# Patient Record
Sex: FEMALE | Race: WHITE | Employment: OTHER | URBAN - NONMETROPOLITAN AREA
[De-identification: names, ages, dates, MRNs, and addresses within clinical notes are randomized per-mention and may not be internally consistent; named-entity substitution may affect disease eponyms.]

---

## 2017-01-26 ENCOUNTER — OFFICE VISIT (OUTPATIENT)
Dept: CARDIOLOGY | Age: 67
End: 2017-01-26

## 2017-01-26 VITALS
HEART RATE: 60 BPM | SYSTOLIC BLOOD PRESSURE: 140 MMHG | HEIGHT: 68 IN | RESPIRATION RATE: 14 BRPM | WEIGHT: 207.4 LBS | BODY MASS INDEX: 31.43 KG/M2 | DIASTOLIC BLOOD PRESSURE: 70 MMHG

## 2017-01-26 DIAGNOSIS — I48.0 PAF (PAROXYSMAL ATRIAL FIBRILLATION) (HCC): Primary | ICD-10-CM

## 2017-01-26 DIAGNOSIS — Z98.84 S/P LAPAROSCOPIC SLEEVE GASTRECTOMY: ICD-10-CM

## 2017-01-26 DIAGNOSIS — I50.32 CHRONIC DIASTOLIC CONGESTIVE HEART FAILURE (HCC): ICD-10-CM

## 2017-01-26 DIAGNOSIS — I10 ESSENTIAL HYPERTENSION: ICD-10-CM

## 2017-01-26 DIAGNOSIS — R06.02 SOB (SHORTNESS OF BREATH) ON EXERTION: ICD-10-CM

## 2017-01-26 PROCEDURE — 99214 OFFICE O/P EST MOD 30 MIN: CPT | Performed by: INTERNAL MEDICINE

## 2017-01-26 RX ORDER — LOSARTAN POTASSIUM 50 MG/1
50 TABLET ORAL DAILY
Qty: 90 TABLET | Refills: 3 | Status: SHIPPED | OUTPATIENT
Start: 2017-01-26 | End: 2018-08-21 | Stop reason: SDUPTHER

## 2017-01-26 RX ORDER — CLOPIDOGREL BISULFATE 75 MG/1
75 TABLET ORAL DAILY
Qty: 90 TABLET | Refills: 3 | Status: SHIPPED | OUTPATIENT
Start: 2017-01-26 | End: 2017-09-29 | Stop reason: ALTCHOICE

## 2017-01-26 RX ORDER — DILTIAZEM HYDROCHLORIDE 120 MG/1
120 CAPSULE, COATED, EXTENDED RELEASE ORAL DAILY
Qty: 90 CAPSULE | Refills: 3 | Status: SHIPPED | OUTPATIENT
Start: 2017-01-26 | End: 2018-06-14 | Stop reason: SDUPTHER

## 2017-03-13 ENCOUNTER — OFFICE VISIT (OUTPATIENT)
Dept: BARIATRICS/WEIGHT MGMT | Age: 67
End: 2017-03-13

## 2017-03-13 VITALS
HEIGHT: 68 IN | DIASTOLIC BLOOD PRESSURE: 72 MMHG | SYSTOLIC BLOOD PRESSURE: 124 MMHG | BODY MASS INDEX: 30.34 KG/M2 | WEIGHT: 200.2 LBS | HEART RATE: 60 BPM | TEMPERATURE: 97.7 F | RESPIRATION RATE: 18 BRPM

## 2017-03-13 DIAGNOSIS — E66.9 OBESITY, CLASS I, BMI 30-34.9: ICD-10-CM

## 2017-03-13 DIAGNOSIS — Z13.21 SCREENING FOR MALNUTRITION: ICD-10-CM

## 2017-03-13 DIAGNOSIS — K21.9 GASTROESOPHAGEAL REFLUX DISEASE, ESOPHAGITIS PRESENCE NOT SPECIFIED: Primary | ICD-10-CM

## 2017-03-13 DIAGNOSIS — E11.9 TYPE 2 DIABETES MELLITUS WITHOUT COMPLICATION, UNSPECIFIED LONG TERM INSULIN USE STATUS: ICD-10-CM

## 2017-03-13 DIAGNOSIS — Z98.84 STATUS POST LAPAROSCOPIC SLEEVE GASTRECTOMY: ICD-10-CM

## 2017-03-13 DIAGNOSIS — K91.2 POSTSURGICAL MALABSORPTION: ICD-10-CM

## 2017-03-13 PROCEDURE — 99213 OFFICE O/P EST LOW 20 MIN: CPT | Performed by: PHYSICIAN ASSISTANT

## 2017-03-13 RX ORDER — OMEPRAZOLE 20 MG/1
20 TABLET, DELAYED RELEASE ORAL DAILY
Qty: 30 TABLET | Refills: 3 | Status: SHIPPED | OUTPATIENT
Start: 2017-03-13 | End: 2017-03-17 | Stop reason: ALTCHOICE

## 2017-03-17 DIAGNOSIS — K21.9 GASTROESOPHAGEAL REFLUX DISEASE, ESOPHAGITIS PRESENCE NOT SPECIFIED: ICD-10-CM

## 2017-03-17 RX ORDER — FAMOTIDINE 20 MG/1
40 TABLET, FILM COATED ORAL DAILY
Qty: 30 TABLET | Refills: 2 | Status: SHIPPED | OUTPATIENT
Start: 2017-03-17 | End: 2017-10-05 | Stop reason: ALTCHOICE

## 2017-06-12 ENCOUNTER — OFFICE VISIT (OUTPATIENT)
Dept: BARIATRICS/WEIGHT MGMT | Age: 67
End: 2017-06-12

## 2017-06-12 VITALS
BODY MASS INDEX: 28.79 KG/M2 | SYSTOLIC BLOOD PRESSURE: 158 MMHG | TEMPERATURE: 97.2 F | WEIGHT: 190 LBS | HEIGHT: 68 IN | DIASTOLIC BLOOD PRESSURE: 70 MMHG | HEART RATE: 56 BPM

## 2017-06-12 DIAGNOSIS — D50.9 IRON DEFICIENCY ANEMIA, UNSPECIFIED IRON DEFICIENCY ANEMIA TYPE: ICD-10-CM

## 2017-06-12 DIAGNOSIS — K21.9 GASTROESOPHAGEAL REFLUX DISEASE, ESOPHAGITIS PRESENCE NOT SPECIFIED: ICD-10-CM

## 2017-06-12 DIAGNOSIS — R53.83 FATIGUE, UNSPECIFIED TYPE: ICD-10-CM

## 2017-06-12 DIAGNOSIS — Z98.84 STATUS POST LAPAROSCOPIC SLEEVE GASTRECTOMY: ICD-10-CM

## 2017-06-12 DIAGNOSIS — I10 ESSENTIAL HYPERTENSION: ICD-10-CM

## 2017-06-12 DIAGNOSIS — Z13.21 SCREENING FOR MALNUTRITION: ICD-10-CM

## 2017-06-12 DIAGNOSIS — E66.9 OBESITY, CLASS I, BMI 30-34.9: Primary | ICD-10-CM

## 2017-06-12 DIAGNOSIS — K91.2 POSTSURGICAL MALABSORPTION: ICD-10-CM

## 2017-06-12 PROCEDURE — 99213 OFFICE O/P EST LOW 20 MIN: CPT | Performed by: PHYSICIAN ASSISTANT

## 2017-06-26 ENCOUNTER — OFFICE VISIT (OUTPATIENT)
Dept: CARDIOLOGY | Age: 67
End: 2017-06-26

## 2017-06-26 VITALS
SYSTOLIC BLOOD PRESSURE: 140 MMHG | HEIGHT: 68 IN | HEART RATE: 68 BPM | BODY MASS INDEX: 29.52 KG/M2 | DIASTOLIC BLOOD PRESSURE: 60 MMHG | WEIGHT: 194.8 LBS

## 2017-06-26 DIAGNOSIS — E66.01 MORBID OBESITY DUE TO EXCESS CALORIES (HCC): ICD-10-CM

## 2017-06-26 DIAGNOSIS — I48.0 PAF (PAROXYSMAL ATRIAL FIBRILLATION) (HCC): Primary | ICD-10-CM

## 2017-06-26 DIAGNOSIS — I27.20 PULMONARY HTN (HCC): ICD-10-CM

## 2017-06-26 DIAGNOSIS — I10 ESSENTIAL HYPERTENSION: ICD-10-CM

## 2017-06-26 PROCEDURE — 99214 OFFICE O/P EST MOD 30 MIN: CPT | Performed by: INTERNAL MEDICINE

## 2017-06-26 RX ORDER — CELECOXIB 200 MG/1
200 CAPSULE ORAL PRN
COMMUNITY
End: 2017-09-29 | Stop reason: ALTCHOICE

## 2017-08-04 ENCOUNTER — HOSPITAL ENCOUNTER (OUTPATIENT)
Dept: CT IMAGING | Age: 67
Discharge: HOME | End: 2017-08-04
Payer: MEDICARE

## 2017-08-04 DIAGNOSIS — I65.23 BILATERAL CAROTID ARTERY STENOSIS: ICD-10-CM

## 2017-08-04 DIAGNOSIS — R92.2 INCONCLUSIVE MAMMOGRAM: ICD-10-CM

## 2017-08-04 LAB — POC CREATININE WHOLE BLOOD: 0.7 MG/DL (ref 0.5–1.2)

## 2017-08-04 PROCEDURE — 6360000004 HC RX CONTRAST MEDICATION: Performed by: THORACIC SURGERY (CARDIOTHORACIC VASCULAR SURGERY)

## 2017-08-04 PROCEDURE — 70498 CT ANGIOGRAPHY NECK: CPT

## 2017-08-04 PROCEDURE — 82565 ASSAY OF CREATININE: CPT

## 2017-08-04 RX ADMIN — IOPAMIDOL 85 ML: 755 INJECTION, SOLUTION INTRAVENOUS at 11:03

## 2017-08-21 PROBLEM — I65.23 BILATERAL CAROTID ARTERY STENOSIS: Status: ACTIVE | Noted: 2017-08-21

## 2017-08-21 PROBLEM — Z72.0 TOBACCO ABUSE: Status: ACTIVE | Noted: 2017-08-21

## 2017-09-26 ENCOUNTER — TELEPHONE (OUTPATIENT)
Dept: CARDIOLOGY CLINIC | Age: 67
End: 2017-09-26

## 2017-09-26 ENCOUNTER — TELEPHONE (OUTPATIENT)
Dept: BARIATRICS/WEIGHT MGMT | Age: 67
End: 2017-09-26

## 2017-09-29 ENCOUNTER — HOSPITAL ENCOUNTER (OUTPATIENT)
Age: 67
Discharge: HOME | End: 2017-09-29
Payer: MEDICARE

## 2017-09-29 ENCOUNTER — OFFICE VISIT (OUTPATIENT)
Dept: CARDIOLOGY CLINIC | Age: 67
End: 2017-09-29
Payer: MEDICARE

## 2017-09-29 VITALS
DIASTOLIC BLOOD PRESSURE: 54 MMHG | HEIGHT: 68 IN | WEIGHT: 194 LBS | SYSTOLIC BLOOD PRESSURE: 156 MMHG | BODY MASS INDEX: 29.4 KG/M2 | HEART RATE: 64 BPM

## 2017-09-29 DIAGNOSIS — K91.2 POSTSURGICAL MALABSORPTION: ICD-10-CM

## 2017-09-29 DIAGNOSIS — R06.02 SOB (SHORTNESS OF BREATH) ON EXERTION: ICD-10-CM

## 2017-09-29 DIAGNOSIS — Z13.21 SCREENING FOR MALNUTRITION: ICD-10-CM

## 2017-09-29 DIAGNOSIS — Z98.84 STATUS POST LAPAROSCOPIC SLEEVE GASTRECTOMY: ICD-10-CM

## 2017-09-29 DIAGNOSIS — I50.32 CHRONIC DIASTOLIC CONGESTIVE HEART FAILURE (HCC): ICD-10-CM

## 2017-09-29 DIAGNOSIS — R00.1 SINUS BRADYCARDIA: ICD-10-CM

## 2017-09-29 DIAGNOSIS — R53.83 FATIGUE, UNSPECIFIED TYPE: ICD-10-CM

## 2017-09-29 DIAGNOSIS — E66.9 OBESITY, CLASS I, BMI 30-34.9: ICD-10-CM

## 2017-09-29 DIAGNOSIS — D50.9 IRON DEFICIENCY ANEMIA, UNSPECIFIED IRON DEFICIENCY ANEMIA TYPE: ICD-10-CM

## 2017-09-29 DIAGNOSIS — I10 HTN (HYPERTENSION), BENIGN: ICD-10-CM

## 2017-09-29 DIAGNOSIS — I48.0 PAF (PAROXYSMAL ATRIAL FIBRILLATION) (HCC): Primary | ICD-10-CM

## 2017-09-29 DIAGNOSIS — I27.20 PULMONARY HTN (HCC): ICD-10-CM

## 2017-09-29 LAB
ALBUMIN SERPL-MCNC: 4.3 G/DL (ref 3.5–5.1)
ALP BLD-CCNC: 120 U/L (ref 38–126)
ALT SERPL-CCNC: 36 U/L (ref 11–66)
ANION GAP SERPL CALCULATED.3IONS-SCNC: 15 MEQ/L (ref 8–16)
AST SERPL-CCNC: 22 U/L (ref 5–40)
AVERAGE GLUCOSE: 72 MG/DL (ref 70–126)
BASOPHILS # BLD: 0.3 %
BASOPHILS ABSOLUTE: 0 THOU/MM3 (ref 0–0.1)
BILIRUB SERPL-MCNC: 0.5 MG/DL (ref 0.3–1.2)
BUN BLDV-MCNC: 21 MG/DL (ref 7–22)
CALCIUM SERPL-MCNC: 9.6 MG/DL (ref 8.5–10.5)
CHLORIDE BLD-SCNC: 101 MEQ/L (ref 98–111)
CHOLESTEROL, TOTAL: 106 MG/DL (ref 100–199)
CO2: 26 MEQ/L (ref 23–33)
CREAT SERPL-MCNC: 0.7 MG/DL (ref 0.4–1.2)
EOSINOPHIL # BLD: 2 %
EOSINOPHILS ABSOLUTE: 0.2 THOU/MM3 (ref 0–0.4)
FERRITIN: 152 NG/ML (ref 10–291)
FOLATE: > 20 NG/ML (ref 4.8–24.2)
GFR SERPL CREATININE-BSD FRML MDRD: 83 ML/MIN/1.73M2
GLUCOSE BLD-MCNC: 97 MG/DL (ref 70–108)
HBA1C MFR BLD: 4.4 % (ref 4.4–6.4)
HCT VFR BLD CALC: 36.8 % (ref 37–47)
HDLC SERPL-MCNC: 53 MG/DL
HEMOGLOBIN: 12.6 GM/DL (ref 12–16)
IRON: 109 UG/DL (ref 50–170)
LDL CHOLESTEROL CALCULATED: 40 MG/DL
LYMPHOCYTES # BLD: 29.1 %
LYMPHOCYTES ABSOLUTE: 3.3 THOU/MM3 (ref 1–4.8)
MCH RBC QN AUTO: 33 PG (ref 27–31)
MCHC RBC AUTO-ENTMCNC: 34.2 GM/DL (ref 33–37)
MCV RBC AUTO: 96.5 FL (ref 81–99)
MONOCYTES # BLD: 12 %
MONOCYTES ABSOLUTE: 1.4 THOU/MM3 (ref 0.4–1.3)
NUCLEATED RED BLOOD CELLS: 0 /100 WBC
PDW BLD-RTO: 13.9 % (ref 11.5–14.5)
PLATELET # BLD: 320 THOU/MM3 (ref 130–400)
PMV BLD AUTO: 7.3 MCM (ref 7.4–10.4)
POTASSIUM SERPL-SCNC: 3.8 MEQ/L (ref 3.5–5.2)
PREALBUMIN: 27.4 MG/DL (ref 20–40)
PTH INTACT: 41.5 PG/ML (ref 15–65)
RBC # BLD: 3.81 MILL/MM3 (ref 4.2–5.4)
RBC # BLD: NORMAL 10*6/UL
SEG NEUTROPHILS: 56.6 %
SEGMENTED NEUTROPHILS ABSOLUTE COUNT: 6.4 THOU/MM3 (ref 1.8–7.7)
SODIUM BLD-SCNC: 142 MEQ/L (ref 135–145)
TOTAL IRON BINDING CAPACITY: 276 UG/DL (ref 171–450)
TOTAL PROTEIN: 7.1 G/DL (ref 6.1–8)
TRIGL SERPL-MCNC: 65 MG/DL (ref 0–199)
TSH SERPL DL<=0.05 MIU/L-ACNC: 3.65 UIU/ML (ref 0.4–4.2)
VITAMIN B-12: > 2000 PG/ML (ref 211–911)
VITAMIN D 25-HYDROXY: 60 NG/ML (ref 30–100)
WBC # BLD: 11.3 THOU/MM3 (ref 4.8–10.8)

## 2017-09-29 PROCEDURE — 84425 ASSAY OF VITAMIN B-1: CPT

## 2017-09-29 PROCEDURE — 83036 HEMOGLOBIN GLYCOSYLATED A1C: CPT

## 2017-09-29 PROCEDURE — 83540 ASSAY OF IRON: CPT

## 2017-09-29 PROCEDURE — 84134 ASSAY OF PREALBUMIN: CPT

## 2017-09-29 PROCEDURE — 83550 IRON BINDING TEST: CPT

## 2017-09-29 PROCEDURE — 36415 COLL VENOUS BLD VENIPUNCTURE: CPT

## 2017-09-29 PROCEDURE — 82728 ASSAY OF FERRITIN: CPT

## 2017-09-29 PROCEDURE — 80061 LIPID PANEL: CPT

## 2017-09-29 PROCEDURE — 82306 VITAMIN D 25 HYDROXY: CPT

## 2017-09-29 PROCEDURE — 80050 GENERAL HEALTH PANEL: CPT

## 2017-09-29 PROCEDURE — 83970 ASSAY OF PARATHORMONE: CPT

## 2017-09-29 PROCEDURE — 99213 OFFICE O/P EST LOW 20 MIN: CPT | Performed by: INTERNAL MEDICINE

## 2017-09-29 PROCEDURE — 82746 ASSAY OF FOLIC ACID SERUM: CPT

## 2017-09-29 PROCEDURE — 82607 VITAMIN B-12: CPT

## 2017-09-29 RX ORDER — CHLORTHALIDONE 25 MG/1
25 TABLET ORAL DAILY
COMMUNITY
End: 2019-01-09 | Stop reason: SDUPTHER

## 2017-09-29 RX ORDER — TRAMADOL HYDROCHLORIDE 50 MG/1
50 TABLET ORAL EVERY 6 HOURS PRN
COMMUNITY
End: 2020-10-05

## 2017-09-29 RX ORDER — MULTIVIT WITH MIN/MFOLATE/K2 340-15/3 G
5000 POWDER (GRAM) ORAL DAILY
COMMUNITY
End: 2017-10-05

## 2017-09-29 RX ORDER — PANTOPRAZOLE SODIUM 40 MG/1
40 GRANULE, DELAYED RELEASE ORAL
COMMUNITY
End: 2019-02-11 | Stop reason: SDUPTHER

## 2017-09-29 RX ORDER — HYDROCODONE BITARTRATE AND ACETAMINOPHEN 5; 325 MG/1; MG/1
1 TABLET ORAL EVERY 6 HOURS PRN
COMMUNITY
End: 2020-03-04 | Stop reason: ALTCHOICE

## 2017-09-29 RX ORDER — CLONIDINE HYDROCHLORIDE 0.1 MG/1
0.1 TABLET ORAL 2 TIMES DAILY
COMMUNITY
End: 2017-12-29 | Stop reason: SDUPTHER

## 2017-09-29 RX ORDER — ATORVASTATIN CALCIUM 40 MG/1
40 TABLET, FILM COATED ORAL DAILY
COMMUNITY
End: 2018-09-10 | Stop reason: ALTCHOICE

## 2017-10-03 LAB — VITAMIN B1 WHOLE BLOOD: 115 NMOL/L (ref 70–180)

## 2017-10-05 ENCOUNTER — OFFICE VISIT (OUTPATIENT)
Dept: BARIATRICS/WEIGHT MGMT | Age: 67
End: 2017-10-05
Payer: MEDICARE

## 2017-10-05 VITALS
WEIGHT: 187.8 LBS | SYSTOLIC BLOOD PRESSURE: 130 MMHG | HEIGHT: 67 IN | DIASTOLIC BLOOD PRESSURE: 84 MMHG | HEART RATE: 80 BPM | RESPIRATION RATE: 18 BRPM | BODY MASS INDEX: 29.47 KG/M2 | TEMPERATURE: 98.8 F

## 2017-10-05 DIAGNOSIS — E03.9 HYPOTHYROIDISM, UNSPECIFIED TYPE: ICD-10-CM

## 2017-10-05 DIAGNOSIS — D50.9 IRON DEFICIENCY ANEMIA, UNSPECIFIED IRON DEFICIENCY ANEMIA TYPE: ICD-10-CM

## 2017-10-05 DIAGNOSIS — E78.5 HYPERLIPIDEMIA, UNSPECIFIED HYPERLIPIDEMIA TYPE: ICD-10-CM

## 2017-10-05 DIAGNOSIS — K21.9 GASTROESOPHAGEAL REFLUX DISEASE, ESOPHAGITIS PRESENCE NOT SPECIFIED: ICD-10-CM

## 2017-10-05 DIAGNOSIS — E66.3 OVERWEIGHT (BMI 25.0-29.9): Primary | ICD-10-CM

## 2017-10-05 DIAGNOSIS — I10 ESSENTIAL HYPERTENSION: ICD-10-CM

## 2017-10-05 DIAGNOSIS — Z98.84 STATUS POST LAPAROSCOPIC SLEEVE GASTRECTOMY: ICD-10-CM

## 2017-10-05 PROCEDURE — 99213 OFFICE O/P EST LOW 20 MIN: CPT | Performed by: PHYSICIAN ASSISTANT

## 2017-10-05 RX ORDER — FAMOTIDINE 20 MG/1
20 TABLET, FILM COATED ORAL NIGHTLY PRN
Qty: 30 TABLET | Refills: 2 | Status: SHIPPED | OUTPATIENT
Start: 2017-10-05 | End: 2019-08-12 | Stop reason: ALTCHOICE

## 2017-10-05 NOTE — PROGRESS NOTES
[facility] PROFESSIONAL [facility]S  [facility]  [address], [address]  [address] [address]  Dept: [phone]  Loc: [phone]      Visit Date:  10/5/2017  Weight Management Postop Follow-up    HPI:      Nitin Rojas is a 69 y.o. female who is here today for 1 year follow up since  robotic-assisted sleeve gastrectomy performed by Dr. Chloe Coronel  on 9/12/16. Down another 3# since last visit. Down 66# since surgery. Reports that she recently had TIA/ carotid endarterectomy 9/11/17. Reports that she has had a very rough recovery. About 1 week post-op she had a large hematoma/ very high BP and severe pain which she was admitted again to the hospital for another 5 days. She continues to feel off balance and weak. She has been following closely with Cardiology/PCP/ Home health. Drinking around 48 oz of fluid and plenty of  protein daily. Continues to drink 1 protein shake daily. Tolerating most foods. Has had issues with constipation since surgery and taking narcotic pain medications. No nausea. No emesis. GERD/ Reflux-continues to take PPI. Reports that she is having sever reflux at night and is taking 2-3 Tums every night. Denies CP/SOB. No abdominal pain. No incisional discomfort. No sx of dehydration. No fever or chills. Taking and tolerating all vitamins. Off CPAP. Off all DM meds. 12 month labs reviewed with patient today. Physical activity has been limited due to recent surgery. Current BMI: Body mass index is 29.2 kg/(m^2).   Current Weight:   Wt Readings from Last 3 Encounters:   10/05/17 187 lb 12.8 oz (85.2 kg)   09/29/17 194 lb (88 kg)   08/21/17 201 lb 1.6 oz (91.2 kg)     Pre-op Body Weight: 253    Past Medical History:  Past Medical History:   Diagnosis Date    Arthritis     Atrial fibrillation (HCC)     Cancer ([address]     skin    [facility]     CHF (congestive heart failure) ([address]     Depression     Diabetes mellitus ([facility] [facility]     Fibromyalgia     Hypertension diltiazem (CARDIZEM CD) 120 MG extended release capsule Take 1 capsule by mouth daily (Patient taking differently: Take 240 mg by mouth daily ) 90 capsule 3    losartan (COZAAR) 50 MG tablet Take 1 tablet by mouth daily 90 tablet 3    Cholecalciferol (VITAMIN D3) 5000 UNITS CAPS Take 2 capsules by mouth daily      oxybutynin (DITROPAN) 5 MG tablet Take 5 mg by mouth 2 times daily      sulfaSALAzine (AZULFIDINE) 500 MG tablet Take 500 mg by mouth 2 times daily      levothyroxine (SYNTHROID) 50 MCG tablet Take 50 mcg by mouth daily       loratadine (CLARITIN) 10 MG tablet Take 10 mg by mouth as needed      hydroxychloroquine (PLAQUENIL) 200 MG tablet Take 200 mg by mouth 2 times daily.  traMADol (ULTRAM) 50 MG tablet Take 50 mg by mouth every 6 hours as needed for Pain      albuterol sulfate HFA (VENTOLIN HFA) 108 (90 BASE) MCG/ACT inhaler Inhale 2 puffs into the lungs every 6 hours as needed for Wheezing 1 Inhaler 11    modafinil (PROVIGIL) 200 MG tablet Take 400 mg by mouth daily        No current facility-administered medications for this visit. Allergies: Allergies   Allergen Reactions    Demerol Hcl [Meperidine] Nausea And Vomiting       Subjective:    Constitutional: Denies any fever, chills (+) fatigue. Wound: Denies any rash, skin color changes or wound problems. Resp: Denies any cough, shortness of breath. CV: Denies any chest pain, orthopnea or syncope. MS: Denies myalgias, arthralgias. GI: Denies any nausea, vomiting, diarrhea (+) intermittent constipation. Denies melena, hematochezia. No incisional discomfort.   (+) GERD  : Denies any hematuria, hesitancy or dysuria. Objective:      /84 (Site: Right Arm, Position: Sitting, Cuff Size: Large Adult)  Pulse 80  Temp 98.8 °F (37.1 °C) (Oral)   Resp 18  Ht 5' 7.25\" (1.708 m)  Wt 187 lb 12.8 oz (85.2 kg)  BMI 29.2 kg/m2    Physical Examination:   Constitutional: Alert and oriented to person, place and time. Well-developed, well- nourished. Head: Normocephalic and atraumatic  Neck: Supple. Large incision over left carotid. Eyes: EOMI b/l. Conjunctivae normal.  No scleral icterus. Respiratory: Effort normal. No respiratory distress. Abd: Benign  Ext:  Movement x 4. No edema  Skin; Warm and dry, no visible rashes, lesions or ulcers.    Neuro: Cranial Nerves Grossly Intact; nml coordination    Labs:  CBC   Lab Results   Component Value Date    WBC 11.3 09/29/2017    RBC 3.81 09/29/2017    HGB 12.6 09/29/2017    HCT 36.8 09/29/2017    MCV 96.5 09/29/2017    MCH 33.0 09/29/2017    MCHC 34.2 09/29/2017    RDW 13.9 09/29/2017     09/29/2017    MPV 7.3 09/29/2017    RBCMORP NORMAL 09/29/2017    SEGSPCT 56.6 09/29/2017    LABLYMP 29.1 09/29/2017    MONOPCT 12.0 09/29/2017    LABEOS 2.0 09/29/2017    BASO 0.3 09/29/2017    NRBC 0 09/29/2017    ANISOCYTOSIS 1+ 03/10/2017    SEGSABS 6.4 09/29/2017    LYMPHSABS 3.3 09/29/2017    MONOSABS 1.4 09/29/2017    EOSABS 0.2 09/29/2017    BASOSABS 0.0 09/29/2017        BMP/CMP   Lab Results   Component Value Date    GLUCOSE 97 09/29/2017    CREATININE 0.7 09/29/2017    BUN 21 09/29/2017     09/29/2017    K 3.8 09/29/2017     09/29/2017    CO2 26 09/29/2017    CALCIUM 9.6 09/29/2017    AST 22 09/29/2017    ALKPHOS 120 09/29/2017    PROT 7.1 09/29/2017    LABALBU 4.3 09/29/2017    BILITOT 0.5 09/29/2017    ALT 36 09/29/2017        PREALBUMIN   Lab Results   Component Value Date    PREALBUMIN 27.4 09/29/2017        VITAMIN B12   Lab Results   Component Value Date    QEWVNBWE60 > 2000 09/29/2017        24 HOUR URINE CALCIUM   Lab Results   Component Value Date    URVOLMEAS 980 03/10/2017    HOURSC 24.0 03/10/2017    CALCIUMUR 39.4 03/10/2017    CALCIUMUR 386 03/10/2017        VITAMIN D   Lab Results   Component Value Date    VITD25 60 09/29/2017        RBC FOLATE   Lab Results   Component Value Date    FOLATE > 20.0 09/29/2017        LIPID SCREEN (FASTING)   Lab Results

## 2017-10-11 ENCOUNTER — TELEPHONE (OUTPATIENT)
Dept: BARIATRICS/WEIGHT MGMT | Age: 67
End: 2017-10-11

## 2017-10-11 PROBLEM — Z98.890 S/P CAROTID ENDARTERECTOMY: Status: ACTIVE | Noted: 2017-10-11

## 2017-10-11 PROBLEM — Z72.0 TOBACCO ABUSE: Status: RESOLVED | Noted: 2017-08-21 | Resolved: 2017-10-11

## 2017-10-27 ENCOUNTER — OFFICE VISIT (OUTPATIENT)
Dept: PULMONOLOGY | Age: 67
End: 2017-10-27
Payer: MEDICARE

## 2017-10-27 VITALS
OXYGEN SATURATION: 96 % | DIASTOLIC BLOOD PRESSURE: 75 MMHG | HEART RATE: 76 BPM | WEIGHT: 194.4 LBS | HEIGHT: 67 IN | SYSTOLIC BLOOD PRESSURE: 136 MMHG | BODY MASS INDEX: 30.51 KG/M2

## 2017-10-27 DIAGNOSIS — G47.33 OSA ON CPAP: Primary | ICD-10-CM

## 2017-10-27 DIAGNOSIS — J44.9 CHRONIC OBSTRUCTIVE PULMONARY DISEASE, UNSPECIFIED COPD TYPE (HCC): ICD-10-CM

## 2017-10-27 DIAGNOSIS — Z99.89 OSA ON CPAP: Primary | ICD-10-CM

## 2017-10-27 PROCEDURE — 99213 OFFICE O/P EST LOW 20 MIN: CPT | Performed by: INTERNAL MEDICINE

## 2017-10-27 NOTE — PATIENT INSTRUCTIONS
Recommendations/Plan:  - Schedule patient for nocturnal polysomnogram (Sleep study) at [facility] sleep lab to check the current status of sleep apnea due to significant weight loss. -Continue Provigil 200mg po daily in Am. She gets her prescriptions from Dr. Yao Swann  -She was advised to continue current positive airway pressure therapy with above described pressure for now  -She advised to keep good compliance with current recommended pressure to get optimal results and clinical improvement.  -Follow with my clinic in 1week after above sleep study for clinical reevaluation. '  - Schedule patient for follow up with my clinic in 6months with download if she choose to not to go for baseline sleep test as above. Patient advised to make early appointment if needed.  -At the request of patient, she was given prescription for CPAP supplies to submit to [employer] .  -She was advised to call [facility] regarding supplies if needed.  -She was advised to loose weight by controlling diet and doing exercise once cleared by her family physician. She is currently following with weight management.   -She call my office for earlier appointment if needed for worsening of sleep symptoms.  -Patient to keep follow up with my clinic at center for pulmonary disease.  -Raymon Standing educated about my impression and plan. Patient verbalizes understanding.

## 2017-10-27 NOTE — PROGRESS NOTES
Chief Complaint:  Awanda Duane is here for a 16 month follow-up for SHANNEN with a download. Mallampati airway Class:  4  Neck Circumference:  16.5 Inches    [place] sleepiness score 10/27/17:  6. Diagnostic Data:  1/15/02  PAP Download:    Recorded compliance dates:  9/16/17 -  10/16/17  More than 4hour usage compliance was:  13%.   Average residual Apnea- Hypoapnea index on current pressue was:0.7       PAP Type CPAP   Level  11      Average usuage hours per day was: 6:04    Interface: FFM    Provider:  []-[facility]  [][facility]  [][facility]  [][facility]         [x][facility] []Other:
Cholecalciferol (VITAMIN D3) 5000 UNITS CAPS Take 2 capsules by mouth daily      oxybutynin (DITROPAN) 5 MG tablet Take 5 mg by mouth 2 times daily      sulfaSALAzine (AZULFIDINE) 500 MG tablet Take 500 mg by mouth 2 times daily      albuterol sulfate HFA (VENTOLIN HFA) 108 (90 BASE) MCG/ACT inhaler Inhale 2 puffs into the lungs every 6 hours as needed for Wheezing 1 Inhaler 11    levothyroxine (SYNTHROID) 50 MCG tablet Take 50 mcg by mouth daily       modafinil (PROVIGIL) 200 MG tablet Take 400 mg by mouth as needed       loratadine (CLARITIN) 10 MG tablet Take 10 mg by mouth as needed      hydroxychloroquine (PLAQUENIL) 200 MG tablet Take 200 mg by mouth 2 times daily. No current facility-administered medications for this visit. Family History   Problem Relation Age of Onset    Stroke Mother     High Blood Pressure Mother     Atrial Fibrillation Mother     Heart Disease Mother     Diabetes Father     Early Death Father     Cancer Other     Heart Disease Sister           /75 (Site: Left Arm, Position: Sitting)   Pulse 76   Ht 5' 7.25\" (1.708 m)   Wt 194 lb 6.4 oz (88.2 kg)   SpO2 96%   BMI 30.22 kg/m²   Mallampati airway Class:  4  Neck Circumference:  16.5 Inches  [place] sleepiness score 10/27/17:  6. Physical Exam   Nursing note and vitals reviewed. Constitutional: Patient appears well built and well nourished. No distress. Patient is oriented to person, place, and time. HENT:   Head: Normocephalic and atraumatic. Right Ear: External ear normal.   Left Ear: External ear normal.   Mouth/Throat: Oropharynx is clear and moist.  No oral thrush. Eyes: Conjunctivae are normal. Pupils are equal, round, and reactive to light. No scleral icterus. Neck: Neck supple. No JVD present. No tracheal deviation present. Cardiovascular: Normal rate, regular rhythm, normal heart sounds. No murmur heard. Pulmonary/Chest: Effort normal and breath sounds normal. No stridor.  No

## 2017-11-10 ENCOUNTER — TELEPHONE (OUTPATIENT)
Dept: SLEEP CENTER | Age: 67
End: 2017-11-10

## 2017-11-10 DIAGNOSIS — G47.30 SLEEP APNEA, UNSPECIFIED TYPE: Primary | ICD-10-CM

## 2017-11-10 NOTE — TELEPHONE ENCOUNTER
From [facility] [birth year] was denied for in-lab. If desired, peer to peer can be done at [phone] opt 6. The ref # is [id number]. An HST does not require an [facility]. Thanks!   Please Advise  Thanks  Nancy Saravia CMA

## 2017-11-15 ENCOUNTER — HOSPITAL ENCOUNTER (OUTPATIENT)
Dept: SLEEP CENTER | Age: 67
Discharge: HOME | End: 2017-11-15
Payer: MEDICARE

## 2017-11-15 VITALS — HEIGHT: 67 IN | WEIGHT: 195 LBS | BODY MASS INDEX: 30.61 KG/M2

## 2017-11-15 DIAGNOSIS — G47.30 SLEEP APNEA, UNSPECIFIED TYPE: ICD-10-CM

## 2017-11-15 PROCEDURE — 95806 SLEEP STUDY UNATT&RESP EFFT: CPT

## 2017-11-15 NOTE — PROGRESS NOTES
Alma Rosa Nikunj presents today for a HST instruction and demonstration on unit # 230. Questions were asked and answers given. She was able to return demonstration and verbalized understanding. The sleep center control room phone number was provided incase questions arise during her study. Informed patient to call [phone] in case of an emergency. She states she will return the unit tomorrow.

## 2017-11-17 LAB — STATUS: NORMAL

## 2017-11-18 NOTE — PROGRESS NOTES
[address] [id number]                                SLEEP STUDY REPORT    PATIENT NAME: Mian Echevarria                       :        1950  MED REC NO:   [id number]                           ROOM:  ACCOUNT NO:   [de-identified]                           ADMIT DATE: 11/15/2017  PROVIDER:     Yumiko Shaw MD      DATE OF STUDY:  11/15/2017    REFERRING PROVIDER:  Yumiko Shaw MD    The patient's height is 67.3 inches, weight is 194 pounds with a BMI of  30.2. HISTORY:  The patient is a 75-year-old female diagnosed with mild  obstructive sleep apnea by a sleep study performed in . The patient is  currently on treatment with a CPAP pressure of 11 cm of water. She also  lost a good amount of weight that is 63 pounds from her last visit after  bariatric surgery. She want to check her current status of sleep apnea. The patient is scheduled for a in-lab sleep study to check the current  status of sleep apnea; however, due to denial of in-lab study, she  underwent a home sleep study. The patient had associated comorbidities  including hypersomnia, hypertension, and COPD. METHODS: The patient underwent Type III Portable Monitoring Sleep Study  including the simultaneous recording of oral-nasal airflow, rib and  abdominal respiratory effort, pulse rate, oxygen saturation, left and right  leg movement, and body position. Scoring criteria is consistent with the  current published AASM standards for scoring of apneas and hypopneas 4A. INTERPRETATION:  This is a home/portable sleep study. The study was  performed on 11/15/2017. The study was started at 12:26 a.m. and was  terminated at 07:15 a.m. with a total recording time of 408.5 minutes. RESPIRATORY EVENT ANALYSIS:  Revealed the patient had a total of one apnea  event, which is obstructive in nature.   The patient also had total of

## 2017-11-27 ENCOUNTER — OFFICE VISIT (OUTPATIENT)
Dept: PULMONOLOGY | Age: 67
End: 2017-11-27
Payer: MEDICARE

## 2017-11-27 VITALS
HEART RATE: 77 BPM | TEMPERATURE: 98.3 F | DIASTOLIC BLOOD PRESSURE: 71 MMHG | OXYGEN SATURATION: 97 % | HEIGHT: 67 IN | WEIGHT: 192 LBS | SYSTOLIC BLOOD PRESSURE: 138 MMHG | BODY MASS INDEX: 30.13 KG/M2

## 2017-11-27 DIAGNOSIS — G47.33 OSA ON CPAP: Primary | ICD-10-CM

## 2017-11-27 DIAGNOSIS — Z99.89 OSA ON CPAP: Primary | ICD-10-CM

## 2017-11-27 PROCEDURE — 99213 OFFICE O/P EST LOW 20 MIN: CPT | Performed by: INTERNAL MEDICINE

## 2017-11-27 NOTE — PROGRESS NOTES
Take 10 mg by mouth as needed      hydroxychloroquine (PLAQUENIL) 200 MG tablet Take 200 mg by mouth 2 times daily. No current facility-administered medications for this visit. Family History   Problem Relation Age of Onset    Stroke Mother     High Blood Pressure Mother     Atrial Fibrillation Mother     Heart Disease Mother     Diabetes Father     Early Death Father     Cancer Other     Heart Disease Sister           /71   Pulse 77   Temp 98.3 °F (36.8 °C)   Ht 5' 7\" (1.702 m)   Wt 192 lb (87.1 kg)   SpO2 97% Comment: room air at rest  BMI 30.07 kg/m²     BMI:  Body mass index is 30.07 kg/m². Mallampati airway Class:4  Neck Circumference:.16.5 Inches  [place] sleepiness score /: 8         Physical Exam :  Constitutional: Patient appears moderately built and moderately nourished. No distress. Patient is oriented to person, place, and time. HENT:   Head: Normocephalic and atraumatic. Right Ear: External ear normal.   Left Ear: External ear normal.   Mouth/Throat: Oropharynx is clear and moist.   Eyes: Conjunctivae are normal. Pupils are equal and reactive to light. No scleral icterus. Neck: Neck supple. No JVD present. Cardiovascular: Normal rate, regular rhythm, normal heart sounds. No murmur heard. Pulmonary/Chest: Effort normal and breath sounds normal. No stridor. No respiratory distress. No wheezes. No rales. Abdominal: Soft. Patient exhibits no distension. No tenderness. Musculoskeletal: Normal range of motion. Extremities: Patient exhibits no erythema or no edema. Lymphadenopathy:  No cervical adenopathy. Neurological: Patient is alert and oriented to person, place, and time. Skin: Skin is warm and dry. Patient is not diaphoretic. Psychiatric: Patient  has a normal mood and affect.     Diagnostic Data:  Sleep study done on : 11/15/2017                              SLEEP STUDY REPORT     PATIENT NAME: Moncho Quintana                       :

## 2017-11-27 NOTE — PROGRESS NOTES
Sleep Medicine clinic  Follow up for Sleep Apnea       [address] [place] [address]     Chief Complaint:  Zully King is here for a 16 month follow-up for SHANNEN with a download. [address] [place] [address] is a 69 y.o.oldfemale came for follow up regarding her sleep apnea after having a sleep test on 11/15.17 to check her current status of sleep apnea. She is currently using her positive airway pressure device with a CPAP pressure of 11cm H20. She denies any problems with machine or mask. She underwent Left carotid endarterectomy surgery in September, 2017 at [facility] by Dr. Francisca Mcdaniel. She is sleeping well at night with out difficulty. She denies any daytime sleepiness. She is currently retired. She used to work at [employer]. Review of Systems:   General/Constitutional: She lost 2lbs of weight from last visit with normal appetite. No fever or chills. HENT: Negative. Eyes: Negative. Upper respiratory tract: No nasal stuffiness or post nasal drip. Lower respiratory tract/ lungs: No cough or sputum production. No hemoptysis. Cardiovascular: No palpitations or chest pain. Gastrointestinal: No nausea or vomiting. Neurological: No focal neurologiacal weakness. Extremities: No edema. Musculoskeletal: Improving neck pain on her left side of neck. Genitourinary: No complaints. Hematological: Negative. Psychiatric/Behavioral: Negative. Skin: No itching.     Social History:  Social History   Substance Use Topics    Smoking status: Former Smoker     Packs/day: 0.25     Years: 30.00     Types: Cigarettes     Start date: 3/24/1982     Quit date: 2/24/2015    Smokeless tobacco: Never Used    Alcohol use No       Past Medical History:   Diagnosis Date    Arthritis     [facility]     Cancer ([facility]     skin    Carotid arterial disease ([facility]     CHF (congestive heart failure) ([facility]     Depression     Diabetes mellitus ([facility] MD Edwardo in the past. No need for vasodilator therapy.  -Chronic hx of tobacco smoking- She quit smoking. -COPD- she is currently on treatment with Advair and Albuterol. She is following with her family physician.  -Inadequate sleep hygiene. Recommendations/Plan:  -Continue Provigil 200mg po daily in Am. She gets her prescriptions from Dr. Genie Arenas  -She was advised to continue current positive airway pressure therapy with above described pressure for now  -She advised to keep good compliance with current recommended pressure to get optimal results and clinical improvement. - Schedule patient for follow up with my clinic in 3 to 4months ( Ms Ara Barragan) with download. Patient advised to make early appointment if needed.  -At the request of patient, she was given prescription for CPAP supplies to submit to [employer] .  -She was advised to call [employer] regarding supplies if needed.  -She was advised to loose weight by controlling diet and doing exercise once cleared by her family physician. She is currently following with weight management.   -She call my office for earlier appointment if needed for worsening of sleep symptoms.  -Patient to keep follow up with my clinic at center for pulmonary disease.  -Gaile Mail educated about my impression and plan. Patient verbalizes understanding.

## 2017-11-28 ENCOUNTER — HOSPITAL ENCOUNTER (OUTPATIENT)
Dept: WOMENS IMAGING | Age: 67
Discharge: HOME | End: 2017-11-28
Payer: MEDICARE

## 2017-11-28 DIAGNOSIS — R92.2 BREAST DENSITY: ICD-10-CM

## 2017-11-28 PROCEDURE — 76642 ULTRASOUND BREAST LIMITED: CPT

## 2017-12-29 RX ORDER — CLONIDINE HYDROCHLORIDE 0.1 MG/1
0.1 TABLET ORAL 2 TIMES DAILY
Qty: 180 TABLET | Refills: 1 | Status: SHIPPED | OUTPATIENT
Start: 2017-12-29 | End: 2018-07-01 | Stop reason: SDUPTHER

## 2018-01-05 ENCOUNTER — HOSPITAL ENCOUNTER (OUTPATIENT)
Dept: ULTRASOUND IMAGING | Age: 68
Discharge: HOME | End: 2018-01-05
Payer: MEDICARE

## 2018-01-05 DIAGNOSIS — E78.2 MIXED HYPERLIPIDEMIA: ICD-10-CM

## 2018-01-05 PROCEDURE — 76775 US EXAM ABDO BACK WALL LIM: CPT

## 2018-01-25 ENCOUNTER — OFFICE VISIT (OUTPATIENT)
Dept: SURGERY | Age: 68
End: 2018-01-25
Payer: MEDICARE

## 2018-01-25 ENCOUNTER — TELEPHONE (OUTPATIENT)
Dept: SURGERY | Age: 68
End: 2018-01-25

## 2018-01-25 VITALS
SYSTOLIC BLOOD PRESSURE: 130 MMHG | OXYGEN SATURATION: 93 % | RESPIRATION RATE: 16 BRPM | TEMPERATURE: 97.2 F | HEART RATE: 84 BPM | HEIGHT: 68 IN | DIASTOLIC BLOOD PRESSURE: 84 MMHG | WEIGHT: 204 LBS | BODY MASS INDEX: 30.92 KG/M2

## 2018-01-25 DIAGNOSIS — R19.5 POSITIVE COLORECTAL CANCER SCREENING USING DNA-BASED STOOL TEST: Primary | ICD-10-CM

## 2018-01-25 PROCEDURE — 99214 OFFICE O/P EST MOD 30 MIN: CPT | Performed by: SURGERY

## 2018-01-25 RX ORDER — LOSARTAN POTASSIUM 50 MG/1
TABLET ORAL
Qty: 90 TABLET | Refills: 1 | Status: SHIPPED | OUTPATIENT
Start: 2018-01-25 | End: 2018-04-03 | Stop reason: SDUPTHER

## 2018-01-25 ASSESSMENT — ENCOUNTER SYMPTOMS
APNEA: 1
STRIDOR: 0
WHEEZING: 0
RHINORRHEA: 0
CHEST TIGHTNESS: 0
SORE THROAT: 0
TROUBLE SWALLOWING: 0
EYE ITCHING: 0
BACK PAIN: 0
CHOKING: 0
COLOR CHANGE: 0
SINUS PRESSURE: 0
EYE DISCHARGE: 0
EYE PAIN: 0
EYE REDNESS: 0
PHOTOPHOBIA: 0
FACIAL SWELLING: 0
SINUS PAIN: 0
VOICE CHANGE: 0
COUGH: 0
SHORTNESS OF BREATH: 0

## 2018-01-25 NOTE — TELEPHONE ENCOUNTER
Scheduled [place] for a colonoscopy with anesthesia on Wed 2/7 at 11am & she will arrive at 9:45. She was given the prep instructions in the office. She will stop the eliquis 2 days prior to the procedure.

## 2018-01-26 NOTE — PROGRESS NOTES
 UPPER GASTROINTESTINAL ENDOSCOPY  07/06/2016    Dr. Chiquita Pena      Family History   Problem Relation Age of Onset    Stroke Mother     High Blood Pressure Mother     Atrial Fibrillation Mother     Heart Disease Mother     Diabetes Father     Early Death Father     Cancer Other     Heart Disease Sister      Social History   Substance Use Topics    Smoking status: Former Smoker     Packs/day: 0.25     Years: 30.00     Types: Cigarettes     Start date: 3/24/1982     Quit date: 2/24/2015    Smokeless tobacco: Never Used    Alcohol use No       Current Outpatient Prescriptions   Medication Sig Dispense Refill    aspirin 81 MG tablet Take 81 mg by mouth daily      cloNIDine (CATAPRES) 0.1 MG tablet Take 1 tablet by mouth 2 times daily 180 tablet 1    famotidine (PEPCID) 20 MG tablet Take 1 tablet by mouth nightly as needed (reflux) 30 tablet 2    atorvastatin (LIPITOR) 40 MG tablet Take 40 mg by mouth daily      HYDROcodone-acetaminophen (NORCO) 5-325 MG per tablet Take 1 tablet by mouth every 6 hours as needed for Pain .       chlorthalidone (HYGROTON) 25 MG tablet Take 25 mg by mouth daily      pantoprazole sodium (PROTONIX) 40 MG PACK packet Take 40 mg by mouth every morning (before breakfast)      traMADol (ULTRAM) 50 MG tablet Take 50 mg by mouth every 6 hours as needed for Pain      apixaban (ELIQUIS) 5 MG TABS tablet Take 1 tablet by mouth 2 times daily 180 tablet 0    Ferrous Fumarate (IRON) 18 MG TBCR Take 1 tablet by mouth daily      Cyanocobalamin (VITAMIN B-12 PO) Take 1 tablet by mouth daily      diltiazem (CARDIZEM CD) 120 MG extended release capsule Take 1 capsule by mouth daily (Patient taking differently: Take 240 mg by mouth daily ) 90 capsule 3    losartan (COZAAR) 50 MG tablet Take 1 tablet by mouth daily 90 tablet 3    Cholecalciferol (VITAMIN D3) 5000 UNITS CAPS Take 2 capsules by mouth daily      oxybutynin (DITROPAN) 5 MG tablet Take 5 mg by mouth 2 times daily      sulfaSALAzine (AZULFIDINE) 500 MG tablet Take 500 mg by mouth 2 times daily      albuterol sulfate HFA (VENTOLIN HFA) 108 (90 BASE) MCG/ACT inhaler Inhale 2 puffs into the lungs every 6 hours as needed for Wheezing 1 Inhaler 11    levothyroxine (SYNTHROID) 50 MCG tablet Take 50 mcg by mouth daily       modafinil (PROVIGIL) 200 MG tablet Take 400 mg by mouth as needed       loratadine (CLARITIN) 10 MG tablet Take 10 mg by mouth as needed      hydroxychloroquine (PLAQUENIL) 200 MG tablet Take 200 mg by mouth 2 times daily.  losartan (COZAAR) 50 MG tablet TAKE 1 TABLET DAILY 90 tablet 1     No current facility-administered medications for this visit. Allergies   Allergen Reactions    Demerol Hcl [Meperidine] Nausea And Vomiting       Subjective:      Review of Systems   Constitutional: Negative for activity change, appetite change, chills, diaphoresis, fatigue, fever and unexpected weight change. HENT: Positive for sneezing. Negative for congestion, dental problem, drooling, ear discharge, ear pain, facial swelling, hearing loss, mouth sores, nosebleeds, postnasal drip, rhinorrhea, sinus pain, sinus pressure, sore throat, tinnitus, trouble swallowing and voice change. Eyes: Negative for photophobia, pain, discharge, redness, itching and visual disturbance. Respiratory: Positive for apnea. Negative for cough, choking, chest tightness, shortness of breath, wheezing and stridor. Cardiovascular: Negative for chest pain, palpitations and leg swelling. Endocrine: Negative for cold intolerance, heat intolerance, polydipsia, polyphagia and polyuria. Genitourinary: Positive for difficulty urinating and frequency. Negative for decreased urine volume, dyspareunia, dysuria, enuresis, flank pain, genital sores, hematuria, menstrual problem, pelvic pain, urgency, vaginal bleeding, vaginal discharge and vaginal pain. Musculoskeletal: Positive for joint swelling.  Negative for arthralgias, back pain,

## 2018-02-06 NOTE — H&P
sulfaSALAzine (AZULFIDINE) 500 MG tablet Take 500 mg by mouth 2 times daily      albuterol sulfate HFA (VENTOLIN HFA) 108 (90 BASE) MCG/ACT inhaler Inhale 2 puffs into the lungs every 6 hours as needed for Wheezing 1 Inhaler 11    levothyroxine (SYNTHROID) 50 MCG tablet Take 50 mcg by mouth daily       modafinil (PROVIGIL) 200 MG tablet Take 400 mg by mouth as needed       loratadine (CLARITIN) 10 MG tablet Take 10 mg by mouth as needed      hydroxychloroquine (PLAQUENIL) 200 MG tablet Take 200 mg by mouth 2 times daily.  losartan (COZAAR) 50 MG tablet TAKE 1 TABLET DAILY 90 tablet 1     No current facility-administered medications for this visit. Allergies   Allergen Reactions    Demerol Hcl [Meperidine] Nausea And Vomiting       Subjective:      Review of Systems   Constitutional: Negative for activity change, appetite change, chills, diaphoresis, fatigue, fever and unexpected weight change. HENT: Positive for sneezing. Negative for congestion, dental problem, drooling, ear discharge, ear pain, facial swelling, hearing loss, mouth sores, nosebleeds, postnasal drip, rhinorrhea, sinus pain, sinus pressure, sore throat, tinnitus, trouble swallowing and voice change. Eyes: Negative for photophobia, pain, discharge, redness, itching and visual disturbance. Respiratory: Positive for apnea. Negative for cough, choking, chest tightness, shortness of breath, wheezing and stridor. Cardiovascular: Negative for chest pain, palpitations and leg swelling. Endocrine: Negative for cold intolerance, heat intolerance, polydipsia, polyphagia and polyuria. Genitourinary: Positive for difficulty urinating and frequency. Negative for decreased urine volume, dyspareunia, dysuria, enuresis, flank pain, genital sores, hematuria, menstrual problem, pelvic pain, urgency, vaginal bleeding, vaginal discharge and vaginal pain. Musculoskeletal: Positive for joint swelling.  Negative for arthralgias, back pain,

## 2018-02-07 ENCOUNTER — HOSPITAL ENCOUNTER (OUTPATIENT)
Age: 68
Setting detail: OUTPATIENT SURGERY
Discharge: HOME | End: 2018-02-07
Attending: SURGERY | Admitting: SURGERY
Payer: MEDICARE

## 2018-02-07 ENCOUNTER — ANESTHESIA EVENT (OUTPATIENT)
Dept: ENDOSCOPY | Age: 68
End: 2018-02-07
Payer: MEDICARE

## 2018-02-07 ENCOUNTER — ANESTHESIA (OUTPATIENT)
Dept: ENDOSCOPY | Age: 68
End: 2018-02-07
Payer: MEDICARE

## 2018-02-07 VITALS
SYSTOLIC BLOOD PRESSURE: 116 MMHG | BODY MASS INDEX: 30.16 KG/M2 | WEIGHT: 199 LBS | HEIGHT: 68 IN | OXYGEN SATURATION: 97 % | TEMPERATURE: 97.3 F | HEART RATE: 64 BPM | DIASTOLIC BLOOD PRESSURE: 62 MMHG | RESPIRATION RATE: 18 BRPM

## 2018-02-07 VITALS — DIASTOLIC BLOOD PRESSURE: 58 MMHG | OXYGEN SATURATION: 99 % | SYSTOLIC BLOOD PRESSURE: 110 MMHG

## 2018-02-07 PROCEDURE — 2580000003 HC RX 258

## 2018-02-07 PROCEDURE — 3609010300 HC COLONOSCOPY W/BIOPSY SINGLE/MULTIPLE: Performed by: SURGERY

## 2018-02-07 PROCEDURE — 7100000000 HC PACU RECOVERY - FIRST 15 MIN: Performed by: SURGERY

## 2018-02-07 PROCEDURE — 45380 COLONOSCOPY AND BIOPSY: CPT | Performed by: SURGERY

## 2018-02-07 PROCEDURE — 7100000001 HC PACU RECOVERY - ADDTL 15 MIN: Performed by: SURGERY

## 2018-02-07 PROCEDURE — 88305 TISSUE EXAM BY PATHOLOGIST: CPT

## 2018-02-07 PROCEDURE — 3700000000 HC ANESTHESIA ATTENDED CARE: Performed by: SURGERY

## 2018-02-07 PROCEDURE — 3700000001 HC ADD 15 MINUTES (ANESTHESIA): Performed by: SURGERY

## 2018-02-07 PROCEDURE — 2500000003 HC RX 250 WO HCPCS: Performed by: NURSE ANESTHETIST, CERTIFIED REGISTERED

## 2018-02-07 PROCEDURE — 6360000002 HC RX W HCPCS: Performed by: NURSE ANESTHETIST, CERTIFIED REGISTERED

## 2018-02-07 RX ORDER — PROPOFOL 10 MG/ML
INJECTION, EMULSION INTRAVENOUS PRN
Status: DISCONTINUED | OUTPATIENT
Start: 2018-02-07 | End: 2018-02-07 | Stop reason: SDUPTHER

## 2018-02-07 RX ORDER — SODIUM CHLORIDE 450 MG/100ML
INJECTION, SOLUTION INTRAVENOUS CONTINUOUS
Status: DISCONTINUED | OUTPATIENT
Start: 2018-02-07 | End: 2018-02-07 | Stop reason: HOSPADM

## 2018-02-07 RX ORDER — LIDOCAINE HYDROCHLORIDE 10 MG/ML
INJECTION, SOLUTION INFILTRATION; PERINEURAL PRN
Status: DISCONTINUED | OUTPATIENT
Start: 2018-02-07 | End: 2018-02-07 | Stop reason: SDUPTHER

## 2018-02-07 RX ADMIN — LIDOCAINE HYDROCHLORIDE 40 MG: 10 INJECTION, SOLUTION INFILTRATION; PERINEURAL at 11:30

## 2018-02-07 RX ADMIN — SODIUM CHLORIDE: 4.5 INJECTION, SOLUTION INTRAVENOUS at 11:03

## 2018-02-07 RX ADMIN — PROPOFOL 100 MG: 10 INJECTION, EMULSION INTRAVENOUS at 11:55

## 2018-02-07 RX ADMIN — PROPOFOL 100 MG: 10 INJECTION, EMULSION INTRAVENOUS at 11:49

## 2018-02-07 RX ADMIN — PROPOFOL 100 MG: 10 INJECTION, EMULSION INTRAVENOUS at 11:35

## 2018-02-07 RX ADMIN — PROPOFOL 100 MG: 10 INJECTION, EMULSION INTRAVENOUS at 11:30

## 2018-02-07 ASSESSMENT — PAIN SCALES - GENERAL
PAINLEVEL_OUTOF10: 0
PAINLEVEL_OUTOF10: 0

## 2018-02-07 ASSESSMENT — PAIN - FUNCTIONAL ASSESSMENT: PAIN_FUNCTIONAL_ASSESSMENT: 0-10

## 2018-02-07 ASSESSMENT — ENCOUNTER SYMPTOMS
DYSPNEA ACTIVITY LEVEL: AFTER AMBULATING 1 FLIGHT OF STAIRS
SHORTNESS OF BREATH: 1

## 2018-02-07 NOTE — ANESTHESIA PRE PROCEDURE
Department of Anesthesiology  Preprocedure Note       Name:  Patrice Jacobs   Age:  77 y.o.  :  1950                                          MRN:  [id number]         Date:  2018      Surgeon: Christiano Johnson):  Roxana Villanueva MD    Procedure: Procedure(s):  COLONOSCOPY    Medications prior to admission:   Prior to Admission medications    Medication Sig Start Date End Date Taking? Authorizing Provider   losartan (COZAAR) 50 MG tablet TAKE 1 TABLET DAILY 18   Goldie Robles CNP   aspirin 81 MG tablet Take 81 mg by mouth daily    Historical Provider, MD   cloNIDine (CATAPRES) 0.1 MG tablet Take 1 tablet by mouth 2 times daily 17   Goldie Robles CNP   famotidine (PEPCID) 20 MG tablet Take 1 tablet by mouth nightly as needed (reflux) 10/5/17   EMILIE Olivares   atorvastatin (LIPITOR) 40 MG tablet Take 40 mg by mouth daily    Historical Provider, MD   HYDROcodone-acetaminophen (NORCO) 5-325 MG per tablet Take 1 tablet by mouth every 6 hours as needed for Pain .     Historical Provider, MD   chlorthalidone (HYGROTON) 25 MG tablet Take 25 mg by mouth daily    Historical Provider, MD   pantoprazole sodium (PROTONIX) 40 MG PACK packet Take 40 mg by mouth every morning (before breakfast)    Historical Provider, MD   traMADol (ULTRAM) 50 MG tablet Take 50 mg by mouth every 6 hours as needed for Pain    Historical Provider, MD   apixaban (ELIQUIS) 5 MG TABS tablet Take 1 tablet by mouth 2 times daily 17   Goldie Robles CNP   Ferrous Fumarate (IRON) 18 MG TBCR Take 1 tablet by mouth daily    Historical Provider, MD   Cyanocobalamin (VITAMIN B-12 PO) Take 1 tablet by mouth daily    Historical Provider, MD   diltiazem (CARDIZEM CD) 120 MG extended release capsule Take 1 capsule by mouth daily  Patient taking differently: Take 240 mg by mouth daily  17   Tinnie Favre, MD   losartan (COZAAR) 50 MG tablet Take 1 tablet by mouth daily 17   Tinnie Favre, MD

## 2018-02-07 NOTE — BRIEF OP NOTE
Brief Postoperative Note    Merlene Snyder  YOB: 1950  [id number]    Pre-operative Diagnosis: Positive Cologuard test    Post-operative Diagnosis: 1. Small   Asc Colon Polyp  2. Small Transverse Colon Polyp    Procedure: Colonoscopy with Polypectomy x 2 with cold forceps    Anesthesia: Deep Sedation    Surgeons/Assistants:  Rekha Zuleta    Estimated Blood Loss: less than 50     Complications: None    Specimens: Was Obtained:     Findings: see op note    Electronically signed by Kortney Rene MD on 2/7/2018 at 12:05 PM

## 2018-02-22 ENCOUNTER — OFFICE VISIT (OUTPATIENT)
Dept: SURGERY | Age: 68
End: 2018-02-22
Payer: MEDICARE

## 2018-02-22 VITALS
OXYGEN SATURATION: 97 % | SYSTOLIC BLOOD PRESSURE: 148 MMHG | WEIGHT: 200.9 LBS | HEIGHT: 68 IN | HEART RATE: 86 BPM | DIASTOLIC BLOOD PRESSURE: 72 MMHG | RESPIRATION RATE: 18 BRPM | BODY MASS INDEX: 30.45 KG/M2 | TEMPERATURE: 97.1 F

## 2018-02-22 DIAGNOSIS — R19.5 POSITIVE COLORECTAL CANCER SCREENING USING DNA-BASED STOOL TEST: Primary | ICD-10-CM

## 2018-02-22 PROCEDURE — 99212 OFFICE O/P EST SF 10 MIN: CPT | Performed by: SURGERY

## 2018-02-24 ASSESSMENT — ENCOUNTER SYMPTOMS
CHOKING: 0
RHINORRHEA: 0
SHORTNESS OF BREATH: 0
STRIDOR: 0
SINUS PAIN: 0
EYE ITCHING: 0
COLOR CHANGE: 0
SORE THROAT: 0
WHEEZING: 0
TROUBLE SWALLOWING: 0
APNEA: 0
EYE REDNESS: 0
CHEST TIGHTNESS: 0
BACK PAIN: 0
SINUS PRESSURE: 0
COUGH: 0
FACIAL SWELLING: 0
VOICE CHANGE: 0
EYE DISCHARGE: 0
EYE PAIN: 0
PHOTOPHOBIA: 0

## 2018-02-24 NOTE — PROGRESS NOTES
[facility]  [facility]  [address]  [address] [address]  Dept: [phone]  Dept Fax: [phone]  Loc: [phone]    Visit Date: 2/22/2018    Jacki Krabbe is a 77 y.o. female who presents today for:  Chief Complaint   Patient presents with    Follow Up After Procedure     s/p Colonoscopy with polypectomy x2 with cold forceps 2/7/18       HPI:     HPI as above patient is status post colonoscopy that was performed for a positive Cologuard  test the patient had 2 small polyps that were removed both of which were tubular adenomas she had no problems post colonoscopy bowel movements have returned to normal    Past Medical History:   Diagnosis Date    Arthritis     Atrial fibrillation ([address]     Cancer ([address]     skin    Carotid arterial disease ([address]     CHF (congestive heart failure) ([address]     Depression     Diabetes mellitus ([address]     Fibromyalgia     Hypertension     Hypothyroidism     Obesity     Sleep apnea     CPAP    Thyroid disease     TIA (transient ischemic attack) 12/2015      Past Surgical History:   Procedure Laterality Date    APPENDECTOMY  over 10 years ago    [address]  09/2017    CHOLECYSTECTOMY  over 10 years ago    [address] [facility] COLONOSCOPY N/A 2/7/2018    COLONOSCOPY WITH BIOPSY performed by Vinita Moncada MD at [address] [facility]     HYSTERECTOMY  10 plus years ago    Dr. Bisi Stewart Right 2009    OTHER SURGICAL HISTORY Left 2012    Rotator cuff sx    SLEEVE GASTRECTOMY  09/12/2016    Robotic, Extensive Lysis of Adhesions, Removal of [place] Prosthesis - Dr. Thuan De La Vega  07/06/2016    Dr. Sp Bowman      Family History   Problem Relation Age of Onset    Stroke Mother     High Blood Pressure Mother     Atrial Fibrillation Mother     Heart Disease Mother     Diabetes asymmetry, speech difficulty, weakness, light-headedness, numbness and headaches. Hematological: Negative for adenopathy. Does not bruise/bleed easily. Psychiatric/Behavioral: Negative for agitation, behavioral problems, confusion, decreased concentration, dysphoric mood, hallucinations, self-injury, sleep disturbance and suicidal ideas. The patient is not nervous/anxious and is not hyperactive. Objective:   BP (!) 148/72 (Site: Right Arm, Position: Sitting, Cuff Size: Medium Adult)   Pulse 86   Temp 97.1 °F (36.2 °C) (Tympanic)   Resp 18   Ht 5' 8\" (1.727 m)   Wt 200 lb 14.4 oz (91.1 kg)   SpO2 97%   BMI 30.55 kg/m²     Physical Exam   Constitutional: She is oriented to person, place, and time. She appears well-developed and well-nourished. HENT:   Head: Normocephalic and atraumatic. Eyes: Conjunctivae and EOM are normal. Pupils are equal, round, and reactive to light. Right eye exhibits no discharge. Left eye exhibits no discharge. No scleral icterus. Neck: Normal range of motion. Neck supple. No JVD present. No thyromegaly present. Cardiovascular: Normal rate and regular rhythm. Exam reveals no gallop and no friction rub. No murmur heard. Pulmonary/Chest: Effort normal and breath sounds normal. No stridor. No respiratory distress. She has no wheezes. She exhibits no tenderness. Abdominal: She exhibits no distension. There is no tenderness. Musculoskeletal: Normal range of motion. Neurological: She is alert and oriented to person, place, and time. Skin: Skin is warm and dry. No rash noted. No erythema. No pallor. Psychiatric: She has a normal mood and affect.  Her behavior is normal. Judgment and thought content normal.       Results for orders placed or performed during the hospital encounter of 11/15/17   Home Sleep Study   Result Value Ref Range    Status Interpreted        Assessment:     Patient had colonoscopy for a positive [place] guard test and had 2 small polyps

## 2018-03-05 DIAGNOSIS — R19.5 POSITIVE COLORECTAL CANCER SCREENING USING DNA-BASED STOOL TEST: ICD-10-CM

## 2018-03-08 ENCOUNTER — OFFICE VISIT (OUTPATIENT)
Dept: ENT CLINIC | Age: 68
End: 2018-03-08
Payer: MEDICARE

## 2018-03-08 VITALS
SYSTOLIC BLOOD PRESSURE: 138 MMHG | HEIGHT: 68 IN | TEMPERATURE: 97.8 F | DIASTOLIC BLOOD PRESSURE: 70 MMHG | BODY MASS INDEX: 31.08 KG/M2 | WEIGHT: 205.1 LBS | HEART RATE: 88 BPM | RESPIRATION RATE: 12 BRPM

## 2018-03-08 DIAGNOSIS — J38.00 VOCAL CORD PARESIS: Primary | ICD-10-CM

## 2018-03-08 DIAGNOSIS — T46.5X5A ADVERSE EFFECT OF ANGIOTENSIN 2 RECEPTOR ANTAGONIST, INITIAL ENCOUNTER: ICD-10-CM

## 2018-03-08 DIAGNOSIS — R49.0 HOARSENESS: ICD-10-CM

## 2018-03-08 DIAGNOSIS — R09.89 FOREIGN BODY SENSATION IN THROAT: ICD-10-CM

## 2018-03-08 DIAGNOSIS — K21.9 GASTROESOPHAGEAL REFLUX DISEASE WITHOUT ESOPHAGITIS: ICD-10-CM

## 2018-03-08 DIAGNOSIS — R13.14 PHARYNGOESOPHAGEAL DYSPHAGIA: ICD-10-CM

## 2018-03-08 PROBLEM — R13.10 DYSPHAGIA: Status: ACTIVE | Noted: 2018-03-08

## 2018-03-08 PROCEDURE — 99203 OFFICE O/P NEW LOW 30 MIN: CPT | Performed by: OTOLARYNGOLOGY

## 2018-03-08 PROCEDURE — 31575 DIAGNOSTIC LARYNGOSCOPY: CPT | Performed by: OTOLARYNGOLOGY

## 2018-03-08 ASSESSMENT — ENCOUNTER SYMPTOMS
CHEST TIGHTNESS: 0
STRIDOR: 0
RHINORRHEA: 0
COUGH: 0
APNEA: 1
CHOKING: 0
TROUBLE SWALLOWING: 1
VOICE CHANGE: 0
VOMITING: 0
SORE THROAT: 0
WHEEZING: 0
SINUS PRESSURE: 1
COLOR CHANGE: 0
FACIAL SWELLING: 0
DIARRHEA: 0
SHORTNESS OF BREATH: 0
NAUSEA: 0
ABDOMINAL PAIN: 0

## 2018-03-08 NOTE — LETTER
[facility]  [address]  [address]  Phone: [phone]  Fax: [phone]    Gumaro Forrest MD        March 25, 2018    Kamille Manuel  [address] [address]    Patient: Jamila Lau   MR Number: [id number]   YOB: 1950   Date of Visit: 3/8/2018     Dear Kamille Manuel,    Thank you for the request for consultation for Jamila Lau to me for the evaluation of Dysphagia. She reports that began after her carotid endarterectomy in the subsequent drainage of hematoma. She was also hoarse. She has paresis of the left true vocal cord. She also has a very strong foreign-body sensation and it would be reasonable to try her off her ARB for a couple of months. Below are the relevant portions of my assessment and plan of care. Assessment & Plan   Diagnoses and all orders for this visit:    1. Vocal cord paresis, left  FL LARYNGOSCOPY FLEXIBLE DIAGNOSTIC   2. Hoarseness     3. Pharyngoesophageal dysphagia     4. Gastroesophageal reflux disease without esophagitis     5. Adverse effect of angiotensin 2 receptor antagonist, initial encounter     6. Foreign body sensation in throat         The findings were explained and her questions were answered. Options were discussed including giving  her voice some time to heal.  The Losartan may be contributing to her symptoms but may not be the main problem. She should see if they can switch her off  the Losartan. I will see her back in 2 months to look for further recovery of the left vocal cord mobility. If you have questions, please do not hesitate to call me. I look forward to following Deanne Pleitez along with you.     Sincerely,          uGmaro Forrest MD

## 2018-03-08 NOTE — PROGRESS NOTES
Gastrointestinal: Negative for abdominal pain, diarrhea, nausea and vomiting. Endocrine: Negative for cold intolerance, heat intolerance, polydipsia and polyuria. Genitourinary: Positive for enuresis. Negative for dysuria and hematuria. Musculoskeletal: Positive for arthralgias. Negative for gait problem, neck pain and neck stiffness. Skin: Negative for color change and rash. Allergic/Immunologic: Positive for environmental allergies. Negative for food allergies and immunocompromised state. Neurological: Negative for dizziness, syncope, facial asymmetry, speech difficulty, light-headedness and headaches. Hematological: Negative for adenopathy. Does not bruise/bleed easily. Psychiatric/Behavioral: Negative for confusion and sleep disturbance. The patient is not nervous/anxious. Objective:     /70 (Site: Left Arm, Position: Sitting)   Pulse 88   Temp 97.8 °F (36.6 °C) (Oral)   Resp 12   Ht 5' 7.99\" (1.727 m)   Wt 205 lb 1.6 oz (93 kg)   BMI 31.19 kg/m²     Physical Exam   Constitutional: She is oriented to person, place, and time. She appears well-developed and well-nourished. She is cooperative. HENT:   Head: Normocephalic and atraumatic. Head is without laceration. Right Ear: Hearing, tympanic membrane, external ear and ear canal normal. No drainage or swelling. Tympanic membrane is not scarred, not perforated and not erythematous. Tympanic membrane mobility is normal (on pneumatic massage). No middle ear effusion. Left Ear: Hearing, tympanic membrane, external ear and ear canal normal. No drainage or swelling. Tympanic membrane is not scarred, not perforated and not erythematous. Tympanic membrane mobility is normal (on pneumatic massage). No middle ear effusion. Nose: Nose normal. No mucosal edema, rhinorrhea or septal deviation. Mouth/Throat: Uvula is midline, oropharynx is clear and moist and mucous membranes are normal. Mucous membranes are not pale and not dry. No oral lesions. No uvula swelling. No oropharyngeal exudate, posterior oropharyngeal edema or posterior oropharyngeal erythema. Turbinates: violaceous  LIps: lips normal    Mallampati 1  Tonsils:Unremarkable  Base of tongue: symmetric,  Larynx, mirror exam: unable due to gag reflex  Overhanging epiglottis   Eyes: Right eye exhibits normal extraocular motion and no nystagmus. Left eye exhibits normal extraocular motion and no nystagmus. Conjugate gaze   Neck: Trachea normal and phonation normal. Neck supple. No spinous process tenderness present. No tracheal deviation present. No thyroid mass and no thyromegaly present. No adenopathy. Salivary glands not enlarged and normal to palpation     Cardiovascular:   No murmur heard. Pulmonary/Chest: Breath sounds normal. No stridor. Neurological: She is alert and oriented to person, place, and time. No cranial nerve deficit (VIIth N function intact bilat). Psychiatric: She has a normal mood and affect. Nursing note and vitals reviewed. PROCEDURE: FIBEROPTIC LARYNGOSCOPY    A fiberoptic laryngoscopy was performed under topical anesthesia, after using Afrin and Lidocaine spray in the nasal fossa. The nasal fossa, nasopharynx, hypopharynx and larynx were carefully examined. Base of tongue was symmetrical. Epiglottis appeared normal and was not retrodisplaced. There was no erythema. No mucosal lesions or masses were noted. No pooling in the pyriform sinuses. Right true vocal cord was normal and mobile. Paresis left vocal cord. Data:  All of the past medical history, past surgical history, family history, social history, allergies and current medications were reviewed with the patient. Assessment & Plan   Diagnoses and all orders for this visit:    1. Vocal cord paresis, left  ND LARYNGOSCOPY FLEXIBLE DIAGNOSTIC   2. Hoarseness     3. Pharyngoesophageal dysphagia     4. Gastroesophageal reflux disease without esophagitis     5.  Adverse effect of

## 2018-03-28 ENCOUNTER — OFFICE VISIT (OUTPATIENT)
Dept: PULMONOLOGY | Age: 68
End: 2018-03-28
Payer: MEDICARE

## 2018-03-28 VITALS
DIASTOLIC BLOOD PRESSURE: 80 MMHG | HEART RATE: 74 BPM | HEIGHT: 68 IN | WEIGHT: 206.4 LBS | RESPIRATION RATE: 18 BRPM | OXYGEN SATURATION: 95 % | BODY MASS INDEX: 31.28 KG/M2 | SYSTOLIC BLOOD PRESSURE: 138 MMHG

## 2018-03-28 DIAGNOSIS — J41.0 SIMPLE CHRONIC BRONCHITIS (HCC): ICD-10-CM

## 2018-03-28 DIAGNOSIS — E66.9 OBESITY (BMI 30-39.9): ICD-10-CM

## 2018-03-28 DIAGNOSIS — G47.33 OBSTRUCTIVE SLEEP APNEA ON CPAP: Primary | ICD-10-CM

## 2018-03-28 DIAGNOSIS — Z99.89 OBSTRUCTIVE SLEEP APNEA ON CPAP: Primary | ICD-10-CM

## 2018-03-28 DIAGNOSIS — I27.20 PULMONARY HTN (HCC): ICD-10-CM

## 2018-03-28 DIAGNOSIS — J43.2 CENTRILOBULAR EMPHYSEMA (HCC): ICD-10-CM

## 2018-03-28 DIAGNOSIS — G47.10 HYPERSOMNIA: ICD-10-CM

## 2018-03-28 PROCEDURE — 99214 OFFICE O/P EST MOD 30 MIN: CPT | Performed by: PHYSICIAN ASSISTANT

## 2018-03-28 RX ORDER — MODAFINIL 200 MG/1
200 TABLET ORAL 2 TIMES DAILY
Qty: 180 TABLET | Refills: 0 | Status: SHIPPED | OUTPATIENT
Start: 2018-03-28 | End: 2018-06-26

## 2018-03-28 NOTE — PROGRESS NOTES
[facility] [address]                                         [phone]  3/28/2018   Chief Complaint   Patient presents with    Sleep Apnea     SHANNEN 4 mo f/u with Download. Needs Provigil RX and all new supplies        Pt of Dr. Purvi Arredondo    PAP Download:   Original or initial  AHI: 17.2   Date of initial study: 11-15-17    [x] Compliant  90%   []  Noncompliant 10 %     PAP Type C PAP  Level  11.0cmH2O  Avg Hrs/Day 5 hrs 36 min  AHI: 0.7   Recorded compliance dates , 2-18-18  to 3-19-18  Machine/Mfg: ResMed   Interface:Nasal Mask    Provider:  [][facility]  []Anthony  [][facility]  [][facility]         [x][facility] []Other   Neck Size: 16.5 in   Mallampati IV  ESS:  15    Here is a scan of the most recent download:              Presentation:   Quinn Millard presents for sleep medicine follow up for obstructive sleep apnea  Since the last visit, Quinn Millard is doing reasonably well with their sleep machine. Other comments: She is having dry mouth with PAP. Mask is old. She uses Provigil for hypersomnia. She denies dyspnea. Equipment issues: The pressure is  acceptable, the mask is acceptable and she  is  using the humidity. Sleep issues:  Do you feel better? Yes  More rested? Yes   Better concentration? yes    Progress History:   Since last visit any new medical issues? No  New ER or hospitlal visits? No  Any new or changes in medicines? No  Any new sleep medicines?  No        Past Medical History:   Diagnosis Date    Arthritis     [facility]     Cancer (HCC)     skin    Carotid arterial disease ([facility]     CHF (congestive heart failure) ([facility]     Depression     Diabetes mellitus ([facility]     Fibromyalgia     Hypertension     Hypothyroidism     Obesity     Sleep apnea     CPAP    Thyroid disease     TIA (transient ischemic attack) 12/2015       Past Surgical History:   Procedure Laterality Date    APPENDECTOMY  over 10 years ago    [address] Mean pressure 32 mmhg by [facility]  DME Order for CPAP as OP   4. Obesity (BMI 30-39. 9)  DME Order for CPAP as OP   5. Hypersomnia              Plan   Do you need any equipment today? Yes update supplies  - renew provigil for hypersomnia   - She  was advised to continue current positive airway pressure therapy with above described pressure. - She  advised to keep good compliance with current recommended pressure to get optimal results and clinical improvement  - Recommend 7-9 hours of sleep with PAP  - She was advised to call DME company regarding supplies if needed. - She call my office for earlier appointment if needed for worsening of sleep symptoms.   - She was instructed on weight loss  - Lorelei Mohs was educated about my impression and plan. Patient verbalizes understanding.   We will see [address] back in: 1 year with download    Caity Moore PA-C, ANAS  3/28/2018

## 2018-04-03 ENCOUNTER — OFFICE VISIT (OUTPATIENT)
Dept: CARDIOLOGY CLINIC | Age: 68
End: 2018-04-03
Payer: MEDICARE

## 2018-04-03 VITALS
SYSTOLIC BLOOD PRESSURE: 146 MMHG | HEIGHT: 68 IN | WEIGHT: 209 LBS | HEART RATE: 60 BPM | BODY MASS INDEX: 31.67 KG/M2 | DIASTOLIC BLOOD PRESSURE: 58 MMHG

## 2018-04-03 DIAGNOSIS — I48.0 PAF (PAROXYSMAL ATRIAL FIBRILLATION) (HCC): Primary | ICD-10-CM

## 2018-04-03 DIAGNOSIS — R06.02 SOB (SHORTNESS OF BREATH) ON EXERTION: ICD-10-CM

## 2018-04-03 DIAGNOSIS — I27.20 PULMONARY HTN (HCC): ICD-10-CM

## 2018-04-03 DIAGNOSIS — I10 HTN (HYPERTENSION), BENIGN: ICD-10-CM

## 2018-04-03 PROCEDURE — 99213 OFFICE O/P EST LOW 20 MIN: CPT | Performed by: INTERNAL MEDICINE

## 2018-04-04 ENCOUNTER — OFFICE VISIT (OUTPATIENT)
Dept: BARIATRICS/WEIGHT MGMT | Age: 68
End: 2018-04-04
Payer: MEDICARE

## 2018-04-04 VITALS
HEIGHT: 67 IN | BODY MASS INDEX: 32.18 KG/M2 | DIASTOLIC BLOOD PRESSURE: 62 MMHG | TEMPERATURE: 97.6 F | WEIGHT: 205 LBS | SYSTOLIC BLOOD PRESSURE: 122 MMHG | HEART RATE: 68 BPM

## 2018-04-04 DIAGNOSIS — K21.9 GASTROESOPHAGEAL REFLUX DISEASE, ESOPHAGITIS PRESENCE NOT SPECIFIED: ICD-10-CM

## 2018-04-04 DIAGNOSIS — Z98.84 S/P LAPAROSCOPIC SLEEVE GASTRECTOMY: Primary | ICD-10-CM

## 2018-04-04 DIAGNOSIS — K91.2 POSTSURGICAL MALABSORPTION: ICD-10-CM

## 2018-04-04 DIAGNOSIS — E66.9 OBESITY, CLASS I, BMI 30-34.9: ICD-10-CM

## 2018-04-04 DIAGNOSIS — E03.9 HYPOTHYROIDISM, UNSPECIFIED TYPE: ICD-10-CM

## 2018-04-04 DIAGNOSIS — Z13.21 SCREENING FOR MALNUTRITION: ICD-10-CM

## 2018-04-04 DIAGNOSIS — D50.9 IRON DEFICIENCY ANEMIA, UNSPECIFIED IRON DEFICIENCY ANEMIA TYPE: ICD-10-CM

## 2018-04-04 DIAGNOSIS — G47.33 OSA ON CPAP: ICD-10-CM

## 2018-04-04 DIAGNOSIS — I10 ESSENTIAL HYPERTENSION: ICD-10-CM

## 2018-04-04 DIAGNOSIS — E78.5 HYPERLIPIDEMIA, UNSPECIFIED HYPERLIPIDEMIA TYPE: ICD-10-CM

## 2018-04-04 DIAGNOSIS — Z99.89 OSA ON CPAP: ICD-10-CM

## 2018-04-04 PROCEDURE — 99213 OFFICE O/P EST LOW 20 MIN: CPT | Performed by: PHYSICIAN ASSISTANT

## 2018-05-01 ENCOUNTER — HOSPITAL ENCOUNTER (OUTPATIENT)
Dept: INTERVENTIONAL RADIOLOGY/VASCULAR | Age: 68
Discharge: HOME | End: 2018-05-01
Payer: MEDICARE

## 2018-05-01 DIAGNOSIS — I65.29 STENOSIS OF CAROTID ARTERY, UNSPECIFIED LATERALITY: ICD-10-CM

## 2018-05-01 PROCEDURE — 93880 EXTRACRANIAL BILAT STUDY: CPT

## 2018-05-08 ENCOUNTER — OFFICE VISIT (OUTPATIENT)
Dept: ENT CLINIC | Age: 68
End: 2018-05-08
Payer: MEDICARE

## 2018-05-08 VITALS
SYSTOLIC BLOOD PRESSURE: 124 MMHG | RESPIRATION RATE: 16 BRPM | WEIGHT: 205.8 LBS | BODY MASS INDEX: 31.99 KG/M2 | DIASTOLIC BLOOD PRESSURE: 76 MMHG | TEMPERATURE: 98 F | HEART RATE: 78 BPM

## 2018-05-08 DIAGNOSIS — K21.9 GASTROESOPHAGEAL REFLUX DISEASE WITHOUT ESOPHAGITIS: ICD-10-CM

## 2018-05-08 DIAGNOSIS — J38.00 VOCAL CORD PARESIS: Primary | ICD-10-CM

## 2018-05-08 DIAGNOSIS — T46.5X5A ADVERSE EFFECT OF ANGIOTENSIN 2 RECEPTOR ANTAGONIST, INITIAL ENCOUNTER: ICD-10-CM

## 2018-05-08 DIAGNOSIS — R13.14 PHARYNGOESOPHAGEAL DYSPHAGIA: ICD-10-CM

## 2018-05-08 DIAGNOSIS — R49.0 HOARSENESS: ICD-10-CM

## 2018-05-08 DIAGNOSIS — R09.89 FOREIGN BODY SENSATION IN THROAT: ICD-10-CM

## 2018-05-08 PROCEDURE — 99213 OFFICE O/P EST LOW 20 MIN: CPT | Performed by: OTOLARYNGOLOGY

## 2018-05-08 PROCEDURE — 31575 DIAGNOSTIC LARYNGOSCOPY: CPT | Performed by: OTOLARYNGOLOGY

## 2018-05-08 ASSESSMENT — ENCOUNTER SYMPTOMS
ABDOMINAL PAIN: 0
CHOKING: 0
SHORTNESS OF BREATH: 0
SORE THROAT: 0
RHINORRHEA: 0
FACIAL SWELLING: 0
COLOR CHANGE: 0
CHEST TIGHTNESS: 0
VOICE CHANGE: 0
SINUS PRESSURE: 0
NAUSEA: 0
STRIDOR: 0
WHEEZING: 0
TROUBLE SWALLOWING: 0
APNEA: 0
COUGH: 0
DIARRHEA: 0
VOMITING: 0

## 2018-06-14 RX ORDER — DILTIAZEM HYDROCHLORIDE 120 MG/1
240 CAPSULE, COATED, EXTENDED RELEASE ORAL DAILY
Qty: 90 CAPSULE | Refills: 3 | Status: SHIPPED | OUTPATIENT
Start: 2018-06-14 | End: 2019-02-11 | Stop reason: SDUPTHER

## 2018-07-02 RX ORDER — CLONIDINE HYDROCHLORIDE 0.1 MG/1
TABLET ORAL
Qty: 180 TABLET | Refills: 1 | Status: SHIPPED | OUTPATIENT
Start: 2018-07-02 | End: 2018-12-29 | Stop reason: SDUPTHER

## 2018-07-07 ENCOUNTER — NURSE TRIAGE (OUTPATIENT)
Dept: ADMINISTRATIVE | Age: 68
End: 2018-07-07

## 2018-07-07 ENCOUNTER — HOSPITAL ENCOUNTER (EMERGENCY)
Age: 68
Discharge: HOME | End: 2018-07-07
Attending: INTERNAL MEDICINE
Payer: MEDICARE

## 2018-07-07 VITALS
OXYGEN SATURATION: 96 % | BODY MASS INDEX: 30.31 KG/M2 | SYSTOLIC BLOOD PRESSURE: 147 MMHG | HEART RATE: 53 BPM | DIASTOLIC BLOOD PRESSURE: 54 MMHG | WEIGHT: 200 LBS | HEIGHT: 68 IN | RESPIRATION RATE: 15 BRPM | TEMPERATURE: 98.3 F

## 2018-07-07 DIAGNOSIS — I95.9 HYPOTENSION, UNSPECIFIED HYPOTENSION TYPE: Primary | ICD-10-CM

## 2018-07-07 LAB
EKG ATRIAL RATE: 54 BPM
EKG P AXIS: 39 DEGREES
EKG P-R INTERVAL: 168 MS
EKG Q-T INTERVAL: 446 MS
EKG QRS DURATION: 90 MS
EKG QTC CALCULATION (BAZETT): 422 MS
EKG R AXIS: -31 DEGREES
EKG T AXIS: 80 DEGREES
EKG VENTRICULAR RATE: 54 BPM

## 2018-07-07 PROCEDURE — 93005 ELECTROCARDIOGRAM TRACING: CPT | Performed by: INTERNAL MEDICINE

## 2018-07-07 PROCEDURE — 99284 EMERGENCY DEPT VISIT MOD MDM: CPT

## 2018-07-07 ASSESSMENT — ENCOUNTER SYMPTOMS
ABDOMINAL PAIN: 0
NAUSEA: 0
VOMITING: 0
SHORTNESS OF BREATH: 0
SORE THROAT: 0
BACK PAIN: 0
WHEEZING: 0
DIARRHEA: 0
EYE PAIN: 0
RHINORRHEA: 0
EYE DISCHARGE: 0
COUGH: 0

## 2018-07-07 NOTE — ED TRIAGE NOTES
Patient presents to ED with c/o hypotension since last night. States that last night her wrist blood pressure cuff was reading 94/38 and this morning 101/40. Patient denies any symptoms or chest pain. Mike Mata took patient's blood pressure with her personal machine and received a much lower reading than ER machine. Education provided to patient on where to take blood pressure machine to have it checked. Denies any needs. Call light in reach.

## 2018-07-07 NOTE — ED PROVIDER NOTES
mucous membranes are normal. No oropharyngeal exudate, posterior oropharyngeal edema or posterior oropharyngeal erythema. Eyes: Conjunctivae and EOM are normal.   Neck: Normal range of motion. Neck supple. No JVD present. Cardiovascular: Normal rate, regular rhythm, normal heart sounds, intact distal pulses and normal pulses. Exam reveals no gallop and no friction rub. No murmur heard. On exam ED machine was reading 142/56 and the patient's machine was reading 110/51. Pulmonary/Chest: Effort normal and breath sounds normal. No respiratory distress. She has no decreased breath sounds. She has no wheezes. She has no rhonchi. She has no rales. Abdominal: Soft. Bowel sounds are normal. She exhibits no distension. There is no tenderness. There is no rebound, no guarding and no CVA tenderness. Musculoskeletal: Normal range of motion. She exhibits no edema. Neurological: She is alert and oriented to person, place, and time. She exhibits normal muscle tone. Coordination normal.   Skin: Skin is warm and dry. No rash noted. She is not diaphoretic. Nursing note and vitals reviewed. DIAGNOSTIC RESULTS     EKG: All EKG's are interpreted by the Emergency Department Physician who either signs or Co-signs this chart in the absence of a cardiologist.  EKG interpreted by Tess Beaver MD:    Vent. Rate: 54 bpm  MT interval: 168 ms  QRS duration: 90 ms  QTc: 422 ms  P-R-T axes: 39, -31, 80  Sinus bradycardia. Left axis deviation. Left ventriclar hypertrophy with repolarization abnormality. Cannot rule out septal infarct. No STEMI.   Compared to old EKG on 10-      RADIOLOGY: non-plain film images(s) such as CT, Ultrasound and MRI are read by the radiologist.    No orders to display       LABS:   Labs Reviewed - No data to display    EMERGENCY DEPARTMENT COURSE:   Vitals:    Vitals:    07/07/18 1041 07/07/18 1055   BP: (!) 142/56 (!) 147/54   Pulse: 55 53   Resp: 19 15   Temp: 98.3 °F (36.8 °C)    TempSrc: Oral    SpO2: 97% 96%   Weight: 200 lb (90.7 kg)    Height: 5' 8\" (1.727 m)        10:47 AM: The patient was seen and evaluated. MDM:   His blood pressure in the emergency department was 142/56. I also checked blood pressure with her machine which she brought to the emergency department and her blood pressure was 516 systolic. Patient was advised to change her blood pressure machine. Patient also was advised to follow-up with her primary care physician. Patient refused to have any blood work done or any other testing done and wanted to go home. Patient was discharged home and was advised to follow-up either with her primary care physician or come back to the emergency department if she needed it. CRITICAL CARE:   None     CONSULTS:      PROCEDURES:  None     FINAL IMPRESSION      1. Hypotension, unspecified hypotension type          DISPOSITION/PLAN   Discharge  PATIENT REFERRED TO:  [address]  [phone]    Go in 2 days      [facility] EMERGENCY DEPT  [address]  Go to   If symptoms worsen      DISCHARGE MEDICATIONS:  New Prescriptions    No medications on file       (Please note that portions of this note were completed with a voice recognition program.  Efforts were made to edit the dictations but occasionally words are mis-transcribed.)    Scribe:  Gerber Darby 7/7/18 10:47 AM Scribing for and in the presence of Clay Manley MD.    Signed by: Jacque Saavedra, 07/07/18 11:57 AM    Provider:  I personally performed the services described in the documentation, reviewed and edited the documentation which was dictated to the scribe in my presence, and it accurately records my words and actions.     Clay Manley MD 7/7/18 11:57 AM                            Clay Manley MD  07/07/18 0383

## 2018-07-08 PROCEDURE — 93010 ELECTROCARDIOGRAM REPORT: CPT | Performed by: INTERNAL MEDICINE

## 2018-08-22 RX ORDER — LOSARTAN POTASSIUM 50 MG/1
TABLET ORAL
Qty: 90 TABLET | Refills: 3 | Status: SHIPPED | OUTPATIENT
Start: 2018-08-22 | End: 2019-02-11 | Stop reason: SDUPTHER

## 2018-09-10 ENCOUNTER — OFFICE VISIT (OUTPATIENT)
Dept: CARDIOLOGY CLINIC | Age: 68
End: 2018-09-10
Payer: MEDICARE

## 2018-09-10 VITALS
HEIGHT: 67 IN | WEIGHT: 209 LBS | BODY MASS INDEX: 32.8 KG/M2 | SYSTOLIC BLOOD PRESSURE: 160 MMHG | DIASTOLIC BLOOD PRESSURE: 85 MMHG | HEART RATE: 51 BPM

## 2018-09-10 DIAGNOSIS — I27.20 PULMONARY HTN (HCC): ICD-10-CM

## 2018-09-10 DIAGNOSIS — I50.32 CHRONIC DIASTOLIC CONGESTIVE HEART FAILURE (HCC): ICD-10-CM

## 2018-09-10 DIAGNOSIS — I48.0 PAF (PAROXYSMAL ATRIAL FIBRILLATION) (HCC): Primary | ICD-10-CM

## 2018-09-10 DIAGNOSIS — Z98.890 S/P CAROTID ENDARTERECTOMY: ICD-10-CM

## 2018-09-10 DIAGNOSIS — R29.898 WEAKNESS OF BOTH LOWER EXTREMITIES: ICD-10-CM

## 2018-09-10 DIAGNOSIS — I10 HTN (HYPERTENSION), BENIGN: ICD-10-CM

## 2018-09-10 PROCEDURE — 99214 OFFICE O/P EST MOD 30 MIN: CPT | Performed by: INTERNAL MEDICINE

## 2018-09-10 NOTE — PROGRESS NOTES
[address]    [facility]  10 plus years ago    Dr. Yolie Lovelace Right 2009    OTHER SURGICAL HISTORY Left 2012    Rotator cuff sx    SLEEVE GASTRECTOMY  09/12/2016    Robotic, Extensive Lysis of Adhesions, Removal of [place] Prosthesis - Dr. Dillon Acevedo ENDOSCOPY  07/06/2016    Dr. Mathew Mcdermott        Allergies   Allergen Reactions    Demerol Hcl [Meperidine] Nausea And Vomiting        Family History   Problem Relation Age of Onset    Stroke Mother     High Blood Pressure Mother     Atrial Fibrillation Mother     Heart Disease Mother     Diabetes Father     Early Death Father     Cancer Other     Heart Disease Sister         Social History     Social History    Marital status:      Spouse name: N/A    Number of children: N/A    Years of education: N/A     Occupational History    Not on file. Social History Main Topics    Smoking status: Former Smoker     Packs/day: 0.25     Years: 30.00     Types: Cigarettes     Start date: 3/24/1982     Quit date: 2/24/2015    Smokeless tobacco: Never Used    Alcohol use No    Drug use: No    Sexual activity: No     Other Topics Concern    Not on file     Social History Narrative    No narrative on file       Current Outpatient Prescriptions   Medication Sig Dispense Refill    pitavastatin (LIVALO) 1 MG TABS tablet Take 1 tablet by mouth nightly 30 tablet 5    losartan (COZAAR) 50 MG tablet TAKE 1 TABLET DAILY 90 tablet 3    cloNIDine (CATAPRES) 0.1 MG tablet TAKE 1 TABLET TWICE A  tablet 1    diltiazem (CARDIZEM CD) 120 MG extended release capsule Take 2 capsules by mouth daily 90 capsule 3    aspirin 81 MG tablet Take 81 mg by mouth daily      famotidine (PEPCID) 20 MG tablet Take 1 tablet by mouth nightly as needed (reflux) 30 tablet 2    HYDROcodone-acetaminophen (NORCO) 5-325 MG per tablet Take 1 tablet by mouth every 6 hours as needed for Pain .  chlorthalidone (HYGROTON) 25 MG tablet Take 25 mg by mouth daily      pantoprazole sodium (PROTONIX) 40 MG PACK packet Take 40 mg by mouth every morning (before breakfast)      traMADol (ULTRAM) 50 MG tablet Take 50 mg by mouth every 6 hours as needed for Pain      apixaban (ELIQUIS) 5 MG TABS tablet Take 1 tablet by mouth 2 times daily 180 tablet 0    Ferrous Fumarate (IRON) 18 MG TBCR Take 1 tablet by mouth daily      Cyanocobalamin (VITAMIN B-12 PO) Take 1 tablet by mouth daily      Cholecalciferol (VITAMIN D3) 5000 UNITS CAPS Take 2 capsules by mouth daily      oxybutynin (DITROPAN) 5 MG tablet Take 5 mg by mouth 2 times daily      sulfaSALAzine (AZULFIDINE) 500 MG tablet Take 500 mg by mouth 2 times daily      albuterol sulfate HFA (VENTOLIN HFA) 108 (90 BASE) MCG/ACT inhaler Inhale 2 puffs into the lungs every 6 hours as needed for Wheezing 1 Inhaler 11    levothyroxine (SYNTHROID) 50 MCG tablet Take 50 mcg by mouth daily       loratadine (CLARITIN) 10 MG tablet Take 10 mg by mouth as needed      hydroxychloroquine (PLAQUENIL) 200 MG tablet Take 200 mg by mouth 2 times daily.  atorvastatin (LIPITOR) 40 MG tablet Take 40 mg by mouth daily       No current facility-administered medications for this visit. Review of Systems -     General ROS: negative  Psychological ROS: negative  Hematological and Lymphatic ROS: No history of blood clots or bleeding disorder. Respiratory ROS: no cough, shortness of breath, or wheezing  Cardiovascular ROS: no chest pain or dyspnea on exertion  Gastrointestinal ROS: negative  Genito-Urinary ROS: no dysuria, trouble voiding, or hematuria  Musculoskeletal ROS: negative  Neurological ROS: no TIA or stroke symptoms  Dermatological ROS: negative      Blood pressure (!) 154/73, pulse 51, height 5' 6.5\" (1.689 m), weight 209 lb (94.8 kg), not currently breastfeeding.         Physical Examination:    General appearance - alert, well appearing, and in no TSH 3.650 09/29/2017       EKG 1/5/16  Normal sinus rhythm  Rightward axis  T wave abnormality, consider inferior ischemia  Abnormal ECG  When compared with ECG of 01-JUL-2011 09:56,  Questionable change in The axis  T wave inversion now evident in Inferior leads  Confirmed by Clotilde Davis ([id number]) on 12/8/2015 8:38:06 PM    EKG 8/25/16  Marked sinus  Bradycardia   -Left axis -anterior fascicular block.    -Old anteroseptal infarct. ABNORMAL       EKG 9/22/16-sinus bradycardia rate 46 bopm       CONCLUSION:   1. This is a normal sinus rhythm with average heart rate 55 beats per minute,  ranging from  beats per minute. 2. No significant pause of more than 1.9 seconds noted. 3. Rare ventricular ectopic beats, isolated and couplets. 4. Rare supraventricular ectopic beats, isolated, couplets, and rare  nonsustained supraventricular ectopic run. 5. No significant bradyarrhythmia to be addressed, yet certainly average  heart rate 55, minimum is 40, so probably need further clinical correlation. 6. Otherwise, at this level, this minimal low heart rate if there are no  symptoms will need just observation.  Teresa Stroud M.D.        D: 11/29/2016 20:0      Assessment    Home BP good       Diagnosis Orders   1. PAF (paroxysmal atrial fibrillation) (HCC)-post op- gastric sleeve  Hepatic Function Panel    CK    Lipid Panel   2. Weakness of both lower extremities from lipitor  Hepatic Function Panel    CK    Lipid Panel   3. Chronic diastolic congestive heart failure (HCC)  Hepatic Function Panel    CK    Lipid Panel   4. HTN (hypertension), benign  Hepatic Function Panel    CK    Lipid Panel   5. Pulmonary HTN (HCC) Mean pressure 32 mmhg by [facility]  Hepatic Function Panel    CK    Lipid Panel   6.  S/P carotid endarterectomy  Hepatic Function Panel    CK    Lipid Panel       Previous admission DX    Hx of recent TIA with RT side weakness      NSVT 5 beat   S/P gastric sleeve  Hypertensive Urgency  Hx of obstructive Sleep apnea  Pulmonary HTN  New onset post afib with RVR- now rate controlled  Chads of  DM II  HX of TIA         Continue home medication regimen   bP controlled    Doing well        Plan     The Current meds and labs reviewed and d/w the pat    Post afib rvr new onset-now IN NSR    Sinus bradycardia 52 improved after meds adjusted  Cont cardizem cd 240 qd   Cont   losartan 50 qd   Holter 24 hrs- good  CHDAS of 4( DM, htn, Hx of TIA)  Need longterm OA and on apixaban  D/w the pat the risk and benefit of OA  Pat understand the risk of bleeding including ICH    Patient Seen, Chart, Consults notes, Labs, Radiology studies reviewed. Hx of TIA  Was on asa and apixaban    Continue the current treatment and with constant vigilance to changes in symptoms and also any potential side effects. Return for care or seek medical attention immediately if symptoms got worse and/or develop new symptoms. Hypertension, on medical treatment. Seems to be under good control. Patient is compliant with medical treatment. Congestive heart failure: no evidence of fluid overload today, no recent hospitalization for CHF    Hx of carotid left carotid endarterectomy in 2017  And started on statin then  Lower leg weakness from lipitor  Stop lipitor  Start levalo 1 mg in 3 weeks    Pulm HTN  Cont  lopressor to 12.5 bid  Cont   cardizem cd 240 qd   Cont   losartan 50 qd       Hypertension, on medical treatment. Seems to be under good control. Patient is compliant with medical treatment. Intermittent serena- claudio  WNL and probably neuropathic    Reviewed the  report of [facility] from [facility]    Lipid panel and liver function test before next appointment    patient is advised to exercise 30 min s a day three times a week and about weight loss ,balance diet and    More fruits and vegetables . Discussed use, benefit, and side effects of prescribed medications. All patient questions answered. Pt voiced understanding.  Instructed to

## 2018-09-12 ENCOUNTER — TELEPHONE (OUTPATIENT)
Dept: CARDIOLOGY CLINIC | Age: 68
End: 2018-09-12

## 2018-09-24 RX ORDER — PRAVASTATIN SODIUM 20 MG
20 TABLET ORAL DAILY
COMMUNITY
End: 2018-09-24 | Stop reason: SDUPTHER

## 2018-09-24 RX ORDER — PRAVASTATIN SODIUM 20 MG
20 TABLET ORAL DAILY
Qty: 30 TABLET | Refills: 1 | Status: SHIPPED | OUTPATIENT
Start: 2018-09-24 | End: 2019-02-11 | Stop reason: ALTCHOICE

## 2018-10-01 LAB
ALBUMIN SERPL-MCNC: 4.3 GM/DL (ref 3.5–5)
ALP BLD-CCNC: 85 IU/L (ref 39–118)
ALT SERPL-CCNC: 19 IU/L (ref 10–40)
ANION GAP SERPL CALCULATED.3IONS-SCNC: 7 MMOL/L (ref 4–12)
AST SERPL-CCNC: 23 IU/L (ref 15–41)
BILIRUB SERPL-MCNC: 0.3 MG/DL (ref 0.2–1)
BILIRUBIN DIRECT: 0.1 MG/DL (ref 0.1–0.2)
BUN BLDV-MCNC: 21 MG/DL (ref 7–20)
CALCIUM SERPL-MCNC: 9.2 MG/DL (ref 8.8–10.5)
CHLORIDE BLD-SCNC: 107 MEQ/L (ref 101–111)
CHOLESTEROL/HDL RELATIVE RISK: 2.3 (ref 4–4.4)
CHOLESTEROL: 126 MG/DL
CO2: 29 MEQ/L (ref 21–32)
CREAT SERPL-MCNC: 0.71 MG/DL (ref 0.6–1.3)
CREATININE CLEARANCE: >60
DIRECT-LDL / HDL RISK: 1.5
GLUCOSE: 90 MG/DL (ref 70–110)
HDLC SERPL-MCNC: 54 MG/DL
LDL CHOLESTEROL DIRECT: 83 MG/DL
POTASSIUM SERPL-SCNC: 4.3 MEQ/L (ref 3.6–5)
SODIUM BLD-SCNC: 143 MEQ/L (ref 135–145)
TOTAL CK: 63 IU/L (ref 38–234)
TOTAL PROTEIN: 6.6 G/DL (ref 6.2–8)
TRIGL SERPL-MCNC: 52 MG/DL
VLDLC SERPL CALC-MCNC: 10 MG/DL

## 2018-10-04 ENCOUNTER — OFFICE VISIT (OUTPATIENT)
Dept: BARIATRICS/WEIGHT MGMT | Age: 68
End: 2018-10-04
Payer: MEDICARE

## 2018-10-04 VITALS
BODY MASS INDEX: 33.12 KG/M2 | TEMPERATURE: 97.9 F | DIASTOLIC BLOOD PRESSURE: 64 MMHG | SYSTOLIC BLOOD PRESSURE: 146 MMHG | WEIGHT: 211 LBS | HEART RATE: 64 BPM | HEIGHT: 67 IN

## 2018-10-04 DIAGNOSIS — K21.9 GASTROESOPHAGEAL REFLUX DISEASE, ESOPHAGITIS PRESENCE NOT SPECIFIED: ICD-10-CM

## 2018-10-04 DIAGNOSIS — E66.09 CLASS 1 OBESITY DUE TO EXCESS CALORIES WITH BODY MASS INDEX (BMI) OF 32.0 TO 32.9 IN ADULT, UNSPECIFIED WHETHER SERIOUS COMORBIDITY PRESENT: ICD-10-CM

## 2018-10-04 DIAGNOSIS — E78.5 HYPERLIPIDEMIA, UNSPECIFIED HYPERLIPIDEMIA TYPE: ICD-10-CM

## 2018-10-04 DIAGNOSIS — G47.33 OSA ON CPAP: ICD-10-CM

## 2018-10-04 DIAGNOSIS — D50.9 IRON DEFICIENCY ANEMIA, UNSPECIFIED IRON DEFICIENCY ANEMIA TYPE: ICD-10-CM

## 2018-10-04 DIAGNOSIS — Z98.84 S/P LAPAROSCOPIC SLEEVE GASTRECTOMY: Primary | ICD-10-CM

## 2018-10-04 DIAGNOSIS — Z99.89 OSA ON CPAP: ICD-10-CM

## 2018-10-04 DIAGNOSIS — I10 ESSENTIAL HYPERTENSION: ICD-10-CM

## 2018-10-04 DIAGNOSIS — E03.9 HYPOTHYROIDISM, UNSPECIFIED TYPE: ICD-10-CM

## 2018-10-04 PROCEDURE — 99214 OFFICE O/P EST MOD 30 MIN: CPT | Performed by: PHYSICIAN ASSISTANT

## 2018-10-04 NOTE — PROGRESS NOTES
fever, chills, (+) fatigue. Wound: Denies any rash, skin color changes or wound problems. Resp: Denies any cough, shortness of breath. CV: Denies any chest pain, orthopnea or syncope. MS: Denies myalgias, (+)arthralgias- left knee  GI: Denies any nausea, vomiting, diarrhea, (+) intermittent constipation, melena, hematochezia. No incisional discomfort. (+) reflux  : Denies any hematuria, hesitancy or dysuria. NEURO: Denies seizures, headache. Objective:      BP (!) 146/64 (Site: Right Upper Arm, Position: Sitting, Cuff Size: Large Adult)   Pulse 64   Temp 97.9 °F (36.6 °C) (Oral)   Ht 5' 7.25\" (1.708 m)   Wt 211 lb (95.7 kg)   BMI 32.80 kg/m²     Physical Examination:   Constitutional: Alert and oriented to person, place and time. Well-developed, well- nourished. Head: Normocephalic and atraumatic  Neck: Supple. Eyes: EOMI b/l. Conjunctivae normal.  No scleral icterus. Respiratory: Effort normal. No respiratory distress. Abd: Benign  Ext:  Movement x 4. No edema  Skin; Warm and dry, no visible rashes, lesions or ulcers.    Neuro: Cranial Nerves Grossly Intact; nml coordination  coordination      Labs:  CBC   Lab Results   Component Value Date    WBC 9.0 10/01/2018    RBC 4.06 10/01/2018    HGB 12.4 10/01/2018    HCT 37.8 10/01/2018    MCV 93.2 10/01/2018    MCH 30.7 10/01/2018    MCHC 32.9 10/01/2018    RDW 14.3 10/01/2018     10/01/2018    MPV 7.3 09/29/2017    RBCMORP NORMAL 09/29/2017    SEGSPCT 56.6 09/29/2017    LABLYMP 29.1 09/29/2017    MONOPCT 10.6 10/01/2018    LABEOS 2.0 09/29/2017    BASO 0.3 09/29/2017    NRBC 0.1 10/01/2018    NRBC 0 09/29/2017    ANISOCYTOSIS 1+ 03/10/2017    SEGSABS 6.4 09/29/2017    LYMPHSABS 3000 10/01/2018    LYMPHSABS 3.3 09/29/2017    MONOSABS 900 10/01/2018    MONOSABS 1.4 09/29/2017    EOSABS 300 10/01/2018    EOSABS 0.2 09/29/2017    BASOSABS 100 10/01/2018    BASOSABS 0.0 09/29/2017        BMP/CMP   Lab Results   Component Value Date    GLUCOSE 90 10/01/2018    CREATININE 0.71 10/01/2018    BUN 21 10/01/2018     10/01/2018    K 4.3 10/01/2018     10/01/2018    CO2 29 10/01/2018    CALCIUM 9.2 10/01/2018    AST 23 10/01/2018    ALKPHOS 85 10/01/2018    PROT 6.6 10/01/2018    LABALBU 4.3 10/01/2018    BILITOT 0.3 10/01/2018    ALT 19 10/01/2018        PREALBUMIN   Lab Results   Component Value Date    PREALBUMIN 23.6 10/01/2018        VITAMIN B12   Lab Results   Component Value Date    XISJMXDG19 562 10/01/2018        24 HOUR URINE CALCIUM   Lab Results   Component Value Date    URVOLMEAS 980 03/10/2017    HOURSC 24.0 03/10/2017    CALCIUMUR 39.4 03/10/2017    CALCIUMUR 386 03/10/2017        VITAMIN D   Lab Results   Component Value Date    VITD25 53.55 10/01/2018        RBC FOLATE   Lab Results   Component Value Date    FOLATE > 23.9 10/01/2018        LIPID SCREEN (FASTING)   Lab Results   Component Value Date    CHOL 126 10/01/2018    CHOL 106 09/29/2017    TRIG 52 10/01/2018    HDL 54 10/01/2018    LDLCALC 40 09/29/2017     HGA1C (T2DM ONLY)   Lab Results   Component Value Date    LABA1C 5.4 10/01/2018    AVGG 72 09/29/2017        TSH   Lab Results   Component Value Date    TSH 3.650 09/29/2017        IRON   Lab Results   Component Value Date    IRON 109 09/29/2017        IONIZED CALCIUM   Lab Results   Component Value Date    CAION 1.05 09/14/2016         Assessment:       Diagnosis Orders   1. S/P laparoscopic sleeve gastrectomy  Iron    Iron Binding Capacity    Ferritin    CBC Auto Differential   2. Class 1 obesity due to excess calories with body mass index (BMI) of 32.0 to 32.9 in adult, unspecified whether serious comorbidity present     3. Gastroesophageal reflux disease, esophagitis presence not specified     4. Essential hypertension     5. Hyperlipidemia, unspecified hyperlipidemia type     6. Hypothyroidism, unspecified type     7. SHANNEN on CPAP     8.  Iron deficiency anemia, unspecified iron deficiency anemia type  Iron    Iron Binding Capacity    Ferritin    CBC Auto Differential     Plan:    Stay well hydrated. Drink a minimum of 64 oz of non-carbonated, non-caffeinated fluids daily. Nutritional education occurred during visit. Tolerating diet. Continue following with dietitian and follow their recommendations as directed. Continue  60-80 grams of protein each day. Signs and symptoms reviewed with patient that would be concerning and need her to return to office for re-evaluation. Patient will call if any questions or concerns arrise. Importance of physical activity discussed with patient. Increase physical activity as tolerated  Add strength training  Continue taking Multivitamin, Calcium and B12 as directed  Continue PPI   Take Iron, as advised. Encouraged to attend support groups  2 year labs reviewed with patient today- Repeat Iron studies in 3 months/ orders given  Marcia to call patient on how much Iron that she should be taking  SECA scale completed and reviewed with patient today  Measurements completed and reviewed with patient today  Discussed making apt with Dr. Deepali Gallardo- referral sent  Return in about 6 months (around 4/4/2019) for postop follow up. I spent over 25 minutes with the patient, with greater that 50% of that time spent on face counseling for nutrition and exercise.     Electronically signed by EMILIE Mata on 10/4/2018 at 10:24 AM

## 2018-10-05 ENCOUNTER — TELEPHONE (OUTPATIENT)
Dept: BARIATRICS/WEIGHT MGMT | Age: 68
End: 2018-10-05

## 2018-12-31 RX ORDER — CLONIDINE HYDROCHLORIDE 0.1 MG/1
TABLET ORAL
Qty: 180 TABLET | Refills: 1 | Status: SHIPPED | OUTPATIENT
Start: 2018-12-31 | End: 2019-02-11 | Stop reason: SDUPTHER

## 2019-01-09 RX ORDER — CHLORTHALIDONE 25 MG/1
25 TABLET ORAL DAILY
Qty: 90 TABLET | Refills: 0 | Status: SHIPPED | OUTPATIENT
Start: 2019-01-09 | End: 2019-02-11 | Stop reason: SDUPTHER

## 2019-02-11 ENCOUNTER — OFFICE VISIT (OUTPATIENT)
Dept: CARDIOLOGY CLINIC | Age: 69
End: 2019-02-11
Payer: MEDICARE

## 2019-02-11 VITALS
WEIGHT: 214 LBS | HEIGHT: 68 IN | SYSTOLIC BLOOD PRESSURE: 123 MMHG | HEART RATE: 55 BPM | BODY MASS INDEX: 32.43 KG/M2 | DIASTOLIC BLOOD PRESSURE: 65 MMHG

## 2019-02-11 DIAGNOSIS — I48.0 PAF (PAROXYSMAL ATRIAL FIBRILLATION) (HCC): Primary | ICD-10-CM

## 2019-02-11 DIAGNOSIS — I10 HTN (HYPERTENSION), BENIGN: ICD-10-CM

## 2019-02-11 DIAGNOSIS — I27.20 PULMONARY HTN (HCC): ICD-10-CM

## 2019-02-11 DIAGNOSIS — I50.32 CHRONIC DIASTOLIC CONGESTIVE HEART FAILURE (HCC): ICD-10-CM

## 2019-02-11 DIAGNOSIS — Z98.890 S/P CAROTID ENDARTERECTOMY: ICD-10-CM

## 2019-02-11 PROBLEM — R29.898 WEAKNESS OF BOTH LOWER EXTREMITIES: Status: RESOLVED | Noted: 2018-09-10 | Resolved: 2019-02-11

## 2019-02-11 PROCEDURE — 99214 OFFICE O/P EST MOD 30 MIN: CPT | Performed by: INTERNAL MEDICINE

## 2019-02-11 RX ORDER — CLONIDINE HYDROCHLORIDE 0.1 MG/1
TABLET ORAL
Qty: 180 TABLET | Refills: 3 | Status: SHIPPED | OUTPATIENT
Start: 2019-02-11 | End: 2020-02-13

## 2019-02-11 RX ORDER — TRAZODONE HYDROCHLORIDE 50 MG/1
50 TABLET ORAL NIGHTLY
COMMUNITY
End: 2020-10-05

## 2019-02-11 RX ORDER — DILTIAZEM HYDROCHLORIDE 120 MG/1
240 CAPSULE, COATED, EXTENDED RELEASE ORAL DAILY
Qty: 90 CAPSULE | Refills: 3 | Status: SHIPPED | OUTPATIENT
Start: 2019-02-11 | End: 2019-08-12 | Stop reason: SDUPTHER

## 2019-02-11 RX ORDER — MODAFINIL 200 MG/1
200 TABLET ORAL 2 TIMES DAILY
COMMUNITY
End: 2019-04-10 | Stop reason: SDUPTHER

## 2019-02-11 RX ORDER — PANTOPRAZOLE SODIUM 40 MG/1
40 GRANULE, DELAYED RELEASE ORAL
Qty: 90 EACH | Refills: 3 | Status: SHIPPED | OUTPATIENT
Start: 2019-02-11 | End: 2019-03-20

## 2019-02-11 RX ORDER — LOSARTAN POTASSIUM 50 MG/1
TABLET ORAL
Qty: 90 TABLET | Refills: 3 | Status: SHIPPED | OUTPATIENT
Start: 2019-02-11 | End: 2020-02-06

## 2019-02-11 RX ORDER — CHLORTHALIDONE 25 MG/1
25 TABLET ORAL DAILY
Qty: 90 TABLET | Refills: 3 | Status: SHIPPED | OUTPATIENT
Start: 2019-02-11 | End: 2020-03-11

## 2019-02-14 ENCOUNTER — TELEPHONE (OUTPATIENT)
Dept: CARDIOLOGY CLINIC | Age: 69
End: 2019-02-14

## 2019-03-20 RX ORDER — PANTOPRAZOLE SODIUM 40 MG/1
40 TABLET, DELAYED RELEASE ORAL DAILY
Qty: 90 TABLET | Refills: 3 | Status: SHIPPED | OUTPATIENT
Start: 2019-03-20 | End: 2020-05-04

## 2019-04-02 ENCOUNTER — OFFICE VISIT (OUTPATIENT)
Dept: BARIATRICS/WEIGHT MGMT | Age: 69
End: 2019-04-02
Payer: MEDICARE

## 2019-04-02 VITALS
WEIGHT: 217.4 LBS | RESPIRATION RATE: 18 BRPM | BODY MASS INDEX: 34.12 KG/M2 | HEART RATE: 56 BPM | TEMPERATURE: 97.9 F | HEIGHT: 67 IN | DIASTOLIC BLOOD PRESSURE: 60 MMHG | SYSTOLIC BLOOD PRESSURE: 98 MMHG

## 2019-04-02 DIAGNOSIS — Z99.89 OSA ON CPAP: ICD-10-CM

## 2019-04-02 DIAGNOSIS — G47.33 OSA ON CPAP: ICD-10-CM

## 2019-04-02 DIAGNOSIS — I10 ESSENTIAL HYPERTENSION: ICD-10-CM

## 2019-04-02 DIAGNOSIS — D50.9 IRON DEFICIENCY ANEMIA, UNSPECIFIED IRON DEFICIENCY ANEMIA TYPE: ICD-10-CM

## 2019-04-02 DIAGNOSIS — E66.09 CLASS 1 OBESITY DUE TO EXCESS CALORIES WITH BODY MASS INDEX (BMI) OF 33.0 TO 33.9 IN ADULT, UNSPECIFIED WHETHER SERIOUS COMORBIDITY PRESENT: ICD-10-CM

## 2019-04-02 DIAGNOSIS — E78.5 HYPERLIPIDEMIA, UNSPECIFIED HYPERLIPIDEMIA TYPE: ICD-10-CM

## 2019-04-02 DIAGNOSIS — Z13.21 SCREENING FOR MALNUTRITION: ICD-10-CM

## 2019-04-02 DIAGNOSIS — K21.9 GASTROESOPHAGEAL REFLUX DISEASE, ESOPHAGITIS PRESENCE NOT SPECIFIED: ICD-10-CM

## 2019-04-02 DIAGNOSIS — Z98.84 S/P LAPAROSCOPIC SLEEVE GASTRECTOMY: Primary | ICD-10-CM

## 2019-04-02 DIAGNOSIS — E03.9 HYPOTHYROIDISM, UNSPECIFIED TYPE: ICD-10-CM

## 2019-04-02 PROCEDURE — 99214 OFFICE O/P EST MOD 30 MIN: CPT | Performed by: PHYSICIAN ASSISTANT

## 2019-04-02 RX ORDER — PRAVASTATIN SODIUM 10 MG
10 TABLET ORAL DAILY
COMMUNITY
End: 2019-04-29 | Stop reason: DRUGHIGH

## 2019-04-02 NOTE — PROGRESS NOTES
mass index is 33.8 kg/m². Current Weight:   Wt Readings from Last 3 Encounters:   04/02/19 217 lb 6.4 oz (98.6 kg)   02/11/19 214 lb (97.1 kg)   10/04/18 211 lb (95.7 kg)     Pre-op Body Weight: 253    Past Medical History:  Past Medical History:   Diagnosis Date    Arthritis     [facility]     Cancer ([facility]     skin    Carotid arterial disease ([facility]     CHF (congestive heart failure) ([facility]     Depression     Diabetes mellitus ([facility]     Fibromyalgia     Hypertension     Hypothyroidism     Obesity     Sleep apnea     CPAP    Thyroid disease     TIA (transient ischemic attack) 12/2015       Past Surgical History:  Past Surgical History:   Procedure Laterality Date    APPENDECTOMY  over 10 years ago    [address]  09/2017    CHOLECYSTECTOMY  over 10 years ago    [address] [facility] COLONOSCOPY N/A 2/7/2018    COLONOSCOPY WITH BIOPSY performed by [address] at [address] [facility]  10 plus years ago    Dr. Melissa Ferreira Right 2009    OTHER SURGICAL HISTORY Left 2012    Rotator cuff sx    SLEEVE GASTRECTOMY  09/12/2016    Robotic, Extensive Lysis of Adhesions, Removal of [place] Prosthesis - Dr. Stephanie Hammond  07/06/2016    Dr. Donnell Posey        Past Social History:  Social History     Socioeconomic History    Marital status:      Spouse name: Not on file    Number of children: Not on file    Years of education: Not on file    Highest education level: Not on file   Occupational History    Not on file   Social Needs    Financial resource strain: Not on file    Food insecurity:     Worry: Not on file     Inability: Not on file    Transportation needs:     Medical: Not on file     Non-medical: Not on file   Tobacco Use    Smoking status: Former Smoker     Packs/day: 0.25     Years: 30.00     Pack years: 7.50 Normocephalic and atraumatic  Neck: Supple. Eyes: EOMI b/l. Conjunctivae normal.  No scleral icterus. Respiratory: Effort normal. No respiratory distress. Abd: Benign  Ext:  Movement x 4. No edema  Skin; Warm and dry, no visible rashes, lesions or ulcers.    Neuro: Cranial Nerves Grossly Intact; nml coordination  coordination      Labs:  CBC   Lab Results   Component Value Date    WBC 9.0 10/01/2018    RBC 4.06 10/01/2018    HGB 12.4 10/01/2018    HCT 37.8 10/01/2018    MCV 93.2 10/01/2018    MCH 30.7 10/01/2018    MCHC 32.9 10/01/2018    RDW 14.3 10/01/2018     10/01/2018    MPV 7.3 09/29/2017    RBCMORP NORMAL 09/29/2017    SEGSPCT 56.6 09/29/2017    LABLYMP 29.1 09/29/2017    MONOPCT 10.6 10/01/2018    LABEOS 2.0 09/29/2017    BASO 0.3 09/29/2017    NRBC 0.1 10/01/2018    NRBC 0 09/29/2017    ANISOCYTOSIS 1+ 03/10/2017    SEGSABS 6.4 09/29/2017    LYMPHSABS 3000 10/01/2018    LYMPHSABS 3.3 09/29/2017    MONOSABS 900 10/01/2018    MONOSABS 1.4 09/29/2017    EOSABS 300 10/01/2018    EOSABS 0.2 09/29/2017    BASOSABS 100 10/01/2018    BASOSABS 0.0 09/29/2017        BMP/CMP   Lab Results   Component Value Date    GLUCOSE 90 10/01/2018    CREATININE 0.71 10/01/2018    BUN 21 10/01/2018     10/01/2018    K 4.3 10/01/2018     10/01/2018    CO2 29 10/01/2018    CALCIUM 9.2 10/01/2018    AST 23 10/01/2018    ALKPHOS 85 10/01/2018    PROT 6.6 10/01/2018    LABALBU 4.3 10/01/2018    BILITOT 0.3 10/01/2018    ALT 19 10/01/2018        PREALBUMIN   Lab Results   Component Value Date    PREALBUMIN 23.6 10/01/2018        VITAMIN B12   Lab Results   Component Value Date    [id number] 562 10/01/2018        24 HOUR URINE CALCIUM   Lab Results   Component Value Date    URVOLMEAS 980 03/10/2017    HOURSC 24.0 03/10/2017    CALCIUMUR 39.4 03/10/2017    CALCIUMUR 386 03/10/2017        VITAMIN D   Lab Results   Component Value Date    VITD25 53.55 10/01/2018        RBC FOLATE   Lab Results   Component Value Date FOLATE > 23.9 10/01/2018        LIPID SCREEN (FASTING)   Lab Results   Component Value Date    CHOL 126 10/01/2018    CHOL 106 09/29/2017    TRIG 52 10/01/2018    HDL 54 10/01/2018    LDLCALC 40 09/29/2017     HGA1C (T2DM ONLY)   Lab Results   Component Value Date    LABA1C 5.4 10/01/2018    AVGG 72 09/29/2017        TSH   Lab Results   Component Value Date    TSH 3.650 09/29/2017        IRON   Lab Results   Component Value Date    IRON 109 09/29/2017        IONIZED CALCIUM   Lab Results   Component Value Date    CAION 1.05 09/14/2016         Assessment:       Diagnosis Orders   1. S/P laparoscopic sleeve gastrectomy  CBC Auto Differential    Comprehensive Metabolic Panel    Ferritin    Iron    Iron Binding Capacity    Lipid Panel    Prealbumin    PTH, Intact    TSH with Reflex    Vitamin B1    Vitamin B12 & Folate   2. Class 1 obesity due to excess calories with body mass index (BMI) of 33.0 to 33.9 in adult, unspecified whether serious comorbidity present  CBC Auto Differential    Comprehensive Metabolic Panel    Ferritin    Iron    Iron Binding Capacity    Lipid Panel    Prealbumin    PTH, Intact    TSH with Reflex    Vitamin B1    Vitamin B12 & Folate   3. Screening for malnutrition  CBC Auto Differential    Comprehensive Metabolic Panel    Ferritin    Iron    Iron Binding Capacity    Lipid Panel    Prealbumin    PTH, Intact    TSH with Reflex    Vitamin B1    Vitamin B12 & Folate   4. Gastroesophageal reflux disease, esophagitis presence not specified     5. Essential hypertension     6. Hyperlipidemia, unspecified hyperlipidemia type  Lipid Panel   7. Hypothyroidism, unspecified type  TSH with Reflex   8. SHANNEN on CPAP     9. Iron deficiency anemia, unspecified iron deficiency anemia type  CBC Auto Differential    Ferritin    Iron    Iron Binding Capacity     Plan:    Stay well hydrated. Drink a minimum of 64 oz of non-carbonated, non-caffeinated fluids daily. Nutritional education occurred during visit.

## 2019-04-10 ENCOUNTER — OFFICE VISIT (OUTPATIENT)
Dept: PULMONOLOGY | Age: 69
End: 2019-04-10
Payer: MEDICARE

## 2019-04-10 VITALS
SYSTOLIC BLOOD PRESSURE: 110 MMHG | HEART RATE: 62 BPM | BODY MASS INDEX: 34.21 KG/M2 | WEIGHT: 218 LBS | HEIGHT: 67 IN | OXYGEN SATURATION: 95 % | DIASTOLIC BLOOD PRESSURE: 68 MMHG

## 2019-04-10 DIAGNOSIS — I50.32 CHRONIC DIASTOLIC CONGESTIVE HEART FAILURE (HCC): ICD-10-CM

## 2019-04-10 DIAGNOSIS — J41.0 SIMPLE CHRONIC BRONCHITIS (HCC): ICD-10-CM

## 2019-04-10 DIAGNOSIS — E66.9 OBESITY (BMI 30-39.9): ICD-10-CM

## 2019-04-10 DIAGNOSIS — G47.33 OBSTRUCTIVE SLEEP APNEA ON CPAP: Primary | ICD-10-CM

## 2019-04-10 DIAGNOSIS — G47.10 HYPERSOMNIA WITH SLEEP APNEA: ICD-10-CM

## 2019-04-10 DIAGNOSIS — G47.30 HYPERSOMNIA WITH SLEEP APNEA: ICD-10-CM

## 2019-04-10 DIAGNOSIS — Z99.89 OBSTRUCTIVE SLEEP APNEA ON CPAP: Primary | ICD-10-CM

## 2019-04-10 PROCEDURE — 99213 OFFICE O/P EST LOW 20 MIN: CPT | Performed by: PHYSICIAN ASSISTANT

## 2019-04-10 RX ORDER — MODAFINIL 200 MG/1
200 TABLET ORAL 2 TIMES DAILY
Qty: 180 TABLET | Refills: 1 | Status: SHIPPED | OUTPATIENT
Start: 2019-04-10 | End: 2019-10-07

## 2019-04-10 ASSESSMENT — ENCOUNTER SYMPTOMS
WHEEZING: 0
SHORTNESS OF BREATH: 0
CHEST TIGHTNESS: 0
COUGH: 0
DIARRHEA: 0
BACK PAIN: 0
ALLERGIC/IMMUNOLOGIC NEGATIVE: 1
EYES NEGATIVE: 1
NAUSEA: 0
STRIDOR: 0

## 2019-04-10 NOTE — PROGRESS NOTES
[facility] and [address] [place] [address]         [id number]  4/10/2019   Chief Complaint   Patient presents with    Sleep Apnea     SHANNEN f/u with download cloud. Last office visit was 3/28/18 with Dr Purvi De León        Pt of Dr. Purvi De León    PAP Download:   Original or initialAHI: 17.2     Date of initial study: 11/15/17      Compliant  100%     Noncompliant  %     PAP Type    Cpap   Level   11   Avg Hrs/Day 8 hours 10 min   AHI: 0.4   Recorded compliance dates ,3/3/19-4/1/19  Machine/Mfg: ResMed  Interface: Nasal    Provider:    __[facility]           Kanika Lizarraga        __ Edwige Couch    __ [address]            _1_[facility] __[employer]   __[place]:       __Other    Neck Size: 15.50 Mallampati 4  ESS:   SAQLI: 50    Here is a scan of the most recent download:              Presentation:   Jatinder Fernando presents for sleep medicine follow up for obstructive sleep apnea  Since the last visit, Jaitnder Fernando is doing well with PAP. She is complaining of hypersomnia. She is raising her 11 y/o grandson. She is taking Provigil sometimes. she needs replacement machine. Their machine is obsolete and loud. Equipment issues: The pressure is  acceptable, the mask is acceptable and she  is  using the humidity. Sleep issues:  Do you feel better? Yes and No  More rested? No   Better concentration? yes    Progress History:   Since last visit any new medical issues? No  New ER or hospitlal visits? No  Any new or changes in medicines? No  Any new sleep medicines?  No        Past Medical History:   Diagnosis Date    Arthritis     Atrial fibrillation ([address]     Cancer (HCC)     skin    Carotid arterial disease (HCC)     [facility]) [facility]     Depression     Diabetes mellitus ([facility]     Fibromyalgia     Hypertension     Hypothyroidism     Obesity     Sleep apnea     CPAP    Thyroid disease     TIA (transient ischemic attack) 12/2015       Past Surgical History:   Procedure Laterality Date    tablet Take 1 tablet by mouth nightly as needed (reflux) 30 tablet 2    HYDROcodone-acetaminophen (NORCO) 5-325 MG per tablet Take 1 tablet by mouth every 6 hours as needed for Pain .  traMADol (ULTRAM) 50 MG tablet Take 50 mg by mouth every 6 hours as needed for Pain      Ferrous Fumarate (IRON) 18 MG TBCR Take 1 tablet by mouth daily      Cyanocobalamin (VITAMIN B-12 PO) Take 1 tablet by mouth daily      Cholecalciferol (VITAMIN D3) 5000 UNITS CAPS Take 2 capsules by mouth daily      oxybutynin (DITROPAN) 5 MG tablet Take 5 mg by mouth 2 times daily      sulfaSALAzine (AZULFIDINE) 500 MG tablet Take 500 mg by mouth 2 times daily      albuterol sulfate HFA (VENTOLIN HFA) 108 (90 BASE) MCG/ACT inhaler Inhale 2 puffs into the lungs every 6 hours as needed for Wheezing 1 Inhaler 11    levothyroxine (SYNTHROID) 50 MCG tablet Take 50 mcg by mouth daily       loratadine (CLARITIN) 10 MG tablet Take 10 mg by mouth as needed      hydroxychloroquine (PLAQUENIL) 200 MG tablet Take 200 mg by mouth 2 times daily. No current facility-administered medications for this visit. Family History   Problem Relation Age of Onset    Stroke Mother     High Blood Pressure Mother     Atrial Fibrillation Mother     Heart Disease Mother     Diabetes Father     Early Death Father     Cancer Other     Heart Disease Sister         Review of Systems -   Review of Systems   Constitutional: Negative for activity change, appetite change, chills and fever. HENT: Negative for congestion and postnasal drip. Eyes: Negative. Respiratory: Negative for cough, chest tightness, shortness of breath, wheezing and stridor. Cardiovascular: Negative for chest pain and leg swelling. Gastrointestinal: Negative for diarrhea and nausea. Endocrine: Negative. Genitourinary: Negative. Musculoskeletal: Negative. Negative for arthralgias and back pain. Skin: Negative. Allergic/Immunologic: Negative. call [employer] regarding supplies if needed.   -She call my office for earlier appointment if needed for worsening of sleep symptoms.   - She was instructed on weight loss  - Ajit Martinez was educated about my impression and plan. Patient verbalizesunderstanding.   We will see [address] back in: 1 year with download    Information added by my medical assistant/LPN was reviewed today         Dorina Han PA-C, MPAS  4/10/2019

## 2019-04-29 RX ORDER — PRAVASTATIN SODIUM 20 MG
20 TABLET ORAL DAILY
Qty: 90 TABLET | Refills: 3 | Status: SHIPPED | OUTPATIENT
Start: 2019-04-29 | End: 2020-02-17 | Stop reason: SDUPTHER

## 2019-04-29 RX ORDER — PRAVASTATIN SODIUM 20 MG
20 TABLET ORAL DAILY
Qty: 30 TABLET | Refills: 0 | Status: SHIPPED | OUTPATIENT
Start: 2019-04-29 | End: 2020-02-17 | Stop reason: ALTCHOICE

## 2019-04-29 RX ORDER — PRAVASTATIN SODIUM 20 MG
20 TABLET ORAL DAILY
COMMUNITY
End: 2019-04-29 | Stop reason: SDUPTHER

## 2019-04-29 NOTE — TELEPHONE ENCOUNTER
Patient notified to call pharmacy and see if the losartan lot she is taking was recalled she voiced understanding.

## 2019-08-12 ENCOUNTER — OFFICE VISIT (OUTPATIENT)
Dept: CARDIOLOGY CLINIC | Age: 69
End: 2019-08-12
Payer: MEDICARE

## 2019-08-12 VITALS
WEIGHT: 218.8 LBS | HEART RATE: 51 BPM | BODY MASS INDEX: 33.16 KG/M2 | DIASTOLIC BLOOD PRESSURE: 75 MMHG | HEIGHT: 68 IN | SYSTOLIC BLOOD PRESSURE: 144 MMHG

## 2019-08-12 DIAGNOSIS — I48.0 PAF (PAROXYSMAL ATRIAL FIBRILLATION) (HCC): Primary | ICD-10-CM

## 2019-08-12 DIAGNOSIS — I10 HTN (HYPERTENSION), BENIGN: ICD-10-CM

## 2019-08-12 DIAGNOSIS — I27.20 PULMONARY HTN (HCC): ICD-10-CM

## 2019-08-12 DIAGNOSIS — I50.32 CHRONIC DIASTOLIC CONGESTIVE HEART FAILURE (HCC): ICD-10-CM

## 2019-08-12 DIAGNOSIS — R06.02 SOB (SHORTNESS OF BREATH) ON EXERTION: ICD-10-CM

## 2019-08-12 PROCEDURE — 99214 OFFICE O/P EST MOD 30 MIN: CPT | Performed by: INTERNAL MEDICINE

## 2019-08-12 PROCEDURE — 93000 ELECTROCARDIOGRAM COMPLETE: CPT | Performed by: INTERNAL MEDICINE

## 2019-08-12 RX ORDER — CITALOPRAM 20 MG/1
20 TABLET ORAL DAILY
COMMUNITY
End: 2020-02-17 | Stop reason: ALTCHOICE

## 2019-08-12 RX ORDER — DILTIAZEM HYDROCHLORIDE 240 MG/1
240 CAPSULE, COATED, EXTENDED RELEASE ORAL DAILY
Qty: 90 CAPSULE | Refills: 4 | Status: SHIPPED | OUTPATIENT
Start: 2019-08-12 | End: 2020-02-17 | Stop reason: SDUPTHER

## 2019-08-12 RX ORDER — DULOXETIN HYDROCHLORIDE 20 MG/1
60 CAPSULE, DELAYED RELEASE ORAL DAILY
COMMUNITY
End: 2022-09-22 | Stop reason: DRUGHIGH

## 2019-08-12 RX ORDER — CELECOXIB 200 MG/1
200 CAPSULE ORAL DAILY
Status: ON HOLD | COMMUNITY
End: 2020-02-25 | Stop reason: HOSPADM

## 2019-08-12 NOTE — PROGRESS NOTES
Bard Cristy MD at [address] [facility]  10 plus years ago    Dr. Amy Vargas Right 2009    OTHER SURGICAL HISTORY Left 2012    Rotator cuff sx    SLEEVE GASTRECTOMY  2016    Robotic, Extensive Lysis of Adhesions, Removal of Angelchik Prosthesis - Dr. Merari Thacker  2016    Dr. Bard Muniz        Allergies   Allergen Reactions    Demerol Hcl [Meperidine] Nausea And Vomiting        Family History   Problem Relation Age of Onset    Stroke Mother     High Blood Pressure Mother     Atrial Fibrillation Mother     Heart Disease Mother     Diabetes Father     Early Death Father     Cancer Other     Heart Disease Sister         Social History     Socioeconomic History    Marital status:      Spouse name: Not on file    Number of children: Not on file    Years of education: Not on file    Highest education level: Not on file   Occupational History    Not on file   Social Needs    Financial resource strain: Not on file    Food insecurity:     Worry: Not on file     Inability: Not on file    Transportation needs:     Medical: Not on file     Non-medical: Not on file   Tobacco Use    Smoking status: Former Smoker     Packs/day: 0.25     Years: 30.00     Pack years: 7.50     Types: Cigarettes     Start date: 3/24/1982     Last attempt to quit: 2015     Years since quittin.4    Smokeless tobacco: Never Used   Substance and Sexual Activity    Alcohol use: No    Drug use: No    Sexual activity: Never   Lifestyle    Physical activity:     Days per week: Not on file     Minutes per session: Not on file    Stress: Not on file   Relationships    Social connections:     Talks on phone: Not on file     Gets together: Not on file     Attends Mandaeism service: Not on file     Active member of club or organization: Not on file     Attends meetings of clubs or organizations: Not minute. 2. No significant pause of more than 1.9 seconds noted. 3. Rare ventricular ectopic beats, isolated and couplets. 4. Rare supraventricular ectopic beats, isolated, couplets, and rare  nonsustained supraventricular ectopic run. 5. No significant bradyarrhythmia to be addressed, yet certainly average  heart rate 55, minimum is 40, so probably need further clinical correlation. 6. Otherwise, at this level, this minimal low heart rate if there are no  symptoms will need just observation.  Bri Randolph M.D.        D: 11/29/2016 20:0      EKG 8/12/19  NSR, no acute abn      Assessment    Home BP good       Diagnosis Orders   1. PAF (paroxysmal atrial fibrillation) (HCC)-post op- gastric sleeve  EKG 12 Lead   2. Chronic diastolic congestive heart failure ([address]     3. HTN (hypertension), benign     4. Pulmonary HTN (HCC) Mean pressure 32 mmhg by RHC     5. SOB (shortness of breath) on exertion         Previous admission DX    Hx of recent TIA with RT side weakness      NSVT 5 beat   S/P gastric sleeve  Hypertensive Urgency  Hx of obstructive Sleep apnea  Pulmonary HTN  New onset post afib with RVR- now rate controlled  Chads of  DM II  HX of TIA         Continue home medication regimen   bP controlled    Doing well        Plan     The Current meds and labs reviewed and d/w the pat    Post afib rvr new onset-now IN NSR    Sinus bradycardia 52 improved after meds adjusted  Cont cardizem cd 240 qd   Cont   losartan 50 qd   Holter 24 hrs- good  CHDAS of 4( DM, htn, Hx of TIA)  Need longterm OA and on apixaban  D/w the pat the risk and benefit of OA  Pat understand the risk of bleeding including ICH    Patient Seen, Chart, Consults notes, Labs, Radiology studies reviewed. Hx of TIA IN 2016  Was on asa and apixaban    Hypertension, on medical treatment. Seems to be under good control. Patient is compliant with medical treatment.      Congestive heart failure: no evidence of fluid overload today, no

## 2019-08-29 LAB
ALBUMIN SERPL-MCNC: 3.8 GM/DL (ref 3.5–5)
ALP BLD-CCNC: 68 IU/L (ref 39–118)
ALT SERPL-CCNC: 12 IU/L (ref 10–40)
ANION GAP SERPL CALCULATED.3IONS-SCNC: 9 MMOL/L (ref 4–12)
AST SERPL-CCNC: 15 IU/L (ref 15–41)
BILIRUB SERPL-MCNC: 0.5 MG/DL (ref 0.2–1)
BILIRUBIN DIRECT: 0.1 MG/DL (ref 0.1–0.2)
BUN BLDV-MCNC: 22 MG/DL (ref 7–20)
CALCIUM SERPL-MCNC: 8.4 MG/DL (ref 8.8–10.5)
CHLORIDE BLD-SCNC: 108 MEQ/L (ref 101–111)
CHOLESTEROL/HDL RELATIVE RISK: 2.4 (ref 4–4.4)
CHOLESTEROL: 132 MG/DL
CO2: 26 MEQ/L (ref 21–32)
CREAT SERPL-MCNC: 0.63 MG/DL (ref 0.6–1.3)
CREATININE CLEARANCE: >60
DIRECT-LDL / HDL RISK: 1.3
GLUCOSE: 70 MG/DL (ref 70–110)
HDLC SERPL-MCNC: 54 MG/DL
LDL CHOLESTEROL DIRECT: 75 MG/DL
MAGNESIUM: 1.9 MG/DL (ref 1.8–2.5)
POTASSIUM SERPL-SCNC: 3.7 MEQ/L (ref 3.6–5)
SODIUM BLD-SCNC: 143 MEQ/L (ref 135–145)
TOTAL PROTEIN: 6.2 G/DL (ref 6.2–8)
TRIGL SERPL-MCNC: 68 MG/DL
VLDLC SERPL CALC-MCNC: 13 MG/DL

## 2019-09-09 ENCOUNTER — HOSPITAL ENCOUNTER (OUTPATIENT)
Dept: INTERVENTIONAL RADIOLOGY/VASCULAR | Age: 69
Discharge: HOME | End: 2019-09-09
Payer: MEDICARE

## 2019-09-09 DIAGNOSIS — I65.23 BILATERAL CAROTID ARTERY STENOSIS: ICD-10-CM

## 2019-09-09 PROCEDURE — 93880 EXTRACRANIAL BILAT STUDY: CPT

## 2019-11-20 ENCOUNTER — OFFICE VISIT (OUTPATIENT)
Dept: PULMONOLOGY | Age: 69
End: 2019-11-20
Payer: MEDICARE

## 2019-11-20 VITALS
OXYGEN SATURATION: 95 % | HEART RATE: 68 BPM | DIASTOLIC BLOOD PRESSURE: 68 MMHG | BODY MASS INDEX: 31.64 KG/M2 | SYSTOLIC BLOOD PRESSURE: 130 MMHG | WEIGHT: 208.8 LBS | HEIGHT: 68 IN

## 2019-11-20 DIAGNOSIS — G47.30 HYPERSOMNIA WITH SLEEP APNEA: ICD-10-CM

## 2019-11-20 DIAGNOSIS — Z99.89 OBSTRUCTIVE SLEEP APNEA ON CPAP: Primary | ICD-10-CM

## 2019-11-20 DIAGNOSIS — E66.9 OBESITY (BMI 30-39.9): ICD-10-CM

## 2019-11-20 DIAGNOSIS — G47.33 OBSTRUCTIVE SLEEP APNEA ON CPAP: Primary | ICD-10-CM

## 2019-11-20 DIAGNOSIS — G47.10 HYPERSOMNIA WITH SLEEP APNEA: ICD-10-CM

## 2019-11-20 PROCEDURE — 99213 OFFICE O/P EST LOW 20 MIN: CPT | Performed by: PHYSICIAN ASSISTANT

## 2019-11-20 ASSESSMENT — ENCOUNTER SYMPTOMS
COUGH: 0
STRIDOR: 0
EYES NEGATIVE: 1
DIARRHEA: 0
SHORTNESS OF BREATH: 0
BACK PAIN: 0
WHEEZING: 0
CHEST TIGHTNESS: 0
ALLERGIC/IMMUNOLOGIC NEGATIVE: 1
NAUSEA: 0

## 2020-02-06 RX ORDER — LOSARTAN POTASSIUM 50 MG/1
TABLET ORAL
Qty: 90 TABLET | Refills: 4 | Status: SHIPPED | OUTPATIENT
Start: 2020-02-06 | End: 2020-05-19 | Stop reason: SDUPTHER

## 2020-02-11 RX ORDER — APIXABAN 5 MG/1
TABLET, FILM COATED ORAL
Qty: 180 TABLET | Refills: 4 | Status: SHIPPED | OUTPATIENT
Start: 2020-02-11 | End: 2020-02-17 | Stop reason: SDUPTHER

## 2020-02-14 RX ORDER — CLONIDINE HYDROCHLORIDE 0.1 MG/1
TABLET ORAL
Qty: 180 TABLET | Refills: 0 | Status: ON HOLD | OUTPATIENT
Start: 2020-02-14 | End: 2020-02-25 | Stop reason: HOSPADM

## 2020-02-17 ENCOUNTER — OFFICE VISIT (OUTPATIENT)
Dept: CARDIOLOGY CLINIC | Age: 70
End: 2020-02-17
Payer: MEDICARE

## 2020-02-17 ENCOUNTER — HOSPITAL ENCOUNTER (INPATIENT)
Age: 70
LOS: 8 days | Discharge: HOME | DRG: 247 | End: 2020-02-25
Attending: EMERGENCY MEDICINE | Admitting: INTERNAL MEDICINE
Payer: MEDICARE

## 2020-02-17 VITALS
HEART RATE: 142 BPM | DIASTOLIC BLOOD PRESSURE: 86 MMHG | BODY MASS INDEX: 32.71 KG/M2 | SYSTOLIC BLOOD PRESSURE: 130 MMHG | WEIGHT: 215.8 LBS | HEIGHT: 68 IN

## 2020-02-17 PROBLEM — R53.83 FATIGUE: Status: ACTIVE | Noted: 2020-02-17

## 2020-02-17 PROBLEM — I48.91 ATRIAL FIBRILLATION WITH RVR (HCC): Status: ACTIVE | Noted: 2020-02-17

## 2020-02-17 LAB
ANION GAP SERPL CALCULATED.3IONS-SCNC: 15 MEQ/L (ref 8–16)
BASOPHILS # BLD: 0.7 %
BASOPHILS ABSOLUTE: 0.1 THOU/MM3 (ref 0–0.1)
BUN BLDV-MCNC: 21 MG/DL (ref 7–22)
CALCIUM SERPL-MCNC: 9.5 MG/DL (ref 8.5–10.5)
CHLORIDE BLD-SCNC: 105 MEQ/L (ref 98–111)
CO2: 24 MEQ/L (ref 23–33)
CREAT SERPL-MCNC: 0.6 MG/DL (ref 0.4–1.2)
EOSINOPHIL # BLD: 1.2 %
EOSINOPHILS ABSOLUTE: 0.1 THOU/MM3 (ref 0–0.4)
ERYTHROCYTE [DISTWIDTH] IN BLOOD BY AUTOMATED COUNT: 14.6 % (ref 11.5–14.5)
ERYTHROCYTE [DISTWIDTH] IN BLOOD BY AUTOMATED COUNT: 51.9 FL (ref 35–45)
GFR SERPL CREATININE-BSD FRML MDRD: > 90 ML/MIN/1.73M2
GLUCOSE BLD-MCNC: 106 MG/DL (ref 70–108)
HCT VFR BLD CALC: 40.9 % (ref 37–47)
HEMOGLOBIN: 13.2 GM/DL (ref 12–16)
IMMATURE GRANS (ABS): 0.05 THOU/MM3 (ref 0–0.07)
IMMATURE GRANULOCYTES: 0.5 %
LYMPHOCYTES # BLD: 22.2 %
LYMPHOCYTES ABSOLUTE: 2.3 THOU/MM3 (ref 1–4.8)
MCH RBC QN AUTO: 31.4 PG (ref 26–33)
MCHC RBC AUTO-ENTMCNC: 32.3 GM/DL (ref 32.2–35.5)
MCV RBC AUTO: 97.1 FL (ref 81–99)
MONOCYTES # BLD: 11 %
MONOCYTES ABSOLUTE: 1.2 THOU/MM3 (ref 0.4–1.3)
NUCLEATED RED BLOOD CELLS: 0 /100 WBC
OSMOLALITY CALCULATION: 290.2 MOSMOL/KG (ref 275–300)
PLATELET # BLD: 226 THOU/MM3 (ref 130–400)
PMV BLD AUTO: 9.8 FL (ref 9.4–12.4)
POTASSIUM REFLEX MAGNESIUM: 3.8 MEQ/L (ref 3.5–5.2)
PRO-BNP: 1133 PG/ML (ref 0–900)
RBC # BLD: 4.21 MILL/MM3 (ref 4.2–5.4)
SEG NEUTROPHILS: 64.4 %
SEGMENTED NEUTROPHILS ABSOLUTE COUNT: 6.8 THOU/MM3 (ref 1.8–7.7)
SODIUM BLD-SCNC: 144 MEQ/L (ref 135–145)
T4 FREE: 1.44 NG/DL (ref 0.93–1.76)
TROPONIN T: < 0.01 NG/ML
TSH SERPL DL<=0.05 MIU/L-ACNC: 1.66 UIU/ML (ref 0.4–4.2)
TSH SERPL DL<=0.05 MIU/L-ACNC: 1.68 UIU/ML (ref 0.4–4.2)
WBC # BLD: 10.5 THOU/MM3 (ref 4.8–10.8)

## 2020-02-17 PROCEDURE — 6370000000 HC RX 637 (ALT 250 FOR IP): Performed by: INTERNAL MEDICINE

## 2020-02-17 PROCEDURE — 2709999900 HC NON-CHARGEABLE SUPPLY

## 2020-02-17 PROCEDURE — 2500000003 HC RX 250 WO HCPCS: Performed by: INTERNAL MEDICINE

## 2020-02-17 PROCEDURE — 85025 COMPLETE CBC W/AUTO DIFF WBC: CPT

## 2020-02-17 PROCEDURE — 96376 TX/PRO/DX INJ SAME DRUG ADON: CPT

## 2020-02-17 PROCEDURE — 99214 OFFICE O/P EST MOD 30 MIN: CPT | Performed by: INTERNAL MEDICINE

## 2020-02-17 PROCEDURE — 80048 BASIC METABOLIC PNL TOTAL CA: CPT

## 2020-02-17 PROCEDURE — 93005 ELECTROCARDIOGRAM TRACING: CPT | Performed by: EMERGENCY MEDICINE

## 2020-02-17 PROCEDURE — 99284 EMERGENCY DEPT VISIT MOD MDM: CPT

## 2020-02-17 PROCEDURE — 96374 THER/PROPH/DIAG INJ IV PUSH: CPT

## 2020-02-17 PROCEDURE — 84439 ASSAY OF FREE THYROXINE: CPT

## 2020-02-17 PROCEDURE — 83880 ASSAY OF NATRIURETIC PEPTIDE: CPT

## 2020-02-17 PROCEDURE — 99223 1ST HOSP IP/OBS HIGH 75: CPT | Performed by: INTERNAL MEDICINE

## 2020-02-17 PROCEDURE — 84484 ASSAY OF TROPONIN QUANT: CPT

## 2020-02-17 PROCEDURE — 36415 COLL VENOUS BLD VENIPUNCTURE: CPT

## 2020-02-17 PROCEDURE — 1200000000 HC SEMI PRIVATE

## 2020-02-17 PROCEDURE — 1200000003 HC TELEMETRY R&B

## 2020-02-17 PROCEDURE — 84443 ASSAY THYROID STIM HORMONE: CPT

## 2020-02-17 PROCEDURE — 2580000003 HC RX 258: Performed by: INTERNAL MEDICINE

## 2020-02-17 PROCEDURE — 93000 ELECTROCARDIOGRAM COMPLETE: CPT | Performed by: INTERNAL MEDICINE

## 2020-02-17 PROCEDURE — 2500000003 HC RX 250 WO HCPCS: Performed by: EMERGENCY MEDICINE

## 2020-02-17 RX ORDER — TRAZODONE HYDROCHLORIDE 50 MG/1
50 TABLET ORAL NIGHTLY
Status: DISCONTINUED | OUTPATIENT
Start: 2020-02-17 | End: 2020-02-25 | Stop reason: HOSPADM

## 2020-02-17 RX ORDER — DILTIAZEM HYDROCHLORIDE 5 MG/ML
20 INJECTION INTRAVENOUS ONCE
Status: COMPLETED | OUTPATIENT
Start: 2020-02-17 | End: 2020-02-17

## 2020-02-17 RX ORDER — SULFASALAZINE 500 MG/1
500 TABLET ORAL 2 TIMES DAILY
Status: DISCONTINUED | OUTPATIENT
Start: 2020-02-17 | End: 2020-02-25 | Stop reason: HOSPADM

## 2020-02-17 RX ORDER — PRAVASTATIN SODIUM 20 MG
20 TABLET ORAL NIGHTLY
Status: DISCONTINUED | OUTPATIENT
Start: 2020-02-17 | End: 2020-02-21

## 2020-02-17 RX ORDER — SODIUM CHLORIDE 0.9 % (FLUSH) 0.9 %
10 SYRINGE (ML) INJECTION PRN
Status: DISCONTINUED | OUTPATIENT
Start: 2020-02-17 | End: 2020-02-19 | Stop reason: ALTCHOICE

## 2020-02-17 RX ORDER — CELECOXIB 200 MG/1
200 CAPSULE ORAL 2 TIMES DAILY
Status: DISCONTINUED | OUTPATIENT
Start: 2020-02-17 | End: 2020-02-18

## 2020-02-17 RX ORDER — AMOXICILLIN AND CLAVULANATE POTASSIUM 875; 125 MG/1; MG/1
1 TABLET, FILM COATED ORAL EVERY 12 HOURS
Status: ON HOLD | COMMUNITY
End: 2020-02-18 | Stop reason: ALTCHOICE

## 2020-02-17 RX ORDER — ALBUTEROL SULFATE 90 UG/1
2 AEROSOL, METERED RESPIRATORY (INHALATION) EVERY 6 HOURS PRN
Status: DISCONTINUED | OUTPATIENT
Start: 2020-02-17 | End: 2020-02-25 | Stop reason: HOSPADM

## 2020-02-17 RX ORDER — MODAFINIL 100 MG/1
200 TABLET ORAL DAILY
Status: DISCONTINUED | OUTPATIENT
Start: 2020-02-18 | End: 2020-02-25 | Stop reason: HOSPADM

## 2020-02-17 RX ORDER — DILTIAZEM HYDROCHLORIDE 240 MG/1
240 CAPSULE, COATED, EXTENDED RELEASE ORAL DAILY
Qty: 90 CAPSULE | Refills: 4 | Status: CANCELLED | OUTPATIENT
Start: 2020-02-17

## 2020-02-17 RX ORDER — TRAMADOL HYDROCHLORIDE 50 MG/1
50 TABLET ORAL EVERY 6 HOURS PRN
Status: DISCONTINUED | OUTPATIENT
Start: 2020-02-17 | End: 2020-02-25 | Stop reason: HOSPADM

## 2020-02-17 RX ORDER — CLONIDINE HYDROCHLORIDE 0.1 MG/1
TABLET ORAL
Qty: 180 TABLET | Refills: 4 | Status: CANCELLED | OUTPATIENT
Start: 2020-02-17

## 2020-02-17 RX ORDER — ONDANSETRON 2 MG/ML
4 INJECTION INTRAMUSCULAR; INTRAVENOUS EVERY 6 HOURS PRN
Status: DISCONTINUED | OUTPATIENT
Start: 2020-02-17 | End: 2020-02-25 | Stop reason: HOSPADM

## 2020-02-17 RX ORDER — LOSARTAN POTASSIUM 50 MG/1
TABLET ORAL
Qty: 90 TABLET | Refills: 4 | Status: CANCELLED | OUTPATIENT
Start: 2020-02-17

## 2020-02-17 RX ORDER — CHLORTHALIDONE 25 MG/1
25 TABLET ORAL DAILY
Status: DISCONTINUED | OUTPATIENT
Start: 2020-02-17 | End: 2020-02-25 | Stop reason: HOSPADM

## 2020-02-17 RX ORDER — SODIUM CHLORIDE 0.9 % (FLUSH) 0.9 %
10 SYRINGE (ML) INJECTION EVERY 12 HOURS SCHEDULED
Status: DISCONTINUED | OUTPATIENT
Start: 2020-02-17 | End: 2020-02-19 | Stop reason: ALTCHOICE

## 2020-02-17 RX ORDER — CLONIDINE HYDROCHLORIDE 0.1 MG/1
0.1 TABLET ORAL 2 TIMES DAILY
Status: DISCONTINUED | OUTPATIENT
Start: 2020-02-17 | End: 2020-02-21

## 2020-02-17 RX ORDER — LEVOTHYROXINE SODIUM 0.05 MG/1
50 TABLET ORAL DAILY
Status: DISCONTINUED | OUTPATIENT
Start: 2020-02-18 | End: 2020-02-25 | Stop reason: HOSPADM

## 2020-02-17 RX ORDER — OXYBUTYNIN CHLORIDE 5 MG/1
5 TABLET ORAL DAILY
Status: DISCONTINUED | OUTPATIENT
Start: 2020-02-17 | End: 2020-02-25 | Stop reason: HOSPADM

## 2020-02-17 RX ORDER — PANTOPRAZOLE SODIUM 40 MG/1
40 TABLET, DELAYED RELEASE ORAL DAILY
Status: DISCONTINUED | OUTPATIENT
Start: 2020-02-18 | End: 2020-02-25 | Stop reason: HOSPADM

## 2020-02-17 RX ORDER — MODAFINIL 200 MG/1
200 TABLET ORAL PRN
COMMUNITY
End: 2020-11-17 | Stop reason: SDUPTHER

## 2020-02-17 RX ORDER — DULOXETIN HYDROCHLORIDE 20 MG/1
20 CAPSULE, DELAYED RELEASE ORAL DAILY
Status: DISCONTINUED | OUTPATIENT
Start: 2020-02-17 | End: 2020-02-25 | Stop reason: HOSPADM

## 2020-02-17 RX ORDER — DILTIAZEM HYDROCHLORIDE 300 MG/1
300 CAPSULE, COATED, EXTENDED RELEASE ORAL DAILY
Qty: 30 CAPSULE | Refills: 0 | Status: SHIPPED | OUTPATIENT
Start: 2020-02-17 | End: 2020-02-18 | Stop reason: DRUGHIGH

## 2020-02-17 RX ORDER — LOSARTAN POTASSIUM 50 MG/1
50 TABLET ORAL DAILY
Status: DISCONTINUED | OUTPATIENT
Start: 2020-02-17 | End: 2020-02-25 | Stop reason: HOSPADM

## 2020-02-17 RX ORDER — ASPIRIN 81 MG/1
81 TABLET ORAL DAILY
Status: DISCONTINUED | OUTPATIENT
Start: 2020-02-17 | End: 2020-02-25 | Stop reason: HOSPADM

## 2020-02-17 RX ORDER — SODIUM CHLORIDE 9 MG/ML
INJECTION, SOLUTION INTRAVENOUS CONTINUOUS
Status: DISCONTINUED | OUTPATIENT
Start: 2020-02-17 | End: 2020-02-17

## 2020-02-17 RX ORDER — HYDROCODONE BITARTRATE AND ACETAMINOPHEN 5; 325 MG/1; MG/1
1 TABLET ORAL EVERY 6 HOURS PRN
Status: DISCONTINUED | OUTPATIENT
Start: 2020-02-17 | End: 2020-02-25 | Stop reason: HOSPADM

## 2020-02-17 RX ORDER — PRAVASTATIN SODIUM 20 MG
20 TABLET ORAL DAILY
Qty: 90 TABLET | Refills: 4 | Status: ON HOLD | OUTPATIENT
Start: 2020-02-17 | End: 2020-02-25 | Stop reason: HOSPADM

## 2020-02-17 RX ORDER — HYDROXYCHLOROQUINE SULFATE 200 MG/1
200 TABLET, FILM COATED ORAL 2 TIMES DAILY
Status: DISCONTINUED | OUTPATIENT
Start: 2020-02-17 | End: 2020-02-25 | Stop reason: HOSPADM

## 2020-02-17 RX ADMIN — CLONIDINE HYDROCHLORIDE 0.1 MG: 0.1 TABLET ORAL at 20:53

## 2020-02-17 RX ADMIN — SULFASALAZINE 500 MG: 500 TABLET ORAL at 20:53

## 2020-02-17 RX ADMIN — PRAVASTATIN SODIUM 20 MG: 20 TABLET ORAL at 20:53

## 2020-02-17 RX ADMIN — HYDROXYCHLOROQUINE SULFATE 200 MG: 200 TABLET ORAL at 20:53

## 2020-02-17 RX ADMIN — Medication 10 ML: at 20:53

## 2020-02-17 RX ADMIN — DILTIAZEM HYDROCHLORIDE 20 MG: 5 INJECTION INTRAVENOUS at 13:56

## 2020-02-17 RX ADMIN — DILTIAZEM HYDROCHLORIDE 20 MG: 5 INJECTION INTRAVENOUS at 12:19

## 2020-02-17 RX ADMIN — APIXABAN 5 MG: 5 TABLET, FILM COATED ORAL at 20:53

## 2020-02-17 RX ADMIN — TRAZODONE HYDROCHLORIDE 50 MG: 50 TABLET ORAL at 20:55

## 2020-02-17 RX ADMIN — DILTIAZEM HYDROCHLORIDE 5 MG/HR: 5 INJECTION INTRAVENOUS at 20:48

## 2020-02-17 ASSESSMENT — ENCOUNTER SYMPTOMS
DIARRHEA: 0
CONSTIPATION: 0
SINUS PAIN: 0
ABDOMINAL PAIN: 0
EYE PAIN: 0
CHEST TIGHTNESS: 0
BACK PAIN: 0
BLOOD IN STOOL: 0
NAUSEA: 0
COUGH: 0
RECTAL PAIN: 0
SHORTNESS OF BREATH: 1
SINUS PRESSURE: 0

## 2020-02-17 ASSESSMENT — PAIN DESCRIPTION - PROGRESSION: CLINICAL_PROGRESSION: NOT CHANGED

## 2020-02-17 ASSESSMENT — PAIN DESCRIPTION - ORIENTATION: ORIENTATION: LEFT

## 2020-02-17 ASSESSMENT — PAIN DESCRIPTION - PAIN TYPE: TYPE: ACUTE PAIN

## 2020-02-17 ASSESSMENT — PAIN SCALES - GENERAL: PAINLEVEL_OUTOF10: 4

## 2020-02-17 ASSESSMENT — PAIN DESCRIPTION - ONSET: ONSET: ON-GOING

## 2020-02-17 ASSESSMENT — PAIN DESCRIPTION - FREQUENCY: FREQUENCY: CONTINUOUS

## 2020-02-17 ASSESSMENT — PAIN DESCRIPTION - DESCRIPTORS: DESCRIPTORS: ACHING

## 2020-02-17 ASSESSMENT — PAIN DESCRIPTION - LOCATION: LOCATION: TEETH

## 2020-02-17 ASSESSMENT — PAIN - FUNCTIONAL ASSESSMENT: PAIN_FUNCTIONAL_ASSESSMENT: ACTIVITIES ARE NOT PREVENTED

## 2020-02-17 NOTE — ED NOTES
Pt updated on bed status. Sister at bedside. Call light within reach. No concerns voiced.      Baron Yury RN  02/17/20 2802

## 2020-02-17 NOTE — CARE COORDINATION
ED Care Transition    2020    Patient Name: Aarti Clinton   : 1950  MRN: [id number]    PILO Score: 2  PCP: Dyanna Kehr   Specialist: yes - [place] (sleep)  Active ACC/CTC: no                       Utilization Review:  ED visits:  0  Admissions: 1  20 - Current     Problem List:  Patient Active Problem List   Diagnosis    Obstructive sleep apnea on CPAP    COPD [facility] disease) [facility]    Fibromyalgia    DM (diabetes mellitus), type 2, uncontrolled ([facility]    Right arm weakness    Obesity (BMI 30-39. 9)    Chronic diastolic congestive heart failure (HCC)    Pulmonary HTN (HCC) Mean pressure 32 mmhg by RHC    SOB (shortness of breath) on exertion    Polyosteoarthritis    HTN (hypertension), benign    PAF (paroxysmal atrial fibrillation) (HCC)-post op- gastric sleeve    Postsurgical malabsorption    Bilateral carotid artery stenosis    S/P carotid endarterectomy    Positive colorectal cancer screening using DNA-based stool test    Dysphagia    Gastroesophageal reflux disease without esophagitis    Hypersomnia with sleep apnea    Fatigue    Atrial fibrillation with RVR ([facility]     Summary:  Met with Kelly Deluna, her spouse and son, introduced self/role. Presented to ED from Cardiology office for evaluation of Afib with RVR. Patient reports she has felt more fatigued and sob for the past month. Reports she has put it off as she has been under a lot of stress recently. Patient shared her spouse and daughter both have cancer. They are raising their 16year old grandson - employed, in school, reports he is a good kid, on honor roll but its stressful. Patient resides at home with spouse and grandson, independent, reports granddaughter has been assisted with household chores recently d/t feeling more fatigued and needing to take breaks. Able to obtain and afford medications. Manages own medications. Has transportation. Uses CPAP.  Patient denies further needs/assistance. Patient in Afib ranging from 120's-140's, Dr. Trino Lyons notified. Plan is for admission - Afib with RVR.      Follow up appointments:    Future Appointments   Date Time Provider Vanesa Lundyi   3/17/2020  9:00 AM Renzo Dumas MD [address] [facility] [place]   11/17/2020 10:45 AM Lazarus Echevaria, PA-C [place]       Review Due Health Maintenance:  Health Maintenance Due   Topic Date Due    Hepatitis C screen  1950    Diabetic foot exam  01/10/1958    Diabetic retinal exam  01/10/1958    Diabetic microalbuminuria test  01/10/1966    Hepatitis B vaccine (1 of 3 - Risk 3-dose series) 01/10/1967    DEXA (modify frequency per FRAX score)  01/10/2013    Pneumococcal 65+ years Vaccine (2 of 2 - PPSV23) 01/10/2013    Shingles Vaccine (2 of 3) 06/19/2015    Breast cancer screen  06/13/2019    Annual Wellness Visit (AWV)  06/18/2019    Flu vaccine (1) 09/01/2019    A1C test (Diabetic or Prediabetic)  10/01/2019     Suzanne Negro RN, BSN  [phone]

## 2020-02-17 NOTE — H&P
10/01/2019    CAROTID ENDARTERECTOMY  09/2017    CHOLECYSTECTOMY  over 10 years ago    [id number] [facility] COLONOSCOPY N/A 2/7/2018    COLONOSCOPY WITH BIOPSY performed by Ramona Velazquez MD at [address] [facility]     HYSTERECTOMY  10 plus years ago    Dr. Prabhu Rosario Right 2009    OTHER SURGICAL HISTORY Left 2012    Rotator cuff sx    SLEEVE GASTRECTOMY  09/12/2016    Robotic, Extensive Lysis of Adhesions, Removal of Angelchik Prosthesis - Dr. Gumaro Wei ENDOSCOPY  07/06/2016    Dr. Eleno Sanchez Medications:   No current facility-administered medications on file prior to encounter. Current Outpatient Medications on File Prior to Encounter   Medication Sig Dispense Refill    modafinil (PROVIGIL) 200 MG tablet Take 200 mg by mouth daily.  pravastatin (PRAVACHOL) 20 MG tablet Take 1 tablet by mouth daily 90 tablet 4    apixaban (ELIQUIS) 5 MG TABS tablet TAKE 1 TABLET TWICE A  tablet 4    cloNIDine (CATAPRES) 0.1 MG tablet TAKE 1 TABLET TWICE A  tablet 0    losartan (COZAAR) 50 MG tablet TAKE 1 TABLET DAILY 90 tablet 4    DULoxetine (CYMBALTA) 20 MG extended release capsule Take 20 mg by mouth daily      celecoxib (CELEBREX) 200 MG capsule Take 200 mg by mouth 2 times daily      pantoprazole (PROTONIX) 40 MG tablet Take 1 tablet by mouth daily 90 tablet 3    traZODone (DESYREL) 50 MG tablet Take 50 mg by mouth nightly      chlorthalidone (HYGROTON) 25 MG tablet Take 1 tablet by mouth daily 90 tablet 3    aspirin 81 MG tablet Take 81 mg by mouth daily      HYDROcodone-acetaminophen (NORCO) 5-325 MG per tablet Take 1 tablet by mouth every 6 hours as needed for Pain .       traMADol (ULTRAM) 50 MG tablet Take 50 mg by mouth every 6 hours as needed for Pain      Ferrous Fumarate (IRON) 18 MG TBCR Take 1 tablet by mouth daily      Cyanocobalamin (VITAMIN B-12 PO) Take 1 Rales/Wheezes/Rhonchi. Cardiovascular: Irregularly irregular, tachycardic, no murmurs  Abdomen: Soft, non-tender, non-distended with normal bowel sounds. Musculoskeletal:  No clubbing, cyanosis or edema bilaterally. Skin: Skin color, texture, turgor normal.  No rashes or lesions. Neurologic:  Neurovascularly intact without any focal sensory/motor deficits. Cranial nerves: II-XII intact, grossly non-focal.  Psychiatric: Alert and oriented, thought content appropriate, normal insight  Capillary Refill: Brisk,< 3 seconds   Peripheral Pulses: +2 palpable, equal bilaterally     Labs:   Recent Labs     02/17/20  1206   WBC 10.5   HGB 13.2   HCT 40.9        Recent Labs     02/17/20  1206      K 3.8      CO2 24   BUN 21   CREATININE 0.6   CALCIUM 9.5     No results for input(s): AST, ALT, BILIDIR, BILITOT, ALKPHOS in the last 72 hours. No results for input(s): INR in the last 72 hours. No results for input(s): Harrison Ignacio in the last 72 hours. Urinalysis:    Lab Results   Component Value Date    NITRU NEGATIVE 12/07/2015    WBCUA 0-2 12/07/2015    BACTERIA FEW 12/07/2015    RBCUA 0-2 12/07/2015    BLOODU NEGATIVE 12/07/2015    GLUCOSEU NEGATIVE 12/07/2015       Radiology:   No orders to display     No results found.     Electronically signed by Jessica Duarte MD on 2/17/2020 at 4:20 PM

## 2020-02-17 NOTE — PROGRESS NOTES
Chief Complaint   Patient presents with    6 Month Follow-Up    Atrial Fibrillation     HX OF  fu had TIA here at [address], transferred from [facility]  Pt was prescribed lasix 20 mg daily by , but has not started taking it. Pt here for pre op clearance  Bariatric surgery 9/12/2016  With Dr. ANSARI [facility]    Had carotid endarterectomy - 9-11-17 -         6 month follow up. Feel fatigue at rest    Sob on exertion- worse    Occasional palpitation    No wt gain    Hx of depression and a lots of stress in family  and and daughter sick    DENIES CP, DIZZINESS, LIGHTHEADED AND YVETTE. Patient has not been taking Atorvastatin - makes patient very weak  Dann Lemons stated he leg get weak and went off lipitor got better then back onmit and then weakness is back  Has difficulty of walking    Patient did not bring a medication list. Patient verbally stated nothing has changed. NO dizziness    No leg swelling        Patient Active Problem List   Diagnosis    Obstructive sleep apnea on CPAP    COPD (chronic obstructive pulmonary disease) (HCC)    Fibromyalgia    DM (diabetes mellitus), type 2, uncontrolled ([address]    Right arm weakness    Obesity (BMI 30-39. 9)    Chronic diastolic congestive heart failure (HCC)    Pulmonary HTN (HCC) Mean pressure 32 mmhg by RHC    SOB (shortness of breath) on exertion    Polyosteoarthritis    HTN (hypertension), benign    PAF (paroxysmal atrial fibrillation) (HCC)-post op- gastric sleeve    Postsurgical malabsorption    Bilateral carotid artery stenosis    S/P carotid endarterectomy    Positive colorectal cancer screening using DNA-based stool test    Dysphagia    Gastroesophageal reflux disease without esophagitis    Hypersomnia with sleep apnea    Fatigue    Atrial fibrillation with RVR ([address]       Past Surgical History:   Procedure Laterality Date    APPENDECTOMY  over 10 years ago    [address] 10/01/2019    CAROTID ENDARTERECTOMY 2017    CHOLECYSTECTOMY  over 10 years ago    [address] [facility] COLONOSCOPY N/A 2018    COLONOSCOPY WITH BIOPSY performed by Dipak Crespo MD at [address] [facility]  10 plus years ago    Dr. Eleni Tinajero Right     OTHER SURGICAL HISTORY Left     Rotator cuff sx    SLEEVE GASTRECTOMY  2016    Robotic, Extensive Lysis of Adhesions, Removal of Angelchik Prosthesis - Dr. Dougie Antunez  2016    Dr. Beryl Ramirez        Allergies   Allergen Reactions    Demerol Hcl [Meperidine] Nausea And Vomiting        Family History   Problem Relation Age of Onset    Stroke Mother     High Blood Pressure Mother     Atrial Fibrillation Mother     Heart Disease Mother     Diabetes Father     Early Death Father     Cancer Other     Heart Disease Sister         Social History     Socioeconomic History    Marital status:      Spouse name: Not on file    Number of children: Not on file    Years of education: Not on file    Highest education level: Not on file   Occupational History    Not on file   Social Needs    Financial resource strain: Not on file    Food insecurity:     Worry: Not on file     Inability: Not on file    Transportation needs:     Medical: Not on file     Non-medical: Not on file   Tobacco Use    Smoking status: Former Smoker     Packs/day: 0.25     Years: 30.00     Pack years: 7.50     Types: Cigarettes     Start date: 3/24/1982     Last attempt to quit: 2015     Years since quittin.9    Smokeless tobacco: Never Used   Substance and Sexual Activity    Alcohol use: No    Drug use: No    Sexual activity: Never   Lifestyle    Physical activity:     Days per week: Not on file     Minutes per session: Not on file    Stress: Not on file   Relationships    Social connections:     Talks on phone: Not on file     Gets together: Not on daily      albuterol sulfate HFA (VENTOLIN HFA) 108 (90 BASE) MCG/ACT inhaler Inhale 2 puffs into the lungs every 6 hours as needed for Wheezing 1 Inhaler 11    levothyroxine (SYNTHROID) 50 MCG tablet Take 50 mcg by mouth daily       loratadine (CLARITIN) 10 MG tablet Take 10 mg by mouth as needed      hydroxychloroquine (PLAQUENIL) 200 MG tablet Take 200 mg by mouth 2 times daily.  traMADol (ULTRAM) 50 MG tablet Take 50 mg by mouth every 6 hours as needed for Pain       No current facility-administered medications for this visit. Review of Systems -     General ROS: negative  Psychological ROS: negative  Hematological and Lymphatic ROS: No history of blood clots or bleeding disorder. Respiratory ROS: no cough, shortness of breath, or wheezing  Cardiovascular ROS: no chest pain or dyspnea on exertion  Gastrointestinal ROS: negative  Genito-Urinary ROS: no dysuria, trouble voiding, or hematuria  Musculoskeletal ROS: negative  Neurological ROS: no TIA or stroke symptoms  Dermatological ROS: negative      Blood pressure 130/86, pulse 142, height 5' 8\" (1.727 m), weight 215 lb 12.8 oz (97.9 kg), not currently breastfeeding.         Physical Examination:    General appearance - alert, well appearing, and in no distress  Mental status - alert, oriented to person, place, and time  Neck - supple, no significant adenopathy, no JVD, or carotid bruits  Chest - clear to auscultation, no wheezes, rales or rhonchi, symmetric air entry  Heart - normal rate, regular rhythm, normal S1, S2, no murmurs, rubs, clicks or gallops  Abdomen - soft, nontender, nondistended, no masses or organomegaly  Neurological - alert, oriented, normal speech, no focal findings or movement disorder noted  Musculoskeletal - no joint tenderness, deformity or swelling  Extremities - peripheral pulses normal, no pedal edema, no clubbing or cyanosis  Skin - normal coloration and turgor, no rashes, no suspicious skin lesions noted    Lab  No results for input(s): CKTOTAL, CKMB, CKMBINDEX, TROPONINI in the last 72 hours. CBC:   Lab Results   Component Value Date    WBC 9.0 10/01/2018    RBC 4.06 10/01/2018    HGB 12.4 10/01/2018    HCT 37.8 10/01/2018    MCV 93.2 10/01/2018    MCH 30.7 10/01/2018    MCHC 32.9 10/01/2018    RDW 14.3 10/01/2018     10/01/2018    MPV 7.3 09/29/2017     BMP:    Lab Results   Component Value Date     08/29/2019    K 3.7 08/29/2019     08/29/2019    CO2 26 08/29/2019    BUN 22 08/29/2019    LABALBU 3.8 08/29/2019    CREATININE 0.63 08/29/2019    CALCIUM 8.40 08/29/2019    LABGLOM 83 09/29/2017    GLUCOSE 70 08/29/2019     Hepatic Function Panel:    Lab Results   Component Value Date    ALKPHOS 68 08/29/2019    ALT 12 08/29/2019    AST 15 08/29/2019    PROT 6.2 08/29/2019    BILITOT 0.5 08/29/2019    BILIDIR 0.1 08/29/2019    LABALBU 3.8 08/29/2019     Magnesium:    Lab Results   Component Value Date    MG 1.9 08/29/2019     Warfarin PT/INR:  No components found for: PTPATWAR, PTINRWAR  HgBA1c:    Lab Results   Component Value Date    LABA1C 5.4 10/01/2018     FLP:    Lab Results   Component Value Date    TRIG 68 08/29/2019    HDL 54 08/29/2019    LDLCALC 40 09/29/2017    LDLDIRECT 75 08/29/2019     TSH:    Lab Results   Component Value Date    TSH 3.650 09/29/2017       EKG 1/5/16  Normal sinus rhythm  Rightward axis  T wave abnormality, consider inferior ischemia  Abnormal ECG  When compared with ECG of 01-JUL-2011 09:56,  Questionable change in The axis  T wave inversion now evident in Inferior leads  Confirmed by Misael Tovar ([id number]) on 12/8/2015 8:38:06 PM    EKG 8/25/16  Marked sinus  Bradycardia   -Left axis -anterior fascicular block.    -Old anteroseptal infarct. ABNORMAL       EKG 9/22/16-sinus bradycardia rate 46 bopm       CONCLUSION:   1. This is a normal sinus rhythm with average heart rate 55 beats per minute,  ranging from  beats per minute.    2. No significant pause of more

## 2020-02-17 NOTE — ED NOTES
ED to inpatient nurses report    Chief Complaint   Patient presents with    Atrial Fibrillation     RVR    Fatigue      Present to ED from home  LOC: alert and orientated to name, place, date  Vital signs   Vitals:    02/17/20 1218 02/17/20 1354 02/17/20 1426 02/17/20 1543   BP: (!) 139/92 (!) 151/117 131/77 (!) 156/97   Pulse: 141 133 126 126   Resp: 21 23 20 24   Temp:       TempSrc:       SpO2: 95% 94% 95% 95%   Weight:       Height:          Oxygen Baseline 95% on RA    Current needs required none Bipap/Cpap No  LDAs: 20g Lt Forearm  Peripheral IV 02/17/20 Left Forearm (Active)   Site Assessment Intact;Dry;Clean 2/17/2020  1:56 PM   Line Status Normal saline locked; Flushed 2/17/2020  1:56 PM   Dressing Status Intact;Dry;Clean 2/17/2020  1:56 PM   Dressing Intervention New 2/17/2020 12:18 PM     Mobility: Requires assistance * 2  Pending ED orders: none  Present condition: stable    Electronically signed by Pierre Ruiz RN on 2/17/2020 at 4:39 PM       Pierre Ruiz RN  02/17/20 [id number]

## 2020-02-17 NOTE — ED PROVIDER NOTES
[facility] [address]        CHIEF COMPLAINT       Chief Complaint   Patient presents with    Atrial Fibrillation     RVR    Fatigue     History obtained from the patient, her , chart review. HISTORY OF PRESENT ILLNESS    HPI  Claudia John is a 65 y.o. female who presents to the emergency department  from Dr. Ti Serna (cardiologist) office for evaluation of atrial fibrillation and RVR onset today, currently in no pain. Patient states that she was at Dr. iT Serna (cardiologist) office for a routine evaluation when her EKG noted atrial fibrillation with RVR. She reports intermittent episodes of generalized weakness, shortness of breath, and palpitations. She denies experiencing any of those symptoms today PTA. She denies chest pain, dizziness, and leg swelling. Patient reports medication compliance, and states she took her blood thinner and morning medications PTA. Patient has a past medical history of TIA, CHF, HTN, DM, atrial fibrillation with RVR, and COPD. There are no other complaints, symptoms, or concerns on initial encounter. The patient has no other acute complaints at this time. REVIEW OF SYSTEMS   Review of Systems   Constitutional: Negative for chills, fever and unexpected weight change. Sent in from Dr. Ti Serna (cardiologist) office for evaluation of atrial fibrillation and RVR   HENT: Negative for congestion, ear pain, nosebleeds, sinus pressure and sinus pain. Eyes: Negative for pain. Respiratory: Positive for shortness of breath (intermittent not currently experienced ). Negative for cough and chest tightness. Cardiovascular: Positive for palpitations (intermittent not currently experienced). Negative for chest pain. Gastrointestinal: Negative for abdominal pain, blood in stool, constipation, diarrhea, nausea and rectal pain. Endocrine: Negative for polydipsia and polyuria.    Genitourinary: Negative for dysuria and flank pain. Musculoskeletal: Negative for arthralgias, back pain, myalgias and neck pain. Skin: Negative for rash. Neurological: Positive for weakness (intermittent not currently experienced). Negative for dizziness, tremors, seizures and headaches. All other systems reviewed and are negative. PAST MEDICAL AND SURGICAL HISTORY     Past Medical History:   Diagnosis Date    Arthritis     Atrial fibrillation ([address]     Cancer [facility]     skin    Carotid arterial disease ([facility]     CHF (congestive heart failure) ([address]     Depression     Diabetes mellitus ([facility]     Fibromyalgia     Hypertension     Hypothyroidism     Obesity     Sleep apnea     CPAP    Thyroid disease     TIA (transient ischemic attack) 12/2015     Past Surgical History:   Procedure Laterality Date    APPENDECTOMY  over 10 years ago    [address] 10/01/2019    CAROTID ENDARTERECTOMY  09/2017    CHOLECYSTECTOMY  over 10 years ago    [address] [facility] COLONOSCOPY N/A 2/7/2018    COLONOSCOPY WITH BIOPSY performed by Ad Willoughby MD at [address] [facility]. [place]     HYSTERECTOMY  10 plus years ago    Dr. Carlita Motley Right 2009    OTHER SURGICAL HISTORY Left 2012    Rotator cuff sx    SLEEVE GASTRECTOMY  09/12/2016    Robotic, Extensive Lysis of Adhesions, Removal of [place] Prosthesis - Dr. Patt Kirby  07/06/2016    Dr. Cali Spence   No current facility-administered medications for this encounter. Current Outpatient Medications:     diltiazem (CARDIZEM CD) 300 MG extended release capsule, Take 1 capsule by mouth daily, Disp: 30 capsule, Rfl: 0    modafinil (PROVIGIL) 200 MG tablet, Take 200 mg by mouth daily. , Disp: , Rfl:     pravastatin (PRAVACHOL) 20 MG tablet, Take 1 tablet by mouth daily, Disp: 90 tablet, Rfl: 4    apixaban (ELIQUIS) 5 MG TABS have social determinant information on file (likely too young). Social History Narrative    Not on file     Social History     Tobacco Use    Smoking status: Former Smoker     Packs/day: 0.25     Years: 30.00     Pack years: 7.50     Types: Cigarettes     Start date: 3/24/1982     Last attempt to quit: 2015     Years since quittin.9    Smokeless tobacco: Never Used   Substance Use Topics    Alcohol use: No    Drug use: No         ALLERGIES     Allergies   Allergen Reactions    Demerol Hcl [Meperidine] Nausea And Vomiting         FAMILY HISTORY     Family History   Problem Relation Age of Onset    Stroke Mother     High Blood Pressure Mother     Atrial Fibrillation Mother     Heart Disease Mother     Diabetes Father     Early Death Father     Cancer Other     Heart Disease Sister          PREVIOUS RECORDS   Previous records reviewed: patient was evaluated in the ED 18 for hypotension. She was diagnosed with hypotension, unspecified hypotension type and discharged in stable condition. PHYSICAL EXAM     Vitals:    20 1426   BP: 131/77   Pulse: 126   Resp: 20   Temp:    SpO2: 95%     Vital signs and nursing assessment reviewed and abnormal from Pulse 141. Pulsoximetry is normal per my interpretation. Physical Exam  Vitals signs and nursing note reviewed. Constitutional:       General: She is not in acute distress. Appearance: Normal appearance. She is well-developed. HENT:      Head: Normocephalic and atraumatic. Right Ear: External ear normal.      Left Ear: External ear normal.      Nose: Nose normal.      Mouth/Throat:      Mouth: Mucous membranes are moist.   Eyes:      Conjunctiva/sclera: Conjunctivae normal.      Pupils: Pupils are equal, round, and reactive to light. Neck:      Musculoskeletal: Neck supple. Vascular: No JVD. Cardiovascular:      Rate and Rhythm: Regular rhythm. Tachycardia present. Heart sounds: Normal heart sounds.  No 1512 Heart rate significantly increased when trying to ambulate for discharge. Will admit. [DT]      ED Course User Index  [DT] Eduardo Goins MD         MEDICATION CHANGES     Current Discharge Medication List            FINAL DISPOSITION     Final diagnoses:   Paroxysmal atrial fibrillation with rapid ventricular response ([address]     Condition: condition: fair  Dispo: Admit to med/surg floor        Parts of this transcription were electronically signed. It was dictated by use of voice recognition software and electronically transcribed. The transcription may contain errors not detected in proofreading. I attest that this documentation has been prepared under my direction and in the presence of Ms. Uri Poon (ED scribe). Kerri Kirby, attest that the scribe's documentation has been prepared under my direction and in my presence, and was personally reviewed by me. I confirmed that the note accurately reflects the work and medical decision making performed by me.     Electronically Signed: Eduardo Goins, 02/17/20, 3:38 PM           Eduardo Goins MD  02/17/20 [id number]

## 2020-02-17 NOTE — ED NOTES
Pt up to restroom at this time via wheelchair. Tolerated well.      Татьяна Stallings RN  02/17/20 7472

## 2020-02-18 LAB
ANION GAP SERPL CALCULATED.3IONS-SCNC: 13 MEQ/L (ref 8–16)
BUN BLDV-MCNC: 16 MG/DL (ref 7–22)
CALCIUM SERPL-MCNC: 8.9 MG/DL (ref 8.5–10.5)
CHLORIDE BLD-SCNC: 106 MEQ/L (ref 98–111)
CO2: 24 MEQ/L (ref 23–33)
CREAT SERPL-MCNC: 0.5 MG/DL (ref 0.4–1.2)
EKG ATRIAL RATE: 47 BPM
EKG Q-T INTERVAL: 292 MS
EKG QRS DURATION: 84 MS
EKG QTC CALCULATION (BAZETT): 442 MS
EKG R AXIS: -37 DEGREES
EKG T AXIS: 148 DEGREES
EKG VENTRICULAR RATE: 138 BPM
GFR SERPL CREATININE-BSD FRML MDRD: > 90 ML/MIN/1.73M2
GLUCOSE BLD-MCNC: 102 MG/DL (ref 70–108)
INR BLD: 1.54 (ref 0.85–1.13)
LV EF: 55 %
LVEF MODALITY: NORMAL
OSMOLALITY CALCULATION: 286.4 MOSMOL/KG (ref 275–300)
POTASSIUM SERPL-SCNC: 3.5 MEQ/L (ref 3.5–5.2)
SODIUM BLD-SCNC: 143 MEQ/L (ref 135–145)

## 2020-02-18 PROCEDURE — 6370000000 HC RX 637 (ALT 250 FOR IP): Performed by: INTERNAL MEDICINE

## 2020-02-18 PROCEDURE — 97116 GAIT TRAINING THERAPY: CPT

## 2020-02-18 PROCEDURE — 2709999900 HC NON-CHARGEABLE SUPPLY

## 2020-02-18 PROCEDURE — 2580000003 HC RX 258: Performed by: INTERNAL MEDICINE

## 2020-02-18 PROCEDURE — C1751 CATH, INF, PER/CENT/MIDLINE: HCPCS

## 2020-02-18 PROCEDURE — 85610 PROTHROMBIN TIME: CPT

## 2020-02-18 PROCEDURE — 6370000000 HC RX 637 (ALT 250 FOR IP): Performed by: NUCLEAR MEDICINE

## 2020-02-18 PROCEDURE — 76937 US GUIDE VASCULAR ACCESS: CPT

## 2020-02-18 PROCEDURE — 80048 BASIC METABOLIC PNL TOTAL CA: CPT

## 2020-02-18 PROCEDURE — 93010 ELECTROCARDIOGRAM REPORT: CPT | Performed by: INTERNAL MEDICINE

## 2020-02-18 PROCEDURE — 99232 SBSQ HOSP IP/OBS MODERATE 35: CPT | Performed by: NURSE PRACTITIONER

## 2020-02-18 PROCEDURE — 93306 TTE W/DOPPLER COMPLETE: CPT

## 2020-02-18 PROCEDURE — 97163 PT EVAL HIGH COMPLEX 45 MIN: CPT

## 2020-02-18 PROCEDURE — 36410 VNPNXR 3YR/> PHY/QHP DX/THER: CPT

## 2020-02-18 PROCEDURE — 99223 1ST HOSP IP/OBS HIGH 75: CPT | Performed by: NUCLEAR MEDICINE

## 2020-02-18 PROCEDURE — 2500000003 HC RX 250 WO HCPCS: Performed by: INTERNAL MEDICINE

## 2020-02-18 PROCEDURE — 97535 SELF CARE MNGMENT TRAINING: CPT

## 2020-02-18 PROCEDURE — 36415 COLL VENOUS BLD VENIPUNCTURE: CPT

## 2020-02-18 PROCEDURE — 97166 OT EVAL MOD COMPLEX 45 MIN: CPT

## 2020-02-18 PROCEDURE — 1200000003 HC TELEMETRY R&B

## 2020-02-18 PROCEDURE — 93005 ELECTROCARDIOGRAM TRACING: CPT | Performed by: INTERNAL MEDICINE

## 2020-02-18 RX ORDER — METOPROLOL TARTRATE 50 MG/1
50 TABLET, FILM COATED ORAL 2 TIMES DAILY
Status: DISCONTINUED | OUTPATIENT
Start: 2020-02-18 | End: 2020-02-21

## 2020-02-18 RX ORDER — LIDOCAINE HYDROCHLORIDE 10 MG/ML
5 INJECTION, SOLUTION EPIDURAL; INFILTRATION; INTRACAUDAL; PERINEURAL ONCE
Status: DISCONTINUED | OUTPATIENT
Start: 2020-02-18 | End: 2020-02-25 | Stop reason: HOSPADM

## 2020-02-18 RX ORDER — SODIUM CHLORIDE 0.9 % (FLUSH) 0.9 %
10 SYRINGE (ML) INJECTION EVERY 12 HOURS SCHEDULED
Status: DISCONTINUED | OUTPATIENT
Start: 2020-02-18 | End: 2020-02-21

## 2020-02-18 RX ORDER — CELECOXIB 200 MG/1
200 CAPSULE ORAL DAILY
Status: DISCONTINUED | OUTPATIENT
Start: 2020-02-18 | End: 2020-02-25 | Stop reason: HOSPADM

## 2020-02-18 RX ORDER — SODIUM CHLORIDE 0.9 % (FLUSH) 0.9 %
10 SYRINGE (ML) INJECTION PRN
Status: DISCONTINUED | OUTPATIENT
Start: 2020-02-18 | End: 2020-02-21

## 2020-02-18 RX ORDER — DILTIAZEM HYDROCHLORIDE EXTENDED-RELEASE TABLETS 240 MG/1
240 TABLET, EXTENDED RELEASE ORAL DAILY
Status: ON HOLD | COMMUNITY
End: 2020-02-25 | Stop reason: HOSPADM

## 2020-02-18 RX ADMIN — METOPROLOL TARTRATE 50 MG: 50 TABLET, FILM COATED ORAL at 19:55

## 2020-02-18 RX ADMIN — LEVOTHYROXINE SODIUM 50 MCG: 50 TABLET ORAL at 06:20

## 2020-02-18 RX ADMIN — CLONIDINE HYDROCHLORIDE 0.1 MG: 0.1 TABLET ORAL at 19:56

## 2020-02-18 RX ADMIN — HYDROXYCHLOROQUINE SULFATE 200 MG: 200 TABLET ORAL at 19:56

## 2020-02-18 RX ADMIN — TRAZODONE HYDROCHLORIDE 50 MG: 50 TABLET ORAL at 21:38

## 2020-02-18 RX ADMIN — PRAVASTATIN SODIUM 20 MG: 20 TABLET ORAL at 19:56

## 2020-02-18 RX ADMIN — OXYBUTYNIN CHLORIDE 5 MG: 5 TABLET ORAL at 10:11

## 2020-02-18 RX ADMIN — DULOXETINE HYDROCHLORIDE 20 MG: 20 CAPSULE, DELAYED RELEASE ORAL at 10:11

## 2020-02-18 RX ADMIN — SULFASALAZINE 500 MG: 500 TABLET ORAL at 19:55

## 2020-02-18 RX ADMIN — CELECOXIB 200 MG: 200 CAPSULE ORAL at 10:12

## 2020-02-18 RX ADMIN — APIXABAN 5 MG: 5 TABLET, FILM COATED ORAL at 10:11

## 2020-02-18 RX ADMIN — CHLORTHALIDONE 25 MG: 25 TABLET ORAL at 10:11

## 2020-02-18 RX ADMIN — PANTOPRAZOLE SODIUM 40 MG: 40 TABLET, DELAYED RELEASE ORAL at 06:20

## 2020-02-18 RX ADMIN — APIXABAN 5 MG: 5 TABLET, FILM COATED ORAL at 19:56

## 2020-02-18 RX ADMIN — LOSARTAN POTASSIUM 50 MG: 50 TABLET, FILM COATED ORAL at 10:11

## 2020-02-18 RX ADMIN — CLONIDINE HYDROCHLORIDE 0.1 MG: 0.1 TABLET ORAL at 10:11

## 2020-02-18 RX ADMIN — DILTIAZEM HYDROCHLORIDE 15 MG/HR: 5 INJECTION INTRAVENOUS at 06:21

## 2020-02-18 RX ADMIN — SULFASALAZINE 500 MG: 500 TABLET ORAL at 10:11

## 2020-02-18 RX ADMIN — ASPIRIN 81 MG: 81 TABLET ORAL at 10:11

## 2020-02-18 RX ADMIN — HYDROXYCHLOROQUINE SULFATE 200 MG: 200 TABLET ORAL at 10:11

## 2020-02-18 ASSESSMENT — PAIN SCALES - GENERAL
PAINLEVEL_OUTOF10: 0

## 2020-02-18 NOTE — PROGRESS NOTES
Hospitalist Progress Note      Patient:  Jesús Brady    Unit/Bed:4K-06/006-A  YOB: 1950  MRN: [id number]   Acct: [de-identified]   PCP: Saurabh Franco  Date of Admission: 2/17/2020    Assessment/Plan:    1. Atrial fibrillation with RVR: Improving. Heart rate 141 upon arrival, Cardizem gtt was initiated, rate currently 106-108. Continue to titrate optimizing heart rate. Complications with IV access noted, midline ordered. Echocardiogram ordered, results pending. Continue Eliquis. Cardiology consulted, Dr. Joi Sexton to see patient later this afternoon, appreciate assistance. 2. Essential hypertension, controlled: History, stable. Continue Lopressor and Cozaar. 3. Hypothyroidism: History. Continue Synthroid. 4. SHANNEN: On CPAP at home, continue. Continue modafinil. 5. CAD: History. Continue aspirin and Plavix. 6. Depression: Continue duloxetine. 7. Fibromyalgia: History. 8. Obesity: BMI 31.5. Chief Complaint: Atrial fibrillation    Initial H and P:-    Sherley Watkins is a 75-year-old  female, reformed smoker, with a medical history of arthritis, atrial fibrillation, history of skin cancer, carotid artery disease, CHF, depression, diabetes mellitus, fibromyalgia, hypertension, hypothyroidism, obesity, sleep apnea, thyroid disease, and history of TIAs in the past.  Patient presents [facility] ER from her cardiologist office with complaints of atrial fibrillation with RVR. Patient does have a history of A. fib and takes Cardizem orally for rate control. Patient went in for routine up at her cardiologist office when it was found that her heart rate was in the 140s. She denied any chest pain, palpitations, shortness of breath, nausea, vomiting, fever, chills, or diarrhea while she was in the office. Upon arrival to the ER, patient was identified to be tachycardic in the EKG that was performed demonstrated A. fib with RVR. 2/18/2020 at 6:57 PM

## 2020-02-18 NOTE — PROGRESS NOTES
[facility] ICU STEPDOWN TELEMETRY 4K  EVALUATION    Time:   Time In: 1217  Time Out: 9480  Timed Code Treatment Minutes: 20 Minutes  Minutes: 28          Date: 2020  Patient Name: Rafita Watts,   Gender: female      MRN: [id number]  : 1950  (65 y.o.)  Referring Practitioner: Danica Moreno MD  Diagnosis: Atrial Fibrillation with RVR  Additional Pertinent Hx: Rafita Watts is a 65 y.o. female who presents to the emergency department  from Dr. Keith Alfaro (cardiologist) office for evaluation of atrial fibrillation and RVR onset today, currently in no pain. Patient states that she was at Dr. Keith Alfaro (cardiologist) office for a routine evaluation when her EKG noted atrial fibrillation with RVR. She reports intermittent episodes of generalized weakness, shortness of breath, and palpitations.      Restrictions/Precautions:  Restrictions/Precautions: General Precautions, Fall Risk    Subjective  Chart Reviewed: Yes, Orders, Progress Notes, History and Physical  Patient assessed for rehabilitation services?: Yes    Subjective: Pt. supine in bed and agreeable to OT session    Pain:  Pain Assessment  Patient Currently in Pain: Denies    Social/Functional History:  Lives With: Spouse, Other (comment)  Type of Home: House  Home Layout: One level  Home Access: Stairs to enter with rails  Entrance Stairs - Number of Steps: 3  Home Equipment: 4 wheeled walker, Rolling walker, Cane(no AD used PTA)   Bathroom Shower/Tub: Tub/Shower unit, Shower chair with back  Bathroom Toilet: Handicap height  Bathroom Equipment: Grab bars in shower  IADL Comments: Granddaughter available to help with [facility] chores        ADL Assistance: Independent  Homemaking Assistance: Needs assistance  Ambulation Assistance: Independent  Transfer Assistance: Independent    Active : Yes  Mode of Transportation: Car     Additional Comments: Pt. states that she was indep with all ADLs/IADLs( with

## 2020-02-18 NOTE — PROGRESS NOTES
Pharmacy Medication History Note      List of current medications patient is taking is complete. Source of information: surescripts     Changes made to medication list:  Medications removed (include reason, ex. therapy complete or physician discontinued):  Removed Augmentin, therapy completed     Medications added/doses adjusted:  Adjusted dose of diltiazem to 240 mg QD, per surescripts, patient has been filling 240 mg for past several months, last filled for 90 days on 2/4/2020, no recent fills of 300 mg     Other notes (ex. Recent course of antibiotics, Coumadin dosing):  Denies use of other OTC or herbal medications.       Allergies reviewed      Electronically signed by Concetta Talamantes on 2/18/2020 at 9:47 AM

## 2020-02-18 NOTE — PROGRESS NOTES
Supervision    Transfers:  Sit to Stand: Stand By Assistance  Stand to Sit:Stand By Assistance    Ambulation:  Stand By Assistance, at end of amb CGA  Distance: 5'x1, 225'x1  Surface: Level Tile  Device:Rolling Walker  Gait Deviations:  Pt steadier initially then with inc distance more unsteady and fatigued with inc fwd flexed posture, no LOB    Exercise:  Patient was guided in 1 set(s) 10 reps of exercise to both lower extremities. Ankle pumps. Exercises were completed for increased independence with functional mobility. Functional Outcome Measures: Completed  AM-PAC Inpatient Mobility without Stair Climbing Raw Score : 16  AM-PAC Inpatient without Stair Climbing T-Scale Score : 45.54    ASSESSMENT:  Activity Tolerance:  Patient tolerance of  treatment: good. Treatment Initiated: Treatment and education initiated within context of evaluation. Evaluation time included review of current medical information, gathering information related to past medical, social and functional history, completion of standardized testing, formal and informal observation of tasks, assessment of data and development of plan of care and goals. Treatment time included skilled education and facilitation of tasks to increase safety and independence with functional mobility for improved independence and quality of life. Assessment:   Body structures, Functions, Activity limitations: Decreased functional mobility , Decreased strength, Decreased endurance, Decreased balance  Assessment: pt tolerated well, pt fatigued at end of long bout of amb, generalized weakness, dec balance, IV lines, heart monitor, use of walker and inc assist for safe mobility, recommend cont PT to inc pt I with functional mobility  Prognosis: Excellent    REQUIRES PT FOLLOW UP: Yes    Discharge Recommendations:  Discharge Recommendations: Continue to assess pending progress    Patient Education:  PT Education: Plan of Care, PT Role, Goals, Home Exercise

## 2020-02-18 NOTE — PLAN OF CARE
Problem: Falls - Risk of:  Goal: Will remain free from falls  Description  Will remain free from falls  Outcome: Ongoing  Note:   Patient is absent of fall this shift. Problem: Risk for Impaired Skin Integrity  Goal: Tissue integrity - skin and mucous membranes  Description  Structural intactness and normal physiological function of skin and  mucous membranes. Outcome: Ongoing  Note:   Patient has no skin issues other than scrap on right forearm. Problem: Discharge Planning:  Goal: Patients continuum of care needs are met  Description  Patients continuum of care needs are met  Outcome: Ongoing  Note:   Patient to be discharged home with spouse. Problem: Cardiovascular  Goal: No DVT, peripheral vascular complications  Outcome: Ongoing  Note:   No signs of DVT. Goal: Anticoagulate/Hct stable  Outcome: Ongoing  Note:   Patient is on Eliquis for Afib. Goal: Weight maintained or lost  Outcome: Ongoing  Note:   Patient has reported no weight gain or loss  Goal: Understanding of dietary restrictions  Outcome: Ongoing  Note:   Patient understand dietary restrictions     Care plan reviewed with patient and spouse. Patient and spouse verbalize understanding of the plan of care and contribute to goal setting.

## 2020-02-18 NOTE — CARE COORDINATION
2/18/20, 3:36 PM  DISCHARGE PLANNING EVALUATION:    [address]       Admitted from: ED 2/17/2020/ 17 [facility] day: 1   Location: [facility] Reason for admit: Atrial fibrillation with RVR ([address] [I48.91] Status: IP  Admit order signed?: yes  PMH:  has a past medical history of Arthritis, Atrial fibrillation ([address] Cancer ([address] Carotid arterial disease ([address] CHF (congestive heart failure) ([address] Depression, Diabetes mellitus ([address] Fibromyalgia, Hypertension, Hypothyroidism, Obesity, Sleep apnea, Thyroid disease, and TIA (transient ischemic attack). Procedure:   2/18 PICC planned  2/18 ECHO planned  Medications:  Scheduled Meds:   celecoxib  200 mg Oral Daily    lidocaine 1 % injection  5 mL Intradermal Once    sodium chloride flush  10 mL Intravenous 2 times per day    metoprolol tartrate  25 mg Oral BID    apixaban  5 mg Oral BID    aspirin  81 mg Oral Daily    chlorthalidone  25 mg Oral Daily    cloNIDine  0.1 mg Oral BID    DULoxetine  20 mg Oral Daily    hydroxychloroquine  200 mg Oral BID    levothyroxine  50 mcg Oral Daily    losartan  50 mg Oral Daily    modafinil  200 mg Oral Daily    oxybutynin  5 mg Oral Daily    pantoprazole  40 mg Oral Daily    pravastatin  20 mg Oral Nightly    sulfaSALAzine  500 mg Oral BID    traZODone  50 mg Oral Nightly    sodium chloride flush  10 mL Intravenous 2 times per day     Continuous Infusions:   diltiazem 15 mg/hr (02/18/20 9587)      Pertinent Info/Orders/Treatment Plan:  Client admitted with A-fib, treated with Cardizem gtt; Cardiology following. Elevated BNP; monitor  Diet: DIET LOW SODIUM 2 GM;   Smoking status:  reports that she quit smoking about 4 years ago. Her smoking use included cigarettes. She started smoking about 37 years ago. She has a 7.50 pack-year smoking history.  She has never used smokeless tobacco.   PCP: Dyanna Kehr  Readmission 30 days or less: none  Readmission Risk Score: 11%    Discharge Planning Evaluation  Current

## 2020-02-18 NOTE — PROGRESS NOTES
This RN attempted to restart peripheral IV x2 unsuccessfully. Primary nurse Caro Ge RN updated. Recommendation made for midline placement. Caro Ge RN verbalized contacting physician for an order for midline placement. This RN verbalized understanding. No further question or concerns voiced at this time.

## 2020-02-19 ENCOUNTER — APPOINTMENT (OUTPATIENT)
Dept: GENERAL RADIOLOGY | Age: 70
DRG: 247 | End: 2020-02-19
Payer: MEDICARE

## 2020-02-19 ENCOUNTER — APPOINTMENT (OUTPATIENT)
Dept: NON INVASIVE DIAGNOSTICS | Age: 70
DRG: 247 | End: 2020-02-19
Payer: MEDICARE

## 2020-02-19 LAB
ANION GAP SERPL CALCULATED.3IONS-SCNC: 12 MEQ/L (ref 8–16)
BUN BLDV-MCNC: 23 MG/DL (ref 7–22)
CALCIUM SERPL-MCNC: 9.1 MG/DL (ref 8.5–10.5)
CHLORIDE BLD-SCNC: 106 MEQ/L (ref 98–111)
CO2: 26 MEQ/L (ref 23–33)
CREAT SERPL-MCNC: 0.6 MG/DL (ref 0.4–1.2)
EKG ATRIAL RATE: 65 BPM
EKG ATRIAL RATE: 71 BPM
EKG Q-T INTERVAL: 386 MS
EKG Q-T INTERVAL: 400 MS
EKG QRS DURATION: 106 MS
EKG QRS DURATION: 114 MS
EKG QTC CALCULATION (BAZETT): 490 MS
EKG QTC CALCULATION (BAZETT): 492 MS
EKG R AXIS: -34 DEGREES
EKG R AXIS: -37 DEGREES
EKG T AXIS: 132 DEGREES
EKG T AXIS: 139 DEGREES
EKG VENTRICULAR RATE: 91 BPM
EKG VENTRICULAR RATE: 97 BPM
ERYTHROCYTE [DISTWIDTH] IN BLOOD BY AUTOMATED COUNT: 14.9 % (ref 11.5–14.5)
ERYTHROCYTE [DISTWIDTH] IN BLOOD BY AUTOMATED COUNT: 55.7 FL (ref 35–45)
GFR SERPL CREATININE-BSD FRML MDRD: > 90 ML/MIN/1.73M2
GLUCOSE BLD-MCNC: 98 MG/DL (ref 70–108)
HCT VFR BLD CALC: 40.3 % (ref 37–47)
HEMOGLOBIN: 12.8 GM/DL (ref 12–16)
MAGNESIUM: 2 MG/DL (ref 1.6–2.4)
MCH RBC QN AUTO: 32.2 PG (ref 26–33)
MCHC RBC AUTO-ENTMCNC: 31.8 GM/DL (ref 32.2–35.5)
MCV RBC AUTO: 101.5 FL (ref 81–99)
PLATELET # BLD: 194 THOU/MM3 (ref 130–400)
PMV BLD AUTO: 10.1 FL (ref 9.4–12.4)
POTASSIUM SERPL-SCNC: 3.6 MEQ/L (ref 3.5–5.2)
RBC # BLD: 3.97 MILL/MM3 (ref 4.2–5.4)
SODIUM BLD-SCNC: 144 MEQ/L (ref 135–145)
WBC # BLD: 9.9 THOU/MM3 (ref 4.8–10.8)

## 2020-02-19 PROCEDURE — 78452 HT MUSCLE IMAGE SPECT MULT: CPT

## 2020-02-19 PROCEDURE — 93016 CV STRESS TEST SUPVJ ONLY: CPT | Performed by: NUCLEAR MEDICINE

## 2020-02-19 PROCEDURE — 6360000002 HC RX W HCPCS

## 2020-02-19 PROCEDURE — 99232 SBSQ HOSP IP/OBS MODERATE 35: CPT | Performed by: PHYSICIAN ASSISTANT

## 2020-02-19 PROCEDURE — 2580000003 HC RX 258: Performed by: NURSE PRACTITIONER

## 2020-02-19 PROCEDURE — 2709999900 HC NON-CHARGEABLE SUPPLY

## 2020-02-19 PROCEDURE — 93017 CV STRESS TEST TRACING ONLY: CPT

## 2020-02-19 PROCEDURE — 6370000000 HC RX 637 (ALT 250 FOR IP): Performed by: NUCLEAR MEDICINE

## 2020-02-19 PROCEDURE — 6370000000 HC RX 637 (ALT 250 FOR IP): Performed by: INTERNAL MEDICINE

## 2020-02-19 PROCEDURE — 80048 BASIC METABOLIC PNL TOTAL CA: CPT

## 2020-02-19 PROCEDURE — 3430000000 HC RX DIAGNOSTIC RADIOPHARMACEUTICAL: Performed by: NUCLEAR MEDICINE

## 2020-02-19 PROCEDURE — 94760 N-INVAS EAR/PLS OXIMETRY 1: CPT

## 2020-02-19 PROCEDURE — 2580000003 HC RX 258: Performed by: NUCLEAR MEDICINE

## 2020-02-19 PROCEDURE — 6370000000 HC RX 637 (ALT 250 FOR IP): Performed by: PHYSICIAN ASSISTANT

## 2020-02-19 PROCEDURE — A9500 TC99M SESTAMIBI: HCPCS | Performed by: NUCLEAR MEDICINE

## 2020-02-19 PROCEDURE — 71046 X-RAY EXAM CHEST 2 VIEWS: CPT

## 2020-02-19 PROCEDURE — 85027 COMPLETE CBC AUTOMATED: CPT

## 2020-02-19 PROCEDURE — 2500000003 HC RX 250 WO HCPCS: Performed by: PHYSICIAN ASSISTANT

## 2020-02-19 PROCEDURE — 78452 HT MUSCLE IMAGE SPECT MULT: CPT | Performed by: NUCLEAR MEDICINE

## 2020-02-19 PROCEDURE — 83735 ASSAY OF MAGNESIUM: CPT

## 2020-02-19 PROCEDURE — 36415 COLL VENOUS BLD VENIPUNCTURE: CPT

## 2020-02-19 PROCEDURE — 93018 CV STRESS TEST I&R ONLY: CPT | Performed by: NUCLEAR MEDICINE

## 2020-02-19 PROCEDURE — 1200000003 HC TELEMETRY R&B

## 2020-02-19 PROCEDURE — 93010 ELECTROCARDIOGRAM REPORT: CPT | Performed by: INTERNAL MEDICINE

## 2020-02-19 RX ORDER — DILTIAZEM HYDROCHLORIDE 120 MG/1
120 CAPSULE, COATED, EXTENDED RELEASE ORAL DAILY
Status: DISCONTINUED | OUTPATIENT
Start: 2020-02-19 | End: 2020-02-19

## 2020-02-19 RX ORDER — SODIUM CHLORIDE 0.9 % (FLUSH) 0.9 %
10 SYRINGE (ML) INJECTION PRN
Status: ACTIVE | OUTPATIENT
Start: 2020-02-19 | End: 2020-02-19

## 2020-02-19 RX ORDER — ALBUTEROL SULFATE 90 UG/1
2 AEROSOL, METERED RESPIRATORY (INHALATION) PRN
Status: ACTIVE | OUTPATIENT
Start: 2020-02-19 | End: 2020-02-19

## 2020-02-19 RX ORDER — SODIUM CHLORIDE 9 MG/ML
500 INJECTION, SOLUTION INTRAVENOUS CONTINUOUS PRN
Status: ACTIVE | OUTPATIENT
Start: 2020-02-19 | End: 2020-02-19

## 2020-02-19 RX ORDER — AMINOPHYLLINE DIHYDRATE 25 MG/ML
50 INJECTION, SOLUTION INTRAVENOUS PRN
Status: ACTIVE | OUTPATIENT
Start: 2020-02-19 | End: 2020-02-19

## 2020-02-19 RX ORDER — METOPROLOL TARTRATE 5 MG/5ML
5 INJECTION INTRAVENOUS EVERY 5 MIN PRN
Status: ACTIVE | OUTPATIENT
Start: 2020-02-19 | End: 2020-02-19

## 2020-02-19 RX ORDER — ATROPINE SULFATE 0.1 MG/ML
0.5 INJECTION INTRAVENOUS EVERY 5 MIN PRN
Status: ACTIVE | OUTPATIENT
Start: 2020-02-19 | End: 2020-02-19

## 2020-02-19 RX ORDER — POTASSIUM CHLORIDE 750 MG/1
40 TABLET, FILM COATED, EXTENDED RELEASE ORAL ONCE
Status: COMPLETED | OUTPATIENT
Start: 2020-02-19 | End: 2020-02-19

## 2020-02-19 RX ORDER — NITROGLYCERIN 0.4 MG/1
0.4 TABLET SUBLINGUAL EVERY 5 MIN PRN
Status: ACTIVE | OUTPATIENT
Start: 2020-02-19 | End: 2020-02-19

## 2020-02-19 RX ADMIN — SULFASALAZINE 500 MG: 500 TABLET ORAL at 21:12

## 2020-02-19 RX ADMIN — METOPROLOL TARTRATE 50 MG: 50 TABLET, FILM COATED ORAL at 10:58

## 2020-02-19 RX ADMIN — LOSARTAN POTASSIUM 50 MG: 50 TABLET, FILM COATED ORAL at 10:50

## 2020-02-19 RX ADMIN — CELECOXIB 200 MG: 200 CAPSULE ORAL at 08:05

## 2020-02-19 RX ADMIN — AMIODARONE HYDROCHLORIDE 0.5 MG/MIN: 1.8 INJECTION, SOLUTION INTRAVENOUS at 18:31

## 2020-02-19 RX ADMIN — AMIODARONE HYDROCHLORIDE 1 MG/MIN: 1.8 INJECTION, SOLUTION INTRAVENOUS at 12:26

## 2020-02-19 RX ADMIN — Medication 35 MILLICURIE: at 09:42

## 2020-02-19 RX ADMIN — APIXABAN 5 MG: 5 TABLET, FILM COATED ORAL at 21:12

## 2020-02-19 RX ADMIN — APIXABAN 5 MG: 5 TABLET, FILM COATED ORAL at 10:49

## 2020-02-19 RX ADMIN — CHLORTHALIDONE 25 MG: 25 TABLET ORAL at 08:05

## 2020-02-19 RX ADMIN — OXYBUTYNIN CHLORIDE 5 MG: 5 TABLET ORAL at 08:05

## 2020-02-19 RX ADMIN — LEVOTHYROXINE SODIUM 50 MCG: 50 TABLET ORAL at 08:05

## 2020-02-19 RX ADMIN — SODIUM CHLORIDE 50 ML: 9 INJECTION, SOLUTION INTRAVENOUS at 07:51

## 2020-02-19 RX ADMIN — METOPROLOL TARTRATE 50 MG: 50 TABLET, FILM COATED ORAL at 23:59

## 2020-02-19 RX ADMIN — SULFASALAZINE 500 MG: 500 TABLET ORAL at 10:49

## 2020-02-19 RX ADMIN — ASPIRIN 81 MG: 81 TABLET ORAL at 08:06

## 2020-02-19 RX ADMIN — Medication 10 ML: at 21:13

## 2020-02-19 RX ADMIN — Medication 10.2 MILLICURIE: at 08:35

## 2020-02-19 RX ADMIN — POTASSIUM CHLORIDE 40 MEQ: 750 TABLET, FILM COATED, EXTENDED RELEASE ORAL at 12:38

## 2020-02-19 RX ADMIN — PRAVASTATIN SODIUM 20 MG: 20 TABLET ORAL at 21:12

## 2020-02-19 RX ADMIN — DULOXETINE HYDROCHLORIDE 20 MG: 20 CAPSULE, DELAYED RELEASE ORAL at 08:05

## 2020-02-19 RX ADMIN — TRAMADOL HYDROCHLORIDE 50 MG: 50 TABLET, FILM COATED ORAL at 10:47

## 2020-02-19 RX ADMIN — HYDROXYCHLOROQUINE SULFATE 200 MG: 200 TABLET ORAL at 21:12

## 2020-02-19 RX ADMIN — CLONIDINE HYDROCHLORIDE 0.1 MG: 0.1 TABLET ORAL at 10:50

## 2020-02-19 RX ADMIN — HYDROXYCHLOROQUINE SULFATE 200 MG: 200 TABLET ORAL at 10:50

## 2020-02-19 RX ADMIN — AMIODARONE HYDROCHLORIDE 150 MG: 1.5 INJECTION, SOLUTION INTRAVENOUS at 12:12

## 2020-02-19 ASSESSMENT — PAIN SCALES - GENERAL
PAINLEVEL_OUTOF10: 3
PAINLEVEL_OUTOF10: 0
PAINLEVEL_OUTOF10: 5
PAINLEVEL_OUTOF10: 0

## 2020-02-19 ASSESSMENT — PAIN DESCRIPTION - DESCRIPTORS
DESCRIPTORS: ACHING
DESCRIPTORS: ACHING;HEADACHE

## 2020-02-19 ASSESSMENT — PAIN DESCRIPTION - ORIENTATION: ORIENTATION: LEFT

## 2020-02-19 ASSESSMENT — PAIN DESCRIPTION - FREQUENCY: FREQUENCY: CONTINUOUS

## 2020-02-19 ASSESSMENT — PAIN DESCRIPTION - PAIN TYPE
TYPE: CHRONIC PAIN
TYPE: ACUTE PAIN

## 2020-02-19 ASSESSMENT — PAIN DESCRIPTION - LOCATION: LOCATION: ARM

## 2020-02-19 NOTE — CONSULTS
[address] [place] [address]                                  CONSULTATION    PATIENT NAME: Chante Wilson                       :        1950  MED REC NO:   [id number]                           ROOM:       0006  ACCOUNT NO:   [de-identified]                           ADMIT DATE: 2020  PROVIDER:     GUERDA Cummings:  2020    CARDIOLOGY CONSULTATION    REASON FOR THE CONSULTATION:  Atrial fibrillation with rapid ventricular  response.  __________________    HISTORY OF PRESENT ILLNESS:  This is a pleasant 64-year-old patient who  follows with Dr. Neva Kaur who has a history of atrial fibrillation, morbid  obesity, sleep apnea, hypertension, hyperlipidemia who apparently was in  Dr. Amado Carmona office yesterday complaining of symptoms of fatigue,  tiredness, shortness of breath, not feeling well, was found to be in  atrial fibrillation, rapid ventricular response. She was sent to the  emergency room, subsequently admitted after she was started on Cardizem  drip. We were consulted to assist in the management of the patient. The patient denies any active chest pain. She does have intermittent  shortness of breath and no obvious palpitation. No recent syncope. REVIEW OF SYSTEMS:  No fever, no chills, no weight loss. No hematuria  or dysuria. No obvious abdominal pain, nausea, vomiting or diarrhea. No obvious psych problems or suicidal ideation. No skin rashes. No  obvious dizziness, lightheadedness or loss of consciousness. No recent  trauma. No bleeding problem. Diffuse arthritis and musculoskeletal  problems. PAST MEDICAL HISTORY:  1.  _____. 2.  ____pulmonRY HYPERTENSION . 3. Hyperlipidemia. 4.  _____. 5.  Obesity. 6.  Sleep apnea. ALLERGIES:  DEMEROL.     MEDICATIONS:  Eliquis 5 twice a day, Catapres 0.1 three times a day,  Cymbalta 20 a day, Synthroid 50 a day, Cozaar 50 a day, Zofran p.r.n.,  Protonix 40 a day, pravastatin 20 a day and currently on Cardizem drip. SOCIAL HISTORY:  No tobacco.  No drugs. No alcohol. FAMILY HISTORY:  Noncontributory. PHYSICAL EXAMINATION:  VITAL SIGNS:  Showed blood pressure 110/70, heart rate of 120. GENERAL APPEARANCE:  Pleasant lady, in no acute distress. EYES AND EARS:  No discharge. NECK:  No JVD. No bruits. No masses. LUNGS:  Decreased air entry. No crackles. No wheezes. HEART:  Normal S1, S2. Systolic murmur grade 2/6. ABDOMEN:  Soft, nontender. Positive bowel sounds. No organomegaly. EXTREMITIES:  Mild edema, but good peripheral pulses. MUSCULOSKELETAL:  No obvious deformities. NEUROLOGIC:  Grossly intact. Awake and alert. No focal deficits. PSYCH:  No evidence of active psychosis. SKIN:  No rashes. LABORATORY DATA:  Showed sodium 143, potassium 3.5, BUN 18, creatinine  0.5. White count 7.5, hemoglobin 13.2, hematocrit 40.9. DIAGNOSTIC DATA:  EKG showed AFib with rapid ventricular response. IMPRESSION:  This is a patient who gets admitted for atrial fibrillation  and rapid ventricular response. PLAN:  At this point, plan is as follows:  1. Wean Cardizem and start the patient on metoprolol 50 twice a day. 2.  Switch to p.o. Cardizem. 3.  Continue Eliquis. 4.  Continue with efforts for rate control and follow accordingly.         Genie Galaviz M.D.    D: 02/18/2020 17:40:55       T: 02/18/2020 20:42:38     [email or web address]  Job#: [id number]     Doc#: [id number]    CC:

## 2020-02-19 NOTE — PROGRESS NOTES
Hospitalist Progress Note      Patient:  Jazmin Room    Unit/Bed:4K-06/006-A  YOB: 1950  MRN: [id number]   Acct: [de-identified]   PCP: Kami Bright  Date of Admission: 2/17/2020    Assessment/Plan:    1. Atrial fibrillation with RVR:  upon admission. Cardizem drip initiated with CVR noted. HR 80's during evaluation. Amiodarone drip initiated per cardiology. Echocardiogram 2/18/20 showed EF 55% with mildly dilated left atrium although a technically difficult exam. Pharmacologic stress test 2/19/20 showed moderate apical ischemia with abnormal LV function. Cardiology following. Discussed possible cardioversion s/p TAYLOR 2/20/20 if patient does not convert spontaneously. Continue Lopressor, Cardizem, and Amiodarone. On Eliquis for [facility]. 2. Benign essential hypertension, controlled: History. Continue Lopressor, Clonidine, Clorthalidone and Cozaar. Monitor for hypotension. 3. Hypothyroidism: History. Continue Synthroid. 4. SHANNEN: Wears CPAP at home, continue. Continue modafinil. 5. CAD: History. Continue aspirin, Plavix, and statin. Stress test 2/19/20 showed moderate apical ischemia. Cardiology on board for further recommendations. 6. Depression: Continue duloxetine. 7. Fibromyalgia: History. 8. Obesity: BMI 31.5. Chief Complaint: Atrial Fibrillation    Initial H and P:-    Mitchel Schroeder is a 41-year-old  female, reformed smoker, with a medical history of arthritis, atrial fibrillation, history of skin cancer, carotid artery disease, CHF, depression, diabetes mellitus, fibromyalgia, hypertension, hypothyroidism, obesity, sleep apnea, thyroid disease, and history of TIAs in the past.  Patient presents [facility] ER from her cardiologist office with complaints of atrial fibrillation with RVR. Patient does have a history of A. fib and takes Cardizem orally for rate control.   Patient went in for routine up at her cardiologist office when it was found that her heart rate was in the 140s. She denied any chest pain, palpitations, shortness of breath, nausea, vomiting, fever, chills, or diarrhea while she was in the office. Upon arrival to the ER, patient was identified to be tachycardic in the EKG that was performed demonstrated A. fib with RVR. Labs were essentially unremarkable and the patient was given 2 doses of Cardizem IVP which helped. She was placed on a Cardizem drip and admitted for an episode of atrial fibrillation with RVR. The hospitalist service was contacted for further evaluation, treatment, management. Subjective (past 24 hours):   Patient states that she feels well upon examination. She reports that her fatigue and shortness of breath have improved since rate has been controlled. Denies palpitations, abdominal pain,  nausea, vomiting, lightheadedness, dizziness, or cough. Patient was evaluated s/p stress test which she states made her feel tired but overall she reports improvement in symptoms. Patient informed of current treatment plan. She understands and is agreeable without further questions. Past medical history, family history, social history and allergies reviewed again and is unchanged since admission. ROS (12 point review of systems completed. Pertinent positives noted.  Otherwise ROS is negative)     Medications:  Reviewed    Infusion Medications    amiodarone 1 mg/min (02/19/20 1226)    Followed by   Samantha Vizcaino amiodarone       Scheduled Medications    celecoxib  200 mg Oral Daily    lidocaine 1 % injection  5 mL Intradermal Once    sodium chloride flush  10 mL Intravenous 2 times per day    metoprolol tartrate  50 mg Oral BID    apixaban  5 mg Oral BID    aspirin  81 mg Oral Daily    chlorthalidone  25 mg Oral Daily    cloNIDine  0.1 mg Oral BID    DULoxetine  20 mg Oral Daily    hydroxychloroquine  200 mg Oral BID    levothyroxine  50 mcg Oral Daily    losartan  50 mg Oral Daily   

## 2020-02-19 NOTE — CARE COORDINATION
Aristeo Elias 5 day: 2  Location: [facility]06/006-A Reason for admit: Atrial fibrillation with RVR ([address] [I48.91]   Procedure:   2/18 Midline  2/18 ECHO EF 55%  2/19 Stress Test (+) Ischemia  2/19 CXR  Infiltrate versus nodule at the left lung base  2/20   Cardiac Cath planned  2/20 TAYLOR/CV planned  Treatment Plan of Care: Cardiology following  Barriers to Discharge: Cardiology plans procedure above in am  PCP: Jasmin Shukla  Readmission Risk Score: 13%  Patient Goals/Plan/Treatment Preferences: plans home with spouse, has cane, walker, rollator, CPAP; therapies recommend home at discharge

## 2020-02-19 NOTE — PLAN OF CARE
Problem: Falls - Risk of:  Goal: Will remain free from falls  Description  Will remain free from falls  Outcome: Ongoing     Problem: Risk for Impaired Skin Integrity  Goal: Tissue integrity - skin and mucous membranes  Description  Structural intactness and normal physiological function of skin and  mucous membranes. Outcome: Ongoing     Problem: Discharge Planning:  Goal: Patients continuum of care needs are met  Description  Patients continuum of care needs are met  Outcome: Ongoing     Problem: Cardiovascular  Goal: No DVT, peripheral vascular complications  Outcome: Ongoing  Goal: Anticoagulate/Hct stable  Outcome: Ongoing  Goal: Weight maintained or lost  Outcome: Ongoing  Goal: Understanding of dietary restrictions  Outcome: Ongoing     Problem: Musculor/Skeletal Functional Status  Goal: Highest potential functional level  Outcome: Ongoing     Problem: Infection - Central Venous Catheter-Associated Bloodstream Infection:  Goal: Will show no infection signs and symptoms  Description  Will show no infection signs and symptoms  Outcome: Ongoing  Care plan reviewed with patient Patient  verbalize understanding of the plan of care and contribute to goal setting.

## 2020-02-19 NOTE — PROGRESS NOTES
CKMB, CKMBINDEX, TROPONINI in the last 72 hours. CBC:   Lab Results   Component Value Date    WBC 9.9 02/19/2020    RBC 3.97 02/19/2020    HGB 12.8 02/19/2020    HCT 40.3 02/19/2020     02/19/2020       CMP:    Lab Results   Component Value Date     02/19/2020    K 3.6 02/19/2020    K 3.8 02/17/2020     02/19/2020    CO2 26 02/19/2020    BUN 23 02/19/2020    CREATININE 0.6 02/19/2020    AGRATIO 1.9 10/01/2018    LABGLOM >90 02/19/2020    GLUCOSE 98 02/19/2020    GLUCOSE 70 08/29/2019    CALCIUM 9.1 02/19/2020       Hepatic Function Panel:    Lab Results   Component Value Date    ALKPHOS 68 08/29/2019    ALT 12 08/29/2019    AST 15 08/29/2019    PROT 6.2 08/29/2019    BILITOT 0.5 08/29/2019    BILIDIR 0.1 08/29/2019    LABALBU 3.8 08/29/2019       Magnesium:    Lab Results   Component Value Date    MG 1.9 08/29/2019       PT/INR:    Lab Results   Component Value Date    INR 1.54 02/18/2020       HgBA1c:    Lab Results   Component Value Date    LABA1C 5.4 10/01/2018       FLP:    Lab Results   Component Value Date    TRIG 68 08/29/2019    HDL 54 08/29/2019    LDLCALC 40 09/29/2017    LDLDIRECT 75 08/29/2019       TSH:    Lab Results   Component Value Date    TSH 1.680 02/17/2020    TSH 1.660 02/17/2020         Assessment:    afib rvr - HR   Hx bradycardia, ? SSS  hx PAF - on eliquis prior to admission  Ef 55 per echo 2/18/20  Mildly dilated LA  Hx mild to mod PHTN per RHC 2015  HTN  HLD  DM  Hx TIA  Hx left CEA 2017  SHANNEN    Plan:  · Stress test results pending  · Keep mag >2 and K >4, check mag, replace prn  · Normal tsh 2/18/20  · Cont asa/statin/BB  · Cont cdz gtt - titrate to keep HR <100  · Discussed with dr Jhonathan Cisneros - start amio bolus and gtt - if pt doesn't convert on own, will schedule for TAYLOR/CV tomorrow  · If bradycardia develops, will consider EP consult  · Check lft, cxr  · ekg in am         Electronically signed by Uche Rogel PA-C on 2/19/2020 at 9:25 AM

## 2020-02-20 ENCOUNTER — PREP FOR PROCEDURE (OUTPATIENT)
Dept: CARDIOLOGY CLINIC | Age: 70
End: 2020-02-20

## 2020-02-20 ENCOUNTER — PREP FOR PROCEDURE (OUTPATIENT)
Dept: CARDIOLOGY | Age: 70
End: 2020-02-20

## 2020-02-20 LAB
ALBUMIN SERPL-MCNC: 3.7 G/DL (ref 3.5–5.1)
ALP BLD-CCNC: 83 U/L (ref 38–126)
ALT SERPL-CCNC: 24 U/L (ref 11–66)
ANION GAP SERPL CALCULATED.3IONS-SCNC: 12 MEQ/L (ref 8–16)
AST SERPL-CCNC: 26 U/L (ref 5–40)
BILIRUB SERPL-MCNC: 0.2 MG/DL (ref 0.3–1.2)
BILIRUBIN DIRECT: < 0.2 MG/DL (ref 0–0.3)
BUN BLDV-MCNC: 21 MG/DL (ref 7–22)
CALCIUM SERPL-MCNC: 9.6 MG/DL (ref 8.5–10.5)
CHLORIDE BLD-SCNC: 105 MEQ/L (ref 98–111)
CO2: 27 MEQ/L (ref 23–33)
CREAT SERPL-MCNC: 0.8 MG/DL (ref 0.4–1.2)
ERYTHROCYTE [DISTWIDTH] IN BLOOD BY AUTOMATED COUNT: 14.7 % (ref 11.5–14.5)
ERYTHROCYTE [DISTWIDTH] IN BLOOD BY AUTOMATED COUNT: 54.6 FL (ref 35–45)
GFR SERPL CREATININE-BSD FRML MDRD: 70 ML/MIN/1.73M2
GLUCOSE BLD-MCNC: 108 MG/DL (ref 70–108)
HCT VFR BLD CALC: 42.3 % (ref 37–47)
HEMOGLOBIN: 13.2 GM/DL (ref 12–16)
MCH RBC QN AUTO: 31.4 PG (ref 26–33)
MCHC RBC AUTO-ENTMCNC: 31.2 GM/DL (ref 32.2–35.5)
MCV RBC AUTO: 100.7 FL (ref 81–99)
PLATELET # BLD: 222 THOU/MM3 (ref 130–400)
PMV BLD AUTO: 10.2 FL (ref 9.4–12.4)
POTASSIUM SERPL-SCNC: 4.6 MEQ/L (ref 3.5–5.2)
RBC # BLD: 4.2 MILL/MM3 (ref 4.2–5.4)
SODIUM BLD-SCNC: 144 MEQ/L (ref 135–145)
TOTAL PROTEIN: 6.6 G/DL (ref 6.1–8)
WBC # BLD: 10.3 THOU/MM3 (ref 4.8–10.8)

## 2020-02-20 PROCEDURE — 36415 COLL VENOUS BLD VENIPUNCTURE: CPT

## 2020-02-20 PROCEDURE — 2580000003 HC RX 258: Performed by: NURSE PRACTITIONER

## 2020-02-20 PROCEDURE — 99232 SBSQ HOSP IP/OBS MODERATE 35: CPT | Performed by: PHYSICIAN ASSISTANT

## 2020-02-20 PROCEDURE — 85027 COMPLETE CBC AUTOMATED: CPT

## 2020-02-20 PROCEDURE — 97110 THERAPEUTIC EXERCISES: CPT

## 2020-02-20 PROCEDURE — 93005 ELECTROCARDIOGRAM TRACING: CPT | Performed by: PHYSICIAN ASSISTANT

## 2020-02-20 PROCEDURE — 1200000003 HC TELEMETRY R&B

## 2020-02-20 PROCEDURE — 6370000000 HC RX 637 (ALT 250 FOR IP): Performed by: NUCLEAR MEDICINE

## 2020-02-20 PROCEDURE — 80053 COMPREHEN METABOLIC PANEL: CPT

## 2020-02-20 PROCEDURE — 2500000003 HC RX 250 WO HCPCS: Performed by: PHYSICIAN ASSISTANT

## 2020-02-20 PROCEDURE — 6370000000 HC RX 637 (ALT 250 FOR IP): Performed by: PHYSICIAN ASSISTANT

## 2020-02-20 PROCEDURE — 97116 GAIT TRAINING THERAPY: CPT

## 2020-02-20 PROCEDURE — 82248 BILIRUBIN DIRECT: CPT

## 2020-02-20 PROCEDURE — 2709999900 HC NON-CHARGEABLE SUPPLY

## 2020-02-20 PROCEDURE — 6370000000 HC RX 637 (ALT 250 FOR IP): Performed by: INTERNAL MEDICINE

## 2020-02-20 RX ORDER — SODIUM CHLORIDE 0.9 % (FLUSH) 0.9 %
10 SYRINGE (ML) INJECTION EVERY 12 HOURS SCHEDULED
Status: CANCELLED | OUTPATIENT
Start: 2020-02-20

## 2020-02-20 RX ORDER — SODIUM CHLORIDE 0.9 % (FLUSH) 0.9 %
10 SYRINGE (ML) INJECTION PRN
Status: CANCELLED | OUTPATIENT
Start: 2020-02-20

## 2020-02-20 RX ORDER — DIPHENHYDRAMINE HYDROCHLORIDE 50 MG/ML
50 INJECTION INTRAMUSCULAR; INTRAVENOUS ONCE
Status: CANCELLED | OUTPATIENT
Start: 2020-02-20 | End: 2020-02-20

## 2020-02-20 RX ORDER — SODIUM CHLORIDE 9 MG/ML
INJECTION, SOLUTION INTRAVENOUS CONTINUOUS
Status: CANCELLED | OUTPATIENT
Start: 2020-02-20

## 2020-02-20 RX ORDER — ASPIRIN 325 MG
325 TABLET ORAL ONCE
Status: CANCELLED | OUTPATIENT
Start: 2020-02-20 | End: 2020-02-20

## 2020-02-20 RX ORDER — NITROGLYCERIN 0.4 MG/1
0.4 TABLET SUBLINGUAL EVERY 5 MIN PRN
Status: CANCELLED | OUTPATIENT
Start: 2020-02-20

## 2020-02-20 RX ORDER — AMIODARONE HYDROCHLORIDE 200 MG/1
200 TABLET ORAL DAILY
Status: DISCONTINUED | OUTPATIENT
Start: 2020-02-20 | End: 2020-02-25 | Stop reason: HOSPADM

## 2020-02-20 RX ADMIN — AMIODARONE HYDROCHLORIDE 0.5 MG/MIN: 1.8 INJECTION, SOLUTION INTRAVENOUS at 06:48

## 2020-02-20 RX ADMIN — TRAMADOL HYDROCHLORIDE 50 MG: 50 TABLET, FILM COATED ORAL at 11:20

## 2020-02-20 RX ADMIN — DULOXETINE HYDROCHLORIDE 20 MG: 20 CAPSULE, DELAYED RELEASE ORAL at 08:24

## 2020-02-20 RX ADMIN — SULFASALAZINE 500 MG: 500 TABLET ORAL at 20:15

## 2020-02-20 RX ADMIN — CLONIDINE HYDROCHLORIDE 0.1 MG: 0.1 TABLET ORAL at 08:24

## 2020-02-20 RX ADMIN — CHLORTHALIDONE 25 MG: 25 TABLET ORAL at 08:24

## 2020-02-20 RX ADMIN — LOSARTAN POTASSIUM 50 MG: 50 TABLET, FILM COATED ORAL at 08:25

## 2020-02-20 RX ADMIN — OXYBUTYNIN CHLORIDE 5 MG: 5 TABLET ORAL at 08:25

## 2020-02-20 RX ADMIN — LEVOTHYROXINE SODIUM 50 MCG: 50 TABLET ORAL at 08:25

## 2020-02-20 RX ADMIN — TRAZODONE HYDROCHLORIDE 50 MG: 50 TABLET ORAL at 20:15

## 2020-02-20 RX ADMIN — PANTOPRAZOLE SODIUM 40 MG: 40 TABLET, DELAYED RELEASE ORAL at 06:51

## 2020-02-20 RX ADMIN — HYDROXYCHLOROQUINE SULFATE 200 MG: 200 TABLET ORAL at 08:25

## 2020-02-20 RX ADMIN — TRAMADOL HYDROCHLORIDE 50 MG: 50 TABLET, FILM COATED ORAL at 04:15

## 2020-02-20 RX ADMIN — ASPIRIN 81 MG: 81 TABLET ORAL at 08:26

## 2020-02-20 RX ADMIN — CELECOXIB 200 MG: 200 CAPSULE ORAL at 08:25

## 2020-02-20 RX ADMIN — APIXABAN 5 MG: 5 TABLET, FILM COATED ORAL at 08:26

## 2020-02-20 RX ADMIN — PRAVASTATIN SODIUM 20 MG: 20 TABLET ORAL at 20:15

## 2020-02-20 RX ADMIN — AMIODARONE HYDROCHLORIDE 200 MG: 200 TABLET ORAL at 13:39

## 2020-02-20 RX ADMIN — METOPROLOL TARTRATE 50 MG: 50 TABLET, FILM COATED ORAL at 08:24

## 2020-02-20 RX ADMIN — HYDROXYCHLOROQUINE SULFATE 200 MG: 200 TABLET ORAL at 20:15

## 2020-02-20 RX ADMIN — Medication 10 ML: at 20:15

## 2020-02-20 RX ADMIN — CLONIDINE HYDROCHLORIDE 0.1 MG: 0.1 TABLET ORAL at 20:15

## 2020-02-20 RX ADMIN — SULFASALAZINE 500 MG: 500 TABLET ORAL at 08:25

## 2020-02-20 RX ADMIN — METOPROLOL TARTRATE 50 MG: 50 TABLET, FILM COATED ORAL at 20:15

## 2020-02-20 ASSESSMENT — PAIN SCALES - GENERAL
PAINLEVEL_OUTOF10: 0
PAINLEVEL_OUTOF10: 4
PAINLEVEL_OUTOF10: 2
PAINLEVEL_OUTOF10: 0
PAINLEVEL_OUTOF10: 0
PAINLEVEL_OUTOF10: 2

## 2020-02-20 ASSESSMENT — PAIN - FUNCTIONAL ASSESSMENT
PAIN_FUNCTIONAL_ASSESSMENT: ACTIVITIES ARE NOT PREVENTED
PAIN_FUNCTIONAL_ASSESSMENT: ACTIVITIES ARE NOT PREVENTED

## 2020-02-20 ASSESSMENT — PAIN DESCRIPTION - ORIENTATION
ORIENTATION: LEFT
ORIENTATION: LEFT

## 2020-02-20 ASSESSMENT — PAIN DESCRIPTION - DESCRIPTORS
DESCRIPTORS: ACHING
DESCRIPTORS: ACHING

## 2020-02-20 ASSESSMENT — PAIN DESCRIPTION - PROGRESSION: CLINICAL_PROGRESSION: GRADUALLY IMPROVING

## 2020-02-20 ASSESSMENT — PAIN DESCRIPTION - PAIN TYPE
TYPE: CHRONIC PAIN
TYPE: CHRONIC PAIN

## 2020-02-20 ASSESSMENT — PAIN DESCRIPTION - FREQUENCY
FREQUENCY: INTERMITTENT
FREQUENCY: INTERMITTENT

## 2020-02-20 ASSESSMENT — PAIN DESCRIPTION - LOCATION
LOCATION: JAW
LOCATION: ARM

## 2020-02-20 ASSESSMENT — PAIN DESCRIPTION - ONSET
ONSET: ON-GOING
ONSET: ON-GOING

## 2020-02-20 NOTE — PROGRESS NOTES
traMADol, HYDROcodone-acetaminophen, ondansetron, perflutren lipid microspheres      Intake/Output Summary (Last 24 hours) at 2/20/2020 1505  Last data filed at 2/20/2020 0315  Gross per 24 hour   Intake 780 ml   Output 785 ml   Net -5 ml       Diet:  DIET LOW SODIUM 2 GM;  Diet NPO, After Midnight    Exam:  BP (!) 142/52   Pulse 93   Temp 97.7 °F (36.5 °C) (Oral)   Resp 20   Ht 5' 8\" (1.727 m)   Wt 215 lb 12.8 oz (97.9 kg)   SpO2 96%   BMI 32.81 kg/m²   General appearance: No apparent distress, appears stated age and cooperative. HEENT: Pupils equal, round, and reactive to light. Conjunctivae/corneas clear. Neck: Supple, with full range of motion. No jugular venous distention. Trachea midline. Respiratory:  Normal respiratory effort. Clear to auscultation, bilaterally without Rales/Wheezes/Rhonchi. Cardiovascular: Regular rate with irregular rhythm. normal S1/S2 without murmurs, rubs or gallops. Abdomen: Obese, soft, non-tender, non-distended with normal bowel sounds. Musculoskeletal: passive and active ROM x 4 extremities. Skin: Skin color, texture, turgor normal.  No rashes or lesions. Neurologic:  Neurovascularly intact without any focal sensory/motor deficits.  Cranial nerves: II-XII intact, grossly non-focal.  Psychiatric: Alert and oriented, thought content appropriate, normal insight  Capillary Refill: Brisk,< 3 seconds   Peripheral Pulses: +2 palpable, equal bilaterally     Labs:   Recent Labs     02/19/20  0635 02/20/20  0627   WBC 9.9 10.3   HGB 12.8 13.2   HCT 40.3 42.3    222     Recent Labs     02/18/20  0528 02/19/20  0635 02/20/20  0627    144 144   K 3.5 3.6 4.6    106 105   CO2 24 26 27   BUN 16 23* 21   CREATININE 0.5 0.6 0.8   CALCIUM 8.9 9.1 9.6     Recent Labs     02/20/20  0627   AST 26   ALT 24   BILIDIR <0.2   BILITOT 0.2*   ALKPHOS 83     Recent Labs     02/18/20  0528   INR 1.54*     No results for input(s): Nannette Ran in the last 72

## 2020-02-20 NOTE — FLOWSHEET NOTE
02/20/20 1141   Provider Notification   Reason for Communication Evaluate   Provider Name [facility]   Provider Notification Physician Assistant   Method of Communication Face to face   Notification Time [phone] stated to stop amio drip per protocol at 1230 (noon) and start amiodarone 200 mg PO daily, see orders.

## 2020-02-20 NOTE — PROGRESS NOTES
Decreased Terminal Knee Extension and fatigued at end of bout. Exercise:  Patient was guided in 1 set(s) 10 reps of exercise to both lower extremities. Ankle pumps, Glut sets, Quad sets, Heelslides, Long arc quads, Hip abduction/adduction, Straight leg raises, Seated hip flexion and Seated isometric hip adduction. Exercises were completed for increased independence with functional mobility. Functional Outcome Measures: Not completed       ASSESSMENT:  Assessment: Patient progressing toward established goals. Activity Tolerance:  Patient tolerance of  treatment: good. Equipment Recommendations:Equipment Needed: No  Discharge Recommendations:  Continue to assess pending progress    Plan: Times per week: 3-5X GM  Times per day: Daily  Specific instructions for Next Treatment: therex and mobility    Patient Education  Patient Education: Plan of Care, Transfers, Gait, Verbal Exercise Instruction    Goals:  Patient goals : return home  Short term goals  Time Frame for Short term goals: by discharge  Short term goal 1: bed mobility with MOD I  Short term goal 2: transfer with MOD I  Short term goal 3: amb >100'x1 with LRAD and S to walk safely in home  Short term goal 4: negotiate 3 steps with HR and SBA to enter home safely  Long term goals  Time Frame for Long term goals : no LTGs set secondary to short ELOS    Following session, patient left in safe position with all fall risk precautions in place.

## 2020-02-20 NOTE — PROGRESS NOTES
Value Date    WBC 10.3 02/20/2020    RBC 4.20 02/20/2020    HGB 13.2 02/20/2020    HCT 42.3 02/20/2020     02/20/2020       CMP:    Lab Results   Component Value Date     02/20/2020    K 4.6 02/20/2020    K 3.8 02/17/2020     02/20/2020    CO2 27 02/20/2020    BUN 21 02/20/2020    CREATININE 0.8 02/20/2020    AGRATIO 1.9 10/01/2018    LABGLOM 70 02/20/2020    GLUCOSE 108 02/20/2020    GLUCOSE 70 08/29/2019    CALCIUM 9.6 02/20/2020       Hepatic Function Panel:    Lab Results   Component Value Date    ALKPHOS 83 02/20/2020    ALT 24 02/20/2020    AST 26 02/20/2020    PROT 6.6 02/20/2020    BILITOT 0.2 02/20/2020    BILIDIR <0.2 02/20/2020    LABALBU 3.7 02/20/2020       Magnesium:    Lab Results   Component Value Date    MG 2.0 02/19/2020       PT/INR:    Lab Results   Component Value Date    INR 1.54 02/18/2020       HgBA1c:    Lab Results   Component Value Date    LABA1C 5.4 10/01/2018       FLP:    Lab Results   Component Value Date    TRIG 68 08/29/2019    HDL 54 08/29/2019    LDLCALC 40 09/29/2017    LDLDIRECT 75 08/29/2019       TSH:    Lab Results   Component Value Date    TSH 1.680 02/17/2020    TSH 1.660 02/17/2020         Assessment:    afib rvr - now CVR 90s  Hx bradycardia, ? SSS  hx PAF - on eliquis prior to admission  Ef 55 per echo 2/18/20  Mildly dilated LA  Hx mild to mod PHTN per RHC 2015  Abn stress test 2/19/20 - mod apical ischemia  HTN  HLD  DM  Hx TIA  Hx left CEA 2017  SHANNEN    Discussed with dr Brandon Lennox:  · Keep mag >2 and K >4, replace prn  · Normal tsh 2/18/20  · Cont asa/statin/BB  · Cont amio gtt - change to po once protocol complete  · If bradycardia develops, will consider EP consult  · Pt received her eliquis this morning, hold for now, discussed with dr Carla Andujar, ok to proceed for diagnostic cath after 24 hours  · Npo after midnight  · LHC tomorrow  · TAYLOR/CV tomorrow at 4pm  · Pt agrees with above plan    A decision was made to proceed with cardiac catheterization as

## 2020-02-20 NOTE — PROGRESS NOTES
session    Goals  Short term goals  Time Frame for Short term goals: by discharge  Short term goal 1: Pt. to navigate throughout room/hallways with Sup to collect needed items for ADL/simple [facility] tasks. Short term goal 2: Pt. to complete dynamic standing for ~5min with Sup to complete ADLs/ simple kitchen tasks. Short term goal 3: Pt. to complete BADL with SUP using energy conservation tech and no increase in fatigue. Following session, patient left in safe position with all fall risk precautions in place.

## 2020-02-20 NOTE — CARE COORDINATION
Update: spoke with Cardiology; plans Cardiac Cath in am  Electronically signed by Yaya Moura RN on 2/20/2020 at 12:44 PM

## 2020-02-20 NOTE — PLAN OF CARE
Problem: Falls - Risk of:  Goal: Will remain free from falls  Description  Will remain free from falls  2/20/2020 1819 by Humera Johnston RN  Outcome: Ongoing  Note:   Patient up as tolerated with assistance. Using call light appropriately. Alert and oriented X4. Problem: Risk for Impaired Skin Integrity  Goal: Tissue integrity - skin and mucous membranes  Description  Structural intactness and normal physiological function of skin and  mucous membranes. 2/20/2020 1819 by Humera Johnston RN  Outcome: Ongoing  Note:   No evidence of new skin breakdown this shift. Encouraging patient to reposition every 2 hours, tolerates well. Problem: Discharge Planning:  Goal: Patients continuum of care needs are met  Description  Patients continuum of care needs are met  2/20/2020 1819 by Humera Johnston RN  Outcome: Ongoing  Note:   Discharge pending physician approval.  Patient plans to return home with spouse. Problem: Cardiovascular  Goal: No DVT, peripheral vascular complications  5/81/0505 1819 by Humera Johnston RN  Outcome: Ongoing  Note:   Assessment negative for DVT. Eliquis on hold as pt planning cardiac cath tomorrow morning. Problem: Cardiovascular  Goal: Anticoagulate/Hct stable  2/20/2020 1819 by Humera Johnston RN  Outcome: Ongoing  Note:   Assessment negative for DVT. Eliquis on hold as pt planning cardiac cath tomorrow morning. Problem: Cardiovascular  Goal: Understanding of dietary restrictions  2/20/2020 1819 by Humera Johnston RN  Outcome: Ongoing  Note:   Patient tolerates meals as they are available. Problem: Musculor/Skeletal Functional Status  Goal: Highest potential functional level  2/20/2020 1819 by Humera Johnston RN  Outcome: Ongoing  Note:   Patient tolerates activity well.       Problem: Infection - Central Venous Catheter-Associated Bloodstream Infection:  Goal: Will show no infection signs and symptoms  Description  Will show no infection signs and

## 2020-02-21 ENCOUNTER — APPOINTMENT (OUTPATIENT)
Dept: CARDIAC CATH/INVASIVE PROCEDURES | Age: 70
DRG: 247 | End: 2020-02-21
Payer: MEDICARE

## 2020-02-21 LAB
ABO: NORMAL
ACTIVATED CLOTTING TIME: 307 SECONDS (ref 1–150)
ACTIVATED CLOTTING TIME: 323 SECONDS (ref 1–150)
ANION GAP SERPL CALCULATED.3IONS-SCNC: 13 MEQ/L (ref 8–16)
ANION GAP SERPL CALCULATED.3IONS-SCNC: 15 MEQ/L (ref 8–16)
ANTIBODY SCREEN: NORMAL
APTT: 34 SECONDS (ref 22–38)
BUN BLDV-MCNC: 20 MG/DL (ref 7–22)
BUN BLDV-MCNC: 20 MG/DL (ref 7–22)
CALCIUM SERPL-MCNC: 8.9 MG/DL (ref 8.5–10.5)
CALCIUM SERPL-MCNC: 9.4 MG/DL (ref 8.5–10.5)
CHLORIDE BLD-SCNC: 104 MEQ/L (ref 98–111)
CHLORIDE BLD-SCNC: 105 MEQ/L (ref 98–111)
CO2: 21 MEQ/L (ref 23–33)
CO2: 24 MEQ/L (ref 23–33)
CREAT SERPL-MCNC: 0.5 MG/DL (ref 0.4–1.2)
CREAT SERPL-MCNC: 0.7 MG/DL (ref 0.4–1.2)
ERYTHROCYTE [DISTWIDTH] IN BLOOD BY AUTOMATED COUNT: 14.7 % (ref 11.5–14.5)
ERYTHROCYTE [DISTWIDTH] IN BLOOD BY AUTOMATED COUNT: 54.3 FL (ref 35–45)
GFR SERPL CREATININE-BSD FRML MDRD: 82 ML/MIN/1.73M2
GFR SERPL CREATININE-BSD FRML MDRD: > 90 ML/MIN/1.73M2
GLUCOSE BLD-MCNC: 67 MG/DL (ref 70–108)
GLUCOSE BLD-MCNC: 87 MG/DL (ref 70–108)
GLUCOSE BLD-MCNC: 95 MG/DL (ref 70–108)
HCT VFR BLD CALC: 41.2 % (ref 37–47)
HEMOGLOBIN: 13.1 GM/DL (ref 12–16)
INR BLD: 1.4 (ref 0.85–1.13)
MCH RBC QN AUTO: 31.9 PG (ref 26–33)
MCHC RBC AUTO-ENTMCNC: 31.8 GM/DL (ref 32.2–35.5)
MCV RBC AUTO: 100.2 FL (ref 81–99)
PLATELET # BLD: 235 THOU/MM3 (ref 130–400)
PMV BLD AUTO: 10.7 FL (ref 9.4–12.4)
POTASSIUM REFLEX MAGNESIUM: 4 MEQ/L (ref 3.5–5.2)
POTASSIUM REFLEX MAGNESIUM: 4.2 MEQ/L (ref 3.5–5.2)
RBC # BLD: 4.11 MILL/MM3 (ref 4.2–5.4)
RH FACTOR: NORMAL
SODIUM BLD-SCNC: 138 MEQ/L (ref 135–145)
SODIUM BLD-SCNC: 144 MEQ/L (ref 135–145)
WBC # BLD: 9 THOU/MM3 (ref 4.8–10.8)

## 2020-02-21 PROCEDURE — 93005 ELECTROCARDIOGRAM TRACING: CPT | Performed by: NURSE PRACTITIONER

## 2020-02-21 PROCEDURE — 85730 THROMBOPLASTIN TIME PARTIAL: CPT

## 2020-02-21 PROCEDURE — 80048 BASIC METABOLIC PNL TOTAL CA: CPT

## 2020-02-21 PROCEDURE — 92928 PRQ TCAT PLMT NTRAC ST 1 LES: CPT | Performed by: INTERNAL MEDICINE

## 2020-02-21 PROCEDURE — 92978 ENDOLUMINL IVUS OCT C 1ST: CPT | Performed by: INTERNAL MEDICINE

## 2020-02-21 PROCEDURE — B2151ZZ FLUOROSCOPY OF LEFT HEART USING LOW OSMOLAR CONTRAST: ICD-10-PCS | Performed by: INTERNAL MEDICINE

## 2020-02-21 PROCEDURE — 6360000002 HC RX W HCPCS: Performed by: INTERNAL MEDICINE

## 2020-02-21 PROCEDURE — 86850 RBC ANTIBODY SCREEN: CPT

## 2020-02-21 PROCEDURE — 6370000000 HC RX 637 (ALT 250 FOR IP): Performed by: PHYSICIAN ASSISTANT

## 2020-02-21 PROCEDURE — 4A023N7 MEASUREMENT OF CARDIAC SAMPLING AND PRESSURE, LEFT HEART, PERCUTANEOUS APPROACH: ICD-10-PCS | Performed by: INTERNAL MEDICINE

## 2020-02-21 PROCEDURE — 2709999900 HC NON-CHARGEABLE SUPPLY

## 2020-02-21 PROCEDURE — 6370000000 HC RX 637 (ALT 250 FOR IP)

## 2020-02-21 PROCEDURE — C1753 CATH, INTRAVAS ULTRASOUND: HCPCS

## 2020-02-21 PROCEDURE — C1725 CATH, TRANSLUMIN NON-LASER: HCPCS

## 2020-02-21 PROCEDURE — 85347 COAGULATION TIME ACTIVATED: CPT

## 2020-02-21 PROCEDURE — 6370000000 HC RX 637 (ALT 250 FOR IP): Performed by: INTERNAL MEDICINE

## 2020-02-21 PROCEDURE — 92979 ENDOLUMINL IVUS OCT C EA: CPT | Performed by: INTERNAL MEDICINE

## 2020-02-21 PROCEDURE — 2500000003 HC RX 250 WO HCPCS

## 2020-02-21 PROCEDURE — 6370000000 HC RX 637 (ALT 250 FOR IP): Performed by: NUCLEAR MEDICINE

## 2020-02-21 PROCEDURE — 94761 N-INVAS EAR/PLS OXIMETRY MLT: CPT

## 2020-02-21 PROCEDURE — 92978 ENDOLUMINL IVUS OCT C 1ST: CPT

## 2020-02-21 PROCEDURE — 99232 SBSQ HOSP IP/OBS MODERATE 35: CPT | Performed by: PHYSICIAN ASSISTANT

## 2020-02-21 PROCEDURE — 82948 REAGENT STRIP/BLOOD GLUCOSE: CPT

## 2020-02-21 PROCEDURE — C1887 CATHETER, GUIDING: HCPCS

## 2020-02-21 PROCEDURE — 85027 COMPLETE CBC AUTOMATED: CPT

## 2020-02-21 PROCEDURE — 1200000003 HC TELEMETRY R&B

## 2020-02-21 PROCEDURE — 2500000003 HC RX 250 WO HCPCS: Performed by: NURSE PRACTITIONER

## 2020-02-21 PROCEDURE — C1894 INTRO/SHEATH, NON-LASER: HCPCS

## 2020-02-21 PROCEDURE — 85610 PROTHROMBIN TIME: CPT

## 2020-02-21 PROCEDURE — 2580000003 HC RX 258: Performed by: INTERNAL MEDICINE

## 2020-02-21 PROCEDURE — C1874 STENT, COATED/COV W/DEL SYS: HCPCS

## 2020-02-21 PROCEDURE — 6360000002 HC RX W HCPCS

## 2020-02-21 PROCEDURE — 6360000004 HC RX CONTRAST MEDICATION: Performed by: INTERNAL MEDICINE

## 2020-02-21 PROCEDURE — 36415 COLL VENOUS BLD VENIPUNCTURE: CPT

## 2020-02-21 PROCEDURE — B2111ZZ FLUOROSCOPY OF MULTIPLE CORONARY ARTERIES USING LOW OSMOLAR CONTRAST: ICD-10-PCS | Performed by: INTERNAL MEDICINE

## 2020-02-21 PROCEDURE — 2580000003 HC RX 258: Performed by: NURSE PRACTITIONER

## 2020-02-21 PROCEDURE — 93458 L HRT ARTERY/VENTRICLE ANGIO: CPT | Performed by: INTERNAL MEDICINE

## 2020-02-21 PROCEDURE — 027135Z DILATION OF CORONARY ARTERY, TWO ARTERIES WITH TWO DRUG-ELUTING INTRALUMINAL DEVICES, PERCUTANEOUS APPROACH: ICD-10-PCS | Performed by: INTERNAL MEDICINE

## 2020-02-21 PROCEDURE — C9600 PERC DRUG-EL COR STENT SING: HCPCS

## 2020-02-21 PROCEDURE — 93458 L HRT ARTERY/VENTRICLE ANGIO: CPT

## 2020-02-21 PROCEDURE — 86900 BLOOD TYPING SEROLOGIC ABO: CPT

## 2020-02-21 PROCEDURE — C1769 GUIDE WIRE: HCPCS

## 2020-02-21 PROCEDURE — 86901 BLOOD TYPING SEROLOGIC RH(D): CPT

## 2020-02-21 RX ORDER — HEPARIN SODIUM 1000 [USP'U]/ML
40 INJECTION, SOLUTION INTRAVENOUS; SUBCUTANEOUS PRN
Status: ACTIVE | OUTPATIENT
Start: 2020-02-21 | End: 2020-02-22

## 2020-02-21 RX ORDER — SODIUM CHLORIDE 0.9 % (FLUSH) 0.9 %
10 SYRINGE (ML) INJECTION PRN
Status: DISCONTINUED | OUTPATIENT
Start: 2020-02-21 | End: 2020-02-21 | Stop reason: SDUPTHER

## 2020-02-21 RX ORDER — HEPARIN SODIUM 1000 [USP'U]/ML
80 INJECTION, SOLUTION INTRAVENOUS; SUBCUTANEOUS PRN
Status: ACTIVE | OUTPATIENT
Start: 2020-02-21 | End: 2020-02-22

## 2020-02-21 RX ORDER — SODIUM CHLORIDE 0.9 % (FLUSH) 0.9 %
10 SYRINGE (ML) INJECTION PRN
Status: DISCONTINUED | OUTPATIENT
Start: 2020-02-21 | End: 2020-02-21

## 2020-02-21 RX ORDER — NITROGLYCERIN 0.4 MG/1
0.4 TABLET SUBLINGUAL EVERY 5 MIN PRN
Status: DISCONTINUED | OUTPATIENT
Start: 2020-02-21 | End: 2020-02-25 | Stop reason: HOSPADM

## 2020-02-21 RX ORDER — SODIUM CHLORIDE 9 MG/ML
INJECTION, SOLUTION INTRAVENOUS CONTINUOUS
Status: DISCONTINUED | OUTPATIENT
Start: 2020-02-21 | End: 2020-02-21

## 2020-02-21 RX ORDER — SODIUM CHLORIDE 0.9 % (FLUSH) 0.9 %
10 SYRINGE (ML) INJECTION EVERY 12 HOURS SCHEDULED
Status: DISCONTINUED | OUTPATIENT
Start: 2020-02-21 | End: 2020-02-21 | Stop reason: SDUPTHER

## 2020-02-21 RX ORDER — ACETAMINOPHEN 325 MG/1
650 TABLET ORAL EVERY 4 HOURS PRN
Status: DISCONTINUED | OUTPATIENT
Start: 2020-02-21 | End: 2020-02-25 | Stop reason: HOSPADM

## 2020-02-21 RX ORDER — DIPHENHYDRAMINE HYDROCHLORIDE 50 MG/ML
50 INJECTION INTRAMUSCULAR; INTRAVENOUS ONCE
Status: DISCONTINUED | OUTPATIENT
Start: 2020-02-21 | End: 2020-02-21

## 2020-02-21 RX ORDER — SODIUM CHLORIDE 0.9 % (FLUSH) 0.9 %
10 SYRINGE (ML) INJECTION EVERY 12 HOURS SCHEDULED
Status: DISCONTINUED | OUTPATIENT
Start: 2020-02-21 | End: 2020-02-25 | Stop reason: HOSPADM

## 2020-02-21 RX ORDER — CLONIDINE HYDROCHLORIDE 0.1 MG/1
0.1 TABLET ORAL DAILY
Status: DISCONTINUED | OUTPATIENT
Start: 2020-02-22 | End: 2020-02-22

## 2020-02-21 RX ORDER — SODIUM CHLORIDE 9 MG/ML
INJECTION, SOLUTION INTRAVENOUS CONTINUOUS
Status: DISCONTINUED | OUTPATIENT
Start: 2020-02-21 | End: 2020-02-24

## 2020-02-21 RX ORDER — SODIUM CHLORIDE 0.9 % (FLUSH) 0.9 %
10 SYRINGE (ML) INJECTION PRN
Status: DISCONTINUED | OUTPATIENT
Start: 2020-02-21 | End: 2020-02-25 | Stop reason: HOSPADM

## 2020-02-21 RX ORDER — SODIUM CHLORIDE 0.9 % (FLUSH) 0.9 %
10 SYRINGE (ML) INJECTION EVERY 12 HOURS SCHEDULED
Status: DISCONTINUED | OUTPATIENT
Start: 2020-02-21 | End: 2020-02-21

## 2020-02-21 RX ORDER — CLOPIDOGREL BISULFATE 75 MG/1
75 TABLET ORAL DAILY
Status: DISCONTINUED | OUTPATIENT
Start: 2020-02-22 | End: 2020-02-25 | Stop reason: HOSPADM

## 2020-02-21 RX ORDER — ASPIRIN 325 MG
325 TABLET ORAL ONCE
Status: DISCONTINUED | OUTPATIENT
Start: 2020-02-21 | End: 2020-02-25 | Stop reason: HOSPADM

## 2020-02-21 RX ORDER — ATORVASTATIN CALCIUM 40 MG/1
40 TABLET, FILM COATED ORAL NIGHTLY
Status: DISCONTINUED | OUTPATIENT
Start: 2020-02-21 | End: 2020-02-25 | Stop reason: HOSPADM

## 2020-02-21 RX ORDER — METOPROLOL TARTRATE 100 MG/1
100 TABLET ORAL 2 TIMES DAILY
Status: DISCONTINUED | OUTPATIENT
Start: 2020-02-21 | End: 2020-02-25 | Stop reason: HOSPADM

## 2020-02-21 RX ORDER — HEPARIN SODIUM 10000 [USP'U]/100ML
18 INJECTION, SOLUTION INTRAVENOUS CONTINUOUS
Status: DISPENSED | OUTPATIENT
Start: 2020-02-21 | End: 2020-02-22

## 2020-02-21 RX ADMIN — ATORVASTATIN CALCIUM 40 MG: 40 TABLET, FILM COATED ORAL at 20:51

## 2020-02-21 RX ADMIN — SULFASALAZINE 500 MG: 500 TABLET ORAL at 20:51

## 2020-02-21 RX ADMIN — SULFASALAZINE 500 MG: 500 TABLET ORAL at 07:48

## 2020-02-21 RX ADMIN — Medication 10 ML: at 20:51

## 2020-02-21 RX ADMIN — METOPROLOL TARTRATE 50 MG: 50 TABLET, FILM COATED ORAL at 07:49

## 2020-02-21 RX ADMIN — DULOXETINE HYDROCHLORIDE 20 MG: 20 CAPSULE, DELAYED RELEASE ORAL at 07:48

## 2020-02-21 RX ADMIN — Medication 10 ML: at 07:50

## 2020-02-21 RX ADMIN — OXYBUTYNIN CHLORIDE 5 MG: 5 TABLET ORAL at 07:48

## 2020-02-21 RX ADMIN — SODIUM CHLORIDE: 9 INJECTION, SOLUTION INTRAVENOUS at 05:02

## 2020-02-21 RX ADMIN — SODIUM CHLORIDE: 9 INJECTION, SOLUTION INTRAVENOUS at 15:46

## 2020-02-21 RX ADMIN — FAMOTIDINE 20 MG: 10 INJECTION, SOLUTION INTRAVENOUS at 07:52

## 2020-02-21 RX ADMIN — CELECOXIB 200 MG: 200 CAPSULE ORAL at 07:48

## 2020-02-21 RX ADMIN — HEPARIN SODIUM 18 UNITS/KG/HR: 10000 INJECTION, SOLUTION INTRAVENOUS at 19:47

## 2020-02-21 RX ADMIN — LEVOTHYROXINE SODIUM 50 MCG: 50 TABLET ORAL at 07:49

## 2020-02-21 RX ADMIN — IOPAMIDOL 280 ML: 755 INJECTION, SOLUTION INTRAVENOUS at 13:34

## 2020-02-21 RX ADMIN — TRAZODONE HYDROCHLORIDE 50 MG: 50 TABLET ORAL at 20:51

## 2020-02-21 RX ADMIN — CHLORTHALIDONE 25 MG: 25 TABLET ORAL at 07:48

## 2020-02-21 RX ADMIN — HYDROXYCHLOROQUINE SULFATE 200 MG: 200 TABLET ORAL at 20:51

## 2020-02-21 RX ADMIN — LOSARTAN POTASSIUM 50 MG: 50 TABLET, FILM COATED ORAL at 07:48

## 2020-02-21 RX ADMIN — ASPIRIN 81 MG: 81 TABLET ORAL at 06:38

## 2020-02-21 RX ADMIN — CLONIDINE HYDROCHLORIDE 0.1 MG: 0.1 TABLET ORAL at 07:48

## 2020-02-21 RX ADMIN — METOPROLOL TARTRATE 100 MG: 100 TABLET, FILM COATED ORAL at 20:51

## 2020-02-21 RX ADMIN — HYDROXYCHLOROQUINE SULFATE 200 MG: 200 TABLET ORAL at 07:48

## 2020-02-21 RX ADMIN — AMIODARONE HYDROCHLORIDE 200 MG: 200 TABLET ORAL at 07:49

## 2020-02-21 ASSESSMENT — PAIN SCALES - GENERAL
PAINLEVEL_OUTOF10: 4
PAINLEVEL_OUTOF10: 0
PAINLEVEL_OUTOF10: 0

## 2020-02-21 NOTE — PLAN OF CARE
Pain Intervention(s): Rest    Patient states she only has intermittent pain in her left neck with certain turns of the head. Tolerates ultram as it is available and as needed. Goal: Control of acute pain  Description  Control of acute pain  2/20/2020 1819 by Dacia Duncan RN  Outcome: Ongoing  Note:   Patient states she only has intermittent pain in her left neck with certain turns of the head. Tolerates ultram as it is available and as needed. Goal: Control of chronic pain  Description  Control of chronic pain  2/20/2020 1819 by Dacia Duncan RN  Outcome: Ongoing  Note:   Patient states she only has intermittent pain in her left neck with certain turns of the head. Tolerates ultram as it is available and as needed. Care plan reviewed with patient. Patient verbalize understanding of the plan of care and contribute to goal setting.

## 2020-02-21 NOTE — PROGRESS NOTES
at her cardiologist office when it was found that her heart rate was in the 140s.  She denied any chest pain, palpitations, shortness of breath, nausea, vomiting, fever, chills, or diarrhea while she was in the office. Edgar Kamaraen arrival to the ER, patient was identified to be tachycardic in the EKG that was performed demonstrated A. fib with RVR.  Labs were essentially unremarkable and the patient was given 2 doses of Cardizem IVP which helped.  She was placed on a Cardizem drip and admitted for an episode of atrial fibrillation with RVR.  The hospitalist service was contacted for further evaluation, treatment, management. Subjective (past 24 hours):   Patient evaluated prior to cardiac catheterization. Patient was sleeping with CPAP on upon evaluation. Resting comfortably. Denies chest pain, shortness of breath, abdominal pain, nausea, vomiting, or diarrhea. Patient denies palpitations or fatigue. Patient understands and is agreeable with current plan of care. .      Past medical history, family history, social history and allergies reviewed again and is unchanged since admission. ROS (12 point review of systems completed. Pertinent positives noted.  Otherwise ROS is negative)     Medications:  Reviewed    Infusion Medications    sodium chloride 75 mL/hr at 02/21/20 1546     Scheduled Medications    aspirin  325 mg Oral Once    [START ON 2/22/2020] cloNIDine  0.1 mg Oral Daily    metoprolol tartrate  100 mg Oral BID    sodium chloride flush  10 mL Intravenous 2 times per day    [START ON 2/22/2020] clopidogrel  75 mg Oral Daily    atorvastatin  40 mg Oral Nightly    amiodarone  200 mg Oral Daily    celecoxib  200 mg Oral Daily    lidocaine 1 % injection  5 mL Intradermal Once    [Held by provider] apixaban  5 mg Oral BID    aspirin  81 mg Oral Daily    chlorthalidone  25 mg Oral Daily    DULoxetine  20 mg Oral Daily    hydroxychloroquine  200 mg Oral BID    levothyroxine  50 mcg Oral Daily    losartan  50 mg Oral Daily    modafinil  200 mg Oral Daily    oxybutynin  5 mg Oral Daily    pantoprazole  40 mg Oral Daily    sulfaSALAzine  500 mg Oral BID    traZODone  50 mg Oral Nightly     PRN Meds: nitroGLYCERIN, sodium chloride flush, acetaminophen, magnesium hydroxide, regadenoson, albuterol sulfate HFA, traMADol, HYDROcodone-acetaminophen, ondansetron, perflutren lipid microspheres      Intake/Output Summary (Last 24 hours) at 2/21/2020 1608  Last data filed at 2/21/2020 1511  Gross per 24 hour   Intake 1314.61 ml   Output 850 ml   Net 464.61 ml       Diet:  Diet NPO Time Specified  Diet NPO Time Specified  DIET CARDIAC; Exam:  /76   Pulse 114   Temp 97.5 °F (36.4 °C) (Oral)   Resp 20   Ht 5' 8\" (1.727 m)   Wt 216 lb 9.6 oz (98.2 kg)   SpO2 96%   BMI 32.93 kg/m²   General appearance: No apparent distress, appears stated age and cooperative. HEENT: Pupils equal, round, and reactive to light. Conjunctivae/corneas clear. Neck: Supple, with full range of motion. No jugular venous distention. Trachea midline. Respiratory:  Normal respiratory effort. Clear to auscultation, bilaterally without Rales/Wheezes/Rhonchi. Cardiovascular: Regular rate with irregular rhythm. normal S1/S2 without murmurs, rubs or gallops. Abdomen: Obese, soft, non-tender, non-distended with normal bowel sounds. Musculoskeletal: passive and active ROM x 4 extremities. Skin: Skin color, texture, turgor normal.  No rashes or lesions. Neurologic:  Neurovascularly intact without any focal sensory/motor deficits.  Cranial nerves: II-XII intact, grossly non-focal.  Psychiatric: Alert and oriented, thought content appropriate, normal insight  Capillary Refill: Brisk,< 3 seconds   Peripheral Pulses: +2 palpable, equal bilaterally     Labs:   Recent Labs     02/19/20  0635 02/20/20  0627 02/21/20  0536   WBC 9.9 10.3 9.0   HGB 12.8 13.2 13.1   HCT 40.3 42.3 41.2    222 235     Recent Labs     02/20/20 0627 02/21/20  0137 02/21/20  0535    138 144   K 4.6 4.2 4.0    104 105   CO2 27 21* 24   BUN 21 20 20   CREATININE 0.8 0.5 0.7   CALCIUM 9.6 8.9 9.4     Recent Labs     02/20/20  0627   AST 26   ALT 24   BILIDIR <0.2   BILITOT 0.2*   ALKPHOS 83     Recent Labs     02/21/20  0536   INR 1.40*     No results for input(s): CKTOTAL, TROPONINI in the last 72 hours. Microbiology:    Blood culture #1: No results found for: BC    Blood culture #2:No results found for: BLOODCULT2    Organism:  Lab Results   Component Value Date    ORG Escherichia coli 12/07/2015       No results found for: LABGRAM    MRSA culture only:No results found for: [address]     Urine culture: No results found for: LABURIN    Respiratory culture: No results found for: CULTRESP    Aerobic and Anaerobic :  No results found for: LABAERO  No results found for: LABANAE    Urinalysis:      Lab Results   Component Value Date    NITRU NEGATIVE 12/07/2015    WBCUA 0-2 12/07/2015    BACTERIA FEW 12/07/2015    RBCUA 0-2 12/07/2015    BLOODU NEGATIVE 12/07/2015    GLUCOSEU NEGATIVE 12/07/2015       Radiology:  XR CHEST STANDARD (2 VW)   Final Result   Infiltrate versus nodule at the left lung base. Follow-up to complete clearing is recommended. **This report has been created using voice recognition software. It may contain minor errors which are inherent in voice recognition technology. **      Final report electronically signed by Dr. Ronny Chris on 2/19/2020 2:20 PM        No results found.     Electronically signed by Kacey Lange PA-C on 2/21/2020 at 4:08 PM

## 2020-02-21 NOTE — PRE SEDATION
[facility]  Sedation/Analgesia History & Physical    Pt Name: Ines Walker  Account number: [de-identified]  MRN: [id number]  YOB: 1950  Provider Performing Procedure: Brinda Sanchez MD  Referring Provider: Josue Purdy PA-C   Primary Care Physician: Zoe Krishna  Date: 2/21/2020    PRE-PROCEDURE    Code Status: FULL CODE  Brief History/Pre-Procedure Diagnosis:     afib rvr - now CVR 90s  Hx bradycardia, ? SSS  hx PAF - on eliquis prior to admission  Ef 55 per echo 2/18/20  Mildly dilated LA  Hx mild to mod PHTN per [facility] 2015  Abn stress test 2/19/20 - mod apical ischemia  HTN  HLD  DM  Hx TIA  Hx left CE[facility] 2017  SHANNEN       Consent: : I have discussed with the patient risks, benefits, and alternatives (and relevant risks, benefits, and side effects related to alternatives or not receiving care), and likelihood of the success. The patient and/or representative appear to understand and agree to proceed. The discussion encompasses risks, benefits, and side effects related to the alternatives and the risks related to not receiving the proposed care, treatment, and services. The indication, risks and benefits of the procedure and possible therapeutic consequences and alternatives were discussed with the patient. The patient was given the opportunity to ask questions and to have them answered to his/her satisfaction.  Risks of the procedure include but are not limited to the following: Bleeding, hematoma including retroperitoneal hemmorhage, infection, pain and discomfort, injury to the aorta and other blood vessels, rhythm disturbance, low blood pressure, myocardial infarction, stroke, kidney damage/failure, myocardial perforation, allergic reactions to sedatives/contrast material, loss of pulse/vascular compromise requiring surgery, aneurysm/pseudoaneurysm formation, possible loss of a limb/hand/leg, needing blood transfusion, requiring emergent open heart surgery or emergent coronary PRN, AWILDA Simon CNP    sodium chloride flush 0.9 % injection 10 mL, 10 mL, Intravenous, 2 times per day, AWILDA Simon CNP, 10 mL at 02/21/20 0750    sodium chloride flush 0.9 % injection 10 mL, 10 mL, Intravenous, PRN, AWILDA Simon - CNP    amiodarone (CORDARONE) tablet 200 mg, 200 mg, Oral, Daily, Clark Land MD, 200 mg at 02/21/20 0749    celecoxib (CELEBREX) capsule 200 mg, 200 mg, Oral, Daily, Ike Troy MD, 200 mg at 02/21/20 0748    lidocaine PF 1 % injection 5 mL, 5 mL, Intradermal, Once, AWILDA Walker CNP    metoprolol tartrate (LOPRESSOR) tablet 50 mg, 50 mg, Oral, BID, Dorota Hanson MD, 50 mg at 02/21/20 0749    regadenoson (LEXISCAN) injection 0.4 mg, 0.4 mg, Intravenous, ONCE PRN, Dorota Mast MD    albuterol sulfate  (90 Base) MCG/ACT inhaler 2 puff, 2 puff, Inhalation, Q6H PRN, Jason Barahona MD    traMADol [facility]) tablet 50 mg, 50 mg, Oral, Q6H PRN, Jason Barahona MD, 50 mg at 02/20/20 1120    [Held by provider] apixaban (ELIQUIS) tablet 5 mg, 5 mg, Oral, BID, Jason Barahona MD, 5 mg at 02/20/20 2424    aspirin EC tablet 81 mg, 81 mg, Oral, Daily, Jason Barahona MD, 81 mg at 02/21/20 6614    chlorthalidone (HYGROTON) tablet 25 mg, 25 mg, Oral, Daily, Jason Barahona MD, 25 mg at 02/21/20 8190    cloNIDine (CATAPRES) tablet 0.1 mg, 0.1 mg, Oral, BID, Jason Barahona MD, 0.1 mg at 02/21/20 0748    DULoxetine (CYMBALTA) extended release capsule 20 mg, 20 mg, Oral, Daily, Jason Barahona MD, 20 mg at 02/21/20 0748    HYDROcodone-acetaminophen (NORCO) 5-325 MG per tablet 1 tablet, 1 tablet, Oral, Q6H PRN, Jason Barahona MD    hydroxychloroquine (PLAQUENIL) tablet 200 mg, 200 mg, Oral, BID, Jason Barahona MD, 200 mg at 02/21/20 3586    levothyroxine (SYNTHROID) tablet 50 mcg, 50 mcg, Oral, Daily, Jason Barahona MD, 50 mcg at 02/21/20 0749    losartan (COZAAR) tablet 50 mg, 50 mg, Oral, Daily, Megan Bell MD, 50 mg at 02/21/20 0748    modafinil (PROVIGIL) tablet 200 mg, 200 mg, Oral, Daily, Megan Bell MD    oxybutynin [facility]) tablet 5 mg, 5 mg, Oral, Daily, Megan Bell MD, 5 mg at 02/21/20 0748    pantoprazole (PROTONIX) tablet 40 mg, 40 mg, Oral, Daily, Megan Bell MD, 40 mg at 02/20/20 5653    pravastatin (PRAVACHOL) tablet 20 mg, 20 mg, Oral, Nightly, Megan Bell MD, 20 mg at 02/20/20 2015    sulfaSALAzine (AZULFIDINE) tablet 500 mg, 500 mg, Oral, BID, Megan Bell MD, 500 mg at 02/21/20 0748    traZODone (DESYREL) tablet 50 mg, 50 mg, Oral, Nightly, Megan Bell MD, 50 mg at 02/20/20 2015    ondansetron [facility]) injection 4 mg, 4 mg, Intravenous, Q6H PRN, Megan Bell MD    perflutren lipid microspheres (DEFINITY) injection 1.65 mg, 1.5 mL, Intravenous, ONCE PRN, Megan Bell MD  Prior to Admission medications    Medication Sig Start Date End Date Taking? Authorizing Provider   dilTIAZem HCl ER Coated Beads 240 MG TB24 Take 240 mg by mouth daily   Yes Historical Provider, MD   modafinil (PROVIGIL) 200 MG tablet Take 200 mg by mouth daily.    Yes Historical Provider, MD   pravastatin (PRAVACHOL) 20 MG tablet Take 1 tablet by mouth daily 2/17/20  Yes Emogene Aschoff, MD   apixaban (ELIQUIS) 5 MG TABS tablet TAKE 1 TABLET TWICE A DAY 2/17/20  Yes Emogene Aschoff, MD   cloNIDine (CATAPRES) 0.1 MG tablet TAKE 1 TABLET TWICE A DAY 2/14/20  Yes Emogene Aschoff, MD   losartan (COZAAR) 50 MG tablet TAKE 1 TABLET DAILY 2/6/20  Yes Emogene Aschoff, MD   DULoxetine (CYMBALTA) 20 MG extended release capsule Take 20 mg by mouth daily   Yes Historical Provider, MD   celecoxib (CELEBREX) 200 MG capsule Take 200 mg by mouth daily    Yes Historical Provider, MD   pantoprazole (PROTONIX) 40 MG tablet Take 1 tablet by mouth daily 3/20/19  Yes [facility] Aschoff, MD   chlorthalidone (HYGROTON) 25 MG tablet Take 1 tablet by mouth daily 2/11/19 Yes Rossy Bermudez MD   aspirin 81 MG tablet Take 81 mg by mouth daily   Yes Historical Provider, MD   Ferrous Fumarate (IRON) 18 MG TBCR Take 1 tablet by mouth daily   Yes Historical Provider, MD   Cyanocobalamin (VITAMIN B-12 PO) Take 1 tablet by mouth daily   Yes Historical Provider, MD   Cholecalciferol (VITAMIN D3) 5000 UNITS CAPS Take 2 capsules by mouth daily   Yes Historical Provider, MD   oxybutynin (DITROPAN) 5 MG tablet Take 5 mg by mouth daily    Yes Historical Provider, MD   sulfaSALAzine (AZULFIDINE) 500 MG tablet Take 500 mg by mouth 2 times daily   Yes Historical Provider, MD   levothyroxine (SYNTHROID) 50 MCG tablet Take 50 mcg by mouth daily  5/26/16  Yes Historical Provider, MD   hydroxychloroquine (PLAQUENIL) 200 MG tablet Take 200 mg by mouth 2 times daily. Yes Historical Provider, MD   traZODone (DESYREL) 50 MG tablet Take 50 mg by mouth nightly    Historical Provider, MD   HYDROcodone-acetaminophen (NORCO) 5-325 MG per tablet Take 1 tablet by mouth every 6 hours as needed for Pain .     Historical Provider, MD   traMADol (ULTRAM) 50 MG tablet Take 50 mg by mouth every 6 hours as needed for Pain    Historical Provider, MD   albuterol sulfate HFA (VENTOLIN HFA) 108 (90 BASE) MCG/ACT inhaler Inhale 2 puffs into the lungs every 6 hours as needed for Wheezing 6/24/16   Awais García MD   loratadine (CLARITIN) 10 MG tablet Take 10 mg by mouth as needed    Historical Provider, MD     Additional information:       VITAL SIGNS   Vitals:    02/21/20 0738   BP: (!) 141/69   Pulse: 102   Resp: 18   Temp: 97.4 °F (36.3 °C)   SpO2: 94%       PHYSICAL:   General: No acute distress  HEENT:  Unremarkable for age  Neck: without increased JVD, carotid pulses 2+ bilaterally without bruits  Heart: RRR, S1 & S2 WNL, S4 gallop, without murmurs or rubs   NYHA: IV   Lungs: Clear to auscultation    Abdomen: BS present, without HSM, masses, or tenderness    Extremities: without C,C,E.  Pulses 2+ bilaterally  Mental Status: Alert & Oriented        PLANNED PROCEDURE   [x]Cath  [x]PCI                []Pacemaker/AICD  []TAYLOR             []Cardioversion []Peripheral angiography/PTA  []Other:      SEDATION  Planned agent:[x]Midazolam []Meperidine []Sublimaze []Morphine  []Diazepam  []Other:     ASA Classification:  []1 []2 [x]3 []4 []5   Class 1: A normal healthy patient  Class 2: Pt with mild to moderate systemic disease  Class 3: Severe systemic disease or disturbance  Class 4: Severe systemic disorders that are already life threatening. Class 5: Moribund pt with little chances of survival, for more than 24 hours. Mallampati I Airway Classification:   []1 []2 [x]3 []4     [x]Pre-procedure diagnostic studies complete and results available. Comment:    [x]Previous sedation/anesthesia experiences assessed. Comment:    [x]The patient is an appropriate candidate to undergo the planned procedure sedation and anesthesia. (Refer to nursing sedation/analgesia documentation record)  [x]Formulation and discussion of sedation/procedure plan, risks, and expectations with patient and/or responsible adult completed. [x]Patient examined immediately prior to the procedure.  (Refer to nursing sedation/analgesia documentation record)        Irina Pedraza MD, Justin Flores    Electronically signed 2/21/2020 at 11:03 AM

## 2020-02-21 NOTE — PROGRESS NOTES
Cardiology Progress Note      Patient:  Valerie Litten  YOB: 1950  MRN: [id number]   Acct: [de-identified]  Admit Date:  2/17/2020  Primary Cardiologist: Clovis Neves MD  Seen by dr Juanjose Diaz    Note per dr Ibis Orellana:  Atrial fibrillation with rapid ventricular  response.  _____________________.     HISTORY OF PRESENT ILLNESS:  This is a pleasant 78-year-old patient who  follows with Dr. Ghanshyam Gutierrez who has a history of atrial fibrillation, morbid  obesity, sleep apnea, hypertension, hyperlipidemia who apparently was in  Dr. Denia Angeles office yesterday complaining of symptoms of fatigue,  tiredness, shortness of breath, not feeling well, was found to be in  atrial fibrillation, rapid ventricular response. She was sent to the  emergency room, subsequently admitted after she was started on Cardizem  drip. We were consulted to assist in the management of the patient. The patient denies any active chest pain. She does have intermittent  shortness of breath and no obvious palpitation. No recent syncope\"    Subjective (Events in last 24 hours):   Pt awake and alert. NAD. No cp or sob.   Just came back from cath lab    Objective:   /75   Pulse 106   Temp 97.5 °F (36.4 °C) (Oral)   Resp 18   Ht 5' 8\" (1.727 m)   Wt 216 lb 9.6 oz (98.2 kg)   SpO2 95%   BMI 32.93 kg/m²        TELEMETRY: afib rvr low 100s    Physical Exam:  General Appearance: alert and oriented to person, place and time, in no acute distress  Cardiovascular: irregularly irregular  Pulmonary/Chest: few rales  Abdomen: soft, non-tender, non-distended, normal bowel sounds, no masses Extremities: no cyanosis, clubbing or edema, pulse   Skin: warm and dry, no rash or erythema  Head: normocephalic and atraumatic  Eyes: pupils equal, round, and reactive to light  Neck: supple and non-tender without mass, no thyromegaly   Musculoskeletal: normal range of motion, no joint swelling, deformity or tenderness  Neurological: alert, oriented, normal speech, no focal findings or movement disorder noted    Medications:    aspirin  325 mg Oral Once    sodium chloride flush  10 mL Intravenous 2 times per day    amiodarone  200 mg Oral Daily    celecoxib  200 mg Oral Daily    lidocaine 1 % injection  5 mL Intradermal Once    metoprolol tartrate  50 mg Oral BID    [Held by provider] apixaban  5 mg Oral BID    aspirin  81 mg Oral Daily    chlorthalidone  25 mg Oral Daily    cloNIDine  0.1 mg Oral BID    DULoxetine  20 mg Oral Daily    hydroxychloroquine  200 mg Oral BID    levothyroxine  50 mcg Oral Daily    losartan  50 mg Oral Daily    modafinil  200 mg Oral Daily    oxybutynin  5 mg Oral Daily    pantoprazole  40 mg Oral Daily    pravastatin  20 mg Oral Nightly    sulfaSALAzine  500 mg Oral BID    traZODone  50 mg Oral Nightly      sodium chloride 75 mL/hr at 02/21/20 0502     nitroGLYCERIN, 0.4 mg, Q5 Min PRN  sodium chloride flush, 10 mL, PRN  regadenoson, 0.4 mg, ONCE PRN  albuterol sulfate HFA, 2 puff, Q6H PRN  traMADol, 50 mg, Q6H PRN  HYDROcodone-acetaminophen, 1 tablet, Q6H PRN  ondansetron, 4 mg, Q6H PRN  perflutren lipid microspheres, 1.5 mL, ONCE PRN        Diagnostics:  Echo 2/18/290  Summary   Technically difficult examination. Ejection fraction is visually estimated at 55%. Overall left ventricular function is normal.   Mildly dilated left atrium. Signature      ----------------------------------------------------------------   Electronically signed by Anna Marie Hayward MD (Interpreting   physician) on 02/18/2020 at 04:47 PM    Stress test 2/19/20  Summary   This Nuclear Medicine study was abnormal .   moderate apical ischemia   abnormal LV function      Recommendation   Clinical correlation is recommended.       Signatures      ----------------------------------------------------------------   Electronically signed by Anna Marie Hayward MD (Interpreting   Cardiologist) on 02/19/2020 at 17:28    Lab cancel TAYLOR/CV today and consider outpt if HR controlled  Will observe overnight  Decrease clonidine and increase lopressor 100 bid     Electronically signed by Yamilex Alonso PA-C on 2/21/2020 at 1:54 PM

## 2020-02-21 NOTE — PROGRESS NOTES
[address] THERAPY MISSED TREATMENT NOTE  [facility] ICU STEPDOWN TELEMETRY 4K  [id number]      Date: 2020  Patient Name: Jazmin Washburn        CSN: [id number]   : 1950  (65 y.o.)  Gender: female   Referring Practitioner: Mark Thompson MD  Diagnosis: Atrial Fibrillation with RVR         REASON FOR MISSED TREATMENT: Pt off unit for heart cath.  Will check back as able

## 2020-02-22 LAB
ANION GAP SERPL CALCULATED.3IONS-SCNC: 12 MEQ/L (ref 8–16)
APTT: 113.4 SECONDS (ref 22–38)
APTT: 187.6 SECONDS (ref 22–38)
APTT: > 200 SECONDS (ref 22–38)
BUN BLDV-MCNC: 13 MG/DL (ref 7–22)
CALCIUM SERPL-MCNC: 8.4 MG/DL (ref 8.5–10.5)
CHLORIDE BLD-SCNC: 106 MEQ/L (ref 98–111)
CO2: 25 MEQ/L (ref 23–33)
CREAT SERPL-MCNC: 0.7 MG/DL (ref 0.4–1.2)
EKG ATRIAL RATE: 85 BPM
EKG ATRIAL RATE: 94 BPM
EKG Q-T INTERVAL: 354 MS
EKG Q-T INTERVAL: 376 MS
EKG QRS DURATION: 102 MS
EKG QRS DURATION: 96 MS
EKG QTC CALCULATION (BAZETT): 459 MS
EKG QTC CALCULATION (BAZETT): 470 MS
EKG R AXIS: -36 DEGREES
EKG R AXIS: -37 DEGREES
EKG T AXIS: 118 DEGREES
EKG T AXIS: 138 DEGREES
EKG VENTRICULAR RATE: 106 BPM
EKG VENTRICULAR RATE: 90 BPM
ERYTHROCYTE [DISTWIDTH] IN BLOOD BY AUTOMATED COUNT: 14.7 % (ref 11.5–14.5)
ERYTHROCYTE [DISTWIDTH] IN BLOOD BY AUTOMATED COUNT: 54.1 FL (ref 35–45)
GFR SERPL CREATININE-BSD FRML MDRD: 82 ML/MIN/1.73M2
GLUCOSE BLD-MCNC: 90 MG/DL (ref 70–108)
HCT VFR BLD CALC: 39.2 % (ref 37–47)
HEMOGLOBIN: 12.5 GM/DL (ref 12–16)
MCH RBC QN AUTO: 32 PG (ref 26–33)
MCHC RBC AUTO-ENTMCNC: 31.9 GM/DL (ref 32.2–35.5)
MCV RBC AUTO: 100.3 FL (ref 81–99)
PLATELET # BLD: 192 THOU/MM3 (ref 130–400)
PMV BLD AUTO: 10.1 FL (ref 9.4–12.4)
POTASSIUM SERPL-SCNC: 3.5 MEQ/L (ref 3.5–5.2)
RBC # BLD: 3.91 MILL/MM3 (ref 4.2–5.4)
SODIUM BLD-SCNC: 143 MEQ/L (ref 135–145)
WBC # BLD: 11 THOU/MM3 (ref 4.8–10.8)

## 2020-02-22 PROCEDURE — 36415 COLL VENOUS BLD VENIPUNCTURE: CPT

## 2020-02-22 PROCEDURE — 99232 SBSQ HOSP IP/OBS MODERATE 35: CPT | Performed by: PHYSICIAN ASSISTANT

## 2020-02-22 PROCEDURE — 1200000003 HC TELEMETRY R&B

## 2020-02-22 PROCEDURE — 80048 BASIC METABOLIC PNL TOTAL CA: CPT

## 2020-02-22 PROCEDURE — 6370000000 HC RX 637 (ALT 250 FOR IP): Performed by: PHYSICIAN ASSISTANT

## 2020-02-22 PROCEDURE — 6370000000 HC RX 637 (ALT 250 FOR IP): Performed by: INTERNAL MEDICINE

## 2020-02-22 PROCEDURE — 6360000002 HC RX W HCPCS: Performed by: PHYSICIAN ASSISTANT

## 2020-02-22 PROCEDURE — 2580000003 HC RX 258: Performed by: INTERNAL MEDICINE

## 2020-02-22 PROCEDURE — 6370000000 HC RX 637 (ALT 250 FOR IP): Performed by: NUCLEAR MEDICINE

## 2020-02-22 PROCEDURE — 85730 THROMBOPLASTIN TIME PARTIAL: CPT

## 2020-02-22 PROCEDURE — 85027 COMPLETE CBC AUTOMATED: CPT

## 2020-02-22 PROCEDURE — 2709999900 HC NON-CHARGEABLE SUPPLY

## 2020-02-22 RX ORDER — CLONIDINE HYDROCHLORIDE 0.1 MG/1
0.05 TABLET ORAL 2 TIMES DAILY
Status: DISCONTINUED | OUTPATIENT
Start: 2020-02-22 | End: 2020-02-25 | Stop reason: HOSPADM

## 2020-02-22 RX ORDER — FUROSEMIDE 10 MG/ML
20 INJECTION INTRAMUSCULAR; INTRAVENOUS ONCE
Status: COMPLETED | OUTPATIENT
Start: 2020-02-22 | End: 2020-02-22

## 2020-02-22 RX ADMIN — APIXABAN 5 MG: 5 TABLET, FILM COATED ORAL at 20:39

## 2020-02-22 RX ADMIN — APIXABAN 5 MG: 5 TABLET, FILM COATED ORAL at 08:14

## 2020-02-22 RX ADMIN — TRAZODONE HYDROCHLORIDE 50 MG: 50 TABLET ORAL at 20:39

## 2020-02-22 RX ADMIN — CLONIDINE HYDROCHLORIDE 0.05 MG: 0.1 TABLET ORAL at 08:14

## 2020-02-22 RX ADMIN — SULFASALAZINE 500 MG: 500 TABLET ORAL at 20:39

## 2020-02-22 RX ADMIN — CELECOXIB 200 MG: 200 CAPSULE ORAL at 08:13

## 2020-02-22 RX ADMIN — HYDROXYCHLOROQUINE SULFATE 200 MG: 200 TABLET ORAL at 20:39

## 2020-02-22 RX ADMIN — PANTOPRAZOLE SODIUM 40 MG: 40 TABLET, DELAYED RELEASE ORAL at 06:10

## 2020-02-22 RX ADMIN — AMIODARONE HYDROCHLORIDE 200 MG: 200 TABLET ORAL at 08:14

## 2020-02-22 RX ADMIN — SULFASALAZINE 500 MG: 500 TABLET ORAL at 08:13

## 2020-02-22 RX ADMIN — CHLORTHALIDONE 25 MG: 25 TABLET ORAL at 08:14

## 2020-02-22 RX ADMIN — DULOXETINE HYDROCHLORIDE 20 MG: 20 CAPSULE, DELAYED RELEASE ORAL at 08:14

## 2020-02-22 RX ADMIN — CLOPIDOGREL 75 MG: 75 TABLET, FILM COATED ORAL at 08:17

## 2020-02-22 RX ADMIN — LEVOTHYROXINE SODIUM 50 MCG: 50 TABLET ORAL at 08:14

## 2020-02-22 RX ADMIN — ATORVASTATIN CALCIUM 40 MG: 40 TABLET, FILM COATED ORAL at 20:39

## 2020-02-22 RX ADMIN — Medication 10 ML: at 08:17

## 2020-02-22 RX ADMIN — METOPROLOL TARTRATE 100 MG: 100 TABLET, FILM COATED ORAL at 20:39

## 2020-02-22 RX ADMIN — OXYBUTYNIN CHLORIDE 5 MG: 5 TABLET ORAL at 08:14

## 2020-02-22 RX ADMIN — Medication 10 ML: at 20:40

## 2020-02-22 RX ADMIN — LOSARTAN POTASSIUM 50 MG: 50 TABLET, FILM COATED ORAL at 08:14

## 2020-02-22 RX ADMIN — METOPROLOL TARTRATE 100 MG: 100 TABLET, FILM COATED ORAL at 08:14

## 2020-02-22 RX ADMIN — ASPIRIN 81 MG: 81 TABLET ORAL at 08:17

## 2020-02-22 RX ADMIN — FUROSEMIDE 20 MG: 40 INJECTION, SOLUTION INTRAMUSCULAR; INTRAVENOUS at 15:59

## 2020-02-22 RX ADMIN — CLONIDINE HYDROCHLORIDE 0.05 MG: 0.1 TABLET ORAL at 20:39

## 2020-02-22 RX ADMIN — HYDROXYCHLOROQUINE SULFATE 200 MG: 200 TABLET ORAL at 08:14

## 2020-02-22 ASSESSMENT — PAIN SCALES - GENERAL: PAINLEVEL_OUTOF10: 4

## 2020-02-22 NOTE — PROGRESS NOTES
Cardiology Progress Note      Patient:  Fariba Newton  YOB: 1950  MRN: [id number]   Acct: [de-identified]  Admit Date:  2/17/2020  Primary Cardiologist: Dorina James MD  Seen by dr Kristel Moreno    Note per dr Marlin Romero:  Atrial fibrillation with rapid ventricular  response.  _____________________.     HISTORY OF PRESENT ILLNESS:  This is a pleasant 25-year-old patient who  follows with Dr. Tay Herrera who has a history of atrial fibrillation, morbid  obesity, sleep apnea, hypertension, hyperlipidemia who apparently was in  Dr. Dileep Perez office yesterday complaining of symptoms of fatigue,  tiredness, shortness of breath, not feeling well, was found to be in  atrial fibrillation, rapid ventricular response. She was sent to the  emergency room, subsequently admitted after she was started on Cardizem  drip. We were consulted to assist in the management of the patient. The patient denies any active chest pain. She does have intermittent  shortness of breath and no obvious palpitation. No recent syncope\"    Subjective (Events in last 24 hours):   Pt awake and alert. NAD. No cp. Mild SOB with exertion.       Objective:   /82   Pulse 118   Temp 97.6 °F (36.4 °C) (Oral)   Resp 16   Ht 5' 8\" (1.727 m)   Wt 216 lb 1.6 oz (98 kg)   SpO2 95%   BMI 32.86 kg/m²        TELEMETRY: afib rvr low 100s    Physical Exam:  General Appearance: alert and oriented to person, place and time, in no acute distress  Cardiovascular: irregularly irregular  Pulmonary/Chest: few rales  Abdomen: soft, non-tender, non-distended, normal bowel sounds, no masses Extremities: no cyanosis, clubbing or edema, pulse   Skin: warm and dry, no rash or erythema  Head: normocephalic and atraumatic  Eyes: pupils equal, round, and reactive to light  Neck: supple and non-tender without mass, no thyromegaly   Musculoskeletal: normal range of motion, no joint swelling, deformity or tenderness  Neurological: alert, oriented, normal speech, no focal findings or movement disorder noted    Medications:    cloNIDine  0.05 mg Oral BID    aspirin  325 mg Oral Once    metoprolol tartrate  100 mg Oral BID    sodium chloride flush  10 mL Intravenous 2 times per day    clopidogrel  75 mg Oral Daily    atorvastatin  40 mg Oral Nightly    amiodarone  200 mg Oral Daily    celecoxib  200 mg Oral Daily    lidocaine 1 % injection  5 mL Intradermal Once    apixaban  5 mg Oral BID    aspirin  81 mg Oral Daily    chlorthalidone  25 mg Oral Daily    DULoxetine  20 mg Oral Daily    hydroxychloroquine  200 mg Oral BID    levothyroxine  50 mcg Oral Daily    losartan  50 mg Oral Daily    modafinil  200 mg Oral Daily    oxybutynin  5 mg Oral Daily    pantoprazole  40 mg Oral Daily    sulfaSALAzine  500 mg Oral BID    traZODone  50 mg Oral Nightly      sodium chloride Stopped (02/22/20 0813)     nitroGLYCERIN, 0.4 mg, Q5 Min PRN  sodium chloride flush, 10 mL, PRN  acetaminophen, 650 mg, Q4H PRN  magnesium hydroxide, 30 mL, Daily PRN  regadenoson, 0.4 mg, ONCE PRN  albuterol sulfate HFA, 2 puff, Q6H PRN  traMADol, 50 mg, Q6H PRN  HYDROcodone-acetaminophen, 1 tablet, Q6H PRN  ondansetron, 4 mg, Q6H PRN  perflutren lipid microspheres, 1.5 mL, ONCE PRN        Diagnostics:  Echo 2/18/290  Summary   Technically difficult examination. Ejection fraction is visually estimated at 55%. Overall left ventricular function is normal.   Mildly dilated left atrium. Signature      ----------------------------------------------------------------   Electronically signed by Lynda Vargas MD (Interpreting   physician) on 02/18/2020 at 04:47 PM    Stress test 2/19/20  Summary   This Nuclear Medicine study was abnormal .   moderate apical ischemia   abnormal LV function      Recommendation   Clinical correlation is recommended.       Signatures      ----------------------------------------------------------------   Electronically signed by Livia Scanlon, Estella Hamm MD (Interpreting   Cardiologist) on 02/19/2020 at 17:28    Lab Data:    Cardiac Enzymes:  No results for input(s): CKTOTAL, CKMB, CKMBINDEX, TROPONINI in the last 72 hours. CBC:   Lab Results   Component Value Date    WBC 11.0 02/22/2020    RBC 3.91 02/22/2020    HGB 12.5 02/22/2020    HCT 39.2 02/22/2020     02/22/2020       CMP:    Lab Results   Component Value Date     02/22/2020    K 3.5 02/22/2020    K 4.0 02/21/2020     02/22/2020    CO2 25 02/22/2020    BUN 13 02/22/2020    CREATININE 0.7 02/22/2020    AGRATIO 1.9 10/01/2018    LABGLOM 82 02/22/2020    GLUCOSE 90 02/22/2020    GLUCOSE 70 08/29/2019    CALCIUM 8.4 02/22/2020       Hepatic Function Panel:    Lab Results   Component Value Date    ALKPHOS 83 02/20/2020    ALT 24 02/20/2020    AST 26 02/20/2020    PROT 6.6 02/20/2020    BILITOT 0.2 02/20/2020    BILIDIR <0.2 02/20/2020    LABALBU 3.7 02/20/2020       Magnesium:    Lab Results   Component Value Date    MG 2.0 02/19/2020       PT/INR:    Lab Results   Component Value Date    INR 1.40 02/21/2020       HgBA1c:    Lab Results   Component Value Date    LABA1C 5.4 10/01/2018       FLP:    Lab Results   Component Value Date    TRIG 68 08/29/2019    HDL 54 08/29/2019    LDLCALC 40 09/29/2017    LDLDIRECT 75 08/29/2019       TSH:    Lab Results   Component Value Date    TSH 1.680 02/17/2020    TSH 1.660 02/17/2020         Assessment:    afib rvr - low 100s  Hx bradycardia, ? SSS - no bradycardia this admission  hx PAF - on eliquis prior to admission  Ef 55 per echo 2/18/20  Mildly dilated LA  Hx mild to mod PHTN per [facility] 2015    S/p cath 2/21/20 -IVUS LM/LAD  -Severe stenotic lesion of dLM (Bifurcation lesion, [place] Type 1,1,0)  -Severe 80-90% lesion of ostial/proximal LAD  -S/P Successful PCI with stenting of dLM/pLAD    Abn stress test 2/19/20 - mod apical ischemia  HTN  HLD  DM  Hx TIA  Hx left CEA 2017  SHANNEN    Discussed with dr Angie Bill:  · Keep mag >2 and K >4, replace prn  · Normal tsh 2/18/20  · Cont asa/plavix/statin/BB/amio  · On eliquis  · If bradycardia develops, will consider EP consult  · Lasix 20mg iv x2  · Daily I/o and weights  · 2 liter fluid restriction and 2gm sodium diet  · TAYLOR/CV Monday       Electronically signed by Varsha Sanchez PA-C on 2/22/2020 at 1:12 PM

## 2020-02-22 NOTE — PROGRESS NOTES
hydroxide, regadenoson, albuterol sulfate HFA, traMADol, HYDROcodone-acetaminophen, ondansetron, perflutren lipid microspheres      Intake/Output Summary (Last 24 hours) at 2/22/2020 1340  Last data filed at 2/22/2020 0349  Gross per 24 hour   Intake 2500 ml   Output 875 ml   Net 1625 ml       Diet:  DIET CARDIAC; Exam:  /82   Pulse 118   Temp 97.6 °F (36.4 °C) (Oral)   Resp 16   Ht 5' 8\" (1.727 m)   Wt 216 lb 1.6 oz (98 kg)   SpO2 95%   BMI 32.86 kg/m²   General appearance:  No apparent distress, appears stated age and cooperative. HEENT: Pupils equal, round, and reactive to light. Conjunctivae/corneas clear. Neck: Supple, with full range of motion. No jugular venous distention. Trachea midline. Respiratory:  Normal respiratory effort. Clear to auscultation, bilaterally without Rales/Wheezes/Rhonchi. Cardiovascular: Regular rate with irregular rhythm. normal S1/S2 without murmurs, rubs or gallops. Abdomen: Obese, soft, non-tender, non-distended with normal bowel sounds. Musculoskeletal: passive and active ROM x 4 extremities. Skin: Skin color, texture, turgor normal.  No rashes or lesions. Neurologic:  Neurovascularly intact without any focal sensory/motor deficits.  Cranial nerves: II-XII intact, grossly non-focal.  Psychiatric: Alert and oriented, thought content appropriate, normal insight  Capillary Refill: Brisk,< 3 seconds   Peripheral Pulses: +2 palpable, equal bilaterally     Labs:   Recent Labs     02/20/20  0627 02/21/20  0536 02/22/20  0524   WBC 10.3 9.0 11.0*   HGB 13.2 13.1 12.5   HCT 42.3 41.2 39.2    235 192     Recent Labs     02/21/20  0137 02/21/20  0535 02/22/20  0524    144 143   K 4.2 4.0 3.5    105 106   CO2 21* 24 25   BUN 20 20 13   CREATININE 0.5 0.7 0.7   CALCIUM 8.9 9.4 8.4*     Recent Labs     02/20/20  0627   AST 26   ALT 24   BILIDIR <0.2   BILITOT 0.2*   ALKPHOS 83     Recent Labs     02/21/20  0536   INR 1.40*     No results for input(s):

## 2020-02-22 NOTE — PLAN OF CARE
Problem: Falls - Risk of:  Goal: Will remain free from falls  Description  Will remain free from falls  Outcome: Ongoing  Note:   Patient did not fall this shift. Call light in reach, bed alarm on. Problem: Risk for Impaired Skin Integrity  Goal: Tissue integrity - skin and mucous membranes  Description  Structural intactness and normal physiological function of skin and  mucous membranes. Outcome: Ongoing  Note:   No new skin breakdown this shift. Pt turned every 2 hours and as needed. Problem: Discharge Planning:  Goal: Patients continuum of care needs are met  Description  Patients continuum of care needs are met  Outcome: Ongoing  Note:   Pt planning to go home on discharge     Problem: Cardiovascular  Goal: No DVT, peripheral vascular complications  Outcome: Ongoing  Note:   Pt on heparin gtt this shift     Problem: Musculor/Skeletal Functional Status  Goal: Highest potential functional level  Outcome: Ongoing  Note:   Ambulating with 1 assist     Problem: Pain:  Goal: Pain level will decrease  Description  Pain level will decrease  Outcome: Ongoing  Note:   Pain Assessment: 0-10  Pain Level: 4   Patient's Stated Pain Goal: No pain   Is pain goal met at this time? Yes     Non-Pharmaceutical Pain Intervention(s): Rest     Care plan reviewed with patient. Patient verbalize understanding of the plan of care and contribute to goal setting.

## 2020-02-23 ENCOUNTER — APPOINTMENT (OUTPATIENT)
Dept: GENERAL RADIOLOGY | Age: 70
DRG: 247 | End: 2020-02-23
Payer: MEDICARE

## 2020-02-23 LAB
ANION GAP SERPL CALCULATED.3IONS-SCNC: 12 MEQ/L (ref 8–16)
BUN BLDV-MCNC: 16 MG/DL (ref 7–22)
CALCIUM SERPL-MCNC: 8.9 MG/DL (ref 8.5–10.5)
CHLORIDE BLD-SCNC: 103 MEQ/L (ref 98–111)
CO2: 27 MEQ/L (ref 23–33)
CREAT SERPL-MCNC: 0.5 MG/DL (ref 0.4–1.2)
GFR SERPL CREATININE-BSD FRML MDRD: > 90 ML/MIN/1.73M2
GLUCOSE BLD-MCNC: 103 MG/DL (ref 70–108)
MAGNESIUM: 1.8 MG/DL (ref 1.6–2.4)
PLATELET # BLD: 195 THOU/MM3 (ref 130–400)
POTASSIUM REFLEX MAGNESIUM: 3.2 MEQ/L (ref 3.5–5.2)
SODIUM BLD-SCNC: 142 MEQ/L (ref 135–145)

## 2020-02-23 PROCEDURE — 6370000000 HC RX 637 (ALT 250 FOR IP): Performed by: NUCLEAR MEDICINE

## 2020-02-23 PROCEDURE — 6370000000 HC RX 637 (ALT 250 FOR IP): Performed by: INTERNAL MEDICINE

## 2020-02-23 PROCEDURE — 99232 SBSQ HOSP IP/OBS MODERATE 35: CPT | Performed by: PHYSICIAN ASSISTANT

## 2020-02-23 PROCEDURE — 71045 X-RAY EXAM CHEST 1 VIEW: CPT

## 2020-02-23 PROCEDURE — 2580000003 HC RX 258: Performed by: INTERNAL MEDICINE

## 2020-02-23 PROCEDURE — 6360000002 HC RX W HCPCS: Performed by: PHYSICIAN ASSISTANT

## 2020-02-23 PROCEDURE — 83735 ASSAY OF MAGNESIUM: CPT

## 2020-02-23 PROCEDURE — 2709999900 HC NON-CHARGEABLE SUPPLY

## 2020-02-23 PROCEDURE — 36415 COLL VENOUS BLD VENIPUNCTURE: CPT

## 2020-02-23 PROCEDURE — 80048 BASIC METABOLIC PNL TOTAL CA: CPT

## 2020-02-23 PROCEDURE — 1200000003 HC TELEMETRY R&B

## 2020-02-23 PROCEDURE — 6370000000 HC RX 637 (ALT 250 FOR IP): Performed by: PHYSICIAN ASSISTANT

## 2020-02-23 PROCEDURE — 2580000003 HC RX 258: Performed by: PHYSICIAN ASSISTANT

## 2020-02-23 PROCEDURE — 85049 AUTOMATED PLATELET COUNT: CPT

## 2020-02-23 RX ORDER — POTASSIUM CHLORIDE 20 MEQ/1
40 TABLET, EXTENDED RELEASE ORAL ONCE
Status: COMPLETED | OUTPATIENT
Start: 2020-02-23 | End: 2020-02-23

## 2020-02-23 RX ADMIN — Medication 10 ML: at 09:43

## 2020-02-23 RX ADMIN — CLOPIDOGREL 75 MG: 75 TABLET, FILM COATED ORAL at 09:42

## 2020-02-23 RX ADMIN — METOPROLOL TARTRATE 100 MG: 100 TABLET, FILM COATED ORAL at 20:42

## 2020-02-23 RX ADMIN — DULOXETINE HYDROCHLORIDE 20 MG: 20 CAPSULE, DELAYED RELEASE ORAL at 09:40

## 2020-02-23 RX ADMIN — MAGNESIUM SULFATE HEPTAHYDRATE 1 G: 500 INJECTION, SOLUTION INTRAMUSCULAR; INTRAVENOUS at 09:53

## 2020-02-23 RX ADMIN — METOPROLOL TARTRATE 100 MG: 100 TABLET, FILM COATED ORAL at 09:41

## 2020-02-23 RX ADMIN — LEVOTHYROXINE SODIUM 50 MCG: 50 TABLET ORAL at 09:40

## 2020-02-23 RX ADMIN — HYDROXYCHLOROQUINE SULFATE 200 MG: 200 TABLET ORAL at 20:43

## 2020-02-23 RX ADMIN — SULFASALAZINE 500 MG: 500 TABLET ORAL at 20:43

## 2020-02-23 RX ADMIN — APIXABAN 5 MG: 5 TABLET, FILM COATED ORAL at 20:42

## 2020-02-23 RX ADMIN — AMIODARONE HYDROCHLORIDE 200 MG: 200 TABLET ORAL at 09:41

## 2020-02-23 RX ADMIN — CLONIDINE HYDROCHLORIDE 0.05 MG: 0.1 TABLET ORAL at 20:43

## 2020-02-23 RX ADMIN — OXYBUTYNIN CHLORIDE 5 MG: 5 TABLET ORAL at 09:41

## 2020-02-23 RX ADMIN — ASPIRIN 81 MG: 81 TABLET ORAL at 09:40

## 2020-02-23 RX ADMIN — LOSARTAN POTASSIUM 50 MG: 50 TABLET, FILM COATED ORAL at 09:40

## 2020-02-23 RX ADMIN — ATORVASTATIN CALCIUM 40 MG: 40 TABLET, FILM COATED ORAL at 20:42

## 2020-02-23 RX ADMIN — CELECOXIB 200 MG: 200 CAPSULE ORAL at 09:41

## 2020-02-23 RX ADMIN — PANTOPRAZOLE SODIUM 40 MG: 40 TABLET, DELAYED RELEASE ORAL at 06:24

## 2020-02-23 RX ADMIN — SULFASALAZINE 500 MG: 500 TABLET ORAL at 09:41

## 2020-02-23 RX ADMIN — CHLORTHALIDONE 25 MG: 25 TABLET ORAL at 09:40

## 2020-02-23 RX ADMIN — CLONIDINE HYDROCHLORIDE 0.05 MG: 0.1 TABLET ORAL at 09:42

## 2020-02-23 RX ADMIN — HYDROXYCHLOROQUINE SULFATE 200 MG: 200 TABLET ORAL at 09:40

## 2020-02-23 RX ADMIN — APIXABAN 5 MG: 5 TABLET, FILM COATED ORAL at 09:40

## 2020-02-23 RX ADMIN — TRAZODONE HYDROCHLORIDE 50 MG: 50 TABLET ORAL at 20:44

## 2020-02-23 RX ADMIN — Medication 10 ML: at 20:43

## 2020-02-23 RX ADMIN — POTASSIUM CHLORIDE 40 MEQ: 1500 TABLET, EXTENDED RELEASE ORAL at 10:05

## 2020-02-23 ASSESSMENT — PAIN SCALES - GENERAL
PAINLEVEL_OUTOF10: 0

## 2020-02-23 NOTE — PLAN OF CARE
Problem: Falls - Risk of:  Goal: Will remain free from falls  Description  Will remain free from falls  Outcome: Ongoing  Note:   Patient free from falls this shift with call light within reach slipper socks in place, and bed alarm on. Problem: Risk for Impaired Skin Integrity  Goal: Tissue integrity - skin and mucous membranes  Description  Structural intactness and normal physiological function of skin and  mucous membranes. Outcome: Ongoing  Note:   Patient skin and mucous membranes remain intact this shift. Problem: Discharge Planning:  Goal: Patients continuum of care needs are met  Description  Patients continuum of care needs are met  Outcome: Ongoing  Note:   Patient plans to return home with spouse at discharge with no additional assistance needed. Problem: Cardiovascular  Goal: No DVT, peripheral vascular complications  Outcome: Ongoing  Note:   Patient shows no DVT or PVD complications patient often ambulated and is on a blood thinner. Problem: Musculor/Skeletal Functional Status  Goal: Highest potential functional level  Outcome: Ongoing  Note:   Patient remains at highest functional level this shift. Problem: Infection - Central Venous Catheter-Associated Bloodstream Infection:  Goal: Will show no infection signs and symptoms  Description  Will show no infection signs and symptoms  Outcome: Ongoing  Note:   Patient shows no signs or symptoms of infection this shift, all vitals stable. Problem: Pain:  Description  Pain management should include both nonpharmacologic and pharmacologic interventions. Goal: Pain level will decrease  Description  Pain level will decrease  Outcome: Ongoing  Note:   Patient has no pain level this shift. Problem: Pain:  Description  Pain management should include both nonpharmacologic and pharmacologic interventions.   Goal: Control of acute pain  Description  Control of acute pain  Outcome: Ongoing  Note:   Patient has no pain level this shift.      Problem: Pain:  Description  Pain management should include both nonpharmacologic and pharmacologic interventions. Goal: Control of chronic pain  Description  Control of chronic pain  Outcome: Ongoing  Note:   Patient has no pain level this shift. Care plan reviewed with patient. Patient verbalizes understanding of the plan of care and contribute to goal setting.

## 2020-02-23 NOTE — PROGRESS NOTES
Hospitalist Progress Note      Patient:  Hudson Belcher    Unit/Bed:4K-06/006-A  YOB: 1950  MRN: [id number]   Acct: [de-identified]   PCP: Carvel Form  Date of Admission: 2/17/2020    Assessment/Plan:    1. Atrial fibrillation with RVR: Continued. . TTE during admission showed EF 55%. S/p PCI with 2 stents 2/21/20. Continue BB, Amiodarone, Eliquis. Potassium and magnesium replaced today. Continue to monitor daily with BMP and replace as needed. . Plan for TAYLOR/CV 2/24/20. 2. Coronary Artery Disease: Stress 2/19/20 showed moderate apical ischemia. Cardiac cath 2/21/20 with successful stenting of dLM/pLAD. Continue DAPT, statin, BB.  Cardiology following. 3. Benign essential hypertension, controlled: Continue Lopressor, Clonidine, Clorthalidone and Cozaar. Monitor for hypotension. 4. Hypothyroidism: History. Continue Synthroid. 5. SHANNEN: Wears CPAP at home, continue. Continue modafinil. 6. Depression: Continue duloxetine. 7. Fibromyalgia: History. 8. Obesity: BMI 31.5.     Chief Complaint: Atrial fibrillation     Initial H and P:-    Rosalio Rodrigues is a 75-year-old  female, reformed smoker, with a medical history of arthritis, atrial fibrillation, history of skin cancer, carotid artery disease, CHF, depression, diabetes mellitus, fibromyalgia, hypertension, hypothyroidism, obesity, sleep apnea, thyroid disease, and history of TIAs in the past.  Patient presents [facility] ER from her cardiologist office with complaints of atrial fibrillation with RVR.  Patient does have a history of A. fib and takes Cardizem orally for rate control. Justine Kirby went in for routine up at her cardiologist office when it was found that her heart rate was in the 140s.  She denied any chest pain, palpitations, shortness of breath, nausea, vomiting, fever, chills, or diarrhea while she was in the office. Michael Alec arrival to the ER, patient was identified to be tachycardic in the EKG that was performed demonstrated A. fib with RVR.  Labs were essentially unremarkable and the patient was given 2 doses of Cardizem IVP which helped.  She was placed on a Cardizem drip and admitted for an episode of atrial fibrillation with RVR.  The hospitalist service was contacted for further evaluation, treatment, management. Subjective (past 24 hours):   Denies any new concerns at this time. She reports she has had mild intermittent shortness of breath. We discussed diuretic that was administered yesterday and electrolyte replacement. She denies chest pain or cough. Denies lower extremity edema. Denies abdominal pain, nausea, vomiting, diarrhea, or constipation. No further concerns or complaints at this time. Past medical history, family history, social history and allergies reviewed again and is unchanged since admission. ROS (12 point review of systems completed. Pertinent positives noted.  Otherwise ROS is negative)     Medications:  Reviewed    Infusion Medications    sodium chloride Stopped (02/22/20 0813)     Scheduled Medications    potassium replacement protocol   Other RX Placeholder    cloNIDine  0.05 mg Oral BID    aspirin  325 mg Oral Once    metoprolol tartrate  100 mg Oral BID    sodium chloride flush  10 mL Intravenous 2 times per day    clopidogrel  75 mg Oral Daily    atorvastatin  40 mg Oral Nightly    amiodarone  200 mg Oral Daily    celecoxib  200 mg Oral Daily    lidocaine 1 % injection  5 mL Intradermal Once    apixaban  5 mg Oral BID    aspirin  81 mg Oral Daily    chlorthalidone  25 mg Oral Daily    DULoxetine  20 mg Oral Daily    hydroxychloroquine  200 mg Oral BID    levothyroxine  50 mcg Oral Daily    losartan  50 mg Oral Daily    modafinil  200 mg Oral Daily    oxybutynin  5 mg Oral Daily    pantoprazole  40 mg Oral Daily    sulfaSALAzine  500 mg Oral BID    traZODone  50 mg Oral Nightly     PRN Meds: nitroGLYCERIN, hours. Recent Labs     02/21/20  0536   INR 1.40*     No results for input(s): Arley Persaud in the last 72 hours. Microbiology:    Blood culture #1: No results found for: BC    Blood culture #2:No results found for: BLOODCULT2    Organism:  Lab Results   Component Value Date    ORG Escherichia coli 12/07/2015       No results found for: LABGRAM    MRSA culture only:No results found for: [facility]    Urine culture: No results found for: LABURIN    Respiratory culture: No results found for: CULTRESP    Aerobic and Anaerobic :  No results found for: LABAERO  No results found for: LABANAE    Urinalysis:      Lab Results   Component Value Date    NITRU NEGATIVE 12/07/2015    WBCUA 0-2 12/07/2015    BACTERIA FEW 12/07/2015    RBCUA 0-2 12/07/2015    BLOODU NEGATIVE 12/07/2015    GLUCOSEU NEGATIVE 12/07/2015       Radiology:  XR CHEST PORTABLE   Final Result   Mild left basilar atelectasis. No pulmonary edema. **This report has been created using voice recognition software. It may contain minor errors which are inherent in voice recognition technology. **      Final report electronically signed by Dr. Singh Dale on 2/23/2020 9:07 AM      XR CHEST STANDARD (2 VW)   Final Result   Infiltrate versus nodule at the left lung base. Follow-up to complete clearing is recommended. **This report has been created using voice recognition software. It may contain minor errors which are inherent in voice recognition technology. **      Final report electronically signed by Dr. Mariela Al on 2/19/2020 2:20 PM        No results found.     Electronically signed by Teresa Wu PA-C on 2/23/2020 at 2:15 PM

## 2020-02-23 NOTE — PROGRESS NOTES
Voicemail left with cath lab to schedule a TAYLOR with cardioversion on Monday 2/24 with Dr. Ashley Brower. Consent form signed.

## 2020-02-23 NOTE — PROGRESS NOTES
focal findings or movement disorder noted    Medications:    potassium replacement protocol   Other RX Placeholder    cloNIDine  0.05 mg Oral BID    aspirin  325 mg Oral Once    metoprolol tartrate  100 mg Oral BID    sodium chloride flush  10 mL Intravenous 2 times per day    clopidogrel  75 mg Oral Daily    atorvastatin  40 mg Oral Nightly    amiodarone  200 mg Oral Daily    celecoxib  200 mg Oral Daily    lidocaine 1 % injection  5 mL Intradermal Once    apixaban  5 mg Oral BID    aspirin  81 mg Oral Daily    chlorthalidone  25 mg Oral Daily    DULoxetine  20 mg Oral Daily    hydroxychloroquine  200 mg Oral BID    levothyroxine  50 mcg Oral Daily    losartan  50 mg Oral Daily    modafinil  200 mg Oral Daily    oxybutynin  5 mg Oral Daily    pantoprazole  40 mg Oral Daily    sulfaSALAzine  500 mg Oral BID    traZODone  50 mg Oral Nightly      sodium chloride Stopped (02/22/20 0813)     nitroGLYCERIN, 0.4 mg, Q5 Min PRN  sodium chloride flush, 10 mL, PRN  acetaminophen, 650 mg, Q4H PRN  magnesium hydroxide, 30 mL, Daily PRN  regadenoson, 0.4 mg, ONCE PRN  albuterol sulfate HFA, 2 puff, Q6H PRN  traMADol, 50 mg, Q6H PRN  HYDROcodone-acetaminophen, 1 tablet, Q6H PRN  ondansetron, 4 mg, Q6H PRN  perflutren lipid microspheres, 1.5 mL, ONCE PRN        Diagnostics:  Echo 2/18/290  Summary   Technically difficult examination. Ejection fraction is visually estimated at 55%. Overall left ventricular function is normal.   Mildly dilated left atrium. Signature      ----------------------------------------------------------------   Electronically signed by Anna Marie Hayward MD (Interpreting   physician) on 02/18/2020 at 04:47 PM    Stress test 2/19/20  Summary   This Nuclear Medicine study was abnormal .   moderate apical ischemia   abnormal LV function      Recommendation   Clinical correlation is recommended.       Signatures ----------------------------------------------------------------   Electronically signed by Matt Perry MD (Interpreting   Cardiologist) on 02/19/2020 at 17:28    Lab Data:    Cardiac Enzymes:  No results for input(s): CKTOTAL, CKMB, CKMBINDEX, TROPONINI in the last 72 hours. CBC:   Lab Results   Component Value Date    WBC 11.0 02/22/2020    RBC 3.91 02/22/2020    HGB 12.5 02/22/2020    HCT 39.2 02/22/2020     02/23/2020       CMP:    Lab Results   Component Value Date     02/23/2020    K 3.2 02/23/2020     02/23/2020    CO2 27 02/23/2020    BUN 16 02/23/2020    CREATININE 0.5 02/23/2020    AGRATIO 1.9 10/01/2018    LABGLOM >90 02/23/2020    GLUCOSE 103 02/23/2020    GLUCOSE 70 08/29/2019    CALCIUM 8.9 02/23/2020       Hepatic Function Panel:    Lab Results   Component Value Date    ALKPHOS 83 02/20/2020    ALT 24 02/20/2020    AST 26 02/20/2020    PROT 6.6 02/20/2020    BILITOT 0.2 02/20/2020    BILIDIR <0.2 02/20/2020    LABALBU 3.7 02/20/2020       Magnesium:    Lab Results   Component Value Date    MG 1.8 02/23/2020       PT/INR:    Lab Results   Component Value Date    INR 1.40 02/21/2020       HgBA1c:    Lab Results   Component Value Date    LABA1C 5.4 10/01/2018       FLP:    Lab Results   Component Value Date    TRIG 68 08/29/2019    HDL 54 08/29/2019    LDLCALC 40 09/29/2017    LDLDIRECT 75 08/29/2019       TSH:    Lab Results   Component Value Date    TSH 1.680 02/17/2020    TSH 1.660 02/17/2020         Assessment:    afib rvr - low 100s  Hx bradycardia, ? SSS - no bradycardia this admission  hx PAF - on eliquis prior to admission  Ef 55 per echo 2/18/20  Mildly dilated LA  Hx mild to mod PHTN per [facility] 2015    Abn stress test 2/19/20 - mod apical ischemia  S/p cath 2/21/20 -IVUS LM/LAD  -Severe stenotic lesion of dLM (Bifurcation lesion, [place] Type 1,1,0)  -Severe 80-90% lesion of ostial/proximal LAD  -S/P Successful PCI with stenting of dLM/pLAD    HTN  HLD  DM  Hx TIA  Hx left CEA 2017  SHANNEN    Discussed with dr Julianna Alvarenga:  · Keep mag >2 and K >4, replace prn  · Normal tsh 2/18/20  · Cont asa/plavix/statin/BB/amio  · On eliquis  · If bradycardia develops, will consider EP consult  · Daily I/o and weights  · 2 liter fluid restriction and 2gm sodium diet  · Npo after midnight  · TAYLOR/CV Monday       Electronically signed by Jose Enrique Maurice PA-C on 2/23/2020 at 12:27 PM

## 2020-02-24 ENCOUNTER — APPOINTMENT (OUTPATIENT)
Dept: CARDIAC CATH/INVASIVE PROCEDURES | Age: 70
DRG: 247 | End: 2020-02-24
Payer: MEDICARE

## 2020-02-24 LAB
ANION GAP SERPL CALCULATED.3IONS-SCNC: 13 MEQ/L (ref 8–16)
BUN BLDV-MCNC: 17 MG/DL (ref 7–22)
CALCIUM SERPL-MCNC: 9.2 MG/DL (ref 8.5–10.5)
CHLORIDE BLD-SCNC: 105 MEQ/L (ref 98–111)
CO2: 24 MEQ/L (ref 23–33)
CREAT SERPL-MCNC: 0.5 MG/DL (ref 0.4–1.2)
EKG ATRIAL RATE: 60 BPM
EKG P AXIS: 72 DEGREES
EKG P-R INTERVAL: 186 MS
EKG Q-T INTERVAL: 410 MS
EKG QRS DURATION: 94 MS
EKG QTC CALCULATION (BAZETT): 410 MS
EKG R AXIS: -44 DEGREES
EKG T AXIS: 77 DEGREES
EKG VENTRICULAR RATE: 60 BPM
ERYTHROCYTE [DISTWIDTH] IN BLOOD BY AUTOMATED COUNT: 14.7 % (ref 11.5–14.5)
ERYTHROCYTE [DISTWIDTH] IN BLOOD BY AUTOMATED COUNT: 54.4 FL (ref 35–45)
GFR SERPL CREATININE-BSD FRML MDRD: > 90 ML/MIN/1.73M2
GLUCOSE BLD-MCNC: 94 MG/DL (ref 70–108)
HCT VFR BLD CALC: 40.2 % (ref 37–47)
HEMOGLOBIN: 12.7 GM/DL (ref 12–16)
LV EF: 43 %
LVEF MODALITY: NORMAL
MAGNESIUM: 2 MG/DL (ref 1.6–2.4)
MCH RBC QN AUTO: 31.8 PG (ref 26–33)
MCHC RBC AUTO-ENTMCNC: 31.6 GM/DL (ref 32.2–35.5)
MCV RBC AUTO: 100.5 FL (ref 81–99)
PLATELET # BLD: 186 THOU/MM3 (ref 130–400)
PMV BLD AUTO: 10.4 FL (ref 9.4–12.4)
POTASSIUM SERPL-SCNC: 4.1 MEQ/L (ref 3.5–5.2)
RBC # BLD: 4 MILL/MM3 (ref 4.2–5.4)
SODIUM BLD-SCNC: 142 MEQ/L (ref 135–145)
WBC # BLD: 8.9 THOU/MM3 (ref 4.8–10.8)

## 2020-02-24 PROCEDURE — 6370000000 HC RX 637 (ALT 250 FOR IP): Performed by: NUCLEAR MEDICINE

## 2020-02-24 PROCEDURE — 6370000000 HC RX 637 (ALT 250 FOR IP): Performed by: INTERNAL MEDICINE

## 2020-02-24 PROCEDURE — 2580000003 HC RX 258: Performed by: INTERNAL MEDICINE

## 2020-02-24 PROCEDURE — 6370000000 HC RX 637 (ALT 250 FOR IP)

## 2020-02-24 PROCEDURE — 93010 ELECTROCARDIOGRAM REPORT: CPT | Performed by: INTERNAL MEDICINE

## 2020-02-24 PROCEDURE — 94761 N-INVAS EAR/PLS OXIMETRY MLT: CPT

## 2020-02-24 PROCEDURE — 83735 ASSAY OF MAGNESIUM: CPT

## 2020-02-24 PROCEDURE — 99232 SBSQ HOSP IP/OBS MODERATE 35: CPT | Performed by: PHYSICIAN ASSISTANT

## 2020-02-24 PROCEDURE — 36415 COLL VENOUS BLD VENIPUNCTURE: CPT

## 2020-02-24 PROCEDURE — 6360000002 HC RX W HCPCS

## 2020-02-24 PROCEDURE — 93325 DOPPLER ECHO COLOR FLOW MAPG: CPT

## 2020-02-24 PROCEDURE — 1200000003 HC TELEMETRY R&B

## 2020-02-24 PROCEDURE — 6370000000 HC RX 637 (ALT 250 FOR IP): Performed by: PHYSICIAN ASSISTANT

## 2020-02-24 PROCEDURE — 93312 ECHO TRANSESOPHAGEAL: CPT

## 2020-02-24 PROCEDURE — 2709999900 HC NON-CHARGEABLE SUPPLY

## 2020-02-24 PROCEDURE — 93320 DOPPLER ECHO COMPLETE: CPT

## 2020-02-24 PROCEDURE — 5A2204Z RESTORATION OF CARDIAC RHYTHM, SINGLE: ICD-10-PCS | Performed by: INTERNAL MEDICINE

## 2020-02-24 PROCEDURE — 94770 HC ETCO2 MONITOR DAILY: CPT

## 2020-02-24 PROCEDURE — 92960 CARDIOVERSION ELECTRIC EXT: CPT | Performed by: INTERNAL MEDICINE

## 2020-02-24 PROCEDURE — 93005 ELECTROCARDIOGRAM TRACING: CPT | Performed by: PHYSICIAN ASSISTANT

## 2020-02-24 PROCEDURE — 85027 COMPLETE CBC AUTOMATED: CPT

## 2020-02-24 PROCEDURE — 2500000003 HC RX 250 WO HCPCS

## 2020-02-24 PROCEDURE — 2700000000 HC OXYGEN THERAPY PER DAY

## 2020-02-24 PROCEDURE — 80048 BASIC METABOLIC PNL TOTAL CA: CPT

## 2020-02-24 PROCEDURE — 99232 SBSQ HOSP IP/OBS MODERATE 35: CPT | Performed by: NURSE PRACTITIONER

## 2020-02-24 RX ADMIN — TRAZODONE HYDROCHLORIDE 50 MG: 50 TABLET ORAL at 20:36

## 2020-02-24 RX ADMIN — HYDROXYCHLOROQUINE SULFATE 200 MG: 200 TABLET ORAL at 08:48

## 2020-02-24 RX ADMIN — SULFASALAZINE 500 MG: 500 TABLET ORAL at 08:48

## 2020-02-24 RX ADMIN — CLONIDINE HYDROCHLORIDE 0.05 MG: 0.1 TABLET ORAL at 20:36

## 2020-02-24 RX ADMIN — LEVOTHYROXINE SODIUM 50 MCG: 50 TABLET ORAL at 08:48

## 2020-02-24 RX ADMIN — ATORVASTATIN CALCIUM 40 MG: 40 TABLET, FILM COATED ORAL at 20:36

## 2020-02-24 RX ADMIN — CELECOXIB 200 MG: 200 CAPSULE ORAL at 08:48

## 2020-02-24 RX ADMIN — CLONIDINE HYDROCHLORIDE 0.05 MG: 0.1 TABLET ORAL at 08:49

## 2020-02-24 RX ADMIN — APIXABAN 5 MG: 5 TABLET, FILM COATED ORAL at 08:51

## 2020-02-24 RX ADMIN — CLOPIDOGREL 75 MG: 75 TABLET, FILM COATED ORAL at 08:49

## 2020-02-24 RX ADMIN — DULOXETINE HYDROCHLORIDE 20 MG: 20 CAPSULE, DELAYED RELEASE ORAL at 08:48

## 2020-02-24 RX ADMIN — LOSARTAN POTASSIUM 50 MG: 50 TABLET, FILM COATED ORAL at 08:48

## 2020-02-24 RX ADMIN — METOPROLOL TARTRATE 100 MG: 100 TABLET, FILM COATED ORAL at 08:48

## 2020-02-24 RX ADMIN — SULFASALAZINE 500 MG: 500 TABLET ORAL at 20:36

## 2020-02-24 RX ADMIN — Medication 10 ML: at 20:38

## 2020-02-24 RX ADMIN — AMIODARONE HYDROCHLORIDE 200 MG: 200 TABLET ORAL at 08:48

## 2020-02-24 RX ADMIN — METOPROLOL TARTRATE 100 MG: 100 TABLET, FILM COATED ORAL at 20:36

## 2020-02-24 RX ADMIN — Medication 10 ML: at 08:50

## 2020-02-24 RX ADMIN — HYDROXYCHLOROQUINE SULFATE 200 MG: 200 TABLET ORAL at 20:36

## 2020-02-24 RX ADMIN — CHLORTHALIDONE 25 MG: 25 TABLET ORAL at 08:48

## 2020-02-24 RX ADMIN — APIXABAN 5 MG: 5 TABLET, FILM COATED ORAL at 20:36

## 2020-02-24 RX ADMIN — ASPIRIN 81 MG: 81 TABLET ORAL at 08:48

## 2020-02-24 RX ADMIN — OXYBUTYNIN CHLORIDE 5 MG: 5 TABLET ORAL at 08:48

## 2020-02-24 ASSESSMENT — PAIN SCALES - GENERAL
PAINLEVEL_OUTOF10: 0

## 2020-02-24 NOTE — CARE COORDINATION
2/24/20, 2:41 PM  Client plans home with brother when medically cleared (cardioversion today)  Patient goals/plan/ treatment preferences discussed by  and . Patient goals/plan/ treatment preferences reviewed with patient/ family. Patient/ family verbalize understanding of discharge plan and are in agreement with goal/plan/treatment preferences. Understanding was demonstrated using the teach back method. AVS provided by RN at time of discharge, which includes all necessary medical information pertaining to the patients current course of illness, treatment, post-discharge goals of care, and treatment preferences.         IMM Letter  IMM Letter given to Patient/Family/Significant other/Guardian/POA/by[de-identified]   IMM Letter date given[de-identified] 02/24/20  IMM Letter time given[de-identified] 2455

## 2020-02-24 NOTE — PLAN OF CARE
Problem: Falls - Risk of:  Goal: Will remain free from falls  Description  Will remain free from falls  Outcome: Met This Shift  Note:   Up as tolerated  No falls this shift     Problem: Risk for Impaired Skin Integrity  Goal: Tissue integrity - skin and mucous membranes  Description  Structural intactness and normal physiological function of skin and  mucous membranes. Outcome: Met This Shift  Note:   Repositions self frequently      Problem: Cardiovascular  Goal: No DVT, peripheral vascular complications  Outcome: Met This Shift  Note:   No signs of DVT this shift  On Eliquis aspirin and plavix  Goal: Anticoagulate/Hct stable  Outcome: Met This Shift  Note:   Remains on Eliquis aspirin and plavix     Problem: Musculor/Skeletal Functional Status  Goal: Highest potential functional level  Outcome: Met This Shift     Problem: Infection - Central Venous Catheter-Associated Bloodstream Infection:  Goal: Will show no infection signs and symptoms  Description  Will show no infection signs and symptoms  Outcome: Met This Shift  Note:   No signs of infection this shift     Problem: Pain:  Goal: Pain level will decrease  Description  Pain level will decrease  Outcome: Met This Shift  Note:   Denies pain this shift     Problem: Nutrition  Goal: Optimal nutrition therapy  2/24/2020 1418 by Grant Maxwell RN  Outcome: Met This Shift  Note:   Tolerating diet well  2/24/2020 1319 by Jackson Garcia RD, LD  Outcome: Ongoing     Problem: Discharge Planning:  Goal: Patients continuum of care needs are met  Description  Patients continuum of care needs are met  Outcome: Ongoing  Note:   Discharge pending physician approval  Plan to return home with      Care plan reviewed with patient. Patient verbalizes understanding of the plan of care and contributes to goal setting.

## 2020-02-24 NOTE — PROGRESS NOTES
Sedation/Analgesia History & Physical      Pt Name: Rafita Watts  MRN: [id number]  YOB: 1950  Provider Performing Procedure: Sebastián Carlisle MD, [facility]  Primary Care Physician: Griffin Baker      PRE-PROCEDURE   DNR-CCA/DNR-CC []Yes [x]No      PLANNED PROCEDURE     []Cath  []PCI                []Pacemaker/AICD  [x]TAYLOR             [x]Cardioversion []Peripheral angiography/PTA  []Other:        Consent:   The indication, risks and benefits of the procedure and possible therapeutic consequences and alternatives were discussed with the patient. The patient was given the opportunity to ask questions and to have them answered to his/her satisfaction. Risks of the procedure include but are not limited to the following: Bleeding, hematoma including retroperitoneal hematoma, infection, pain and discomfort, injury to the aorta and other blood vessels, rhythm disturbance, low blood pressure, myocardial infarction, stroke, kidney damage/failure, myocardial perforation, allergic reactions to sedatives/contrast material, loss of pulse/vascular compromise requiring surgery, aneurysm/pseudoaneurysm formation, possible loss of a limb/hand/leg, death. Alternatives to and omission of the suggested procedure were discussed. The patient had no further questions and wished to proceed; the consent form was signed.       Indications for the Procedure:   Afib with RVR  On amiodarone    Here for TAYLOR guided DCCV            MEDICAL HISTORY        Past Medical History:   Diagnosis Date    Arthritis     Atrial fibrillation ([address]     Cancer ([address]     skin    Carotid arterial disease ([address]     CHF (congestive heart failure) ([address]     Depression     Diabetes mellitus ([address]     Fibromyalgia     Hypertension     Hypothyroidism     Obesity     Sleep apnea     CPAP    Thyroid disease     TIA (transient ischemic attack) 12/2015         Past Surgical History:   Procedure Laterality Date    APPENDECTOMY  over 10 years ago [address] [facility] SURGERY Left 10/01/2019    CAROTID ENDARTERECTOMY  09/2017    CHOLECYSTECTOMY  over 10 years ago    [id number] [facility] COLONOSCOPY N/A 2/7/2018    COLONOSCOPY WITH BIOPSY performed by Mc Echevarria MD at [address]     HYSTERECTOMY  10 plus years ago    Dr. Delroy Barrientos Right 2009    OTHER SURGICAL HISTORY Left 2012    Rotator cuff sx    SLEEVE GASTRECTOMY  09/12/2016    Robotic, Extensive Lysis of Adhesions, Removal of [place] Prosthesis - Dr. Crowe Duty  07/06/2016    Dr. Arti Dobbins            Allergies   Allergen Reactions    Demerol Hcl [Meperidine] Nausea And Vomiting         MEDICATIONS   Coumadin Use Last 5 Days [x]No []Yes  Antiplatelet drug therapy use last 5 days  []No [x]Yes  Other anticoagulant use last 5 days  [x]No []Yes      No current facility-administered medications on file prior to encounter. Current Outpatient Medications on File Prior to Encounter   Medication Sig Dispense Refill    dilTIAZem HCl ER Coated Beads 240 MG TB24 Take 240 mg by mouth daily      modafinil (PROVIGIL) 200 MG tablet Take 200 mg by mouth daily.       pravastatin (PRAVACHOL) 20 MG tablet Take 1 tablet by mouth daily 90 tablet 4    apixaban (ELIQUIS) 5 MG TABS tablet TAKE 1 TABLET TWICE A  tablet 4    cloNIDine (CATAPRES) 0.1 MG tablet TAKE 1 TABLET TWICE A  tablet 0    losartan (COZAAR) 50 MG tablet TAKE 1 TABLET DAILY 90 tablet 4    DULoxetine (CYMBALTA) 20 MG extended release capsule Take 20 mg by mouth daily      celecoxib (CELEBREX) 200 MG capsule Take 200 mg by mouth daily       pantoprazole (PROTONIX) 40 MG tablet Take 1 tablet by mouth daily 90 tablet 3    chlorthalidone (HYGROTON) 25 MG tablet Take 1 tablet by mouth daily 90 tablet 3    aspirin 81 MG tablet Take 81 mg by mouth daily      Ferrous Fumarate (IRON) 18 MG TBCR Take 1 tablet by mouth daily      Cyanocobalamin (VITAMIN B-12 PO) Take 1 tablet by mouth daily      Cholecalciferol (VITAMIN D3) 5000 UNITS CAPS Take 2 capsules by mouth daily      oxybutynin (DITROPAN) 5 MG tablet Take 5 mg by mouth daily       sulfaSALAzine (AZULFIDINE) 500 MG tablet Take 500 mg by mouth 2 times daily      levothyroxine (SYNTHROID) 50 MCG tablet Take 50 mcg by mouth daily       hydroxychloroquine (PLAQUENIL) 200 MG tablet Take 200 mg by mouth 2 times daily.  traZODone (DESYREL) 50 MG tablet Take 50 mg by mouth nightly      HYDROcodone-acetaminophen (NORCO) 5-325 MG per tablet Take 1 tablet by mouth every 6 hours as needed for Pain .       traMADol (ULTRAM) 50 MG tablet Take 50 mg by mouth every 6 hours as needed for Pain      albuterol sulfate HFA (VENTOLIN HFA) 108 (90 BASE) MCG/ACT inhaler Inhale 2 puffs into the lungs every 6 hours as needed for Wheezing 1 Inhaler 11    loratadine (CLARITIN) 10 MG tablet Take 10 mg by mouth as needed         Current Facility-Administered Medications   Medication Dose Route Frequency Provider Last Rate Last Dose    potassium replacement protocol   Other RX Placeholder Josue Purdy PA-C        cloNIDine (CATAPRES) tablet 0.05 mg  0.05 mg Oral BID Danilo Mondragon PA-C   0.05 mg at 02/24/20 0849    0.9 % sodium chloride infusion   Intravenous Continuous Belita Cowden, APRN - CNP   Stopped at 02/22/20 0813    aspirin tablet 325 mg  325 mg Oral Once Belita Cowden, APRN - CNP        nitroGLYCERIN (NITROSTAT) SL tablet 0.4 mg  0.4 mg Sublingual Q5 Min PRN Belita Cowden, APRN - CNP        metoprolol (LOPRESSOR) tablet 100 mg  100 mg Oral BID Danilo Mondragon PA-C   100 mg at 02/24/20 0848    sodium chloride flush 0.9 % injection 10 mL  10 mL Intravenous 2 times per day Brinda Sanchez MD   10 mL at 02/24/20 0850    sodium chloride flush 0.9 % injection 10 mL  10 mL Intravenous PRN Brinda Sanchez MD        acetaminophen (TYLENOL) tablet 650 (DITROPAN) tablet 5 mg  5 mg Oral Daily Lena Arguello MD   5 mg at 02/24/20 0848    pantoprazole (PROTONIX) tablet 40 mg  40 mg Oral Daily Lena Arguello MD   Stopped at 02/24/20 0700    sulfaSALAzine (AZULFIDINE) tablet 500 mg  500 mg Oral BID Lena Arguello MD   500 mg at 02/24/20 0848    traZODone (DESYREL) tablet 50 mg  50 mg Oral Nightly Lena Arguello MD   50 mg at 02/23/20 2044    [facility]etron [facility]) injection 4 mg  4 mg Intravenous Q6H PRN Lena Arguello MD        perflutren lipid microspheres (DEFINITY) injection 1.65 mg  1.5 mL Intravenous ONCE PRN Lena Arguello MD                 PHYSICAL:     /80   Pulse 95   Temp 98.1 °F (36.7 °C) (Oral)   Resp 18   Ht 5' 8\" (1.727 m)   Wt 213 lb 11.2 oz (96.9 kg)   SpO2 94%   BMI 32.49 kg/m²     Heart:  [x]Regular rate and rhythm  []Other:    Lungs:  [x]Clear    []Other:    Abdomen: [x]Soft    []Other:    Mental Status: [x]Alert & Oriented  []Other:   Ext:                [x]No edema       []Other:         No results for input(s): CKTOTAL, CKMB, CKMBINDEX, TROPONINI in the last 72 hours.     Lab Results   Component Value Date    WBC 8.9 02/24/2020    RBC 4.00 02/24/2020    HGB 12.7 02/24/2020    HCT 40.2 02/24/2020    .5 02/24/2020    MCH 31.8 02/24/2020    MCHC 31.6 02/24/2020    RDW 14.3 10/01/2018     02/24/2020    MPV 10.4 02/24/2020       Lab Results   Component Value Date     02/24/2020    K 4.1 02/24/2020    K 3.2 02/23/2020     02/24/2020    CO2 24 02/24/2020    BUN 17 02/24/2020    LABALBU 3.7 02/20/2020    CREATININE 0.5 02/24/2020    CALCIUM 9.2 02/24/2020    LABGLOM >90 02/24/2020    GLUCOSE 94 02/24/2020    GLUCOSE 70 08/29/2019       Lab Results   Component Value Date    ALKPHOS 83 02/20/2020    ALT 24 02/20/2020    AST 26 02/20/2020    PROT 6.6 02/20/2020    BILITOT 0.2 02/20/2020    BILIDIR <0.2 02/20/2020    LABALBU 3.7 02/20/2020       Lab Results   Component Value Date    MG 2.0

## 2020-02-24 NOTE — PROGRESS NOTES
Date of Procedure: 2/24/2020  Patient's Name: Claudia John  YOB: 1950   Medical Record Number: [id number]  Referring Physician: Rosa Maria Rocha PA-C  Procedure Performed by: Joss De La Cruz MD, Sujata Almonte, RPVI    DATE: 2/24/2020       PROCEDURE PERFORMED: TAYLOR-guided cardioversion. INDICATION FOR PROCEDURE: Atrial fibrillation         INFORMED CONSENT: The patient was informed - we discussed the risks of the procedure with the patient, risks not limited to damage to teeth, death,  stroke, bleeding and dissection of the esophagus. Despite the risks, the  patient agreed to undergo a TAYLOR with cardioversion. Time out : Taken    Complications: None      DESCRIPTION OF PROCEDURE: The patient was brought to the cardiac  catheterization lab, and the Cetacaine spray was utilized to numb the throat. Then after that, sedation with fentanyl and versed was provided under supervision of me. Once the patient received conscious  sedation, the probe was introduced very carefully and manipulated to mid and  distal esophagus. The findings were as follows: The left atrial appendage was normal. No  thrombus was noted. Then, the patient underwent synchronized cardioversion at 200 joules of energy  for which the patient converted to normal sinus rhythm and was without  complication. The patient tolerated the procedure well. Reversal agents of narcan and flumazenil were given to assess neuro function which showed no significant findings. CONCLUSION:  Successful cardioversion utilizing 200 joules of energy for which the  patient converted to normal sinus rhythm without any complication. Again, patient tolerated the procedure well without any complications. Please follow up complete report of echo.            Joss De La Cruz MD, Sujata Almonte, [address]  Electronically signed 2/24/2020 at 12:38 PM        CC: Celso Hyatt

## 2020-02-24 NOTE — PROGRESS NOTES
Hospitalist Progress Note      Patient:  Jesús Brady    Unit/Bed:4K-06/006-A  YOB: 1950  MRN: [id number]   Acct: [de-identified]   PCP: Saurabh Franco  Date of Admission: 2/17/2020    Assessment/Plan:    1. Atrial fibrillation with RVR: Resolved. TTE during admission showed EF 55%. S/p PCI with 2 stents 2/21/20. TAYLOR without atrial thrombus/CV completed today with return to NSR. Continue BB/Amiodarone/Eliquis. Plan for discharge in the morning if she maintains NSR with outpatient cardiology follow-up.    2. Coronary Artery Disease: Stress 2/19/20 showed moderate apical ischemia. Cardiac cath 2/21/20 with successful stenting of dLM/pLAD. Continue DAPT, statin, BB.     3. Benign essential hypertension, controlled: Continue Lopressor, Clonidine, Clorthalidone and Cozaar. Monitor for hypotension. 4. Hypothyroidism: History. Continue Synthroid. 5. SHANNEN: Wears CPAP at home, continue. Continue modafinil. 6. Depression: Continue duloxetine. 7. Fibromyalgia: History.   8. Obesity: BMI 31.5.     Chief Complaint: Atrial fibrillation     Initial H and P:-    Sherley Watkins is a 66-year-old  female, reformed smoker, with a medical history of arthritis, atrial fibrillation, history of skin cancer, carotid artery disease, CHF, depression, diabetes mellitus, fibromyalgia, hypertension, hypothyroidism, obesity, sleep apnea, thyroid disease, and history of TIAs in the past.  Patient presents [facility] ER from her cardiologist office with complaints of atrial fibrillation with RVR.  Patient does have a history of A. fib and takes Cardizem orally for rate control. Jose Luis Garces went in for routine up at her cardiologist office when it was found that her heart rate was in the 140s.  She denied any chest pain, palpitations, shortness of breath, nausea, vomiting, fever, chills, or diarrhea while she was in the office. Olivier Roman arrival to the ER, patient was identified to be tachycardic in the EKG that was performed demonstrated A. fib with RVR.  Labs were essentially unremarkable and the patient was given 2 doses of Cardizem IVP which helped.  She was placed on a Cardizem drip and admitted for an episode of atrial fibrillation with RVR.  The hospitalist service was contacted for further evaluation, treatment, management. Subjective (past 24 hours):   Patient states that she is resting comfortably and has no new complaints today. She denies any chest pain, shortness of breath, palpitations, or cough. Patient also denies lower extremity edema. Patient denies fever, chills. No new concerns or complaints at this time. Plan of care discussed with patient at length who understands and is agreeable with current plan. Past medical history, family history, social history and allergies reviewed again and is unchanged since admission. ROS (12 point review of systems completed. Pertinent positives noted.  Otherwise ROS is negative)     Medications:  Reviewed    Infusion Medications    sodium chloride Stopped (02/22/20 0813)     Scheduled Medications    potassium replacement protocol   Other RX Placeholder    cloNIDine  0.05 mg Oral BID    aspirin  325 mg Oral Once    metoprolol tartrate  100 mg Oral BID    sodium chloride flush  10 mL Intravenous 2 times per day    clopidogrel  75 mg Oral Daily    atorvastatin  40 mg Oral Nightly    amiodarone  200 mg Oral Daily    celecoxib  200 mg Oral Daily    lidocaine 1 % injection  5 mL Intradermal Once    apixaban  5 mg Oral BID    aspirin  81 mg Oral Daily    chlorthalidone  25 mg Oral Daily    DULoxetine  20 mg Oral Daily    hydroxychloroquine  200 mg Oral BID    levothyroxine  50 mcg Oral Daily    losartan  50 mg Oral Daily    modafinil  200 mg Oral Daily    oxybutynin  5 mg Oral Daily    pantoprazole  40 mg Oral Daily    sulfaSALAzine  500 mg Oral BID    traZODone  50 mg Oral Nightly     PRN Meds: nitroGLYCERIN, sodium chloride flush, acetaminophen, magnesium hydroxide, regadenoson, albuterol sulfate HFA, traMADol, HYDROcodone-acetaminophen, ondansetron, perflutren lipid microspheres      Intake/Output Summary (Last 24 hours) at 2/24/2020 0828  Last data filed at 2/24/2020 0503  Gross per 24 hour   Intake 901.37 ml   Output 1100 ml   Net -198.63 ml       Diet:  Diet NPO, After Midnight    Exam:  /61   Pulse 80   Temp 97.9 °F (36.6 °C) (Oral)   Resp 20   Ht 5' 8\" (1.727 m)   Wt 213 lb 11.2 oz (96.9 kg)   SpO2 96%   BMI 32.49 kg/m²   General appearance: No apparent distress, appears stated age and cooperative. HEENT: Pupils equal, round, and reactive to light. Conjunctivae/corneas clear. Neck: Supple, with full range of motion. No jugular venous distention. Trachea midline. Respiratory:  Normal respiratory effort. No rales, wheezing or rhonchi. Cardiovascular: Tachycardic with irregular rhythm. normal S1/S2 without murmurs, rubs or gallops. Abdomen: Obese, soft, non-tender, non-distended with normal bowel sounds. Musculoskeletal: passive and active ROM x 4 extremities. Trace pitting edema BLE. Skin: Skin color, texture, turgor normal.  No rashes or lesions. Neurologic:  Neurovascularly intact without any focal sensory/motor deficits. Cranial nerves: II-XII intact, grossly non-focal.  Psychiatric: Alert and oriented, thought content appropriate, normal insight  Capillary Refill: Brisk,< 3 seconds   Peripheral Pulses: +2 palpable, equal bilaterally     Labs:   Recent Labs     02/22/20  0524 02/23/20  0524 02/24/20  0730   WBC 11.0*  --  8.9   HGB 12.5  --  12.7   HCT 39.2  --  40.2    195 186     Recent Labs     02/22/20  0524 02/23/20  0524 02/24/20  0647    142 142   K 3.5 3.2* 4.1    103 105   CO2 25 27 24   BUN 13 16 17   CREATININE 0.7 0.5 0.5   CALCIUM 8.4* 8.9 9.2     No results for input(s): AST, ALT, BILIDIR, BILITOT, ALKPHOS in the last 72 hours.   No results for

## 2020-02-24 NOTE — PROGRESS NOTES
Nutrition Assessment    Type and Reason for Visit: Initial(LOS)    Nutrition Recommendations: Continue current diet. Consider add CHO controlled diet to diet order if hyperglycemic. Will send Ensure High Protein BID as pt. Agreeable. Recommend resume home Vitamin regimen. Recommend MVI. Nutrition Assessment:   Pt. nutritionally compromised AEB poor appetite during admission. At risk for further nutrition compromise r/t underlying medical condition (hx gastric sleeve, CHF and DM). Nutrition recommendations/interventions as per above. Malnutrition Assessment:  · Malnutrition Status: At risk for malnutrition  · Context: Acute illness or injury  · Findings of the 6 clinical characteristics of malnutrition (Minimum of 2 out of 6 clinical characteristics is required to make the diagnosis of moderate or severe Protein Calorie Malnutrition based on AND/ASPEN Guidelines):  1. Energy Intake-Less than or equal to 75% of estimated energy requirement, Greater than or equal to 5 days    2. Weight Loss-No significant weight loss,    3. Fat Loss-No significant subcutaneous fat loss,    4. Muscle Loss-No significant muscle mass loss,    5. Fluid Accumulation-Unable to assess,    6.  Strength-Not measured    Nutrition Risk Level: Moderate    Nutrient Needs:  · Estimated Daily Total Kcal: 0387-5575 kcals (15-18 kcals/kgm wt of 97 kgm)  · Estimated Daily Protein (g):  gm (1.2-2 gm/kgm IBW)    Nutrition Diagnosis:   · Problem: Inadequate oral intake  · Etiology: related to Insufficient energy/nutrient consumption     Signs and symptoms:  as evidenced by Diet history of poor intake    Objective Information:  · Nutrition-Focused Physical Findings: reports decreased appetite;  Rx includes Cymbalta and Zofran; takes Vitamin B12, D and Iron pta (although states sometimes forgets); drinks Ensure or Premier protein shakes at times; states doesn't like meat much anymore but does well with eggs and cheese  · Wound Type: None  · Current Nutrition Therapies:  · Oral Diet Orders: Cardiac   · Oral Diet intake: 26-50%, 51-75%, %  · Oral Nutrition Supplement (ONS) Orders: Low Calorie High Protein Supplement(BID)  · ONS intake: (initiated)  · Anthropometric Measures:  · Ht: 5' 8\" (172.7 cm)   · Current Body Wt: 213 lb 11.2 oz (96.9 kg)(2/24, no edema)  · Admission Body Wt: 206 lb 14.4 oz (93.8 kg)(2/18, no edema)  · Usual Body Wt: (pre-op weight (gastric sleeve 2016): 244.6#; pt reports current UBW: 210-216#)  · Ideal Body Wt: 140 lb (63.5 kg),   · BMI Classification: BMI 30.0 - 34.9 Obese Class I    Nutrition Interventions:   Continue current diet, Start ONS  Continued Inpatient Monitoring, Education Initiated, Coordination of Care(2/24 Encouraged good nutrition, protein sources/ONS at best efforts.  )    Nutrition Evaluation:   · Evaluation: Goals set   · Goals: Pt. will consume 75% or more of meals during LOS.     · Monitoring: Meal Intake, Supplement Intake, Diet Tolerance, Skin Integrity, Weight, Pertinent Labs, Patient/Family Education      Electronically signed by Cristhian Grewal RD, LD on 2/24/20 at 1:20 PM    Contact Number: [phone]

## 2020-02-24 NOTE — PROGRESS NOTES
Bernice Henderson 58  PHYSICAL THERAPY MISSED TREATMENT NOTE  ACUTE CARE    Date: 2020  Patient Name: Mateusz Blackman        MRN: [id number]   : 1950  (65 y.o.)  Gender: female   Referring Practitioner: Josefina Ham MD  Referring Practitioner: Dr. Juli Todd  Diagnosis: Atrial Fibrillation with RVR  Diagnosis: a fib with RVR         REASON FOR MISSED TREATMENT:  Nursing ok'd therapy, pt resting in bed on arrival and she declined any activity. Pt is scheduled for TAYLOR and  Cardioversion pt requested to rest till procedures. Pt did state that she got up over the weekend and did well and feels she will go to home following her procedures today. We discussed steps as it said she had a few steps at home pt feels she will not have any difficulty with this, will check back tomorrow if pt still in hosp and if mod I will discharge from therapy then.

## 2020-02-24 NOTE — PROGRESS NOTES
motion, no joint swelling, deformity or tenderness  Neurological: alert, oriented, normal speech, no focal findings or movement disorder noted    Medications:    potassium replacement protocol   Other RX Placeholder    cloNIDine  0.05 mg Oral BID    aspirin  325 mg Oral Once    metoprolol tartrate  100 mg Oral BID    sodium chloride flush  10 mL Intravenous 2 times per day    clopidogrel  75 mg Oral Daily    atorvastatin  40 mg Oral Nightly    amiodarone  200 mg Oral Daily    celecoxib  200 mg Oral Daily    lidocaine 1 % injection  5 mL Intradermal Once    apixaban  5 mg Oral BID    aspirin  81 mg Oral Daily    chlorthalidone  25 mg Oral Daily    DULoxetine  20 mg Oral Daily    hydroxychloroquine  200 mg Oral BID    levothyroxine  50 mcg Oral Daily    losartan  50 mg Oral Daily    modafinil  200 mg Oral Daily    oxybutynin  5 mg Oral Daily    pantoprazole  40 mg Oral Daily    sulfaSALAzine  500 mg Oral BID    traZODone  50 mg Oral Nightly      sodium chloride Stopped (20 0813)     nitroGLYCERIN, 0.4 mg, Q5 Min PRN  sodium chloride flush, 10 mL, PRN  acetaminophen, 650 mg, Q4H PRN  magnesium hydroxide, 30 mL, Daily PRN  regadenoson, 0.4 mg, ONCE PRN  albuterol sulfate HFA, 2 puff, Q6H PRN  traMADol, 50 mg, Q6H PRN  HYDROcodone-acetaminophen, 1 tablet, Q6H PRN  ondansetron, 4 mg, Q6H PRN  perflutren lipid microspheres, 1.5 mL, ONCE PRN        Diagnostics:  EK2020 12:26:13 [facility]-4K ROUTINE RETRIEVAL  ** Poor data quality, interpretation may be adversely affected  Normal sinus rhythm  Left axis deviation  Nonspecific T wave abnormality  Abnormal ECG  When compared with ECG of 2020 06:44,  Sinus rhythm has replaced Atrial fibrillation  Ventricular rate has decreased BY 46 BPM  QT has shortened    TAYLOR: pending    Stress:   Summary   This Nuclear Medicine study was abnormal .   moderate apical ischemia   abnormal LV function      Recommendation   Clinical correlation is recommended. Signatures      ----------------------------------------------------------------   Electronically signed by Julio Mcqueen MD (Interpreting   Cardiologist) on 02/19/2020 at 17:28    Left Heart Cath:  FINDINGS:  LEFT VENTRICULOGRAPHY:  There is evidence for mild global hypokinesis,  mild to moderate hypokinesis of the anterior wall. Estimated ejection  fraction 45%.     HEMODYNAMICS:  LVEDP 9 mmHg.     CORONARY ANGIOGRAM:  1. Right coronary artery. The ostial RCA has a 10% lesion, proximal  RCA has a 20% lesion. Mid RCA has 30% lesion. Distal RCA with luminal  irregularities. RCA is a dominant vessel. Bifurcates into RPL and  RPDA. RPL has an ostial 20% lesion. RPDA has a 10% to 20% lesion. 2.  Left main coronary artery. Left main coronary artery has a 50%  lesion in the distal left main coronary artery. Bifurcates into left  circumflex artery and LAD. 3.  Left circumflex artery. 10% to 20% ostial lesion in the left  circumflex artery. There is a high takeoff of a large OM1 branch that  has a 60% lesion in the proximal part of the vessel. Distal left  circumflex artery is patent. OM2 is a large vessel with no significant  stenotic lesions. 4.  Left anterior descending artery. There is a severe stenotic diffuse  lesion involving the ostial and proximal part of LAD ranging in severity  between 70% to 90%. Mid LAD is patent. Distal LAD has mild diffuse  disease.     MEDICATIONS:  See MAR.     COMPLICATIONS:  None.     ESTIMATED BLOOD LOSS:  Less than 30 mL.     ACCESS:  Right radial artery access. Vasc Band was applied. Hemostasis  was achieved.     CONCLUSION:  1. Severe stenotic lesion of the distal left main coronary artery and  ostial/proximal left anterior descending artery. 2.  Status post successful PCI with the stenting of distal left main  coronary artery and left anterior descending artery with details as  listed above.     RECOMMENDATIONS:  1. cath 2/21/2020:  -Severe stenotic lesion of dLM (Bifurcation lesion, [place] Type 1,1,0)  -Severe 80-90% lesion of ostial/proximal LAD  -S/P Successful PCI with stenting of dLM/pLAD    Hx bradycardia - none this admit    HTN  HLP  DM II  Hx Lt CEA 2007  Hx TIA  Hx SHANNEN      Plan:  · Continue eliquis / asa / plavix -- stop ASA in one month   · Continue cardiac meds   amio labs: Patient is advised of amiodarone toxicity and the need for follow-up tests for evaluation that include:  1. Yearly PA \ LATchest xray   2. Hepatic panel  3.   TSH  4. Yearly Eye exams    If she remains in SR - may be DC in am        Cardiac Rehab: Yes    Discharge Medications for PCI/MI (performed or attempted):   · ASA: yes  · Statin: yes  · P2Y12 Inhibitor: yes  · Beta Blocker: yes  · Nitro SL: yes      Discharge Medications for ICD, Cardiomyopathy, CHF:  · Beta Blocker: yes  · ACE Inhibitor/ARB: yes        Electronically signed by AWILDA Mckenzie CNP on 2/24/2020 at 3:27 PM

## 2020-02-25 VITALS
BODY MASS INDEX: 32.39 KG/M2 | HEIGHT: 68 IN | WEIGHT: 213.7 LBS | RESPIRATION RATE: 18 BRPM | OXYGEN SATURATION: 96 % | SYSTOLIC BLOOD PRESSURE: 134 MMHG | TEMPERATURE: 97.6 F | HEART RATE: 70 BPM | DIASTOLIC BLOOD PRESSURE: 70 MMHG

## 2020-02-25 LAB
ANION GAP SERPL CALCULATED.3IONS-SCNC: 13 MEQ/L (ref 8–16)
BUN BLDV-MCNC: 28 MG/DL (ref 7–22)
CALCIUM SERPL-MCNC: 8.9 MG/DL (ref 8.5–10.5)
CHLORIDE BLD-SCNC: 105 MEQ/L (ref 98–111)
CO2: 24 MEQ/L (ref 23–33)
CREAT SERPL-MCNC: 0.7 MG/DL (ref 0.4–1.2)
GFR SERPL CREATININE-BSD FRML MDRD: 82 ML/MIN/1.73M2
GLUCOSE BLD-MCNC: 84 MG/DL (ref 70–108)
PLATELET # BLD: 183 THOU/MM3 (ref 130–400)
POTASSIUM SERPL-SCNC: 3.6 MEQ/L (ref 3.5–5.2)
SODIUM BLD-SCNC: 142 MEQ/L (ref 135–145)

## 2020-02-25 PROCEDURE — 85049 AUTOMATED PLATELET COUNT: CPT

## 2020-02-25 PROCEDURE — 2709999900 HC NON-CHARGEABLE SUPPLY

## 2020-02-25 PROCEDURE — APPNB15 APP NON BILLABLE TIME 0-15 MINS: Performed by: NURSE PRACTITIONER

## 2020-02-25 PROCEDURE — 6370000000 HC RX 637 (ALT 250 FOR IP): Performed by: PHYSICIAN ASSISTANT

## 2020-02-25 PROCEDURE — 80048 BASIC METABOLIC PNL TOTAL CA: CPT

## 2020-02-25 PROCEDURE — 6370000000 HC RX 637 (ALT 250 FOR IP): Performed by: NUCLEAR MEDICINE

## 2020-02-25 PROCEDURE — 6370000000 HC RX 637 (ALT 250 FOR IP): Performed by: INTERNAL MEDICINE

## 2020-02-25 PROCEDURE — 99238 HOSP IP/OBS DSCHRG MGMT 30/<: CPT | Performed by: PHYSICIAN ASSISTANT

## 2020-02-25 PROCEDURE — 36415 COLL VENOUS BLD VENIPUNCTURE: CPT

## 2020-02-25 RX ORDER — CLOPIDOGREL BISULFATE 75 MG/1
75 TABLET ORAL DAILY
Qty: 30 TABLET | Refills: 0 | Status: SHIPPED | OUTPATIENT
Start: 2020-02-26 | End: 2020-03-05 | Stop reason: SDUPTHER

## 2020-02-25 RX ORDER — NITROGLYCERIN 0.4 MG/1
TABLET SUBLINGUAL
Qty: 25 TABLET | Refills: 1 | Status: SHIPPED | OUTPATIENT
Start: 2020-02-25 | End: 2022-09-12 | Stop reason: SDUPTHER

## 2020-02-25 RX ORDER — METOPROLOL TARTRATE 100 MG/1
100 TABLET ORAL 2 TIMES DAILY
Qty: 60 TABLET | Refills: 0 | Status: SHIPPED | OUTPATIENT
Start: 2020-02-25 | End: 2020-03-05 | Stop reason: SDUPTHER

## 2020-02-25 RX ORDER — AMIODARONE HYDROCHLORIDE 200 MG/1
200 TABLET ORAL DAILY
Qty: 30 TABLET | Refills: 3 | Status: SHIPPED | OUTPATIENT
Start: 2020-02-26 | End: 2020-03-17

## 2020-02-25 RX ORDER — CLONIDINE HYDROCHLORIDE 0.1 MG/1
0.05 TABLET ORAL 2 TIMES DAILY
Qty: 60 TABLET | Refills: 0 | Status: SHIPPED | OUTPATIENT
Start: 2020-02-25 | End: 2020-03-17

## 2020-02-25 RX ORDER — ATORVASTATIN CALCIUM 40 MG/1
40 TABLET, FILM COATED ORAL NIGHTLY
Qty: 30 TABLET | Refills: 3 | Status: SHIPPED | OUTPATIENT
Start: 2020-02-25 | End: 2020-03-17

## 2020-02-25 RX ADMIN — HYDROXYCHLOROQUINE SULFATE 200 MG: 200 TABLET ORAL at 09:14

## 2020-02-25 RX ADMIN — APIXABAN 5 MG: 5 TABLET, FILM COATED ORAL at 09:12

## 2020-02-25 RX ADMIN — LOSARTAN POTASSIUM 50 MG: 50 TABLET, FILM COATED ORAL at 09:14

## 2020-02-25 RX ADMIN — LEVOTHYROXINE SODIUM 50 MCG: 50 TABLET ORAL at 09:14

## 2020-02-25 RX ADMIN — ASPIRIN 81 MG: 81 TABLET ORAL at 09:14

## 2020-02-25 RX ADMIN — CHLORTHALIDONE 25 MG: 25 TABLET ORAL at 09:13

## 2020-02-25 RX ADMIN — METOPROLOL TARTRATE 100 MG: 100 TABLET, FILM COATED ORAL at 09:14

## 2020-02-25 RX ADMIN — AMIODARONE HYDROCHLORIDE 200 MG: 200 TABLET ORAL at 09:12

## 2020-02-25 RX ADMIN — DULOXETINE HYDROCHLORIDE 20 MG: 20 CAPSULE, DELAYED RELEASE ORAL at 09:13

## 2020-02-25 RX ADMIN — SULFASALAZINE 500 MG: 500 TABLET ORAL at 09:14

## 2020-02-25 RX ADMIN — PANTOPRAZOLE SODIUM 40 MG: 40 TABLET, DELAYED RELEASE ORAL at 05:30

## 2020-02-25 RX ADMIN — MODAFINIL 200 MG: 100 TABLET ORAL at 09:14

## 2020-02-25 RX ADMIN — CELECOXIB 200 MG: 200 CAPSULE ORAL at 09:13

## 2020-02-25 RX ADMIN — CLOPIDOGREL 75 MG: 75 TABLET, FILM COATED ORAL at 09:14

## 2020-02-25 RX ADMIN — OXYBUTYNIN CHLORIDE 5 MG: 5 TABLET ORAL at 09:14

## 2020-02-25 ASSESSMENT — PAIN SCALES - GENERAL
PAINLEVEL_OUTOF10: 4
PAINLEVEL_OUTOF10: 0

## 2020-02-25 NOTE — FLOWSHEET NOTE
Pt is a 65 y.o. female sitting up in an easy chair;  Jaquelin Valiente is in the room as well, laying on her bed watching television news. It sounds like there's a good chance she's going home tomorrow, and she's glad of that. She recounted the events that led to her hospital stay, and while shocked and surprised as to why, she's grateful due to what was found and corrected. She did express some concern about a new reality once she returns home; that being a cardio-friendly diet.  encouraged her regarding the shock she felt and stressed the blessing it was that she's heading home after what was found and treated. She again expressed gratitude for what was discovered, a bit of trepidation for the new reality but thankful for her life and . Immanuel prayed with and for pt and additional concerns they voiced as well.     02/24/20 1845   Encounter Summary   Services provided to: Patient and family together   Referral/Consult From: [address] [place] [address] Family members   Continue Visiting Yes  (2/24 If she hasn't been discharged-possible 2/25)   Complexity of Encounter Low   Length of Encounter 15 minutes   Spiritual/Congregation   Type Spiritual support   Assessment Approachable;Calm;Coping; Hopeful   Intervention Active listening;Explored feelings, thoughts, concerns;Prayer   Outcome Expressed gratitude;Comfort;Engaged in conversation; Hopeful

## 2020-02-25 NOTE — PLAN OF CARE
Problem: Musculor/Skeletal Functional Status  Goal: Highest potential functional level  2/24/2020 2303 by Rico Cuadra  Outcome: Met This Shift  Note:   Patient up with stand by assistance. Problem: Pain:  Goal: Pain level will decrease  Description  Pain level will decrease  2/24/2020 2303 by AAYUSH PITTS  Outcome: Met This Shift  Note:   Patient denies pain at this time. Will continue to monitor. Problem: Nutrition  Goal: Optimal nutrition therapy  2/24/2020 2303 by Rico Cuadra  Outcome: Met This Shift  Note:   Patient tolerating PO diet. Problem: Falls - Risk of:  Goal: Will remain free from falls  Description  Will remain free from falls  2/24/2020 2303 by Rico Cuadra  Outcome: Ongoing  Note:   Fall precautions in place. Call light and bedside table within reach. Patient demonstrates proper use of call light. Bed locked and in lowest position. Bed alarm on. Non skid socks while ambulating. Hourly rounding. Problem: Risk for Impaired Skin Integrity  Goal: Tissue integrity - skin and mucous membranes  Description  Structural intactness and normal physiological function of skin and  mucous membranes. 2/24/2020 2303 by Rico Cuadra  Outcome: Ongoing  Note:   Patient turns self in bed and frequently shifts weight while up in chair. Problem: Discharge Planning:  Goal: Patients continuum of care needs are met  Description  Patients continuum of care needs are met  2/24/2020 2303 by Rico Cuadra  Outcome: Ongoing  Note:   Plans for patient to go home with spouse when medically stable. Problem: Cardiovascular  Goal: No DVT, peripheral vascular complications  2/21/0233 2303 by AAYUSH PITTS  Outcome: Ongoing  Note:   No s/s of DVT. Problem: Cardiovascular  Goal: Anticoagulate/Hct stable  2/24/2020 2303 by Rico Cuadra  Outcome: Ongoing  Note:   Patient on Eliquis, plavix, and asa.       Problem: Infection - Central Venous Catheter-Associated Bloodstream

## 2020-02-25 NOTE — PROGRESS NOTES
Order received for midline removal per order. Patient placed in bed. Transparent dressing removed and stat lock. Skin accessed appears clean, dry, and intact no signs of infection note, with bruising around insertion site  Area cleaned with chloro prep, sterile gauze placed over insertion site, 15cm removed of midline with tip intact, pressure held with sterile gauze for 2 minutes no bleeding at this time. New transparent dressing applied with biopatch. Dressing stays on for 24 hours clean, dry, and intact. Monitor for signs and symptoms of infection, notifiy physician or return to hospital if s/s of infection occur. Lima Kumar RN notified and updated on the above. No further questions or concerns voiced at this time.

## 2020-02-25 NOTE — DISCHARGE SUMMARY
Hospitalist Discharge Summary        Patient: Queenie Irizarry  YOB: 1950  MRN: [id number]   Acct: [de-identified]    Primary Care Physician: Jerry Armendariz    Admit date  2/17/2020    Discharge date:  2/25/2020 12:00 PM    Chief Complaint on presentation :-  Atrial Fibrillation    Discharge Assessment and Plan:-   1. Atrial fibrillation with RVR: Resolved. TTE during admission showed EF 55%. S/p PCI with 2 stents 2/21/20. TAYLOR without atrial thrombus/CV completed 2/24/20 with return to NSR. Continue BB/Amiodarone/Eliquis. Amiodarone toxicity discussed with patient by cardiology. Appropriate follow-up labs discussed. Follow-up with cardiology outpatient. 2. Coronary Artery Disease: Stress 2/19/20 showed moderate apical ischemia. Cardiac cath 2/21/20 with successful stenting of dLM/pLAD. Continue DAPT, statin, BB.     3. Benign essential hypertension, controlled: Continue Lopressor, Clonidine, Clorthalidone and Cozaar. Monitor for hypotension. 4. Hypothyroidism: History. Continue Synthroid. 5. SHANNEN: Wears CPAP at home, continue. Continue modafinil. 6. Depression: Continue duloxetine. 7. Fibromyalgia: History. 8. Obesity: BMI 31.5.     Initial H and P and Hospital course:-  Catarina Velez is a 64-year-old  female, reformed smoker, with a medical history of arthritis, atrial fibrillation, history of skin cancer, carotid artery disease, CHF, depression, diabetes mellitus, fibromyalgia, hypertension, hypothyroidism, obesity, sleep apnea, thyroid disease, and history of TIAs in the past.    Patient presents [facility] ER from her cardiologist office with complaints of atrial fibrillation with RVR.  Patient does have a history of A. fib and takes Cardizem orally for rate control. Rene Hay went in for routine up at her cardiologist office when it was found that her heart rate was in the 140s.  She denied any chest pain, palpitations, shortness of breath, nausea, vomiting, fever, chills, or diarrhea while she was in the office. Tonya Kick arrival to the ER, patient was identified to be tachycardic in the EKG that was performed demonstrated A. fib with RVR.  Labs were essentially unremarkable and the patient was given 2 doses of Cardizem IVP which helped.  She was placed on a Cardizem drip and admitted for an episode of atrial fibrillation with RVR.  The hospitalist service was contacted for further evaluation, treatment, management. 2/19/20: Echocardiogram showed EF 55% with mildly dilated LA. Pharmacologic stress revealed moderate apical ischemia. Patient remained in A-fib despite continuing Amio drip, CCB, and BB. Cardiac cath completed with successful PCI and stenting of dLM/pLAD. Patient was started on DAPT, high intensity statin. Plan was to complete TAYLOR/CV outpatient for A-fib if rate was controlled secondary to complex PCI, however, rate was not controlled. Patient was monitored over the weekend and remained in A-fib s/p transition to PO amiodarone and continuing BB. TAYLOR/CV completed and successful 2/24/20. Patient remained in NSR s/p procedure and overnight. Patient was discharged home 2/25/20 with instruction to have close follow-up with PCP and cardiology.      Physical Exam:-  Vitals:   Patient Vitals for the past 24 hrs:   BP Temp Temp src Pulse Resp SpO2   02/25/20 1152 134/70 97.6 °F (36.4 °C) Oral 70 18 96 %   02/25/20 0901 (!) 118/50 98.4 °F (36.9 °C) Oral 66 18 94 %   02/25/20 0515 116/62 97.9 °F (36.6 °C) Oral 57 18 97 %   02/24/20 2329 112/69 97.8 °F (36.6 °C) Axillary 66 20 94 %   02/24/20 2015 128/77 97.5 °F (36.4 °C) Oral 72 20 94 %   02/24/20 1600 (!) 105/57 97.7 °F (36.5 °C) Oral 62 18 95 %   02/24/20 1245 -- -- -- -- -- 94 %   02/24/20 1239 101/68 -- -- 60 -- --     Weight:   Weight: 213 lb 11.2 oz (96.9 kg)   24 hour intake/output:     Intake/Output Summary (Last 24 hours) at 2/25/2020 1200  Last data filed at 2/25/2020 0515  Gross per 24 hour   Intake 850 ml   Output 700 ml albuterol sulfate  (90 Base) MCG/ACT inhaler  Commonly known as:  Ventolin HFA  Inhale 2 puffs into the lungs every 6 hours as needed for Wheezing     aspirin 81 MG tablet     celecoxib 200 MG capsule  Commonly known as:  CELEBREX     chlorthalidone 25 MG tablet  Commonly known as:  HYGROTON  Take 1 tablet by mouth daily     DULoxetine 20 MG extended release capsule  Commonly known as:  CYMBALTA     HYDROcodone-acetaminophen 5-325 MG per tablet  Commonly known as:  NORCO     hydroxychloroquine 200 MG tablet  Commonly known as:  PLAQUENIL     Iron 18 MG Tbcr     levothyroxine 50 MCG tablet  Commonly known as:  SYNTHROID     loratadine 10 MG tablet  Commonly known as:  CLARITIN     losartan 50 MG tablet  Commonly known as:  COZAAR  TAKE 1 TABLET DAILY     modafinil 200 MG tablet  Commonly known as:  PROVIGIL     oxybutynin 5 MG tablet  Commonly known as:  DITROPAN     pantoprazole 40 MG tablet  Commonly known as:  PROTONIX  Take 1 tablet by mouth daily     pravastatin 20 MG tablet  Commonly known as:  PRAVACHOL  Take 1 tablet by mouth daily     sulfaSALAzine 500 MG tablet  Commonly known as:  AZULFIDINE     traMADol 50 MG tablet  Commonly known as:  ULTRAM     traZODone 50 MG tablet  Commonly known as:  DESYREL     VITAMIN B-12 PO     vitamin D3 125 MCG (5000 UT) Caps        STOP taking these medications    amoxicillin-clavulanate 875-125 MG per tablet  Commonly known as:  AUGMENTIN     dilTIAZem 240 MG extended release capsule  Commonly known as:  Cardizem CD     dilTIAZem HCl ER Coated Beads 240 MG Tb24           Where to Get Your Medications      These medications were sent to [facility] [address], [address]  [address], [address] [address]    Phone:  [phone]   · amiodarone 200 MG tablet  · apixaban 5 MG Tabs tablet  · cloNIDine 0.1 MG tablet  · clopidogrel 75 MG tablet  · metoprolol 100 MG tablet          Labs :-  Recent Results (from the past 72 hour(s))   Platelet count    Collection Time: 02/23/20  5:24 AM   Result Value Ref Range    Platelets 738 164 - 391 thou/mm3   Basic Metabolic Panel w/ Reflex to MG    Collection Time: 02/23/20  5:24 AM   Result Value Ref Range    Sodium 142 135 - 145 meq/L    Potassium reflex Magnesium 3.2 (L) 3.5 - 5.2 meq/L    Chloride 103 98 - 111 meq/L    CO2 27 23 - 33 meq/L    Glucose 103 70 - 108 mg/dL    BUN 16 7 - 22 mg/dL    CREATININE 0.5 0.4 - 1.2 mg/dL    Calcium 8.9 8.5 - 10.5 mg/dL   Anion Gap    Collection Time: 02/23/20  5:24 AM   Result Value Ref Range    Anion Gap 12.0 8.0 - 16.0 meq/L   Glomerular Filtration Rate, Estimated    Collection Time: 02/23/20  5:24 AM   Result Value Ref Range    Est, Glom Filt Rate >90 ml/min/1.73m2   Magnesium    Collection Time: 02/23/20  5:24 AM   Result Value Ref Range    Magnesium 1.8 1.6 - 2.4 mg/dL   Basic Metabolic Panel    Collection Time: 02/24/20  6:47 AM   Result Value Ref Range    Sodium 142 135 - 145 meq/L    Potassium 4.1 3.5 - 5.2 meq/L    Chloride 105 98 - 111 meq/L    CO2 24 23 - 33 meq/L    Glucose 94 70 - 108 mg/dL    BUN 17 7 - 22 mg/dL    CREATININE 0.5 0.4 - 1.2 mg/dL    Calcium 9.2 8.5 - 10.5 mg/dL   Magnesium    Collection Time: 02/24/20  6:47 AM   Result Value Ref Range    Magnesium 2.0 1.6 - 2.4 mg/dL   Anion Gap    Collection Time: 02/24/20  6:47 AM   Result Value Ref Range    Anion Gap 13.0 8.0 - 16.0 meq/L   Glomerular Filtration Rate, Estimated    Collection Time: 02/24/20  6:47 AM   Result Value Ref Range    Est, Glom Filt Rate >90 ml/min/1.73m2   CBC    Collection Time: 02/24/20  7:30 AM   Result Value Ref Range    WBC 8.9 4.8 - 10.8 thou/mm3    RBC 4.00 (L) 4.20 - 5.40 mill/mm3    Hemoglobin 12.7 12.0 - 16.0 gm/dl    Hematocrit 40.2 37.0 - 47.0 %    .5 (H) 81.0 - 99.0 fL    MCH 31.8 26.0 - 33.0 pg    MCHC 31.6 (L) 32.2 - 35.5 gm/dl    RDW-CV 14.7 (H) 11.5 - 14.5 %    RDW-SD 54.4 (H) 35.0 - 45.0 fL stay:-  [] NONE [x] Cardiology  [] Nephrology  [] Hemo onco   [] GI   [] ID  [] Endocrine  [] Pulm    [] Neuro    [] Psych   [] Urology  [] ENT   [] G SURGERY   []Ortho    []CV surg    [] Palliative  [] Hospice [] Pain management   []    []TCU   [] PT/OT  OTHERS:-    Disposition: home  Condition at Discharge: Stable    Time Spent:- 25 minutes    Electronically signed by Lolly Hill PA-C on 2/25/2020 at 12:00 PM  Discharging Hospitalist

## 2020-02-25 NOTE — PROGRESS NOTES
Inpatient Cardiac Rehabilitation Consult    Received consult for Phase II Cardiac Rehabilitation. Cardiac Rehab education completed with patient. Pt would like staff to call her next week at home to schedule  Brochure given.

## 2020-02-26 ENCOUNTER — CARE COORDINATION (OUTPATIENT)
Dept: CASE MANAGEMENT | Age: 70
End: 2020-02-26

## 2020-03-04 ENCOUNTER — HOSPITAL ENCOUNTER (EMERGENCY)
Age: 70
Discharge: HOME | End: 2020-03-04
Attending: EMERGENCY MEDICINE
Payer: MEDICARE

## 2020-03-04 VITALS
SYSTOLIC BLOOD PRESSURE: 136 MMHG | HEIGHT: 68 IN | WEIGHT: 215 LBS | BODY MASS INDEX: 32.58 KG/M2 | TEMPERATURE: 98 F | DIASTOLIC BLOOD PRESSURE: 79 MMHG | RESPIRATION RATE: 20 BRPM | OXYGEN SATURATION: 96 % | HEART RATE: 104 BPM

## 2020-03-04 LAB
ANION GAP SERPL CALCULATED.3IONS-SCNC: 13 MEQ/L (ref 8–16)
BASOPHILS # BLD: 0.8 %
BASOPHILS ABSOLUTE: 0.1 THOU/MM3 (ref 0–0.1)
BUN BLDV-MCNC: 19 MG/DL (ref 7–22)
CALCIUM SERPL-MCNC: 9.1 MG/DL (ref 8.5–10.5)
CHLORIDE BLD-SCNC: 106 MEQ/L (ref 98–111)
CO2: 27 MEQ/L (ref 23–33)
CREAT SERPL-MCNC: 0.8 MG/DL (ref 0.4–1.2)
EKG ATRIAL RATE: 89 BPM
EKG Q-T INTERVAL: 292 MS
EKG QRS DURATION: 92 MS
EKG QTC CALCULATION (BAZETT): 395 MS
EKG R AXIS: -36 DEGREES
EKG T AXIS: 6 DEGREES
EKG VENTRICULAR RATE: 110 BPM
EOSINOPHIL # BLD: 0.9 %
EOSINOPHILS ABSOLUTE: 0.1 THOU/MM3 (ref 0–0.4)
ERYTHROCYTE [DISTWIDTH] IN BLOOD BY AUTOMATED COUNT: 14.4 % (ref 11.5–14.5)
ERYTHROCYTE [DISTWIDTH] IN BLOOD BY AUTOMATED COUNT: 54.2 FL (ref 35–45)
GFR SERPL CREATININE-BSD FRML MDRD: 70 ML/MIN/1.73M2
GLUCOSE BLD-MCNC: 156 MG/DL (ref 70–108)
HCT VFR BLD CALC: 40.1 % (ref 37–47)
HEMOGLOBIN: 12.6 GM/DL (ref 12–16)
IMMATURE GRANS (ABS): 0.04 THOU/MM3 (ref 0–0.07)
IMMATURE GRANULOCYTES: 0.4 %
LYMPHOCYTES # BLD: 23.5 %
LYMPHOCYTES ABSOLUTE: 2.5 THOU/MM3 (ref 1–4.8)
MCH RBC QN AUTO: 31.6 PG (ref 26–33)
MCHC RBC AUTO-ENTMCNC: 31.4 GM/DL (ref 32.2–35.5)
MCV RBC AUTO: 100.5 FL (ref 81–99)
MONOCYTES # BLD: 8.9 %
MONOCYTES ABSOLUTE: 1 THOU/MM3 (ref 0.4–1.3)
NUCLEATED RED BLOOD CELLS: 0 /100 WBC
OSMOLALITY CALCULATION: 296 MOSMOL/KG (ref 275–300)
PLATELET # BLD: 218 THOU/MM3 (ref 130–400)
PMV BLD AUTO: 9.9 FL (ref 9.4–12.4)
POTASSIUM REFLEX MAGNESIUM: 4 MEQ/L (ref 3.5–5.2)
RBC # BLD: 3.99 MILL/MM3 (ref 4.2–5.4)
SEG NEUTROPHILS: 65.5 %
SEGMENTED NEUTROPHILS ABSOLUTE COUNT: 7 THOU/MM3 (ref 1.8–7.7)
SODIUM BLD-SCNC: 146 MEQ/L (ref 135–145)
TROPONIN T: < 0.01 NG/ML
TSH SERPL DL<=0.05 MIU/L-ACNC: 3.63 UIU/ML (ref 0.4–4.2)
WBC # BLD: 10.7 THOU/MM3 (ref 4.8–10.8)

## 2020-03-04 PROCEDURE — 84443 ASSAY THYROID STIM HORMONE: CPT

## 2020-03-04 PROCEDURE — 96374 THER/PROPH/DIAG INJ IV PUSH: CPT

## 2020-03-04 PROCEDURE — 93005 ELECTROCARDIOGRAM TRACING: CPT | Performed by: EMERGENCY MEDICINE

## 2020-03-04 PROCEDURE — 93010 ELECTROCARDIOGRAM REPORT: CPT | Performed by: NUCLEAR MEDICINE

## 2020-03-04 PROCEDURE — 84484 ASSAY OF TROPONIN QUANT: CPT

## 2020-03-04 PROCEDURE — 99285 EMERGENCY DEPT VISIT HI MDM: CPT

## 2020-03-04 PROCEDURE — 85025 COMPLETE CBC W/AUTO DIFF WBC: CPT

## 2020-03-04 PROCEDURE — 36415 COLL VENOUS BLD VENIPUNCTURE: CPT

## 2020-03-04 PROCEDURE — 80048 BASIC METABOLIC PNL TOTAL CA: CPT

## 2020-03-04 PROCEDURE — 2500000003 HC RX 250 WO HCPCS: Performed by: EMERGENCY MEDICINE

## 2020-03-04 RX ORDER — DILTIAZEM HYDROCHLORIDE 120 MG/1
120 CAPSULE, COATED, EXTENDED RELEASE ORAL DAILY
Qty: 30 CAPSULE | Refills: 0 | Status: SHIPPED | OUTPATIENT
Start: 2020-03-04 | End: 2020-03-05 | Stop reason: SDUPTHER

## 2020-03-04 RX ORDER — DILTIAZEM HYDROCHLORIDE 5 MG/ML
10 INJECTION INTRAVENOUS ONCE
Status: COMPLETED | OUTPATIENT
Start: 2020-03-04 | End: 2020-03-04

## 2020-03-04 RX ADMIN — DILTIAZEM HYDROCHLORIDE 10 MG: 5 INJECTION INTRAVENOUS at 12:05

## 2020-03-04 ASSESSMENT — ENCOUNTER SYMPTOMS
PHOTOPHOBIA: 0
VOMITING: 0
SORE THROAT: 0
TROUBLE SWALLOWING: 0
WHEEZING: 0
COUGH: 0
CHEST TIGHTNESS: 0
FACIAL SWELLING: 0
BLOOD IN STOOL: 0
BACK PAIN: 0
DIARRHEA: 0
SHORTNESS OF BREATH: 1
NAUSEA: 0
VOICE CHANGE: 0
ABDOMINAL PAIN: 0

## 2020-03-04 NOTE — ED PROVIDER NOTES
[facility] [address] [address] EMERGENCY DEPT  eMERGENCYdEPARTMENT eNCOUnter      Pt Name: Demian Redd  MRN: [id number]  [place] 1950  Date of evaluation: 3/4/2020  Provider:Alexander A Mardeen Riedel, DO, [place] COMPLAINT       Chief Complaint   Patient presents with    Atrial Storm [address] is a 65 y.o. female who presents to the emergency department sent from her PCP with Atrial Fibrillation with RVR, HR of 110. Patient presented to her PCP for a well visit when and EKG was performed and noted the Atrial fibrillation. Patient has a documented history of Atrial Fibrillation, recently admitted and cardioverted, with a recent cardiac stent. Shereports compliance with ASA, Plavix, and Eliquis for management. She was switched from Cardizem to Lopressor for the afib. Patient reports fatigue which has been ongoing for several months, not worse than usual. Patient denies chest pain, dizziness, headache, nausea, vomiting, swelling in upper and lower extremities, and numbness and tingling to upper and lower extremities. Patient reports shortness of breath on exertion with a history of COPD, but the shortness of breath has improved since getting her stent 2wks ago. No lower extremity pain or swelling. There are no other complaints, symptoms, or concerns on initial encounter. Triage notes and Nursing notes were reviewed by myself. Any discrepancies are addressedabove.     PAST MEDICAL HISTORY     Past Medical History:   Diagnosis Date    Arthritis     Atrial fibrillation ([address]     Cancer ([address]     skin    Carotid arterial disease ([address]     CHF (congestive heart failure) ([address]     Depression     Diabetes mellitus ([address]     Fibromyalgia     Hypertension     Hypothyroidism     Obesity     Sleep apnea     CPAP    Thyroid disease     TIA (transient ischemic attack) 12/2015       SURGICALHISTORY       Past Surgical History:   Procedure Laterality Date    APPENDECTOMY  over 10 years ago    [address] 10/01/2019    CAROTID ENDARTERECTOMY  09/2017    CHOLECYSTECTOMY  over 10 years ago    [id number] COLONOSCOPY N/A 2/7/2018    COLONOSCOPY WITH BIOPSY performed by Paula Chang MD at [facility]     HYSTERECTOMY  10 plus years ago    Dr. Janusz Liegh Right 2009    OTHER SURGICAL HISTORY Left 2012    Rotator cuff sx    SLEEVE GASTRECTOMY  09/12/2016    Robotic, Extensive Lysis of Adhesions, Removal of [place] Prosthesis - Dr. Rossi Salts ENDOSCOPY  07/06/2016    Dr. Rachel Lay       Previous Medications    ALBUTEROL SULFATE HFA (VENTOLIN HFA) 108 (90 BASE) MCG/ACT INHALER    Inhale 2 puffs into the lungs every 6 hours as needed for Wheezing    AMIODARONE (CORDARONE) 200 MG TABLET    Take 1 tablet by mouth daily    APIXABAN (ELIQUIS) 5 MG TABS TABLET    Take 1 tablet by mouth 2 times daily    ASPIRIN 81 MG TABLET    Take 81 mg by mouth daily    ATORVASTATIN (LIPITOR) 40 MG TABLET    Take 1 tablet by mouth nightly    CHLORTHALIDONE (HYGROTON) 25 MG TABLET    Take 1 tablet by mouth daily    CHOLECALCIFEROL (VITAMIN D3) 5000 UNITS CAPS    Take 2 capsules by mouth daily    CLONIDINE (CATAPRES) 0.1 MG TABLET    Take 0.5 tablets by mouth 2 times daily    CLOPIDOGREL (PLAVIX) 75 MG TABLET    Take 1 tablet by mouth daily    CYANOCOBALAMIN (VITAMIN B-12 PO)    Take 1 tablet by mouth daily    DULOXETINE (CYMBALTA) 20 MG EXTENDED RELEASE CAPSULE    Take 20 mg by mouth daily    FERROUS FUMARATE (IRON) 18 MG TBCR    Take 1 tablet by mouth daily    HYDROXYCHLOROQUINE (PLAQUENIL) 200 MG TABLET    Take 200 mg by mouth 2 times daily.     LEVOTHYROXINE (SYNTHROID) 50 MCG TABLET    Take 50 mcg by mouth daily     LORATADINE (CLARITIN) 10 MG TABLET    Take 10 mg by mouth as needed    LOSARTAN (COZAAR) 50 MG TABLET    TAKE 1 TABLET Attends Taoism service: None     Active member of club or organization: None     Attends meetings of clubs or organizations: None     Relationship status: None    Intimate partner violence:     Fear of current or ex partner: None     Emotionally abused: None     Physically abused: None     Forced sexual activity: None   Other Topics Concern    None   Social History Narrative    None       REVIEW OF SYSTEMS       Review of Systems   Constitutional: Negative for chills, diaphoresis and fever. HENT: Negative for facial swelling, sore throat, trouble swallowing and voice change. Eyes: Negative for photophobia and visual disturbance. Respiratory: Positive for shortness of breath (with exertion that is chronic ). Negative for cough, chest tightness and wheezing. Cardiovascular: Negative for chest pain, palpitations and leg swelling. Atrial fibrillation with RVR    Gastrointestinal: Negative for abdominal pain, blood in stool, diarrhea, nausea and vomiting. Endocrine: Negative for polydipsia and polyuria. Genitourinary: Negative for dysuria, frequency, hematuria and urgency. Musculoskeletal: Negative for back pain, neck pain and neck stiffness. Skin: Negative for pallor and rash. Neurological: Negative for dizziness, seizures, speech difficulty, weakness, numbness ( upper and lower extremities) and headaches. Hematological: Does not bruise/bleed easily. Psychiatric/Behavioral: Negative for confusion. The patient is not nervous/anxious. Except as noted above the remainder of the review of systemswasreviewed and is negative. PHYSICAL EXAM    (up to 7 for level 4, 8 or more for level 5)     ED Triage Vitals [03/04/20 1129]   BP Temp Temp Source Pulse Resp SpO2 Height Weight   (!) 125/107 98 °F (36.7 °C) Oral 107 20 97 % 5' 8\" (1.727 m) 215 lb (97.5 kg)       Physical Exam  Vitals signs and nursing note reviewed. Constitutional:       General: She is not in acute distress. Appearance: She is well-developed. She is not ill-appearing, toxic-appearing or diaphoretic. HENT:      Head: Normocephalic and atraumatic. Eyes:      General: No scleral icterus. Conjunctiva/sclera: Conjunctivae normal.      Right eye: Right conjunctiva is not injected. Left eye: Left conjunctiva is not injected. Pupils: Pupils are equal, round, and reactive to light. Neck:      Musculoskeletal: Normal range of motion and neck supple. Thyroid: No thyromegaly. Trachea: No tracheal deviation. Cardiovascular:      Rate and Rhythm: Tachycardia present. Rhythm irregularly irregular. Heart sounds: Normal heart sounds. No murmur. No friction rub. No gallop. Pulmonary:      Effort: Pulmonary effort is normal. No respiratory distress. Breath sounds: Normal breath sounds. No stridor. No wheezing or rales. Abdominal:      General: Bowel sounds are normal. There is no distension. Palpations: Abdomen is soft. There is no mass. Tenderness: There is no abdominal tenderness. There is no guarding or rebound. Comments: Negative Wilkins's sign  Nontender McBurney's Point  Negative Rovsig's sign  No bruising or echymosis of abdomen   Musculoskeletal:         General: No tenderness. Comments: Negative Keshia's Sign bilaterally   Lymphadenopathy:      Cervical: No cervical adenopathy. Skin:     General: Skin is warm and dry. Coloration: Skin is not pale. Findings: Bruising (to right arm from previous IV lines ) present. No erythema or rash. Neurological:      Mental Status: She is alert and oriented to person, place, and time. Cranial Nerves: No cranial nerve deficit. Motor: No abnormal muscle tone. Coordination: Coordination normal.      Comments: No nystagmus   Psychiatric:         Behavior: Behavior normal.         Thought Content:  Thought content normal.         DIAGNOSTIC RESULTS     EKG: (none if blank)  All EKG's are interpreted by the Emergency Department Physician who either signs or Co-signs this chart in the absence of a cardiologist.    EKG done at the PCPs office shows a ventricular rate of 118, A. fib with RVR, T wave inversions in the lateral leads which are unchanged from patient's previous EKG from February 24. Repeat EKG in the emergency department on arrival shows atrial fibrillation, rate of 110. T wave inversions are noted as above as well. No other changes    RADIOLOGY: (none if blank)  Interpretation per the Radiologist below, if available at the time of this note:    No orders to display       LABS:  Labs Reviewed   CBC WITH AUTO DIFFERENTIAL - Abnormal; Notable for the following components:       Result Value    RBC 3.99 (*)     .5 (*)     MCHC 31.4 (*)     RDW-SD 54.2 (*)     All other components within normal limits   BASIC METABOLIC PANEL W/ REFLEX TO MG FOR LOW K - Abnormal; Notable for the following components:    Sodium 146 (*)     Glucose 156 (*)     All other components within normal limits   GLOMERULAR FILTRATION RATE, ESTIMATED - Abnormal; Notable for the following components:    Est, Glom Filt Rate 70 (*)     All other components within normal limits   TSH WITH REFLEX   TROPONIN   ANION GAP   OSMOLALITY       All other labs were within normalrange ornot returned as of this dictation. EMERGENCY DEPARTMENT COURSE and Medical Decision Making:     MDM/   Reassessed multiple times during the course of her stay. She remains completely asymptomatic. She was given a dose of IV Cardizem 10 mg, heart rate improved to 90. She remains completely asymptomatic. I estimate the risk of pulmonary embolus given that she is fully anticoagulated with a recent PE to be very low, do not feel that work-up in the ED is indicated for this entity at this time. There is no evidence of ACS. EKG is nonischemic.   Case is discussed with Dr. Giuliana Mejia, cardiology on-call who reviewed her case recommends adding Cardizem 120 mg daily to her regimen and following up in the office. This was discussed at length with the patient and she is in agreement with this, as is her spouse. Strict return precautions and follow up instructions were discussed with the patient with which the patient agrees    ED Medications administered this visit:    Medications   dilTIAZem injection 10 mg (10 mg Intravenous Given 3/4/20 6874)       CONSULTS: (None if blank)  None    Procedures:(None ifblank)       CLINICAL IMPRESSION      1. Atrial fibrillation, unspecified type [facility])          DISPOSITION/PLAN   DISPOSITION Decision To Discharge 03/04/2020 01:48:48 PM      PATIENTREFERRED TO:  Peg Merlin, MD  [facility], [address] [place] [address]  [phone]    Call today        DISCHARGE MEDICATIONS:  New Prescriptions    DILTIAZEM (CARDIZEM CD) 120 MG EXTENDED RELEASE CAPSULE    Take 1 capsule by mouth daily              (Pleasenote that portions of this note were completed with a voice recognition program.  Efforts were made to edit the dictations but occasionally words are mis-transcribed.)    Scribe:  Nicolas Ayala 3/4/20 12:26 PM Scribing for and in the presence of Kevon Garcia DO. Signed by:Jacque Marie, 03/04/20 1:55 PM    Provider:  I personally performed the services described in the documentation, reviewedand edited thedocumentation which was dictated to the scribe in my presence, and it accurately records my words and actions.     Kevon Garcia DO 3/4/20 1:55 PM         Kevon Garcia DO, FACEP (electronically signed)  Attending Emergency Physician        Kevon Garcia DO  03/04/20 8338

## 2020-03-04 NOTE — CARE COORDINATION
ED Care Transition    3/4/2020    Patient Name: Beckie Flores   : 1950  MRN: [id number]    PILO Score:3  PCP: Jaymie Jaime   Specialist: yes - Dr. Jasper Little, Dr. Norma Parada ACC/CTC: no                       Utilization Review:  ED visits:  1  3/4/20 - Afib   Admissions: 1  20-20 - Afib RVR, Cardioversion, CAD, LHC - 2 stents    Problem List:  Patient Active Problem List   Diagnosis    Obstructive sleep apnea on CPAP    COPD [facility] disease) [facility]    Fibromyalgia    DM (diabetes mellitus), type 2, uncontrolled ([facility]    Right arm weakness    Obesity (BMI 30-39. 9)    Chronic diastolic congestive heart failure (HCC)    Pulmonary HTN (HCC) Mean pressure 32 mmhg by RHC    SOB (shortness of breath) on exertion    Polyosteoarthritis    HTN (hypertension), benign    Paroxysmal atrial fibrillation with rapid ventricular response (HCC)    Postsurgical malabsorption    Bilateral carotid artery stenosis    S/P carotid endarterectomy    Positive colorectal cancer screening using DNA-based stool test    Dysphagia    Gastroesophageal reflux disease without esophagitis    Hypersomnia with sleep apnea    Fatigue    Atrial fibrillation with RVR ([facility]    Essential hypertension    Sleep apnea    Coronary artery disease involving native heart without angina pectoris    S/P cardiac cath    S/P percutaneous transluminal angioplasty (PTA) with stent placement    Atrial fibrillation status post cardioversion [facility])     Summary:  Met with Katherine Pineda, introduced self/role. Presented to ED from PCP office for evaluation of Afib with RVR. Patient had recent hospitalization for Afib RVR, successful cardioversion, LHC with 2 stents to dLM/pLAD. Patient started on Amiodarone 200 mg daily, plavix 75 mg daily, and lopressor 100 mg BID. Stopped Cardizem CD. Patient resides at home with spouse, independent, active , able to afford medications, has all needed equipment.  Denies

## 2020-03-04 NOTE — ED NOTES
ED nurse-to-nurse bedside report    Chief Complaint   Patient presents with    Atrial Fibrillation      LOC: alert and orientated to name, place, date     Vital signs   Vitals:    03/04/20 1129 03/04/20 1200   BP: (!) 125/107 (!) 151/100   Pulse: 107 99   Resp: 20 20   Temp: 98 °F (36.7 °C)    TempSrc: Oral    SpO2: 97% 96%   Weight: 215 lb (97.5 kg)    Height: 5' 8\" (1.727 m)       Pain:  Denies     Oxygen: No    Current needs required- Disposition plan   Telemetry: Yes    LDAs:   Peripheral IV 03/04/20 Right Antecubital (Active)   Site Assessment Clean;Dry; Intact 3/4/2020 12:04 PM   Line Status Blood return noted;Capped;Normal saline locked; Flushed 3/4/2020 12:04 PM   Dressing Status Clean;Dry; Intact 3/4/2020 12:04 PM   Dressing Intervention New 3/4/2020 12:04 PM     Continuous Infusions: N/A  Mobility: Independent  Guzman Fall Risk Score: No flowsheet data found.   Fall Interventions: N/A  Report given to: Salvador Gruber RN  03/04/20 6569

## 2020-03-05 ENCOUNTER — OFFICE VISIT (OUTPATIENT)
Dept: CARDIOLOGY CLINIC | Age: 70
End: 2020-03-05
Payer: MEDICARE

## 2020-03-05 VITALS
HEIGHT: 68 IN | HEART RATE: 84 BPM | WEIGHT: 214.8 LBS | DIASTOLIC BLOOD PRESSURE: 84 MMHG | SYSTOLIC BLOOD PRESSURE: 132 MMHG | BODY MASS INDEX: 32.55 KG/M2

## 2020-03-05 PROCEDURE — 99213 OFFICE O/P EST LOW 20 MIN: CPT | Performed by: NURSE PRACTITIONER

## 2020-03-05 RX ORDER — CLOPIDOGREL BISULFATE 75 MG/1
75 TABLET ORAL DAILY
Qty: 30 TABLET | Refills: 0 | Status: SHIPPED | OUTPATIENT
Start: 2020-03-05 | End: 2020-03-20 | Stop reason: SDUPTHER

## 2020-03-05 RX ORDER — METOPROLOL TARTRATE 100 MG/1
100 TABLET ORAL 2 TIMES DAILY
Qty: 60 TABLET | Refills: 0 | Status: SHIPPED | OUTPATIENT
Start: 2020-03-05 | End: 2020-04-13 | Stop reason: SDUPTHER

## 2020-03-05 RX ORDER — DILTIAZEM HYDROCHLORIDE 120 MG/1
120 CAPSULE, COATED, EXTENDED RELEASE ORAL DAILY
Qty: 90 CAPSULE | Refills: 3 | Status: SHIPPED | OUTPATIENT
Start: 2020-03-05 | End: 2020-03-17

## 2020-03-05 NOTE — PROGRESS NOTES
[facility] PROFESSIONAL SERVICES  [facility]   [address]   [address] [address]   Dept: [phone]   Dept Fax: [phone]   Loc: [phone]      Chief Complaint   Patient presents with    Follow-Up from [facility] Atrial Fibrillation   F/U from hospitalization for  AFB RVR s/p TAYLOR/DCCV and PCI with stenting of distal left main and proximal left anterior descending artery in a 66 y/o female with history of PAFB, HTN, hypothyroidism, TIA's in past, SHANNEN on CPAP, fibromyalgia, and obesity. Found to be in AFB RVR at PCP F/U felt heart racing and sent to ER on 3/4/20.  [facility] contacted by ER MD with recs to add cardizem and discharge home if HR controlled and then f/u with cardiology. Denies chest pain, palpitations, sob, YVETTE, lightheadedness, dizziness or syncope.     Cardiologist:  Dr. Piotr Soares:   No fever, no chills, No fatigue or weight loss  Pulmonary:    No dyspnea, no wheezing  Cardiac:    Denies recent chest pain   GI:     No nausea or vomiting, no abdominal pain  Neuro:    No dizziness or light headedness  Musculoskeletal:  No recent active issues  Extremities:   No edema, good peripheral pulses      Past Medical History:   Diagnosis Date    Arthritis     Atrial fibrillation (HCC)     Cancer ([facility] [address]     skin    Carotid arterial disease (HCC)     CHF (congestive heart failure) (HCC)     Depression     Diabetes mellitus (HCC)     Fibromyalgia     Hypertension     Hypothyroidism     Obesity     Sleep apnea     CPAP    Thyroid disease     TIA (transient ischemic attack) 12/2015       Allergies   Allergen Reactions    Demerol Hcl [Meperidine] Nausea And Vomiting       Current Outpatient Medications   Medication Sig Dispense Refill    dilTIAZem (CARDIZEM CD) 120 MG extended release capsule Take 1 capsule by mouth daily 90 capsule 3    amiodarone (CORDARONE) 200 MG tablet Take 1 tablet by mouth daily 30 tablet 3    apixaban (ELIQUIS) 5 MG TABS tablet Take 1 tablet by mouth 2 times daily 60 tablet 0    cloNIDine (CATAPRES) 0.1 MG tablet Take 0.5 tablets by mouth 2 times daily 60 tablet 0    nitroGLYCERIN (NITROSTAT) 0.4 MG SL tablet up to max of 3 total doses. If no relief after 1 dose, call 911. 25 tablet 1    atorvastatin (LIPITOR) 40 MG tablet Take 1 tablet by mouth nightly 30 tablet 3    modafinil (PROVIGIL) 200 MG tablet Take 200 mg by mouth daily.  losartan (COZAAR) 50 MG tablet TAKE 1 TABLET DAILY 90 tablet 4    DULoxetine (CYMBALTA) 20 MG extended release capsule Take 20 mg by mouth daily      pantoprazole (PROTONIX) 40 MG tablet Take 1 tablet by mouth daily 90 tablet 3    traZODone (DESYREL) 50 MG tablet Take 50 mg by mouth nightly      chlorthalidone (HYGROTON) 25 MG tablet Take 1 tablet by mouth daily 90 tablet 3    aspirin 81 MG tablet Take 81 mg by mouth daily      Ferrous Fumarate (IRON) 18 MG TBCR Take 1 tablet by mouth daily      Cyanocobalamin (VITAMIN B-12 PO) Take 1 tablet by mouth daily      Cholecalciferol (VITAMIN D3) 5000 UNITS CAPS Take 2 capsules by mouth daily      oxybutynin (DITROPAN) 5 MG tablet Take 15 mg by mouth daily       sulfaSALAzine (AZULFIDINE) 500 MG tablet Take 500 mg by mouth 2 times daily      albuterol sulfate HFA (VENTOLIN HFA) 108 (90 BASE) MCG/ACT inhaler Inhale 2 puffs into the lungs every 6 hours as needed for Wheezing 1 Inhaler 11    levothyroxine (SYNTHROID) 50 MCG tablet Take 50 mcg by mouth daily       loratadine (CLARITIN) 10 MG tablet Take 10 mg by mouth as needed      hydroxychloroquine (PLAQUENIL) 200 MG tablet Take 200 mg by mouth 2 times daily.  clopidogrel (PLAVIX) 75 MG tablet Take 1 tablet by mouth daily 30 tablet 0    metoprolol (LOPRESSOR) 100 MG tablet Take 1 tablet by mouth 2 times daily 60 tablet 0    traMADol (ULTRAM) 50 MG tablet Take 50 mg by mouth every 6 hours as needed for Pain       No current facility-administered medications for this visit. Social History     Socioeconomic History    Marital status:      Spouse name: Not on file    Number of children: Not on file    Years of education: Not on file    Highest education level: Not on file   Occupational History    Not on file   Social Needs    Financial resource strain: Not on file    Food insecurity:     Worry: Not on file     Inability: Not on file    Transportation needs:     Medical: Not on file     Non-medical: Not on file   Tobacco Use    Smoking status: Former Smoker     Packs/day: 0.25     Years: 30.00     Pack years: 7.50     Types: Cigarettes     Start date: 3/24/1982     Last attempt to quit: 2015     Years since quittin.0    Smokeless tobacco: Never Used   Substance and Sexual Activity    Alcohol use: No    Drug use: No    Sexual activity: Not Currently     Partners: Male   Lifestyle    Physical activity:     Days per week: Not on file     Minutes per session: Not on file    Stress: Not on file   Relationships    Social connections:     Talks on phone: Not on file     Gets together: Not on file     Attends [employer] service: Not on file     Active member of club or organization: Not on file     Attends meetings of clubs or organizations: Not on file     Relationship status: Not on file    Intimate partner violence:     Fear of current or ex partner: Not on file     Emotionally abused: Not on file     Physically abused: Not on file     Forced sexual activity: Not on file   Other Topics Concern    Not on file   Social History Narrative    Not on file       Family History   Problem Relation Age of Onset    Stroke Mother     High Blood Pressure Mother     Atrial Fibrillation Mother     Heart Disease Mother     Diabetes Father     Early Death Father     Cancer Other     Heart Disease Sister        Blood pressure 132/84, pulse 84, height 5' 8\" (1.727 m), weight 214 lb 12.8 oz (97.4 kg), not currently breastfeeding.     General:   Well developed, well nourished  Lungs:   Clear to auscultation  Heart:    Normal S1 S2, No murmur, rubs, or gallops  Abdomen:   Soft, non tender, no organomegalies, positive bowel sounds  Extremities:   No edema, no cyanosis, good peripheral pulses  Neurological:   Awake, alert, oriented. No obvious focal deficits  Musculoskeletal:  No obvious deformities    EKG: AFB, CVR    TAYLOR/CV: 2/24/20  Summary   Left ventricular size is normal and systolic function is moderately   reduced. Ejection fraction was estimated at 40-45%. LV wall thickness is   within normal limits. Mildly dilated left atrium. No evidence of thrombus within left atrium or CAROLINE. Small left to right shunt noted. Mildly mitral regurgitation is present. Mild tricuspid regurgitation. Signature      ----------------------------------------------------------------   Electronically signed by Massiel Altman MD   Echo: 2/18/20  Summary   Technically difficult examination. Ejection fraction is visually estimated at 55%. Overall left ventricular function is normal.   Mildly dilated left atrium. Signature      ----------------------------------------------------------------   Electronically signed by Kaushal Garcia MD  LHC/ PCI: 2/21/20  INDICATIONS FOR PROCEDURE:  1. Abnormal stress test with evidence for ischemia. 2.  Dyspnea on exertion, shortness of breath, fatigue. 3.  Angina and angina equivalent symptoms. 4.  Hypertension. 5.  Dyslipidemia. 6.  Diabetes mellitus. 7.  History of bradycardia.     PROCEDURES PERFORMED:  1. Left cardiac catheterization with selective coronary angiogram.  2.  IVUS of left main. 3.  IVUS of proximal LAD. 4.  Left ventriculography. 5.  Successful PCI with stenting of distal left main and proximal left  anterior descending artery. CORONARY ANGIOGRAM:  1. Right coronary artery. The ostial RCA has a 10% lesion, proximal  RCA has a 20% lesion. Mid RCA has 30% lesion. Distal RCA with luminal  irregularities.   RCA Obesity (BMI 30-39.9)     9. History of carotid endarterectomy         No orders of the defined types were placed in this encounter. Status post successful PCI with the stenting of distal left main  coronary artery and left anterior descending artery. On asa, plavix, BB, ARB, statin. Planning cardiac rehab. AFB RVR with DCCV and return to AFB post cardioversion, now cvr. On cordarone, eliquis, cardizem, and lopressor    Will stop asa 30 days post PCI to avoid triple anticoagulation. Will need amiodarone toxicity monitoring:  Yearly chest x-ray, TSH, LFTs, and eye exam.  Has f/u 3/17/20 with Dr. Mark Padilla to discuss possible repeat cardioversion/further AFB management.       Discussed use, benefit, and side effects of prescribed medications. All patient questions answered. Pt voiced understanding. Instructed to continue current medications, diet and exercise. Continue risk factor modification and medical management. Patient agreed with treatment plan. Follow up as directed.     Continue Dr Hobbs [place] current treatment plan  Follow up with Dr Mark Padilla as scheduled or sooner if needed

## 2020-03-06 ENCOUNTER — CARE COORDINATION (OUTPATIENT)
Dept: CASE MANAGEMENT | Age: 70
End: 2020-03-06

## 2020-03-06 NOTE — CARE COORDINATION
[facility] ED Follow Up Call    3/6/2020    Patient: Aarti Clinton Patient : 1950   MRN: [id number]  Reason for Admission: Atrial fibrillation, unspecified type   Discharge Date: 3/5/20    Patient denies chest pain, SOB, edema and n/v/d. Patient has an appt with PCP. Denies concerns or issues at this time.      Care Transitions ED Follow Up    Care Transitions Interventions  No Identified Needs                          Do you have any ongoing symptoms?:  No   Did you call your PCP prior to going to the ED?:  No - Did not call PCP   Do you have a copy of your discharge instructions?:  Yes   Do you understand what to report and when to return?:  Yes   Are you following your discharge instructions?:  No   Do you have all of your prescriptions and are they filled?:  Yes   Have you scheduled your follow up appointment?:  No   Were you discharged with any Home Care or Post Acute Services or do you currently have any active services?:  No         Do you have any needs or concerns that I can assist you with?:  No   Identified Barriers:  None

## 2020-03-10 ENCOUNTER — HOSPITAL ENCOUNTER (OUTPATIENT)
Dept: CARDIAC REHAB | Age: 70
Setting detail: THERAPIES SERIES
Discharge: HOME | End: 2020-03-10
Payer: MEDICARE

## 2020-03-10 PROCEDURE — G0422 INTENS CARDIAC REHAB W/EXERC: HCPCS

## 2020-03-10 PROCEDURE — G0423 INTENS CARDIAC REHAB NO EXER: HCPCS

## 2020-03-10 NOTE — PROGRESS NOTES
EXERCISE PLAN EXERCISE PLAN   *Interventions* *Interventions* *Interventions* *Interventions* *Interventions*   Exercise Prescription  (per physician & CR staff) Exercise Prescription  (per physician & CR staff) Exercise Prescription  (per physician & CR staff) Exercise Prescription  (per physician & CR staff) Exercise Prescription  (per physician & CR staff)   Cardiovascular Cardiovascular Cardiovascular Cardiovascular Cardiovascular   Mode:    [x] Arms Ergometer (AE)  [x] NuStep   MODE:    [] Treadmill (TM)  [] Schwinn Airdyne (AD)  [] Arms Ergometer (AE)  [] NuStep  [] Elliptical (E) MODE:    [] Treadmill (TM)  [] Schwinn Airdyne (AD)  [] Arms Ergometer (AE)  [] NuStep  [] Elliptical (E) MODE:    [] Treadmill (TM)  [] Schwinn Airdyne (AD)  [] Arms Ergometer (AE)  [] NuStep  [] Elliptical (E) MODE:    [] Treadmill (TM)  [] Schwinn Airdyne (AD)  [] Arms Ergometer (AE)  [] NuStep  [] Elliptical (E)   Initial Workloads  NS: 36  Lynn= 2.1 METs  AE: 26  = 1.9 METs Current Workloads  TM:  @ %=  METs  AD:  level =  METs  NS:   Lynn=  METs  AE:  level =  METs Current Workloads  TM:  @ %=  METs  AD:  level =  METs  NS:   Lynn=  METs  AE:  level =  METs Current Workloads  TM:  @ %=  METs  AD:  level =  METs  NS:   Lynn=  METs  AE:  level =  METs Current Workloads  TM:  @ %=  METs  AD:  level =  METs  NS:   Lynn=  METs  AE:  level =  METs     Frequency:    ICR: 3x/week  Home: 2-3x/wk Frequency:   ICR: 3x/week  Home: 3x/wk Frequency:  ICR: 3x/week  Home: 3-4x/wk Frequency:  ICR: 3x/week  Home: 3-4x/wk Frequency:  Haven Rico will continue exercise at  5-7 days/week   Duration:   Total aerobic exercise = 30-45 min    5-8 min/mode Duration:  Total aerobic exercises = ** min     **min/mode Duration:  Total aerobic exercises = ** min     **min/mode Duration:  Total aerobic exercises = ** min     **min/mode Duration:  Total erobic exercise =  60-90 min   Intensity:   MET Level = 2.1  RPE = 12-15 Intensity:  Max MET Level = **  RPE Exercise  *Arlene verbalizes planning to ** ** days/week for ** min on days not in rehab. Home Exercise  *Agnes Brooks verbalizes his/her plan to ** ** days/week for ** min @ **   *Education* *Education* *Education* *Education* *Education*   RPE Scale  [x] Yes      [] No  Exercise Safety  [x] Yes      [] No  Equipment Orientation  [x] Yes      [] No  S/S to Report  [x] Yes      [] No  Warm Up/Cool Down  [x] Yes      [] No  Home Exercise  [x] Yes      [] No    All Exercise Education Completed  [] Yes      [] No   Exercise Education Recommended    Workshops  [x] Improving Performance  [x] Balance Training & Fall Prevention  [x] Exercise Biomechanics  [x] Resistance Training & Core Strength Exercise Education Attended/Date Exercise Education Attended/Date Exercise Education Attended/Date All Sessions Completed? [] Yes  [] No   *Goals* *Goals* *Goals* *Goals* *Goals*   Initial Exercise Goals Exercise Goals  Exercise Goals   Exercise Goals  Exercise Goals   Arlene plans to:  [x] Attend exercise sessions 3x/wk  [x] initiate home exercise 2-3x/wk for 10-20 min  [x] Increase 6 min walk distance by 10%  [x] Attend Exercise workshops Agnes Brooks plans to:  [x] Attend exercise sessions 3x/wk  [x] continue home exercise 2-3x/wk for 20-30 min  [x] Attend Exercise workshops Arlene's plans to:  [x] Attend exercise sessions 3x/wk  [x] continue home exercise 3-4x/wk for 30-45 min  [x] Determine plan of exercise following rehab  [x] Attend Exercise workshops Arlene's plans to:  Nazia Gilbert achieved exercise goals?     []  Yes    [] No  If no, why?  **  [] Increased 6 min walk distance by 10%  [] Currently exercising 30-60 min/day, 5-7days/wk   [] Plans to continue exercise on own  [] Plans to join a local fitness center to continue exercise  [] Does not plan to continue to exercise after rehab   Return to ADL or Hobbies:  Agnes Brooks would like to improve strength and endurance so she is able to return to not being so tired and exhausted and walking longer distances Return to ADL or Hobbies:  Pamella Chang would like to improve strength and endurance so he/she is able to return to ** Return to ADL or Hobbies:  Pamella Chang would like to improve strength and endurance so he/she is able to return to ** Return to ADL or Hobbies:  Pamella Chang would like to improve strength and endurance so he/she is able to return to ** Return to ADL or Hobbies:  Pamella Chang would like to improve strength and endurance so he/she is able to return to **    *MET level required for above goal:  5.0 METs MET level Achieved:  **METs MET level Achieved:  **METs MET level Achieved:  **METs MET level Achieved:  **METs     Individual Cardiac Treatment Plan - Nutrition  NUTRITION  ASSESSMENT/PLAN NUTRITION  REASSESSMENT NUTRITION   REASSESSMENT NUTRITION   REASSESSMENT NUTRITION  DISCHARGE/FOLLOW-UP   Stages of Change Stages of Change Stages of Change Stages of Change Stages of Change   [] Pre Contemplation  [x] Contemplation  [] Preparation  [] Action  [] Maintenance  [] Relapse [] Pre Contemplation  [] Contemplation  [] Preparation  [] Action  [] Maintenance  [] Relapse [] Pre Contemplation  [] Contemplation  [] Preparation  [] Action  [] Maintenance  [] Relapse [] Pre Contemplation  [] Contemplation  [] Preparation  [] Action  [] Maintenance  [] Relapse [] Pre Contemplation  [] Contemplation  [] Preparation  [] Action  [] Maintenance  [] Relapse   NUTRITION ASSESSMENT NUTRITION ASSESSMENT NUTRITION ASSESSMENT NUTRITION ASSESSMENT NUTRITION ASSESSMENT   Weight Management  Weight: 213.8        Height: 5'8\"   BMI: 32.5  Weight Management  Weight: **                  Weight Management  Weight: **                  Weight Management  Weight: ** Weight Management  Weight: **                    BMI: **   Eating Plan  Current eating habits: watches sugar, sodium, not a lot of red meat Eating Plan  Changes: Eating Plan  Changes: Eating Plan  Changes: Eating Plan Improvements:   Alcohol Use  [x] none          [] daily  [] *Goals* *Goals*   Arlene's nutritional goals are as follows:  Complete and return diet survey Arlene's nutritional goals are as follows:  [] Attend Nutrition Workshops  [] Attend 1:1   [] Attend Cooking Classes  [] ** Arlene's nutritional goals are as follows:  [] Attend Nutrition Workshops  [] Attend 1:1   [] Attend Cooking Classes  [] Complete and return diet survey  [] ** Arlene's nutritional goals are as follows:  [] Attend Nutrition Workshops  [] Attend 1:1   [] Attend Cooking Classes  [] ** Arlene achieved nutritional goals   [] Yes    [] No  If no, why? Use knowledge gained to continue Pritikin eating plan at home       Individual Cardiac Treatment Plan - Psychosocial  PSYCHOSOCIAL  ASSESSMENT/PLAN PSYCHOSOCIAL  REASSESSMENT PSYCHOSOCIAL   REASSESSMENT PSYCHOSOCIAL   REASSESSMENT PSYCHOSOCIAL  DISCHARGE/FOLLOW-UP   Stages of Change Stages of Change Stages of Change Stages of Change Stages of Change   [] Pre Contemplation  [] Contemplation  [x] Preparation  [] Action  [] Maintenance  [] Relapse [] Pre Contemplation  [] Contemplation  [] Preparation  [] Action  [] Maintenance  [] Relapse [] Pre Contemplation  [] Contemplation  [] Preparation  [] Action  [] Maintenance  [] Relapse [] Pre Contemplation  [] Contemplation  [] Preparation  [] Action  [] Maintenance  [] Relapse [] Pre Contemplation  [] Contemplation  [] Preparation  [] Action  [] Maintenance  [] Relapse   PSYCHOSOCIAL ASSESSMENT PSYCHOSOCIAL ASSESSMENT PSYCHOSOCIAL ASSESSMENT PSYCHOSOCIAL ASSESSMENT PSYCHOSOCIAL ASSESSMENT   Behavioral Outcomes Behavioral Outcomes Behavioral Outcomes Behavioral Outcomes Behavioral Outcomes   Tool Used:  Dian & Roxanne, Quality of Life Index, Cardiac Version IV  *Given to patient to complete.  Tool Used:    Dian & Roxanne, Quality of Life Index, Cardiac Version IV     QOL Index Score: **  HF:**  S&E:**  P&S: **  Family: **   Tool Used:     Dian & Roxanne, Quality of Life Index, Cardiac Version IV    QOL Index Score: [] No  If yes, please explain:  ** Signs and Symptoms of Anxiety Present? [] Yes      [] No  If yes, please explain:  **   [] Patient has poor eye contact   [] Flat affect present. [] Signs of anxiety, anger or hostility    [] Signs social isolation present. []  Signs of alcohol or substance abuse       PSYCHOSOCIAL PLAN PSYCHOSOCIAL PLAN PSYCHOSOCIAL PLAN PSYCHOSOCIAL PLAN PSYCHOSOCIAL PLAN   *Interventions* *Interventions* *Interventions* *Interventions* *Interventions*   *Please check if needed  [] Psych Consult  [] Physician Referral  [x] Stress Management Skills *Please check if needed  [] Psych Consult  [] Physician Referral  [] Stress Management Skills *Please check if needed  [] Psych Consult  [] Physician Referral  [] Stress Management Skills *Please check if needed  [] Psych Consult  [] Physician Referral  [] Stress Management Skills *Please check if needed  [] Psych Consult  [] Physician Referral  [] Stress Management Skills   Is patient currently taking anti-depressant or anti-anxiety medications? [x] Yes      [] No  If yes, please list medications:  CYMBALTA Change in anti-depressant or anti-anxiety medications? [] Yes      [] No  If yes, please list medications:  ** Change in anti-depressant or anti-anxiety medications? [] Yes      [] No  If yes, please list medications:  ** Change in anti-depressant or anti-anxiety medications? [] Yes      [] No  If yes, please list medications:  ** Change in anti-depressant or anti-anxiety medications?   [] Yes      [] No  If yes, please list medications:  **   *Education* *Education* *Education* *Education* *Education*   Healthy Mind-Set Workshops Recommended  [x] Stress & Health  [x] Taking Charge of Stress  [x] New Thoughts, New Behaviors  [x] Managing Moods & Relationships Healthy Mind-Set Workshops Attended/Date Healthy Mind-Set Workshops Attended/Date Healthy Mind-Set Workshops Attended/Date Healthy Mind-Set Workshops  Completed  [] Yes      [] No *Goals* *Goals* *Goals* *Goals* *Goals*   Meena psychosocial goals are as follows:  Complete HADS & Dian & Roxanne, Quality of Life Index, Cardiac Version IV Meena psychosocial goals are as follows:  [] Attend Healthy Mind-Set Workshops  [] Reduce depression symptom severity by 1 level  [] ** Meena psychosocial goals are as follows:  [] Attend Healthy Mind-Set Workshops  [] Reduce depression symptom severity by 1 level  [] ** Meena psychosocial goals are as follows:  [] Attend Healthy Mind-Set Workshops  [] Reduce depression symptom severity by 1 level  [] ** Arlene achieved psychosocial goals?   [] Yes    [] No  If no, why?  **  [] Use methods learned to continue to reduce depression symptom severity by 1 level  [] **     Individual Cardiac Treatment Plan - Other:  Risk Factor/Education  RISK FACTOR  ASSESSMENT/PLAN RISK FACTOR  REASSESSMENT  RISK FACTOR  REASSESSMENT RISK FACTOR  REASSESSMENT RISK FACTOR   DISCHARGE/FOLLOW-UP   Stages of Change Stages of Change Stages of Change Stages of Change Stages of Change   [] Pre Contemplation  [x] Contemplation  [] Preparation  [] Action  [] Maintenance  [] Relapse [] Pre Contemplation  [] Contemplation  [] Preparation  [] Action  [] Maintenance  [] Relapse [] Pre Contemplation  [] Contemplation  [] Preparation  [] Action  [] Maintenance  [] Relapse [] Pre Contemplation  [] Contemplation  [] Preparation  [] Action  [] Maintenance  [] Relapse [] Pre Contemplation  [] Contemplation  [] Preparation  [] Action  [] Maintenance  [] Relapse   RISK FACTOR/EDUCATION ASSESSMENT RISK FACTOR/EDUCATION ASSESSMENT RISK FACTOR/EDUCATION ASSESSMENT RISK FACTOR/EDUCATION ASSESSMENT RISK FACTOR /EDUCATION ASSESSMENT   Hypertension  [x] Yes      [] No    Resting BP: 124/70  Peak Ex BP:132/68  Medication: CATAPRES, COZAAR   Hypertension  Resting BP: **  Peak Ex BP:**  Medication Changes:  [] Yes      [] No Hypertension  Resting BP: **  Peak Ex BP:**  Medication Changes:  [] Yes

## 2020-03-10 NOTE — PROGRESS NOTES
Video Education Report - ICR/CR    Name:  Ronny Kuhn     Date:  3/10/2020  MRN: [id number]     Session #:  1  Session Length: 40 min    Recommended Videos        []01 [employer] - Program Overview   34:22    [x]02 Overview of Pritikin Eating Plan   34:10    []03 Becoming a [place]   33:08     []04 Diseases of Our Time - Part 1   34:22    []05 Calorie Density     33:39   []06 Label Reading - Part 1    32:15   []07 Move it      32.54   []08 Healthy Minds, Bodies, Hearts   32:14   []09 Dining Out - Part 1    32:28   []10 Heart Disease Risk Reduction   73:37   []80 Metabolic Syndrome and Belly Fat  31:52   []12 Facts on Fat     35:29   []13 Diseases of Our Time - Part 2   33:07   []14 Biology of Weight Control   32:36   []15 Biomechanical Limitations   35:20   []16 Nutrition Action Plan    34:23      Comments:  Video completed

## 2020-03-11 RX ORDER — CHLORTHALIDONE 25 MG/1
TABLET ORAL
Qty: 90 TABLET | Refills: 0 | Status: SHIPPED | OUTPATIENT
Start: 2020-03-11 | End: 2020-06-09

## 2020-03-17 ENCOUNTER — TELEPHONE (OUTPATIENT)
Dept: CARDIOLOGY CLINIC | Age: 70
End: 2020-03-17

## 2020-03-17 ENCOUNTER — OFFICE VISIT (OUTPATIENT)
Dept: CARDIOLOGY CLINIC | Age: 70
End: 2020-03-17
Payer: MEDICARE

## 2020-03-17 VITALS
SYSTOLIC BLOOD PRESSURE: 126 MMHG | DIASTOLIC BLOOD PRESSURE: 74 MMHG | WEIGHT: 210.4 LBS | HEART RATE: 93 BPM | HEIGHT: 68 IN | BODY MASS INDEX: 31.89 KG/M2

## 2020-03-17 PROBLEM — I48.21 PERMANENT ATRIAL FIBRILLATION (HCC): Status: ACTIVE | Noted: 2020-03-17

## 2020-03-17 PROBLEM — I25.10 CORONARY ARTERY DISEASE INVOLVING NATIVE CORONARY ARTERY OF NATIVE HEART WITHOUT ANGINA PECTORIS: Status: ACTIVE | Noted: 2020-03-17

## 2020-03-17 PROCEDURE — 99215 OFFICE O/P EST HI 40 MIN: CPT | Performed by: INTERNAL MEDICINE

## 2020-03-17 RX ORDER — CLONIDINE HYDROCHLORIDE 0.1 MG/1
0.05 TABLET ORAL DAILY
Qty: 60 TABLET | Refills: 3
Start: 2020-03-17 | End: 2020-05-19

## 2020-03-17 RX ORDER — DILTIAZEM HYDROCHLORIDE 180 MG/1
180 CAPSULE, COATED, EXTENDED RELEASE ORAL DAILY
Qty: 30 CAPSULE | Refills: 3 | Status: SHIPPED | OUTPATIENT
Start: 2020-03-17 | End: 2020-03-20 | Stop reason: SDUPTHER

## 2020-03-17 RX ORDER — PRAVASTATIN SODIUM 40 MG
40 TABLET ORAL DAILY
Qty: 90 TABLET | Refills: 2 | Status: SHIPPED | OUTPATIENT
Start: 2020-03-17 | End: 2020-05-19 | Stop reason: SDUPTHER

## 2020-03-17 NOTE — PROGRESS NOTES
Chief Complaint   Patient presents with    1 Month Follow-Up     HX OF  fu had TIA here at [facility], transferred from [facility]  Pt was prescribed lasix 20 mg daily by [facility], but has not started taking it. Pt here for pre op clearance  Bariatric surgery 9/12/2016  With Dr. Marychuy Billingsley    Had carotid endarterectomy - 9-11-17 -         Patient here for 1 month fu  Was admitted from office for atr fib with  and was admitted TAYLOR-CV . Cath and needed stent and later went back to atr fib with 110 and re-sent to ER and sent home with added cardizem     EKG done 3/4/20. Post Cath and PCI and A. fib rate control--feel stronger  Fatigue better, sob better    Sob on exertion    Occasional palpitations    No wt gain    Hx of depression and a lots of stress in family  and and daughter sick    DENIES CP, DIZZINESS, LIGHTHEADED AND YVETTE. Patient did not bring a medication list. Patient verbally stated nothing has changed. NO dizziness    No leg swelling        Patient Active Problem List   Diagnosis    Obstructive sleep apnea on CPAP    COPD (chronic obstructive pulmonary disease) (HCC)    Fibromyalgia    DM (diabetes mellitus), type 2, uncontrolled ([address]    Right arm weakness    Obesity (BMI 30-39. 9)    Chronic diastolic congestive heart failure (HCC)    Pulmonary HTN (HCC) Mean pressure 32 mmhg by RHC    SOB (shortness of breath) on exertion    Polyosteoarthritis    HTN (hypertension), benign    Paroxysmal atrial fibrillation with rapid ventricular response (HCC)    Postsurgical malabsorption    Bilateral carotid artery stenosis    S/P carotid endarterectomy    Positive colorectal cancer screening using DNA-based stool test    Dysphagia    Gastroesophageal reflux disease without esophagitis    Hypersomnia with sleep apnea    Fatigue    Atrial fibrillation with RVR ([address]    Essential hypertension    Sleep apnea    Coronary artery disease involving native heart without angina pectoris    S/P cardiac cath    S/P percutaneous transluminal angioplasty (PTA) with stent placement    Atrial fibrillation status post cardioversion [facility])    Coronary artery disease involving native coronary artery of native heart without angina pectoris    Permanent atrial fibrillation       Past Surgical History:   Procedure Laterality Date    APPENDECTOMY  over 10 years ago    [id number] 10/01/2019    CAROTID ENDARTERECTOMY  09/2017    CHOLECYSTECTOMY  over 10 years ago    [address] [id number] COLONOSCOPY N/A 2/7/2018    COLONOSCOPY WITH BIOPSY performed by Lynette Magana MD at [address] [facility]  10 plus years ago    Dr. Abebe Meza Right 2009    OTHER SURGICAL HISTORY Left 2012    Rotator cuff sx    SLEEVE GASTRECTOMY  09/12/2016    Robotic, Extensive Lysis of Adhesions, Removal of [place] Prosthesis - Dr. Josephine Olivier  07/06/2016    Dr. Jose Anglin        Allergies   Allergen Reactions    Demerol Hcl [Meperidine] Nausea And Vomiting        Family History   Problem Relation Age of Onset    Stroke Mother     High Blood Pressure Mother     Atrial Fibrillation Mother     Heart Disease Mother     Diabetes Father     Early Death Father     Cancer Other     Heart Disease Sister         Social History     Socioeconomic History    Marital status:      Spouse name: Not on file    Number of children: Not on file    Years of education: Not on file    Highest education level: Not on file   Occupational History    Not on file   Social Needs    Financial resource strain: Not on file    Food insecurity     Worry: Not on file     Inability: Not on file    Transportation needs     Medical: Not on file     Non-medical: Not on file   Tobacco Use    Smoking status: Former Smoker     Packs/day: 0.25     Years: 30.00     Pack years: to 20% lesion. 2.  Left main coronary artery. Left main coronary artery has a 50%  lesion in the distal left main coronary artery. Bifurcates into left  circumflex artery and LAD. 3.  Left circumflex artery. 10% to 20% ostial lesion in the left  circumflex artery. There is a high takeoff of a large OM1 branch that  has a 60% lesion in the proximal part of the vessel. Distal left  circumflex artery is patent. OM2 is a large vessel with no significant  stenotic lesions. 4.  Left anterior descending artery. There is a severe stenotic diffuse  lesion involving the ostial and proximal part of LAD ranging in severity  between 70% to 90%. Mid LAD is patent. Distal LAD has mild diffuse  disease.     MEDICATIONS:  See MAR.     COMPLICATIONS:  None.     ESTIMATED BLOOD LOSS:  Less than 30 mL.     ACCESS:  Right radial artery access. Vasc Band was applied. Hemostasis  was achieved.     CONCLUSION:  1. Severe stenotic lesion of the distal left main coronary artery and  ostial/proximal left anterior descending artery. 2.  Status post successful PCI with the stenting of distal left main  coronary artery and left anterior descending artery with details as  listed above.     RECOMMENDATIONS:  1.  Bedrest for the next 4 hours. 2.  Monitor in telemetry. 3.  Aggressive risk factor modification. 4.  Optimize medical therapy for CAD. 5.  Access site care. 6.  Aspirin 81 mg p.o. daily, can be stopped after 1 month. 7.  Plavix 75 mg p.o. daily. 8.  May resume Eliquis in a.m.  9.  High intensity statin therapy. 10.  Outpatient follow up in office in 2 to 4 weeks.     Findings and plan of care discussed with the patient's family.  Jocelyn Mahoney MD     D: 02/21/2020 15:     -IVUS LM/LAD  -Severe stenotic lesion of dLM (Bifurcation lesion, De Leon Type 1,1,0)  -Severe 80-90% lesion of ostial/proximal LAD  -S/P Successful PCI with stenting of dLM/pLAD    Assessment    Home BP good       Diagnosis Orders   1. Coronary artery disease involving native coronary artery of native heart without angina pectoris     2. S/P percutaneous transluminal angioplasty (PTA) with stent placement     3. Permanent atrial fibrillation     4. Chronic diastolic congestive heart failure ([address]     5. HTN (hypertension), benign     6. Pulmonary HTN (HCC) Mean pressure 32 mmhg by RHC     7. SOB (shortness of breath) on exertion         Previous admission DX    Hx of recent TIA with RT side weakness      NSVT 5 beat   S/P gastric sleeve  Hypertensive Urgency  Hx of obstructive Sleep apnea  Pulmonary HTN  New onset post afib with RVR- now rate controlled  Chads of  DM II  HX of TIA         Continue home medication regimen   bP controlled    Doing well        Plan     The record reviewed    Patient Seen, Chart, Consults notes, Labs, Radiology studies reviewed. Permanent atr fib   S/p TAYLOR- CV and weent back to atr fib  Cont rate control  Stop amiodarone as failed to be in NSR  Cont metoprolol 100 bid  Cont cardizem CD increase 180  Mg po qd from 120   CHDAS of 4( DM, htn, Hx of TIA)  Need longterm OA and on apixaban  D/w the pat the risk and benefit of OA  Pat understand the risk of bleeding including ICH    Coronary artery disease, seems to be stable. Denies angina or change in breathing pattern  S/ p LM-LAD stent feb 2020  Cont asa and plavix  A dn to drop asa in 1 week    Patient Seen, Chart, Consults notes, Labs, Radiology studies reviewed. Hx of TIA IN 2016  Was on asa and apixaban    Hypertension, on medical treatment. Seems to be under good control. Patient is compliant with medical treatment. Decrease clonidine 0.05 mg  Po qd  Increase cardiam cd 180 po qd      Congestive heart failure: no evidence of fluid overload today, no recent hospitalization for CHF       Hyperlipidemia: on statins, followed periodically. Patient need periodic lipid and liver profile.   Hx of carotid left carotid endarterectomy in 2017  And started on statin then  Lower leg weakness from lipitor  OFF  lipitor  TOLERATED  lIvalo 1 mg in 3 weeks AND BUT PCP STOPPED IT DUE TO nORMAL LIPID PANEL VALUES  Now taking pravastatin  Increase pravastatin to 40 mg po qd    Pulm HTN    Hypertension, on medical treatment. Seems to be under good control. Patient is compliant with medical treatment. Intermittent serena- claudio  WNL and probably neuropathic    Reviewed the  report of R[facility] from [facility]    patient is advised to exercise 30 min s a day three times a week and about weight loss ,balance diet and    More fruits and vegetables . Discussed use, benefit, and side effects of prescribed medications. All patient questions answered. Pt voiced understanding. Instructed to continue current medications, diet and exercise. Continue risk factor modification and medical management. Patient agreed with treatment plan. Follow up as directed.         I spent 40 minutes involved in face-to-face discussion of medical issues, prognosis, record review  and plan with the patient today and more than 50% of the time was spent on counseling and coordination of care      RTC in  2 months consider to stop clonidine and increase losartan and holter for rate control      Candance Buoy [facility]

## 2020-03-18 ENCOUNTER — APPOINTMENT (OUTPATIENT)
Dept: CARDIAC REHAB | Age: 70
End: 2020-03-18
Payer: MEDICARE

## 2020-03-18 ENCOUNTER — HOSPITAL ENCOUNTER (OUTPATIENT)
Dept: CARDIAC REHAB | Age: 70
Setting detail: THERAPIES SERIES
End: 2020-03-18
Payer: MEDICARE

## 2020-03-19 NOTE — PROGRESS NOTES
Eating Plan  Changes: Eating Plan Improvements:   Alcohol Use  [x] none          [] daily  [] weekly      [] special          Diet Assessment Tool:  RATE YOUR PLATE  *Given to patient to complete and return. Diet Assessment Tool:    Score: **/69       Diet Assessment Tool: RATE YOUR PLATE  Score: **/75   NUTRITION PLAN NUTRITION PLAN NUTRITION PLAN NUTRITION PLAN NUTRITION PLAN   *Interventions* *Interventions* *Interventions* *Interventions* *Interventions*   Initial Survey given Goal Setting Discussion:   [] Yes      [] No       Follow Up Survey Reviewed & Goals Updated:     Professional Referral  Please check if needed. [] Dietitian Consult   [] Wt. Management Referral  [] Other:  Professional Referral  Please check if needed. [] Dietitian Consult   [] Wt. Management Referral  [] Other: Professional Referral  Please check if needed. [] Dietitian Consult   [] Wt. Management Referral  [] Other: Professional Referral  Please check if needed. [] Dietitian Consult   [] Wt. Management Referral  [] Other: Professional Referral  Please check if needed. [] Dietitian Consult   [] Wt. Management Referral  [] Other:   *Education* *Education* *Education* *Education* *Education*   Nutritional Education Recommended    [x] 1:1 Registered Dietitian    Workshops  [x] Label Reading   [x] Menu  [x] Targeting Nutrition Priorities  [x] Fueling a Healthy Body   Nutritional Education Attended/Date Nutritional Education Attended/Date Nutritional Education Attended/Date All Sessions Completed?     [] Yes  [] No   Cooking School  Recommended     [x] Adding Flavor  [x] Fast & Healthy     Breakfasts  [x] Salads & Dressings  [x] Soups & Simple     Sauces  [x] Simple Sides  [x] Appetizers &     Snacks  [x] Delicious Desserts  [x] Plant Proteins  [x] Fast Evening Meals  [x] Weekend Breakfasts  [x] Cook once, Eat       twice  [x] [employer] [facility] [employer]  Sessions Completed   [facility] [employer]  Sessions Completed Cooking School  Sessions Completed     Cooking School    # of sessions completed:  **   *Goals* *Goals* *Goals* *Goals* *Goals*   Bettinas nutritional goals are as follows:  Complete and return diet survey Bettinas nutritional goals are as follows:  [] Attend Nutrition Workshops  [] Attend 1:1   [] Attend Cooking Classes  [] ** Arlene's nutritional goals are as follows:  [] Attend Nutrition Workshops  [] Attend 1:1   [] Attend Cooking Classes  [] Complete and return diet survey  [] ** Arlene's nutritional goals are as follows:  [] Attend Nutrition Workshops  [] Attend 1:1   [] Attend Cooking Classes  [] ** Arlene achieved nutritional goals   [] Yes    [] No  If no, why? Use knowledge gained to continue Pritikin eating plan at home       Individual Cardiac Treatment Plan - Psychosocial  PSYCHOSOCIAL  ASSESSMENT/PLAN PSYCHOSOCIAL  REASSESSMENT PSYCHOSOCIAL   REASSESSMENT PSYCHOSOCIAL   REASSESSMENT PSYCHOSOCIAL  DISCHARGE/FOLLOW-UP   Stages of Change Stages of Change Stages of Change Stages of Change Stages of Change   [] Pre Contemplation  [] Contemplation  [x] Preparation  [] Action  [] Maintenance  [] Relapse [] Pre Contemplation  [] Contemplation  [] Preparation  [] Action  [] Maintenance  [] Relapse [] Pre Contemplation  [] Contemplation  [] Preparation  [] Action  [] Maintenance  [] Relapse [] Pre Contemplation  [] Contemplation  [] Preparation  [] Action  [] Maintenance  [] Relapse [] Pre Contemplation  [] Contemplation  [] Preparation  [] Action  [] Maintenance  [] Relapse   PSYCHOSOCIAL ASSESSMENT PSYCHOSOCIAL ASSESSMENT PSYCHOSOCIAL ASSESSMENT PSYCHOSOCIAL ASSESSMENT PSYCHOSOCIAL ASSESSMENT   Behavioral Outcomes Behavioral Outcomes Behavioral Outcomes Behavioral Outcomes Behavioral Outcomes   Tool Used:  Dian & Roxanne, Quality of Life Index, Cardiac Version IV  *Given to patient to complete.  Tool Used:    Dian Oliveira, Quality of Life Index, Cardiac Version IV     QOL Index Score: **  HF:**  S&E:**  P&S: Present? [] Yes      [] No  If yes, please explain:  ** Signs and Symptoms of Anxiety Present? [] Yes      [] No  If yes, please explain:  ** Signs and Symptoms of Anxiety Present? [] Yes      [] No  If yes, please explain:  **   [] Patient has poor eye contact   [] Flat affect present. [] Signs of anxiety, anger or hostility    [] Signs social isolation present. []  Signs of alcohol or substance abuse       PSYCHOSOCIAL PLAN PSYCHOSOCIAL PLAN PSYCHOSOCIAL PLAN PSYCHOSOCIAL PLAN PSYCHOSOCIAL PLAN   *Interventions* *Interventions* *Interventions* *Interventions* *Interventions*   *Please check if needed  [] Psych Consult  [] Physician Referral  [x] Stress Management Skills *Please check if needed  [] Psych Consult  [] Physician Referral  [] Stress Management Skills *Please check if needed  [] Psych Consult  [] Physician Referral  [] Stress Management Skills *Please check if needed  [] Psych Consult  [] Physician Referral  [] Stress Management Skills *Please check if needed  [] Psych Consult  [] Physician Referral  [] Stress Management Skills   Is patient currently taking anti-depressant or anti-anxiety medications? [x] Yes      [] No  If yes, please list medications:  CYMBALTA Change in anti-depressant or anti-anxiety medications? [] Yes      [] No  If yes, please list medications:  ** Change in anti-depressant or anti-anxiety medications? [] Yes      [] No  If yes, please list medications:  ** Change in anti-depressant or anti-anxiety medications? [] Yes      [] No  If yes, please list medications:  ** Change in anti-depressant or anti-anxiety medications?   [] Yes      [] No  If yes, please list medications:  **   *Education* *Education* *Education* *Education* *Education*   Healthy Mind-Set Workshops Recommended  [x] Stress & Health  [x] Taking Charge of Stress  [x] New Thoughts, New Behaviors  [x] Managing Moods & Relationships Healthy Mind-Set Workshops Attended/Date Healthy Mind-Set Workshops Fat  [x] Facts on Fat  [x] Diseases of Our Times-Part 2  [x] Biology of Weight Control  [x] Biomechanical Limitations  [x] Nurtition Action Plan   Completed Videos/Date      3/10/20  Overview of The Pritikin Eating Plan Completed Videos/Date Completed Videos/Date Recommended Educational Videos Completed    [] Yes      [] No    **If not completed, Why? **          Smoking Cessation/Relaspe Prevention Intervention needed? [] Yes      [x] No  *Pt verbalizes and agrees to attend intervention Smoking Cessation/Relapse Prevention Scheduled? [] Yes      [] No  Date:  ** Smoking Cessation/Relapse Prevention completed? [] Yes      [] No  Date: **    Smoking Cessation Counseling attended  [] Yes      [] No  **If not completed, Why? **   Professional Referrals:  *Please check if needed  [] Diabetes Clinic  [] Lipid Clinic   [] Other:     Professional Referrals:  *Please check if needed  [] Diabetes Clinic  [] Lipid Clinic   [] Other:   Preventative Medication Preventative Medication Preventative Medication Preventative Medication Preventative Medication   Aspirin  [x] Yes    [] No  Blood Thinner: Clopidogrel/Effient/Brillinta  [x] Yes    [] No  Beta Blocker  [x] Yes    [] No  Ace Inhibitor  [x] Yes    [] No  Statin/Lipid Lowering  [x] Yes    [] No Medication Changes? [] Yes    [] No Medication Changes? [] Yes    [] No Medication Changes? [] Yes    [] No Medication Changes? [] Yes    [] No   *Education* *Education* *Education* *Education* *Education*   Does aDljit Kong require any additional education? [] Yes    [x] No   Does Daljit Kong require any additional education? [] Yes    [] No Does Elpidione Dilan require any additional education? [] Yes    [] No Does Elpidione Dilan require any additional education? [] Yes    [] No Does Elpidione Dilan require any additional education?   [] Yes    [] No   Recommended Additional Educational Videos    Medical  [] Hypertension & Heart Disease  [] Decoding Lab Results  [] Aging Enhancing Your QoL  [] Sleep Monitored telemetry has revealed **  [] documented arrhythmia at increasing workloads  [] associated symptoms ** Monitored telemetry has revealed **  [] documented arrhythmia at increasing workloads  [] associated symptoms **   Physician Response    [x] Cardiac rehab is reasonably and medically necessary for continuous cardiac monitoring surveillance  of patient's cardiac activity  [x] Initiate continuous telemerty monitoring and notify me with any concerns  [] Other   Physician Response    [x] Cardiac rehab is reasonably and medically necessary for continuous cardiac monitoring surveillance  of patient's cardiac activity  [x] Continue continuous telemerty monitoring and notify me with any concerns  [] Other     Physician Response    [x] Cardiac rehab is reasonably and medically necessary for continuous cardiac monitoring surveillance  of patient's cardiac activity  [x] Continue continuous telemerty monitoring and notify me with any concerns   [] Other     Physician Response    [x] Cardiac rehab is reasonably and medically necessary for continuous cardiac monitoring surveillance  of patient's cardiac activity  [x] Continue continuous telemerty monitoring and notify me with any concerns   [] Other

## 2020-03-20 ENCOUNTER — APPOINTMENT (OUTPATIENT)
Dept: CARDIAC REHAB | Age: 70
End: 2020-03-20
Payer: MEDICARE

## 2020-03-20 ENCOUNTER — HOSPITAL ENCOUNTER (OUTPATIENT)
Dept: CARDIAC REHAB | Age: 70
Setting detail: THERAPIES SERIES
End: 2020-03-20
Payer: MEDICARE

## 2020-03-20 RX ORDER — DILTIAZEM HYDROCHLORIDE 180 MG/1
180 CAPSULE, COATED, EXTENDED RELEASE ORAL DAILY
Qty: 90 CAPSULE | Refills: 0 | Status: SHIPPED | OUTPATIENT
Start: 2020-03-20 | End: 2020-05-19

## 2020-03-20 RX ORDER — CLOPIDOGREL BISULFATE 75 MG/1
75 TABLET ORAL DAILY
Qty: 90 TABLET | Refills: 0 | Status: SHIPPED | OUTPATIENT
Start: 2020-03-20 | End: 2020-05-19 | Stop reason: SDUPTHER

## 2020-03-23 ENCOUNTER — APPOINTMENT (OUTPATIENT)
Dept: CARDIAC REHAB | Age: 70
End: 2020-03-23
Payer: MEDICARE

## 2020-03-23 ENCOUNTER — HOSPITAL ENCOUNTER (OUTPATIENT)
Dept: CARDIAC REHAB | Age: 70
Setting detail: THERAPIES SERIES
End: 2020-03-23
Payer: MEDICARE

## 2020-03-25 ENCOUNTER — APPOINTMENT (OUTPATIENT)
Dept: CARDIAC REHAB | Age: 70
End: 2020-03-25
Payer: MEDICARE

## 2020-03-27 ENCOUNTER — HOSPITAL ENCOUNTER (OUTPATIENT)
Dept: CARDIAC REHAB | Age: 70
Setting detail: THERAPIES SERIES
End: 2020-03-27
Payer: MEDICARE

## 2020-03-27 ENCOUNTER — APPOINTMENT (OUTPATIENT)
Dept: CARDIAC REHAB | Age: 70
End: 2020-03-27
Payer: MEDICARE

## 2020-03-30 ENCOUNTER — APPOINTMENT (OUTPATIENT)
Dept: CARDIAC REHAB | Age: 70
End: 2020-03-30
Payer: MEDICARE

## 2020-03-30 ENCOUNTER — HOSPITAL ENCOUNTER (OUTPATIENT)
Dept: CARDIAC REHAB | Age: 70
Setting detail: THERAPIES SERIES
Discharge: HOME | End: 2020-03-30
Payer: MEDICARE

## 2020-04-13 RX ORDER — METOPROLOL TARTRATE 100 MG/1
100 TABLET ORAL 2 TIMES DAILY
Qty: 180 TABLET | Refills: 0 | Status: SHIPPED | OUTPATIENT
Start: 2020-04-13 | End: 2020-05-19 | Stop reason: SDUPTHER

## 2020-05-04 RX ORDER — PANTOPRAZOLE SODIUM 40 MG/1
TABLET, DELAYED RELEASE ORAL
Qty: 90 TABLET | Refills: 3 | Status: SHIPPED | OUTPATIENT
Start: 2020-05-04 | End: 2021-12-22 | Stop reason: SDUPTHER

## 2020-05-11 ENCOUNTER — TELEPHONE (OUTPATIENT)
Dept: PULMONOLOGY | Age: 70
End: 2020-05-11

## 2020-05-18 ENCOUNTER — HOSPITAL ENCOUNTER (OUTPATIENT)
Dept: CARDIAC REHAB | Age: 70
Setting detail: THERAPIES SERIES
Discharge: HOME | End: 2020-05-18
Payer: MEDICARE

## 2020-05-18 PROCEDURE — G0423 INTENS CARDIAC REHAB NO EXER: HCPCS

## 2020-05-18 PROCEDURE — G0422 INTENS CARDIAC REHAB W/EXERC: HCPCS

## 2020-05-18 NOTE — PROGRESS NOTES
Video Education Report - ICR/CR    Name:  Dawson Rivas     Date:  5/18/2020  MRN: [id number]     Session #:  2  Session Length: 45 min    Recommended Videos        []01 [employer] - Program Overview   34:22    []02 Overview of Pritikin Eating Plan   34:10    []03 Becoming a Felicia Locket   33:08     [x]04 Diseases of Our Time - Part 1   34:22    []05 Calorie Density     33:39   []06 Label Reading - Part 1    32:15   []07 Move it      32.54   []08 Healthy Minds, Bodies, Hearts   32:14   []09 Dining Out - Part 1    32:28   []10 Heart Disease Risk Reduction   82:11   []52 Metabolic Syndrome and Belly Fat  31:52   []12 Facts on Fat     35:29   []13 Diseases of Our Time - Part 2   33:07   []14 Biology of Weight Control   32:36   []15 Biomechanical Limitations   35:20   []16 Nutrition Action Plan    34:23     Comments:  Video completed,

## 2020-05-19 ENCOUNTER — OFFICE VISIT (OUTPATIENT)
Dept: CARDIOLOGY CLINIC | Age: 70
End: 2020-05-19
Payer: MEDICARE

## 2020-05-19 VITALS
SYSTOLIC BLOOD PRESSURE: 123 MMHG | HEIGHT: 68 IN | DIASTOLIC BLOOD PRESSURE: 79 MMHG | WEIGHT: 218.4 LBS | HEART RATE: 113 BPM | BODY MASS INDEX: 33.1 KG/M2

## 2020-05-19 PROCEDURE — 99214 OFFICE O/P EST MOD 30 MIN: CPT | Performed by: INTERNAL MEDICINE

## 2020-05-19 RX ORDER — LOSARTAN POTASSIUM 50 MG/1
50 TABLET ORAL DAILY
Qty: 90 TABLET | Refills: 3 | Status: SHIPPED | OUTPATIENT
Start: 2020-05-19 | End: 2020-09-23

## 2020-05-19 RX ORDER — CLONIDINE HYDROCHLORIDE 0.1 MG/1
0.05 TABLET ORAL DAILY
Qty: 60 TABLET | Refills: 1 | Status: CANCELLED | OUTPATIENT
Start: 2020-05-19

## 2020-05-19 RX ORDER — PRAVASTATIN SODIUM 40 MG
40 TABLET ORAL DAILY
Qty: 90 TABLET | Refills: 3 | Status: SHIPPED | OUTPATIENT
Start: 2020-05-19 | End: 2021-07-27

## 2020-05-19 RX ORDER — METOPROLOL TARTRATE 100 MG/1
100 TABLET ORAL 2 TIMES DAILY
Qty: 180 TABLET | Refills: 3 | Status: SHIPPED | OUTPATIENT
Start: 2020-05-19 | End: 2020-07-21

## 2020-05-19 RX ORDER — CLOPIDOGREL BISULFATE 75 MG/1
75 TABLET ORAL DAILY
Qty: 90 TABLET | Refills: 3 | Status: SHIPPED | OUTPATIENT
Start: 2020-05-19 | End: 2020-07-28 | Stop reason: SDUPTHER

## 2020-05-19 RX ORDER — DILTIAZEM HYDROCHLORIDE 180 MG/1
180 CAPSULE, COATED, EXTENDED RELEASE ORAL DAILY
Qty: 90 CAPSULE | Refills: 3 | Status: CANCELLED | OUTPATIENT
Start: 2020-05-19

## 2020-05-19 RX ORDER — VERAPAMIL HYDROCHLORIDE 300 MG/1
300 CAPSULE, EXTENDED RELEASE ORAL DAILY
Qty: 30 CAPSULE | Refills: 0 | Status: SHIPPED | OUTPATIENT
Start: 2020-05-19 | End: 2020-07-21

## 2020-05-19 RX ORDER — VERAPAMIL HYDROCHLORIDE 300 MG/1
300 CAPSULE, EXTENDED RELEASE ORAL DAILY
Qty: 90 CAPSULE | Refills: 3 | Status: SHIPPED | OUTPATIENT
Start: 2020-05-19 | End: 2020-07-21

## 2020-05-19 NOTE — PROGRESS NOTES
METs DASI: ** METs   Return to Work  [employer] on returning to work? [] Yes              [] No   [] Disabled     [x] Retired    Helps daughter with her [employer] sometimes. Return to work:  Has Tammie Trevizo returned to work? [] Yes    [] No    Return to work date set? [] Yes, **    [] No    Tammie Trevizo is doing ** at work. Return to work:  Has Tammie Trevizo returned to work? [] Yes    [] No    Return to work date set? [] Yes, **    [] No    Tammie Trevizo is doing ** at work. Return to work:  Has Tammie Trevizo returned to work? [] Yes    [] No    Return to work date set? [] Yes, **    [] No    Tammie Trevizo is doing ** at work. Return to work:  Has Tammie Trevizo returned to work? [] Yes    [] No    Return to work? [] Yes, **    [] No    *Required MET Level achieved for job duties? [] Yes    [] No   Orthopedic Limitations/  [x] Yes    [] No  If yes please list:  One knee replaced, the other needs replaced. Arthritis throughout. Orthopedic Limitations  *If patient has orthopedic issue:   Actions/  accomodations needed to make Tammie Trevizo successful : Orthopedic Limitations   Orthopedic Limitations   Orthopedic Limitations     Fall Risk  Fall risk assessed? [x] Yes      [] No    Balance Issues? [x] Yes      [] No     [] Walker [x] [employer] - only uses for long distances    [x] Safety issues reviewed       Fall Risk  *If patient is a fall risk, action needed to accommodate:  Fall Risk Fall Risk Fall Risk   Home Exercise  [x] Yes    [] No  Type: stretching and walking   Frequency: daily  Duration: 5 min No current exercise Home Exercise  [] Yes    [] No  Type: **  Frequency:**  Duration: ** Home Exercise  [] Yes    [] No  Type: **  Frequency: **  Duration: ** Home Exercise  [] Yes    [] No  Type: **  Frequency: **  Duration: ** Home Exercise  [] Yes    [] No  Type: **  Frequency: **  Duration: **   Angina with Activity? [] Yes    [x] No    Angina with Activity? [] Yes    [] No  Angina Management: ** Angina with Activity?   [] Yes    [] No  Angina **min/mode Duration:  Total aerobic exercises = ** min     **min/mode Duration:  Total erobic exercise =  60-90 min   Intensity:   MET Level = 2.1  RPE = 12-15  Intensity:  Max MET Level = **  RPE = 12-15 Intensity:  Max MET Level = **  RPE = 12-15 Intensity:  Max MET Level = **  RPE = 12-15 Intensity:  Max MET Level = ** RPE = 12-15   Progression: increase aerobic activity up to 15 min over next 4 weeks by increasing time 2-3 min/week. Progression:   Progression:   Progression: Progression:  Increase time/intensity when RPE <13, and HR is in [facility]   [x] Yes      [] No  Upper and Lower body strength training 2x/wk    Wt: 2#       Reps:  8-15    *Increase wt. after completing 15 reps with an RPE of <12/13. [] Yes      [] No  Upper and Lower body strength training 2x/wk    Wt: **#       Reps:  8-15    *Increase wt. after completing 15 reps with an RPE of <12/13. [] Yes      [] No  Upper and Lower body strength training 2x/wk    Wt: **#       Reps:  8-15    *Increase wt. after completing 15 reps with an RPE of <12/13. [] Yes      [] No  Upper and Lower body strength training 2x/wk    Wt: **#       Reps:  8-15    *Increase wt. after completing 15 reps with an RPE of <12/13. Continue Strength Training at home   [] Exercise Log & Strength training handout given     Wt: **#       Reps:  8-15    *Increase wt. after completing 15 reps with an RPE of <12/13.    Flexibility  Flexibility Flexibility Flexibility Flexibility   [x] Yes      [] No  25 min session of Core Strength & Flexibility 1x/per week   Attends Core Strength & Flexibility   [] Yes      [] No Attends Core Strength & Flexibility   [] Yes      [] No Attends Core Strength & Flexibility   [] Yes      [] No Continue Core Strength & Flexibility at home   Home Exercise  *Lenny Husain verbalizes planning to start walking around her home 2 days/week for 5-10 min on days not in Meals  [x] Weekend Breakfasts  [x] Dung Malik once, Eat       twice  [x] [employer]  Sessions Completed   [facility] [employer]  Sessions Completed [facility]  Sessions Completed     [employer]    # of sessions completed:  **   *Goals*  *Goals* *Goals* *Goals* *Goals*   Bettinas nutritional goals are as follows:  Complete and return diet survey  Arlene's nutritional goals are as follows:  [] Attend Nutrition Workshops  [] Attend 1:1   [] Attend Cooking Classes  [] ** Arlene's nutritional goals are as follows:  [] Attend Nutrition Workshops  [] Attend 1:1   [] Attend Cooking Classes  [] Complete and return diet survey  [] ** Arlene's nutritional goals are as follows:  [] Attend Nutrition Workshops  [] Attend 1:1   [] Attend Cooking Classes  [] ** Arlene achieved nutritional goals   [] Yes    [] No  If no, why?   Use knowledge gained to continue Pritikin eating plan at home       Individual Cardiac Treatment Plan - Psychosocial  PSYCHOSOCIAL  ASSESSMENT/PLAN  PSYCHOSOCIAL  REASSESSMENT PSYCHOSOCIAL   REASSESSMENT PSYCHOSOCIAL   REASSESSMENT PSYCHOSOCIAL  DISCHARGE/FOLLOW-UP   Stages of Change  Stages of Change Stages of Change Stages of Change Stages of Change   [] Pre Contemplation  [] Contemplation  [x] Preparation  [] Action  [] Maintenance  [] Relapse  [] Pre Contemplation  [] Contemplation  [] Preparation  [] Action  [] Maintenance  [] Relapse [] Pre Contemplation  [] Contemplation  [] Preparation  [] Action  [] Maintenance  [] Relapse [] Pre Contemplation  [] Contemplation  [] Preparation  [] Action  [] Maintenance  [] Relapse [] Pre Contemplation  [] Contemplation  [] Preparation  [] Action  [] Maintenance  [] Relapse   PSYCHOSOCIAL ASSESSMENT  PSYCHOSOCIAL ASSESSMENT PSYCHOSOCIAL ASSESSMENT PSYCHOSOCIAL ASSESSMENT PSYCHOSOCIAL ASSESSMENT   Behavioral Outcomes  Behavioral Outcomes Behavioral Outcomes Behavioral Outcomes Behavioral Outcomes   Tool Used:  Dian Oliveira, Quality of Life Index, **   *Education*  *Education* *Education* *Education* *Education*   Healthy Mind-Set Workshops Recommended  [x] Stress & Health  [x] Taking Charge of Stress  [x] New Thoughts, New Behaviors  [x] Managing Moods & Relationships  Healthy Mind-Set Workshops Attended/Date Healthy Mind-Set Workshops Attended/Date Healthy Mind-Set Workshops Attended/Date Healthy Mind-Set Workshops  Completed  [] Yes      [] No   *Goals*  *Goals* *Goals* *Goals* *Goals*   Arlene's psychosocial goals are as follows:  Complete HADS & Dian Oliveira Quality of Life Index, Cardiac Version IV  Arlene's psychosocial goals are as follows:  [] Attend Healthy Mind-Set Workshops  [] Reduce depression symptom severity by 1 level  [] ** Arlene's psychosocial goals are as follows:  [] Attend Healthy Mind-Set Workshops  [] Reduce depression symptom severity by 1 level  [] ** Arlene's psychosocial goals are as follows:  [] Attend Healthy Mind-Set Workshops  [] Reduce depression symptom severity by 1 level  [] ** Arlene achieved psychosocial goals?   [] Yes    [] No  If no, why?  **  [] Use methods learned to continue to reduce depression symptom severity by 1 level  [] **     Individual Cardiac Treatment Plan - Other:  Risk Factor/Education  RISK FACTOR  ASSESSMENT/PLAN  RISK FACTOR  REASSESSMENT  RISK FACTOR  REASSESSMENT RISK FACTOR  REASSESSMENT RISK FACTOR   DISCHARGE/FOLLOW-UP   Stages of Change  Stages of Change Stages of Change Stages of Change Stages of Change   [] Pre Contemplation  [x] Contemplation  [] Preparation  [] Action  [] Maintenance  [] Relapse  [] Pre Contemplation  [] Contemplation  [] Preparation  [] Action  [] Maintenance  [] Relapse [] Pre Contemplation  [] Contemplation  [] Preparation  [] Action  [] Maintenance  [] Relapse [] Pre Contemplation  [] Contemplation  [] Preparation  [] Action  [] Maintenance  [] Relapse [] Pre Contemplation  [] Contemplation  [] Preparation  [] Action  [] Maintenance  [] Relapse   RISK FACTOR/EDUCATION Videos    [x] Overview of The Pritikin Eating Plan  [x] Becoming A Pritikin   [x] Diseases of Our Time-Part 1  [x] Calorie Density  [x] Label Reading-Part 1  [x] Move It! [x] Healthy Minds, Bodies, Hearts  [x] Dining Out-Part 1  [x] Heart Disease Risk Reduction  [x] Metabolic Syndrome & Belly Fat  [x] Facts on Fat  [x] Diseases of Our Times-Part 2  [x] Biology of Weight Control  [x] Biomechanical Limitations  [x] Nurtition Action Plan    Completed Videos/Date      3/10/20  Overview of The Pritikin Eating Plan Completed Videos/Date Completed Videos/Date Recommended Educational Videos Completed    [] Yes      [] No    **If not completed, Why? **           Smoking Cessation/Relaspe Prevention Intervention needed? [] Yes      [x] No  *Pt verbalizes and agrees to attend intervention  Smoking Cessation/Relapse Prevention Scheduled? [] Yes      [] No  Date:  ** Smoking Cessation/Relapse Prevention completed? [] Yes      [] No  Date: **    Smoking Cessation Counseling attended  [] Yes      [] No  **If not completed, Why? **   Professional Referrals:  *Please check if needed  [] Diabetes Clinic  [] Lipid Clinic   [] Other:      Professional Referrals:  *Please check if needed  [] Diabetes Clinic  [] Lipid Clinic   [] Other:   Preventative Medication  Preventative Medication Preventative Medication Preventative Medication Preventative Medication   Aspirin  [x] Yes    [] No  Blood Thinner: Clopidogrel/Effient/Brillinta  [x] Yes    [] No  Beta Blocker  [x] Yes    [] No  Ace Inhibitor  [x] Yes    [] No  Statin/Lipid Lowering  [x] Yes    [] No  Medication Changes? [] Yes    [] No Medication Changes? [] Yes    [] No Medication Changes? [] Yes    [] No Medication Changes? [] Yes    [] No   *Education*  *Education* *Education* *Education* *Education*   Does Geetha Money require any additional education? [] Yes    [x] No    Does Geetha Money require any additional education?   [] Yes    [] No Does Geetha Money require any additional revealed Afib  [] documented arrhythmia at increasing workloads  [] associated symptoms  Monitored telemetry has revealed At- fib   Monitored telemetry has revealed  [] documented arrhythmia at increasing workloads  [] associated symptoms ** Monitored telemetry has revealed **  [] documented arrhythmia at increasing workloads  [] associated symptoms ** Monitored telemetry has revealed **  [] documented arrhythmia at increasing workloads  [] associated symptoms **   Physician Response    [x] Cardiac rehab is reasonably and medically necessary for continuous cardiac monitoring surveillance  of patient's cardiac activity  [x] Initiate continuous telemerty monitoring and notify me with any concerns  [] Other   Physician Response    [x] Cardiac rehab is reasonably and medically necessary for continuous cardiac monitoring surveillance  of patient's cardiac activity  [x] Continue continuous telemerty monitoring and notify me with any concerns  [] Other     Physician Response    [x] Cardiac rehab is reasonably and medically necessary for continuous cardiac monitoring surveillance  of patient's cardiac activity  [x] Continue continuous telemerty monitoring and notify me with any concerns   [] Other     Physician Response    [x] Cardiac rehab is reasonably and medically necessary for continuous cardiac monitoring surveillance  of patient's cardiac activity  [x] Continue continuous telemerty monitoring and notify me with any concerns   [] Other

## 2020-05-19 NOTE — PROGRESS NOTES
change in The axis  T wave inversion now evident in Inferior leads  Confirmed by Bakari De Leon ([id number]) on 12/8/2015 8:38:06 PM    EKG 8/25/16  Marked sinus  Bradycardia   -Left axis -anterior fascicular block.    -Old anteroseptal infarct. ABNORMAL       EKG 9/22/16-sinus bradycardia rate 46 bopm       CONCLUSION:   1. This is a normal sinus rhythm with average heart rate 55 beats per minute,  ranging from  beats per minute. 2. No significant pause of more than 1.9 seconds noted. 3. Rare ventricular ectopic beats, isolated and couplets. 4. Rare supraventricular ectopic beats, isolated, couplets, and rare  nonsustained supraventricular ectopic run. 5. No significant bradyarrhythmia to be addressed, yet certainly average  heart rate 55, minimum is 40, so probably need further clinical correlation. 6. Otherwise, at this level, this minimal low heart rate if there are no  symptoms will need just observation.  Barbara Elias M.D.        D: 11/29/2016 20:0      EKG 8/12/19  NSR, no acute abn       PAFB RVR  - more persistent now - s\p TAYLOR / CV to SR 2/24/2020              On [address]               On amiodarone      abnormal stress testing   S\p cardiac cath 2/21/2020:  -Severe stenotic lesion of dLM (Bifurcation lesion, De Leon Type 1,1,0)  -Severe 80-90% lesion of ostial/proximal LAD  -S/P Successful PCI with stenting of dLM/pLAD     Hx bradycardia - none this admit     HTN  HLP  DM II  Hx Lt CEA 2007  Hx TIA  Hx SHANNEN      HEMODYNAMICS:  LVEDP 9 mmHg.     CORONARY ANGIOGRAM:  1. Right coronary artery. The ostial RCA has a 10% lesion, proximal  RCA has a 20% lesion. Mid RCA has 30% lesion. Distal RCA with luminal  irregularities. RCA is a dominant vessel. Bifurcates into RPL and  RPDA. RPL has an ostial 20% lesion. RPDA has a 10% to 20% lesion. 2.  Left main coronary artery. Left main coronary artery has a 50%  lesion in the distal left main coronary artery.   Bifurcates into left  circumflex BUT PCP STOPPED IT DUE TO nORMAL LIPID PANEL VALUES  Now taking pravastatin  Increase pravastatin to 40 mg po qd    Pulm HTN    Hypertension, on medical treatment. Seems to be under good control. Patient is compliant with medical treatment. Intermittent serena- claudio  WNL and probably neuropathic    Reviewed the  report of [facility] from [facility]    patient is advised to exercise 30 min s a day three times a week and about weight loss ,balance diet and    More fruits and vegetables . Discussed use, benefit, and side effects of prescribed medications. All patient questions answered. Pt voiced understanding. Instructed to continue current medications, diet and exercise. Continue risk factor modification and medical management. Patient agreed with treatment plan. Follow up as directed.         I spent 25 minutes involved in face-to-face discussion of medical issues, prognosis, record review  and plan with the patient today and more than 50% of the time was spent on counseling and coordination of care      RTC in  2 months for rate  control      Tg [facility]

## 2020-05-20 ENCOUNTER — HOSPITAL ENCOUNTER (OUTPATIENT)
Dept: CARDIAC REHAB | Age: 70
Setting detail: THERAPIES SERIES
Discharge: HOME | End: 2020-05-20
Payer: MEDICARE

## 2020-05-20 ENCOUNTER — APPOINTMENT (OUTPATIENT)
Dept: CARDIAC REHAB | Age: 70
End: 2020-05-20
Payer: MEDICARE

## 2020-05-22 ENCOUNTER — HOSPITAL ENCOUNTER (OUTPATIENT)
Dept: CARDIAC REHAB | Age: 70
Setting detail: THERAPIES SERIES
Discharge: HOME | End: 2020-05-22
Payer: MEDICARE

## 2020-05-22 PROCEDURE — G0422 INTENS CARDIAC REHAB W/EXERC: HCPCS

## 2020-05-22 PROCEDURE — G0423 INTENS CARDIAC REHAB NO EXER: HCPCS

## 2020-05-25 ENCOUNTER — HOSPITAL ENCOUNTER (OUTPATIENT)
Dept: CARDIAC REHAB | Age: 70
Setting detail: THERAPIES SERIES
End: 2020-05-25
Payer: MEDICARE

## 2020-05-25 ENCOUNTER — APPOINTMENT (OUTPATIENT)
Dept: CARDIAC REHAB | Age: 70
End: 2020-05-25
Payer: MEDICARE

## 2020-05-27 ENCOUNTER — HOSPITAL ENCOUNTER (OUTPATIENT)
Dept: CARDIAC REHAB | Age: 70
Setting detail: THERAPIES SERIES
Discharge: HOME | End: 2020-05-27
Payer: MEDICARE

## 2020-05-27 PROCEDURE — G0423 INTENS CARDIAC REHAB NO EXER: HCPCS

## 2020-05-27 PROCEDURE — G0422 INTENS CARDIAC REHAB W/EXERC: HCPCS

## 2020-05-27 NOTE — PROGRESS NOTES
Video Education Report - ICR/CR    Name:  Bert Garza     Date:  5/27/2020  MRN: [id number]     Session #:  4  Session Length: 40 min    Recommended Videos        []01 [employer] - Program Overview   34:22    []02 Overview of Pritikin Eating Plan   34:10    []03 Becoming a Amy Rude   33:08     []04 Diseases of Our Time - Part 1   34:22    [x]05 Calorie Density     33:39   []06 Label Reading - Part 1    32:15   []07 Move it      32.54   []08 Healthy Minds, Bodies, Hearts   32:14   []09 Dining Out - Part 1    32:28   []10 Heart Disease Risk Reduction   51:58   []78 Metabolic Syndrome and Belly Fat  31:52   []12 Facts on Fat     35:29   []13 Diseases of Our Time - Part 2   33:07   []14 Biology of Weight Control   32:36   []15 Biomechanical Limitations   35:20   []16 Nutrition Action Plan    34:23    Comments:  Video completed

## 2020-05-29 ENCOUNTER — APPOINTMENT (OUTPATIENT)
Dept: CARDIAC REHAB | Age: 70
End: 2020-05-29
Payer: MEDICARE

## 2020-05-29 ENCOUNTER — HOSPITAL ENCOUNTER (OUTPATIENT)
Dept: CARDIAC REHAB | Age: 70
Setting detail: THERAPIES SERIES
Discharge: HOME | End: 2020-05-29
Payer: MEDICARE

## 2020-05-29 NOTE — PROGRESS NOTES
Video Education Report - ICR/CR    Name:  Elidia Hayes     Date:  5/29/2020  MRN: [id number]     Session #:  5  Session Length: 55 min    Recommended Videos        []01 [employer] - Program Overview   34:22    []02 Overview of Pritikin Eating Plan   34:10    []03 Becoming a Elsie Guajardo   33:08     []04 Diseases of Our Time - Part 1   34:22    []05 Calorie Density     33:39   []06 Label Reading - Part 1    32:15   []07 Move it      32.54   [x]08 Healthy Minds, Bodies, Hearts   32:14   []09 Dining Out - Part 1    32:28   []10 Heart Disease Risk Reduction   43:21   []39 Metabolic Syndrome and Belly Fat  31:52   []12 Facts on Fat     35:29   []13 Diseases of Our Time - Part 2   33:07   []14 Biology of Weight Control   32:36   []15 Biomechanical Limitations   35:20   []16 Nutrition Action Plan    34:23    Additional Videos         []17 Hypertension & Heart Disease   32:39        []18 Cooking Breakfasts and Snacks  32:00   []19 Planning Your Eating Strategy   33:30   []20 Label Reading - Part 2    32:36  []21 Cooking Soups and Desserts   31:41  []22 How Our Thoughts Can Heal Our Hearts 33:05  []23 Targeting Your Nutrition Priorities  33:58  []24 Healthy Salads & Dressings   35:32  []25 Dining Out - Part 2    32:35  []26 Cooking Dinner and Sides   35:06  []27 Sleep Disorders     33:14  []28 Menu Workshop     32:06  []29 Decoding Lab Results    32:42     []30 Vitamins and Minerals    32:54  []31 Exercise Action Plan    32:26  []32 Body Composition    34:03  []33 Improving Performance    32:13  []34 Fueling a Healthy Body    31:32  []35 Introduction to Yoga    33:47  []36 Aging-Enhancing the Quality of Your Life 33:22  []37 Smoking Cessation    36:19    Comments:  Video completed, teach back method utilized, good dialogue with patient

## 2020-06-01 ENCOUNTER — HOSPITAL ENCOUNTER (OUTPATIENT)
Dept: CARDIAC REHAB | Age: 70
Setting detail: THERAPIES SERIES
Discharge: HOME | End: 2020-06-01
Payer: MEDICARE

## 2020-06-01 ENCOUNTER — APPOINTMENT (OUTPATIENT)
Dept: CARDIAC REHAB | Age: 70
End: 2020-06-01
Payer: MEDICARE

## 2020-06-01 PROCEDURE — G0423 INTENS CARDIAC REHAB NO EXER: HCPCS

## 2020-06-01 PROCEDURE — G0422 INTENS CARDIAC REHAB W/EXERC: HCPCS

## 2020-06-03 ENCOUNTER — APPOINTMENT (OUTPATIENT)
Dept: CARDIAC REHAB | Age: 70
End: 2020-06-03
Payer: MEDICARE

## 2020-06-05 ENCOUNTER — APPOINTMENT (OUTPATIENT)
Dept: CARDIAC REHAB | Age: 70
End: 2020-06-05
Payer: MEDICARE

## 2020-06-08 ENCOUNTER — HOSPITAL ENCOUNTER (OUTPATIENT)
Dept: CARDIAC REHAB | Age: 70
Setting detail: THERAPIES SERIES
Discharge: HOME | End: 2020-06-08
Payer: MEDICARE

## 2020-06-08 ENCOUNTER — TELEPHONE (OUTPATIENT)
Dept: CARDIOLOGY CLINIC | Age: 70
End: 2020-06-08

## 2020-06-08 ENCOUNTER — APPOINTMENT (OUTPATIENT)
Dept: CARDIAC REHAB | Age: 70
End: 2020-06-08
Payer: MEDICARE

## 2020-06-08 PROCEDURE — G0422 INTENS CARDIAC REHAB W/EXERC: HCPCS

## 2020-06-08 PROCEDURE — G0423 INTENS CARDIAC REHAB NO EXER: HCPCS

## 2020-06-08 NOTE — PROGRESS NOTES
Hospital Facility-Based Program  Phase 2 Cardiac Rehab Weekly Progress Report      Patient prescribed exercise:  3:00 class. 3 times per week in rehab, 1-4 times per week at home for the amount of sessions/weeks specified by insurance. Current Levels: NuStep:  28 Lynn for 8 minutes x2, UBE: 22 Lynn for 5 minutes. Progression Discussion: Increase Aerobic exercise 10 minutes to work on endurance. Attempt to increase intensity by 5-20% for each modality this week. Try to increase intensities until Nina Morris rates the exercises a 13-17 on Edmundo RPE.

## 2020-06-09 ENCOUNTER — OFFICE VISIT (OUTPATIENT)
Dept: CARDIOLOGY CLINIC | Age: 70
End: 2020-06-09
Payer: MEDICARE

## 2020-06-09 VITALS
HEIGHT: 68 IN | DIASTOLIC BLOOD PRESSURE: 88 MMHG | HEART RATE: 106 BPM | BODY MASS INDEX: 33.37 KG/M2 | WEIGHT: 220.2 LBS | SYSTOLIC BLOOD PRESSURE: 139 MMHG

## 2020-06-09 PROCEDURE — 99214 OFFICE O/P EST MOD 30 MIN: CPT | Performed by: INTERNAL MEDICINE

## 2020-06-09 RX ORDER — FUROSEMIDE 20 MG/1
20 TABLET ORAL PRN
COMMUNITY
End: 2020-06-16 | Stop reason: SDUPTHER

## 2020-06-09 RX ORDER — CHLORTHALIDONE 25 MG/1
TABLET ORAL
Qty: 90 TABLET | Refills: 3 | Status: SHIPPED | OUTPATIENT
Start: 2020-06-09 | End: 2021-06-04 | Stop reason: SDUPTHER

## 2020-06-09 RX ORDER — METOPROLOL TARTRATE 100 MG/1
150 TABLET ORAL EVERY MORNING
COMMUNITY
End: 2020-06-16

## 2020-06-09 NOTE — PROGRESS NOTES
Chief Complaint   Patient presents with    Follow-up     weight gain, SOB, cough     HX OF  fu had TIA here at [address], transferred from [facility]  Pt was prescribed lasix 20 mg daily by , but has not started taking it. Pt here for pre op clearance  Bariatric surgery 9/12/2016  With Dr. Carey Kumar    Had carotid endarterectomy - 9-11-17 -     Was admitted from office for atr fib with  and was admitted [facility] . Cath and needed stent and later went back to atr fib with 110 and re-sent to ER and sent home with added cardizem         3 weeks F/U    Palpitation with tachycardia intermittent    Sob on exertion and fatigue on exertion worse  post  Leg edema    Gained 2 lb in 3 weeks    Post Cath and PCI and A. fib rate control--feel stronger  Fatigue better, sob better    No wt gain    Hx of depression and a lots of stress in family  and and daughter sick    Patient did not bring a medication list. Patient verbally stated nothing has changed. NO dizziness    No leg swelling        Patient Active Problem List   Diagnosis    Obstructive sleep apnea on CPAP    COPD (chronic obstructive pulmonary disease) (HCC)    Fibromyalgia    DM (diabetes mellitus), type 2, uncontrolled ([address]    Right arm weakness    Obesity (BMI 30-39. 9)    Chronic diastolic congestive heart failure (HCC)    Pulmonary HTN (HCC) Mean pressure 32 mmhg by RHC    SOB (shortness of breath) on exertion    Polyosteoarthritis    HTN (hypertension), benign    Paroxysmal atrial fibrillation with rapid ventricular response (HCC)    Postsurgical malabsorption    Bilateral carotid artery stenosis    S/P carotid endarterectomy    Positive colorectal cancer screening using DNA-based stool test    Dysphagia    Gastroesophageal reflux disease without esophagitis    Hypersomnia with sleep apnea    Fatigue    Atrial fibrillation with RVR ([address]    Essential hypertension    Sleep apnea    Coronary artery disease Years: 30.00     Pack years: 7.50     Types: Cigarettes     Start date: 3/24/1982     Last attempt to quit: 2015     Years since quittin.2    Smokeless tobacco: Never Used   Substance and Sexual Activity    Alcohol use: No    Drug use: No    Sexual activity: Not Currently     Partners: Male   Lifestyle    Physical activity     Days per week: Not on file     Minutes per session: Not on file    Stress: Not on file   Relationships    Social connections     Talks on phone: Not on file     Gets together: Not on file     Attends [employer] service: Not on file     Active member of club or organization: Not on file     Attends meetings of clubs or organizations: Not on file     Relationship status: Not on file    Intimate partner violence     Fear of current or ex partner: Not on file     Emotionally abused: Not on file     Physically abused: Not on file     Forced sexual activity: Not on file   Other Topics Concern    Not on file   Social History Narrative    Not on file       Current Outpatient Medications   Medication Sig Dispense Refill    chlorthalidone (HYGROTON) 25 MG tablet TAKE 1 TABLET DAILY 90 tablet 3    clopidogrel (PLAVIX) 75 MG tablet Take 1 tablet by mouth daily 90 tablet 3    losartan (COZAAR) 50 MG tablet Take 1 tablet by mouth daily 90 tablet 3    metoprolol (LOPRESSOR) 100 MG tablet Take 1 tablet by mouth 2 times daily 180 tablet 3    pravastatin (PRAVACHOL) 40 MG tablet Take 1 tablet by mouth daily 90 tablet 3    apixaban (ELIQUIS) 5 MG TABS tablet Take 1 tablet by mouth 2 times daily 180 tablet 3    Verapamil HCl  MG CP24 Take 300 mg by mouth daily 90 capsule 3    Verapamil HCl  MG CP24 Take 300 mg by mouth daily 30 capsule 0    pantoprazole (PROTONIX) 40 MG tablet TAKE 1 TABLET DAILY 90 tablet 3    nitroGLYCERIN (NITROSTAT) 0.4 MG SL tablet up to max of 3 total doses.  If no relief after 1 dose, call 911. 25 tablet 1    modafinil (PROVIGIL) 200 MG tablet Take 200 mg by mouth daily.  DULoxetine (CYMBALTA) 20 MG extended release capsule Take 20 mg by mouth daily      traZODone (DESYREL) 50 MG tablet Take 50 mg by mouth nightly      aspirin 81 MG tablet Take 81 mg by mouth daily      traMADol (ULTRAM) 50 MG tablet Take 50 mg by mouth every 6 hours as needed for Pain      Ferrous Fumarate (IRON) 18 MG TBCR Take 1 tablet by mouth daily      Cyanocobalamin (VITAMIN B-12 PO) Take 1 tablet by mouth daily      Cholecalciferol (VITAMIN D3) 5000 UNITS CAPS Take 2 capsules by mouth daily      oxybutynin (DITROPAN) 5 MG tablet Take 15 mg by mouth daily       sulfaSALAzine (AZULFIDINE) 500 MG tablet Take 500 mg by mouth 2 times daily      albuterol sulfate HFA (VENTOLIN HFA) 108 (90 BASE) MCG/ACT inhaler Inhale 2 puffs into the lungs every 6 hours as needed for Wheezing 1 Inhaler 11    levothyroxine (SYNTHROID) 50 MCG tablet Take 50 mcg by mouth daily       hydroxychloroquine (PLAQUENIL) 200 MG tablet Take 200 mg by mouth 2 times daily. No current facility-administered medications for this visit. Review of Systems -     General ROS: negative  Psychological ROS: negative  Hematological and Lymphatic ROS: No history of blood clots or bleeding disorder. Respiratory ROS: no cough, shortness of breath, or wheezing  Cardiovascular ROS: no chest pain or dyspnea on exertion  Gastrointestinal ROS: negative  Genito-Urinary ROS: no dysuria, trouble voiding, or hematuria  Musculoskeletal ROS: negative  Neurological ROS: no TIA or stroke symptoms  Dermatological ROS: negative      Blood pressure 139/88, pulse 106, height 5' 8\" (1.727 m), weight 220 lb 3.2 oz (99.9 kg), not currently breastfeeding.         Physical Examination:    General appearance - alert, well appearing, and in no distress  Mental status - alert, oriented to person, place, and time  Neck - supple, no significant adenopathy, no JVD, or carotid bruits  Chest - clear to auscultation, no wheezes, rales ischemia  Abnormal ECG  When compared with ECG of 01-JUL-2011 09:56,  Questionable change in The axis  T wave inversion now evident in Inferior leads  Confirmed by [facility] ([id number]) on 12/8/2015 8:38:06 PM    EKG 8/25/16  Marked sinus  Bradycardia   -Left axis -anterior fascicular block.    -Old anteroseptal infarct. ABNORMAL       EKG 9/22/16-sinus bradycardia rate 46 bopm       CONCLUSION:   1. This is a normal sinus rhythm with average heart rate 55 beats per minute,  ranging from  beats per minute. 2. No significant pause of more than 1.9 seconds noted. 3. Rare ventricular ectopic beats, isolated and couplets. 4. Rare supraventricular ectopic beats, isolated, couplets, and rare  nonsustained supraventricular ectopic run. 5. No significant bradyarrhythmia to be addressed, yet certainly average  heart rate 55, minimum is 40, so probably need further clinical correlation. 6. Otherwise, at this level, this minimal low heart rate if there are no  symptoms will need just observation.  Ceci Parks M.D.        D: 11/29/2016 20:0      EKG 8/12/19  NSR, no acute abn       Cath feb 2020    HEMODYNAMICS:  LVEDP 9 mmHg.     CORONARY ANGIOGRAM:  1. Right coronary artery. The ostial RCA has a 10% lesion, proximal  RCA has a 20% lesion. Mid RCA has 30% lesion. Distal RCA with luminal  irregularities. RCA is a dominant vessel. Bifurcates into RPL and  RPDA. RPL has an ostial 20% lesion. RPDA has a 10% to 20% lesion. 2.  Left main coronary artery. Left main coronary artery has a 50%  lesion in the distal left main coronary artery. Bifurcates into left  circumflex artery and LAD. 3.  Left circumflex artery. 10% to 20% ostial lesion in the left  circumflex artery. There is a high takeoff of a large OM1 branch that  has a 60% lesion in the proximal part of the vessel. Distal left  circumflex artery is patent. OM2 is a large vessel with no significant  stenotic lesions.   4.  Left

## 2020-06-10 ENCOUNTER — HOSPITAL ENCOUNTER (OUTPATIENT)
Dept: CARDIAC REHAB | Age: 70
Setting detail: THERAPIES SERIES
Discharge: HOME | End: 2020-06-10
Payer: MEDICARE

## 2020-06-10 PROCEDURE — G0422 INTENS CARDIAC REHAB W/EXERC: HCPCS

## 2020-06-10 PROCEDURE — G0423 INTENS CARDIAC REHAB NO EXER: HCPCS

## 2020-06-10 NOTE — PROGRESS NOTES
Video Education Report - ICR/CR    Name:  Jeane Gallagher     Date:  6/10/2020  MRN: [id number]     Session #:  8  Session Length: 45 min    Recommended Videos        []01 [employer] - Program Overview   34:22    []02 Overview of [employer] Eating Plan   34:10    []03 Becoming a [employer]   33:08     []04 Diseases of Our Time - Part 1   34:22    []05 Calorie Density     33:39   [x]06 Label Reading - Part 1    32:15   []07 Move it      32.54   []08 Healthy Minds, Bodies, Hearts   32:14   []09 Dining Out - Part 1    32:28   []10 Heart Disease Risk Reduction   51:61   []40 Metabolic Syndrome and Belly Fat  31:52   []12 Facts on Fat     35:29   []13 Diseases of Our Time - Part 2   33:07   []14 Biology of Weight Control   32:36   []15 Biomechanical Limitations   35:20   []16 Nutrition Action Plan    34:23     Comments:  Video completed,

## 2020-06-12 ENCOUNTER — HOSPITAL ENCOUNTER (OUTPATIENT)
Dept: CARDIAC REHAB | Age: 70
Setting detail: THERAPIES SERIES
Discharge: HOME | End: 2020-06-12
Payer: MEDICARE

## 2020-06-12 PROCEDURE — G0422 INTENS CARDIAC REHAB W/EXERC: HCPCS

## 2020-06-12 PROCEDURE — G0423 INTENS CARDIAC REHAB NO EXER: HCPCS

## 2020-06-15 ENCOUNTER — HOSPITAL ENCOUNTER (OUTPATIENT)
Dept: CARDIAC REHAB | Age: 70
Setting detail: THERAPIES SERIES
Discharge: HOME | End: 2020-06-15
Payer: MEDICARE

## 2020-06-15 PROCEDURE — G0422 INTENS CARDIAC REHAB W/EXERC: HCPCS

## 2020-06-15 PROCEDURE — G0423 INTENS CARDIAC REHAB NO EXER: HCPCS

## 2020-06-15 NOTE — PROGRESS NOTES
DASI: ** METs DASI: ** METs   Return to Work  [employer] on returning to work? 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 Return to work:  Has Winford Litten returned to work? [] Yes    [x] No     Return to work:  Has Winford Litten returned to work? [] Yes    [] No    Return to work date set? [] Yes, **    [] No    Winford Litten is doing ** at work. Return to work:  Has Winford Litten returned to work? [] Yes    [] No    Return to work date set? [] Yes, **    [] No    Winford Litten is doing ** at work. Return to work:  Has Winford Litten returned to work? [] Yes    [] No    Return to work? [] Yes, **    [] No    *Required MET Level achieved for job duties? [] Yes    [] No   Orthopedic Limitations/  [x] Yes    [] No  If yes please list:  One knee replaced, the other needs replaced. Arthritis throughout. Orthopedic Limitations  *If patient has orthopedic issue:   Actions/  accomodations needed to make Winford Litten successful :only uses [employer] Orthopedic Limitations   Orthopedic Limitations   Orthopedic Limitations     Fall Risk  Fall risk assessed? [x] Yes      [] No    Balance Issues? [x] Yes      [] No     [] Walker [x] [address] [facility] [place] - only uses for long distances    [x] Safety issues reviewed       Fall Risk  *If patient is a fall risk, action needed to accommodate:  Uses a cane Fall Risk Fall Risk Fall Risk   Home Exercise  [x] Yes    [] No  Type: stretching and walking   Frequency: daily  Duration: 5 min No current exercise Home Exercise  [x] Yes    [] No  Type: walking  Frequency: 1-2x/wk  Duration: 5-10 min Home Exercise  [] Yes    [] No  Type: **  Frequency: **  Duration: ** Home Exercise  [] Yes    [] No  Type: **  Frequency: **  Duration: ** Home Exercise  [] Yes    [] No  Type: **  Frequency: **  Duration: **   Angina with Activity? [] Yes    [x] No    Angina with Activity? [] Yes    [x] No  Angina Management: n/a Angina with Activity?   [] Yes    [] No  Angina Management: ** Angina with **min/mode Duration:  Total erobic exercise =  60-90 min   Intensity:   MET Level = 2.1  RPE = 12-15  Intensity:  Max MET Level = 2  RPE = 12-15 Intensity:  Max MET Level = **  RPE = 12-15 Intensity:  Max MET Level = **  RPE = 12-15 Intensity:  Max MET Level = ** RPE = 12-15   Progression: increase aerobic activity up to 15 min over next 4 weeks by increasing time 2-3 min/week. Progression:  increase aerobic activity up to 15 min over next 4 weeks by increasing time 2-3 min/week. Progression:   Progression: Progression:  Increase time/intensity when RPE <13, and HR is in [facility]   [x] Yes      [] No  Upper and Lower body strength training 2x/wk    Wt: 2#       Reps:  8-15    *Increase wt. after completing 15 reps with an RPE of <12/13. [x] Yes      [] No  Upper and Lower body strength training 2x/wk    Wt: 2#       Reps:  8-15    *Increase wt. after completing 15 reps with an RPE of <12/13. [] Yes      [] No  Upper and Lower body strength training 2x/wk    Wt: **#       Reps:  8-15    *Increase wt. after completing 15 reps with an RPE of <12/13. [] Yes      [] No  Upper and Lower body strength training 2x/wk    Wt: **#       Reps:  8-15    *Increase wt. after completing 15 reps with an RPE of <12/13. Continue Strength Training at home   [] Exercise Log & Strength training handout given     Wt: **#       Reps:  8-15    *Increase wt. after completing 15 reps with an RPE of <12/13.    Flexibility  Flexibility Flexibility Flexibility Flexibility   [x] Yes      [] No  25 min session of Core Strength & Flexibility 1x/per week   Attends Core Strength & Flexibility   [x] Yes      [] No Attends Core Strength & Flexibility   [] Yes      [] No Attends Core Strength & Flexibility   [] Yes      [] No Continue Core Strength & Flexibility at home   Home Exercise  *Taj Horan verbalizes planning to start walking around her home 2 days/week ** Weight Management  Weight: **                    BMI: **   Eating Plan  Current eating habits: watches sugar, sodium, not a lot of red meat Cooking more due to increased # of people in household Eating Plan  Changes: less red meat Eating Plan  Changes: Eating Plan  Changes: Eating Plan Improvements:   Alcohol Use  [x] none          [] daily  [] weekly      [] special           Diet Assessment Tool:  RATE YOUR PLATE  *Given to patient to complete and return. Diet Assessment Tool:    Score: 43/69       Diet Assessment Tool: RATE YOUR PLATE  Score: **/03   NUTRITION PLAN  NUTRITION PLAN NUTRITION PLAN NUTRITION PLAN NUTRITION PLAN   *Interventions*  *Interventions* *Interventions* *Interventions* *Interventions*   Initial Survey given  Goal Setting Discussion:   [x] Yes      [] No       Follow Up Survey Reviewed & Goals Updated:     Professional Referral  Please check if needed. [] Dietitian Consult   [] Wt. Management Referral  [] Other:   Professional Referral  Please check if needed. [] Dietitian Consult   [] Wt. Management Referral  [] Other: Professional Referral  Please check if needed. [] Dietitian Consult   [] Wt. Management Referral  [] Other: Professional Referral  Please check if needed. [] Dietitian Consult   [] Wt. Management Referral  [] Other: Professional Referral  Please check if needed. [] Dietitian Consult   [] Wt. Management Referral  [] Other:   *Education*  *Education* *Education* *Education* *Education*   Nutritional Education Recommended    [x] 1:1 Registered Dietitian    Workshops  [x] Label Reading   [x] Menu  [x] Targeting Nutrition Priorities  [x] Fueling a Healthy Body    Nutritional Education Attended/Date Nutritional Education Attended/Date Nutritional Education Attended/Date All Sessions Completed?     [] Yes  [] No   Cooking School  Recommended     [x] Adding Flavor  [x] Fast & Healthy     Breakfasts  [x] Salads & Dressings  [x] Soups & Simple     Sauces  [x] Simple Sides  [x] Appetizers &     Snacks  [x] Delicious Desserts  [x] Plant Proteins  [x] Fast Evening Meals  [x] Weekend Breakfasts  [x] Cook once, Eat       twice  [x] [employer]  [facility] [employer]  Sessions Completed  6/12/20  Plant protein [employer]  Sessions Completed [facility] [employer]  Sessions Completed     Cooking School    # of sessions completed:  **   *Goals*  *Goals* *Goals* *Goals* *Goals*   Arlene's nutritional goals are as follows:  Complete and return diet survey  Arlene's nutritional goals are as follows:  [x] Attend Nutrition Workshops  [x] Attend 1:1   [x] Attend Cooking Classes   Arlene's nutritional goals are as follows:  [] Attend Nutrition Workshops  [] Attend 1:1   [] Attend Cooking Classes  [] Complete and return diet survey  [] ** Arlene's nutritional goals are as follows:  [] Attend Nutrition Workshops  [] Attend 1:1   [] Attend Cooking Classes  [] ** Trace Bussing achieved nutritional goals   [] Yes    [] No  If no, why?   Use knowledge gained to continue Pritikin eating plan at home       Individual Cardiac Treatment Plan - Psychosocial  PSYCHOSOCIAL  ASSESSMENT/PLAN  PSYCHOSOCIAL  REASSESSMENT PSYCHOSOCIAL   REASSESSMENT PSYCHOSOCIAL   REASSESSMENT PSYCHOSOCIAL  DISCHARGE/FOLLOW-UP   Stages of Change  Stages of Change Stages of Change Stages of Change Stages of Change   [] Pre Contemplation  [] Contemplation  [x] Preparation  [] Action  [] Maintenance  [] Relapse  [] Pre Contemplation  [] Contemplation  [x] Preparation  [] Action  [] Maintenance  [] Relapse [] Pre Contemplation  [] Contemplation  [] Preparation  [] Action  [] Maintenance  [] Relapse [] Pre Contemplation  [] Contemplation  [] Preparation  [] Action  [] Maintenance  [] Relapse [] Pre Contemplation  [] Contemplation  [] Preparation  [] Action  [] Maintenance  [] Relapse   PSYCHOSOCIAL ASSESSMENT  PSYCHOSOCIAL ASSESSMENT PSYCHOSOCIAL ASSESSMENT PSYCHOSOCIAL ASSESSMENT PSYCHOSOCIAL ASSESSMENT   Behavioral Outcomes  Behavioral Outcomes Behavioral please explain:  ** Signs and Symptoms of Depression Present? [] Yes      [] No  If yes, please explain:  ** Signs and Symptoms of Depression Present? [] Yes      [] No  If yes, please explain:  **   Signs and Symptoms of Anxiety Present? [x] Yes      [] No  If yes, please explain:  See above  Signs and Symptoms of Anxiety Present? [x] Yes      [] No  If yes, please explain: see above Signs and Symptoms of Anxiety Present? [] Yes      [] No  If yes, please explain:  ** Signs and Symptoms of Anxiety Present? [] Yes      [] No  If yes, please explain:  ** Signs and Symptoms of Anxiety Present? [] Yes      [] No  If yes, please explain:  **   [] Patient has poor eye contact   [] Flat affect present. [] Signs of anxiety, anger or hostility    [] Signs social isolation present. []  Signs of alcohol or substance abuse        PSYCHOSOCIAL PLAN  PSYCHOSOCIAL PLAN PSYCHOSOCIAL PLAN PSYCHOSOCIAL PLAN PSYCHOSOCIAL PLAN   *Interventions*  *Interventions* *Interventions* *Interventions* *Interventions*   *Please check if needed  [] Psych Consult  [] Physician Referral  [x] Stress Management Skills  *Please check if needed  [] Psych Consult  [] Physician Referral  [x] Stress Management Skills *Please check if needed  [] Psych Consult  [] Physician Referral  [] Stress Management Skills *Please check if needed  [] Psych Consult  [] Physician Referral  [] Stress Management Skills *Please check if needed  [] Psych Consult  [] Physician Referral  [] Stress Management Skills   Is patient currently taking anti-depressant or anti-anxiety medications? [x] Yes      [] No  If yes, please list medications:  CYMBALTA  Change in anti-depressant or anti-anxiety medications? [] Yes      [x] No  If yes, please list medications: n/a Change in anti-depressant or anti-anxiety medications? [] Yes      [] No  If yes, please list medications:  ** Change in anti-depressant or anti-anxiety medications?   [] Yes      [] No  If yes, please list medications:  ** Change in anti-depressant or anti-anxiety medications? [] Yes      [] No  If yes, please list medications:  **   *Education*  *Education* *Education* *Education* *Education*   Healthy Mind-Set Workshops Recommended  [x] Stress & Health  [x] Taking Charge of Stress  [x] New Thoughts, New Behaviors  [x] Managing Moods & Relationships  Healthy Mind-Set Workshops Attended/Date Healthy Mind-Set Workshops Attended/Date Healthy Mind-Set Workshops Attended/Date Healthy Mind-Set Workshops  Completed  [] Yes      [] No   *Goals*  *Goals* *Goals* *Goals* *Goals*   Arlene's psychosocial goals are as follows:  Complete HADS & Dian & Roxanne, Quality of Life Index, Cardiac Version IV  Arlene's psychosocial goals are as follows:  [x] Attend Healthy Mind-Set Workshops  [x] Reduce depression symptom severity by 1 level   Arlene's psychosocial goals are as follows:  [] Attend Healthy Mind-Set Workshops  [] Reduce depression symptom severity by 1 level  [] ** Arlene's psychosocial goals are as follows:  [] Attend Healthy Mind-Set Workshops  [] Reduce depression symptom severity by 1 level  [] ** Arlene achieved psychosocial goals?   [] Yes    [] No  If no, why?  **  [] Use methods learned to continue to reduce depression symptom severity by 1 level  [] **     Individual Cardiac Treatment Plan - Other:  Risk Factor/Education  RISK FACTOR  ASSESSMENT/PLAN  RISK FACTOR  REASSESSMENT  RISK FACTOR  REASSESSMENT RISK FACTOR  REASSESSMENT RISK FACTOR   DISCHARGE/FOLLOW-UP   Stages of Change  Stages of Change Stages of Change Stages of Change Stages of Change   [] Pre Contemplation  [x] Contemplation  [] Preparation  [] Action  [] Maintenance  [] Relapse  [] Pre Contemplation  [] Contemplation  [x] Preparation  [] Action  [] Maintenance  [] Relapse [] Pre Contemplation  [] Contemplation  [] Preparation  [] Action  [] Maintenance  [] Relapse [] Pre Contemplation  [] Contemplation  [] Preparation  [] Action  [] FACTOR/EDUCATION PLAN RISK FACTOR/EDUCATION PLAN   *Interventions*  *Interventions* *Interventions* *Interventions* *Interventions*   Recommended Educational Videos    [x] Overview of The Pritikin Eating Plan  [x] Becoming A Pritikin   [x] Diseases of Our Time-Part 1  [x] Calorie Density  [x] Label Reading-Part 1  [x] Move It! [x] Healthy Minds, Bodies, Hearts  [x] Dining Out-Part 1  [x] Heart Disease Risk Reduction  [x] Metabolic Syndrome & Belly Fat  [x] Facts on Fat  [x] Diseases of Our Times-Part 2  [x] Biology of Weight Control  [x] Biomechanical Limitations  [x] Nurtition Action Plan    Completed Videos/Date      3/10/20  Overview of The Pritikin Eating Plan    6/1/20  Becoming A Pritikin     5/18/20  Diseases of Our Time-Part 1    5/27/20  Calorie Density    6/10/20   Label Reading-Part 1    6/8/20  Move It!  5/29/20  Healthy Minds, Bodies, Hearts    5/22/20  Heart Disease Risk Reduction Completed Videos/Date Completed Videos/Date Recommended Educational Videos Completed    [] Yes      [] No    **If not completed, Why? **           Smoking Cessation/Relaspe Prevention Intervention needed? [] Yes      [x] No  *Pt verbalizes and agrees to attend intervention   Smoking Cessation/Relapse Prevention completed? [] Yes      [] No  Date: **    Smoking Cessation Counseling attended  [] Yes      [] No  **If not completed, Why? **   Professional Referrals:  *Please check if needed  [] Diabetes Clinic  [] Lipid Clinic   [] Other:      Professional Referrals:  *Please check if needed  [] Diabetes Clinic  [] Lipid Clinic   [] Other:   Preventative Medication  Preventative Medication Preventative Medication Preventative Medication Preventative Medication   Aspirin  [x] Yes    [] No  Blood Thinner: Clopidogrel/Effient/Brillinta  [x] Yes    [] No  Beta Blocker  [x] Yes    [] No  Ace Inhibitor  [x] Yes    [] No  Statin/Lipid Lowering  [x] Yes    [] No  Medication Changes? [] Yes    [x] No Medication Changes?   [] time   [] ** Arlene's risk factor/education goals are as follows:    [x] Optimal BP <140/90  [] Blood Sugar <120  [x] Attend recommended video education sessions  [x] Takes medications as prescribed 100% of the time   [] ** Arlene achieved risk factor goals?   [] Yes    [] No  If no, why?  **     Monitored telemetry has revealed Afib  [] documented arrhythmia at increasing workloads  [] associated symptoms  Monitored telemetry has revealed At- fib   Monitored telemetry has revealed At- fib  [] Monitored telemetry has revealed **  [] documented arrhythmia at increasing workloads  [] associated symptoms ** Monitored telemetry has revealed **  [] documented arrhythmia at increasing workloads  [] associated symptoms **   Physician Response    [x] Cardiac rehab is reasonably and medically necessary for continuous cardiac monitoring surveillance  of patient's cardiac activity  [x] Initiate continuous telemerty monitoring and notify me with any concerns  [] Other   Physician Response    [x] Cardiac rehab is reasonably and medically necessary for continuous cardiac monitoring surveillance  of patient's cardiac activity  [x] Continue continuous telemerty monitoring and notify me with any concerns  [] Other     Physician Response    [x] Cardiac rehab is reasonably and medically necessary for continuous cardiac monitoring surveillance  of patient's cardiac activity  [x] Continue continuous telemerty monitoring and notify me with any concerns   [] Other     Physician Response    [x] Cardiac rehab is reasonably and medically necessary for continuous cardiac monitoring surveillance  of patient's cardiac activity  [x] Continue continuous telemerty monitoring and notify me with any concerns   [] Other          Cosigned by: Lorena Macias MD at 3/10/2020  1:35 PM   Revision History      Date/Time User Provider Type Action   3/10/2020  1:35 PM Lorena Macias MD Physician Cosign   3/10/2020  1:24 PM [address] [facility] [address] Sign     Cosigned by: Bhavik Mooney MD at 5/21/2020 10:43 AM   Revision History      Date/Time User Provider Type Action   5/21/2020 10:43 AM Bhavik Mooney MD Physician Cosign   5/19/2020  8:04 AM Joaquín Porter Exercise Physiologist Sign

## 2020-06-15 NOTE — PROGRESS NOTES
Video Education Report - ICR/CR    Name:  Cynthia Santamaria     Date:  6/15/2020  MRN: [id number]     Session #:  10  Session Length: 50 min    Recommended Videos        []01 [employer]in [employer] - Program Overview   34:22    []02 Overview of Pritikin Eating Plan   34:10    []03 Becoming a Netta Lady   33:08     []04 Diseases of Our Time - Part 1   34:22    []05 Calorie Density     33:39   []06 Label Reading - Part 1    32:15   []07 Move it      32.54   []08 Healthy Minds, Bodies, Hearts   32:14   [x]09 Dining Out - Part 1    32:28   []10 Heart Disease Risk Reduction   39:44   []18 Metabolic Syndrome and Belly Fat  31:52   []12 Facts on Fat     35:29   []13 Diseases of Our Time - Part 2   33:07   []14 Biology of Weight Control   32:36   []15 Biomechanical Limitations   35:20   []16 Nutrition Action Plan    34:23      Comments:  Video completed, group discussion

## 2020-06-16 ENCOUNTER — OFFICE VISIT (OUTPATIENT)
Dept: CARDIOLOGY CLINIC | Age: 70
End: 2020-06-16
Payer: MEDICARE

## 2020-06-16 VITALS
WEIGHT: 212.6 LBS | BODY MASS INDEX: 32.22 KG/M2 | DIASTOLIC BLOOD PRESSURE: 78 MMHG | HEART RATE: 105 BPM | SYSTOLIC BLOOD PRESSURE: 130 MMHG | HEIGHT: 68 IN

## 2020-06-16 PROCEDURE — 99214 OFFICE O/P EST MOD 30 MIN: CPT | Performed by: INTERNAL MEDICINE

## 2020-06-16 RX ORDER — FUROSEMIDE 20 MG/1
20 TABLET ORAL PRN
Qty: 30 TABLET | Refills: 1 | Status: SHIPPED | OUTPATIENT
Start: 2020-06-16 | End: 2020-07-21 | Stop reason: SDUPTHER

## 2020-06-16 RX ORDER — METOPROLOL TARTRATE 100 MG/1
150 TABLET ORAL 2 TIMES DAILY
Qty: 270 TABLET | Refills: 3 | Status: SHIPPED | OUTPATIENT
Start: 2020-06-16 | End: 2020-11-04 | Stop reason: SDUPTHER

## 2020-06-16 NOTE — PROGRESS NOTES
artery disease involving native heart without angina pectoris    S/P cardiac cath    S/P percutaneous transluminal angioplasty (PTA) with stent placement    Atrial fibrillation status post cardioversion [facility])    Coronary artery disease involving native coronary artery of native heart without angina pectoris    Permanent atrial fibrillation       Past Surgical History:   Procedure Laterality Date    APPENDECTOMY  over 10 years ago    [address] 10/01/2019    CAROTID ENDARTERECTOMY  09/2017    CHOLECYSTECTOMY  over 10 years ago    [address] [facility]      COLONOSCOPY N/A 2/7/2018    COLONOSCOPY WITH BIOPSY performed by Elvis Whittington MD at [address] [facility]  10 plus years ago    Dr. Lina Ocampo Right 2009    OTHER SURGICAL HISTORY Left 2012    Rotator cuff sx    SLEEVE GASTRECTOMY  09/12/2016    Robotic, Extensive Lysis of Adhesions, Removal of [place] Prosthesis - Dr. Behzad Johnson  07/06/2016    Dr. Luisana Severino        Allergies   Allergen Reactions    Demerol Hcl [Meperidine] Nausea And Vomiting        Family History   Problem Relation Age of Onset    Stroke Mother     High Blood Pressure Mother     Atrial Fibrillation Mother     Heart Disease Mother     Diabetes Father     Early Death Father     Cancer Other     Heart Disease Sister         Social History     Socioeconomic History    Marital status:      Spouse name: Not on file    Number of children: Not on file    Years of education: Not on file    Highest education level: Not on file   Occupational History    Not on file   Social Needs    Financial resource strain: Not on file    Food insecurity     Worry: Not on file     Inability: Not on file    Transportation needs     Medical: Not on file     Non-medical: Not on file   Tobacco Use    Smoking status: Former Smoker mouth daily 30 capsule 0    pantoprazole (PROTONIX) 40 MG tablet TAKE 1 TABLET DAILY 90 tablet 3    nitroGLYCERIN (NITROSTAT) 0.4 MG SL tablet up to max of 3 total doses. If no relief after 1 dose, call 911. 25 tablet 1    modafinil (PROVIGIL) 200 MG tablet Take 200 mg by mouth daily.  DULoxetine (CYMBALTA) 20 MG extended release capsule Take 20 mg by mouth daily      traZODone (DESYREL) 50 MG tablet Take 50 mg by mouth nightly      aspirin 81 MG tablet Take 81 mg by mouth daily      traMADol (ULTRAM) 50 MG tablet Take 50 mg by mouth every 6 hours as needed for Pain      Ferrous Fumarate (IRON) 18 MG TBCR Take 1 tablet by mouth daily      Cyanocobalamin (VITAMIN B-12 PO) Take 1 tablet by mouth daily      Cholecalciferol (VITAMIN D3) 5000 UNITS CAPS Take 2 capsules by mouth daily      oxybutynin (DITROPAN) 5 MG tablet Take 15 mg by mouth daily       sulfaSALAzine (AZULFIDINE) 500 MG tablet Take 500 mg by mouth 2 times daily      albuterol sulfate HFA (VENTOLIN HFA) 108 (90 BASE) MCG/ACT inhaler Inhale 2 puffs into the lungs every 6 hours as needed for Wheezing 1 Inhaler 11    levothyroxine (SYNTHROID) 50 MCG tablet Take 50 mcg by mouth daily       hydroxychloroquine (PLAQUENIL) 200 MG tablet Take 200 mg by mouth 2 times daily. No current facility-administered medications for this visit. Review of Systems -     General ROS: negative  Psychological ROS: negative  Hematological and Lymphatic ROS: No history of blood clots or bleeding disorder.    Respiratory ROS: no cough, shortness of breath, or wheezing  Cardiovascular ROS: no chest pain or dyspnea on exertion  Gastrointestinal ROS: negative  Genito-Urinary ROS: no dysuria, trouble voiding, or hematuria  Musculoskeletal ROS: negative  Neurological ROS: no TIA or stroke symptoms  Dermatological ROS: negative      Blood pressure 130/78, pulse 105, height 5' 8\" (1.727 m), weight 212 lb 9.6 oz (96.4 kg), not currently Component Value Date    TRIG 68 08/29/2019    HDL 54 08/29/2019    LDLCALC 40 09/29/2017    LDLDIRECT 75 08/29/2019     TSH:    Lab Results   Component Value Date    TSH 3.630 03/04/2020       EKG 1/5/16  Normal sinus rhythm  Rightward axis  T wave abnormality, consider inferior ischemia  Abnormal ECG  When compared with ECG of 01-JUL-2011 09:56,  Questionable change in The axis  T wave inversion now evident in Inferior leads  Confirmed by Eladia Waller ([id number]) on 12/8/2015 8:38:06 PM    EKG 8/25/16  Marked sinus  Bradycardia   -Left axis -anterior fascicular block.    -Old anteroseptal infarct. ABNORMAL       EKG 9/22/16-sinus bradycardia rate 46 bopm       CONCLUSION:   1. This is a normal sinus rhythm with average heart rate 55 beats per minute,  ranging from  beats per minute. 2. No significant pause of more than 1.9 seconds noted. 3. Rare ventricular ectopic beats, isolated and couplets. 4. Rare supraventricular ectopic beats, isolated, couplets, and rare  nonsustained supraventricular ectopic run. 5. No significant bradyarrhythmia to be addressed, yet certainly average  heart rate 55, minimum is 40, so probably need further clinical correlation. 6. Otherwise, at this level, this minimal low heart rate if there are no  symptoms will need just observation.  Bonnie Jarrell M.D.        D: 11/29/2016 20:0      EKG 8/12/19  NSR, no acute abn       Cath feb 2020    HEMODYNAMICS:  LVEDP 9 mmHg.     CORONARY ANGIOGRAM:  1. Right coronary artery. The ostial RCA has a 10% lesion, proximal  RCA has a 20% lesion. Mid RCA has 30% lesion. Distal RCA with luminal  irregularities. RCA is a dominant vessel. Bifurcates into RPL and  RPDA. RPL has an ostial 20% lesion. RPDA has a 10% to 20% lesion. 2.  Left main coronary artery. Left main coronary artery has a 50%  lesion in the distal left main coronary artery. Bifurcates into left  circumflex artery and LAD. 3.  Left circumflex artery. hypertension     5. Permanent atrial fibrillation         Recent feb 2020 admission DX  PAFB RVR  - more persistent now - s\p TAYLOR / CV to SR 2/24/2020              On [address]               On amiodarone      abnormal stress testing   S\p cardiac cath 2/21/2020:  -Severe stenotic lesion of dLM (Bifurcation lesion, [place] Type 1,1,0)  -Severe 80-90% lesion of ostial/proximal LAD  -S/P Successful PCI with stenting of dLM/pLAD     Hx bradycardia - none this admit     HTN  HLP  DM II  Hx Lt CEA 2007  Hx TIA  Hx SHANNEN      Previous admission DX    Hx of recent TIA with RT side weakness      NSVT 5 beat   S/P gastric sleeve  Hypertensive Urgency  Hx of obstructive Sleep apnea  Pulmonary HTN  New onset post afib with RVR- now rate controlled  Chads of  DM II  HX of TIA         Continue home medication regimen   bP controlled    Doing well        Plan     The recent  record reviewed    Patient Seen, Chart, Consults notes, Labs, Radiology studies reviewed. Permanent atr fib   S/p TAYLOR- CV and weent back to atr fib  Off  amiodarone as failed to be in NSR  atr fib with  bpm at rest   Increase metoprolol 150  BID  Off  cardizem CD increase 180  Mg   Cont calan SR  300 po qd   CHDAS of 4( DM, htn, Hx of TIA)  Need longterm OA and on apixaban  D/w the pat the risk and benefit of OA  Pat understand the risk of bleeding including ICH    Coronary artery disease, seems to be stable. Denies angina or change in breathing pattern  S/ p LM-LAD stent feb 2020  Cont apixaban and plavix  Off ASA 2 weeks back    Patient Seen, Chart, Consults notes, Labs, Radiology studies reviewed. Hx of TIA IN 2016  Was on asa and apixaban    Hypertension, on medical treatment. Seems to be under good control. Patient is compliant with medical treatment.    Cont  calan  mg po qd    Congestive heart failure: no evidence of fluid overload today, no recent hospitalization for CHF  Leg edema +1- None now  Lasix 20 mg po qd PRN     Hyperlipidemia: on statins, followed periodically. Patient need periodic lipid and liver profile. Hx of carotid left carotid endarterectomy in 2017  And started on statin then  Lower leg weakness from lipitor  OFF  lipitor  TOLERATED  lIvalo 1 mg in 3 weeks AND BUT PCP STOPPED IT DUE TO nORMAL LIPID PANEL VALUES  Now taking pravastatin  Cont  pravastatin to 40 mg po qd    Pulm HTN    Hypertension, on medical treatment. Seems to be under good control. Patient is compliant with medical treatment. Intermittent serena- cluadio  WNL and probably neuropathic    Reviewed the  report of R[facility] from [facility]    patient is advised to exercise 30 min s a day three times a week and about weight loss ,balance diet and    More fruits and vegetables . Discussed use, benefit, and side effects of prescribed medications. All patient questions answered. Pt voiced understanding. Instructed to continue current medications, diet and exercise. Continue risk factor modification and medical management. Patient agreed with treatment plan. Follow up as directed.     I spent 25 minutes involved in face-to-face discussion of medical issues, prognosis, record review  and plan with the patient today and more than 50% of the time was spent on counseling and coordination of care      RTC in  4 week for rate  control      Melba Donis [facility]

## 2020-06-17 ENCOUNTER — HOSPITAL ENCOUNTER (OUTPATIENT)
Dept: CARDIAC REHAB | Age: 70
Setting detail: THERAPIES SERIES
Discharge: HOME | End: 2020-06-17
Payer: MEDICARE

## 2020-06-17 PROCEDURE — G0423 INTENS CARDIAC REHAB NO EXER: HCPCS

## 2020-06-17 PROCEDURE — G0422 INTENS CARDIAC REHAB W/EXERC: HCPCS

## 2020-06-17 NOTE — PROGRESS NOTES
Video Education Report - ICR/CR    Name:  Camron Eller     Date:  6/17/2020  MRN: [id number]     Session #:    Session Length: 40 min    Recommended Videos        []01 [employer] - Program Overview   34:22    []02 Overview of Pritikin Eating Plan   34:10    []03 Becoming a Pritikin    33:08     []04 Diseases of Our Time - Part 1   34:22    []05 Calorie Density     33:39   []06 Label Reading - Part 1    32:15   []07 Move it      32.54   []08 Healthy Minds, Bodies, Hearts   32:14   []09 Dining Out - Part 1    32:28   []10 Heart Disease Risk Reduction   56:78   []48 Metabolic Syndrome and Belly Fat  31:52   []12 Facts on Fat     35:29   []13 Diseases of Our Time - Part 2   33:07   []14 Biology of Weight Control   32:36   []15 Biomechanical Limitations   35:20   []16 Nutrition Action Plan    34:23    Additional Videos         []17 Hypertension & Heart Disease   32:39        []18 Cooking Breakfasts and Snacks  32:00   []19 Planning Your Eating Strategy   33:30   []20 Label Reading - Part 2    32:36  []21 Cooking Soups and Desserts   31:41  []22 How Our Thoughts Can Heal Our Hearts 33:05  []23 Targeting Your Nutrition Priorities  33:58  []24 Healthy Salads & Dressings   35:32  []25 Dining Out - Part 2    32:35  []26 Cooking Dinner and Sides   35:06  []27 Sleep Disorders     33:14  [x]28 Menu Workshop     32:06  []29 Decoding Lab Results    32:42     []30 Vitamins and Minerals    32:54  []31 Exercise Action Plan    32:26  []32 Body Composition    34:03  []33 Improving Performance    32:13  []34 Fueling a Healthy Body    31:32  []35 Introduction to Yoga    33:47  []36 Aging-Enhancing the Quality of Your Life 33:22  []37 Smoking Cessation    36:19    Comments:  Video completed,

## 2020-06-19 ENCOUNTER — HOSPITAL ENCOUNTER (OUTPATIENT)
Dept: CARDIAC REHAB | Age: 70
Setting detail: THERAPIES SERIES
Discharge: HOME | End: 2020-06-19
Payer: MEDICARE

## 2020-06-19 ENCOUNTER — APPOINTMENT (OUTPATIENT)
Dept: CARDIAC REHAB | Age: 70
End: 2020-06-19
Payer: MEDICARE

## 2020-06-19 PROCEDURE — G0422 INTENS CARDIAC REHAB W/EXERC: HCPCS

## 2020-06-19 PROCEDURE — G0423 INTENS CARDIAC REHAB NO EXER: HCPCS

## 2020-06-19 NOTE — PROGRESS NOTES
Idalia MITCHELL.:  1950  Acct Number: [de-identified]  MRN:  [id number]                  [facility] COOKING SCHOOL WORKSHOP             Date: 2020        Session # ________   Edith Sos class covered:      () Adding Flavor     () Fast Breakfasts     () Salads and Dressings     () Soups and Sauces     () Simple Sides     () Appetizers and Snacks     () Delicious Desserts     () Plant Based Proteins     (X) Fast Evening Meals     () Weekend Breakfasts     () Efficiency Cooking     () Meat Alternatives     Patients were shown how to choose, prep, and cook; substitutions and other options were given. Samples were offered. Recipes were given and questions answered. The patient above was in the [employer] for 45 minutes.       Electronically signed by Khushboo BAILEY [address] [place]  on 2020 at 3:32 PM

## 2020-06-22 ENCOUNTER — HOSPITAL ENCOUNTER (OUTPATIENT)
Dept: CARDIAC REHAB | Age: 70
Setting detail: THERAPIES SERIES
Discharge: HOME | End: 2020-06-22
Payer: MEDICARE

## 2020-06-22 PROCEDURE — G0422 INTENS CARDIAC REHAB W/EXERC: HCPCS

## 2020-06-22 PROCEDURE — G0423 INTENS CARDIAC REHAB NO EXER: HCPCS

## 2020-06-22 NOTE — PROGRESS NOTES
Hospital Facility-Based Program  Phase 2 Cardiac Rehab Weekly Progress Report      Patient prescribed exercise:  3:00 class. 3 times per week in rehab, 1-4 times per week at home for the amount of sessions/weeks specified by insurance. Current Levels:NuStep:  30 Evans for 12 minutes x 2, UBE: 22 evans  for 5 minutes. Progression Discussion: Maintain/Increase Aerobic exercise 29 minutes to work on endurance. Attempt to increase intensity by 5-20% for each modality this week. Try to increase intensities until Erwin Martinez rates the exercises a 13-17 on Edmundo RPE.

## 2020-06-24 ENCOUNTER — APPOINTMENT (OUTPATIENT)
Dept: CARDIAC REHAB | Age: 70
End: 2020-06-24
Payer: MEDICARE

## 2020-06-24 ENCOUNTER — HOSPITAL ENCOUNTER (OUTPATIENT)
Dept: CARDIAC REHAB | Age: 70
Setting detail: THERAPIES SERIES
Discharge: HOME | End: 2020-06-24
Payer: MEDICARE

## 2020-06-24 NOTE — PROGRESS NOTES
Jose RODRÍGUEZO.B.:  1950    Acct Number: [de-identified]   MRN:  [id number]                             [facility] HEALTHY MIND-SET WORKSHOP             Date: 2020        Session #________    Todays class covered:    ( )  Stress and Health Workshop:  [facility] patient will learn about the body's adaptive response that is triggered by a variety of stressors. Patient will gain insight into the toll that chronic stress takes on their health, both emotionally and physically. ( ) Taking Charge of Stress Workshop:  [facility] patient will learn and practice a variety of stress management techniques. Patient will be able to effectively apply coping mechanisms in perceived stressful situations. ( x) New Thoughts New Behaviors Workshop: [facility] patient will learn and practice techniques for developing effective health and lifestyle goals. Patient will be able to effectively apply the goal setting process learned to develop at least one new personal goal.     ( ) Managing Moods & Relationships Workshop:  [facility] patient will learn how emotional and chronic stress factors can impact their hearts. They will learn healthy ways to handle stress and utilize positive coping mechanisms. In addition, [facility] patient will learn ways to improve communication skills. [facility] actively participated and verbalized understanding. Total time in the Healthy Mind-Set class was 60 minutes.     Electronically signed by KARLY Echeverria on 2020 at 4:16 PM

## 2020-06-26 ENCOUNTER — HOSPITAL ENCOUNTER (OUTPATIENT)
Dept: CARDIAC REHAB | Age: 70
Setting detail: THERAPIES SERIES
End: 2020-06-26
Payer: MEDICARE

## 2020-06-26 ENCOUNTER — APPOINTMENT (OUTPATIENT)
Dept: CARDIAC REHAB | Age: 70
End: 2020-06-26
Payer: MEDICARE

## 2020-06-29 ENCOUNTER — HOSPITAL ENCOUNTER (OUTPATIENT)
Dept: CARDIAC REHAB | Age: 70
Setting detail: THERAPIES SERIES
Discharge: HOME | End: 2020-06-29
Payer: MEDICARE

## 2020-06-29 PROCEDURE — G0423 INTENS CARDIAC REHAB NO EXER: HCPCS

## 2020-06-29 PROCEDURE — G0422 INTENS CARDIAC REHAB W/EXERC: HCPCS

## 2020-07-01 ENCOUNTER — HOSPITAL ENCOUNTER (OUTPATIENT)
Dept: CARDIAC REHAB | Age: 70
Setting detail: THERAPIES SERIES
Discharge: HOME | End: 2020-07-01
Payer: MEDICARE

## 2020-07-01 ENCOUNTER — APPOINTMENT (OUTPATIENT)
Dept: CARDIAC REHAB | Age: 70
End: 2020-07-01
Payer: MEDICARE

## 2020-07-01 PROCEDURE — G0423 INTENS CARDIAC REHAB NO EXER: HCPCS

## 2020-07-01 PROCEDURE — G0422 INTENS CARDIAC REHAB W/EXERC: HCPCS

## 2020-07-01 NOTE — PROGRESS NOTES
[address] [place] [address]   :  1950    Acct Number: [de-identified]   MRN:  [id number]                             ICR NUTRITION 1:1 Counseling             Date: 2020       Session # _______     Alvin Cutler class covered:    1:1 Counseling Session  Receptiveness to education/goals:  (x ) Agreeable ( ) No Interest   ( ) Refused  Evaluation of education:  (x ) Indicates understanding   ( ) Needs reinforcement     () Unsuccessful     Readiness to change:    ( ) Pre-contemplative   ( ) Contemplative - ambivalent about change    ( ) Action - ready to set action plan and implement   ( x) Maintenance - has made change and is trying, and or practicing different alternative behaviors     GOALS:  1. Improve her strength. Discussed chair exercises, and other home based activities. Encouraged her to make a plan for physical activity after ICR finishes. 2.  Focus on 2/3 of each meal being low calorie dense fruits & vegetables    Carole Pinto reports making some changes since starting [employer] ICR including eating more fruits & vegetables, has tried some fish and has been eating it 2-3 times per week. Reports relatively stable weight. Reports her BG have been under control since her harika surgery, last Hgb A1c 4.7%. Food Recall:  Breakfast:  Coffee, everything bagel  Lunch:  Skipped, ate supper early  Dinner:  Beef tips with gravy (smaller portion), mashed potatoes, peas, brocoli, cooked carrots,   Snacks:  Popcorn  Main Beverages:  Propel grape, bottle of water, 1/2 can [employer]: both her & her   Meal Prep/Cooking: herself  Dining Out: 1-2 times per week    Carole Pinto was counseled by the Dietitian for 45 minutes. Motivational Interviewing was used to help promote change. Patient voiced understanding.      Electronically signed by Alley BAILEY [address] on 2020 at 1:54 PM

## 2020-07-03 ENCOUNTER — APPOINTMENT (OUTPATIENT)
Dept: CARDIAC REHAB | Age: 70
End: 2020-07-03
Payer: MEDICARE

## 2020-07-03 ENCOUNTER — HOSPITAL ENCOUNTER (OUTPATIENT)
Dept: CARDIAC REHAB | Age: 70
Setting detail: THERAPIES SERIES
End: 2020-07-03
Payer: MEDICARE

## 2020-07-06 ENCOUNTER — APPOINTMENT (OUTPATIENT)
Dept: CARDIAC REHAB | Age: 70
End: 2020-07-06
Payer: MEDICARE

## 2020-07-06 ENCOUNTER — HOSPITAL ENCOUNTER (OUTPATIENT)
Dept: CARDIAC REHAB | Age: 70
Setting detail: THERAPIES SERIES
Discharge: HOME | End: 2020-07-06
Payer: MEDICARE

## 2020-07-06 PROCEDURE — G0423 INTENS CARDIAC REHAB NO EXER: HCPCS

## 2020-07-06 PROCEDURE — G0422 INTENS CARDIAC REHAB W/EXERC: HCPCS

## 2020-07-06 NOTE — PROGRESS NOTES
Hospital Facility-Based Program  Phase 2 Cardiac Rehab Weekly Progress Report      Patient prescribed exercise:  3:00 class. 3 times per week in rehab, 1-4 times per week at home for the amount of sessions/weeks specified by insurance. Current Levels: NuStep:  32 Lynn for 10-15 minutes x2, UBE: 26 Lynn for 5 minutes. Progression Discussion: Maintain/Increase Aerobic exercise 15 minutes to work on endurance. Attempt to increase intensity by 5-20% for each modality this week. Try to increase intensities until Coralyn Collar rates the exercises a 13-17 on Edmundo RPE.

## 2020-07-06 NOTE — PROGRESS NOTES
Video Education Report - ICR/CR    Name:  Daina Eldridge     Date:  7/6/2020  MRN: [id number]     Session #:    Session Length: 40 min    Recommended Videos        []01 [employer] - Program Overview   34:22    []02 Overview of Prit[employer]in Eating Plan   34:10    []03 Becoming a Pritikin    33:08     []04 Diseases of Our Time - Part 1   34:22    []05 Calorie Density     33:39   []06 Label Reading - Part 1    32:15   []07 Move it      32.54   []08 Healthy Minds, Bodies, Hearts   32:14   []09 Dining Out - Part 1    32:28   []10 Heart Disease Risk Reduction   35:05   []22 Metabolic Syndrome and Belly Fat  31:52   [x]12 Facts on Fat     35:29   []13 Diseases of Our Time - Part 2   33:07   []14 Biology of Weight Control   32:36   []15 Biomechanical Limitations   35:20   []16 Nutrition Action Plan    34:23      Comments:  Video completed,

## 2020-07-08 ENCOUNTER — HOSPITAL ENCOUNTER (OUTPATIENT)
Dept: CARDIAC REHAB | Age: 70
Setting detail: THERAPIES SERIES
Discharge: HOME | End: 2020-07-08
Payer: MEDICARE

## 2020-07-08 ENCOUNTER — APPOINTMENT (OUTPATIENT)
Dept: CARDIAC REHAB | Age: 70
End: 2020-07-08
Payer: MEDICARE

## 2020-07-08 PROCEDURE — G0422 INTENS CARDIAC REHAB W/EXERC: HCPCS

## 2020-07-08 PROCEDURE — G0423 INTENS CARDIAC REHAB NO EXER: HCPCS

## 2020-07-08 NOTE — PROGRESS NOTES
Eunice MITCHELL.:  1950    Acct Number: [de-identified]   MRN:  [id number]                             [facility] NUTRITION WORKSHOP             Date: 2020        Session # _______    Eugenio Vickie class covered:    ()  Label Reading  ()  Menus  ()  Targeting Nutrition Priorities  (X)  Fueling a Healthy Body    Readiness to change:    ( ) Pre-contemplative   ( ) Contemplative - ambivalent about change    ( ) Action - ready to set action plan and implement   ( X) Maintenance - has made change and is trying, and or practicing different alternative behaviors     Jayceeraul Mcnulty was in the Workshop with the Dietitian for 45 minutes. The content was presented via Powerpoint, lecture, and patient participation based format. Motivational interviewing was utilized when needed, to promote change. Patient voiced understanding.     Electronically signed by Amol Christina RD LD [address] [place]  on 2020 at 4:01 PM

## 2020-07-09 NOTE — PROGRESS NOTES
[address] [place] [address] Facility-Based Program  Individualized Cardiac Treatment Plan    Patient Name:  Vishnu Malcolm  :  1950  Age:  65 y.o. MRN:  [id number]  Diagnosis: PCI  Date of Event: 20   Physician:  Jonathan Ocampo  Next Office Visit:  3/17/20  Date Entered Program: 3/10/20  Risk Stratifications: [] Low [x] Intermediate [] High  Allergies: Allergies   Allergen Reactions    Demerol Hcl [Meperidine] Nausea And Vomiting        Individual Cardiac Treatment Plan -EXERCISE  INITIAL Return from Covid 19 closure 30 DAY FINAL DAY   EXERCISE  ASSESSMENT/PLAN  EXERCISE  REASSESSMENT EXERCISE  DISCHARGE/FOLLOW-UP   Date: 3/10/20 5/18/20 Date: 6/15/20 Date: 2020   Session #1  Session # 12 Session # 32    Last session completed on **     Stages of Change  Stages of Change Stages of Change   [] Pre Contemplation  [x] Contemplation  [] Preparation  [] Action  [] Maintenance  [] Relapse  [] Pre Contemplation  [] Contemplation  [x] Preparation  [] Action  [] Maintenance  [] Relapse [] Pre Contemplation  [] Contemplation  [x] Preparation  [] Action  [] Maintenance  [] Relapse   EXERCISE ASSESSMENT  EXERCISE ASSESSMENT EXERCISE ASSESSMENT   NUSTEP Test  Distance:   0.23 miles  1214.4ft.  2.8 METs  Max HR:121 BPM      RPE:  12    THR:  125-134  Rhythm:  afib   6 Min Walk Test  Distance walked:   ** miles  ** ft  ** METs  Max HR:** BPM      RPE:  **  %Change ft= **    Rhythm:  **   DASI: 4.1 METs 4.7 DASI: 4.1 METs DASI: 6.0 METs   Return to Work  [employer] on returning to work? 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 Return to work:  Has Abril Look returned to work? [] Yes    [x] No     Return to work:  Has Abril Look returned to work? [] Yes    [x] No - retired      Orthopedic Limitations/  [x] Yes    [] No  If yes please list:  One knee replaced, the other needs replaced. Arthritis throughout.        Orthopedic Limitations  *If patient has orthopedic issue:   Actions/  accomodations needed to make Radha Rivas successful :only uses NuStep Orthopedic Limitations  NUSTEP only     Fall Risk  Fall risk assessed? [x] Yes      [] No    Balance Issues? [x] Yes      [] No     [] Walker [x] Cane - only uses for long distances    [x] Safety issues reviewed       Fall Risk  *If patient is a fall risk, action needed to accommodate:  Uses a cane [address] Exercise  [x] Yes    [] No  Type: stretching and walking   Frequency: daily  Duration: 5 min No current exercise Home Exercise  [x] Yes    [] No  Type: walking  Frequency: 1-2x/wk  Duration: 5-10 min Home Exercise  [x] Yes    [] No  Type: walking/bike  Frequency: 3d/wk  Duration: 20   Angina with Activity? [] Yes    [x] No    Angina with Activity? [] Yes    [x] No  Angina Management: n/a Angina with Activity?   [] Yes    [x] No     EXERCISE PLAN  EXERCISE PLAN EXERCISE PLAN   *Interventions*  *Interventions* *Interventions*   Exercise Prescription  (per physician & CR staff)  Exercise Prescription  (per physician & CR staff) Exercise Prescription  (per physician & CR staff)   Cardiovascular  Cardiovascular Cardiovascular   Mode:    [x] Arms Ergometer (AE)  [x] NuStep    MODE:    [] Treadmill (TM)  [] Schwinn Airdyne (AD)  [] Arms Ergometer (AE)  [x] NuStep  [] Elliptical (E) MODE:    [x] Arms Ergometer (AE)  [x] NuStep     Initial Workloads  NS: 36  Lynn= 2.1 METs  AE: 26  = 1.9 METs  Current Workloads    NS:  30 Lynn= 2 METs  AE:  22 Lynn = 1.8 METs Current Workloads  NS: 34watts  Lynn= 2.1 METs  AE:  26watts = 1.9  METs     Frequency:    ICR: 3x/week  Home: 2-3x/wk  Frequency:   ICR: 3x/week  Home: 3x/wk Frequency:  Radha Marshs will continue exercise at  5-7 days/week   Duration:   Total aerobic exercise = 30-45 min    5-8 min/mode  Duration:  Total aerobic exercises = 25 min     10min/mode Duration:  Total erobic exercise =  60-90 min   Intensity:   MET Level = 2.1  RPE = 12-15  Intensity:  Max MET Level = 2  RPE = 12-15 Intensity:  Max MET Level = 2.1 RPE = 12-15   Progression: increase aerobic activity up to 15 min over next 4 weeks by increasing time 2-3 min/week. Progression:  increase aerobic activity up to 15 min over next 4 weeks by increasing time 2-3 min/week. Progression:  Increase time/intensity when RPE <13, and HR is in [facility]   [x] Yes      [] No  Upper and Lower body strength training 2x/wk    Wt: 2#       Reps:  8-15    *Increase wt. after completing 15 reps with an RPE of <12/13. [x] Yes      [] No  Upper and Lower body strength training 2x/wk    Wt: 2#       Reps:  8-15    *Increase wt. after completing 15 reps with an RPE of <12/13. Continue Strength Training at home   [x] Exercise Log & Strength training handout given     Wt: 2#       Reps:  8-15    *Increase wt. after completing 15 reps with an RPE of <12/13. Flexibility  Flexibility Flexibility   [x] Yes      [] No  25 min session of Core Strength & Flexibility 1x/per week   Attends Core Strength & Flexibility   [x] Yes      [] No Continue Core Strength & Flexibility at home   Home Exercise  *Mavis Adorno verbalizes planning to start walking around her home 2 days/week for 5-10 min on days not in rehab. Home Exercise  *Arlene verbalizes planning to start walking around her home 2 days/week for 5-10 min on days not in rehab.  Home Exercise  *Arlene verbalizes his/her plan to continue walking 3 days/week for 20 min @ home   *Education*  *Education* *Education*   RPE Scale  [x] Yes      [] No  Exercise Safety  [x] Yes      [] No  Equipment Orientation  [x] Yes      [] No  S/S to Report  [x] Yes      [] No  Warm Up/Cool Down  [x] Yes      [] No  Home Exercise  [x] Yes      [] No   All Exercise Education Completed  [x] Yes      [] No   Exercise Education Recommended    Workshops  [x] Improving Performance  [x] Balance Training & Fall Prevention  [x] Exercise ASSESSMENT  NUTRITION ASSESSMENT NUTRITION ASSESSMENT   Weight Management  Weight: 213.8        Height: 5'8\"   BMI: 32.5   Weight Management  Weight: 217.8               Weight Management  Weight: 217                    BMI: 33   Eating Plan  Current eating habits: watches sugar, sodium, not a lot of red meat Cooking more due to increased # of people in household Eating Plan  Changes: less red meat Eating Plan Improvements: watching labels and mindful cooking   Alcohol Use  [x] none          [] daily  [] weekly      [] special         Diet Assessment Tool:  RATE YOUR PLATE  *Given to patient to complete and return. Diet Assessment Tool:    Score: 43/69     Diet Assessment Tool: RATE YOUR PLATE  Score: **/68   NUTRITION PLAN  NUTRITION PLAN NUTRITION PLAN   *Interventions*  *Interventions* *Interventions*   Initial Survey given  Goal Setting Discussion:   [x] Yes      [] No     Follow Up Survey Reviewed & Goals Updated:     Professional Referral  Please check if needed. [] Dietitian Consult   [] Wt. Management Referral  [] Other:   Professional Referral  Please check if needed. [] Dietitian Consult   [] Wt. Management Referral  [] Other: Professional Referral  Please check if needed. [] Dietitian Consult   [] Wt. Management Referral  [] Other:   *Education*  *Education* *Education*   Nutritional Education Recommended    [x] 1:1 Registered Dietitian    Workshops  [x] Label Reading   [x] Menu  [x] Targeting Nutrition Priorities  [x] Fueling a Healthy Body    Nutritional Education Attended/Date All Sessions Completed?     [] Yes  [x] No    Fueling a healthy body - 7/8    Menu video - 6/17   Cooking School  Recommended     [x] Adding Flavor  [x] Fast & Healthy     Breakfasts  [x] Salads & Dressings  [x] Soups & Simple     Sauces  [x] Simple Sides  [x] Appetizers &     Snacks  [x] Delicious Desserts  [x] Plant Proteins  [x] Fast Evening Meals  [x] Weekend Breakfasts  [x] Cook once, Eat       twice  [x] [place] Alternatives  Cooking School  Sessions Completed  6/12/20  Plant protein [employer]    # of sessions completed:  **    Fast evening meals - 6/19       *Goals*  *Goals* *Goals*   Meena nutritional goals are as follows:  Complete and return diet survey  Meena nutritional goals are as follows:  [x] Attend Nutrition Workshops  [x] Attend 1:1   [x] Attend [address] [employer] achieved nutritional goals   [x] Yes    [] No  If no, why? Use knowledge gained to continue Pritikin eating plan at home       Individual Cardiac Treatment Plan - Psychosocial  PSYCHOSOCIAL  ASSESSMENT/PLAN  PSYCHOSOCIAL  REASSESSMENT PSYCHOSOCIAL  DISCHARGE/FOLLOW-UP   Stages of Change  Stages of Change Stages of Change   [] Pre Contemplation  [] Contemplation  [x] Preparation  [] Action  [] Maintenance  [] Relapse  [] Pre Contemplation  [] Contemplation  [x] Preparation  [] Action  [] Maintenance  [] Relapse [] Pre Contemplation  [] Contemplation  [x] Preparation  [] Action  [] Maintenance  [] Relapse   PSYCHOSOCIAL ASSESSMENT  PSYCHOSOCIAL ASSESSMENT PSYCHOSOCIAL ASSESSMENT   Behavioral Outcomes  Behavioral Outcomes Behavioral Outcomes   Tool Used:  Ferrans & Oliveira, Quality of Life Index, Cardiac Version IV  *Given to patient to complete. Tool Used:    Ferrans & Oliveira, Quality of Life Index, Cardiac Version IV     QOL Index Score: 16.62  HF:12.2  S&E:21.43  P&S: 17.67  Family: 21.9 Tool Used:     Ferrans & Oliveira, Quality of Life Index, Cardiac Version IV    QOL Index Score: **  HF:**  S&E:**  P&S: **  Family: **   PHQ-9 score 8  Depression Severity  []Minimal  [x]Mild   []Moderate  []Moderately Severe  []Severe   PHQ-9 score **  Depression Severity  []Minimal  []Mild   []Moderate  []Moderately Severe []Severe   Does patient have Family Support? [x] Yes      [] No  No signs of marital/family distress      Within the Past Month:  *Have you wished you were dead or wished you could go to sleep and not wake up?   [] Yes      [x] No  *Have you had any thoughts of killing yourself? [] Yes      [x] No        Using a scale of 0-10, 0=none, 10=very:   Rate your depression: 6  Rate your anxiety:  4 Using a scale of 0-10, 0=none, 10=very:   Rate your depression: 8  Rate your anxiety:  8  Using a scale of 0-10, 0=none, 10=very:   Rate your depression: 5  Rate your anxiety: 3 Using a scale of 0-10, 0=none, 10=very:   Rate your depression: 4  Rate your anxiety:  0   Signs and Symptoms of Depression Present? [x] Yes      [] No  If yes, please explain:  Pt stated that she is depressed. Her  and daughter both have stage 4 cancers. She is raising her 16year old grandson. She is worried about her own health. Daughter is sick with cancer Signs and Symptoms of Depression Present? [x] Yes      [] No  If yes, please explain: Daughter is sick. Signs and Symptoms of Depression Present? [x] Yes      [] No  If yes, please explain:  Self reported   Signs and Symptoms of Anxiety Present? [x] Yes      [] No  If yes, please explain:  See above  Signs and Symptoms of Anxiety Present? [x] Yes      [] No  If yes, please explain: see above Signs and Symptoms of Anxiety Present? [x] Yes      [] No  If yes, please explain: self reported   [] Patient has poor eye contact   [] Flat affect present. [] Signs of anxiety, anger or hostility    [] Signs social isolation present. []  Signs of alcohol or substance abuse      PSYCHOSOCIAL PLAN  PSYCHOSOCIAL PLAN PSYCHOSOCIAL PLAN   *Interventions*  *Interventions* *Interventions*   *Please check if needed  [] Psych Consult  [] Physician Referral  [x] Stress Management Skills  *Please check if needed  [] Psych Consult  [] Physician Referral  [x] Stress Management Skills *Please check if needed  [] Psych Consult  [] Physician Referral  [x] Stress Management Skills   Is patient currently taking anti-depressant or anti-anxiety medications?   [x] Yes      [] No  If yes, please list medications:  Bailee Lozano Change in anti-depressant or anti-anxiety medications? [] Yes      [x] No  If yes, please list medications: n/a Change in anti-depressant or anti-anxiety medications? [x] Yes      [] No  If yes, please list medications:  CYMBALTA and DSYREL   *Education*  *Education* *Education*   Healthy Mind-Set Workshops Recommended  [x] Stress & Health  [x] Taking Charge of Stress  [x] New Thoughts, New Behaviors  [x] Managing Moods & Relationships  Healthy Mind-Set Workshops Attended/Date Healthy Mind-Set Workshops  Completed  [] Yes      [x] No       *Goals*  *Goals* *Goals*   Arlene's psychosocial goals are as follows:  Complete HADS & Dian & Roxanne, Quality of Life Index, Cardiac Version IV  Arlene's psychosocial goals are as follows:  [x] Attend Healthy Mind-Set Workshops  [x] Reduce depression symptom severity by 1 level   Barbara Leaver achieved psychosocial goals?   [] Yes    [] No  If no, why?  **  [] Use methods learned to continue to reduce depression symptom severity by 1 level  [] **     Individual Cardiac Treatment Plan - Other:  Risk Factor/Education  RISK FACTOR  ASSESSMENT/PLAN  RISK FACTOR  REASSESSMENT  RISK FACTOR   DISCHARGE/FOLLOW-UP   Stages of Change  Stages of Change Stages of Change   [] Pre Contemplation  [x] Contemplation  [] Preparation  [] Action  [] Maintenance  [] Relapse  [] Pre Contemplation  [] Contemplation  [x] Preparation  [] Action  [] Maintenance  [] Relapse [] Pre Contemplation  [] Contemplation  [x] Preparation  [] Action  [] Maintenance  [] Relapse   RISK FACTOR/EDUCATION ASSESSMENT  RISK FACTOR/EDUCATION ASSESSMENT RISK FACTOR /EDUCATION ASSESSMENT   Hypertension  [x] Yes      [] No    Resting BP: 124/70  Peak Ex BP:132/68  Medication: CATAPRES, COZAAR    Hypertension  Resting BP: 136/68  Peak Ex BP:152/84  Medication Changes:  [x] Yes      [] No     Hypertension  Resting BP: 124/62  Peak Ex BP:162/64  Medication Changes:  [] Yes      [x] No   Lipids  HLD/DLD  [x] Yes      [] No  TOTAL CHOL: 132  HDL:  54  LDL:  75  TRI  Medication: LIPITOR  Lipids  Medication Changes:  [] Yes      [x] No     Lipids    TOTAL CHOL: 132  HDL:  54  LDL:  75  TRI  Medication Changes:  [] Yes      [x] No   Diabetes  [] Yes      [x] No    Diabetes  na   Diabetes         Tobacco Use  [] Current  [x] Former  [] Never     Date Quit:     Tobacco Use  Change in smoking status   [] Yes      [x] No       Tobacco Use  Change in smoking status   [] Yes      [x] No               Learning Barriers  Please select one:  [] Speech  [] Literacy  [] Hearing  [] Cognitive  [] Vision  [x] Ready to Learn  Learning Barriers Addressed:   [] Yes      [] No  na Learning Barriers Addressed:  [] Yes      [] No  NA     RISK FACTOR/EDUCATION PLAN  RISK FACTOR/EDUCATION PLAN RISK FACTOR/EDUCATION PLAN   *Interventions*  *Interventions* *Interventions*   Recommended Educational Videos    [x] Overview of The Pritikin Eating Plan  [x] Becoming A Pritikin   [x] Diseases of Our Time-Part 1  [x] Calorie Density  [x] Label Reading-Part 1  [x] Move It! [x] Healthy Minds, Bodies, Hearts  [x] Dining Out-Part 1  [x] Heart Disease Risk Reduction  [x] Metabolic Syndrome & Belly Fat  [x] Facts on Fat  [x] Diseases of Our Times-Part 2  [x] Biology of Weight Control  [x] Biomechanical Limitations  [x] Nurtition Action Plan    Completed Videos/Date      3/10/20  Overview of The Pritikin Eating Plan    20  Becoming A Pritikin     20  Diseases of Our Time-Part 1    20  Calorie Density    6/10/20   Label Reading-Part 1    20  Move It!  20  Healthy Minds, Bodies, Hearts    20  Heart Disease Risk Reduction Recommended Educational Videos Completed    [] Yes      [x] No    **If not completed, Why? Pt had to stop program early due to insurance    Dining out  -     Metabolic syndrome - . Facts on fat -          Smoking Cessation/Relaspe Prevention Intervention needed?   [] Yes      [x] No  *Pt verbalizes and agrees to attend intervention   Smoking Cessation Counseling attended  NA   Professional Referrals:  *Please check if needed  [] Diabetes Clinic  [] Lipid Clinic   [] Other:    Professional Referrals:  *Please check if needed  [] Diabetes Clinic  [] Lipid Clinic   [] Other:   Preventative Medication  Preventative Medication Preventative Medication   Aspirin  [x] Yes    [] No  Blood Thinner: Clopidogrel/Effient/Brillinta  [x] Yes    [] No  Beta Blocker  [x] Yes    [] No  Ace Inhibitor  [x] Yes    [] No  Statin/Lipid Lowering  [x] Yes    [] No  Medication Changes? [] Yes    [x] No Medication Changes? [] Yes    [x] No   *Education*  *Education* *Education*   Does Maru Bravo require any additional education? [] Yes    [x] No    Does Maru Bravo require any additional education? [] Yes    [x] No Does Maru Bravo require any additional education?   [] Yes    [x] No   Recommended Additional Educational Videos    Medical  [] Hypertension & Heart Disease  [] Decoding Lab Results  [] Aging Enhancing Your QoL  [] Sleep Disorders  Exercise  [] Body Composition  [] Improve Performance  [] Exercise Action Plan  [] Intro to Yoga  Behavioral  [] How Our Thoughts Can Heal Our Hearts  [] Smoking Cessation  Nutrition  [] Planning Your Eating Strategy  [] Lable Reading- Part 2  [] Dining Out - Part 2  [] Targeting Your Nutrition Priorities  [] Fueling a Healthy Body  [] Menu Workshop  [employer] [] Breakfast & Snacks  [] [employer] Salads & Dressing  [] [address] [place] [address]  [] Soups & Desserts    Additional Educational Videos Completed    n/a Additional Educational Videos Completed    NA   *Goals*  *Goals* *Goals*   Arlene's risk factor/education goals are as follows:    [x] Optimal BP <140/90  [] Blood Sugar <120  [x] Attend recommended video education sessions  [x] Takes medications as prescribed 100% of the time     Arlene's risk factor/education goals are as follows:    [x] Optimal BP <140/90  [] Blood Sugar <120  [x] Attend recommended

## 2020-07-10 ENCOUNTER — APPOINTMENT (OUTPATIENT)
Dept: CARDIAC REHAB | Age: 70
End: 2020-07-10
Payer: MEDICARE

## 2020-07-10 ENCOUNTER — HOSPITAL ENCOUNTER (OUTPATIENT)
Dept: CARDIAC REHAB | Age: 70
Setting detail: THERAPIES SERIES
Discharge: HOME | End: 2020-07-10
Payer: MEDICARE

## 2020-07-10 PROCEDURE — G0422 INTENS CARDIAC REHAB W/EXERC: HCPCS

## 2020-07-10 PROCEDURE — G0423 INTENS CARDIAC REHAB NO EXER: HCPCS

## 2020-07-10 NOTE — PROGRESS NOTES
Celeste MITCHELL.:  1950  Acct Number: [de-identified]  MRN:  [id number]                  [facility] COOKING SCHOOL WORKSHOP             Date: 7/10/2020        Session # ________   Yusra Rice class covered:      () Adding Flavor     () Fast Breakfasts     () Salads and Dressings     () Soups and Sauces     () Simple Sides     () Appetizers and Snacks     () Delicious Desserts     () Plant Based Proteins     () Fast Evening Meals     () Weekend Breakfasts     (x) Efficiency Cooking     () Meat Alternatives     Patients were shown how to choose, prep, and cook; substitutions and other options were given. Samples were offered. Recipes were given and questions answered. The patient above was in the [employer] for 60 minutes.       Electronically signed by Aysha Cosme on 7/10/2020 at 3:14 PM

## 2020-07-13 ENCOUNTER — HOSPITAL ENCOUNTER (OUTPATIENT)
Dept: CARDIAC REHAB | Age: 70
Setting detail: THERAPIES SERIES
Discharge: HOME | End: 2020-07-13
Payer: MEDICARE

## 2020-07-13 ENCOUNTER — APPOINTMENT (OUTPATIENT)
Dept: CARDIAC REHAB | Age: 70
End: 2020-07-13
Payer: MEDICARE

## 2020-07-13 PROCEDURE — G0423 INTENS CARDIAC REHAB NO EXER: HCPCS

## 2020-07-13 PROCEDURE — G0422 INTENS CARDIAC REHAB W/EXERC: HCPCS

## 2020-07-13 NOTE — PROGRESS NOTES
[address] [place] [address] Facility-Based Program  Individualized Cardiac Treatment Plan    Patient Name:  Dagmar Erazo  :  1950  Age:  65 y.o. MRN:  [id number]  Diagnosis: PCI  Date of Event: 20   Physician:  Madi Bella  Next Office Visit:  3/17/20  Date Entered Program: 3/10/20  Risk Stratifications: [] Low [x] Intermediate [] High  Allergies: Allergies   Allergen Reactions    Demerol Hcl [Meperidine] Nausea And Vomiting        Individual Cardiac Treatment Plan -EXERCISE  INITIAL Return from Covid 19 closure 30 DAY FINAL DAY   EXERCISE  ASSESSMENT/PLAN  EXERCISE  REASSESSMENT EXERCISE  DISCHARGE/FOLLOW-UP   Date: 3/10/20 5/18/20 Date: 6/15/20 Date: 2020   Session #1  Session # 12 Session # 36    Last session completed on 2020     Stages of Change  Stages of Change Stages of Change   [] Pre Contemplation  [x] Contemplation  [] Preparation  [] Action  [] Maintenance  [] Relapse  [] Pre Contemplation  [] Contemplation  [x] Preparation  [] Action  [] Maintenance  [] Relapse [] Pre Contemplation  [] Contemplation  [x] Preparation  [] Action  [] Maintenance  [] Relapse   EXERCISE ASSESSMENT  EXERCISE ASSESSMENT EXERCISE ASSESSMENT   NUSTEP Test  Distance:   0.23 miles  1214.4ft.  2.8 METs  Max HR:121 BPM      RPE:  12    THR:  125-134  Rhythm:  afib   6 Min Walk Test  NA   DASI: 4.1 METs 4.7 DASI: 4.1 METs DASI: 6.0 METs   Return to Work  [employer] on returning to work? 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 Return to work:  Has [place] returned to work? [] Yes    [x] No     Return to work:  Has [place] returned to work? [] Yes    [x] No - retired      Orthopedic Limitations/  [x] Yes    [] No  If yes please list:  One knee replaced, the other needs replaced. Arthritis throughout.        Orthopedic Limitations  *If patient has orthopedic issue:   Actions/  accomodations needed to make [place] successful :only uses NuStep Orthopedic Limitations  NUSTEP only     Fall Risk  Fall risk assessed? [x] Yes      [] No    Balance Issues? [x] Yes      [] No     [] Walker [x] Cane - only uses for long distances    [x] Safety issues reviewed       Fall Risk  *If patient is a fall risk, action needed to accommodate:  Uses a cane [address] Exercise  [x] Yes    [] No  Type: stretching and walking   Frequency: daily  Duration: 5 min No current exercise Home Exercise  [x] Yes    [] No  Type: walking  Frequency: 1-2x/wk  Duration: 5-10 min Home Exercise  [x] Yes    [] No  Type: walking/bike  Frequency: 3d/wk  Duration: 20   Angina with Activity? [] Yes    [x] No    Angina with Activity? [] Yes    [x] No  Angina Management: n/a Angina with Activity?   [] Yes    [x] No     EXERCISE PLAN  EXERCISE PLAN EXERCISE PLAN   *Interventions*  *Interventions* *Interventions*   Exercise Prescription  (per physician & CR staff)  Exercise Prescription  (per physician & CR staff) Exercise Prescription  (per physician & CR staff)   Cardiovascular  Cardiovascular Cardiovascular   Mode:    [x] Arms Ergometer (AE)  [x] NuStep    MODE:    [] Treadmill (TM)  [] Schwinn Airdyne (AD)  [] Arms Ergometer (AE)  [x] NuStep  [] Elliptical (E) MODE:    [x] Arms Ergometer (AE)  [x] NuStep     Initial Workloads  NS: 36  Lynn= 2.1 METs  AE: 26  = 1.9 METs  Current Workloads    NS:  30 Lynn= 2 METs  AE:  22 Lynn = 1.8 METs Current Workloads  NS: 34watts  Lynn= 2.1 METs  AE:  26watts = 1.9  METs     Frequency:    ICR: 3x/week  Home: 2-3x/wk  Frequency:   ICR: 3x/week  Home: 3x/wk Frequency:  Jaymie Rogel will continue exercise at  5-7 days/week   Duration:   Total aerobic exercise = 30-45 min    5-8 min/mode  Duration:  Total aerobic exercises = 25 min     10min/mode Duration:  Total erobic exercise =  60-90 min   Intensity:   MET Level = 2.1  RPE = 12-15  Intensity:  Max MET Level = 2  RPE = 12-15 Intensity:  Max MET Level = 2.1 RPE = 12-15 Progression: increase aerobic activity up to 15 min over next 4 weeks by increasing time 2-3 min/week. Progression:  increase aerobic activity up to 15 min over next 4 weeks by increasing time 2-3 min/week. Progression:  Increase time/intensity when RPE <13, and HR is in [facility]   [x] Yes      [] No  Upper and Lower body strength training 2x/wk    Wt: 2#       Reps:  8-15    *Increase wt. after completing 15 reps with an RPE of <12/13. [x] Yes      [] No  Upper and Lower body strength training 2x/wk    Wt: 2#       Reps:  8-15    *Increase wt. after completing 15 reps with an RPE of <12/13. Continue Strength Training at home   [x] Exercise Log & Strength training handout given     Wt: 2#       Reps:  8-15    *Increase wt. after completing 15 reps with an RPE of <12/13. Flexibility  Flexibility Flexibility   [x] Yes      [] No  25 min session of Core Strength & Flexibility 1x/per week   Attends Core Strength & Flexibility   [x] Yes      [] No Continue Core Strength & Flexibility at home   Home Exercise  *Carolinenavarro Vega verbalizes planning to start walking around her home 2 days/week for 5-10 min on days not in rehab. Home Exercise  *Arlene verbalizes planning to start walking around her home 2 days/week for 5-10 min on days not in rehab.  Home Exercise  *Arlene verbalizes his/her plan to continue walking 3 days/week for 20 min @ home   *Education*  *Education* *Education*   RPE Scale  [x] Yes      [] No  Exercise Safety  [x] Yes      [] No  Equipment Orientation  [x] Yes      [] No  S/S to Report  [x] Yes      [] No  Warm Up/Cool Down  [x] Yes      [] No  Home Exercise  [x] Yes      [] No   All Exercise Education Completed  [x] Yes      [] No   Exercise Education Recommended    Workshops  [x] Improving Performance  [x] Balance Training & Fall Prevention  [x] Exercise Biomechanics  [x] Resistance Training & Core Strength  Exercise Education Attended/Date All Sessions Completed? [] Yes  [x] No    Pt had to stop program early due to time off from COVID and insurance coverage   *Goals*  *Goals* *Goals*   Initial Exercise Goals  Exercise Goals  Exercise Goals   Zohreh Montano plans to:  [x] Attend exercise sessions 3x/wk  [x] initiate home exercise 2-3x/wk for 10-20 min  [x] Increase 6 min walk distance by 10%  [x] Attend Exercise workshops  Zohreh Montano plans to:  [x] Attend exercise sessions 3x/wk  [x] continue home exercise 2-3x/wk for 20-30 min  [x] Attend Exercise workshops Zohreh Montano achieved exercise goals? []  Yes    [x] No  If no, why?  **  [] Increased 6 min walk distance by 10%  [] Currently exercising 30-60 min/day, 5-7days/wk   [] Plans to continue exercise on own  [] Plans to join a local fitness center to continue exercise  [x] Does not plan to continue to exercise after rehab   Return to ADL or Hobbies:  Zohreh Montano would like to improve strength and endurance so she is able to return to not being so tired and exhausted and walking longer distances  Return to ADL or Hobbies:  Zohreh Montano would like to improve strength and endurance so she is able to return to all previous activities Return to ADL or Hobbies:  Zohreh Montano would like to improve strength and endurance so she is able to return to walking longer distances. *MET level required for above goal:  5.0 METs  MET level Achieved:  2METs MET level Achieved:  2. 1METs     Individual Cardiac Treatment Plan - Nutrition  NUTRITION  ASSESSMENT/PLAN  NUTRITION  REASSESSMENT NUTRITION  DISCHARGE/FOLLOW-UP   Stages of Change  Stages of Change Stages of Change   [] Pre Contemplation  [x] Contemplation  [] Preparation  [] Action  [] Maintenance  [] Relapse  [] Pre Contemplation  [] Contemplation  [x] Preparation  [] Action  [] Maintenance  [] Relapse [] Pre Contemplation  [] Contemplation  [x] Preparation  [] Action  [] Maintenance  [] Relapse   NUTRITION ASSESSMENT  NUTRITION ASSESSMENT NUTRITION ASSESSMENT   Weight Management  Weight: 213.8 Height: 5'8\"   BMI: 32.5   Weight Management  Weight: 217.8               Weight Management  Weight: 217                    BMI: 33   Eating Plan  Current eating habits: watches sugar, sodium, not a lot of red meat Cooking more due to increased # of people in household Eating Plan  Changes: less red meat Eating Plan Improvements: watching labels and mindful cooking   Alcohol Use  [x] none          [] daily  [] weekly      [] special         Diet Assessment Tool:  RATE YOUR PLATE  *Given to patient to complete and return. Diet Assessment Tool:    Score: 43/69     Diet Assessment Tool: RATE YOUR PLATE  Score: 95/99   NUTRITION PLAN  NUTRITION PLAN NUTRITION PLAN   *Interventions*  *Interventions* *Interventions*   Initial Survey given  Goal Setting Discussion:   [x] Yes      [] No     Follow Up Survey Reviewed & Goals Updated:     Professional Referral  Please check if needed. [] Dietitian Consult   [] Wt. Management Referral  [] Other:   Professional Referral  Please check if needed. [] Dietitian Consult   [] Wt. Management Referral  [] Other: Professional Referral  Please check if needed. [] Dietitian Consult   [] Wt. Management Referral  [] Other:   *Education*  *Education* *Education*   Nutritional Education Recommended    [x] 1:1 Registered Dietitian    Workshops  [x] Label Reading   [x] Menu  [x] Targeting Nutrition Priorities  [x] Fueling a Healthy Body    Nutritional Education Attended/Date All Sessions Completed?     [] Yes  [x] No    Fueling a healthy body - 7/8    Menu video - 6/17   Cooking School  Recommended     [x] Adding Flavor  [x] Fast & Healthy     Breakfasts  [x] Salads & Dressings  [x] Soups & Simple     Sauces  [x] Simple Sides  [x] Appetizers &     Snacks  [x] Delicious Desserts  [x] Plant Proteins  [x] Fast Evening Meals  [x] Weekend Breakfasts  [x] Cook once, Eat       twice  [x] [place] [employer]  Cooking School  Sessions Completed  6/12/20  Plant protein [employer]    # of sessions completed:  3    Fast evening meals - 6/19    Cook once eat twice 7/10       *Goals*  *Goals* *Goals*   Meena nutritional goals are as follows:  Complete and return diet survey  Meena nutritional goals are as follows:  [x] Attend Nutrition Workshops  [x] Attend 1:1   [x] Attend [address] achieved nutritional goals   [x] Yes    [] No  If no, why? Use knowledge gained to continue Pritikin eating plan at home       Individual Cardiac Treatment Plan - Psychosocial  PSYCHOSOCIAL  ASSESSMENT/PLAN  PSYCHOSOCIAL  REASSESSMENT PSYCHOSOCIAL  DISCHARGE/FOLLOW-UP   Stages of Change  Stages of Change Stages of Change   [] Pre Contemplation  [] Contemplation  [x] Preparation  [] Action  [] Maintenance  [] Relapse  [] Pre Contemplation  [] Contemplation  [x] Preparation  [] Action  [] Maintenance  [] Relapse [] Pre Contemplation  [] Contemplation  [x] Preparation  [] Action  [] Maintenance  [] Relapse   PSYCHOSOCIAL ASSESSMENT  PSYCHOSOCIAL ASSESSMENT PSYCHOSOCIAL ASSESSMENT   Behavioral Outcomes  Behavioral Outcomes Behavioral Outcomes   Tool Used:  Ferrans & Oliveira, Quality of Life Index, Cardiac Version IV  *Given to patient to complete. Tool Used:    Dian & Oliveira, Quality of Life Index, Cardiac Version IV     QOL Index Score: 16.62  HF:12.2  S&E:21.43  P&S: 17.67  Family: 21.9 Tool Used:     Ferrans & Oliveira, Quality of Life Index, Cardiac Version IV    QOL Index Score: 21.26  HF:19.17  S&E:24.07  P&S: 23.57  Family: 20.4   PHQ-9 score 8  Depression Severity  []Minimal  [x]Mild   []Moderate  []Moderately Severe  []Severe   PHQ-9 score 4  Depression Severity  [x]Minimal  []Mild   []Moderate  []Moderately Severe []Severe   Does patient have Family Support? [x] Yes      [] No  No signs of marital/family distress      Within the Past Month:  *Have you wished you were dead or wished you could go to sleep and not wake up? [] Yes      [x] No  *Have you had any thoughts of killing yourself?   [] Yes      [x] No        Using a scale of 0-10, 0=none, 10=very:   Rate your depression: 6  Rate your anxiety:  4 Using a scale of 0-10, 0=none, 10=very:   Rate your depression: 8  Rate your anxiety:  8  Using a scale of 0-10, 0=none, 10=very:   Rate your depression: 5  Rate your anxiety: 3 Using a scale of 0-10, 0=none, 10=very:   Rate your depression: 4  Rate your anxiety:  0   Signs and Symptoms of Depression Present? [x] Yes      [] No  If yes, please explain:  Pt stated that she is depressed. Her  and daughter both have stage 4 cancers. She is raising her 16year old grandson. She is worried about her own health. Daughter is sick with cancer Signs and Symptoms of Depression Present? [x] Yes      [] No  If yes, please explain: Daughter is sick. Signs and Symptoms of Depression Present? [x] Yes      [] No  If yes, please explain:  Self reported   Signs and Symptoms of Anxiety Present? [x] Yes      [] No  If yes, please explain:  See above  Signs and Symptoms of Anxiety Present? [x] Yes      [] No  If yes, please explain: see above Signs and Symptoms of Anxiety Present? [x] Yes      [] No  If yes, please explain: self reported   [] Patient has poor eye contact   [] Flat affect present. [] Signs of anxiety, anger or hostility    [] Signs social isolation present. []  Signs of alcohol or substance abuse      PSYCHOSOCIAL PLAN  PSYCHOSOCIAL PLAN PSYCHOSOCIAL PLAN   *Interventions*  *Interventions* *Interventions*   *Please check if needed  [] Psych Consult  [] Physician Referral  [x] Stress Management Skills  *Please check if needed  [] Psych Consult  [] Physician Referral  [x] Stress Management Skills *Please check if needed  [] Psych Consult  [] Physician Referral  [x] Stress Management Skills   Is patient currently taking anti-depressant or anti-anxiety medications? [x] Yes      [] No  If yes, please list medications:  CYMBALTA  Change in anti-depressant or anti-anxiety medications?   [] Yes      [x] No  If yes, please list medications: n/a Change in anti-depressant or anti-anxiety medications? [x] Yes      [] No  If yes, please list medications:  CYMBALTA and DSYREL   *Education*  *Education* *Education*   Healthy Mind-Set Workshops Recommended  [x] Stress & Health  [x] Taking Charge of Stress  [x] New Thoughts, New Behaviors  [x] Managing Moods & Relationships  Healthy Mind-Set Workshops Attended/Date Healthy Mind-Set Workshops  Completed  [] Yes      [x] No       *Goals*  *Goals* *Goals*   Arlene's psychosocial goals are as follows:  Complete HADS & Dian Oliveira Quality of Life Index, Cardiac Version IV  Arlene's psychosocial goals are as follows:  [x] Attend Healthy Mind-Set Workshops  [x] Reduce depression symptom severity by 1 level   Freda Dire achieved psychosocial goals? [] Yes    [x] No  If no, why?   Pt had to stop program early due to insurance   [x] Use methods learned to continue to reduce depression symptom severity by 1 level     Individual Cardiac Treatment Plan - Other:  Risk Factor/Education  RISK FACTOR  ASSESSMENT/PLAN  RISK FACTOR  REASSESSMENT  RISK FACTOR   DISCHARGE/FOLLOW-UP   Stages of Change  Stages of Change Stages of Change   [] Pre Contemplation  [x] Contemplation  [] Preparation  [] Action  [] Maintenance  [] Relapse  [] Pre Contemplation  [] Contemplation  [x] Preparation  [] Action  [] Maintenance  [] Relapse [] Pre Contemplation  [] Contemplation  [x] Preparation  [] Action  [] Maintenance  [] Relapse   RISK FACTOR/EDUCATION ASSESSMENT  RISK FACTOR/EDUCATION ASSESSMENT RISK FACTOR /EDUCATION ASSESSMENT   Hypertension  [x] Yes      [] No    Resting BP: 124/70  Peak Ex BP:132/68  Medication: CATAPRES, COZAAR    Hypertension  Resting BP: 136/68  Peak Ex BP:152/84  Medication Changes:  [x] Yes      [] No     Hypertension  Resting BP: 124/62  Peak Ex BP:162/64  Medication Changes:  [] Yes      [x] No   Lipids  HLD/DLD  [x] Yes      [] No  TOTAL CHOL: 132  HDL:  54  LDL:  75  TRIG: 68  Medication: LIPITOR  Lipids  Medication Changes:  [] Yes      [x] No     Lipids    TOTAL CHOL: 132  HDL:  54  LDL:  75  TRI  Medication Changes:  [] Yes      [x] No   Diabetes  [] Yes      [x] No    Diabetes  na   Diabetes         Tobacco Use  [] Current  [x] Former  [] Never     Date Quit:     Tobacco Use  Change in smoking status   [] Yes      [x] No       Tobacco Use  Change in smoking status   [] Yes      [x] No               Learning Barriers  Please select one:  [] Speech  [] Literacy  [] Hearing  [] Cognitive  [] Vision  [x] Ready to Learn  Learning Barriers Addressed:   [] Yes      [] No  na Learning Barriers Addressed:  [] Yes      [] No  NA     RISK FACTOR/EDUCATION PLAN  RISK FACTOR/EDUCATION PLAN RISK FACTOR/EDUCATION PLAN   *Interventions*  *Interventions* *Interventions*   Recommended Educational Videos    [x] Overview of The Pritikin Eating Plan  [x] Becoming A Pritikin   [x] Diseases of Our Time-Part 1  [x] Calorie Density  [x] Label Reading-Part 1  [x] Move It! [x] Healthy Minds, Bodies, Hearts  [x] Dining Out-Part 1  [x] Heart Disease Risk Reduction  [x] Metabolic Syndrome & Belly Fat  [x] Facts on Fat  [x] Diseases of Our Times-Part 2  [x] Biology of Weight Control  [x] Biomechanical Limitations  [x] Nurtition Action Plan    Completed Videos/Date      3/10/20  Overview of The Pritikin Eating Plan    20  Becoming A Pritikin     20  Diseases of Our Time-Part 1    20  Calorie Density    6/10/20   Label Reading-Part 1    20  Move It!  20  Healthy Minds, Bodies, Hearts    20  Heart Disease Risk Reduction Recommended Educational Videos Completed    [] Yes      [x] No    **If not completed, Why? Pt had to stop program early due to insurance    Dining out  -     Metabolic syndrome - . Facts on fat -     Diseases of our time  -          Smoking Cessation/Relaspe Prevention Intervention needed?   [] Yes      [x] No  *Pt verbalizes and agrees to attend intervention   Smoking Cessation Counseling attended  NA   Professional Referrals:  *Please check if needed  [] Diabetes Clinic  [] Lipid Clinic   [] Other:    Professional Referrals:  *Please check if needed  [] Diabetes Clinic  [] Lipid Clinic   [] Other:   Preventative Medication  Preventative Medication Preventative Medication   Aspirin  [x] Yes    [] No  Blood Thinner: Clopidogrel/Effient/Brillinta  [x] Yes    [] No  Beta Blocker  [x] Yes    [] No  Ace Inhibitor  [x] Yes    [] No  Statin/Lipid Lowering  [x] Yes    [] No  Medication Changes? [] Yes    [x] No Medication Changes? [] Yes    [x] No   *Education*  *Education* *Education*   Does Jaycee Husbands require any additional education? [] Yes    [x] No    Does Jaycee Husbands require any additional education? [] Yes    [x] No Does Jaycee Husbands require any additional education?   [] Yes    [x] No   Recommended Additional Educational Videos    Medical  [] Hypertension & Heart Disease  [] Decoding Lab Results  [] Aging Enhancing Your QoL  [] Sleep Disorders  Exercise  [] Body Composition  [] Improve Performance  [] Exercise Action Plan  [] Intro to Yoga  Behavioral  [] How Our Thoughts Can Heal Our Hearts  [] Smoking Cessation  Nutrition  [] Planning Your Eating Strategy  [] Lable Reading- Part 2  [] Dining Out - Part 2  [] Targeting Your Nutrition Priorities  [] Fueling a Healthy Body  [] Menu Workshop  [employer] [] Breakfast & Snacks  [] [employer] Salads & Dressing  [] [address] [place] [address]  [] Soups & Desserts    Additional Educational Videos Completed    n/a Additional Educational Videos Completed    NA   *Goals*  *Goals* *Goals*   Arlene's risk factor/education goals are as follows:    [x] Optimal BP <140/90  [] Blood Sugar <120  [x] Attend recommended video education sessions  [x] Takes medications as prescribed 100% of the time     Arlene's risk factor/education goals are as follows:    [x] Optimal BP <140/90  [] Blood Sugar <120  [x] Attend recommended

## 2020-07-13 NOTE — PROGRESS NOTES
Video Education Report - ICR/CR    Name:  Soy Woodall     Date:  7/13/2020  MRN: [id number]     Session #:  21  Session Length: 50 min    Recommended Videos        []01 [employer] - Program Overview   34:22    []02 Overview of Pritikin Eating Plan   34:10    []03 Becoming a Kalie Yeaddiss   33:08     []04 Diseases of Our Time - Part 1   34:22    []05 Calorie Density     33:39   []06 Label Reading - Part 1    32:15   []07 Move it      32.54   []08 Healthy Minds, Bodies, Hearts   32:14   []09 Dining Out - Part 1    32:28   []10 Heart Disease Risk Reduction   83:02   []65 Metabolic Syndrome and Belly Fat  31:52   []12 Facts on Fat     35:29   [x]13 Diseases of Our Time - Part 2   33:07   []14 Biology of Weight Control   32:36   []15 Biomechanical Limitations   35:20   []16 Nutrition Action Plan    34:23    Comments:  Video completed, binder given to patient, patient's last day

## 2020-07-13 NOTE — PROGRESS NOTES
[facility] Facility-Based Program  Phase 2 Cardiac Rehab Weekly Progress Report      Patient prescribed exercise:  3:00 class. 3 times per week in rehab, 1-4 times per week at home for the amount of sessions/weeks specified by insurance. Current Levels: NuStep:  34 Evans for 15 minutes x 2, UBE: .26 evans for 5 minutes. Progression Discussion: Maintain/Increase Aerobic exercise 15 minutes to work on endurance. Attempt to increase intensity by 5-20% for each modality this week. Try to increase intensities until Jaymie Rogel rates the exercises a 13-17 on Edmundo RPE.

## 2020-07-15 ENCOUNTER — APPOINTMENT (OUTPATIENT)
Dept: CARDIAC REHAB | Age: 70
End: 2020-07-15
Payer: MEDICARE

## 2020-07-15 ENCOUNTER — HOSPITAL ENCOUNTER (OUTPATIENT)
Dept: CARDIAC REHAB | Age: 70
Setting detail: THERAPIES SERIES
End: 2020-07-15
Payer: MEDICARE

## 2020-07-17 ENCOUNTER — APPOINTMENT (OUTPATIENT)
Dept: CARDIAC REHAB | Age: 70
End: 2020-07-17
Payer: MEDICARE

## 2020-07-20 ENCOUNTER — APPOINTMENT (OUTPATIENT)
Dept: CARDIAC REHAB | Age: 70
End: 2020-07-20
Payer: MEDICARE

## 2020-07-21 ENCOUNTER — OFFICE VISIT (OUTPATIENT)
Dept: CARDIOLOGY CLINIC | Age: 70
End: 2020-07-21
Payer: MEDICARE

## 2020-07-21 VITALS
BODY MASS INDEX: 33.4 KG/M2 | WEIGHT: 220.4 LBS | DIASTOLIC BLOOD PRESSURE: 95 MMHG | HEIGHT: 68 IN | HEART RATE: 100 BPM | SYSTOLIC BLOOD PRESSURE: 142 MMHG

## 2020-07-21 PROCEDURE — 99214 OFFICE O/P EST MOD 30 MIN: CPT | Performed by: INTERNAL MEDICINE

## 2020-07-21 RX ORDER — POTASSIUM CHLORIDE 750 MG/1
10 CAPSULE, EXTENDED RELEASE ORAL EVERY OTHER DAY
Qty: 60 CAPSULE | Refills: 1 | Status: SHIPPED | OUTPATIENT
Start: 2020-07-21 | End: 2020-11-04 | Stop reason: SDUPTHER

## 2020-07-21 RX ORDER — VERAPAMIL HYDROCHLORIDE 360 MG/1
360 CAPSULE, DELAYED RELEASE PELLETS ORAL NIGHTLY
Qty: 90 CAPSULE | Refills: 3 | Status: SHIPPED | OUTPATIENT
Start: 2020-07-21 | End: 2020-09-23

## 2020-07-21 RX ORDER — FUROSEMIDE 20 MG/1
20 TABLET ORAL EVERY OTHER DAY
Qty: 30 TABLET | Refills: 1 | Status: SHIPPED | OUTPATIENT
Start: 2020-07-21 | End: 2020-11-04 | Stop reason: SDUPTHER

## 2020-07-21 RX ORDER — POTASSIUM CHLORIDE 750 MG/1
10 CAPSULE, EXTENDED RELEASE ORAL EVERY OTHER DAY
COMMUNITY
End: 2020-07-21 | Stop reason: SDUPTHER

## 2020-07-21 RX ORDER — POTASSIUM CITRATE 10 MEQ/1
TABLET, EXTENDED RELEASE ORAL EVERY OTHER DAY
COMMUNITY
End: 2020-07-21

## 2020-07-21 NOTE — PROGRESS NOTES
Chief Complaint   Patient presents with    Follow-up     HX OF  fu had TIA here at [facility], transferred from [facility]  Pt was prescribed lasix 20 mg daily by , but has not started taking it. Pt here for pre op clearance  Bariatric surgery 9/12/2016  With Dr. Alla Grewal    Had carotid endarterectomy - 9-11-17 -     Was admitted from office for atr fib with  and was admitted TAYLOR-CV . Cath and needed stent and later went back to atr fib with 110 and re-sent to ER and sent home with added cardizem       4 weeks F/U for atr fib with RVR    Palpitation better after increased lopressor and calan    Denied cp    Going to cardiac rehab and rate controlled    Sob on exertion and fatigue on exertion- worse -    Leg edema +1 new    Gained 8 lb in 4 weeks    Post Cath and PCI and A. fib rate control--feel stronger  Fatigue better, sob better    Hx of depression and a lots of stress in family  and and daughter sick    Patient did not bring a medication list. Patient verbally stated nothing has changed. NO dizziness        Patient Active Problem List   Diagnosis    Obstructive sleep apnea on CPAP    COPD (chronic obstructive pulmonary disease) (HCC)    Fibromyalgia    DM (diabetes mellitus), type 2, uncontrolled ([address]    Right arm weakness    Obesity (BMI 30-39. 9)    Chronic diastolic congestive heart failure (HCC)    Pulmonary HTN (HCC) Mean pressure 32 mmhg by RHC    SOB (shortness of breath) on exertion    Polyosteoarthritis    HTN (hypertension), benign    Paroxysmal atrial fibrillation with rapid ventricular response (HCC)    Postsurgical malabsorption    Bilateral carotid artery stenosis    S/P carotid endarterectomy    Positive colorectal cancer screening using DNA-based stool test    Dysphagia    Gastroesophageal reflux disease without esophagitis    Hypersomnia with sleep apnea    Fatigue    Atrial fibrillation with RVR ([address]    Essential hypertension    Sleep apnea    Coronary artery disease involving native heart without angina pectoris    S/P cardiac cath    S/P percutaneous transluminal angioplasty (PTA) with stent placement    Atrial fibrillation status post cardioversion [facility])    Coronary artery disease involving native coronary artery of native heart without angina pectoris    Permanent atrial fibrillation       Past Surgical History:   Procedure Laterality Date    APPENDECTOMY  over 10 years ago    [address] 10/01/2019    CAROTID ENDARTERECTOMY  09/2017    CHOLECYSTECTOMY  over 10 years ago    [address] [facility]      COLONOSCOPY N/A 2/7/2018    COLONOSCOPY WITH BIOPSY performed by Nehemiah Wyman MD at [address] [facility]     HYSTERECTOMY  10 plus years ago    Dr. Netta Aguilar Right 2009    OTHER SURGICAL HISTORY Left 2012    Rotator cuff sx    SLEEVE GASTRECTOMY  09/12/2016    Robotic, Extensive Lysis of Adhesions, Removal of [place] Prosthesis - Dr. Eveline De La Garza  07/06/2016    Dr. Booker Due        Allergies   Allergen Reactions    Demerol Hcl [Meperidine] Nausea And Vomiting        Family History   Problem Relation Age of Onset    Stroke Mother     High Blood Pressure Mother     Atrial Fibrillation Mother     Heart Disease Mother     Diabetes Father     Early Death Father     Cancer Other     Heart Disease Sister         Social History     Socioeconomic History    Marital status:      Spouse name: Not on file    Number of children: Not on file    Years of education: Not on file    Highest education level: Not on file   Occupational History    Not on file   Social Needs    Financial resource strain: Not on file    Food insecurity     Worry: Not on file     Inability: Not on file    Transportation needs     Medical: Not on file     Non-medical: Not on file   Tobacco Use    Smoking status: relief after 1 dose, call 911. 25 tablet 1    modafinil (PROVIGIL) 200 MG tablet Take 200 mg by mouth daily.  DULoxetine (CYMBALTA) 20 MG extended release capsule Take 20 mg by mouth daily      traZODone (DESYREL) 50 MG tablet Take 50 mg by mouth nightly      aspirin 81 MG tablet Take 81 mg by mouth daily      traMADol (ULTRAM) 50 MG tablet Take 50 mg by mouth every 6 hours as needed for Pain      Ferrous Fumarate (IRON) 18 MG TBCR Take 1 tablet by mouth daily      Cyanocobalamin (VITAMIN B-12 PO) Take 1 tablet by mouth daily      Cholecalciferol (VITAMIN D3) 5000 UNITS CAPS Take 2 capsules by mouth daily      oxybutynin (DITROPAN) 5 MG tablet Take 15 mg by mouth daily       sulfaSALAzine (AZULFIDINE) 500 MG tablet Take 500 mg by mouth 2 times daily      albuterol sulfate HFA (VENTOLIN HFA) 108 (90 BASE) MCG/ACT inhaler Inhale 2 puffs into the lungs every 6 hours as needed for Wheezing 1 Inhaler 11    levothyroxine (SYNTHROID) 50 MCG tablet Take 50 mcg by mouth daily       hydroxychloroquine (PLAQUENIL) 200 MG tablet Take 200 mg by mouth 2 times daily. No current facility-administered medications for this visit. Review of Systems -     General ROS: negative  Psychological ROS: negative  Hematological and Lymphatic ROS: No history of blood clots or bleeding disorder. Respiratory ROS: no cough, shortness of breath, or wheezing  Cardiovascular ROS: no chest pain or dyspnea on exertion  Gastrointestinal ROS: negative  Genito-Urinary ROS: no dysuria, trouble voiding, or hematuria  Musculoskeletal ROS: negative  Neurological ROS: no TIA or stroke symptoms  Dermatological ROS: negative      Blood pressure (!) 142/95, pulse 100, height 5' 8\" (1.727 m), weight 220 lb 6.4 oz (100 kg), not currently breastfeeding.         Physical Examination:    General appearance - alert, well appearing, and in no distress  Mental status - alert, oriented to person, place, and time  Neck - supple, no significant adenopathy, no JVD, or carotid bruits  Chest - clear to auscultation, no wheezes, rales or rhonchi, symmetric air entry  Heart - normal rate, regular rhythm, normal S1, S2, no murmurs, rubs, clicks or gallops  Abdomen - soft, nontender, nondistended, no masses or organomegaly  Neurological - alert, oriented, normal speech, no focal findings or movement disorder noted  Musculoskeletal - no joint tenderness, deformity or swelling  Extremities - peripheral pulses normal, no pedal edema, no clubbing or cyanosis  Skin - normal coloration and turgor, no rashes, no suspicious skin lesions noted    Lab  No results for input(s): CKTOTAL, CKMB, CKMBINDEX, TROPONINI in the last 72 hours.   CBC:   Lab Results   Component Value Date    WBC 10.7 03/04/2020    RBC 3.99 03/04/2020    HGB 12.6 03/04/2020    HCT 40.1 03/04/2020    .5 03/04/2020    MCH 31.6 03/04/2020    MCHC 31.4 03/04/2020    RDW 14.3 10/01/2018     03/04/2020    MPV 9.9 03/04/2020     BMP:    Lab Results   Component Value Date     03/04/2020    K 4.0 03/04/2020     03/04/2020    CO2 27 03/04/2020    BUN 19 03/04/2020    LABALBU 3.7 02/20/2020    CREATININE 0.8 03/04/2020    CALCIUM 9.1 03/04/2020    LABGLOM 70 03/04/2020    GLUCOSE 156 03/04/2020    GLUCOSE 70 08/29/2019     Hepatic Function Panel:    Lab Results   Component Value Date    ALKPHOS 83 02/20/2020    ALT 24 02/20/2020    AST 26 02/20/2020    PROT 6.6 02/20/2020    BILITOT 0.2 02/20/2020    BILIDIR <0.2 02/20/2020    LABALBU 3.7 02/20/2020     Magnesium:    Lab Results   Component Value Date    MG 2.0 02/24/2020     Warfarin PT/INR:  No components found for: PTPATWAR, PTINRWAR  HgBA1c:    Lab Results   Component Value Date    LABA1C 5.4 10/01/2018     FLP:    Lab Results   Component Value Date    TRIG 68 08/29/2019    HDL 54 08/29/2019    LDLCALC 40 09/29/2017    LDLDIRECT 75 08/29/2019     TSH:    Lab Results   Component Value Date    TSH 3.630 03/04/2020       EKG 1/5/16  Normal sinus rhythm  Rightward axis  T wave abnormality, consider inferior ischemia  Abnormal ECG  When compared with ECG of 01-JUL-2011 09:56,  Questionable change in The axis  T wave inversion now evident in Inferior leads  Confirmed by Ace Haley ([id number]) on 12/8/2015 8:38:06 PM    EKG 8/25/16  Marked sinus  Bradycardia   -Left axis -anterior fascicular block.    -Old anteroseptal infarct. ABNORMAL       EKG 9/22/16-sinus bradycardia rate 46 bopm       CONCLUSION:   1. This is a normal sinus rhythm with average heart rate 55 beats per minute,  ranging from  beats per minute. 2. No significant pause of more than 1.9 seconds noted. 3. Rare ventricular ectopic beats, isolated and couplets. 4. Rare supraventricular ectopic beats, isolated, couplets, and rare  nonsustained supraventricular ectopic run. 5. No significant bradyarrhythmia to be addressed, yet certainly average  heart rate 55, minimum is 40, so probably need further clinical correlation. 6. Otherwise, at this level, this minimal low heart rate if there are no  symptoms will need just observation.  Tomas Pope M.D.        D: 11/29/2016 20:0      EKG 8/12/19  NSR, no acute abn       Cath feb 2020    HEMODYNAMICS:  LVEDP 9 mmHg.     CORONARY ANGIOGRAM:  1. Right coronary artery. The ostial RCA has a 10% lesion, proximal  RCA has a 20% lesion. Mid RCA has 30% lesion. Distal RCA with luminal  irregularities. RCA is a dominant vessel. Bifurcates into RPL and  RPDA. RPL has an ostial 20% lesion. RPDA has a 10% to 20% lesion. 2.  Left main coronary artery. Left main coronary artery has a 50%  lesion in the distal left main coronary artery. Bifurcates into left  circumflex artery and LAD. 3.  Left circumflex artery. 10% to 20% ostial lesion in the left  circumflex artery. There is a high takeoff of a large OM1 branch that  has a 60% lesion in the proximal part of the vessel.   Distal left  circumflex artery is patent. OM2 is a large vessel with no significant  stenotic lesions. 4.  Left anterior descending artery. There is a severe stenotic diffuse  lesion involving the ostial and proximal part of LAD ranging in severity  between 70% to 90%. Mid LAD is patent. Distal LAD has mild diffuse  disease.     MEDICATIONS:  See MAR.     COMPLICATIONS:  None.     ESTIMATED BLOOD LOSS:  Less than 30 mL.     ACCESS:  Right radial artery access. Vasc Band was applied. Hemostasis  was achieved.     CONCLUSION:  1. Severe stenotic lesion of the distal left main coronary artery and  ostial/proximal left anterior descending artery. 2.  Status post successful PCI with the stenting of distal left main  coronary artery and left anterior descending artery with details as  listed above.     RECOMMENDATIONS:  1.  Bedrest for the next 4 hours. 2.  Monitor in telemetry. 3.  Aggressive risk factor modification. 4.  Optimize medical therapy for CAD. 5.  Access site care. 6.  Aspirin 81 mg p.o. daily, can be stopped after 1 month. 7.  Plavix 75 mg p.o. daily. 8.  May resume Eliquis in a.m.  9.  High intensity statin therapy. 10.  Outpatient follow up in office in 2 to 4 weeks.     Findings and plan of care discussed with the patient's family.  Flaquita Woodward MD     D: 02/21/2020 15:     -IVUS LM/LAD  -Severe stenotic lesion of dLM (Bifurcation lesion, De Leon Type 1,1,0)  -Severe 80-90% lesion of ostial/proximal LAD  -S/P Successful PCI with stenting of dLM/pLAD    Assessment    Home BP good       Diagnosis Orders   1. Permanent atrial fibrillation     2. Chronic diastolic congestive heart failure ([address]     3. Coronary artery disease involving native coronary artery of native heart without angina pectoris     4. Essential hypertension     5. SOB (shortness of breath) on exertion     6.  S/P percutaneous transluminal angioplasty (PTA) with stent placement         Recent feb 2020 admission DX  PAFB RVR  - more persistent now - s\p TAYLOR / CV to SR 2/24/2020              On [address]               On amiodarone      abnormal stress testing   S\p cardiac cath 2/21/2020:  -Severe stenotic lesion of dLM (Bifurcation lesion, [place] Leon Type 1,1,0)  -Severe 80-90% lesion of ostial/proximal LAD  -S/P Successful PCI with stenting of dLM/pLAD     Hx bradycardia - none this admit     HTN  HLP  DM II  Hx Lt CEA 2007  Hx TIA  Hx SHANNEN      Previous admission DX    Hx of recent TIA with RT side weakness      NSVT 5 beat   S/P gastric sleeve  Hypertensive Urgency  Hx of obstructive Sleep apnea  Pulmonary HTN  New onset post afib with RVR- now rate controlled  Chads of  DM II  HX of TIA         Continue home medication regimen   bP controlled    Doing well        Plan     The recent  record reviewed    Patient Seen, Chart, Consults notes, Labs, Radiology studies reviewed. Permanent atr fib   S/p TAYLOR- CV and weent back to atr fib  Off  amiodarone as failed to be in NSR  atr fib with  bpm at rest   Cont  metoprolol 150  BID  Off  cardizem CD increase 180  Mg   Increase calan SR  360 po qd   CHDAS of 4( DM, htn, Hx of TIA)  Need longterm OA and on apixaban  D/w the pat the risk and benefit of OA  Pat understand the risk of bleeding including ICH    Coronary artery disease, seems to be stable. Denies angina or change in breathing pattern  S/ p LM-LAD stent feb 2020  Cont apixaban and plavix  Off ASA 2 weeks back    Patient Seen, Chart, Consults notes, Labs, Radiology studies reviewed. Hx of TIA IN 2016  Was on asa and apixaban    Hypertension, on medical treatment. Seems to be under good control. Patient is compliant with medical treatment. Cont  calan  mg po qd    Congestive heart failure: no evidence of fluid overload today, no recent hospitalization for CHF  Leg edema +1-  Lasix 20 mg po qd  For 1 week the QOD  kdur 10 meq po qd for 1 week the QOD     Hyperlipidemia: on statins, followed periodically.  Patient need periodic lipid and liver

## 2020-07-22 ENCOUNTER — APPOINTMENT (OUTPATIENT)
Dept: CARDIAC REHAB | Age: 70
End: 2020-07-22
Payer: MEDICARE

## 2020-07-24 ENCOUNTER — APPOINTMENT (OUTPATIENT)
Dept: CARDIAC REHAB | Age: 70
End: 2020-07-24
Payer: MEDICARE

## 2020-07-27 ENCOUNTER — APPOINTMENT (OUTPATIENT)
Dept: CARDIAC REHAB | Age: 70
End: 2020-07-27
Payer: MEDICARE

## 2020-07-28 RX ORDER — CLOPIDOGREL BISULFATE 75 MG/1
75 TABLET ORAL DAILY
Qty: 30 TABLET | Refills: 0 | Status: SHIPPED | OUTPATIENT
Start: 2020-07-28 | End: 2020-11-04 | Stop reason: SDUPTHER

## 2020-07-28 RX ORDER — CLOPIDOGREL BISULFATE 75 MG/1
75 TABLET ORAL DAILY
Qty: 90 TABLET | Refills: 3 | OUTPATIENT
Start: 2020-07-28

## 2020-07-28 NOTE — TELEPHONE ENCOUNTER
Patient will not get her mail prescription for Plavix prior to running out. Plus she is leaving on vacation this Saturday. She is asking for a short fill to go to a local pharmacy.     Plavix 75 mg (30 day)    [facility] on [address]    DOLV 7/21/20  DONV  9/23/20

## 2020-07-28 NOTE — TELEPHONE ENCOUNTER
[address] [place] [address] called requesting a refill on the following medications:  Requested Prescriptions     Pending Prescriptions Disp Refills    clopidogrel (PLAVIX) 75 MG tablet 90 tablet 3     Sig: Take 1 tablet by mouth daily     Pharmacy verified:  [facility]      Date of last visit: 7/21/20  Date of next visit (if applicable): 8/68/2693

## 2020-07-29 ENCOUNTER — APPOINTMENT (OUTPATIENT)
Dept: CARDIAC REHAB | Age: 70
End: 2020-07-29
Payer: MEDICARE

## 2020-07-31 ENCOUNTER — APPOINTMENT (OUTPATIENT)
Dept: CARDIAC REHAB | Age: 70
End: 2020-07-31
Payer: MEDICARE

## 2020-07-31 NOTE — TELEPHONE ENCOUNTER
Dr. Nancy Garza would like to see pt tomorrow in office. Spoke with pt.  appt scheduled for 6/9/20 at 1:15PM.  Pt voiced understanding. stretcher

## 2020-08-03 ENCOUNTER — APPOINTMENT (OUTPATIENT)
Dept: CARDIAC REHAB | Age: 70
End: 2020-08-03
Payer: MEDICARE

## 2020-08-05 ENCOUNTER — APPOINTMENT (OUTPATIENT)
Dept: CARDIAC REHAB | Age: 70
End: 2020-08-05
Payer: MEDICARE

## 2020-08-21 ENCOUNTER — TELEPHONE (OUTPATIENT)
Dept: CARDIOLOGY CLINIC | Age: 70
End: 2020-08-21

## 2020-08-21 NOTE — TELEPHONE ENCOUNTER
Pre op Risk Assessment    Procedure Dental Extractions  Physician [facility]  Date of surgery/procedure TBD    Last OV 7-  Last Stress 2-  Last Echo 2- TAYLOR  Last Cath 2-  Last Stent 2-  Is patient on blood thinners ASA, Plavix, Eliquis  Hold Meds/how many days ? ?     Fax:[phone]

## 2020-08-23 NOTE — TELEPHONE ENCOUNTER
May proceed for dental extraction with low to mod risk  Should not stop palvix and apixaban at any rate  Stent feb 2018    Stop asa all together

## 2020-09-10 ENCOUNTER — TELEPHONE (OUTPATIENT)
Dept: CARDIOLOGY CLINIC | Age: 70
End: 2020-09-10

## 2020-09-10 NOTE — TELEPHONE ENCOUNTER
Jenn Harkins from dentist office LM asking if patient can hold blood thinners for tooth extraction. Called Jenn Harkins back at [phone]. Phone line keeps ringing busy.

## 2020-09-21 LAB
ALBUMIN SERPL-MCNC: 4.1 GM/DL (ref 3.5–5)
ALP BLD-CCNC: 99 IU/L (ref 39–118)
ALT SERPL-CCNC: 41 IU/L (ref 10–40)
ANION GAP SERPL CALCULATED.3IONS-SCNC: 6 MMOL/L (ref 4–12)
AST SERPL-CCNC: 33 IU/L (ref 15–41)
BILIRUB SERPL-MCNC: 0.7 MG/DL (ref 0.2–1)
BILIRUBIN DIRECT: 0.1 MG/DL (ref 0.1–0.2)
BUN BLDV-MCNC: 20 MG/DL (ref 7–20)
CALCIUM SERPL-MCNC: 9.6 MG/DL (ref 8.8–10.5)
CHLORIDE BLD-SCNC: 104 MEQ/L (ref 101–111)
CHOLESTEROL/HDL RELATIVE RISK: 2.1 (ref 4–4.4)
CHOLESTEROL: 118 MG/DL
CO2: 31 MEQ/L (ref 21–32)
CREAT SERPL-MCNC: 0.73 MG/DL (ref 0.6–1.3)
CREATININE CLEARANCE: >60
DIRECT-LDL / HDL RISK: 1
GLUCOSE: 91 MG/DL (ref 70–110)
HDLC SERPL-MCNC: 55 MG/DL
LDL CHOLESTEROL DIRECT: 58 MG/DL
MAGNESIUM: 1.9 MG/DL (ref 1.8–2.5)
POTASSIUM SERPL-SCNC: 3.5 MEQ/L (ref 3.6–5)
SODIUM BLD-SCNC: 141 MEQ/L (ref 135–145)
TOTAL PROTEIN: 6.4 G/DL (ref 6.2–8)
TRIGL SERPL-MCNC: 66 MG/DL
VLDLC SERPL CALC-MCNC: 13 MG/DL

## 2020-09-23 ENCOUNTER — OFFICE VISIT (OUTPATIENT)
Dept: CARDIOLOGY CLINIC | Age: 70
End: 2020-09-23
Payer: MEDICARE

## 2020-09-23 VITALS
BODY MASS INDEX: 33.65 KG/M2 | WEIGHT: 222 LBS | HEIGHT: 68 IN | DIASTOLIC BLOOD PRESSURE: 94 MMHG | HEART RATE: 125 BPM | SYSTOLIC BLOOD PRESSURE: 136 MMHG

## 2020-09-23 PROBLEM — E66.01 MORBID (SEVERE) OBESITY DUE TO EXCESS CALORIES (HCC): Status: ACTIVE | Noted: 2020-09-23

## 2020-09-23 PROCEDURE — 99214 OFFICE O/P EST MOD 30 MIN: CPT | Performed by: INTERNAL MEDICINE

## 2020-09-23 RX ORDER — HYDROCODONE BITARTRATE AND ACETAMINOPHEN 5; 325 MG/1; MG/1
TABLET ORAL
COMMUNITY
Start: 2019-10-02 | End: 2021-07-21

## 2020-09-23 RX ORDER — VERAPAMIL HYDROCHLORIDE 240 MG/1
240 CAPSULE, EXTENDED RELEASE ORAL 2 TIMES DAILY
Qty: 60 CAPSULE | Refills: 5 | Status: SHIPPED | OUTPATIENT
Start: 2020-09-23 | End: 2020-11-04 | Stop reason: SDUPTHER

## 2020-09-23 RX ORDER — FERROUS SULFATE 325(65) MG
325 TABLET ORAL EVERY OTHER DAY
COMMUNITY
End: 2021-02-03 | Stop reason: ALTCHOICE

## 2020-09-23 RX ORDER — DIGOXIN 125 MCG
125 TABLET ORAL DAILY
Qty: 30 TABLET | Refills: 5 | Status: SHIPPED | OUTPATIENT
Start: 2020-09-23 | End: 2020-11-04 | Stop reason: SDUPTHER

## 2020-09-23 RX ORDER — LOSARTAN POTASSIUM 25 MG/1
25 TABLET ORAL DAILY
Qty: 30 TABLET | Refills: 5 | Status: SHIPPED | OUTPATIENT
Start: 2020-09-23 | End: 2020-11-04 | Stop reason: SDUPTHER

## 2020-09-23 RX ORDER — OXYBUTYNIN CHLORIDE 15 MG/1
TABLET, EXTENDED RELEASE ORAL
COMMUNITY
Start: 2020-07-31 | End: 2020-10-05

## 2020-09-23 NOTE — PROGRESS NOTES
Chief Complaint   Patient presents with    Check-Up    Atrial Fibrillation     HX OF  fu had TIA here at [facility], transferred from [facility]    Had carotid endarterectomy - 9-11-17 -     Was admitted from office for atr fib with  and was admitted TAYLOR-CV . Cath and needed stent and later went back to atr fib with 110 and re-sent to ER and sent home with added cardizem           Pt here for 2 mo check up  For atr fib rate control  HR at home runs 110 to 120 per pat    Denied cp, palpitations, dizziness or edema    Palpitation better after increased lopressor and calan    Sob on exertion and fatigue on exertion- chronic    No leg edma  No wt gain    Post Cath and PCI and A. fib rate control--feel stronger    Hx of depression and a lots of stress in family  and and daughter sick          Patient Active Problem List   Diagnosis    Obstructive sleep apnea on CPAP    COPD (chronic obstructive pulmonary disease) ([address]    Fibromyalgia    DM (diabetes mellitus), type 2, uncontrolled ([address]    Right arm weakness    Obesity (BMI 30-39. 9)    Chronic diastolic congestive heart failure (HCC)    Pulmonary HTN (HCC) Mean pressure 32 mmhg by RHC    SOB (shortness of breath) on exertion    Polyosteoarthritis    HTN (hypertension), benign    Paroxysmal atrial fibrillation with rapid ventricular response (HCC)    Postsurgical malabsorption    Bilateral carotid artery stenosis    S/P carotid endarterectomy    Positive colorectal cancer screening using DNA-based stool test    Dysphagia    Gastroesophageal reflux disease without esophagitis    Hypersomnia with sleep apnea    Fatigue    Atrial fibrillation with RVR ([address]    Essential hypertension    Sleep apnea    Coronary artery disease involving native heart without angina pectoris    S/P cardiac cath    S/P percutaneous transluminal angioplasty (PTA) with stent placement    Atrial fibrillation status post cardioversion [facility])    Coronary Smokeless tobacco: Never Used   Substance and Sexual Activity    Alcohol use: No    Drug use: No    Sexual activity: Not Currently     Partners: Male   Lifestyle    Physical activity     Days per week: Not on file     Minutes per session: Not on file    Stress: Not on file   Relationships    Social connections     Talks on phone: Not on file     Gets together: Not on file     Attends L[employer] service: Not on file     Active member of club or organization: Not on file     Attends meetings of clubs or organizations: Not on file     Relationship status: Not on file    Intimate partner violence     Fear of current or ex partner: Not on file     Emotionally abused: Not on file     Physically abused: Not on file     Forced sexual activity: Not on file   Other Topics Concern    Not on file   Social History Narrative    Not on file       Current Outpatient Medications   Medication Sig Dispense Refill    ferrous sulfate (IRON 325) 325 (65 Fe) MG tablet Take 325 mg by mouth every other day      HYDROcodone-acetaminophen (NORCO) 5-325 MG per tablet Acetaminophen / HYDROcodone Apap/Hydrocodone 325mg/5mg Active 1 TAB ORAL Every 4 hr as needed 25 5 October 2nd, 2019 12:32pm 10-  [place] [address] ([id number])      oxybutynin (DITROPAN XL) 15 MG extended release tablet       digoxin (LANOXIN) 125 MCG tablet Take 1 tablet by mouth daily 30 tablet 5    verapamil (VERELAN) 240 MG extended release capsule Take 1 capsule by mouth 2 times daily 60 capsule 5    losartan (COZAAR) 25 MG tablet Take 1 tablet by mouth daily 30 tablet 5    clopidogrel (PLAVIX) 75 MG tablet Take 1 tablet by mouth daily 30 tablet 0    potassium chloride (MICRO-K) 10 MEQ extended release capsule Take 1 capsule by mouth every other day Take 1 tab daily for 1 week then take QOD 60 capsule 1    furosemide (LASIX) 20 MG tablet Take 1 tablet by mouth every other day Lasix 20 mg daily X1 week, then take every other day 30 tablet 1    metoprolol (LOPRESSOR) 100 MG tablet Take 1.5 tablets by mouth 2 times daily 270 tablet 3    chlorthalidone (HYGROTON) 25 MG tablet TAKE 1 TABLET DAILY 90 tablet 3    pravastatin (PRAVACHOL) 40 MG tablet Take 1 tablet by mouth daily 90 tablet 3    apixaban (ELIQUIS) 5 MG TABS tablet Take 1 tablet by mouth 2 times daily 180 tablet 3    pantoprazole (PROTONIX) 40 MG tablet TAKE 1 TABLET DAILY 90 tablet 3    nitroGLYCERIN (NITROSTAT) 0.4 MG SL tablet up to max of 3 total doses. If no relief after 1 dose, call 911. 25 tablet 1    modafinil (PROVIGIL) 200 MG tablet Take 200 mg by mouth as needed.  DULoxetine (CYMBALTA) 20 MG extended release capsule Take 20 mg by mouth daily      traZODone (DESYREL) 50 MG tablet Take 50 mg by mouth nightly      traMADol (ULTRAM) 50 MG tablet Take 50 mg by mouth every 6 hours as needed for Pain      Cyanocobalamin (VITAMIN B-12 PO) Take 1 tablet by mouth daily      Cholecalciferol (VITAMIN D3) 5000 UNITS CAPS Take 2 capsules by mouth daily      sulfaSALAzine (AZULFIDINE) 500 MG tablet Take 500 mg by mouth 2 times daily      albuterol sulfate HFA (VENTOLIN HFA) 108 (90 BASE) MCG/ACT inhaler Inhale 2 puffs into the lungs every 6 hours as needed for Wheezing 1 Inhaler 11    levothyroxine (SYNTHROID) 50 MCG tablet Take 50 mcg by mouth daily       hydroxychloroquine (PLAQUENIL) 200 MG tablet Take 200 mg by mouth 2 times daily. No current facility-administered medications for this visit. Review of Systems -     General ROS: negative  Psychological ROS: negative  Hematological and Lymphatic ROS: No history of blood clots or bleeding disorder.    Respiratory ROS: no cough, shortness of breath, or wheezing  Cardiovascular ROS: no chest pain or dyspnea on exertion  Gastrointestinal ROS: negative  Genito-Urinary ROS: no dysuria, trouble voiding, or hematuria  Musculoskeletal ROS: negative  Neurological ROS: no TIA or stroke symptoms  Dermatological ROS: negative      Blood pressure (!) 136/94, pulse 125, height 5' 8\" (1.727 m), weight 222 lb (100.7 kg), not currently breastfeeding. Physical Examination:    General appearance - alert, well appearing, and in no distress  Mental status - alert, oriented to person, place, and time  Neck - supple, no significant adenopathy, no JVD, or carotid bruits  Chest - clear to auscultation, no wheezes, rales or rhonchi, symmetric air entry  Heart - normal rate, regular rhythm, normal S1, S2, no murmurs, rubs, clicks or gallops  Abdomen - soft, nontender, nondistended, no masses or organomegaly  Neurological - alert, oriented, normal speech, no focal findings or movement disorder noted  Musculoskeletal - no joint tenderness, deformity or swelling  Extremities - peripheral pulses normal, no pedal edema, no clubbing or cyanosis  Skin - normal coloration and turgor, no rashes, no suspicious skin lesions noted    Lab  No results for input(s): CKTOTAL, CKMB, CKMBINDEX, TROPONINI in the last 72 hours.   CBC:   Lab Results   Component Value Date    WBC 10.7 03/04/2020    RBC 3.99 03/04/2020    HGB 12.6 03/04/2020    HCT 40.1 03/04/2020    .5 03/04/2020    MCH 31.6 03/04/2020    MCHC 31.4 03/04/2020    RDW 14.3 10/01/2018     03/04/2020    MPV 9.9 03/04/2020     BMP:    Lab Results   Component Value Date     09/21/2020    K 3.5 09/21/2020    K 4.0 03/04/2020     09/21/2020    CO2 31 09/21/2020    BUN 20 09/21/2020    LABALBU 4.1 09/21/2020    CREATININE 0.73 09/21/2020    CALCIUM 9.60 09/21/2020    LABGLOM 70 03/04/2020    GLUCOSE 91 09/21/2020     Hepatic Function Panel:    Lab Results   Component Value Date    ALKPHOS 99 09/21/2020    ALT 41 09/21/2020    AST 33 09/21/2020    PROT 6.4 09/21/2020    BILITOT 0.7 09/21/2020    BILIDIR 0.1 09/21/2020    LABALBU 4.1 09/21/2020     Magnesium:    Lab Results   Component Value Date    MG 1.9 09/21/2020     Warfarin PT/INR:  No components found for: ALICE, coronary artery. Bifurcates into left  circumflex artery and LAD. 3.  Left circumflex artery. 10% to 20% ostial lesion in the left  circumflex artery. There is a high takeoff of a large OM1 branch that  has a 60% lesion in the proximal part of the vessel. Distal left  circumflex artery is patent. OM2 is a large vessel with no significant  stenotic lesions. 4.  Left anterior descending artery. There is a severe stenotic diffuse  lesion involving the ostial and proximal part of LAD ranging in severity  between 70% to 90%. Mid LAD is patent. Distal LAD has mild diffuse  disease.     MEDICATIONS:  See MAR.     COMPLICATIONS:  None.     ESTIMATED BLOOD LOSS:  Less than 30 mL.     ACCESS:  Right radial artery access. Vasc Band was applied. Hemostasis  was achieved.     CONCLUSION:  1. Severe stenotic lesion of the distal left main coronary artery and  ostial/proximal left anterior descending artery. 2.  Status post successful PCI with the stenting of distal left main  coronary artery and left anterior descending artery with details as  listed above.     RECOMMENDATIONS:  1.  Bedrest for the next 4 hours. 2.  Monitor in telemetry. 3.  Aggressive risk factor modification. 4.  Optimize medical therapy for CAD. 5.  Access site care. 6.  Aspirin 81 mg p.o. daily, can be stopped after 1 month. 7.  Plavix 75 mg p.o. daily. 8.  May resume Eliquis in a.m.  9.  High intensity statin therapy. 10.  Outpatient follow up in office in 2 to 4 weeks.     Findings and plan of care discussed with the patient's family.  Silke Moore MD     D: 02/21/2020 15:     -IVUS LM/LAD  -Severe stenotic lesion of dLM (Bifurcation lesion, De Leon Type 1,1,0)  -Severe 80-90% lesion of ostial/proximal LAD  -S/P Successful PCI with stenting of dLM/pLAD    ekg 9/23/2020  Atrial flutter with  bpm    Assessment    Home BP good       Diagnosis Orders   1. Atrial fibrillation with RVR ([address]     2.  Chronic diastolic congestive heart failure ([facility]     3. Coronary artery disease involving native coronary artery of native heart without angina pectoris     4. Pulmonary HTN (HCC) Mean pressure 32 mmhg by RHC     5. Morbid (severe) obesity due to excess calories ([address]     6. Permanent atrial fibrillation     7. S/P percutaneous transluminal angioplasty (PTA) with stent placement         Recent feb 2020 admission DX  PAFB RVR  - more persistent now - s\p TAYLOR / CV to SR 2/24/2020              On [address]               On amiodarone      abnormal stress testing   S\p cardiac cath 2/21/2020:  -Severe stenotic lesion of dLM (Bifurcation lesion, [place] Leon Type 1,1,0)  -Severe 80-90% lesion of ostial/proximal LAD  -S/P Successful PCI with stenting of dLM/pLAD     Hx bradycardia - none this admit     HTN  HLP  DM II  Hx Lt CEA 2007  Hx TIA  Hx SHANNEN      Previous admission DX    Hx of recent TIA with RT side weakness      NSVT 5 beat   S/P gastric sleeve  Hypertensive Urgency  Hx of obstructive Sleep apnea  Pulmonary HTN  New onset post afib with RVR- now rate controlled  Chads of  DM II  HX of TIA         Continue home medication regimen   bP controlled    Doing well        Plan     The recent  record reviewed    Patient Seen, Chart, Consults notes, Labs, Radiology studies reviewed. Permanent atr fib   atr flutter /Fib with   S/p TAYLOR- CV and weent back to atr fib  Off  amiodarone as failed to be in NSR  atr fib with  bpm at rest   TSH WNL  Cont  metoprolol 150  BID  Increase  calan SR  240 po bid for rate control  Add digoxin 125 mcg po qd  Decrease losartan to 25 po qd from 50  If remain uncontrolled consider cardioversion and or eps       CHDAS of 4( DM, htn, Hx of TIA)  Need longterm OA and on apixaban  D/w the pat the risk and benefit of OA  Pat understand the risk of bleeding including ICH    Coronary artery disease, seems to be stable.  Denies angina or change in breathing pattern  S/ p LM-LAD stent feb 2020  Cont apixaban and plavix  Off ASA     Patient Seen, Chart, Consults notes, Labs, Radiology studies reviewed. Hx of TIA IN 2016  Was on asa and apixaban    Hypertension, on medical treatment. Seems to be under good control. Patient is compliant with medical treatment. Cont  calan SR     Congestive heart failure: no evidence of fluid overload today, no recent hospitalization for CHF  Leg edema +1 - now resolved  Lasix 20 mg po qd  For 1 week the QOD  kdur 10 meq po qd for 1 week the QOD  K 3.5- take extra 20 meq po kcl po x1     Hyperlipidemia: on statins, followed periodically. Patient need periodic lipid and liver profile. Hx of carotid left carotid endarterectomy in 2017  And started on statin then  Lower leg weakness from lipitor  OFF  lipitor  TOLERATED  lIvalo 1 mg in 3 weeks AND BUT PCP STOPPED IT DUE TO nORMAL LIPID PANEL VALUES  Now taking pravastatin  Cont  pravastatin to 40 mg po qd    Pulm HTN    Hypertension, on medical treatment. Seems to be under good control. Patient is compliant with medical treatment. Intermittent serena- claudio  WNL and probably neuropathic    Reviewed the  report of RHC from [facility]    patient is advised to exercise 30 min s a day three times a week and about weight loss ,balance diet and    More fruits and vegetables . Discussed use, benefit, and side effects of prescribed medications. All patient questions answered. Pt voiced understanding. Instructed to continue current medications, diet and exercise. Continue risk factor modification and medical management. Patient agreed with treatment plan. Follow up as directed.     I spent 25 minutes involved in face-to-face discussion of medical issues, prognosis, record review  and plan with the patient today and more than 50% of the time was spent on counseling and coordination of care    D/w the jeanette the plan of care      RTC in  6 week for rate  control      [facility]

## 2020-10-05 ENCOUNTER — OFFICE VISIT (OUTPATIENT)
Dept: FAMILY MEDICINE CLINIC | Age: 70
End: 2020-10-05
Payer: MEDICARE

## 2020-10-05 VITALS
DIASTOLIC BLOOD PRESSURE: 70 MMHG | RESPIRATION RATE: 16 BRPM | HEART RATE: 134 BPM | BODY MASS INDEX: 34.1 KG/M2 | TEMPERATURE: 97 F | SYSTOLIC BLOOD PRESSURE: 126 MMHG | HEIGHT: 68 IN | WEIGHT: 225 LBS

## 2020-10-05 PROCEDURE — 99204 OFFICE O/P NEW MOD 45 MIN: CPT | Performed by: FAMILY MEDICINE

## 2020-10-05 PROCEDURE — G8510 SCR DEP NEG, NO PLAN REQD: HCPCS | Performed by: FAMILY MEDICINE

## 2020-10-05 PROCEDURE — 3288F FALL RISK ASSESSMENT DOCD: CPT | Performed by: FAMILY MEDICINE

## 2020-10-05 ASSESSMENT — ENCOUNTER SYMPTOMS
DIARRHEA: 0
BACK PAIN: 0
EYES NEGATIVE: 1
CHEST TIGHTNESS: 0
RHINORRHEA: 0
ABDOMINAL PAIN: 0
NAUSEA: 0
SHORTNESS OF BREATH: 0
VOMITING: 0
COUGH: 0
SORE THROAT: 0
CONSTIPATION: 0

## 2020-10-05 ASSESSMENT — PATIENT HEALTH QUESTIONNAIRE - PHQ9
1. LITTLE INTEREST OR PLEASURE IN DOING THINGS: 0
SUM OF ALL RESPONSES TO PHQ QUESTIONS 1-9: 0
SUM OF ALL RESPONSES TO PHQ QUESTIONS 1-9: 0
2. FEELING DOWN, DEPRESSED OR HOPELESS: 0
SUM OF ALL RESPONSES TO PHQ9 QUESTIONS 1 & 2: 0

## 2020-10-05 NOTE — PROGRESS NOTES
[address] [facility]  [address] [facility] [address]  Dept: [phone]  Dept Fax: [phone]  Loc: [phone]  PROGRESS NOTE      VisitDate: 10/5/2020    Tawnya Choi is a 65 y.o. female who presents today for:     Chief Complaint   Patient presents with    New Patient     previously seen by Pearl Coyne-MM came highly recommended, wants to keep all drs thru [facility] Maintenance     scheduled for dexa next week, due for labs, mammo pended         Subjective:  HPI  New patient comes in for follow-up of chronic conditions. History of A. fib states rate controlled she follows with cardiology. History of COPD she uses Advair and albuterol states her breathing is stable. History of fibromyalgia on Cymbalta still has lot of muscle pain. History of hypertension stable well-controlled. She has a history of iron B12 and vitamin D deficiencies she is not sure the last time these labs were checked. She feels tired a lot just does not feel well. History of hypothyroidism also unclear as to when this was last checked    Review of Systems   Constitutional: Positive for fatigue. Negative for activity change, appetite change, chills and fever. HENT: Negative for congestion, ear pain, postnasal drip, rhinorrhea and sore throat. Eyes: Negative. Respiratory: Negative for cough, chest tightness and shortness of breath. Cardiovascular: Negative for chest pain, palpitations and leg swelling. Gastrointestinal: Negative for abdominal pain, constipation, diarrhea, nausea and vomiting. Genitourinary: Negative for dysuria, frequency, hematuria and urgency. Musculoskeletal: Positive for arthralgias. Negative for back pain and joint swelling. Skin: Negative for rash. Neurological: Negative for dizziness, light-headedness and headaches. Psychiatric/Behavioral: Negative for dysphoric mood. The patient is not nervous/anxious.       Past Medical History:   Diagnosis Date    Arthritis     Atrial fibrillation ([facility] [address]     Cancer (HCC)     skin    Carotid arterial disease ([facility] [address]     CHF (congestive heart failure) (HCC)     Depression     Fibromyalgia     Hypertension     Hypothyroidism     Obesity     S/P bariatric surgery 2018    Sleep apnea     CPAP    Thyroid disease     TIA (transient ischemic attack) 2015      Past Surgical History:   Procedure Laterality Date    APPENDECTOMY  over 10 years ago    [id number] 10/01/2019    CAROTID ENDARTERECTOMY  2017    CHOLECYSTECTOMY  over 10 years ago    [id number] [facility] COLONOSCOPY N/A 2018    COLONOSCOPY WITH BIOPSY performed by Maria Isabel Betancourt MD at [address] [facility]  10 plus years ago    Dr. Marta Post Right     OTHER SURGICAL HISTORY Left     Rotator cuff sx    SLEEVE GASTRECTOMY  2016    Robotic, Extensive Lysis of Adhesions, Removal of [place] Prosthesis - Dr. Laurie Gould  2016    Dr. Paulina Aguilar      Family History   Problem Relation Age of Onset    Stroke Mother     High Blood Pressure Mother     Atrial Fibrillation Mother     Heart Disease Mother     Diabetes Father     Early Death Father     Cancer Other         Daughter    Heart Disease Sister     Heart Attack Sister     Deep Vein Thrombosis Brother      Social History     Tobacco Use    Smoking status: Former Smoker     Packs/day: 0.25     Years: 30.00     Pack years: 7.50     Types: Cigarettes     Start date: 3/24/1982     Last attempt to quit: 2015     Years since quittin.6    Smokeless tobacco: Never Used   Substance Use Topics    Alcohol use: No      Current Outpatient Medications   Medication Sig Dispense Refill    fluticasone-salmeterol (ADVAIR) 250-50 MCG/DOSE AEPB Inhale 1 puff into the lungs 2 times daily 60 each 0    ferrous sulfate (IRON 325) 325 (65 Fe) MG tablet Take 325 mg by mouth every other day      HYDROcodone-acetaminophen (NORCO) 5-325 MG per tablet Acetaminophen / HYDROcodone Apap/Hydrocodone 325mg/5mg Active 1 TAB ORAL Every 4 hr as needed 25 5 October 2nd, 2019 12:32pm 10-  Pricila Cooper ([id number])      digoxin (LANOXIN) 125 MCG tablet Take 1 tablet by mouth daily 30 tablet 5    verapamil (VERELAN) 240 MG extended release capsule Take 1 capsule by mouth 2 times daily 60 capsule 5    losartan (COZAAR) 25 MG tablet Take 1 tablet by mouth daily 30 tablet 5    clopidogrel (PLAVIX) 75 MG tablet Take 1 tablet by mouth daily 30 tablet 0    potassium chloride (MICRO-K) 10 MEQ extended release capsule Take 1 capsule by mouth every other day Take 1 tab daily for 1 week then take QOD 60 capsule 1    furosemide (LASIX) 20 MG tablet Take 1 tablet by mouth every other day Lasix 20 mg daily X1 week, then take every other day 30 tablet 1    metoprolol (LOPRESSOR) 100 MG tablet Take 1.5 tablets by mouth 2 times daily 270 tablet 3    chlorthalidone (HYGROTON) 25 MG tablet TAKE 1 TABLET DAILY 90 tablet 3    pravastatin (PRAVACHOL) 40 MG tablet Take 1 tablet by mouth daily 90 tablet 3    apixaban (ELIQUIS) 5 MG TABS tablet Take 1 tablet by mouth 2 times daily 180 tablet 3    pantoprazole (PROTONIX) 40 MG tablet TAKE 1 TABLET DAILY 90 tablet 3    nitroGLYCERIN (NITROSTAT) 0.4 MG SL tablet up to max of 3 total doses. If no relief after 1 dose, call 911. 25 tablet 1    modafinil (PROVIGIL) 200 MG tablet Take 200 mg by mouth as needed.        DULoxetine (CYMBALTA) 20 MG extended release capsule Take 20 mg by mouth daily      Cyanocobalamin (VITAMIN B-12 PO) Take 1 tablet by mouth daily      Cholecalciferol (VITAMIN D3) 5000 UNITS CAPS Take 2 capsules by mouth daily      sulfaSALAzine (AZULFIDINE) 500 MG tablet Take 500 mg by mouth 2 times daily      albuterol sulfate HFA (VENTOLIN HFA) 108 (90 BASE) MCG/ACT inhaler Inhale 2 puffs into the lungs every 6 hours as needed for Wheezing 1 Inhaler 11    levothyroxine (SYNTHROID) 50 MCG tablet Take 50 mcg by mouth daily       hydroxychloroquine (PLAQUENIL) 200 MG tablet Take 200 mg by mouth 2 times daily. No current facility-administered medications for this visit. Allergies   Allergen Reactions    Demerol Hcl [Meperidine] Nausea And Vomiting     Health Maintenance   Topic Date Due    Hepatitis C screen  1950    Diabetic foot exam  01/10/1958    Diabetic retinal exam  01/10/1958    Diabetic microalbuminuria test  01/10/1966    DEXA (modify frequency per FRAX score)  01/10/2003    Shingles Vaccine (2 of 3) 06/19/2015    Breast cancer screen  06/13/2019    Annual Wellness Visit (AWV)  06/18/2019    A1C test (Diabetic or Prediabetic)  10/01/2019    Flu vaccine (1) 09/01/2020    Statin Therapy  05/19/2021    Lipid screen  09/21/2021    Potassium monitoring  09/21/2021    Creatinine monitoring  09/21/2021    Colon cancer screen colonoscopy  02/07/2023    DTaP/Tdap/Td vaccine (2 - Td) 12/20/2027    Pneumococcal 65+ years Vaccine  Completed    Hepatitis A vaccine  Aged Out    Hib vaccine  Aged Out    Meningococcal (ACWY) vaccine  Aged Out         Objective:     Physical Exam  Constitutional:       General: She is not in acute distress. Appearance: She is well-developed. She is not diaphoretic. HENT:      Head: Normocephalic and atraumatic. Eyes:      General: No scleral icterus. Conjunctiva/sclera: Conjunctivae normal.   Neck:      Thyroid: No thyromegaly. Vascular: No JVD. Comments: No bruits  Cardiovascular:      Rate and Rhythm: Normal rate. Rhythm irregular. Heart sounds: Normal heart sounds. Pulmonary:      Effort: Pulmonary effort is normal. No respiratory distress. Breath sounds: Normal breath sounds. No wheezing or rales. Abdominal:      Palpations: Abdomen is soft. There is no mass. Tenderness: There is no abdominal tenderness. There is no guarding. Musculoskeletal:         General: Tenderness present. Skin:     General: Skin is warm and dry. Findings: No rash. Neurological:      Mental Status: She is alert and oriented to person, place, and time. Cranial Nerves: No cranial nerve deficit. /70 (Site: Left Upper Arm)   Pulse 134   Temp 97 °F (36.1 °C) (Temporal)   Resp 16   Ht 5' 7.5\" (1.715 m)   Wt 225 lb (102.1 kg)   BMI 34.72 kg/m²       Impression/Plan:  1. Atrial fibrillation status post cardioversion [facility])    2. Screening mammogram, encounter for    3. Chronic obstructive pulmonary disease, unspecified COPD type ([facility] [address]    4. Fibromyalgia    5. Permanent atrial fibrillation ([facility] [address]    6. Essential hypertension    7. Need for hepatitis C screening test    8. Iron deficiency    9. B12 deficiency    10. Vitamin D deficiency    11. Hypothyroidism, unspecified type      Requested Prescriptions     Signed Prescriptions Disp Refills    fluticasone-salmeterol (ADVAIR) 250-50 MCG/DOSE AEPB 60 each 0     Sig: Inhale 1 puff into the lungs 2 times daily     Orders Placed This Encounter   Procedures    MICHAELLE DIGITAL SCREEN W OR WO CAD BILATERAL     Standing Status:   Future     Standing Expiration Date:   12/5/2021    Hepatitis C Antibody     Standing Status:   Future     Standing Expiration Date:   10/5/2021    Iron and TIBC     Standing Status:   Future     Standing Expiration Date:   10/5/2021     Order Specific Question:   Is Patient Fasting? Answer:   yes     Order Specific Question:   No of Hours?      Answer:   12    Vitamin B12 & Folate     Standing Status:   Future     Standing Expiration Date:   10/5/2021    Vitamin D 25 Hydroxy     Standing Status:   Future     Standing Expiration Date:   10/5/2021    Comprehensive Metabolic Panel     Standing Status:   Future     Standing Expiration Date:   10/5/2021    CBC Auto Differential     Standing Status: Future     Standing Expiration Date:   10/5/2021    Lipid Panel     Standing Status:   Future     Standing Expiration Date:   10/5/2021     Order Specific Question:   Is Patient Fasting?/# of Hours     Answer:   yes/12    T4, Free     Standing Status:   Future     Standing Expiration Date:   10/5/2021    TSH without Reflex     Standing Status:   Future     Standing Expiration Date:   10/5/2021   45 minutes face-to-face time over 50 that time spent on counseling and reviewing medications. She was unclear why she was taking some of them. She is on Protonix for acid reflux with states it is well controlled if she misses a dose she does get symptoms and occasionally has to take Tums. Also oxybutynin for incontinence but she does not feel is working. We asked her to stop taking this    Patient giveneducational materials - see patient instructions. Discussed use, benefit, and side effects of prescribed medications. All patient questions answered. Pt voiced understanding. Reviewed health maintenance. Patient agreedwith treatment plan. Follow up as directed. **This report has been created using voice recognition software. It may contain minor errorswhich are inherent in voice recognition technology. **       Electronically signed by Sarahi Toney MD on 10/5/2020 at 7:21 PM

## 2020-10-09 ENCOUNTER — NURSE ONLY (OUTPATIENT)
Dept: LAB | Age: 70
End: 2020-10-09

## 2020-10-10 LAB
ALBUMIN SERPL-MCNC: 4.2 G/DL (ref 3.5–5.1)
ALP BLD-CCNC: 83 U/L (ref 38–126)
ALT SERPL-CCNC: 16 U/L (ref 11–66)
ANION GAP SERPL CALCULATED.3IONS-SCNC: 12 MEQ/L (ref 8–16)
AST SERPL-CCNC: 21 U/L (ref 5–40)
BASOPHILS # BLD: 1 %
BASOPHILS ABSOLUTE: 0.1 THOU/MM3 (ref 0–0.1)
BILIRUB SERPL-MCNC: 0.6 MG/DL (ref 0.3–1.2)
BUN BLDV-MCNC: 17 MG/DL (ref 7–22)
CALCIUM SERPL-MCNC: 10 MG/DL (ref 8.5–10.5)
CHLORIDE BLD-SCNC: 107 MEQ/L (ref 98–111)
CHOLESTEROL, TOTAL: 109 MG/DL (ref 100–199)
CO2: 29 MEQ/L (ref 23–33)
CREAT SERPL-MCNC: 0.7 MG/DL (ref 0.4–1.2)
EOSINOPHIL # BLD: 1.5 %
EOSINOPHILS ABSOLUTE: 0.2 THOU/MM3 (ref 0–0.4)
ERYTHROCYTE [DISTWIDTH] IN BLOOD BY AUTOMATED COUNT: 12.7 % (ref 11.5–14.5)
ERYTHROCYTE [DISTWIDTH] IN BLOOD BY AUTOMATED COUNT: 49.1 FL (ref 35–45)
FOLATE: 12.2 NG/ML (ref 4.8–24.2)
GFR SERPL CREATININE-BSD FRML MDRD: 82 ML/MIN/1.73M2
GLUCOSE BLD-MCNC: 99 MG/DL (ref 70–108)
HCT VFR BLD CALC: 45.2 % (ref 37–47)
HDLC SERPL-MCNC: 56 MG/DL
HEMOGLOBIN: 14.6 GM/DL (ref 12–16)
HEPATITIS C ANTIBODY: NEGATIVE
IMMATURE GRANS (ABS): 0.04 THOU/MM3 (ref 0–0.07)
IMMATURE GRANULOCYTES: 0.4 %
IRON: 132 UG/DL (ref 50–170)
LDL CHOLESTEROL CALCULATED: 38 MG/DL
LYMPHOCYTES # BLD: 27.2 %
LYMPHOCYTES ABSOLUTE: 2.8 THOU/MM3 (ref 1–4.8)
MCH RBC QN AUTO: 34 PG (ref 26–33)
MCHC RBC AUTO-ENTMCNC: 32.3 GM/DL (ref 32.2–35.5)
MCV RBC AUTO: 105.1 FL (ref 81–99)
MONOCYTES # BLD: 9.9 %
MONOCYTES ABSOLUTE: 1 THOU/MM3 (ref 0.4–1.3)
NUCLEATED RED BLOOD CELLS: 0 /100 WBC
PLATELET # BLD: 212 THOU/MM3 (ref 130–400)
PMV BLD AUTO: 10.5 FL (ref 9.4–12.4)
POTASSIUM SERPL-SCNC: 3.8 MEQ/L (ref 3.5–5.2)
RBC # BLD: 4.3 MILL/MM3 (ref 4.2–5.4)
SEDIMENTATION RATE, ERYTHROCYTE: 13 MM/HR (ref 0–20)
SEG NEUTROPHILS: 60 %
SEGMENTED NEUTROPHILS ABSOLUTE COUNT: 6.2 THOU/MM3 (ref 1.8–7.7)
SODIUM BLD-SCNC: 148 MEQ/L (ref 135–145)
T4 FREE: 1.36 NG/DL (ref 0.93–1.76)
TOTAL IRON BINDING CAPACITY: 275 UG/DL (ref 171–450)
TOTAL PROTEIN: 6.9 G/DL (ref 6.1–8)
TRIGL SERPL-MCNC: 76 MG/DL (ref 0–199)
TSH SERPL DL<=0.05 MIU/L-ACNC: 0.91 UIU/ML (ref 0.4–4.2)
VITAMIN B-12: 1286 PG/ML (ref 211–911)
VITAMIN D 25-HYDROXY: 30 NG/ML (ref 30–100)
WBC # BLD: 10.3 THOU/MM3 (ref 4.8–10.8)

## 2020-10-12 ENCOUNTER — HOSPITAL ENCOUNTER (OUTPATIENT)
Dept: MAMMOGRAPHY | Age: 70
Discharge: HOME | End: 2020-10-12
Payer: MEDICARE

## 2020-10-12 PROCEDURE — 77063 BREAST TOMOSYNTHESIS BI: CPT

## 2020-11-03 PROBLEM — I10 HTN (HYPERTENSION), BENIGN: Status: RESOLVED | Noted: 2020-11-03 | Resolved: 2020-11-03

## 2020-11-04 ENCOUNTER — OFFICE VISIT (OUTPATIENT)
Dept: CARDIOLOGY CLINIC | Age: 70
End: 2020-11-04
Payer: MEDICARE

## 2020-11-04 VITALS
DIASTOLIC BLOOD PRESSURE: 85 MMHG | SYSTOLIC BLOOD PRESSURE: 136 MMHG | WEIGHT: 228.2 LBS | HEART RATE: 71 BPM | HEIGHT: 68 IN | BODY MASS INDEX: 34.59 KG/M2

## 2020-11-04 PROCEDURE — 99214 OFFICE O/P EST MOD 30 MIN: CPT | Performed by: INTERNAL MEDICINE

## 2020-11-04 RX ORDER — POTASSIUM CHLORIDE 750 MG/1
10 CAPSULE, EXTENDED RELEASE ORAL EVERY OTHER DAY
Qty: 90 CAPSULE | Refills: 3 | Status: SHIPPED | OUTPATIENT
Start: 2020-11-04 | End: 2021-07-21

## 2020-11-04 RX ORDER — LOSARTAN POTASSIUM 25 MG/1
25 TABLET ORAL DAILY
Qty: 90 TABLET | Refills: 3 | Status: SHIPPED | OUTPATIENT
Start: 2020-11-04 | End: 2021-10-12

## 2020-11-04 RX ORDER — METOPROLOL TARTRATE 100 MG/1
150 TABLET ORAL 2 TIMES DAILY
Qty: 270 TABLET | Refills: 3 | Status: SHIPPED | OUTPATIENT
Start: 2020-11-04 | End: 2021-10-10

## 2020-11-04 RX ORDER — FUROSEMIDE 20 MG/1
20 TABLET ORAL EVERY OTHER DAY
Qty: 90 TABLET | Refills: 3 | Status: SHIPPED | OUTPATIENT
Start: 2020-11-04 | End: 2021-10-29

## 2020-11-04 RX ORDER — DIGOXIN 125 MCG
125 TABLET ORAL DAILY
Qty: 90 TABLET | Refills: 3 | Status: SHIPPED | OUTPATIENT
Start: 2020-11-04 | End: 2021-10-06

## 2020-11-04 RX ORDER — VERAPAMIL HYDROCHLORIDE 240 MG/1
240 CAPSULE, EXTENDED RELEASE ORAL 2 TIMES DAILY
Qty: 180 CAPSULE | Refills: 3 | Status: SHIPPED | OUTPATIENT
Start: 2020-11-04 | End: 2021-10-06

## 2020-11-04 RX ORDER — CLOPIDOGREL BISULFATE 75 MG/1
75 TABLET ORAL DAILY
Qty: 90 TABLET | Refills: 3 | Status: SHIPPED | OUTPATIENT
Start: 2020-11-04 | End: 2021-04-15 | Stop reason: SDUPTHER

## 2020-11-04 NOTE — PROGRESS NOTES
Chief Complaint   Patient presents with    Follow-up    Atrial Fibrillation     HX OF  fu had TIA here at [facility], transferred from [facility]    Had carotid endarterectomy - 9-11-17 -     Was admitted from office for atr fib with  and was admitted TAYLOR-CV . Cath and needed stent and later went back to atr fib with 110 and re-sent to ER and sent home with added cardizem         Patient wants to discuss the [address] [employer] [address]. Pt here for 6 weeks F/u  For atr fib rate control  HR at home runs 110 to 120 per pat    Denied cp, palpitations, dizziness or edema    Palpitation better after increased lopressor and calan    Sob on exertion and fatigue on exertion- chronic    No leg edma  No wt gain    Post Cath and PCI and A. fib rate control--feel stronger    Hx of depression and a lots of stress in family  and and daughter sick          Patient Active Problem List   Diagnosis    Obstructive sleep apnea on CPAP    COPD (chronic obstructive pulmonary disease) (HCC)    Fibromyalgia    DM (diabetes mellitus), type 2, uncontrolled (HCC)    Right arm weakness    Obesity (BMI 30-39. 9)    Chronic diastolic congestive heart failure (HCC)    Pulmonary HTN (HCC) Mean pressure 32 mmhg by RHC    SOB (shortness of breath) on exertion    Polyosteoarthritis    Paroxysmal atrial fibrillation with rapid ventricular response (HCC)    Postsurgical malabsorption    Bilateral carotid artery stenosis    S/P carotid endarterectomy    Positive colorectal cancer screening using DNA-based stool test    Dysphagia    Gastroesophageal reflux disease without esophagitis    Hypersomnia with sleep apnea    Fatigue    Atrial fibrillation with RVR ([address]    Essential hypertension    Sleep apnea    Coronary artery disease involving native heart without angina pectoris    S/P cardiac cath    S/P percutaneous transluminal angioplasty (PTA) with stent placement    Atrial fibrillation status post cardioversion ([address]    Coronary artery disease involving native coronary artery of native heart without angina pectoris    Permanent atrial fibrillation (HCC)    Morbid (severe) obesity due to excess calories [facility])       Past Surgical History:   Procedure Laterality Date    APPENDECTOMY  over 10 years ago    [address] 10/01/2019    CAROTID ENDARTERECTOMY  09/2017    CHOLECYSTECTOMY  over 10 years ago    [id number] [facility] COLONOSCOPY N/A 2/7/2018    COLONOSCOPY WITH BIOPSY performed by Alexandra Farias MD at [address] [facility]  10 plus years ago    Dr. Olinda Amado Right 2009    OTHER SURGICAL HISTORY Left 2012    Rotator cuff sx    SLEEVE GASTRECTOMY  09/12/2016    Robotic, Extensive Lysis of Adhesions, Removal of [place] Prosthesis - Dr. iMno Og  07/06/2016    Dr. Nelson Organ        Allergies   Allergen Reactions    Demerol Hcl [Meperidine] Nausea And Vomiting        Family History   Problem Relation Age of Onset    Stroke Mother     High Blood Pressure Mother     Atrial Fibrillation Mother     Heart Disease Mother     Diabetes Father     Early Death Father     Cancer Other         Daughter    Heart Disease Sister     Heart Attack Sister     Deep Vein Thrombosis Brother         Social History     Socioeconomic History    Marital status:      Spouse name: Not on file    Number of children: Not on file    Years of education: Not on file    Highest education level: Not on file   Occupational History    Not on file   Social Needs    Financial resource strain: Not on file    Food insecurity     Worry: Not on file     Inability: Not on file    Transportation needs     Medical: Not on file     Non-medical: Not on file   Tobacco Use    Smoking status: Former Smoker     Packs/day: 0.25     Years: 30.00     Pack years: 7.50     Types: Cigarettes Start date: 3/24/1982     Last attempt to quit: 2015     Years since quittin.6    Smokeless tobacco: Never Used   Substance and Sexual Activity    Alcohol use: No    Drug use: No    Sexual activity: Not Currently     Partners: Male   Lifestyle    Physical activity     Days per week: Not on file     Minutes per session: Not on file    Stress: Not on file   Relationships    Social connections     Talks on phone: Not on file     Gets together: Not on file     Attends Protestant service: Not on file     Active member of club or organization: Not on file     Attends meetings of clubs or organizations: Not on file     Relationship status: Not on file    Intimate partner violence     Fear of current or ex partner: Not on file     Emotionally abused: Not on file     Physically abused: Not on file     Forced sexual activity: Not on file   Other Topics Concern    Not on file   Social History Narrative    Not on file       Current Outpatient Medications   Medication Sig Dispense Refill    verapamil (VERELAN) 240 MG extended release capsule Take 1 capsule by mouth 2 times daily 180 capsule 3    metoprolol (LOPRESSOR) 100 MG tablet Take 1.5 tablets by mouth 2 times daily 270 tablet 3    losartan (COZAAR) 25 MG tablet Take 1 tablet by mouth daily 90 tablet 3    digoxin (LANOXIN) 125 MCG tablet Take 1 tablet by mouth daily 90 tablet 3    clopidogrel (PLAVIX) 75 MG tablet Take 1 tablet by mouth daily 90 tablet 3    apixaban (ELIQUIS) 5 MG TABS tablet Take 1 tablet by mouth 2 times daily 180 tablet 3    furosemide (LASIX) 20 MG tablet Take 1 tablet by mouth every other day Lasix 20 mg daily X1 week, then take every other day 90 tablet 3    potassium chloride (MICRO-K) 10 MEQ extended release capsule Take 1 capsule by mouth every other day Take 1 tab daily for 1 week then take QOD 90 capsule 3    fluticasone-salmeterol (ADVAIR) 250-50 MCG/DOSE AEPB Inhale 1 puff into the lungs 2 times daily 60 each 0    ferrous sulfate (IRON 325) 325 (65 Fe) MG tablet Take 325 mg by mouth every other day      HYDROcodone-acetaminophen (NORCO) 5-325 MG per tablet Acetaminophen / HYDROcodone Apap/Hydrocodone 325mg/5mg Active 1 TAB ORAL Every 4 hr as needed 25 5 October 2nd, 2019 12:32pm 10-  Pricila Cooper ([id number])      chlorthalidone (HYGROTON) 25 MG tablet TAKE 1 TABLET DAILY 90 tablet 3    pravastatin (PRAVACHOL) 40 MG tablet Take 1 tablet by mouth daily 90 tablet 3    pantoprazole (PROTONIX) 40 MG tablet TAKE 1 TABLET DAILY 90 tablet 3    nitroGLYCERIN (NITROSTAT) 0.4 MG SL tablet up to max of 3 total doses. If no relief after 1 dose, call 911. 25 tablet 1    modafinil (PROVIGIL) 200 MG tablet Take 200 mg by mouth as needed.  DULoxetine (CYMBALTA) 20 MG extended release capsule Take 20 mg by mouth daily      Cyanocobalamin (VITAMIN B-12 PO) Take 1 tablet by mouth daily      Cholecalciferol (VITAMIN D3) 5000 UNITS CAPS Take 2 capsules by mouth daily      sulfaSALAzine (AZULFIDINE) 500 MG tablet Take 500 mg by mouth 2 times daily      albuterol sulfate HFA (VENTOLIN HFA) 108 (90 BASE) MCG/ACT inhaler Inhale 2 puffs into the lungs every 6 hours as needed for Wheezing 1 Inhaler 11    levothyroxine (SYNTHROID) 50 MCG tablet Take 50 mcg by mouth daily       hydroxychloroquine (PLAQUENIL) 200 MG tablet Take 200 mg by mouth 2 times daily. No current facility-administered medications for this visit. Review of Systems -     General ROS: negative  Psychological ROS: negative  Hematological and Lymphatic ROS: No history of blood clots or bleeding disorder.    Respiratory ROS: no cough, shortness of breath, or wheezing  Cardiovascular ROS: no chest pain or dyspnea on exertion  Gastrointestinal ROS: negative  Genito-Urinary ROS: no dysuria, trouble voiding, or hematuria  Musculoskeletal ROS: negative  Neurological ROS: no TIA or stroke symptoms  Dermatological ROS: negative      Blood pressure 136/85, pulse 71, height 5' 8\" (1.727 m), weight 228 lb 3.2 oz (103.5 kg), not currently breastfeeding. Physical Examination:    General appearance - alert, well appearing, and in no distress  Mental status - alert, oriented to person, place, and time  Neck - supple, no significant adenopathy, no JVD, or carotid bruits  Chest - clear to auscultation, no wheezes, rales or rhonchi, symmetric air entry  Heart - normal rate, regular rhythm, normal S1, S2, no murmurs, rubs, clicks or gallops  Abdomen - soft, nontender, nondistended, no masses or organomegaly  Neurological - alert, oriented, normal speech, no focal findings or movement disorder noted  Musculoskeletal - no joint tenderness, deformity or swelling  Extremities - peripheral pulses normal, no pedal edema, no clubbing or cyanosis  Skin - normal coloration and turgor, no rashes, no suspicious skin lesions noted    Lab  No results for input(s): CKTOTAL, CKMB, CKMBINDEX, TROPONINI in the last 72 hours.   CBC:   Lab Results   Component Value Date    WBC 10.3 10/09/2020    RBC 4.30 10/09/2020    HGB 14.6 10/09/2020    HCT 45.2 10/09/2020    .1 10/09/2020    MCH 34.0 10/09/2020    MCHC 32.3 10/09/2020    RDW 14.3 10/01/2018     10/09/2020    MPV 10.5 10/09/2020     BMP:    Lab Results   Component Value Date     10/09/2020    K 3.8 10/09/2020    K 4.0 03/04/2020     10/09/2020    CO2 29 10/09/2020    BUN 17 10/09/2020    LABALBU 4.2 10/09/2020    CREATININE 0.7 10/09/2020    CALCIUM 10.0 10/09/2020    LABGLOM 82 10/09/2020    GLUCOSE 99 10/09/2020    GLUCOSE 91 09/21/2020     Hepatic Function Panel:    Lab Results   Component Value Date    ALKPHOS 83 10/09/2020    ALT 16 10/09/2020    AST 21 10/09/2020    PROT 6.9 10/09/2020    BILITOT 0.6 10/09/2020    BILIDIR 0.1 09/21/2020    LABALBU 4.2 10/09/2020     Magnesium:    Lab Results   Component Value Date    MG 1.9 09/21/2020     Warfarin PT/INR:  No components found for: ALICE, PTINRWAR  HgBA1c:    Lab Results   Component Value Date    LABA1C 5.4 10/01/2018     FLP:    Lab Results   Component Value Date    TRIG 76 10/09/2020    HDL 56 10/09/2020    LDLCALC 38 10/09/2020    LDLDIRECT 58 09/21/2020     TSH:    Lab Results   Component Value Date    TSH 0.907 10/09/2020       EKG 1/5/16  Normal sinus rhythm  Rightward axis  T wave abnormality, consider inferior ischemia  Abnormal ECG  When compared with ECG of 01-JUL-2011 09:56,  Questionable change in The axis  T wave inversion now evident in Inferior leads  Confirmed by Rosibel Johnson ([id number]) on 12/8/2015 8:38:06 PM    EKG 8/25/16  Marked sinus  Bradycardia   -Left axis -anterior fascicular block.    -Old anteroseptal infarct. ABNORMAL       EKG 9/22/16-sinus bradycardia rate 46 bopm       CONCLUSION:   1. This is a normal sinus rhythm with average heart rate 55 beats per minute,  ranging from  beats per minute. 2. No significant pause of more than 1.9 seconds noted. 3. Rare ventricular ectopic beats, isolated and couplets. 4. Rare supraventricular ectopic beats, isolated, couplets, and rare  nonsustained supraventricular ectopic run. 5. No significant bradyarrhythmia to be addressed, yet certainly average  heart rate 55, minimum is 40, so probably need further clinical correlation. 6. Otherwise, at this level, this minimal low heart rate if there are no  symptoms will need just observation.  Breann Naik M.D.     D: 11/29/2016 20:0      EKG 8/12/19  NSR, no acute abn       Cath feb 2020    HEMODYNAMICS:  LVEDP 9 mmHg.     CORONARY ANGIOGRAM:  1. Right coronary artery. The ostial RCA has a 10% lesion, proximal  RCA has a 20% lesion. Mid RCA has 30% lesion. Distal RCA with luminal  irregularities. RCA is a dominant vessel. Bifurcates into RPL and  RPDA. RPL has an ostial 20% lesion. RPDA has a 10% to 20% lesion. 2.  Left main coronary artery.   Left main coronary artery has a 50%  lesion in the distal left main coronary artery. Bifurcates into left  circumflex artery and LAD. 3.  Left circumflex artery. 10% to 20% ostial lesion in the left  circumflex artery. There is a high takeoff of a large OM1 branch that  has a 60% lesion in the proximal part of the vessel. Distal left  circumflex artery is patent. OM2 is a large vessel with no significant  stenotic lesions. 4.  Left anterior descending artery. There is a severe stenotic diffuse  lesion involving the ostial and proximal part of LAD ranging in severity  between 70% to 90%. Mid LAD is patent. Distal LAD has mild diffuse  disease.     MEDICATIONS:  See MAR.     COMPLICATIONS:  None.     ESTIMATED BLOOD LOSS:  Less than 30 mL.     ACCESS:  Right radial artery access. Vasc Band was applied. Hemostasis  was achieved.     CONCLUSION:  1. Severe stenotic lesion of the distal left main coronary artery and  ostial/proximal left anterior descending artery. 2.  Status post successful PCI with the stenting of distal left main  coronary artery and left anterior descending artery with details as  listed above.     RECOMMENDATIONS:  1.  Bedrest for the next 4 hours. 2.  Monitor in telemetry. 3.  Aggressive risk factor modification. 4.  Optimize medical therapy for CAD. 5.  Access site care. 6.  Aspirin 81 mg p.o. daily, can be stopped after 1 month. 7.  Plavix 75 mg p.o. daily. 8.  May resume Eliquis in a.m.  9.  High intensity statin therapy. 10.  Outpatient follow up in office in 2 to 4 weeks.     Findings and plan of care discussed with the patient's family.  Sai Paez MD     D: 02/21/2020 15:     -IVUS LM/LAD  -Severe stenotic lesion of dLM (Bifurcation lesion, De Leon Type 1,1,0)  -Severe 80-90% lesion of ostial/proximal LAD  -S/P Successful PCI with stenting of dLM/pLAD    ekg 9/23/2020  Atrial flutter with  bpm    Assessment    Home BP good       Diagnosis Orders   1.  Chronic diastolic congestive heart failure (HCC)  Basic Metabolic Panel    Magnesium   2. Permanent atrial fibrillation (HCC)  Basic Metabolic Panel    Magnesium   3. Coronary artery disease involving native coronary artery of native heart without angina pectoris  Basic Metabolic Panel    Magnesium   4. Essential hypertension  Basic Metabolic Panel    Magnesium   5. SOB (shortness of breath) on exertion  Basic Metabolic Panel    Magnesium   6. Morbid (severe) obesity due to excess calories [facility])  Basic Metabolic Panel    Magnesium       Recent feb 2020 admission DX  PAFB RVR  - more persistent now - s\p TAYLOR / CV to SR 2/24/2020              On [facility]               On amiodarone      abnormal stress testing   S\p cardiac cath 2/21/2020:  -Severe stenotic lesion of dLM (Bifurcation lesion, De Leon Type 1,1,0)  -Severe 80-90% lesion of ostial/proximal LAD  -S/P Successful PCI with stenting of dLM/pLAD     Hx bradycardia - none this admit     HTN  HLP  DM II  Hx Lt CEA 2007  Hx TIA  Hx SHANNEN      Previous admission DX    Hx of recent TIA with RT side weakness      NSVT 5 beat   S/P gastric sleeve  Hypertensive Urgency  Hx of obstructive Sleep apnea  Pulmonary HTN  New onset post afib with RVR- now rate controlled  Chads of  DM II  HX of TIA         Continue home medication regimen   bP controlled    Doing well        Plan     The meds and labs reviewed      Permanent atr fib   atr flutter /Fib with CVR 84  S/p TAYLOR- CV and weent back to atr fib  Off  amiodarone as failed to be in NSR  atr fib with  bpm at rest   TSH WNL  Cont  metoprolol 150  BID  Cont calan SR  240 po bid for rate control  cont digoxin 125 mcg po qd  Decrease losartan to 25 po qd from 50  If remain uncontrolled consider cardioversion and or eps       CHDAS of 4( DM, htn, Hx of TIA)  Need longterm OA and on apixaban  D/w the pat the risk and benefit of OA  Pat understand the risk of bleeding including ICH    Coronary artery disease, seems to be stable.  Denies angina or change in breathing pattern  S/ p LM-LAD stent feb 2020  Cont apixaban and plavix  Off ASA     Patient Seen, Chart, Consults notes, Labs, Radiology studies reviewed. Hx of TIA IN 2016  Was on asa and apixaban    Hypertension, on medical treatment. Seems to be under good control. Patient is compliant with medical treatment. Cont  calan SR     Congestive heart failure: no evidence of fluid overload today, no recent hospitalization for CHF  Leg edema +1 - now resolved  Lasix 20 mg po qd  For 1 week the QOD  kdur 10 meq po QOD     Hyperlipidemia: on statins, followed periodically. Patient need periodic lipid and liver profile. Hx of carotid left carotid endarterectomy in 2017  And started on statin then  Lower leg weakness from lipitor  OFF  lipitor  TOLERATED  lIvalo 1 mg in 3 weeks AND BUT PCP STOPPED IT DUE TO nORMAL LIPID PANEL VALUES  Now taking pravastatin  Cont  pravastatin to 40 mg po qd    Pulm HTN    Hypertension, on medical treatment. Seems to be under good control. Patient is compliant with medical treatment. Intermittent serena- claudio  WNL and probably neuropathic    Reviewed the  report of [facility] from [facility]    patient is advised to exercise 30 min s a day three times a week and about weight loss ,balance diet and    More fruits and vegetables . Discussed use, benefit, and side effects of prescribed medications. All patient questions answered. Pt voiced understanding. Instructed to continue current medications, diet and exercise. Continue risk factor modification and medical management. Patient agreed with treatment plan. Follow up as directed.     I spent 25 minutes involved in face-to-face discussion of medical issues, prognosis, record review  and plan with the patient today and more than 50% of the time was spent on counseling and coordination of care    D/w the pat the plan of care    BMP and MG    Hydration       RTC in   4 months    [facility]

## 2020-11-17 ENCOUNTER — OFFICE VISIT (OUTPATIENT)
Dept: PULMONOLOGY | Age: 70
End: 2020-11-17
Payer: MEDICARE

## 2020-11-17 VITALS
SYSTOLIC BLOOD PRESSURE: 126 MMHG | WEIGHT: 228 LBS | BODY MASS INDEX: 34.56 KG/M2 | OXYGEN SATURATION: 95 % | DIASTOLIC BLOOD PRESSURE: 74 MMHG | TEMPERATURE: 97.6 F | HEIGHT: 68 IN | HEART RATE: 98 BPM

## 2020-11-17 PROCEDURE — 99214 OFFICE O/P EST MOD 30 MIN: CPT | Performed by: PHYSICIAN ASSISTANT

## 2020-11-17 RX ORDER — MODAFINIL 200 MG/1
200 TABLET ORAL DAILY
Qty: 30 TABLET | Refills: 2 | Status: SHIPPED | OUTPATIENT
Start: 2020-11-17 | End: 2021-01-18 | Stop reason: SDUPTHER

## 2020-11-17 ASSESSMENT — ENCOUNTER SYMPTOMS
CHEST TIGHTNESS: 0
BACK PAIN: 0
EYES NEGATIVE: 1
NAUSEA: 0
COUGH: 0
SHORTNESS OF BREATH: 1
WHEEZING: 0
ALLERGIC/IMMUNOLOGIC NEGATIVE: 1
STRIDOR: 0
DIARRHEA: 0

## 2020-12-14 ENCOUNTER — TELEPHONE (OUTPATIENT)
Dept: FAMILY MEDICINE CLINIC | Age: 70
End: 2020-12-14

## 2020-12-14 NOTE — LETTER
[address]  [address]  [facility] [address]  Phone: [phone]  Fax: [phone]    Esau Castellon MD        December 14, 2020     Patient: Gage Coy   YOB: 1950   Date of Visit: 12/14/2020       To Whom It May Concern: It is my medical opinion that [facility] requires a disability parking placard for the following reasons:  She cannot walk 200 feet without stopping to rest.  Duration of need: 2 years    If you have any questions or concerns, please don't hesitate to call.     Sincerely,        Esau Castellon MD

## 2021-01-18 ENCOUNTER — OFFICE VISIT (OUTPATIENT)
Dept: PULMONOLOGY | Age: 71
End: 2021-01-18
Payer: MEDICARE

## 2021-01-18 ENCOUNTER — TELEPHONE (OUTPATIENT)
Dept: CARDIOLOGY CLINIC | Age: 71
End: 2021-01-18

## 2021-01-18 VITALS
HEART RATE: 54 BPM | DIASTOLIC BLOOD PRESSURE: 70 MMHG | OXYGEN SATURATION: 98 % | WEIGHT: 234 LBS | SYSTOLIC BLOOD PRESSURE: 126 MMHG | HEIGHT: 68 IN | TEMPERATURE: 96.6 F | BODY MASS INDEX: 35.46 KG/M2

## 2021-01-18 DIAGNOSIS — E66.9 OBESITY (BMI 30-39.9): ICD-10-CM

## 2021-01-18 DIAGNOSIS — G47.30 HYPERSOMNIA WITH SLEEP APNEA: ICD-10-CM

## 2021-01-18 DIAGNOSIS — G47.09 OTHER INSOMNIA: ICD-10-CM

## 2021-01-18 DIAGNOSIS — Z99.89 OBSTRUCTIVE SLEEP APNEA ON CPAP: Primary | ICD-10-CM

## 2021-01-18 DIAGNOSIS — G47.10 HYPERSOMNIA WITH SLEEP APNEA: ICD-10-CM

## 2021-01-18 DIAGNOSIS — I27.20 PULMONARY HTN (HCC): ICD-10-CM

## 2021-01-18 DIAGNOSIS — G47.33 OBSTRUCTIVE SLEEP APNEA ON CPAP: Primary | ICD-10-CM

## 2021-01-18 PROCEDURE — 99214 OFFICE O/P EST MOD 30 MIN: CPT | Performed by: PHYSICIAN ASSISTANT

## 2021-01-18 RX ORDER — MODAFINIL 200 MG/1
400 TABLET ORAL DAILY
Qty: 180 TABLET | Refills: 1 | Status: SHIPPED | OUTPATIENT
Start: 2021-01-18 | End: 2021-01-18 | Stop reason: SDUPTHER

## 2021-01-18 RX ORDER — MODAFINIL 200 MG/1
400 TABLET ORAL DAILY
Qty: 180 TABLET | Refills: 1 | Status: SHIPPED | OUTPATIENT
Start: 2021-01-18 | End: 2021-10-29 | Stop reason: SDUPTHER

## 2021-01-18 ASSESSMENT — ENCOUNTER SYMPTOMS
WHEEZING: 0
ALLERGIC/IMMUNOLOGIC NEGATIVE: 1
COUGH: 0
BACK PAIN: 0
NAUSEA: 0
DIARRHEA: 0
EYES NEGATIVE: 1
SHORTNESS OF BREATH: 0
CHEST TIGHTNESS: 0
STRIDOR: 0

## 2021-01-18 NOTE — PROGRESS NOTES
[place] [facility] and [address] [place] [address] Drive         [id number]  1/18/2021   Chief Complaint   Patient presents with    Follow-up     SHANNEN 2 month sleep follow up         Pt of Dr. Aldo Holman    PAP Download:   Original or initial AHI: 17.2     Date of initial study: 11/15/2017      Compliant  100%     Noncompliant 0 %     PAP Type AutosetLevel  11   Avg Hrs/Day 8 hr 32 min  AHI: 1.6   Recorded compliance dates , 12/08/2020  to 01/06/2021   Machine/Mfg:   [x] ResMed    [] Respironics/Dreamstation   Interface:   [] Nasal    [] Nasal pillows   [x] FFM      Provider:      [] [facility]     [][employer]     [] [employer]    [] [address]    [] [place]               [] [facility]      [x] [employer]    [] [address]      [] Other    Neck Size: 15  Mallampati Mallampati 3  ESS:  13  SAQLI:  47    Here is a scan of the most recent download:            Presentation:   Jen Driver presents for sleep medicine follow up for obstructive sleep apnea  Since the last visit, Jen Driver is doing well with PAP. She is doing better with Provigil. She states more awake with Provigil. She has also been having some insomnia the past month. Equipment issues: The pressure is  acceptable, the mask is acceptable     Sleep issues:  Do you feel better? Yes  More rested? Yes   Better concentration? yes    Progress History:   Since last visit any new medical issues? No  New ER or hospitlal visits? No  Any new or changes in medicines? No  Any new sleep medicines?  No        Past Medical History:   Diagnosis Date    Arthritis     Atrial fibrillation ([address]     Cancer (HCC)     skin    Carotid arterial disease (HCC)     CHF (congestive heart failure) (HCC)     Depression     Fibromyalgia     Hypertension     Hypothyroidism     Obesity     S/P bariatric surgery 2018    Sleep apnea     CPAP    Thyroid disease     TIA (transient ischemic attack) 12/2015       Past Surgical History:   Procedure Laterality Date    APPENDECTOMY  over 10 years ago    [id number] Left 10/01/2019    CAROTID ENDARTERECTOMY  2017    CHOLECYSTECTOMY  over 10 years ago    [address] [facility] COLONOSCOPY N/A 2018    COLONOSCOPY WITH BIOPSY performed by Ciara Hwang MD at [address] [facility]     HYSTERECTOMY  10 plus years ago    Dr. Moriah Dunlap Right     OTHER SURGICAL HISTORY Left     Rotator cuff sx    SLEEVE GASTRECTOMY  2016    Robotic, Extensive Lysis of Adhesions, Removal of Angelchik Prosthesis - Dr. Jean Fontenot ENDOSCOPY  2016    Dr. Isamar Grant History     Tobacco Use    Smoking status: Former Smoker     Packs/day: 0.25     Years: 30.00     Pack years: 7.50     Types: Cigarettes     Start date: 3/24/1982     Quit date: 2015     Years since quittin.9    Smokeless tobacco: Never Used   Substance Use Topics    Alcohol use: No    Drug use: No       Allergies   Allergen Reactions    Demerol Hcl [Meperidine] Nausea And Vomiting       Current Outpatient Medications   Medication Sig Dispense Refill    modafinil (PROVIGIL) 200 MG tablet Take 1 tablet by mouth daily for 90 days.  30 tablet 2    verapamil (VERELAN) 240 MG extended release capsule Take 1 capsule by mouth 2 times daily 180 capsule 3    metoprolol (LOPRESSOR) 100 MG tablet Take 1.5 tablets by mouth 2 times daily 270 tablet 3    losartan (COZAAR) 25 MG tablet Take 1 tablet by mouth daily 90 tablet 3    digoxin (LANOXIN) 125 MCG tablet Take 1 tablet by mouth daily 90 tablet 3    clopidogrel (PLAVIX) 75 MG tablet Take 1 tablet by mouth daily 90 tablet 3    apixaban (ELIQUIS) 5 MG TABS tablet Take 1 tablet by mouth 2 times daily 180 tablet 3    furosemide (LASIX) 20 MG tablet Take 1 tablet by mouth every other day Lasix 20 mg daily X1 week, then take every other day 90 tablet 3    potassium chloride (MICRO-K) 10 MEQ extended release capsule Take 1 capsule by mouth every other day Take 1 tab daily for 1 week then take QOD 90 capsule 3    fluticasone-salmeterol (ADVAIR) 250-50 MCG/DOSE AEPB Inhale 1 puff into the lungs 2 times daily 60 each 0    ferrous sulfate (IRON 325) 325 (65 Fe) MG tablet Take 325 mg by mouth every other day      HYDROcodone-acetaminophen (NORCO) 5-325 MG per tablet Acetaminophen / HYDROcodone Apap/Hydrocodone 325mg/5mg Active 1 TAB ORAL Every 4 hr as needed 25 5 October 2nd, 2019 12:32pm 10-  [id number] ([id number])      chlorthalidone (HYGROTON) 25 MG tablet TAKE 1 TABLET DAILY 90 tablet 3    pravastatin (PRAVACHOL) 40 MG tablet Take 1 tablet by mouth daily 90 tablet 3    pantoprazole (PROTONIX) 40 MG tablet TAKE 1 TABLET DAILY 90 tablet 3    nitroGLYCERIN (NITROSTAT) 0.4 MG SL tablet up to max of 3 total doses. If no relief after 1 dose, call 911. 25 tablet 1    DULoxetine (CYMBALTA) 20 MG extended release capsule Take 20 mg by mouth daily      Cyanocobalamin (VITAMIN B-12 PO) Take 1 tablet by mouth daily      Cholecalciferol (VITAMIN D3) 5000 UNITS CAPS Take 2 capsules by mouth daily      sulfaSALAzine (AZULFIDINE) 500 MG tablet Take 500 mg by mouth 2 times daily      albuterol sulfate HFA (VENTOLIN HFA) 108 (90 BASE) MCG/ACT inhaler Inhale 2 puffs into the lungs every 6 hours as needed for Wheezing 1 Inhaler 11    levothyroxine (SYNTHROID) 50 MCG tablet Take 50 mcg by mouth daily       hydroxychloroquine (PLAQUENIL) 200 MG tablet Take 200 mg by mouth 2 times daily. No current facility-administered medications for this visit.         Family History   Problem Relation Age of Onset    Stroke Mother     High Blood Pressure Mother     Atrial Fibrillation Mother     Heart Disease Mother     Diabetes Father     Early Death Father     Cancer Other         Daughter    Heart Disease Sister     Heart Attack Sister     Deep Vein Thrombosis Brother         Review of Systems -   Review of Systems   Constitutional: Negative for activity change, appetite change, chills and fever. HENT: Negative for congestion and postnasal drip. Eyes: Negative. Respiratory: Negative for cough, chest tightness, shortness of breath, wheezing and stridor. Cardiovascular: Negative for chest pain and leg swelling. Gastrointestinal: Negative for diarrhea and nausea. Endocrine: Negative. Genitourinary: Negative. Musculoskeletal: Negative. Negative for arthralgias and back pain. Skin: Negative. Allergic/Immunologic: Negative. Neurological: Negative. Negative for dizziness and light-headedness. Psychiatric/Behavioral: Positive for sleep disturbance. All other systems reviewed and are negative. Physical Exam:    BMI:  Body mass index is 35.58 kg/m². Wt Readings from Last 3 Encounters:   01/18/21 234 lb (106.1 kg)   11/17/20 228 lb (103.4 kg)   11/04/20 228 lb 3.2 oz (103.5 kg)     Weight gained 6 lbs over 3 month  Vitals: /70 (Site: Right Upper Arm, Position: Sitting, Cuff Size: Large Adult)   Pulse 54   Temp 96.6 °F (35.9 °C) (Temporal)   Ht 5' 8\" (1.727 m)   Wt 234 lb (106.1 kg)   SpO2 98% Comment: Room Air  BMI 35.58 kg/m²       Physical Exam  Constitutional:       Appearance: She is well-developed. HENT:      Head: Normocephalic and atraumatic. Right Ear: External ear normal.      Left Ear: External ear normal.   Eyes:      Conjunctiva/sclera: Conjunctivae normal.      Pupils: Pupils are equal, round, and reactive to light. Neck:      Musculoskeletal: Normal range of motion and neck supple. Cardiovascular:      Rate and Rhythm: Normal rate and regular rhythm. Heart sounds: Normal heart sounds. Pulmonary:      Effort: Pulmonary effort is normal.      Breath sounds: Normal breath sounds. Musculoskeletal: Normal range of motion. Skin:     General: Skin is warm and dry.    Neurological:      Mental Status: She is alert and oriented to person, place, and time. Psychiatric:         Behavior: Behavior normal.         Thought Content: Thought content normal.         Judgment: Judgment normal.         ASSESSMENT/DIAGNOSIS     Diagnosis Orders   1. Obstructive sleep apnea on CPAP     2. Obesity (BMI 30-39.9)     3. Hypersomnia with sleep apnea     4. Pulmonary HTN (HCC) Mean pressure 32 mmhg by C     5. Other insomnia              Plan   Do you need any equipment today? No  - Increase provigil to 400mg a day  - Melatonin at night for insomnia  - sleep hygiene handout  - increase activity to help promote sleep  - Download reviewed and discussed with patient  - She  was advised to continue current positive airway pressure therapy with above described pressure. - She  advised to keep good compliance with current recommended pressure to get optimal results and clinical improvement  - Recommend 7-9 hours of sleep with PAP  - She was advised to call [employer] regarding supplies if needed.   -She call my office for earlier appointment if needed for worsening of sleep symptoms.   - She was instructed on weight loss  - Barbara Michelle was educated about my impression and plan. Patient verbalizesunderstanding.   We will see [facility] back in: 4 months with download    Information added by my medical assistant/LPN was reviewed today          Felecia Hurst PA-C, MPAS  1/18/2021       Total time for visit was 30 min

## 2021-01-18 NOTE — TELEPHONE ENCOUNTER
Message transferred from . [facility] needing clarification on Verapamil. Call back number [phone]. Ref# [id number].

## 2021-02-03 ENCOUNTER — OFFICE VISIT (OUTPATIENT)
Dept: FAMILY MEDICINE CLINIC | Age: 71
End: 2021-02-03
Payer: MEDICARE

## 2021-02-03 VITALS
SYSTOLIC BLOOD PRESSURE: 118 MMHG | TEMPERATURE: 96 F | HEART RATE: 74 BPM | BODY MASS INDEX: 34.69 KG/M2 | HEIGHT: 68 IN | DIASTOLIC BLOOD PRESSURE: 64 MMHG | WEIGHT: 228.9 LBS

## 2021-02-03 DIAGNOSIS — Z99.89 OBSTRUCTIVE SLEEP APNEA ON CPAP: ICD-10-CM

## 2021-02-03 DIAGNOSIS — G47.33 OBSTRUCTIVE SLEEP APNEA ON CPAP: ICD-10-CM

## 2021-02-03 DIAGNOSIS — I25.10 CORONARY ARTERY DISEASE INVOLVING NATIVE CORONARY ARTERY OF NATIVE HEART WITHOUT ANGINA PECTORIS: ICD-10-CM

## 2021-02-03 DIAGNOSIS — Z00.00 ROUTINE GENERAL MEDICAL EXAMINATION AT A HEALTH CARE FACILITY: Primary | ICD-10-CM

## 2021-02-03 DIAGNOSIS — I10 ESSENTIAL HYPERTENSION: ICD-10-CM

## 2021-02-03 DIAGNOSIS — J44.9 CHRONIC OBSTRUCTIVE PULMONARY DISEASE, UNSPECIFIED COPD TYPE (HCC): ICD-10-CM

## 2021-02-03 PROCEDURE — G0438 PPPS, INITIAL VISIT: HCPCS | Performed by: FAMILY MEDICINE

## 2021-02-03 RX ORDER — ALBUTEROL SULFATE 90 UG/1
2 AEROSOL, METERED RESPIRATORY (INHALATION) EVERY 6 HOURS PRN
Qty: 1 INHALER | Refills: 11 | Status: SHIPPED | OUTPATIENT
Start: 2021-02-03 | End: 2022-05-03 | Stop reason: SDUPTHER

## 2021-02-03 ASSESSMENT — PATIENT HEALTH QUESTIONNAIRE - PHQ9
SUM OF ALL RESPONSES TO PHQ9 QUESTIONS 1 & 2: 2
1. LITTLE INTEREST OR PLEASURE IN DOING THINGS: 1
SUM OF ALL RESPONSES TO PHQ QUESTIONS 1-9: 2
SUM OF ALL RESPONSES TO PHQ QUESTIONS 1-9: 2

## 2021-02-03 NOTE — PATIENT INSTRUCTIONS
Personalized Preventive Plan for Priscilla Monte - 2/3/2021  Medicare offers a range of preventive health benefits. Some of the tests and screenings are paid in full while other may be subject to a deductible, co-insurance, and/or copay. Some of these benefits include a comprehensive review of your medical history including lifestyle, illnesses that may run in your family, and various assessments and screenings as appropriate. After reviewing your medical record and screening and assessments performed today your provider may have ordered immunizations, labs, imaging, and/or referrals for you. A list of these orders (if applicable) as well as your Preventive Care list are included within your After Visit Summary for your review. Other Preventive Recommendations:    · A preventive eye exam performed by an eye specialist is recommended every 1-2 years to screen for glaucoma; cataracts, macular degeneration, and other eye disorders. · A preventive dental visit is recommended every 6 months. · Try to get at least 150 minutes of exercise per week or 10,000 steps per day on a pedometer . · Order or download the FREE \"Exercise & Physical Activity: Your Everyday Guide\" from [employer]. Call 5-121.768.9715 or search [employer] online. · You need 1143-7767 mg of calcium and 7130-7793 IU of vitamin D per day. It is possible to meet your calcium requirement with diet alone, but a vitamin D supplement is usually necessary to meet this goal.  · When exposed to the sun, use a sunscreen that protects against both UVA and UVB radiation with an SPF of 30 or greater. Reapply every 2 to 3 hours or after sweating, drying off with a towel, or swimming. · Always wear a seat belt when traveling in a car. Always wear a helmet when riding a bicycle or motorcycle.

## 2021-02-03 NOTE — PROGRESS NOTES
HYDROcodone-acetaminophen (NORCO) 5-325 MG per tablet Acetaminophen / HYDROcodone Apap/Hydrocodone 325mg/5mg Active 1 TAB ORAL Every 4 hr as needed 25 5 October 2nd, 2019 12:32pm 10-  Pricila Cooper ([id number]) Yes Historical Provider, MD   chlorthalidone (HYGROTON) 25 MG tablet TAKE 1 TABLET DAILY Yes Constance Soulier, APRN - CNP   pravastatin (PRAVACHOL) 40 MG tablet Take 1 tablet by mouth daily Yes Carmelita Lloyd MD   pantoprazole (PROTONIX) 40 MG tablet TAKE 1 TABLET DAILY Yes Carmelita Lloyd MD   DULoxetine (CYMBALTA) 20 MG extended release capsule Take 20 mg by mouth daily Yes Historical Provider, MD   sulfaSALAzine (AZULFIDINE) 500 MG tablet Take 500 mg by mouth 2 times daily Yes Historical Provider, MD   albuterol sulfate HFA (VENTOLIN HFA) 108 (90 BASE) MCG/ACT inhaler Inhale 2 puffs into the lungs every 6 hours as needed for Wheezing Yes Reji Zapien MD   hydroxychloroquine (PLAQUENIL) 200 MG tablet Take 200 mg by mouth 2 times daily. Yes Historical Provider, MD   nitroGLYCERIN (NITROSTAT) 0.4 MG SL tablet up to max of 3 total doses. If no relief after 1 dose, call 911.   Patient not taking: Reported on 2/3/2021  Constance Soulier, APRN - CNP       Past Medical History:   Diagnosis Date    Arthritis     Atrial fibrillation ([facility]     Cancer ([facility]     skin    Carotid arterial disease ([facility]     CHF (congestive heart failure) ([facility]     Depression     Fibromyalgia     Hypertension     Hypothyroidism     Obesity     S/P bariatric surgery 2018    Sleep apnea     CPAP    Thyroid disease     TIA (transient ischemic attack) 12/2015       Past Surgical History:   Procedure Laterality Date    APPENDECTOMY  over 10 years ago    [address] 10/01/2019    CAROTID ENDARTERECTOMY  09/2017    CHOLECYSTECTOMY  over 10 years ago    [address] [facility] COLONOSCOPY N/A 2/7/2018 COLONOSCOPY WITH BIOPSY performed by Leela Gutierrez MD at [address] [facility]     HYSTERECTOMY  10 plus years ago    Dr. Anai Menjivar Right 2009    OTHER SURGICAL HISTORY Left 2012    Rotator cuff sx    SLEEVE GASTRECTOMY  09/12/2016    Robotic, Extensive Lysis of Adhesions, Removal of Angelchik Prosthesis - Dr. Nicol Ontiveros  07/06/2016    Dr. Lani Nicole        Family History   Problem Relation Age of Onset    Stroke Mother     High Blood Pressure Mother     Atrial Fibrillation Mother     Heart Disease Mother     Diabetes Father     Early Death Father     Cancer Other         Daughter    Heart Disease Sister     Heart Attack Sister     Deep Vein Thrombosis Brother        CareTeam (Including outside providers/suppliers regularly involved in providing care):   Patient Care Team:  Akilah Laughlin MD as PCP - General (Family Medicine)  Akilah Laughlin MD as PCP - [facility] Empaneled Provider  Sera Delgado MD as Consulting Physician (Pulmonology)  Gee Castano MD as Physician (Cardiology)  Eric Ma MD as Physician (Rheumatology)  Bhupendra Chou MD as Surgeon (Cardiothoracic Surgery)    Wt Readings from Last 3 Encounters:   02/03/21 228 lb 14.4 oz (103.8 kg)   01/18/21 234 lb (106.1 kg)   11/17/20 228 lb (103.4 kg)     Vitals:    02/03/21 1040   BP: 118/64   Pulse: 74   Temp: 96 °F (35.6 °C)   TempSrc: Temporal   Weight: 228 lb 14.4 oz (103.8 kg)   Height: 5' 7.5\" (1.715 m)     Body mass index is 35.32 kg/m². Based upon direct observation of the patient, evaluation of cognition reveals recent and remote memory intact.     General Appearance: alert and oriented to person, place and time, well developed and well- nourished, in no acute distress  Skin: warm and dry, no rash or erythema  Head: normocephalic and atraumatic Eyes: pupils equal, round, and reactive to light, extraocular eye movements intact, conjunctivae normal  ENT: tympanic membrane, external ear and ear canal normal bilaterally, nose without deformity, nasal mucosa and turbinates normal without polyps  Neck: supple and non-tender without mass, no thyromegaly or thyroid nodules, no cervical lymphadenopathy  Pulmonary/Chest: clear to auscultation bilaterally- no wheezes, rales or rhonchi, normal air movement, no respiratory distress  Cardiovascular: normal rate, regular rhythm, normal S1 and S2, no murmurs, rubs, clicks, or gallops, distal pulses intact, no carotid bruits  Abdomen: soft, non-tender, non-distended, normal bowel sounds, no masses or organomegaly  Extremities: no cyanosis, clubbing or edema  Musculoskeletal: normal range of motion, no joint swelling, deformity or tenderness  Neurologic: reflexes normal and symmetric, no cranial nerve deficit, gait, coordination and speech normal    Patient's complete Health Risk Assessment and screening values have been reviewed and are found in Flowsheets. The following problems were reviewed today and where indicated follow up appointments were made and/or referrals ordered. Positive Risk Factor Screenings with Interventions:          General Health and ACP:  General  In general, how would you say your health is?: Fair  In the past 7 days, have you experienced any of the following?  New or Increased Pain, New or Increased Fatigue, Loneliness, Social Isolation, Stress or Anger?: (!) New or Increased Fatigue  Do you get the social and emotional support that you need?: Yes  Do you have a Living Will?: Yes  Advance Directives     Power of  Living Will ACP-Advance Directive ACP-Power of     Not on File Not on File Not on File Not on File      General Health Risk Interventions:  · na    Health Habits/Nutrition:  Health Habits/Nutrition  Do you exercise for at least 20 minutes 2-3 times per week?: (!) No Have you lost any weight without trying in the past 3 months?: No  Do you eat only one meal per day?: No  Have you seen the dentist within the past year?: Yes  Body mass index: (!) 35.32  Health Habits/Nutrition Interventions:  · na    Hearing/Vision:  No exam data present  Hearing/Vision  Do you or your family notice any trouble with your hearing that hasn't been managed with hearing aids?: (!) Yes  Do you have difficulty driving, watching TV, or doing any of your daily activities because of your eyesight?: No  Have you had an eye exam within the past year?: Yes  Hearing/Vision Interventions:  · na    Safety:  Safety  Do you have working smoke detectors?: Yes  Have all throw rugs been removed or fastened?: Yes  Do you have non-slip mats or surfaces in all bathtubs/showers?: (!) No  Do all of your stairways have a railing or banister?: (!) No  Are your doorways, halls and stairs free of clutter?: Yes  Do you always fasten your seatbelt when you are in a car?: Yes  Safety Interventions:  · Home safety tips provided     Personalized Preventive Plan   Current Health Maintenance Status  Immunization History   Administered Date(s) Administered    Influenza Virus Vaccine 01/17/2015, 11/20/2017, 09/29/2019    Influenza, High-dose, Ruben Alberts, 65 yrs +, IM (Fluzone) 10/21/2020    Influenza, Quadv, IM, PF (6 mo and older Fluzone, Flulaval, Fluarix, and 3 yrs and older Afluria) 11/07/2018    Pneumococcal Conjugate 13-valent ([id number]) 12/20/2017    Pneumococcal Polysaccharide ([id number]) 01/17/2015    Tdap (Boostrix, Adacel) 12/20/2017    Zoster Live (Zostavax) 04/24/2015    Zoster Recombinant (Shingrix) 10/21/2020, 01/15/2021        Health Maintenance   Topic Date Due    Diabetic foot exam  01[birth year]10/1958    Diabetic retinal exam  01/10/1958    COVID-19 Vaccine (1 of 2) 01/10/1964    Diabetic microalbuminuria test  01/10/1966    DEXA (modify frequency per FRAX score)  01/10/2003  Annual Wellness Visit (AWV)  06/18/2019    A1C test (Diabetic or Prediabetic)  10/01/2019    Lipid screen  10/09/2021    Potassium monitoring  10/09/2021    Creatinine monitoring  10/09/2021    Breast cancer screen  10/12/2022    Colon cancer screen colonoscopy  02/07/2023    DTaP/Tdap/Td vaccine (2 - Td) 12/20/2027    Flu vaccine  Completed    Shingles Vaccine  Completed    Pneumococcal 65+ years Vaccine  Completed    Hepatitis C screen  Completed    Hepatitis A vaccine  Aged Out    Hib vaccine  Aged Out    Meningococcal (ACWY) vaccine  Aged Out     [email or web address] Due: see orders and patient instructions/AVS.  . Recommended screening schedule for the next 5-10 years is provided to the patient in written form: see Patient Instructions/AVS.    William Morris was seen today for medicare awv. Diagnoses and all orders for this visit:    Routine general medical examination at a health care facility  -     Comprehensive Metabolic Panel; Future  -     Lipid Panel; Future  -     CBC Auto Differential; Future    Essential hypertension  -     Comprehensive Metabolic Panel; Future  -     Lipid Panel; Future  -     CBC Auto Differential; Future    Coronary artery disease involving native coronary artery of native heart without angina pectoris    Obstructive sleep apnea on CPAP    Chronic obstructive pulmonary disease, unspecified COPD type ([facility]    Other orders  -     fluticasone-salmeterol (ADVAIR) 250-50 MCG/DOSE AEPB; Inhale 1 puff into the lungs 2 times daily  -     albuterol sulfate HFA (VENTOLIN HFA) 108 (90 Base) MCG/ACT inhaler; Inhale 2 puffs into the lungs every 6 hours as needed for Wheezing           Seen today for wellness visit. Discussed the importance of a healthy life style. Balanced diet, nutrition, physical activity,and injury prevention. Also discussed the importance of up to date immunizations and annual screenings.

## 2021-04-01 ENCOUNTER — TELEPHONE (OUTPATIENT)
Dept: CARDIOLOGY CLINIC | Age: 71
End: 2021-04-01

## 2021-04-01 ENCOUNTER — TELEPHONE (OUTPATIENT)
Dept: FAMILY MEDICINE CLINIC | Age: 71
End: 2021-04-01

## 2021-04-01 DIAGNOSIS — R19.7 DIARRHEA, UNSPECIFIED TYPE: Primary | ICD-10-CM

## 2021-04-01 NOTE — LETTER
[address] [facility]  [facility]  [address]  [facility] [address]  Phone: [phone]  Fax: [phone]      April 1, 2021     [address] [facility] [address]      Dear Carleen Tinajero:    I am writing you because I have been informed of your missed appointment(s). We care about you and the management of your healthcare and want to make sure that you follow up as recommended. We're sorry you were unable to keep your appointment and hope that you are doing well. We would like to continue treating your healthcare needs. Please call the office to let us know your plans for treatment and to reschedule your appointment.      Sincerely,        Min Shukla MD

## 2021-04-02 ENCOUNTER — NURSE ONLY (OUTPATIENT)
Dept: LAB | Age: 71
End: 2021-04-02

## 2021-04-02 DIAGNOSIS — R19.7 DIARRHEA, UNSPECIFIED TYPE: ICD-10-CM

## 2021-04-02 LAB — C DIFF TOXIN/ANTIGEN: NEGATIVE

## 2021-04-05 ENCOUNTER — TELEPHONE (OUTPATIENT)
Dept: CARDIOLOGY CLINIC | Age: 71
End: 2021-04-05

## 2021-04-05 NOTE — TELEPHONE ENCOUNTER
Patient left message requesting to reschedule her office visit from 4/01/21 with Dr. Frankey Santee, to which she was a no-show. Appt made for 4/15/21 @1:30 pm.    Left message for patient to call for appt info.

## 2021-04-07 LAB
ALBUMIN SERPL-MCNC: 3.7 G/DL
ALP BLD-CCNC: 71 U/L
ALT SERPL-CCNC: 16 U/L
ANION GAP SERPL CALCULATED.3IONS-SCNC: 7 MMOL/L
AST SERPL-CCNC: 24 U/L
BASOPHILS ABSOLUTE: 0.1 /ΜL
BASOPHILS RELATIVE PERCENT: 0.8 %
BILIRUB SERPL-MCNC: 0.4 MG/DL (ref 0.1–1.4)
BILIRUBIN, URINE: NEGATIVE
BLOOD, URINE: NEGATIVE
BUN BLDV-MCNC: 21 MG/DL
CALCIUM SERPL-MCNC: 9.2 MG/DL
CHLORIDE BLD-SCNC: 104 MMOL/L
CLARITY: ABNORMAL
CO2: 33 MMOL/L
COLOR: ABNORMAL
CREAT SERPL-MCNC: 0.8 MG/DL
EOSINOPHILS ABSOLUTE: 0.1 /ΜL
EOSINOPHILS RELATIVE PERCENT: 0.9 %
GFR CALCULATED: 70
GLUCOSE BLD-MCNC: 102 MG/DL
GLUCOSE URINE: NEGATIVE
HCT VFR BLD CALC: 41.3 % (ref 36–46)
HEMOGLOBIN: 12.6 G/DL (ref 12–16)
INR BLD: NORMAL
KETONES, URINE: NEGATIVE
LEUKOCYTE ESTERASE, URINE: ABNORMAL
LYMPHOCYTES ABSOLUTE: 2.5 /ΜL
LYMPHOCYTES RELATIVE PERCENT: 24.7 %
MCH RBC QN AUTO: 31.2 PG
MCHC RBC AUTO-ENTMCNC: 30.5 G/DL
MCV RBC AUTO: 102.2 FL
MONOCYTES ABSOLUTE: 1.1 /ΜL
MONOCYTES RELATIVE PERCENT: 10.3 %
NEUTROPHILS ABSOLUTE: 6.4 /ΜL
NEUTROPHILS RELATIVE PERCENT: 62.4 %
NITRITE, URINE: POSITIVE
PH UA: 5.5 (ref 4.5–8)
PLATELET # BLD: 252 K/ΜL
PMV BLD AUTO: 10.1 FL
POTASSIUM SERPL-SCNC: 3.7 MMOL/L
PROTEIN UA: NEGATIVE
PROTIME: 1.46 SECONDS
RBC # BLD: 4.04 10^6/ΜL
SODIUM BLD-SCNC: 144 MMOL/L
SPECIFIC GRAVITY, URINE: 1.02
TOTAL PROTEIN: 6.8
UROBILINOGEN, URINE: NORMAL
WBC # BLD: 10.3 10^3/ML

## 2021-04-09 ENCOUNTER — TELEPHONE (OUTPATIENT)
Dept: FAMILY MEDICINE CLINIC | Age: 71
End: 2021-04-09

## 2021-04-09 RX ORDER — CEPHALEXIN 500 MG/1
500 CAPSULE ORAL 3 TIMES DAILY
Qty: 30 CAPSULE | Refills: 0 | Status: SHIPPED | OUTPATIENT
Start: 2021-04-09 | End: 2021-04-12 | Stop reason: ALTCHOICE

## 2021-04-09 NOTE — TELEPHONE ENCOUNTER
Sandra from [facility] Radha Naik called re: her urinalysis on pre op labs. She has a UTI that needs treated. She is scheduled for a pre op with you on 4-15-21, but they would like it addressed sooner. Call patient with information.

## 2021-04-12 RX ORDER — CIPROFLOXACIN 500 MG/1
500 TABLET, FILM COATED ORAL 2 TIMES DAILY
Qty: 10 TABLET | Refills: 0 | Status: SHIPPED | OUTPATIENT
Start: 2021-04-12 | End: 2021-04-17

## 2021-04-15 ENCOUNTER — OFFICE VISIT (OUTPATIENT)
Dept: CARDIOLOGY CLINIC | Age: 71
End: 2021-04-15
Payer: MEDICARE

## 2021-04-15 ENCOUNTER — OFFICE VISIT (OUTPATIENT)
Dept: FAMILY MEDICINE CLINIC | Age: 71
End: 2021-04-15
Payer: MEDICARE

## 2021-04-15 VITALS
TEMPERATURE: 97.8 F | HEIGHT: 69 IN | HEART RATE: 76 BPM | BODY MASS INDEX: 33.62 KG/M2 | RESPIRATION RATE: 20 BRPM | DIASTOLIC BLOOD PRESSURE: 60 MMHG | SYSTOLIC BLOOD PRESSURE: 108 MMHG | WEIGHT: 227 LBS

## 2021-04-15 VITALS
WEIGHT: 227 LBS | SYSTOLIC BLOOD PRESSURE: 138 MMHG | HEIGHT: 69 IN | DIASTOLIC BLOOD PRESSURE: 87 MMHG | HEART RATE: 87 BPM | BODY MASS INDEX: 33.62 KG/M2

## 2021-04-15 DIAGNOSIS — I50.32 CHRONIC DIASTOLIC CONGESTIVE HEART FAILURE (HCC): ICD-10-CM

## 2021-04-15 DIAGNOSIS — I48.21 PERMANENT ATRIAL FIBRILLATION (HCC): ICD-10-CM

## 2021-04-15 DIAGNOSIS — Z95.820 S/P ANGIOPLASTY WITH STENT: ICD-10-CM

## 2021-04-15 DIAGNOSIS — Z01.818 PRE-OP EVALUATION: Primary | ICD-10-CM

## 2021-04-15 DIAGNOSIS — I25.10 CORONARY ARTERY DISEASE INVOLVING NATIVE CORONARY ARTERY OF NATIVE HEART WITHOUT ANGINA PECTORIS: ICD-10-CM

## 2021-04-15 DIAGNOSIS — E66.01 MORBID (SEVERE) OBESITY DUE TO EXCESS CALORIES (HCC): ICD-10-CM

## 2021-04-15 DIAGNOSIS — Z98.890 S/P CAROTID ENDARTERECTOMY: ICD-10-CM

## 2021-04-15 DIAGNOSIS — R06.02 SOB (SHORTNESS OF BREATH) ON EXERTION: ICD-10-CM

## 2021-04-15 DIAGNOSIS — J44.9 CHRONIC OBSTRUCTIVE PULMONARY DISEASE, UNSPECIFIED COPD TYPE (HCC): ICD-10-CM

## 2021-04-15 DIAGNOSIS — I10 ESSENTIAL HYPERTENSION: ICD-10-CM

## 2021-04-15 DIAGNOSIS — I25.5 ISCHEMIC CARDIOMYOPATHY: ICD-10-CM

## 2021-04-15 DIAGNOSIS — M17.12 PRIMARY OSTEOARTHRITIS OF LEFT KNEE: Primary | ICD-10-CM

## 2021-04-15 DIAGNOSIS — I25.118 ATHEROSCLEROTIC HEART DISEASE OF NATIVE CORONARY ARTERY WITH OTHER FORMS OF ANGINA PECTORIS (HCC): ICD-10-CM

## 2021-04-15 DIAGNOSIS — R60.0 BILATERAL LEG EDEMA: ICD-10-CM

## 2021-04-15 PROBLEM — E11.9 TYPE 2 DIABETES MELLITUS WITHOUT COMPLICATION, WITHOUT LONG-TERM CURRENT USE OF INSULIN (HCC): Status: ACTIVE | Noted: 2021-04-15

## 2021-04-15 PROCEDURE — 99214 OFFICE O/P EST MOD 30 MIN: CPT | Performed by: INTERNAL MEDICINE

## 2021-04-15 PROCEDURE — 99214 OFFICE O/P EST MOD 30 MIN: CPT | Performed by: FAMILY MEDICINE

## 2021-04-15 RX ORDER — CLOPIDOGREL BISULFATE 75 MG/1
75 TABLET ORAL DAILY
Qty: 30 TABLET | Refills: 0 | Status: SHIPPED | OUTPATIENT
Start: 2021-04-15 | End: 2021-07-21 | Stop reason: SDUPTHER

## 2021-04-15 RX ORDER — CLOPIDOGREL BISULFATE 75 MG/1
75 TABLET ORAL DAILY
Qty: 90 TABLET | Refills: 1 | Status: SHIPPED | OUTPATIENT
Start: 2021-04-15 | End: 2021-12-22

## 2021-04-15 NOTE — PROGRESS NOTES
Chief Complaint   Patient presents with    Check-Up    Atrial Fibrillation    Congestive Heart Failure    Cardiac Clearance     HX OF  fu had TIA here at [facility], transferred from [facility]    Had carotid endarterectomy - 9-11-17 -     Was admitted from office for atr fib with  and was admitted TAYLOR-CV . Cath and needed stent and later went back to atr fib with 110 and re-sent to ER and sent home with added cardizem       Pt here for pre op eval- left knee replacement    EKG done 4-15-21    No palpitations    Denied cp,  dizziness or edema    Palpitation better after increased lopressor and calan    Sob on exertion and fatigue on exertion- chronic    No leg edma  No wt gain    Post Cath and PCI and A. fib rate control--feel stronger    Hx of depression and a lots of stress in family  and and daughter sick          Patient Active Problem List   Diagnosis    Obstructive sleep apnea on CPAP    COPD (chronic obstructive pulmonary disease) (HCC)    Fibromyalgia    Right arm weakness    Obesity (BMI 30-39. 9)    Chronic diastolic congestive heart failure (HCC)    Bilateral leg edema  +1    Pulmonary HTN (HCC) Mean pressure 32 mmhg by RHC    SOB (shortness of breath) on exertion    Polyosteoarthritis    Postsurgical malabsorption    Bilateral carotid artery stenosis    S/P carotid endarterectomy    Positive colorectal cancer screening using DNA-based stool test    Dysphagia    Gastroesophageal reflux disease without esophagitis    Hypersomnia with sleep apnea    Fatigue    Atrial fibrillation with RVR ([address]    Essential hypertension    Sleep apnea    Coronary artery disease involving native heart without angina pectoris    S/P cardiac cath    S/P percutaneous transluminal angioplasty (PTA) with stent placement    Atrial fibrillation status post cardioversion [facility])    Atherosclerotic heart disease of native coronary artery with other forms of angina pectoris ([address]    Permanent atrial fibrillation (HCC)    Morbid (severe) obesity due to excess calories ([address]    Pre-op evaluation left knee surgery    S/P angioplasty with stent of the LM to LAD feb 2020    Ischemic cardiomyopathy 45 %       Past Surgical History:   Procedure Laterality Date    APPENDECTOMY  over 10 years ago    [id number] 10/01/2019    CAROTID ENDARTERECTOMY  09/2017    CHOLECYSTECTOMY  over 10 years ago    [address] [facility] COLONOSCOPY N/A 2/7/2018    COLONOSCOPY WITH BIOPSY performed by Lin Tidwell MD at [address] [facility]  10 plus years ago    Dr. David Myers Right 2009    OTHER SURGICAL HISTORY Left 2012    Rotator cuff sx    SLEEVE GASTRECTOMY  09/12/2016    Robotic, Extensive Lysis of Adhesions, Removal of [place] Prosthesis - Dr. Selene Barahona  07/06/2016    Dr. Carlos Goodman        Allergies   Allergen Reactions    Demerol Hcl [Meperidine] Nausea And Vomiting        Family History   Problem Relation Age of Onset    Stroke Mother     High Blood Pressure Mother     Atrial Fibrillation Mother     Heart Disease Mother     Diabetes Father     Early Death Father     Cancer Other         Daughter    Heart Disease Sister     Heart Attack Sister     Deep Vein Thrombosis Brother         Social History     Socioeconomic History    Marital status:      Spouse name: Not on file    Number of children: Not on file    Years of education: Not on file    Highest education level: Not on file   Occupational History    Not on file   Social Needs    Financial resource strain: Not on file    Food insecurity     Worry: Not on file     Inability: Not on file    Transportation needs     Medical: Not on file     Non-medical: Not on file   Tobacco Use    Smoking status: Former Smoker     Packs/day: 0.25     Years: 30.00     Pack years: 7.50     Types: 1 tablet by mouth 2 times daily 180 tablet 3    furosemide (LASIX) 20 MG tablet Take 1 tablet by mouth every other day Lasix 20 mg daily X1 week, then take every other day 90 tablet 3    potassium chloride (MICRO-K) 10 MEQ extended release capsule Take 1 capsule by mouth every other day Take 1 tab daily for 1 week then take QOD 90 capsule 3    HYDROcodone-acetaminophen (NORCO) 5-325 MG per tablet Acetaminophen / HYDROcodone Apap/Hydrocodone 325mg/5mg Active 1 TAB ORAL Every 4 hr as needed 25 5 October 2nd, 2019 12:32pm 10-  [address] ([id number])      chlorthalidone (HYGROTON) 25 MG tablet TAKE 1 TABLET DAILY 90 tablet 3    pravastatin (PRAVACHOL) 40 MG tablet Take 1 tablet by mouth daily 90 tablet 3    pantoprazole (PROTONIX) 40 MG tablet TAKE 1 TABLET DAILY 90 tablet 3    nitroGLYCERIN (NITROSTAT) 0.4 MG SL tablet up to max of 3 total doses. If no relief after 1 dose, call 911. 25 tablet 1    DULoxetine (CYMBALTA) 20 MG extended release capsule Take 20 mg by mouth daily      sulfaSALAzine (AZULFIDINE) 500 MG tablet Take 500 mg by mouth 2 times daily      hydroxychloroquine (PLAQUENIL) 200 MG tablet Take 200 mg by mouth 2 times daily. No current facility-administered medications for this visit. Review of Systems -     General ROS: negative  Psychological ROS: negative  Hematological and Lymphatic ROS: No history of blood clots or bleeding disorder. Respiratory ROS: no cough, shortness of breath, or wheezing  Cardiovascular ROS: no chest pain or dyspnea on exertion  Gastrointestinal ROS: negative  Genito-Urinary ROS: no dysuria, trouble voiding, or hematuria  Musculoskeletal ROS: negative  Neurological ROS: no TIA or stroke symptoms  Dermatological ROS: negative      Blood pressure 138/87, pulse 87, height 5' 8.5\" (1.74 m), weight 227 lb (103 kg), not currently breastfeeding.         Physical Examination:    General appearance - alert, well appearing, and in no distress  Mental status - alert, oriented to person, place, and time  Neck - supple, no significant adenopathy, no JVD, or carotid bruits  Chest - clear to auscultation, no wheezes, rales or rhonchi, symmetric air entry  Heart - normal rate, regular rhythm, normal S1, S2, no murmurs, rubs, clicks or gallops  Abdomen - soft, nontender, nondistended, no masses or organomegaly  Neurological - alert, oriented, normal speech, no focal findings or movement disorder noted  Musculoskeletal - no joint tenderness, deformity or swelling  Extremities - peripheral pulses normal, no pedal edema, no clubbing or cyanosis  Skin - normal coloration and turgor, no rashes, no suspicious skin lesions noted    Lab  No results for input(s): CKTOTAL, CKMB, CKMBINDEX, TROPONINI in the last 72 hours.   CBC:   Lab Results   Component Value Date    WBC 10.3 04/07/2021    RBC 4.04 04/07/2021    HGB 12.6 04/07/2021    HCT 41.3 04/07/2021    .2 04/07/2021    MCH 31.2 04/07/2021    MCHC 30.5 04/07/2021    RDW 14.3 10/01/2018     04/07/2021    MPV 10.1 04/07/2021     BMP:    Lab Results   Component Value Date     04/07/2021    K 3.7 04/07/2021    K 4.0 03/04/2020     04/07/2021    CO2 33 04/07/2021    BUN 21 04/07/2021    LABALBU 3.7 04/07/2021    CREATININE 0.8 04/07/2021    CALCIUM 9.2 04/07/2021    LABGLOM 70 04/07/2021    LABGLOM 82 10/09/2020    GLUCOSE 102 04/07/2021    GLUCOSE 91 09/21/2020     Hepatic Function Panel:    Lab Results   Component Value Date    ALKPHOS 71 04/07/2021    ALT 16 04/07/2021    AST 24 04/07/2021    PROT 6.9 10/09/2020    BILITOT 0.4 04/07/2021    BILIDIR 0.1 09/21/2020    LABALBU 3.7 04/07/2021     Magnesium:    Lab Results   Component Value Date    MG 1.9 09/21/2020     Warfarin PT/INR:  No components found for: PTPATWAR, PTINRWAR  HgBA1c:    Lab Results   Component Value Date    LABA1C 5.4 10/01/2018     FLP:    Lab Results   Component Value Date    TRIG 76 10/09/2020    HDL 56 10/09/2020 takeoff of a large OM1 branch that  has a 60% lesion in the proximal part of the vessel. Distal left  circumflex artery is patent. OM2 is a large vessel with no significant  stenotic lesions. 4.  Left anterior descending artery. There is a severe stenotic diffuse  lesion involving the ostial and proximal part of LAD ranging in severity  between 70% to 90%. Mid LAD is patent. Distal LAD has mild diffuse  disease.     MEDICATIONS:  See MAR.     COMPLICATIONS:  None.     ESTIMATED BLOOD LOSS:  Less than 30 mL.     ACCESS:  Right radial artery access. Vasc Band was applied. Hemostasis  was achieved.     CONCLUSION:  1. Severe stenotic lesion of the distal left main coronary artery and  ostial/proximal left anterior descending artery. 2.  Status post successful PCI with the stenting of distal left main  coronary artery and left anterior descending artery with details as  listed above.     RECOMMENDATIONS:  1.  Bedrest for the next 4 hours. 2.  Monitor in telemetry. 3.  Aggressive risk factor modification. 4.  Optimize medical therapy for CAD. 5.  Access site care. 6.  Aspirin 81 mg p.o. daily, can be stopped after 1 month. 7.  Plavix 75 mg p.o. daily. 8.  May resume Eliquis in a.m.  9.  High intensity statin therapy. 10.  Outpatient follow up in office in 2 to 4 weeks.     Findings and plan of care discussed with the patient's family.  Milton Momin MD     D: 02/21/2020 15:     -IVUS LM/LAD  -Severe stenotic lesion of dLM (Bifurcation lesion, De Leon Type 1,1,0)  -Severe 80-90% lesion of ostial/proximal LAD  -S/P Successful PCI with stenting of dLM/pLAD    ekg 9/23/2020  Atrial flutter with  bpm    4/7/2021  atr fib with CVA   Septal infarction  No acute abn    Assessment    Home BP good    Leg edema +1 chronic       Diagnosis Orders   1. Pre-op evaluation left knee surgery     2. Coronary artery disease involving native coronary artery of native heart without angina pectoris     3. Chronic diastolic congestive heart failure ([address]     4. Permanent atrial fibrillation ([address]     5. Essential hypertension     6. SOB (shortness of breath) on exertion     7. S/P angioplasty with stent of the LM to LAD feb 2020     8. S/P carotid endarterectomy     9. Chronic obstructive pulmonary disease, unspecified COPD type ([address]     10. Bilateral leg edema  +1     11. Ischemic cardiomyopathy 45 %         Recent feb 2020 admission DX  PAFB RVR  - more persistent now - s\p TAYLOR / CV to SR 2/24/2020              On [address]               On amiodarone      abnormal stress testing   S\p cardiac cath 2/21/2020:  -Severe stenotic lesion of dLM (Bifurcation lesion, De Leon Type 1,1,0)  -Severe 80-90% lesion of ostial/proximal LAD  -S/P Successful PCI with stenting of dLM/pLAD     Hx bradycardia - none this admit     HTN  HLP  DM II  Hx Lt CEA 2007  Hx TIA  Hx SHANNEN      Previous admission DX    Hx of recent TIA with RT side weakness      NSVT 5 beat   S/P gastric sleeve  Hypertensive Urgency  Hx of obstructive Sleep apnea  Pulmonary HTN  New onset post afib with RVR- now rate controlled  Chads of  DM II  HX of TIA         Continue home medication regimen   bP controlled    Doing well        Plan     The current  meds and labs reviewed    Permanent atr fib with CVR  atr flutter /Fib with CVR 84  S/p TAYLOR- CV and weent back to atr fib  Off  amiodarone as failed to be in NSR  atr fib with  bpm at rest   TSH WNL  Cont  metoprolol 150  BID  Cont calan SR  240 po bid for rate control  cont digoxin 125 mcg po qd  Cont  losartan to 25 po qd from 50  If remain uncontrolled consider cardioversion and or eps       CHDAS of 4( DM, htn, Hx of TIA)  Need longterm OA and on apixaban  D/w the pat the risk and benefit of OA  Pat understand the risk of bleeding including ICH    Coronary artery disease, seems to be stable.  Denies angina   S/ p LM-LAD stent feb 2020  Cont apixaban and plavix  Off ASA     Pre op eval for knee surgery  Walk with Cane  METS< 4  Sob on exertion  Abn ekg  Hx of lM stent 2020 feb  Echo  mariaelena nuc  If the test okay may proceed with mdo  risk    Cardiomyopathy: improving, no CHF symptoms, no change in clinical condition. Will need periodic echocardiograms depending on symptoms. F/u echo        Patient Seen, Chart, Consults notes, Labs, Radiology studies reviewed. Hx of TIA IN 2016  Was on asa and apixaban    Hypertension, on medical treatment. Seems to be under good control. Patient is compliant with medical treatment. Cont  calan SR     Congestive heart failure: no evidence of fluid overload today, no recent hospitalization for CHF  Leg edema +1 -   No wt gain  Lasix 20 mg po qd  For 1 week the QOD  kdur 10 meq po QOD     Hyperlipidemia: on statins, followed periodically. Patient need periodic lipid and liver profile. Hx of carotid left carotid endarterectomy in 2017  And started on statin then  Lower leg weakness from lipitor  OFF  lipitor  TOLERATED  lIvalo 1 mg in 3 weeks AND BUT PCP STOPPED IT DUE TO nORMAL LIPID PANEL VALUES  Now taking pravastatin  Cont  pravastatin to 40 mg po qd    Pulm HTN    Hypertension, on medical treatment. Seems to be under good control. Patient is compliant with medical treatment. Intermittent serena- claudio  WNL and probably neuropathic    Reviewed the  report of RHC from [facility]    patient is advised to exercise 30 min s a day three times a week and about weight loss ,balance diet and    More fruits and vegetables . D/w the pat the plan of care    BMP and MG        Discussed use, benefit, and side effects of prescribed medications. All patient questions answered. Pt voiced understanding. Instructed to continue current medications, diet and exercise. Continue risk factor modification and medical management. Patient agreed with treatment plan. Follow up as directed.     I spent 30 minutes involved in face-to-face discussion of medical issues, prognosis, record review  and plan with the

## 2021-04-16 ASSESSMENT — ENCOUNTER SYMPTOMS
COUGH: 0
EYES NEGATIVE: 1
DIARRHEA: 0
BACK PAIN: 0
SORE THROAT: 0
RHINORRHEA: 0
ABDOMINAL PAIN: 0
SHORTNESS OF BREATH: 0
CHEST TIGHTNESS: 0
NAUSEA: 0
VOMITING: 0

## 2021-04-16 NOTE — PROGRESS NOTES
[address] [place] [facility] [address]  Dept: [phone]  Dept Fax: [phone]  Loc: [phone]  PROGRESS NOTE      VisitDate: 4/15/2021    Kristy Mckeon is a 66 y.o. female who presents today for:     Chief Complaint   Patient presents with    Pre-op Exam     LTK by Nesha Don on 4/22 at [facility]. Testing is in [facility]. Has appt later today with . Subjective:  HPI  Patient comes in for degenerative joint disease of the left knee with upcoming total knee replacement. She has a history of atrial fibrillation and coronary disease and will be having her cardiovascular clearance later today. She denies any history of reaction to anesthesia. No history of bleeding or clotting disorders. She is currently anticoagulated due to her atrial fibrillation. In regards to her degenerative joint disease she has failed conservative management and is planning total knee replacement on the left. Review of Systems   Constitutional: Negative for activity change, appetite change, fatigue and fever. HENT: Negative for congestion, rhinorrhea and sore throat. Eyes: Negative. Respiratory: Negative for cough, chest tightness and shortness of breath. Cardiovascular: Negative for chest pain and palpitations. Gastrointestinal: Negative for abdominal pain, diarrhea, nausea and vomiting. Genitourinary: Negative for dysuria and urgency. Musculoskeletal: Positive for arthralgias and gait problem. Negative for back pain. Neurological: Negative for dizziness and headaches. Psychiatric/Behavioral: Negative for dysphoric mood. The patient is not nervous/anxious.       Past Medical History:   Diagnosis Date    Arthritis     Atrial fibrillation ([address]     Cancer (HCC)     skin    Carotid arterial disease (HCC)     CHF (congestive heart failure) (HCC)     Depression     Fibromyalgia     Hypertension     Hypothyroidism     Obesity  S/P bariatric surgery 2018    Sleep apnea     CPAP    Thyroid disease     TIA (transient ischemic attack) 2015      Past Surgical History:   Procedure Laterality Date    APPENDECTOMY  over 10 years ago    [address] 10/01/2019    CAROTID ENDARTERECTOMY  2017    CHOLECYSTECTOMY  over 10 years ago    [address] [facility] COLONOSCOPY N/A 2018    COLONOSCOPY WITH BIOPSY performed by Lady Snyder MD at [address] [facility]  10 plus years ago    Dr. Brooklyn Centeno Right     OTHER SURGICAL HISTORY Left     Rotator cuff sx    SLEEVE GASTRECTOMY  2016    Robotic, Extensive Lysis of Adhesions, Removal of Angel[place] Prosthesis - Dr. Gary Tony  2016    Dr. Bulmaro Krishnan      Family History   Problem Relation Age of Onset    Stroke Mother     High Blood Pressure Mother     Atrial Fibrillation Mother     Heart Disease Mother     Diabetes Father     Early Death Father     Cancer Other         Daughter    Heart Disease Sister     Heart Attack Sister     Deep Vein Thrombosis Brother      Social History     Tobacco Use    Smoking status: Former Smoker     Packs/day: 0.25     Years: 30.00     Pack years: 7.50     Types: Cigarettes     Start date: 3/24/1982     Quit date: 2015     Years since quittin.1    Smokeless tobacco: Never Used   Substance Use Topics    Alcohol use: No      Current Outpatient Medications   Medication Sig Dispense Refill    ciprofloxacin (CIPRO) 500 MG tablet Take 1 tablet by mouth 2 times daily for 5 days 10 tablet 0    fluticasone-salmeterol (ADVAIR) 250-50 MCG/DOSE AEPB Inhale 1 puff into the lungs 2 times daily 60 each 0    albuterol sulfate HFA (VENTOLIN HFA) 108 (90 Base) MCG/ACT inhaler Inhale 2 puffs into the lungs every 6 hours as needed for Wheezing 1 Inhaler 11    modafinil (PROVIGIL) 200 MG tablet Take 2 tablets by mouth daily for 180 days. 180 tablet 1    verapamil (VERELAN) 240 MG extended release capsule Take 1 capsule by mouth 2 times daily 180 capsule 3    metoprolol (LOPRESSOR) 100 MG tablet Take 1.5 tablets by mouth 2 times daily 270 tablet 3    losartan (COZAAR) 25 MG tablet Take 1 tablet by mouth daily 90 tablet 3    digoxin (LANOXIN) 125 MCG tablet Take 1 tablet by mouth daily 90 tablet 3    apixaban (ELIQUIS) 5 MG TABS tablet Take 1 tablet by mouth 2 times daily 180 tablet 3    furosemide (LASIX) 20 MG tablet Take 1 tablet by mouth every other day Lasix 20 mg daily X1 week, then take every other day 90 tablet 3    potassium chloride (MICRO-K) 10 MEQ extended release capsule Take 1 capsule by mouth every other day Take 1 tab daily for 1 week then take QOD 90 capsule 3    HYDROcodone-acetaminophen (NORCO) 5-325 MG per tablet Acetaminophen / HYDROcodone Apap/Hydrocodone 325mg/5mg Active 1 TAB ORAL Every 4 hr as needed 25 5 October 2nd, 2019 12:32pm 10-  [address] ([id number])      chlorthalidone (HYGROTON) 25 MG tablet TAKE 1 TABLET DAILY 90 tablet 3    pravastatin (PRAVACHOL) 40 MG tablet Take 1 tablet by mouth daily 90 tablet 3    pantoprazole (PROTONIX) 40 MG tablet TAKE 1 TABLET DAILY 90 tablet 3    nitroGLYCERIN (NITROSTAT) 0.4 MG SL tablet up to max of 3 total doses. If no relief after 1 dose, call 911. 25 tablet 1    DULoxetine (CYMBALTA) 20 MG extended release capsule Take 20 mg by mouth daily      sulfaSALAzine (AZULFIDINE) 500 MG tablet Take 500 mg by mouth 2 times daily      hydroxychloroquine (PLAQUENIL) 200 MG tablet Take 200 mg by mouth 2 times daily.  clopidogrel (PLAVIX) 75 MG tablet Take 1 tablet by mouth daily 30 tablet 0    clopidogrel (PLAVIX) 75 MG tablet Take 1 tablet by mouth daily 90 tablet 1     No current facility-administered medications for this visit.       Allergies   Allergen Reactions    Demerol Hcl [Meperidine] Nausea And Vomiting     Health Maintenance   Topic Date Due    DEXA (modify frequency per FRAX score)  Never done    Lipid screen  10/09/2021    Annual Wellness Visit (AWV)  02/04/2022    Potassium monitoring  04/07/2022    Creatinine monitoring  04/07/2022    Breast cancer screen  10/12/2022    Colon cancer screen colonoscopy  02/07/2023    DTaP/Tdap/Td vaccine (2 - Td) 12/20/2027    Flu vaccine  Completed    Shingles Vaccine  Completed    Pneumococcal 65+ years Vaccine  Completed    COVID-19 Vaccine  Completed    Hepatitis C screen  Completed    Hepatitis A vaccine  Aged Out    Hepatitis B vaccine  Aged Out    Hib vaccine  Aged Out    Meningococcal (ACWY) vaccine  Aged Out         Objective:     Physical Exam  Constitutional:       General: She is not in acute distress. Appearance: She is well-developed. She is not diaphoretic. HENT:      Head: Normocephalic and atraumatic. Eyes:      General: No scleral icterus. Conjunctiva/sclera: Conjunctivae normal.   Neck:      Musculoskeletal: Normal range of motion. Thyroid: No thyromegaly. Vascular: No JVD. Comments: No bruits  Cardiovascular:      Rate and Rhythm: Normal rate. Heart sounds: Normal heart sounds. Pulmonary:      Effort: Pulmonary effort is normal. No respiratory distress. Breath sounds: Normal breath sounds. No wheezing or rales. Abdominal:      Palpations: Abdomen is soft. There is no mass. Tenderness: There is no abdominal tenderness. There is no guarding. Musculoskeletal:         General: No tenderness. Skin:     General: Skin is warm and dry. Findings: No rash. Neurological:      Mental Status: She is alert and oriented to person, place, and time. Cranial Nerves: No cranial nerve deficit.        /60   Pulse 76   Temp 97.8 °F (36.6 °C) (Oral)   Resp 20   Ht 5' 8.5\" (1.74 m)   Wt 227 lb (103 kg)   BMI 34.01 kg/m²   Chest x-ray is clear without active disease  EKG A. Fib  MRSA screen negative  Electrolytes normal  CBC normal  Impression/Plan:  1. Primary osteoarthritis of left knee    2. Permanent atrial fibrillation ([facility]    3. Morbid (severe) obesity due to excess calories ([facility]    4. Atherosclerotic heart disease of native coronary artery with other forms of angina pectoris [facility])      Requested Prescriptions      No prescriptions requested or ordered in this encounter     No orders of the defined types were placed in this encounter. Medically patient is cleared for surgery as there is no contraindication. Awaiting clearance from her cardiologist.    Patient giveneducational materials - see patient instructions. Discussed use, benefit, and side effects of prescribed medications. All patient questions answered. Pt voiced understanding. Reviewed health maintenance. Patient agreedwith treatment plan. Follow up as directed. **This report has been created using voice recognition software. It may contain minor errorswhich are inherent in voice recognition technology. **       Electronically signed by Alexander Newell MD on 4/16/2021 at 9:09 AM

## 2021-04-20 ENCOUNTER — HOSPITAL ENCOUNTER (OUTPATIENT)
Dept: NON INVASIVE DIAGNOSTICS | Age: 71
Discharge: HOME | End: 2021-04-20
Payer: MEDICARE

## 2021-04-20 VITALS — WEIGHT: 225 LBS | HEIGHT: 68 IN | BODY MASS INDEX: 34.1 KG/M2

## 2021-04-20 DIAGNOSIS — I25.10 CORONARY ARTERY DISEASE INVOLVING NATIVE CORONARY ARTERY OF NATIVE HEART WITHOUT ANGINA PECTORIS: ICD-10-CM

## 2021-04-20 DIAGNOSIS — I10 ESSENTIAL HYPERTENSION: ICD-10-CM

## 2021-04-20 DIAGNOSIS — I50.32 CHRONIC DIASTOLIC CONGESTIVE HEART FAILURE (HCC): ICD-10-CM

## 2021-04-20 DIAGNOSIS — Z01.818 PRE-OP EVALUATION: ICD-10-CM

## 2021-04-20 DIAGNOSIS — I48.21 PERMANENT ATRIAL FIBRILLATION (HCC): ICD-10-CM

## 2021-04-20 DIAGNOSIS — Z98.890 S/P CAROTID ENDARTERECTOMY: ICD-10-CM

## 2021-04-20 DIAGNOSIS — Z95.820 S/P ANGIOPLASTY WITH STENT: ICD-10-CM

## 2021-04-20 DIAGNOSIS — I25.5 ISCHEMIC CARDIOMYOPATHY: ICD-10-CM

## 2021-04-20 DIAGNOSIS — R06.02 SOB (SHORTNESS OF BREATH) ON EXERTION: ICD-10-CM

## 2021-04-20 DIAGNOSIS — R60.0 BILATERAL LEG EDEMA: ICD-10-CM

## 2021-04-20 DIAGNOSIS — J44.9 CHRONIC OBSTRUCTIVE PULMONARY DISEASE, UNSPECIFIED COPD TYPE (HCC): ICD-10-CM

## 2021-04-20 LAB
LV EF: 58 %
LVEF MODALITY: NORMAL

## 2021-04-20 PROCEDURE — 93306 TTE W/DOPPLER COMPLETE: CPT

## 2021-04-20 PROCEDURE — 3430000000 HC RX DIAGNOSTIC RADIOPHARMACEUTICAL: Performed by: INTERNAL MEDICINE

## 2021-04-20 PROCEDURE — 78452 HT MUSCLE IMAGE SPECT MULT: CPT

## 2021-04-20 PROCEDURE — 6360000002 HC RX W HCPCS

## 2021-04-20 PROCEDURE — 93017 CV STRESS TEST TRACING ONLY: CPT | Performed by: INTERNAL MEDICINE

## 2021-04-20 PROCEDURE — A9500 TC99M SESTAMIBI: HCPCS | Performed by: INTERNAL MEDICINE

## 2021-04-20 RX ADMIN — Medication 33 MILLICURIE: at 09:54

## 2021-04-20 RX ADMIN — Medication 8.7 MILLICURIE: at 09:00

## 2021-05-15 PROBLEM — Z01.818 PRE-OP EVALUATION: Status: RESOLVED | Noted: 2021-04-15 | Resolved: 2021-05-15

## 2021-06-04 RX ORDER — CHLORTHALIDONE 25 MG/1
TABLET ORAL
Qty: 90 TABLET | Refills: 0 | Status: SHIPPED | OUTPATIENT
Start: 2021-06-04 | End: 2021-09-02

## 2021-07-19 ENCOUNTER — HOSPITAL ENCOUNTER (OUTPATIENT)
Age: 71
Discharge: HOME | End: 2021-07-19
Payer: MEDICARE

## 2021-07-19 ENCOUNTER — TELEPHONE (OUTPATIENT)
Dept: FAMILY MEDICINE CLINIC | Age: 71
End: 2021-07-19

## 2021-07-19 DIAGNOSIS — D72.829 LEUKOCYTOSIS, UNSPECIFIED TYPE: Primary | ICD-10-CM

## 2021-07-19 DIAGNOSIS — Z00.00 ROUTINE GENERAL MEDICAL EXAMINATION AT A HEALTH CARE FACILITY: ICD-10-CM

## 2021-07-19 DIAGNOSIS — I10 ESSENTIAL HYPERTENSION: ICD-10-CM

## 2021-07-19 LAB
ALBUMIN SERPL-MCNC: 4 G/DL (ref 3.5–5.1)
ALP BLD-CCNC: 111 U/L (ref 38–126)
ALT SERPL-CCNC: 14 U/L (ref 11–66)
ANION GAP SERPL CALCULATED.3IONS-SCNC: 12 MEQ/L (ref 8–16)
AST SERPL-CCNC: 17 U/L (ref 5–40)
BASOPHILS # BLD: 1.5 %
BASOPHILS ABSOLUTE: 0.3 THOU/MM3 (ref 0–0.1)
BILIRUB SERPL-MCNC: 0.3 MG/DL (ref 0.3–1.2)
BUN BLDV-MCNC: 12 MG/DL (ref 7–22)
CALCIUM SERPL-MCNC: 9.7 MG/DL (ref 8.5–10.5)
CHLORIDE BLD-SCNC: 105 MEQ/L (ref 98–111)
CHOLESTEROL, TOTAL: 113 MG/DL (ref 100–199)
CO2: 28 MEQ/L (ref 23–33)
CREAT SERPL-MCNC: 0.6 MG/DL (ref 0.4–1.2)
DIFFERENTIAL TYPE: ABNORMAL
EOSINOPHIL # BLD: 15.2 %
EOSINOPHILS ABSOLUTE: 2.7 THOU/MM3 (ref 0–0.4)
ERYTHROCYTE [DISTWIDTH] IN BLOOD BY AUTOMATED COUNT: 13.3 % (ref 11.5–14.5)
ERYTHROCYTE [DISTWIDTH] IN BLOOD BY AUTOMATED COUNT: 49.3 FL (ref 35–45)
GFR SERPL CREATININE-BSD FRML MDRD: > 90 ML/MIN/1.73M2
GLUCOSE BLD-MCNC: 96 MG/DL (ref 70–108)
HCT VFR BLD CALC: 43.9 % (ref 37–47)
HDLC SERPL-MCNC: 49 MG/DL
HEMOGLOBIN: 13.5 GM/DL (ref 12–16)
IMMATURE GRANS (ABS): 0.1 THOU/MM3 (ref 0–0.07)
IMMATURE GRANULOCYTES: 0.6 %
LDL CHOLESTEROL CALCULATED: 46 MG/DL
LYMPHOCYTES # BLD: 19.3 %
LYMPHOCYTES ABSOLUTE: 3.4 THOU/MM3 (ref 1–4.8)
MCH RBC QN AUTO: 30.8 PG (ref 26–33)
MCHC RBC AUTO-ENTMCNC: 30.8 GM/DL (ref 32.2–35.5)
MCV RBC AUTO: 100.2 FL (ref 81–99)
MONOCYTES # BLD: 7.4 %
MONOCYTES ABSOLUTE: 1.3 THOU/MM3 (ref 0.4–1.3)
NUCLEATED RED BLOOD CELLS: 0 /100 WBC
PATHOLOGIST REVIEW: ABNORMAL
PLATELET # BLD: 227 THOU/MM3 (ref 130–400)
PMV BLD AUTO: 9.5 FL (ref 9.4–12.4)
POTASSIUM SERPL-SCNC: 3.8 MEQ/L (ref 3.5–5.2)
RBC # BLD: 4.38 MILL/MM3 (ref 4.2–5.4)
SCAN OF BLOOD SMEAR: NORMAL
SEG NEUTROPHILS: 56 %
SEGMENTED NEUTROPHILS ABSOLUTE COUNT: 9.9 THOU/MM3 (ref 1.8–7.7)
SODIUM BLD-SCNC: 145 MEQ/L (ref 135–145)
TOTAL PROTEIN: 7.1 G/DL (ref 6.1–8)
TRIGL SERPL-MCNC: 91 MG/DL (ref 0–199)
WBC # BLD: 17.7 THOU/MM3 (ref 4.8–10.8)

## 2021-07-19 PROCEDURE — 80061 LIPID PANEL: CPT

## 2021-07-19 PROCEDURE — 36415 COLL VENOUS BLD VENIPUNCTURE: CPT

## 2021-07-19 PROCEDURE — 85025 COMPLETE CBC W/AUTO DIFF WBC: CPT

## 2021-07-19 PROCEDURE — 80053 COMPREHEN METABOLIC PANEL: CPT

## 2021-07-19 NOTE — TELEPHONE ENCOUNTER
----- Message from Edith Chirinos MD sent at 7/19/2021  3:21 PM EDT -----  Labs show elevated white blood cell count, patient have any infectious symptoms? Recent steroids?

## 2021-07-19 NOTE — TELEPHONE ENCOUNTER
Recent Left knee replacement. Sx was done OIO by [facility].  Patient states that she is still feeling some pain from sx

## 2021-07-20 ENCOUNTER — NURSE ONLY (OUTPATIENT)
Dept: LAB | Age: 71
End: 2021-07-20

## 2021-07-20 DIAGNOSIS — D72.829 LEUKOCYTOSIS, UNSPECIFIED TYPE: ICD-10-CM

## 2021-07-20 LAB
BACTERIA: ABNORMAL /HPF
BILIRUBIN URINE: NEGATIVE
BLOOD, URINE: NEGATIVE
CASTS 2: ABNORMAL /LPF
CASTS UA: ABNORMAL /LPF
CHARACTER, URINE: CLEAR
COLOR: YELLOW
CRYSTALS, UA: ABNORMAL
EPITHELIAL CELLS, UA: ABNORMAL /HPF
GLUCOSE URINE: NEGATIVE MG/DL
KETONES, URINE: NEGATIVE
LEUKOCYTE ESTERASE, URINE: ABNORMAL
MISCELLANEOUS 2: ABNORMAL
NITRITE, URINE: NEGATIVE
PH UA: 7 (ref 5–9)
PROTEIN UA: 30
RBC URINE: ABNORMAL /HPF
RENAL EPITHELIAL, UA: ABNORMAL
SPECIFIC GRAVITY, URINE: 1.02 (ref 1–1.03)
UROBILINOGEN, URINE: 1 EU/DL (ref 0–1)
WBC UA: ABNORMAL /HPF
YEAST: ABNORMAL

## 2021-07-21 ENCOUNTER — OFFICE VISIT (OUTPATIENT)
Dept: FAMILY MEDICINE CLINIC | Age: 71
End: 2021-07-21
Payer: MEDICARE

## 2021-07-21 VITALS
BODY MASS INDEX: 33.6 KG/M2 | HEART RATE: 80 BPM | SYSTOLIC BLOOD PRESSURE: 128 MMHG | RESPIRATION RATE: 20 BRPM | TEMPERATURE: 97.8 F | WEIGHT: 221 LBS | DIASTOLIC BLOOD PRESSURE: 76 MMHG

## 2021-07-21 DIAGNOSIS — R31.9 URINARY TRACT INFECTION WITH HEMATURIA, SITE UNSPECIFIED: Primary | ICD-10-CM

## 2021-07-21 DIAGNOSIS — N32.81 OAB (OVERACTIVE BLADDER): ICD-10-CM

## 2021-07-21 DIAGNOSIS — N39.0 URINARY TRACT INFECTION WITH HEMATURIA, SITE UNSPECIFIED: Primary | ICD-10-CM

## 2021-07-21 LAB
BASOPHILS # BLD: 1.9 %
BASOPHILS ABSOLUTE: 0.3 THOU/MM3 (ref 0–0.1)
EOSINOPHIL # BLD: 13.1 %
EOSINOPHILS ABSOLUTE: 2 THOU/MM3 (ref 0–0.4)
ERYTHROCYTE [DISTWIDTH] IN BLOOD BY AUTOMATED COUNT: 13.8 % (ref 11.5–14.5)
ERYTHROCYTE [DISTWIDTH] IN BLOOD BY AUTOMATED COUNT: 51.6 FL (ref 35–45)
HCT VFR BLD CALC: 43 % (ref 37–47)
HEMOGLOBIN: 12.9 GM/DL (ref 12–16)
IMMATURE GRANS (ABS): 0.1 THOU/MM3 (ref 0–0.07)
IMMATURE GRANULOCYTES: 0.7 %
LYMPHOCYTES # BLD: 13.5 %
LYMPHOCYTES ABSOLUTE: 2.1 THOU/MM3 (ref 1–4.8)
MCH RBC QN AUTO: 30.6 PG (ref 26–33)
MCHC RBC AUTO-ENTMCNC: 30 GM/DL (ref 32.2–35.5)
MCV RBC AUTO: 102.1 FL (ref 81–99)
MONOCYTES # BLD: 8.1 %
MONOCYTES ABSOLUTE: 1.2 THOU/MM3 (ref 0.4–1.3)
NUCLEATED RED BLOOD CELLS: 0 /100 WBC
PLATELET # BLD: 218 THOU/MM3 (ref 130–400)
PMV BLD AUTO: 10.4 FL (ref 9.4–12.4)
RBC # BLD: 4.21 MILL/MM3 (ref 4.2–5.4)
SEG NEUTROPHILS: 62.7 %
SEGMENTED NEUTROPHILS ABSOLUTE COUNT: 9.7 THOU/MM3 (ref 1.8–7.7)
WBC # BLD: 15.4 THOU/MM3 (ref 4.8–10.8)

## 2021-07-21 PROCEDURE — 99213 OFFICE O/P EST LOW 20 MIN: CPT | Performed by: FAMILY MEDICINE

## 2021-07-21 RX ORDER — CEPHALEXIN 500 MG/1
500 CAPSULE ORAL 3 TIMES DAILY
Qty: 21 CAPSULE | Refills: 0 | Status: SHIPPED | OUTPATIENT
Start: 2021-07-21 | End: 2021-07-28

## 2021-07-22 LAB
ORGANISM: ABNORMAL
URINE CULTURE REFLEX: ABNORMAL

## 2021-07-23 ENCOUNTER — TELEPHONE (OUTPATIENT)
Dept: FAMILY MEDICINE CLINIC | Age: 71
End: 2021-07-23

## 2021-07-23 DIAGNOSIS — I10 ESSENTIAL HYPERTENSION: Primary | ICD-10-CM

## 2021-07-23 NOTE — TELEPHONE ENCOUNTER
Called back regarding her WBC's, she has had no recent infections and has not been on steroids. She is on Keflex right now for her urine, but has no symptoms. No follow up appt.

## 2021-07-24 ASSESSMENT — ENCOUNTER SYMPTOMS
EYES NEGATIVE: 1
COUGH: 0
BACK PAIN: 0
CHEST TIGHTNESS: 0
RHINORRHEA: 0
ABDOMINAL PAIN: 0
DIARRHEA: 0
NAUSEA: 0
VOMITING: 0
SORE THROAT: 0
SHORTNESS OF BREATH: 0

## 2021-07-24 NOTE — PROGRESS NOTES
[address] [place] [address]  Dept: [phone]  Dept Fax: [phone]  Loc: [phone]  PROGRESS NOTE      VisitDate: 7/21/2021    Maile Thakkar is a 66 y.o. female who presents today for:     Chief Complaint   Patient presents with    3 Month Follow-Up     OA, A-Fib, HTN, ASHD, Fibromyalgia. Will call Alejandra's office for dexa    Discuss Labs    Medication Refill         Subjective:  HPI  Follow-up A. fib hypertension heart disease all are stable. She had some blood work recently showed elevated white blood cell count. She was seen in the ER, records reviewed. Repeat CBC showed continued elevation of white blood cell count. She did have a urinalysis that was marginally positive but was untreated. She is asymptomatic    Review of Systems   Constitutional: Negative for activity change, appetite change, fatigue and fever. HENT: Negative for congestion, rhinorrhea and sore throat. Eyes: Negative. Respiratory: Negative for cough, chest tightness and shortness of breath. Cardiovascular: Negative for chest pain and palpitations. Gastrointestinal: Negative for abdominal pain, diarrhea, nausea and vomiting. Genitourinary: Negative for dysuria and urgency. Musculoskeletal: Negative for arthralgias and back pain. Neurological: Negative for dizziness and headaches. Psychiatric/Behavioral: Negative for dysphoric mood. The patient is not nervous/anxious.       Past Medical History:   Diagnosis Date    Arthritis     Atrial fibrillation ([address]     Cancer (HCC)     skin    Carotid arterial disease ([address]     CHF (congestive heart failure) (HCC)     Depression     Fibromyalgia     Hypertension     Hypothyroidism     Obesity     S/P bariatric surgery 2018    Sleep apnea     CPAP    Thyroid disease     TIA (transient ischemic attack) 12/2015      Past Surgical History:   Procedure Laterality Date    APPENDECTOMY  over 10 years ago    [address] 10/01/2019    CAROTID ENDARTERECTOMY  2017    CHOLECYSTECTOMY  over 10 years ago    [address] [facility] COLONOSCOPY N/A 2018    COLONOSCOPY WITH BIOPSY performed by Nancy Altman MD at [address] [facility]  10 plus years ago    Dr. Saintclair Muckle Right     OTHER SURGICAL HISTORY Left     Rotator cuff sx    SLEEVE GASTRECTOMY  2016    Robotic, Extensive Lysis of Adhesions, Removal of Angelch[place] Prosthesis - Dr. [facility]  2016    Dr. Fanny Polo      Family History   Problem Relation Age of Onset    Stroke Mother     High Blood Pressure Mother     Atrial Fibrillation Mother     Heart Disease Mother     Diabetes Father     Early Death Father     Cancer Other         Daughter    Heart Disease Sister     Heart Attack Sister     Deep Vein Thrombosis Brother      Social History     Tobacco Use    Smoking status: Former Smoker     Packs/day: 0.25     Years: 30.00     Pack years: 7.50     Types: Cigarettes     Start date: 3/24/1982     Quit date: 2015     Years since quittin.4    Smokeless tobacco: Never Used   Substance Use Topics    Alcohol use: No      Current Outpatient Medications   Medication Sig Dispense Refill    fluticasone-salmeterol (ADVAIR) 250-50 MCG/DOSE AEPB Inhale 1 puff into the lungs 2 times daily 3 Inhaler 3    mirabegron (MYRBETRIQ) 50 MG TB24 Take 50 mg by mouth daily 30 tablet 2    cephALEXin (KEFLEX) 500 MG capsule Take 1 capsule by mouth 3 times daily for 7 days 21 capsule 0    chlorthalidone (HYGROTON) 25 MG tablet TAKE 1 TABLET DAILY 90 tablet 0    clopidogrel (PLAVIX) 75 MG tablet Take 1 tablet by mouth daily 90 tablet 1    albuterol sulfate HFA (VENTOLIN HFA) 108 (90 Base) MCG/ACT inhaler Inhale 2 puffs into the lungs every 6 hours as needed for Wheezing 1 Inhaler 11    verapamil (VERELAN) 240 MG extended release capsule Take 1 capsule by mouth 2 times daily 180 capsule 3    metoprolol (LOPRESSOR) 100 MG tablet Take 1.5 tablets by mouth 2 times daily 270 tablet 3    losartan (COZAAR) 25 MG tablet Take 1 tablet by mouth daily 90 tablet 3    digoxin (LANOXIN) 125 MCG tablet Take 1 tablet by mouth daily 90 tablet 3    apixaban (ELIQUIS) 5 MG TABS tablet Take 1 tablet by mouth 2 times daily 180 tablet 3    pravastatin (PRAVACHOL) 40 MG tablet Take 1 tablet by mouth daily 90 tablet 3    pantoprazole (PROTONIX) 40 MG tablet TAKE 1 TABLET DAILY 90 tablet 3    nitroGLYCERIN (NITROSTAT) 0.4 MG SL tablet up to max of 3 total doses. If no relief after 1 dose, call 911. 25 tablet 1    DULoxetine (CYMBALTA) 20 MG extended release capsule Take 20 mg by mouth daily      sulfaSALAzine (AZULFIDINE) 500 MG tablet Take 500 mg by mouth 2 times daily      hydroxychloroquine (PLAQUENIL) 200 MG tablet Take 200 mg by mouth 2 times daily.  furosemide (LASIX) 20 MG tablet Take 1 tablet by mouth every other day Lasix 20 mg daily X1 week, then take every other day (Patient not taking: Reported on 7/21/2021) 90 tablet 3     No current facility-administered medications for this visit.      Allergies   Allergen Reactions    Demerol Hcl [Meperidine] Nausea And Vomiting     Health Maintenance   Topic Date Due    DEXA (modify frequency per FRAX score)  Never done    Flu vaccine (1) 09/01/2021    Annual Wellness Visit (AWV)  02/04/2022    Lipid screen  07/19/2022    Potassium monitoring  07/19/2022    Creatinine monitoring  07/19/2022    Breast cancer screen  10/12/2022    Colon cancer screen colonoscopy  02/07/2023    DTaP/Tdap/Td vaccine (2 - Td or Tdap) 12/20/2027    Shingles Vaccine  Completed    Pneumococcal 65+ years Vaccine  Completed    COVID-19 Vaccine  Completed    Hepatitis C screen  Completed    Hepatitis A vaccine  Aged Out    Hepatitis B vaccine  Aged Out    Hib vaccine  Aged Out    Meningococcal (ACWY) vaccine  Aged Out         Objective:     Physical Exam  Constitutional:       General: She is not in acute distress. Appearance: She is well-developed. She is not diaphoretic. HENT:      Head: Normocephalic and atraumatic. Eyes:      General: No scleral icterus. Conjunctiva/sclera: Conjunctivae normal.   Neck:      Thyroid: No thyromegaly. Vascular: No JVD. Comments: No bruits  Cardiovascular:      Rate and Rhythm: Normal rate and regular rhythm. Heart sounds: Normal heart sounds. Pulmonary:      Effort: Pulmonary effort is normal. No respiratory distress. Breath sounds: Normal breath sounds. No wheezing or rales. Abdominal:      Palpations: Abdomen is soft. There is no mass. Tenderness: There is no abdominal tenderness. There is no guarding. Musculoskeletal:         General: No tenderness. Skin:     General: Skin is warm and dry. Findings: No rash. Neurological:      Mental Status: She is alert and oriented to person, place, and time. Cranial Nerves: No cranial nerve deficit. /76   Pulse 80   Temp 97.8 °F (36.6 °C) (Oral)   Resp 20   Wt 221 lb (100.2 kg)   BMI 33.60 kg/m²       Impression/Plan:  1. Urinary tract infection with hematuria, site unspecified    2. OAB (overactive bladder)      Requested Prescriptions     Signed Prescriptions Disp Refills    fluticasone-salmeterol (ADVAIR) 250-50 MCG/DOSE AEPB 3 Inhaler 3     Sig: Inhale 1 puff into the lungs 2 times daily    mirabegron (MYRBETRIQ) 50 MG TB24 30 tablet 2     Sig: Take 50 mg by mouth daily    cephALEXin (KEFLEX) 500 MG capsule 21 capsule 0     Sig: Take 1 capsule by mouth 3 times daily for 7 days     No orders of the defined types were placed in this encounter. Patient giveneducational materials - see patient instructions. Discussed use, benefit, and side effects of prescribed medications.   All patient questions answered. Pt voiced understanding. Reviewed health maintenance. Patient agreedwith treatment plan. Follow up as directed. **This report has been created using voice recognition software. It may contain minor errorswhich are inherent in voice recognition technology. **       Electronically signed by Mandy Crews MD on 7/24/2021 at 10:24 AM

## 2021-07-27 RX ORDER — PRAVASTATIN SODIUM 40 MG
TABLET ORAL
Qty: 90 TABLET | Refills: 0 | Status: SHIPPED | OUTPATIENT
Start: 2021-07-27 | End: 2021-10-25

## 2021-08-16 ENCOUNTER — OFFICE VISIT (OUTPATIENT)
Dept: CARDIOLOGY CLINIC | Age: 71
End: 2021-08-16
Payer: MEDICARE

## 2021-08-16 VITALS
SYSTOLIC BLOOD PRESSURE: 136 MMHG | HEIGHT: 68 IN | BODY MASS INDEX: 33.25 KG/M2 | WEIGHT: 219.4 LBS | DIASTOLIC BLOOD PRESSURE: 80 MMHG | HEART RATE: 80 BPM

## 2021-08-16 DIAGNOSIS — I25.10 CORONARY ARTERY DISEASE INVOLVING NATIVE CORONARY ARTERY OF NATIVE HEART WITHOUT ANGINA PECTORIS: ICD-10-CM

## 2021-08-16 DIAGNOSIS — E66.01 MORBID (SEVERE) OBESITY DUE TO EXCESS CALORIES (HCC): ICD-10-CM

## 2021-08-16 DIAGNOSIS — J44.9 CHRONIC OBSTRUCTIVE PULMONARY DISEASE, UNSPECIFIED COPD TYPE (HCC): ICD-10-CM

## 2021-08-16 DIAGNOSIS — I10 ESSENTIAL HYPERTENSION: ICD-10-CM

## 2021-08-16 DIAGNOSIS — I27.20 PULMONARY HTN (HCC): ICD-10-CM

## 2021-08-16 DIAGNOSIS — I48.21 PERMANENT ATRIAL FIBRILLATION (HCC): ICD-10-CM

## 2021-08-16 DIAGNOSIS — I50.32 CHRONIC DIASTOLIC CONGESTIVE HEART FAILURE (HCC): Primary | ICD-10-CM

## 2021-08-16 DIAGNOSIS — Z95.820 S/P ANGIOPLASTY WITH STENT: ICD-10-CM

## 2021-08-16 DIAGNOSIS — R60.0 BILATERAL LEG EDEMA: ICD-10-CM

## 2021-08-16 PROBLEM — I48.91 ATRIAL FIBRILLATION WITH RVR (HCC): Status: RESOLVED | Noted: 2020-02-17 | Resolved: 2021-08-16

## 2021-08-16 PROCEDURE — 99214 OFFICE O/P EST MOD 30 MIN: CPT | Performed by: INTERNAL MEDICINE

## 2021-08-16 NOTE — PROGRESS NOTES
No chief complaint on file. HX OF  fu had TIA here at [address], transferred from [facility]    Had carotid endarterectomy - 9-11-17 -     Was admitted from office for atr fib with  and was admitted TAYLOR-CV . Cath and needed stent and later went back to atr fib with 110 and re-sent to ER and sent home with added cardizem       5 month f/u    EKG done 4-15-21    No palpitations    Denied cp,  dizziness or edema    Palpitation better after increased lopressor and calan    Sob on exertion and fatigue on exertion- chronic    No leg edma  No wt gain    Post Cath and PCI and A. fib rate control--feel stronger    Hx of depression and a lots of stress in family  and and daughter sick          Patient Active Problem List   Diagnosis    Obstructive sleep apnea on CPAP    COPD (chronic obstructive pulmonary disease) ([facility])    Fibromyalgia    Right arm weakness    Obesity (BMI 30-39. 9)    Chronic diastolic congestive heart failure (HCC)    Bilateral leg edema  +1    Pulmonary HTN ([facility]) Mean pressure 32 mmhg by RHC    SOB (shortness of breath) on exertion    Polyosteoarthritis    Postsurgical malabsorption    Bilateral carotid artery stenosis    S/P carotid endarterectomy    Positive colorectal cancer screening using DNA-based stool test    Dysphagia    Gastroesophageal reflux disease without esophagitis    Hypersomnia with sleep apnea    Fatigue    Essential hypertension    Sleep apnea    Coronary artery disease involving native heart without angina pectoris    S/P cardiac cath    S/P percutaneous transluminal angioplasty (PTA) with stent placement    Atrial fibrillation status post cardioversion [facility])    Atherosclerotic heart disease of native coronary artery with other forms of angina pectoris (HCC)    Permanent atrial fibrillation (HCC)    Morbid (severe) obesity due to excess calories ([address]    S/P angioplasty with stent of the LM to LAD feb 2020    Ischemic cardiomyopathy 45 % Past Surgical History:   Procedure Laterality Date    APPENDECTOMY  over 10 years ago    [address] 10/01/2019    CAROTID ENDARTERECTOMY  2017    CHOLECYSTECTOMY  over 10 years ago    [address] [facility] COLONOSCOPY N/A 2018    COLONOSCOPY WITH BIOPSY performed by Rashawn Gutierrez MD at [address] [facility]  10 plus years ago    Dr. Gustavo Birmingham Right     OTHER SURGICAL HISTORY Left     Rotator cuff sx    SLEEVE GASTRECTOMY  2016    Robotic, Extensive Lysis of Adhesions, Removal of Angelch[place] Prosthesis - Dr. Chelo De Dios Left 2021    UPPER GASTROINTESTINAL ENDOSCOPY  2016    Dr. Jeff Zaragoza        Allergies   Allergen Reactions    Demerol Hcl [Meperidine] Nausea And Vomiting        Family History   Problem Relation Age of Onset    Stroke Mother     High Blood Pressure Mother     Atrial Fibrillation Mother     Heart Disease Mother     Diabetes Father     Early Death Father     Cancer Other         Daughter    Heart Disease Sister     Heart Attack Sister     Deep Vein Thrombosis Brother         Social History     Socioeconomic History    Marital status:      Spouse name: Not on file    Number of children: Not on file    Years of education: Not on file    Highest education level: Not on file   Occupational History    Not on file   Tobacco Use    Smoking status: Former Smoker     Packs/day: 0.25     Years: 30.00     Pack years: 7.50     Types: Cigarettes     Start date: 3/24/1982     Quit date: 2015     Years since quittin.4    Smokeless tobacco: Never Used   Vaping Use    Vaping Use: Former   Substance and Sexual Activity    Alcohol use: No    Drug use: No    Sexual activity: Not Currently     Partners: Male   Other Topics Concern    Not on file   Social History Narrative    Not on file     Social Determinants of Health     Financial Resource Strain:     Difficulty of Paying Living Expenses:    Food Insecurity:     Worried About Running Out of Food in the Last Year:     [address] in the Last Year:    Transportation Needs:     Lack of Transportation (Medical):      Lack of Transportation (Non-Medical):    Physical Activity:     Days of Exercise per Week:     Minutes of Exercise per Session:    Stress:     Feeling of Stress :    Social Connections:     Frequency of Communication with Friends and Family:     Frequency of Social Gatherings with Friends and Family:     Attends [employer] Services:     Active Member of Clubs or Organizations:     Attends Club or Organization Meetings:     Marital Status:    Intimate Partner Violence:     Fear of Current or Ex-Partner:     Emotionally Abused:     Physically Abused:     Sexually Abused:        Current Outpatient Medications   Medication Sig Dispense Refill    pravastatin (PRAVACHOL) 40 MG tablet TAKE 1 TABLET DAILY 90 tablet 0    fluticasone-salmeterol (ADVAIR) 250-50 MCG/DOSE AEPB Inhale 1 puff into the lungs 2 times daily 3 Inhaler 3    mirabegron (MYRBETRIQ) 50 MG TB24 Take 50 mg by mouth daily 30 tablet 2    chlorthalidone (HYGROTON) 25 MG tablet TAKE 1 TABLET DAILY 90 tablet 0    clopidogrel (PLAVIX) 75 MG tablet Take 1 tablet by mouth daily 90 tablet 1    albuterol sulfate HFA (VENTOLIN HFA) 108 (90 Base) MCG/ACT inhaler Inhale 2 puffs into the lungs every 6 hours as needed for Wheezing 1 Inhaler 11    verapamil (VERELAN) 240 MG extended release capsule Take 1 capsule by mouth 2 times daily 180 capsule 3    metoprolol (LOPRESSOR) 100 MG tablet Take 1.5 tablets by mouth 2 times daily 270 tablet 3    losartan (COZAAR) 25 MG tablet Take 1 tablet by mouth daily 90 tablet 3    digoxin (LANOXIN) 125 MCG tablet Take 1 tablet by mouth daily 90 tablet 3    apixaban (ELIQUIS) 5 MG TABS tablet Take 1 tablet by mouth 2 times daily 180 clubbing or cyanosis  Skin - normal coloration and turgor, no rashes, no suspicious skin lesions noted    Lab  No results for input(s): CKTOTAL, CKMB, CKMBINDEX, TROPONINI in the last 72 hours. CBC:   Lab Results   Component Value Date    WBC 15.4 07/20/2021    RBC 4.21 07/20/2021    HGB 12.9 07/20/2021    HCT 43.0 07/20/2021    .1 07/20/2021    MCH 30.6 07/20/2021    MCHC 30.0 07/20/2021    RDW 14.3 10/01/2018     07/20/2021    MPV 10.4 07/20/2021     BMP:    Lab Results   Component Value Date     07/19/2021    K 3.8 07/19/2021    K 4.0 03/04/2020     07/19/2021    CO2 28 07/19/2021    BUN 12 07/19/2021    LABALBU 4.0 07/19/2021    CREATININE 0.6 07/19/2021    CALCIUM 9.7 07/19/2021    LABGLOM >90 07/19/2021    GLUCOSE 96 07/19/2021    GLUCOSE 91 09/21/2020     Hepatic Function Panel:    Lab Results   Component Value Date    ALKPHOS 111 07/19/2021    ALT 14 07/19/2021    AST 17 07/19/2021    PROT 7.1 07/19/2021    BILITOT 0.3 07/19/2021    BILIDIR 0.1 09/21/2020    LABALBU 4.0 07/19/2021     Magnesium:    Lab Results   Component Value Date    MG 1.9 09/21/2020     Warfarin PT/INR:  No components found for: PTPATWAR, PTINRWAR  HgBA1c:    Lab Results   Component Value Date    LABA1C 5.4 10/01/2018     FLP:    Lab Results   Component Value Date    TRIG 91 07/19/2021    HDL 49 07/19/2021    LDLCALC 46 07/19/2021    LDLDIRECT 58 09/21/2020     TSH:    Lab Results   Component Value Date    TSH 0.907 10/09/2020       EKG 1/5/16  Normal sinus rhythm  Rightward axis  T wave abnormality, consider inferior ischemia  Abnormal ECG  When compared with ECG of 01-JUL-2011 09:56,  Questionable change in The axis  T wave inversion now evident in Inferior leads  Confirmed by NATHANIEL PHELAN ([id number]) on 12/8/2015 8:38:06 PM    EKG 8/25/16  Marked sinus  Bradycardia   -Left axis -anterior fascicular block.    -Old anteroseptal infarct.      ABNORMAL       EKG 9/22/16-sinus bradycardia rate 46 [facility]       CONCLUSION:   1. This is a normal sinus rhythm with average heart rate 55 beats per minute,  ranging from  beats per minute. 2. No significant pause of more than 1.9 seconds noted. 3. Rare ventricular ectopic beats, isolated and couplets. 4. Rare supraventricular ectopic beats, isolated, couplets, and rare  nonsustained supraventricular ectopic run. 5. No significant bradyarrhythmia to be addressed, yet certainly average  heart rate 55, minimum is 40, so probably need further clinical correlation. 6. Otherwise, at this level, this minimal low heart rate if there are no  symptoms will need just observation.  Rocael Ordaz M.D.     D: 11/29/2016 20:0      EKG 8/12/19  NSR, no acute abn       Cath feb 2020  HEMODYNAMICS:  LVEDP 9 mmHg.     CORONARY ANGIOGRAM:  1. Right coronary artery. The ostial RCA has a 10% lesion, proximal  RCA has a 20% lesion. Mid RCA has 30% lesion. Distal RCA with luminal  irregularities. RCA is a dominant vessel. Bifurcates into RPL and  RPDA. RPL has an ostial 20% lesion. RPDA has a 10% to 20% lesion. 2.  Left main coronary artery. Left main coronary artery has a 50%  lesion in the distal left main coronary artery. Bifurcates into left  circumflex artery and LAD. 3.  Left circumflex artery. 10% to 20% ostial lesion in the left  circumflex artery. There is a high takeoff of a large OM1 branch that  has a 60% lesion in the proximal part of the vessel. Distal left  circumflex artery is patent. OM2 is a large vessel with no significant  stenotic lesions. 4.  Left anterior descending artery. There is a severe stenotic diffuse  lesion involving the ostial and proximal part of LAD ranging in severity  between 70% to 90%. Mid LAD is patent. Distal LAD has mild diffuse  disease.     MEDICATIONS:  See MAR.     COMPLICATIONS:  None.     ESTIMATED BLOOD LOSS:  Less than 30 mL.     ACCESS:  Right radial artery access. Vasc Band was applied.   Hemostasis  was achieved.     CONCLUSION:  1. Severe stenotic lesion of the distal left main coronary artery and  ostial/proximal left anterior descending artery. 2.  Status post successful PCI with the stenting of distal left main  coronary artery and left anterior descending artery with details as  listed above.     RECOMMENDATIONS:  1.  Bedrest for the next 4 hours. 2.  Monitor in telemetry. 3.  Aggressive risk factor modification. 4.  Optimize medical therapy for CAD. 5.  Access site care. 6.  Aspirin 81 mg p.o. daily, can be stopped after 1 month. 7.  Plavix 75 mg p.o. daily. 8.  May resume Eliquis in a.m.  9.  High intensity statin therapy. 10.  Outpatient follow up in office in 2 to 4 weeks.     Findings and plan of care discussed with the patient's family.  Serg Gabriel MD     D: 02/21/2020 15:     -IVUS LM/LAD  -Severe stenotic lesion of dLM (Bifurcation lesion, De Leon Type 1,1,0)  -Severe 80-90% lesion of ostial/proximal LAD  -S/P Successful PCI with stenting of dLM/pLAD    ekg 9/23/2020  Atrial flutter with  bpm    4/7/2021  atr fib with CVA   Septal infarction  No acute abn    Assessment    Home BP good    Leg edema +1 chronic       Diagnosis Orders   1. Chronic diastolic congestive heart failure ([address]     2. Coronary artery disease involving native coronary artery of native heart without angina pectoris     3. Essential hypertension     4. Permanent atrial fibrillation ([address]     5. Pulmonary HTN (HCC) Mean pressure 32 mmhg by RHC     6. Bilateral leg edema  +1     7. S/P angioplasty with stent of the LM to LAD feb 2020     8. Morbid (severe) obesity due to excess calories ([address]     9.  Chronic obstructive pulmonary disease, unspecified COPD type ([address]         Recent feb 2020 admission DX  PAFB RVR  - more persistent now - s\p TAYLOR / CV to SR 2/24/2020              On [address]               On amiodarone      abnormal stress testing   S\p cardiac cath 2/21/2020:  -Severe stenotic lesion of dLM (Bifurcation lesion, De Leon Type 1,1,0)  -Severe 80-90% lesion of ostial/proximal LAD  -S/P Successful PCI with stenting of dLM/pLAD     Hx bradycardia - none this admit     HTN  HLP  DM II  Hx Lt CEA 2007  Hx TIA  Hx SHANNEN      Previous admission DX    Hx of recent TIA with RT side weakness      NSVT 5 beat   S/P gastric sleeve  Hypertensive Urgency  Hx of obstructive Sleep apnea  Pulmonary HTN  New onset post afib with RVR- now rate controlled  Chads of  DM II  HX of TIA         Continue home medication regimen   bP controlled    Doing well        Plan     The  Most current  meds and labs reviewed    Permanent atr fib with CVR  atr flutter /Fib with CVR 84  S/p TAYLOR- CV and weent back to atr fib  Off  amiodarone as failed to be in NSR  atr fib with  bpm at rest   TSH WNL  Cont  metoprolol 150  BID  Cont calan SR  240 po bid for rate control  Cont digoxin 125 mcg po qd  Cont  losartan to 25 po qd  If remain uncontrolled consider cardioversion and or eps       CHDAS of 4( DM, htn, Hx of TIA)  Need longterm OA and on apixaban  D/w the pat the risk and benefit of OA  Pat understand the risk of bleeding including ICH    Coronary artery disease, seems to be stable. Denies angina or change in breathing pattern  S/ p LM-LAD stent feb 2020  Cont apixaban and plavix  Off ASA       Cardiomyopathy: improving, no CHF symptoms, no change in clinical condition. Will need periodic echocardiograms depending on symptoms. Echo EF 60% and mariaelena nuc negative        Patient Seen, Chart, Consults notes, Labs, Radiology studies reviewed. Hx of TIA IN 2016  Was on asa and apixaban    Hypertension, on medical treatment. Seems to be under good control. Patient is compliant with medical treatment. Cont  calan SR     Congestive heart failure: no evidence of fluid overload today, no recent hospitalization for CHF  Leg edema +1 -   Lost 8 lb  Lasix 20 mg po QOD  kdur 10 meq po QOD     Hyperlipidemia: on statins, followed periodically.

## 2021-08-17 ENCOUNTER — NURSE ONLY (OUTPATIENT)
Dept: LAB | Age: 71
End: 2021-08-17

## 2021-08-17 DIAGNOSIS — I10 ESSENTIAL HYPERTENSION: ICD-10-CM

## 2021-08-17 LAB
ALT SERPL-CCNC: 8 U/L (ref 11–66)
BASOPHILS # BLD: 1.1 %
BASOPHILS ABSOLUTE: 0.1 THOU/MM3 (ref 0–0.1)
CREAT SERPL-MCNC: 0.7 MG/DL (ref 0.4–1.2)
EOSINOPHIL # BLD: 2 %
EOSINOPHILS ABSOLUTE: 0.2 THOU/MM3 (ref 0–0.4)
ERYTHROCYTE [DISTWIDTH] IN BLOOD BY AUTOMATED COUNT: 13.9 % (ref 11.5–14.5)
ERYTHROCYTE [DISTWIDTH] IN BLOOD BY AUTOMATED COUNT: 51.8 FL (ref 35–45)
GFR SERPL CREATININE-BSD FRML MDRD: 82 ML/MIN/1.73M2
HCT VFR BLD CALC: 43.7 % (ref 37–47)
HEMOGLOBIN: 13.6 GM/DL (ref 12–16)
IMMATURE GRANS (ABS): 0.07 THOU/MM3 (ref 0–0.07)
IMMATURE GRANULOCYTES: 0.6 %
LYMPHOCYTES # BLD: 22.3 %
LYMPHOCYTES ABSOLUTE: 2.7 THOU/MM3 (ref 1–4.8)
MCH RBC QN AUTO: 31.5 PG (ref 26–33)
MCHC RBC AUTO-ENTMCNC: 31.1 GM/DL (ref 32.2–35.5)
MCV RBC AUTO: 101.2 FL (ref 81–99)
MONOCYTES # BLD: 9.5 %
MONOCYTES ABSOLUTE: 1.2 THOU/MM3 (ref 0.4–1.3)
NUCLEATED RED BLOOD CELLS: 0 /100 WBC
PLATELET # BLD: 261 THOU/MM3 (ref 130–400)
PMV BLD AUTO: 10.2 FL (ref 9.4–12.4)
RBC # BLD: 4.32 MILL/MM3 (ref 4.2–5.4)
SEDIMENTATION RATE, ERYTHROCYTE: 34 MM/HR (ref 0–20)
SEG NEUTROPHILS: 64.5 %
SEGMENTED NEUTROPHILS ABSOLUTE COUNT: 7.9 THOU/MM3 (ref 1.8–7.7)
WBC # BLD: 12.2 THOU/MM3 (ref 4.8–10.8)

## 2021-08-31 ENCOUNTER — TELEPHONE (OUTPATIENT)
Dept: FAMILY MEDICINE CLINIC | Age: 71
End: 2021-08-31

## 2021-08-31 DIAGNOSIS — D72.829 LEUKOCYTOSIS, UNSPECIFIED TYPE: Primary | ICD-10-CM

## 2021-08-31 NOTE — TELEPHONE ENCOUNTER
Patient called requesting results of repeat CBC done 8/17. Chart shows Dr Anushka Salmeron ordered it but that was an order from you to repeat from 7/23/21.

## 2021-08-31 NOTE — TELEPHONE ENCOUNTER
White blood cell count and absolute neutrophils are slightly lower than previous but still remain elevated.   Recommend appointment with hematology

## 2021-09-01 ENCOUNTER — OFFICE VISIT (OUTPATIENT)
Dept: PULMONOLOGY | Age: 71
End: 2021-09-01
Payer: MEDICARE

## 2021-09-01 VITALS
WEIGHT: 220 LBS | TEMPERATURE: 97.6 F | HEIGHT: 68 IN | HEART RATE: 68 BPM | DIASTOLIC BLOOD PRESSURE: 78 MMHG | SYSTOLIC BLOOD PRESSURE: 128 MMHG | OXYGEN SATURATION: 97 % | BODY MASS INDEX: 33.34 KG/M2

## 2021-09-01 DIAGNOSIS — G47.30 HYPERSOMNIA WITH SLEEP APNEA: ICD-10-CM

## 2021-09-01 DIAGNOSIS — G47.10 HYPERSOMNIA WITH SLEEP APNEA: ICD-10-CM

## 2021-09-01 DIAGNOSIS — Z99.89 OBSTRUCTIVE SLEEP APNEA ON CPAP: Primary | ICD-10-CM

## 2021-09-01 DIAGNOSIS — G47.33 OBSTRUCTIVE SLEEP APNEA ON CPAP: Primary | ICD-10-CM

## 2021-09-01 PROCEDURE — 99214 OFFICE O/P EST MOD 30 MIN: CPT | Performed by: INTERNAL MEDICINE

## 2021-09-01 RX ORDER — MODAFINIL 200 MG/1
200 TABLET ORAL DAILY
Qty: 90 TABLET | Refills: 1 | Status: SHIPPED | OUTPATIENT
Start: 2021-09-01 | End: 2021-12-31

## 2021-09-01 NOTE — PROGRESS NOTES
[facility] [address]  Sleep Medicine clinic  Follow up for Sleep Apnea       [address] [place] [address]     Chief Complaint:  Timbo Soto is here for a 16 month follow-up for SHANNEN with a download. [facility] is a 66 y. o.oldfemale came for follow up regarding her sleep apnea after having a sleep test on 11/15.17 to check her current status of sleep apnea. She is currently using her positive airway pressure device with a CPAP pressure of 11cm H20 with EPR of 3. She denies any problems with machine or mask. She is a currently following with Ms. Juvenal Valdez PA-C at the [facility] sleep clinic. She was scheduled for my clinic today. Ms. Sanford José Miguel is not available today to see this patient. She is sleeping well at night with out difficulty. She denies any daytime sleepiness. She is currently retired. She used to work at [employer]. Currently using a Provigil 400 mg p.o. daily in the morning. She was initially prescribed with the 200 mg twice daily however, patient not able to take the evening dose of Provigil I.e 200mg due to difficulty in going to sleep at nighttime if she takes Provigil in the evening. She is tolerating Provigil 400 mg p.o. daily well with no reported side effects. She is currently not taking any narcotic pain medications. She is currently taking melatonin 3 mg p.o. nightly from over-the-counter. She takes melatonin on a as needed basis. Review of Systems:   General/Constitutional: she lost 14lbs of weight from the last visit. No fever or chills. HENT: Negative. Eyes: Negative. Upper respiratory tract: Occasional nasal stuffiness with the no post nasal drip. She is currently using Flonase nasal spray. Lower respiratory tract/ lungs: No cough or sputum production. Cardiovascular: No palpitations or chest pain. Gastrointestinal: No nausea or vomiting.   Neurological: No focal neurologiacal weakness. Extremities: No edema. Musculoskeletal: No complaints. Genitourinary: No complaints. Hematological: Negative. Psychiatric/Behavioral: Negative. Skin: No itching. Social History:  Social History     Tobacco Use    Smoking status: Former Smoker     Packs/day: 0.25     Years: 30.00     Pack years: 7.50     Types: Cigarettes     Start date: 3/24/1982     Quit date: 2015     Years since quittin.5    Smokeless tobacco: Never Used   Vaping Use    Vaping Use: Former   Substance Use Topics    Alcohol use: No    Drug use: No       Past Medical History:   Diagnosis Date    Arthritis     [facility] ([facility]     Cancer ([facility]     skin    Carotid arterial disease (HCC)     CHF (congestive heart failure) (HCC)     Depression     Fibromyalgia     Hypertension     Hypothyroidism     Obesity     S/P bariatric surgery 2018    Sleep apnea     CPAP    Thyroid disease     TIA (transient ischemic attack) 2015       Past Surgical History:   Procedure Laterality Date    APPENDECTOMY  over 10 years ago    [id number] Left 10/01/2019    CAROTID ENDARTERECTOMY  2017    CHOLECYSTECTOMY  over 10 years ago    [id number] [facility] COLONOSCOPY N/A 2018    COLONOSCOPY WITH [facility]OPSY [facility] [address] [facility]     HYSTERECTOMY  10 plus years ago    Dr. Colleen Michael Right     OTHER SURGICAL HISTORY Left     Rotator cuff sx    SLEEVE GASTRECTOMY  2016    Robotic, Extensive Lysis of Adhesions, Removal of [place] Prosthesis - Dr. Halina Toro Left 2021    UPPER GASTROINTESTINAL ENDOSCOPY  2016    Dr. Erika Fox        Allergies   Allergen Reactions    Demerol Hcl [Meperidine] Nausea And Vomiting       Current Outpatient Medications   Medication Sig Dispense Refill    Modafinil (PROVIGIL PO) Take by mouth 2 tablets am      pravastatin (PRAVACHOL) 40 MG tablet TAKE 1 TABLET DAILY 90 tablet 0    fluticasone-salmeterol (ADVAIR) 250-50 MCG/DOSE AEPB Inhale 1 puff into the lungs 2 times daily 3 Inhaler 3    mirabegron (MYRBETRIQ) 50 MG TB24 Take 50 mg by mouth daily 30 tablet 2    chlorthalidone (HYGROTON) 25 MG tablet TAKE 1 TABLET DAILY 90 tablet 0    clopidogrel (PLAVIX) 75 MG tablet Take 1 tablet by mouth daily 90 tablet 1    albuterol sulfate HFA (VENTOLIN HFA) 108 (90 Base) MCG/ACT inhaler Inhale 2 puffs into the lungs every 6 hours as needed for Wheezing 1 Inhaler 11    verapamil (VERELAN) 240 MG extended release capsule Take 1 capsule by mouth 2 times daily 180 capsule 3    metoprolol (LOPRESSOR) 100 MG tablet Take 1.5 tablets by mouth 2 times daily 270 tablet 3    losartan (COZAAR) 25 MG tablet Take 1 tablet by mouth daily 90 tablet 3    digoxin (LANOXIN) 125 MCG tablet Take 1 tablet by mouth daily 90 tablet 3    apixaban (ELIQUIS) 5 MG TABS tablet Take 1 tablet by mouth 2 times daily 180 tablet 3    furosemide (LASIX) 20 MG tablet Take 1 tablet by mouth every other day Lasix 20 mg daily X1 week, then take every other day 90 tablet 3    pantoprazole (PROTONIX) 40 MG tablet TAKE 1 TABLET DAILY 90 tablet 3    nitroGLYCERIN (NITROSTAT) 0.4 MG SL tablet up to max of 3 total doses. If no relief after 1 dose, call 911. 25 tablet 1    DULoxetine (CYMBALTA) 20 MG extended release capsule Take 20 mg by mouth daily      sulfaSALAzine (AZULFIDINE) 500 MG tablet Take 500 mg by mouth 2 times daily      hydroxychloroquine (PLAQUENIL) 200 MG tablet Take 200 mg by mouth 2 times daily. No current facility-administered medications for this visit.        Family History   Problem Relation Age of Onset    Stroke Mother     High Blood Pressure Mother     Atrial Fibrillation Mother     Heart Disease Mother     Diabetes Father     Early Death Father     Cancer Other         Daughter    Heart Disease Sister     Heart Attack Sister     Deep Vein Thrombosis Brother           /78 (Site: Left Lower Arm, Position: Sitting, Cuff Size: Medium Adult)   Pulse 68   Temp 97.6 °F (36.4 °C)   Ht 5' 8\" (1.727 m)   Wt 220 lb (99.8 kg) Comment: per pt  SpO2 97% Comment: room air at rest  BMI 33.45 kg/m²   Mallampati airway Class:3  Neck Circumference:.15 Inches  [place] sleepiness score 9/1/21: 9  Sleep Apnea Quality of life questionnaire:.41       Physical Exam   Nursing note and vitals reviewed. Constitutional: Patient appears moderately built and moderately nourished. No distress. Patient is oriented to person, place, and time. HENT:   Head: Normocephalic and atraumatic. Right Ear: External ear normal.   Left Ear: External ear normal.   Mouth/Throat: Oropharynx is clear and moist.  No oral thrush. Eyes: Conjunctivae are normal. Pupils are equal, round, and reactive to light. No scleral icterus. Neck: Neck supple. No JVD present. No tracheal deviation present. Cardiovascular: Normal rate, regular rhythm, normal heart sounds. No murmur heard. Pulmonary/Chest: Effort normal and breath sounds normal. No stridor. No respiratory distress. No wheezes. No rales. Patient exhibits no tenderness. Abdominal: Soft. Patient exhibits no distension. No tenderness. Musculoskeletal: Normal range of motion. Extremities: Patient exhibits no edema and no tenderness. Lymphadenopathy:  No cervical adenopathy. Neurological: Patient is alert and oriented to person, place, and time. Skin: Skin is warm and dry. Patient is not diaphoretic. Psychiatric: Patient  has a normal mood and affect. Patient behavior is normal.       Diagnostic Data:              She follows with [employer] [facility]. No recent down load from her CPAP machine. She told me that she is using her CPAP with excellent compliance to >4hours of therapy.     Sleep study done on : 11/15/2017  [facility] STUDY REPORT     PATIENT NAME: Carina Brower                       OGW:        [id number]  MED REC NO:   [id number]                           ROOM:  ACCOUNT NO:   [id number]                           ADMIT DATE: 11/15/2017  PROVIDER:     Verona Shaw MD     IMPRESSION:  1.  Moderately severe obstructive sleep apnea. 2.  Nocturnal hypoxia. 3.  Hypertension. 4.  Hypersomnia. 5.  COPD.     RECOMMENDATIONS:  1.  Due to persistence of the patient's sleep disordered breathing, we  recommend continuation of the patient's current CPAP with pressure of 11 cm  of water. 2.  The patient should be scheduled for a followup in my clinic in one week  to discuss about her sleep study findings for further management.        Lolis Marrero MD    PAP Download:   Recorded compliance dates:7/31/21-8/29/21  More than 4hour usage compliance was:97%. Average residual Apnea- Hypoapnea index on current pressue was:1.1.       PAP Type cpap   Level  11      Average usuage hours per day was:.[id number]                 Interface: full     Provider:  []?SR-HMESTUARDO             []?Anthony                        []?Luisa          []? Shivam                           []?[facility]      [x]? Other: adaptive      Echocardiogram performed on 20 April 2021 4/20/2021   Narrative & Impression  Transthoracic Echocardiography Report (TTE)      Conclusions      Summary   Normal left ventricle size and systolic function. Ejection fraction was   estimated at 55 to 60 %. There were no regional left ventricular wall   motion abnormalities and wall thickness was within normal limits. The left atrium is Moderately dilated. Mildly enlarged right atrium size.       Signature       ----------------------------------------------------------------   Electronically signed by Sandra Hurt MD (Interpreting   physician) on 04/20/2021 at 06:23 PM   ----------------------------------------------------------------  Right Ventricle   The right ventricular size was normal with normal systolic function and   wall thickness. -PDMP (Prescription Drug Monitoring Program Data) report reviewed on Iris Tidwell today i.e on 9/1/21      Assessment:  -Mild obstructive sleep apnea currently on treatment with a CPAP pressure of 11cm H20. She is using her CPAP device with excellent compliance for more than 4 hours of CPAP therapy. She is benefiting from her CPAP therapy will continue.  -Hypersomnia ( Excessive daytime sleepiness) due to obstructive sleep apnea on treatment with Provigil 400mg po daily. she is tolerating Provigil 400 mg p.o. daily. Patient never had MSLT test done in the past  -Chronic hx of tobacco smoking- She quit smoking.  -History of pulmonary hypertension in the past.  She was evaluated at [facility] pulmonary hypertension clinic by Dr. Josue stephens MD.  Her latest echocardiogram did not show pulmonary hypertension. -COPD- she is currently on treatment with Advair and Albuterol. She is following with her family physician.  -Inadequate sleep hygiene. Recommendations/Plan:  -Will decrease Provigil to 200mg po daily in Am. She was advised to call my office if her excessive daytime sleepiness (hypersomnia) gets worse on 200 mg of Provigil daily. Refills were sent to her mail-in pharmacy [facility].  -She was advised to continue to take melatonin 3 mg p.o. nightly from over-the-counter  -She was advised to continue current positive airway pressure therapy with above described pressure.  -She advised to keep good compliance with current recommended pressure to get optimal results and clinical improvement. - Schedule patient for follow up with my clinic in 6months ( Ms Aminta Moreno) with download.  Patient advised to make early appointment if needed.  -At the request of patient, she was given prescription for CPAP supplies [facility].  -She was advised to call [employer] regarding supplies if needed.  -She was advised to loose weight by controlling diet and doing exercise.  -She call my office for earlier appointment if needed for worsening of sleep symptoms.  -[facility] educated about my impression and plan. Patient verbalizes understanding.      -She was advised to go for in lab CPAP titration followed by MSLT test to further evaluate for narcolepsy after holding her Provigil at least 3 to 4 weeks as a part of evaluation for hypersomnia. Patient refused to go for MSLT test.  -I personally reviewed and updated the Past medical hx, Past surgical hx,Social hx, Family hx, Medications, Allergies in the discrete data section of the patient chart. I also reviewed pertaining labs and Pulmonary medicine,Sleep medicine related, Pathological, Microbiological and Radiological investigations.

## 2021-09-01 NOTE — PROGRESS NOTES
Chief Complaint: Dhruv Dean is here for f/u with download     Mallampati airway Class:3  Neck Circumference:.15 Inches    [place] sleepiness score 9/1/21: 9  Sleep Apnea Quality of life questionnaire:.41      Diagnostic Data:   PAP Download:   Recorded compliance dates:7/31/21-8/29/21  More than 4hour usage compliance was:97%. Average residual Apnea- Hypoapnea index on current pressue was:1.1.       PAP Type cpap   Level  11     Average usuage hours per day was:.[id number]     Interface: full    Provider:  []-PENNY  []Anthony  []Luisa  []Shivam         [][facility] [x]Other: adaptive

## 2021-09-01 NOTE — PATIENT INSTRUCTIONS
Recommendations/Plan:  -Will decrease Provigil to 200mg po daily in Am. She was advised to call my office if her excessive daytime sleepiness (hypersomnia) gets worse on 200 mg of Provigil daily. Refills were sent to her mail-in pharmacy [facility].  -She was advised to continue to take melatonin 3 mg p.o. nightly from over-the-counter  -She was advised to continue current positive airway pressure therapy with above described pressure.  -She advised to keep good compliance with current recommended pressure to get optimal results and clinical improvement. - Schedule patient for follow up with my clinic in 6months ( Ms Lucas Arreola) with download. Patient advised to make early appointment if needed.  -At the request of patient, she was given prescription for CPAP supplies to submit to DME company.  -She was advised to call [employer] regarding supplies if needed.  -She was advised to loose weight by controlling diet and doing exercise.  -She call my office for earlier appointment if needed for worsening of sleep symptoms.  -[facility] educated about my impression and plan. Patient verbalizes understanding.

## 2021-09-02 RX ORDER — CHLORTHALIDONE 25 MG/1
TABLET ORAL
Qty: 90 TABLET | Refills: 3 | Status: SHIPPED | OUTPATIENT
Start: 2021-09-02 | End: 2022-09-12 | Stop reason: SDUPTHER

## 2021-09-07 ENCOUNTER — TELEPHONE (OUTPATIENT)
Dept: CARDIOLOGY CLINIC | Age: 71
End: 2021-09-07

## 2021-09-07 ENCOUNTER — HOSPITAL ENCOUNTER (OUTPATIENT)
Dept: INTERVENTIONAL RADIOLOGY/VASCULAR | Age: 71
Discharge: HOME | End: 2021-09-07
Payer: MEDICARE

## 2021-09-07 DIAGNOSIS — I65.23 CAROTID STENOSIS, BILATERAL: ICD-10-CM

## 2021-09-07 PROCEDURE — 93880 EXTRACRANIAL BILAT STUDY: CPT

## 2021-09-22 ENCOUNTER — HOSPITAL ENCOUNTER (OUTPATIENT)
Dept: INFUSION THERAPY | Age: 71
Discharge: HOME | End: 2021-09-22
Payer: MEDICARE

## 2021-09-22 ENCOUNTER — OFFICE VISIT (OUTPATIENT)
Dept: ONCOLOGY | Age: 71
End: 2021-09-22
Payer: MEDICARE

## 2021-09-22 VITALS
HEART RATE: 76 BPM | WEIGHT: 221.25 LBS | BODY MASS INDEX: 33.53 KG/M2 | SYSTOLIC BLOOD PRESSURE: 141 MMHG | TEMPERATURE: 98 F | DIASTOLIC BLOOD PRESSURE: 96 MMHG | HEIGHT: 68 IN | OXYGEN SATURATION: 95 % | RESPIRATION RATE: 16 BRPM

## 2021-09-22 DIAGNOSIS — D72.829 LEUKOCYTOSIS, UNSPECIFIED TYPE: Primary | ICD-10-CM

## 2021-09-22 DIAGNOSIS — D72.829 LEUKOCYTOSIS, UNSPECIFIED TYPE: ICD-10-CM

## 2021-09-22 LAB
ABSOLUTE IMMATURE GRANULOCYTE: 0.06 THOU/MM3 (ref 0–0.07)
BASINOPHIL, AUTOMATED: 1 % (ref 0–3)
BASOPHILS ABSOLUTE: 0.1 THOU/MM3 (ref 0–0.1)
EOSINOPHILS ABSOLUTE: 0.2 THOU/MM3 (ref 0–0.4)
EOSINOPHILS RELATIVE PERCENT: 2 % (ref 0–4)
HCT VFR BLD CALC: 43.6 % (ref 37–47)
HEMOGLOBIN: 14.1 GM/DL (ref 12–16)
IMMATURE GRANULOCYTES: 1 %
LYMPHOCYTES # BLD: 20 % (ref 15–47)
LYMPHOCYTES ABSOLUTE: 2.2 THOU/MM3 (ref 1–4.8)
MCH RBC QN AUTO: 31.8 PG (ref 26–33)
MCHC RBC AUTO-ENTMCNC: 32.3 GM/DL (ref 32.2–35.5)
MCV RBC AUTO: 98 FL (ref 81–99)
MONOCYTES ABSOLUTE: 0.8 THOU/MM3 (ref 0.4–1.3)
MONOCYTES: 8 % (ref 0–12)
PDW BLD-RTO: 13.3 % (ref 11.5–14.5)
PLATELET # BLD: 216 THOU/MM3 (ref 130–400)
PMV BLD AUTO: 9.4 FL (ref 9.4–12.4)
RBC # BLD: 4.43 MILL/MM3 (ref 4.2–5.4)
SEG NEUTROPHILS: 70 % (ref 43–75)
SEGMENTED NEUTROPHILS ABSOLUTE COUNT: 7.6 THOU/MM3 (ref 1.8–7.7)
WBC # BLD: 10.9 THOU/MM3 (ref 4.8–10.8)

## 2021-09-22 PROCEDURE — 99211 OFF/OP EST MAY X REQ PHY/QHP: CPT

## 2021-09-22 PROCEDURE — 36415 COLL VENOUS BLD VENIPUNCTURE: CPT

## 2021-09-22 PROCEDURE — 85025 COMPLETE CBC W/AUTO DIFF WBC: CPT

## 2021-09-22 PROCEDURE — 99204 OFFICE O/P NEW MOD 45 MIN: CPT | Performed by: PHYSICIAN ASSISTANT

## 2021-09-22 NOTE — PROGRESS NOTES
[facility] [address] [facility] [address], [address] [address]  [address] [address]  Dept: [phone]  Dept Fax: [phone]: [phone]      Visit Date:9/22/2021     Maite Lund is a 66 y.o. female who presents today for:   Chief Complaint   Patient presents with    New Patient     leukocytosis        HPI:   Maite Lund is a 66 y.o. female referred to Hematology/Oncology clinic for evaluation of leukocytosis per PCP Dr. Melina Brumfield. The patient had CBC completed on 7/19/2021 with white blood cell count 17.7. Repeat blood cell count on 7/20/2021 was 15.4 and on 8/17/21 white blood cell count was 12.2. She was referred for further evaluation. She has had persistent macrocytosis since February 2020 with MCV ranging 100-102. The patient is here with her  today. She denies prior history of leukocytosis or previous work-up. She states she was treated for UTI in July 2021. She states she required 2 different antibiotics. She affirms history of incontinence. She denies any recent fever or chills. The patient denies any other recent infections. She denies history of Covid and received vaccination in February/March 2021. She denies any recent steroid use related to her psoriatic arthritis. She relates she had knee replacement surgery in [facility] at [facility]. She denies any B type symptoms; no persistent fever, recurrent infection, night sweats, poor appetite, unintentional weight loss, early satiety or new bone pain. She denies history of splenomegaly or splenectomy. Past medical history includes hypertension, SHANNEN, atrial fibrillation, CAD, psoriatic arthritis - follows with Dr. Fatuomata Jean, history of sleeve gastrectomy, hypothyroidism. The patient is a current non-smoker. She quit 4 years ago. She does not drink alcohol.     Past Medical History:   Diagnosis Date    Arthritis     Atrial fibrillation ([address]     Cancer (HCC)     skin    Carotid arterial disease (HCC)     [facility]     Depression     Fibromyalgia     Hypertension     Hypothyroidism     Obesity     S/P bariatric surgery 2018    Sleep apnea     CPAP    Thyroid disease     TIA (transient ischemic attack) 2015      Past Surgical History:   Procedure Laterality Date    APPENDECTOMY  over 10 years ago    [address] Left 10/01/2019    CAROTID ENDARTERECTOMY  2017    CHOLECYSTECTOMY  over 10 years ago    [address] [facility] COLONOSCOPY N/A 2018    COLONOSCOPY WITH BIOPSY performed by Earnest Grant MD at [address] [facility]  10 plus years ago    Dr. Ruben Pham Right     OTHER SURGICAL HISTORY Left     Rotator cuff sx    SLEEVE GASTRECTOMY  2016    Robotic, Extensive Lysis of Adhesions, Removal of [place] Prosthesis - Dr. Mauro Watts Left 2021    UPPER GASTROINTESTINAL ENDOSCOPY  2016    Dr. Susan Conteh       Family History   Problem Relation Age of Onset    Stroke Mother     High Blood Pressure Mother     Atrial Fibrillation Mother     Heart Disease Mother     Diabetes Father     Early Death Father     Cancer Other         Daughter    Heart Disease Sister     Heart Attack Sister     Deep Vein Thrombosis Brother       Social History     Tobacco Use    Smoking status: Former Smoker     Packs/day: 0.25     Years: 30.00     Pack years: 7.50     Types: Cigarettes     Start date: 3/24/1982     Quit date: 2015     Years since quittin.5    Smokeless tobacco: Never Used   Substance Use Topics    Alcohol use: No      Current Outpatient Medications   Medication Sig Dispense Refill    chlorthalidone (HYGROTON) 25 MG tablet TAKE 1 TABLET DAILY 90 tablet 3    modafinil (PROVIGIL) 200 MG tablet Take 1 tablet by mouth daily for 180 days.  90 tablet 1    pravastatin (PRAVACHOL) 40 MG tablet TAKE 1 TABLET DAILY 90 tablet 0    fluticasone-salmeterol (ADVAIR) 250-50 MCG/DOSE AEPB Inhale 1 puff into the lungs 2 times daily 3 Inhaler 3    mirabegron (MYRBETRIQ) 50 MG TB24 Take 50 mg by mouth daily 30 tablet 2    clopidogrel (PLAVIX) 75 MG tablet Take 1 tablet by mouth daily 90 tablet 1    albuterol sulfate HFA (VENTOLIN HFA) 108 (90 Base) MCG/ACT inhaler Inhale 2 puffs into the lungs every 6 hours as needed for Wheezing 1 Inhaler 11    verapamil (VERELAN) 240 MG extended release capsule Take 1 capsule by mouth 2 times daily 180 capsule 3    metoprolol (LOPRESSOR) 100 MG tablet Take 1.5 tablets by mouth 2 times daily 270 tablet 3    losartan (COZAAR) 25 MG tablet Take 1 tablet by mouth daily 90 tablet 3    digoxin (LANOXIN) 125 MCG tablet Take 1 tablet by mouth daily 90 tablet 3    apixaban (ELIQUIS) 5 MG TABS tablet Take 1 tablet by mouth 2 times daily 180 tablet 3    pantoprazole (PROTONIX) 40 MG tablet TAKE 1 TABLET DAILY 90 tablet 3    nitroGLYCERIN (NITROSTAT) 0.4 MG SL tablet up to max of 3 total doses. If no relief after 1 dose, call 911. 25 tablet 1    DULoxetine (CYMBALTA) 20 MG extended release capsule Take 20 mg by mouth daily      sulfaSALAzine (AZULFIDINE) 500 MG tablet Take 500 mg by mouth 2 times daily      hydroxychloroquine (PLAQUENIL) 200 MG tablet Take 200 mg by mouth 2 times daily.  furosemide (LASIX) 20 MG tablet Take 1 tablet by mouth every other day Lasix 20 mg daily X1 week, then take every other day (Patient not taking: Reported on 9/22/2021) 90 tablet 3     No current facility-administered medications for this visit. Allergies   Allergen Reactions    Demerol Hcl [Meperidine] Nausea And Vomiting          Review of Systems:   Review of Systems   Pertinent review of systems noted in HPI, all other ROS negative.    Objective:   Physical Exam   BP (!) 141/96 (Site: Right Upper Arm, Position: Sitting, Cuff Size: Medium Adult)   Pulse 76   Temp 98 °F (36.7 °C) (Oral)   Resp 16   Ht 5' 8\" (1.727 m) Wt 221 lb 4 oz (100.4 kg)   SpO2 95%   BMI 33.64 kg/m²    General appearance: No apparent distress, chronically ill appearing, and cooperative. HEENT: Pupils equal, round, and reactive to light. Conjunctivae/corneas clear. Neck: Supple, with full range of motion. Trachea midline. Respiratory:  Normal respiratory effort. Clear to auscultation bilaterally. No wheezes, rales or rhonchi. Cardiovascular: Regular rate and rhythm with normal S1/S2   Abdomen: Soft, with active bowel sounds. Musculoskeletal: No clubbing, cyanosis or edema bilaterally. Skin: Skin color, texture, turgor normal.  No visible rashes or lesions. Neurologic:  Neurovascularly intact without any focal sensory/motor deficits. Psychiatric: Alert and oriented      Imaging Studies and Labs:   CBC:   Lab Results   Component Value Date    WBC 12.2 (H) 08/17/2021    HGB 13.6 08/17/2021    HCT 43.7 08/17/2021    .2 (H) 08/17/2021     08/17/2021     BMP:   Lab Results   Component Value Date     07/19/2021    K 3.8 07/19/2021    K 4.0 03/04/2020     07/19/2021    CO2 28 07/19/2021    BUN 12 07/19/2021    CREATININE 0.7 08/17/2021    GLUCOSE 96 07/19/2021    GLUCOSE 91 09/21/2020    CALCIUM 9.7 07/19/2021      LFT:   Lab Results   Component Value Date    ALT 8 (L) 08/17/2021    AST 17 07/19/2021    ALKPHOS 111 07/19/2021    BILITOT 0.3 07/19/2021         Assessment and Plan:   1. Leukocytosis  Noted in July 2021 with WBC count 17.7, neutrophil count elevated. The patient states she was diagnosed with UTI at that time and required two different antibiotics. CBC on 8/17/2021 showed decrease in WBC count to 12.2. Per chart review of EMR the patient has had intermittent leukocytosis since 2015 related to procedures/infections/hospitalization. In 9/2016 up to 21 following bariatric surgery. CBC on 8/17/2021 Hgb/Hct and platelet count within normal limits.  MCV up to 101.2 possibly related to immunosuppresives for psoriatic arthritis. No b-type symptoms. Recent leukocytosis likely related to infection in July 2021.    -will obtain CBC today   -If persistent or increasing leukocytosis will consider flow cytometry, BCR-ABL       Update:  CBC on 9/22/21  Lab Results   Component Value Date    WBC 10.9 (H) 09/22/2021    HGB 14.1 09/22/2021    HCT 43.6 09/22/2021    MCV 98 09/22/2021     09/22/2021     WBC count decreased to 10.9. Hgb/hct/MCV and platelet count within normal limits. Recommend to monitor CBC. Patient will return to clinic in 3 months. CBC at follow up visit. All patient questions answered. Pt voiced understanding. Patient agreed with treatment plan. Follow up as directed. Patient instructed to call for questions or concerns.      Electronically signed by   Nehal Torres PA-C

## 2021-09-22 NOTE — PATIENT INSTRUCTIONS
1. Will obtain CBC today  2. Return to clinic in 3 months with Rafa Muhammad CNP  3. CBC on RTC  4. Please call for questions or concerns.

## 2021-10-06 RX ORDER — VERAPAMIL HYDROCHLORIDE 240 MG/1
CAPSULE, EXTENDED RELEASE ORAL
Qty: 180 CAPSULE | Refills: 1 | Status: SHIPPED | OUTPATIENT
Start: 2021-10-06 | End: 2022-04-04

## 2021-10-06 RX ORDER — DIGOXIN 125 MCG
TABLET ORAL
Qty: 90 TABLET | Refills: 1 | Status: SHIPPED | OUTPATIENT
Start: 2021-10-06 | End: 2022-04-04

## 2021-10-11 RX ORDER — METOPROLOL TARTRATE 100 MG/1
TABLET ORAL
Qty: 270 TABLET | Refills: 3 | Status: SHIPPED | OUTPATIENT
Start: 2021-10-11 | End: 2022-09-12 | Stop reason: SDUPTHER

## 2021-10-12 RX ORDER — LOSARTAN POTASSIUM 25 MG/1
TABLET ORAL
Qty: 90 TABLET | Refills: 3 | Status: SHIPPED | OUTPATIENT
Start: 2021-10-12 | End: 2022-09-12 | Stop reason: SDUPTHER

## 2021-10-18 DIAGNOSIS — N32.81 OAB (OVERACTIVE BLADDER): ICD-10-CM

## 2021-10-18 NOTE — TELEPHONE ENCOUNTER
----- Message from Sofia Williamson sent at 10/15/2021  2:36 PM EDT -----  Subject: Refill Request    QUESTIONS  Name of Medication? mirabegron (MYRBETRIQ) 50 MG TB24  Patient-reported dosage and instructions? 1 daily  How many days do you have left? 14  Preferred Pharmacy? [address] phone number (if available)? [phone]  ---------------------------------------------------------------------------  --------------  CALL BACK INFO  What is the best way for the office to contact you? OK to leave message on   voicemail, OK to respond with electronic message via [employer] portal (only   for patients who have registered [employer] account)  Preferred Call Back Phone Number?  [phone]

## 2021-10-25 RX ORDER — PRAVASTATIN SODIUM 40 MG
TABLET ORAL
Qty: 90 TABLET | Refills: 1 | Status: SHIPPED | OUTPATIENT
Start: 2021-10-25 | End: 2022-04-25

## 2021-10-28 ENCOUNTER — NURSE TRIAGE (OUTPATIENT)
Dept: OTHER | Facility: CLINIC | Age: 71
End: 2021-10-28

## 2021-10-28 NOTE — TELEPHONE ENCOUNTER
Reason for Disposition   MODERATE weakness (i.e., interferes with work, school, normal activities) and cause unknown (Exceptions: weakness with acute minor illness, or weakness from poor fluid intake)    Answer Assessment - Initial Assessment Questions  1. DESCRIPTION: \"Describe how you are feeling. \"      Falls asleep when sitting down    2. SEVERITY: \"How bad is it? \"  \"Can you stand and walk? \"    - MILD - Feels weak or tired, but does not interfere with work, school or normal activities    - [facility] to stand and walk; weakness interferes with work, school, or normal activities    - SEVERE - Unable to stand or walk  Moderate to severe. 3. ONSET:  \"When did the weakness begin? \"    One month ago    4. CAUSE: \"What do you think is causing the weakness? \"     Stress    5. MEDICINES: Luevenia Blend you recently started a new medicine or had a change in the amount of a medicine? \"     Denies    6. OTHER SYMPTOMS: \"Do you have any other symptoms? \" (e.g., chest pain, fever, cough, SOB, vomiting, diarrhea, bleeding, other areas of pain)      Denies    7. PREGNANCY: \"Is there any chance you are pregnant? \" \"When was your last menstrual period? \"  NA    Protocols used: WEAKNESS (GENERALIZED) AND FATIGUE-ADULT-OH      Received call from [place]  at [facility] with [facility]. Brief description of triage: Please see notes above. Triage indicates for patient to Go to office Now. Care advice provided, patient verbalizes understanding; denies any other questions or concerns; instructed to call back for any new or worsening symptoms. Writer provided warm transfer to  [facility] at [facility]  for appointment scheduling. Attention Provider: Thank you for allowing me to participate in the care of your patient. The patient was connected to triage in response to information provided to the ECC/PSC. Please do not respond through this encounter as the response is not directed to a shared pool.

## 2021-10-29 ENCOUNTER — OFFICE VISIT (OUTPATIENT)
Dept: FAMILY MEDICINE CLINIC | Age: 71
End: 2021-10-29
Payer: MEDICARE

## 2021-10-29 VITALS
TEMPERATURE: 97.9 F | DIASTOLIC BLOOD PRESSURE: 70 MMHG | OXYGEN SATURATION: 96 % | WEIGHT: 221 LBS | RESPIRATION RATE: 16 BRPM | BODY MASS INDEX: 33.6 KG/M2 | SYSTOLIC BLOOD PRESSURE: 108 MMHG | HEART RATE: 86 BPM

## 2021-10-29 DIAGNOSIS — N30.00 ACUTE CYSTITIS WITHOUT HEMATURIA: ICD-10-CM

## 2021-10-29 DIAGNOSIS — G47.30 HYPERSOMNIA WITH SLEEP APNEA: Primary | ICD-10-CM

## 2021-10-29 DIAGNOSIS — G47.33 OBSTRUCTIVE SLEEP APNEA ON CPAP: ICD-10-CM

## 2021-10-29 DIAGNOSIS — R30.0 DYSURIA: ICD-10-CM

## 2021-10-29 DIAGNOSIS — Z23 FLU VACCINE NEED: ICD-10-CM

## 2021-10-29 DIAGNOSIS — Z99.89 OBSTRUCTIVE SLEEP APNEA ON CPAP: ICD-10-CM

## 2021-10-29 DIAGNOSIS — G47.10 HYPERSOMNIA WITH SLEEP APNEA: Primary | ICD-10-CM

## 2021-10-29 LAB
BILIRUBIN, POC: ABNORMAL
BLOOD URINE, POC: ABNORMAL
CLARITY, POC: CLEAR
COLOR, POC: YELLOW
GLUCOSE URINE, POC: ABNORMAL
KETONES, POC: ABNORMAL
LEUKOCYTE EST, POC: ABNORMAL
NITRITE, POC: POSITIVE
PH, POC: 6.5
PROTEIN, POC: ABNORMAL
SPECIFIC GRAVITY, POC: 1.03
UROBILINOGEN, POC: 0.2

## 2021-10-29 PROCEDURE — G0008 ADMIN INFLUENZA VIRUS VAC: HCPCS | Performed by: NURSE PRACTITIONER

## 2021-10-29 PROCEDURE — 81003 URINALYSIS AUTO W/O SCOPE: CPT | Performed by: NURSE PRACTITIONER

## 2021-10-29 PROCEDURE — 99214 OFFICE O/P EST MOD 30 MIN: CPT | Performed by: NURSE PRACTITIONER

## 2021-10-29 PROCEDURE — 90694 VACC AIIV4 NO PRSRV 0.5ML IM: CPT | Performed by: NURSE PRACTITIONER

## 2021-10-29 RX ORDER — MODAFINIL 200 MG/1
400 TABLET ORAL DAILY
Qty: 180 TABLET | Refills: 3 | Status: SHIPPED | OUTPATIENT
Start: 2021-10-29 | End: 2022-04-27

## 2021-10-29 RX ORDER — NITROFURANTOIN 25; 75 MG/1; MG/1
100 CAPSULE ORAL 2 TIMES DAILY
Qty: 10 CAPSULE | Refills: 0 | Status: SHIPPED | OUTPATIENT
Start: 2021-10-29 | End: 2021-11-03

## 2021-10-29 ASSESSMENT — ENCOUNTER SYMPTOMS
EYE PAIN: 0
VOMITING: 0
SHORTNESS OF BREATH: 0
NAUSEA: 0
DIARRHEA: 0
BACK PAIN: 0
TROUBLE SWALLOWING: 0
CONSTIPATION: 0
ABDOMINAL PAIN: 0
ALLERGIC/IMMUNOLOGIC NEGATIVE: 1
EYE DISCHARGE: 0
RHINORRHEA: 0
EYE REDNESS: 0
WHEEZING: 0
COUGH: 0
SORE THROAT: 0

## 2021-10-29 NOTE — PROGRESS NOTES
[address] [facility]  [address] [facility] [address]  Dept: [phone]  Dept Fax: [phone]  Loc: [phone]     Visit Date:  10/29/2021      Patient:  Seble Giraldo  YOB: 1950    HPI:     Chief Complaint   Patient presents with    Fatigue     x1 month.  Dysuria     burning with urination, cloudy urine.  Flu Vaccine       Presents with 2 separate complaints today. The first has to do with ongoing fatigue for about the last month since she had her Provigil dose lowered. Patient states since then she has been getting up in the morning for an hour or 2, then has to take a 2-hour or more nap due to severe fatigue. She thinks this is due to lowering her dose. Also patient has very cloudy urine and she is concerned about a UTI. She denies fever, low back pain, pelvic pressure, urinary frequency or dysuria. Medications    Current Outpatient Medications:     modafinil (PROVIGIL) 200 MG tablet, Take 2 tablets by mouth daily for 180 days. , Disp: 180 tablet, Rfl: 3    nitrofurantoin, macrocrystal-monohydrate, (MACROBID) 100 MG capsule, Take 1 capsule by mouth 2 times daily for 5 days, Disp: 10 capsule, Rfl: 0    pravastatin (PRAVACHOL) 40 MG tablet, TAKE 1 TABLET DAILY, Disp: 90 tablet, Rfl: 1    mirabegron (MYRBETRIQ) 50 MG TB24, Take 50 mg by mouth daily, Disp: 90 tablet, Rfl: 2    losartan (COZAAR) 25 MG tablet, TAKE 1 TABLET DAILY, Disp: 90 tablet, Rfl: 3    metoprolol (LOPRESSOR) 100 MG tablet, TAKE ONE AND ONE-HALF TABLETS TWICE A DAY, Disp: 270 tablet, Rfl: 3    digoxin (LANOXIN) 125 MCG tablet, TAKE 1 TABLET DAILY, Disp: 90 tablet, Rfl: 1    verapamil (VERELAN) 240 MG extended release capsule, TAKE 1 CAPSULE TWICE A DAY, Disp: 180 capsule, Rfl: 1    chlorthalidone (HYGROTON) 25 MG tablet, TAKE 1 TABLET DAILY, Disp: 90 tablet, Rfl: 3    modafinil (PROVIGIL) 200 MG tablet, Take 1 tablet by mouth daily for 180 days., Disp: 90 tablet, Rfl: 1    fluticasone-salmeterol (ADVAIR) 250-50 MCG/DOSE AEPB, Inhale 1 puff into the lungs 2 times daily, Disp: 3 Inhaler, Rfl: 3    clopidogrel (PLAVIX) 75 MG tablet, Take 1 tablet by mouth daily, Disp: 90 tablet, Rfl: 1    albuterol sulfate HFA (VENTOLIN HFA) 108 (90 Base) MCG/ACT inhaler, Inhale 2 puffs into the lungs every 6 hours as needed for Wheezing, Disp: 1 Inhaler, Rfl: 11    apixaban (ELIQUIS) 5 MG TABS tablet, Take 1 tablet by mouth 2 times daily, Disp: 180 tablet, Rfl: 3    pantoprazole (PROTONIX) 40 MG tablet, TAKE 1 TABLET DAILY, Disp: 90 tablet, Rfl: 3    nitroGLYCERIN (NITROSTAT) 0.4 MG SL tablet, up to max of 3 total doses. If no relief after 1 dose, call 911., Disp: 25 tablet, Rfl: 1    DULoxetine (CYMBALTA) 20 MG extended release capsule, Take 20 mg by mouth daily, Disp: , Rfl:     sulfaSALAzine (AZULFIDINE) 500 MG tablet, Take 500 mg by mouth 2 times daily, Disp: , Rfl:     hydroxychloroquine (PLAQUENIL) 200 MG tablet, Take 200 mg by mouth 2 times daily. , Disp: , Rfl:     The patient is allergic to demerol hcl [meperidine]. Past Medical History  Khris Downs  has a past medical history of Arthritis, Atrial fibrillation ([facility] [address] Cancer ([facility] [address] Carotid arterial disease ([facility] [address] CHF (congestive heart failure) ([facility] [address] Depression, Fibromyalgia, Hypertension, Hypothyroidism, Obesity, S/P bariatric surgery, Sleep apnea, Thyroid disease, and TIA (transient ischemic attack). Subjective:      Review of Systems   Constitutional: Positive for fatigue. Negative for activity change and fever. HENT: Negative for congestion, ear pain, rhinorrhea, sore throat and trouble swallowing. Eyes: Negative for pain, discharge and redness. Respiratory: Negative for cough, shortness of breath and wheezing. Cardiovascular: Negative. Gastrointestinal: Negative for abdominal pain, constipation, diarrhea, nausea and vomiting. Endocrine: Negative.     Genitourinary: Negative for dysuria, frequency and urgency. Cloudy urine   Musculoskeletal: Negative for arthralgias, back pain and myalgias. Skin: Negative for rash. Allergic/Immunologic: Negative. Neurological: Negative for dizziness, tremors, weakness and headaches. Hematological: Negative. Psychiatric/Behavioral: Negative for dysphoric mood and sleep disturbance. The patient is not nervous/anxious. Objective:     /70   Pulse 86   Temp 97.9 °F (36.6 °C) (Oral)   Resp 16   Wt 221 lb (100.2 kg)   SpO2 96%   BMI 33.60 kg/m²     Physical Exam  Constitutional:       General: She is not in acute distress. Appearance: Normal appearance. She is well-developed. She is not diaphoretic. HENT:      Right Ear: Hearing and external ear normal.      Left Ear: Hearing and external ear normal.      Nose: Nose normal. No mucosal edema or rhinorrhea. Mouth/Throat:      Mouth: Mucous membranes are moist.      Dentition: Normal dentition. Pharynx: Uvula midline. No posterior oropharyngeal erythema. Tonsils: No tonsillar exudate. 0 on the right. 0 on the left. Eyes:      General: Lids are normal.         Right eye: No discharge. Left eye: No discharge. Conjunctiva/sclera: Conjunctivae normal.      Right eye: Right conjunctiva is not injected. No chemosis. Left eye: No chemosis. Pupils: Pupils are equal, round, and reactive to light. Neck:      Vascular: No JVD. Trachea: Trachea normal.   Cardiovascular:      Rate and Rhythm: Normal rate and regular rhythm. Heart sounds: Normal heart sounds. No murmur heard. Pulmonary:      Effort: Pulmonary effort is normal. No respiratory distress. Breath sounds: Normal breath sounds. No stridor. Musculoskeletal:         General: No tenderness or deformity. Normal range of motion. Cervical back: Full passive range of motion without pain and normal range of motion. Skin:     General: Skin is warm.       Capillary Refill: Capillary refill takes less than 2 seconds. Findings: No rash. Neurological:      Mental Status: She is alert and oriented to person, place, and time. GCS: GCS eye subscore is 4. GCS verbal subscore is 5. GCS motor subscore is 6. Cranial Nerves: No cranial nerve deficit. Coordination: Coordination normal.      Gait: Gait normal.   Psychiatric:         Mood and Affect: Mood is not anxious or depressed. Speech: Speech normal.         Behavior: Behavior normal. Behavior is not withdrawn or hyperactive. Behavior is cooperative. Thought Content: Thought content normal.         Judgment: Judgment normal.         Assessment/Plan:      Elda Mata was seen today for fatigue, dysuria and flu vaccine. Diagnoses and all orders for this visit:    Hypersomnia with sleep apnea  -     modafinil (PROVIGIL) 200 MG tablet; Take 2 tablets by mouth daily for 180 days. Dysuria  -     POCT Urinalysis No Micro (Auto)    Flu vaccine need  -     INFLUENZA, QUADV, ADJUVANTED, 65 YRS =, IM, PF, PREFILL SYR, 0.5ML (FLUAD)    Obstructive sleep apnea on CPAP    Acute cystitis without hematuria  -     nitrofurantoin, macrocrystal-monohydrate, (MACROBID) 100 MG capsule; Take 1 capsule by mouth 2 times daily for 5 days        Return if symptoms worsen or fail to improve. Patient instructions given nick.         Electronically signed by AWILDA Calles CNP on 10/29/2021 at 11:09 AM

## 2021-10-31 LAB
ORGANISM: ABNORMAL
URINE CULTURE, ROUTINE: ABNORMAL
URINE CULTURE, ROUTINE: ABNORMAL

## 2021-11-01 DIAGNOSIS — N30.00 ACUTE CYSTITIS WITHOUT HEMATURIA: Primary | ICD-10-CM

## 2021-11-01 RX ORDER — SULFAMETHOXAZOLE AND TRIMETHOPRIM 800; 160 MG/1; MG/1
1 TABLET ORAL 2 TIMES DAILY
Qty: 10 TABLET | Refills: 0 | Status: SHIPPED | OUTPATIENT
Start: 2021-11-01 | End: 2021-11-06

## 2021-12-01 ENCOUNTER — TELEPHONE (OUTPATIENT)
Dept: FAMILY MEDICINE CLINIC | Age: 71
End: 2021-12-01

## 2021-12-01 NOTE — TELEPHONE ENCOUNTER
Left VM letting her know that she will need to be seen to check her urine. Asked her to call back so we can set up an appt as soon as possible.

## 2021-12-01 NOTE — TELEPHONE ENCOUNTER
----- Message from Karen Lange sent at 12/1/2021  1:41 PM EST -----  Subject: Message to Provider    QUESTIONS  Information for Provider? Pt stated that this is her 3rd UTI and would   like if you could call her in another script for this? Pt was seen on   10/29/21 for a UTI.   ---------------------------------------------------------------------------  --------------  CALL BACK INFO  What is the best way for the office to contact you? OK to leave message on   voicemail  Preferred Call Back Phone Number? [phone]  ---------------------------------------------------------------------------  --------------  SCRIPT ANSWERS  Relationship to Patient?  Self

## 2021-12-03 ENCOUNTER — OFFICE VISIT (OUTPATIENT)
Dept: FAMILY MEDICINE CLINIC | Age: 71
End: 2021-12-03
Payer: MEDICARE

## 2021-12-03 VITALS
HEIGHT: 68 IN | SYSTOLIC BLOOD PRESSURE: 132 MMHG | TEMPERATURE: 98.6 F | BODY MASS INDEX: 35.61 KG/M2 | DIASTOLIC BLOOD PRESSURE: 62 MMHG | HEART RATE: 75 BPM | OXYGEN SATURATION: 96 % | WEIGHT: 235 LBS

## 2021-12-03 DIAGNOSIS — R30.0 DYSURIA: ICD-10-CM

## 2021-12-03 DIAGNOSIS — N30.00 ACUTE CYSTITIS WITHOUT HEMATURIA: Primary | ICD-10-CM

## 2021-12-03 LAB
BILIRUBIN, POC: ABNORMAL
BLOOD URINE, POC: ABNORMAL
CLARITY, POC: ABNORMAL
COLOR, POC: YELLOW
GLUCOSE URINE, POC: ABNORMAL
KETONES, POC: ABNORMAL
LEUKOCYTE EST, POC: ABNORMAL
NITRITE, POC: ABNORMAL
PH, POC: 6
PROTEIN, POC: ABNORMAL
SPECIFIC GRAVITY, POC: 1.02
UROBILINOGEN, POC: 0.2

## 2021-12-03 PROCEDURE — 81002 URINALYSIS NONAUTO W/O SCOPE: CPT | Performed by: NURSE PRACTITIONER

## 2021-12-03 PROCEDURE — 99213 OFFICE O/P EST LOW 20 MIN: CPT | Performed by: NURSE PRACTITIONER

## 2021-12-03 RX ORDER — CEFUROXIME AXETIL 250 MG/1
250 TABLET ORAL 2 TIMES DAILY
Qty: 10 TABLET | Refills: 0 | Status: SHIPPED | OUTPATIENT
Start: 2021-12-03 | End: 2021-12-08

## 2021-12-03 SDOH — ECONOMIC STABILITY: FOOD INSECURITY: WITHIN THE PAST 12 MONTHS, YOU WORRIED THAT YOUR FOOD WOULD RUN OUT BEFORE YOU GOT MONEY TO BUY MORE.: NEVER TRUE

## 2021-12-03 SDOH — ECONOMIC STABILITY: FOOD INSECURITY: WITHIN THE PAST 12 MONTHS, THE FOOD YOU BOUGHT JUST DIDN'T LAST AND YOU DIDN'T HAVE MONEY TO GET MORE.: NEVER TRUE

## 2021-12-03 ASSESSMENT — ENCOUNTER SYMPTOMS
VOMITING: 0
SHORTNESS OF BREATH: 0
BACK PAIN: 0
ABDOMINAL PAIN: 0
ALLERGIC/IMMUNOLOGIC NEGATIVE: 1
CONSTIPATION: 0
WHEEZING: 0
DIARRHEA: 0
RHINORRHEA: 0
SORE THROAT: 0
TROUBLE SWALLOWING: 0
EYE DISCHARGE: 0
COUGH: 0
EYE REDNESS: 0
EYE PAIN: 0
NAUSEA: 0

## 2021-12-03 ASSESSMENT — SOCIAL DETERMINANTS OF HEALTH (SDOH): HOW HARD IS IT FOR YOU TO PAY FOR THE VERY BASICS LIKE FOOD, HOUSING, MEDICAL CARE, AND HEATING?: NOT HARD AT ALL

## 2021-12-03 NOTE — PROGRESS NOTES
[address] [address] [address]  [facility] [place] [address]  Dept: [phone]  Dept Fax: [phone]  Loc: [phone]     Visit Date:  12/3/2021      Patient:  Librado Talamantes  YOB: 1950    HPI:     Chief Complaint   Patient presents with    Urinary Tract Infection     Started a few days ago       Urinary Tract Infection   This is a new problem. The current episode started in the past 7 days. The problem occurs every urination. The problem has been unchanged. The quality of the pain is described as burning. The pain is at a severity of 3/10. The pain is mild. There has been no fever. There is no history of pyelonephritis. Associated symptoms include frequency, hesitancy and urgency. Pertinent negatives include no chills, discharge, flank pain, hematuria, nausea, sweats or vomiting. She has tried nothing for the symptoms. Medications    Current Outpatient Medications:     cefUROXime (CEFTIN) 250 MG tablet, Take 1 tablet by mouth 2 times daily for 5 days, Disp: 10 tablet, Rfl: 0    modafinil (PROVIGIL) 200 MG tablet, Take 2 tablets by mouth daily for 180 days. , Disp: 180 tablet, Rfl: 3    pravastatin (PRAVACHOL) 40 MG tablet, TAKE 1 TABLET DAILY, Disp: 90 tablet, Rfl: 1    mirabegron (MYRBETRIQ) 50 MG TB24, Take 50 mg by mouth daily, Disp: 90 tablet, Rfl: 2    losartan (COZAAR) 25 MG tablet, TAKE 1 TABLET DAILY, Disp: 90 tablet, Rfl: 3    metoprolol (LOPRESSOR) 100 MG tablet, TAKE ONE AND ONE-HALF TABLETS TWICE A DAY, Disp: 270 tablet, Rfl: 3    digoxin (LANOXIN) 125 MCG tablet, TAKE 1 TABLET DAILY, Disp: 90 tablet, Rfl: 1    verapamil (VERELAN) 240 MG extended release capsule, TAKE 1 CAPSULE TWICE A DAY, Disp: 180 capsule, Rfl: 1    chlorthalidone (HYGROTON) 25 MG tablet, TAKE 1 TABLET DAILY, Disp: 90 tablet, Rfl: 3    modafinil (PROVIGIL) 200 MG tablet, Take 1 tablet by mouth daily for 180 days. , Disp: 90 tablet, Rfl: 1   fluticasone-salmeterol (ADVAIR) 250-50 MCG/DOSE AEPB, Inhale 1 puff into the lungs 2 times daily, Disp: 3 Inhaler, Rfl: 3    clopidogrel (PLAVIX) 75 MG tablet, Take 1 tablet by mouth daily, Disp: 90 tablet, Rfl: 1    albuterol sulfate HFA (VENTOLIN HFA) 108 (90 Base) MCG/ACT inhaler, Inhale 2 puffs into the lungs every 6 hours as needed for Wheezing, Disp: 1 Inhaler, Rfl: 11    apixaban (ELIQUIS) 5 MG TABS tablet, Take 1 tablet by mouth 2 times daily, Disp: 180 tablet, Rfl: 3    pantoprazole (PROTONIX) 40 MG tablet, TAKE 1 TABLET DAILY, Disp: 90 tablet, Rfl: 3    nitroGLYCERIN (NITROSTAT) 0.4 MG SL tablet, up to max of 3 total doses. If no relief after 1 dose, call 911., Disp: 25 tablet, Rfl: 1    DULoxetine (CYMBALTA) 20 MG extended release capsule, Take 20 mg by mouth daily, Disp: , Rfl:     sulfaSALAzine (AZULFIDINE) 500 MG tablet, Take 500 mg by mouth 2 times daily, Disp: , Rfl:     hydroxychloroquine (PLAQUENIL) 200 MG tablet, Take 200 mg by mouth 2 times daily. , Disp: , Rfl:     The patient is allergic to demerol hcl [meperidine]. Past Medical History  Evangelina  has a past medical history of Arthritis, Atrial fibrillation ([address] Cancer ([address] Carotid arterial disease ([address] CHF (congestive heart failure) ([address] Depression, Fibromyalgia, Hypertension, Hypothyroidism, Obesity, S/P bariatric surgery, Sleep apnea, Thyroid disease, and TIA (transient ischemic attack). Subjective:      Review of Systems   Constitutional: Negative for activity change, chills, fatigue and fever. HENT: Negative for congestion, ear pain, rhinorrhea, sore throat and trouble swallowing. Eyes: Negative for pain, discharge and redness. Respiratory: Negative for cough, shortness of breath and wheezing. Cardiovascular: Negative. Gastrointestinal: Negative for abdominal pain, constipation, diarrhea, nausea and vomiting. Endocrine: Negative. Genitourinary: Positive for frequency, hesitancy and urgency.  Negative for dysuria, flank pain and hematuria. Musculoskeletal: Negative for arthralgias, back pain and myalgias. Skin: Negative for rash. Allergic/Immunologic: Negative. Neurological: Negative for dizziness, tremors, weakness and headaches. Hematological: Negative. Psychiatric/Behavioral: Negative for dysphoric mood and sleep disturbance. The patient is not nervous/anxious. Objective:     /62   Pulse 75   Temp 98.6 °F (37 °C) (Oral)   Ht 5' 8\" (1.727 m)   Wt 235 lb (106.6 kg)   SpO2 96%   BMI 35.73 kg/m²     Physical Exam  Constitutional:       General: She is not in acute distress. Appearance: She is well-developed. She is not diaphoretic. HENT:      Right Ear: External ear normal.      Left Ear: External ear normal.      Nose: Nose normal.   Eyes:      General:         Right eye: No discharge. Left eye: No discharge. Conjunctiva/sclera: Conjunctivae normal.      Pupils: Pupils are equal, round, and reactive to light. Neck:      Vascular: No JVD. Cardiovascular:      Rate and Rhythm: Normal rate and regular rhythm. Pulmonary:      Effort: Pulmonary effort is normal. No respiratory distress. Musculoskeletal:         General: No tenderness or deformity. Normal range of motion. Cervical back: Normal range of motion. Skin:     General: Skin is warm and dry. Capillary Refill: Capillary refill takes less than 2 seconds. Coloration: Skin is not pale. Findings: No erythema or rash. Neurological:      Mental Status: She is alert and oriented to person, place, and time. Coordination: Coordination normal.   Psychiatric:         Behavior: Behavior normal.         Thought Content: Thought content normal.         Judgment: Judgment normal.         Assessment/Plan:      Deneen Burgos was seen today for urinary tract infection.     Diagnoses and all orders for this visit:    Acute cystitis without hematuria    Dysuria  -     POCT Urinalysis no Micro    Other orders  -     cefUROXime (CEFTIN) 250 MG tablet; Take 1 tablet by mouth 2 times daily for 5 days    Urinalysis consistent with likely UTI. Will be sent for culture. Return if symptoms worsen or fail to improve. Patient instructions given nick.         Electronically signed by AWILDA Stuart CNP on 12/3/2021 at 11:23 AM

## 2021-12-09 ENCOUNTER — TELEPHONE (OUTPATIENT)
Dept: ONCOLOGY | Age: 71
End: 2021-12-09

## 2021-12-13 ENCOUNTER — TELEPHONE (OUTPATIENT)
Dept: PULMONOLOGY | Age: 71
End: 2021-12-13

## 2021-12-13 DIAGNOSIS — G47.33 OBSTRUCTIVE SLEEP APNEA ON CPAP: Primary | ICD-10-CM

## 2021-12-13 DIAGNOSIS — Z99.89 OBSTRUCTIVE SLEEP APNEA ON CPAP: Primary | ICD-10-CM

## 2021-12-22 RX ORDER — CLOPIDOGREL BISULFATE 75 MG/1
TABLET ORAL
Qty: 90 TABLET | Refills: 3 | Status: SHIPPED | OUTPATIENT
Start: 2021-12-22

## 2021-12-22 RX ORDER — PANTOPRAZOLE SODIUM 40 MG/1
TABLET, DELAYED RELEASE ORAL
Qty: 90 TABLET | Refills: 3 | Status: SHIPPED | OUTPATIENT
Start: 2021-12-22 | End: 2022-09-22 | Stop reason: SDUPTHER

## 2021-12-30 ENCOUNTER — HOSPITAL ENCOUNTER (OUTPATIENT)
Age: 71
Setting detail: OBSERVATION
Discharge: HOME | End: 2021-12-31
Attending: EMERGENCY MEDICINE | Admitting: PEDIATRICS
Payer: MEDICARE

## 2021-12-30 ENCOUNTER — APPOINTMENT (OUTPATIENT)
Dept: GENERAL RADIOLOGY | Age: 71
End: 2021-12-30
Payer: MEDICARE

## 2021-12-30 DIAGNOSIS — I20.0 UNSTABLE ANGINA (HCC): Primary | ICD-10-CM

## 2021-12-30 LAB
ALBUMIN SERPL-MCNC: 4.1 G/DL (ref 3.5–5.1)
ALP BLD-CCNC: 82 U/L (ref 38–126)
ALT SERPL-CCNC: 11 U/L (ref 11–66)
ANION GAP SERPL CALCULATED.3IONS-SCNC: 10 MEQ/L (ref 8–16)
AST SERPL-CCNC: 15 U/L (ref 5–40)
BASOPHILS # BLD: 0.7 %
BASOPHILS ABSOLUTE: 0.1 THOU/MM3 (ref 0–0.1)
BILIRUB SERPL-MCNC: 0.3 MG/DL (ref 0.3–1.2)
BILIRUBIN DIRECT: < 0.2 MG/DL (ref 0–0.3)
BUN BLDV-MCNC: 18 MG/DL (ref 7–22)
CALCIUM SERPL-MCNC: 9.5 MG/DL (ref 8.5–10.5)
CHLORIDE BLD-SCNC: 101 MEQ/L (ref 98–111)
CO2: 29 MEQ/L (ref 23–33)
CREAT SERPL-MCNC: 0.7 MG/DL (ref 0.4–1.2)
DIGOXIN LEVEL: 0.3 NG/ML (ref 0.5–2)
EOSINOPHIL # BLD: 1.8 %
EOSINOPHILS ABSOLUTE: 0.2 THOU/MM3 (ref 0–0.4)
ERYTHROCYTE [DISTWIDTH] IN BLOOD BY AUTOMATED COUNT: 13.6 % (ref 11.5–14.5)
ERYTHROCYTE [DISTWIDTH] IN BLOOD BY AUTOMATED COUNT: 49.4 FL (ref 35–45)
GFR SERPL CREATININE-BSD FRML MDRD: 82 ML/MIN/1.73M2
GLUCOSE BLD-MCNC: 136 MG/DL (ref 70–108)
HCT VFR BLD CALC: 42.1 % (ref 37–47)
HEMOGLOBIN: 13.7 GM/DL (ref 12–16)
IMMATURE GRANS (ABS): 0.07 THOU/MM3 (ref 0–0.07)
IMMATURE GRANULOCYTES: 0.7 %
LIPASE: 23 U/L (ref 5.6–51.3)
LYMPHOCYTES # BLD: 19.9 %
LYMPHOCYTES ABSOLUTE: 2 THOU/MM3 (ref 1–4.8)
MCH RBC QN AUTO: 32.2 PG (ref 26–33)
MCHC RBC AUTO-ENTMCNC: 32.5 GM/DL (ref 32.2–35.5)
MCV RBC AUTO: 99.1 FL (ref 81–99)
MONOCYTES # BLD: 9.7 %
MONOCYTES ABSOLUTE: 1 THOU/MM3 (ref 0.4–1.3)
NUCLEATED RED BLOOD CELLS: 0 /100 WBC
OSMOLALITY CALCULATION: 283.4 MOSMOL/KG (ref 275–300)
PLATELET # BLD: 225 THOU/MM3 (ref 130–400)
PMV BLD AUTO: 9.1 FL (ref 9.4–12.4)
POTASSIUM SERPL-SCNC: 3.3 MEQ/L (ref 3.5–5.2)
PRO-BNP: 1307 PG/ML (ref 0–900)
RBC # BLD: 4.25 MILL/MM3 (ref 4.2–5.4)
SEG NEUTROPHILS: 67.2 %
SEGMENTED NEUTROPHILS ABSOLUTE COUNT: 6.9 THOU/MM3 (ref 1.8–7.7)
SODIUM BLD-SCNC: 140 MEQ/L (ref 135–145)
TOTAL PROTEIN: 7 G/DL (ref 6.1–8)
TROPONIN T: 0.01 NG/ML
TROPONIN T: < 0.01 NG/ML
WBC # BLD: 10.3 THOU/MM3 (ref 4.8–10.8)

## 2021-12-30 PROCEDURE — 36415 COLL VENOUS BLD VENIPUNCTURE: CPT

## 2021-12-30 PROCEDURE — 6360000002 HC RX W HCPCS: Performed by: STUDENT IN AN ORGANIZED HEALTH CARE EDUCATION/TRAINING PROGRAM

## 2021-12-30 PROCEDURE — 83880 ASSAY OF NATRIURETIC PEPTIDE: CPT

## 2021-12-30 PROCEDURE — 82248 BILIRUBIN DIRECT: CPT

## 2021-12-30 PROCEDURE — 80162 ASSAY OF DIGOXIN TOTAL: CPT

## 2021-12-30 PROCEDURE — 93005 ELECTROCARDIOGRAM TRACING: CPT | Performed by: NURSE PRACTITIONER

## 2021-12-30 PROCEDURE — 85025 COMPLETE CBC W/AUTO DIFF WBC: CPT

## 2021-12-30 PROCEDURE — 6370000000 HC RX 637 (ALT 250 FOR IP): Performed by: STUDENT IN AN ORGANIZED HEALTH CARE EDUCATION/TRAINING PROGRAM

## 2021-12-30 PROCEDURE — 99284 EMERGENCY DEPT VISIT MOD MDM: CPT

## 2021-12-30 PROCEDURE — 93005 ELECTROCARDIOGRAM TRACING: CPT | Performed by: STUDENT IN AN ORGANIZED HEALTH CARE EDUCATION/TRAINING PROGRAM

## 2021-12-30 PROCEDURE — 96372 THER/PROPH/DIAG INJ SC/IM: CPT

## 2021-12-30 PROCEDURE — 80053 COMPREHEN METABOLIC PANEL: CPT

## 2021-12-30 PROCEDURE — 83690 ASSAY OF LIPASE: CPT

## 2021-12-30 PROCEDURE — 84484 ASSAY OF TROPONIN QUANT: CPT

## 2021-12-30 PROCEDURE — 71045 X-RAY EXAM CHEST 1 VIEW: CPT

## 2021-12-30 RX ORDER — NITROGLYCERIN 0.4 MG/1
0.4 TABLET SUBLINGUAL EVERY 5 MIN PRN
Status: DISCONTINUED | OUTPATIENT
Start: 2021-12-30 | End: 2021-12-31 | Stop reason: SDUPTHER

## 2021-12-30 RX ORDER — DIGOXIN 250 MCG
125 TABLET ORAL ONCE
Status: COMPLETED | OUTPATIENT
Start: 2021-12-30 | End: 2021-12-30

## 2021-12-30 RX ORDER — PRAVASTATIN SODIUM 40 MG
40 TABLET ORAL NIGHTLY
Status: DISCONTINUED | OUTPATIENT
Start: 2021-12-30 | End: 2021-12-31 | Stop reason: SDUPTHER

## 2021-12-30 RX ORDER — METOPROLOL TARTRATE 50 MG/1
100 TABLET, FILM COATED ORAL ONCE
Status: COMPLETED | OUTPATIENT
Start: 2021-12-30 | End: 2021-12-30

## 2021-12-30 RX ADMIN — DIGOXIN 125 MCG: 250 TABLET ORAL at 19:44

## 2021-12-30 RX ADMIN — ENOXAPARIN SODIUM 100 MG: 100 INJECTION SUBCUTANEOUS at 20:39

## 2021-12-30 RX ADMIN — PRAVASTATIN SODIUM 40 MG: 40 TABLET ORAL at 20:38

## 2021-12-30 RX ADMIN — METOPROLOL TARTRATE 100 MG: 50 TABLET, FILM COATED ORAL at 19:44

## 2021-12-30 ASSESSMENT — PAIN DESCRIPTION - FREQUENCY: FREQUENCY: CONTINUOUS

## 2021-12-30 ASSESSMENT — PAIN SCALES - GENERAL
PAINLEVEL_OUTOF10: 5
PAINLEVEL_OUTOF10: 0

## 2021-12-30 ASSESSMENT — PAIN DESCRIPTION - PAIN TYPE: TYPE: ACUTE PAIN

## 2021-12-30 ASSESSMENT — PAIN DESCRIPTION - LOCATION: LOCATION: CHEST

## 2021-12-31 VITALS
HEART RATE: 103 BPM | TEMPERATURE: 98.1 F | SYSTOLIC BLOOD PRESSURE: 148 MMHG | BODY MASS INDEX: 33.34 KG/M2 | OXYGEN SATURATION: 94 % | DIASTOLIC BLOOD PRESSURE: 78 MMHG | RESPIRATION RATE: 18 BRPM | WEIGHT: 220 LBS | HEIGHT: 68 IN

## 2021-12-31 PROBLEM — R07.89 ATYPICAL CHEST PAIN: Status: ACTIVE | Noted: 2021-12-31

## 2021-12-31 LAB
ANION GAP SERPL CALCULATED.3IONS-SCNC: 14 MEQ/L (ref 8–16)
BUN BLDV-MCNC: 14 MG/DL (ref 7–22)
CALCIUM SERPL-MCNC: 9.3 MG/DL (ref 8.5–10.5)
CHLORIDE BLD-SCNC: 104 MEQ/L (ref 98–111)
CO2: 27 MEQ/L (ref 23–33)
CREAT SERPL-MCNC: 0.6 MG/DL (ref 0.4–1.2)
EKG Q-T INTERVAL: 340 MS
EKG Q-T INTERVAL: 348 MS
EKG QRS DURATION: 88 MS
EKG QRS DURATION: 90 MS
EKG QTC CALCULATION (BAZETT): 459 MS
EKG QTC CALCULATION (BAZETT): 474 MS
EKG R AXIS: -32 DEGREES
EKG R AXIS: -32 DEGREES
EKG T AXIS: 149 DEGREES
EKG T AXIS: 162 DEGREES
EKG VENTRICULAR RATE: 105 BPM
EKG VENTRICULAR RATE: 117 BPM
GFR SERPL CREATININE-BSD FRML MDRD: > 90 ML/MIN/1.73M2
GLUCOSE BLD-MCNC: 95 MG/DL (ref 70–108)
MAGNESIUM: 1.9 MG/DL (ref 1.6–2.4)
OSMOLALITY CALCULATION: 289 MOSMOL/KG (ref 275–300)
POTASSIUM SERPL-SCNC: 3.9 MEQ/L (ref 3.5–5.2)
SODIUM BLD-SCNC: 145 MEQ/L (ref 135–145)
TROPONIN T: < 0.01 NG/ML
TSH SERPL DL<=0.05 MIU/L-ACNC: 2.05 UIU/ML (ref 0.4–4.2)

## 2021-12-31 PROCEDURE — 84484 ASSAY OF TROPONIN QUANT: CPT

## 2021-12-31 PROCEDURE — 6370000000 HC RX 637 (ALT 250 FOR IP): Performed by: PEDIATRICS

## 2021-12-31 PROCEDURE — 83735 ASSAY OF MAGNESIUM: CPT

## 2021-12-31 PROCEDURE — G0378 HOSPITAL OBSERVATION PER HR: HCPCS

## 2021-12-31 PROCEDURE — 80048 BASIC METABOLIC PNL TOTAL CA: CPT

## 2021-12-31 PROCEDURE — 36415 COLL VENOUS BLD VENIPUNCTURE: CPT

## 2021-12-31 PROCEDURE — 96372 THER/PROPH/DIAG INJ SC/IM: CPT

## 2021-12-31 PROCEDURE — 99220 PR INITIAL OBSERVATION CARE/DAY 70 MINUTES: CPT | Performed by: PEDIATRICS

## 2021-12-31 PROCEDURE — 2580000003 HC RX 258: Performed by: PEDIATRICS

## 2021-12-31 PROCEDURE — 93005 ELECTROCARDIOGRAM TRACING: CPT | Performed by: HOSPITALIST

## 2021-12-31 PROCEDURE — 94640 AIRWAY INHALATION TREATMENT: CPT

## 2021-12-31 PROCEDURE — 84443 ASSAY THYROID STIM HORMONE: CPT

## 2021-12-31 PROCEDURE — 6360000002 HC RX W HCPCS: Performed by: STUDENT IN AN ORGANIZED HEALTH CARE EDUCATION/TRAINING PROGRAM

## 2021-12-31 PROCEDURE — 99217 PR OBSERVATION CARE DISCHARGE MANAGEMENT: CPT | Performed by: HOSPITALIST

## 2021-12-31 RX ORDER — MODAFINIL 100 MG/1
400 TABLET ORAL DAILY
Status: DISCONTINUED | OUTPATIENT
Start: 2021-12-31 | End: 2021-12-31 | Stop reason: HOSPADM

## 2021-12-31 RX ORDER — PANTOPRAZOLE SODIUM 40 MG/1
40 TABLET, DELAYED RELEASE ORAL
Status: DISCONTINUED | OUTPATIENT
Start: 2021-12-31 | End: 2021-12-31 | Stop reason: HOSPADM

## 2021-12-31 RX ORDER — ONDANSETRON 2 MG/ML
4 INJECTION INTRAMUSCULAR; INTRAVENOUS EVERY 6 HOURS PRN
Status: DISCONTINUED | OUTPATIENT
Start: 2021-12-31 | End: 2021-12-31 | Stop reason: HOSPADM

## 2021-12-31 RX ORDER — SODIUM CHLORIDE 0.9 % (FLUSH) 0.9 %
10 SYRINGE (ML) INJECTION PRN
Status: DISCONTINUED | OUTPATIENT
Start: 2021-12-31 | End: 2021-12-31 | Stop reason: HOSPADM

## 2021-12-31 RX ORDER — DULOXETIN HYDROCHLORIDE 20 MG/1
20 CAPSULE, DELAYED RELEASE ORAL DAILY
Status: DISCONTINUED | OUTPATIENT
Start: 2021-12-31 | End: 2021-12-31 | Stop reason: HOSPADM

## 2021-12-31 RX ORDER — VERAPAMIL HYDROCHLORIDE 240 MG/1
240 TABLET, FILM COATED, EXTENDED RELEASE ORAL 2 TIMES DAILY
Status: DISCONTINUED | OUTPATIENT
Start: 2021-12-31 | End: 2021-12-31 | Stop reason: HOSPADM

## 2021-12-31 RX ORDER — ACETAMINOPHEN 650 MG/1
650 SUPPOSITORY RECTAL EVERY 6 HOURS PRN
Status: DISCONTINUED | OUTPATIENT
Start: 2021-12-31 | End: 2021-12-31 | Stop reason: HOSPADM

## 2021-12-31 RX ORDER — ALBUTEROL SULFATE 90 UG/1
2 AEROSOL, METERED RESPIRATORY (INHALATION) EVERY 6 HOURS PRN
Status: DISCONTINUED | OUTPATIENT
Start: 2021-12-31 | End: 2021-12-31 | Stop reason: HOSPADM

## 2021-12-31 RX ORDER — CELECOXIB 200 MG/1
200 CAPSULE ORAL PRN
COMMUNITY
End: 2021-12-31 | Stop reason: HOSPADM

## 2021-12-31 RX ORDER — CLOPIDOGREL BISULFATE 75 MG/1
75 TABLET ORAL DAILY
Status: DISCONTINUED | OUTPATIENT
Start: 2021-12-31 | End: 2021-12-31 | Stop reason: HOSPADM

## 2021-12-31 RX ORDER — VITAMIN B COMPLEX
1 CAPSULE ORAL DAILY PRN
COMMUNITY

## 2021-12-31 RX ORDER — METOPROLOL TARTRATE 50 MG/1
150 TABLET, FILM COATED ORAL 2 TIMES DAILY
Status: DISCONTINUED | OUTPATIENT
Start: 2021-12-31 | End: 2021-12-31 | Stop reason: HOSPADM

## 2021-12-31 RX ORDER — ONDANSETRON 4 MG/1
4 TABLET, ORALLY DISINTEGRATING ORAL EVERY 8 HOURS PRN
Status: DISCONTINUED | OUTPATIENT
Start: 2021-12-31 | End: 2021-12-31 | Stop reason: HOSPADM

## 2021-12-31 RX ORDER — SODIUM CHLORIDE 0.9 % (FLUSH) 0.9 %
10 SYRINGE (ML) INJECTION EVERY 12 HOURS SCHEDULED
Status: DISCONTINUED | OUTPATIENT
Start: 2021-12-31 | End: 2021-12-31 | Stop reason: HOSPADM

## 2021-12-31 RX ORDER — SULFASALAZINE 500 MG/1
500 TABLET ORAL 2 TIMES DAILY
Status: DISCONTINUED | OUTPATIENT
Start: 2021-12-31 | End: 2021-12-31 | Stop reason: HOSPADM

## 2021-12-31 RX ORDER — SODIUM CHLORIDE 9 MG/ML
25 INJECTION, SOLUTION INTRAVENOUS PRN
Status: DISCONTINUED | OUTPATIENT
Start: 2021-12-31 | End: 2021-12-31 | Stop reason: HOSPADM

## 2021-12-31 RX ORDER — BUDESONIDE AND FORMOTEROL FUMARATE DIHYDRATE 160; 4.5 UG/1; UG/1
2 AEROSOL RESPIRATORY (INHALATION) 2 TIMES DAILY
Status: DISCONTINUED | OUTPATIENT
Start: 2021-12-31 | End: 2021-12-31 | Stop reason: HOSPADM

## 2021-12-31 RX ORDER — NITROGLYCERIN 0.4 MG/1
0.4 TABLET SUBLINGUAL EVERY 5 MIN PRN
Status: DISCONTINUED | OUTPATIENT
Start: 2021-12-31 | End: 2021-12-31 | Stop reason: HOSPADM

## 2021-12-31 RX ORDER — LOSARTAN POTASSIUM 25 MG/1
25 TABLET ORAL DAILY
Status: DISCONTINUED | OUTPATIENT
Start: 2021-12-31 | End: 2021-12-31 | Stop reason: HOSPADM

## 2021-12-31 RX ORDER — CHLORTHALIDONE 25 MG/1
25 TABLET ORAL DAILY
Status: DISCONTINUED | OUTPATIENT
Start: 2021-12-31 | End: 2021-12-31 | Stop reason: HOSPADM

## 2021-12-31 RX ORDER — LOPERAMIDE HYDROCHLORIDE 2 MG/1
2 CAPSULE ORAL 4 TIMES DAILY PRN
COMMUNITY

## 2021-12-31 RX ORDER — POLYETHYLENE GLYCOL 3350 17 G/17G
17 POWDER, FOR SOLUTION ORAL DAILY PRN
Status: DISCONTINUED | OUTPATIENT
Start: 2021-12-31 | End: 2021-12-31 | Stop reason: HOSPADM

## 2021-12-31 RX ORDER — ACETAMINOPHEN 325 MG/1
650 TABLET ORAL EVERY 6 HOURS PRN
Status: DISCONTINUED | OUTPATIENT
Start: 2021-12-31 | End: 2021-12-31 | Stop reason: HOSPADM

## 2021-12-31 RX ORDER — DIGOXIN 125 MCG
125 TABLET ORAL DAILY
Status: DISCONTINUED | OUTPATIENT
Start: 2021-12-31 | End: 2021-12-31 | Stop reason: HOSPADM

## 2021-12-31 RX ORDER — PHENOL 1.4 %
10 AEROSOL, SPRAY (ML) MUCOUS MEMBRANE NIGHTLY PRN
COMMUNITY

## 2021-12-31 RX ORDER — PRAVASTATIN SODIUM 40 MG
40 TABLET ORAL NIGHTLY
Status: DISCONTINUED | OUTPATIENT
Start: 2021-12-31 | End: 2021-12-31 | Stop reason: HOSPADM

## 2021-12-31 RX ORDER — ASPIRIN 81 MG/1
81 TABLET, CHEWABLE ORAL DAILY
Status: DISCONTINUED | OUTPATIENT
Start: 2021-12-31 | End: 2021-12-31 | Stop reason: HOSPADM

## 2021-12-31 RX ORDER — MAGNESIUM HYDROXIDE/ALUMINUM HYDROXICE/SIMETHICONE 120; 1200; 1200 MG/30ML; MG/30ML; MG/30ML
30 SUSPENSION ORAL EVERY 6 HOURS PRN
Status: DISCONTINUED | OUTPATIENT
Start: 2021-12-31 | End: 2021-12-31 | Stop reason: HOSPADM

## 2021-12-31 RX ORDER — HYDROXYCHLOROQUINE SULFATE 200 MG/1
200 TABLET, FILM COATED ORAL 2 TIMES DAILY
Status: DISCONTINUED | OUTPATIENT
Start: 2021-12-31 | End: 2021-12-31 | Stop reason: HOSPADM

## 2021-12-31 RX ADMIN — CHLORTHALIDONE 25 MG: 25 TABLET ORAL at 11:39

## 2021-12-31 RX ADMIN — BUDESONIDE AND FORMOTEROL FUMARATE DIHYDRATE 2 PUFF: 160; 4.5 AEROSOL RESPIRATORY (INHALATION) at 10:18

## 2021-12-31 RX ADMIN — DIGOXIN 125 MCG: 125 TABLET ORAL at 11:39

## 2021-12-31 RX ADMIN — SODIUM CHLORIDE, PRESERVATIVE FREE 10 ML: 5 INJECTION INTRAVENOUS at 11:42

## 2021-12-31 RX ADMIN — METOPROLOL TARTRATE 150 MG: 50 TABLET ORAL at 11:51

## 2021-12-31 RX ADMIN — HYDROXYCHLOROQUINE SULFATE 200 MG: 200 TABLET ORAL at 11:39

## 2021-12-31 RX ADMIN — ASPIRIN 81 MG CHEWABLE TABLET 81 MG: 81 TABLET CHEWABLE at 11:51

## 2021-12-31 RX ADMIN — VERAPAMIL HYDROCHLORIDE 240 MG: 240 TABLET, FILM COATED, EXTENDED RELEASE ORAL at 11:38

## 2021-12-31 RX ADMIN — ENOXAPARIN SODIUM 100 MG: 100 INJECTION SUBCUTANEOUS at 11:52

## 2021-12-31 RX ADMIN — LOSARTAN POTASSIUM 25 MG: 25 TABLET, FILM COATED ORAL at 11:41

## 2021-12-31 NOTE — ED NOTES
Pt medicated per orders. Continues pain free at this time, watching TV on cart in position of comfort w/ SO at bedside.       Radha Palacios RN  12/30/21 2004

## 2021-12-31 NOTE — ED NOTES
Patient resting in bed. Respirations easy and unlabored. No distress noted. Call light within reach.        Martha Hammonds RN  12/31/21 0086

## 2021-12-31 NOTE — ED PROVIDER NOTES
[address] [address]          Pt Name: Blu Haq  MRN: [id number]  [place] 1950  Date of evaluation: 12/30/2021  Treating Resident Physician: Simba Jhaveri MD  Supervising Physician: Dr. Merlin Redo       Chief Complaint   Patient presents with    Chest Pain     History obtained from the patient. HISTORY OF PRESENT ILLNESS    HPI  Blu Haq is a 66 y.o. female who presents to the emergency department for evaluation of chest pain. Patient states that she was sitting on the toilet at 1700, and began to have chest pain during defecation. States that she took aspirin and nitro with resolution of pain. Has not had recurrence of chest pain since then. States that she has not taken her meds today. States that chest pain was midsternal pressure-like, 7 out of 10 at its peak, resolved nitro  The patient has no other acute complaints at this time. REVIEW OF SYSTEMS   Review of Systems   Constitutional: Positive for diaphoresis and fatigue. Negative for fever. HENT: Negative. Eyes: Negative for photophobia and visual disturbance. Respiratory: Positive for chest tightness and shortness of breath. Negative for cough and wheezing. Cardiovascular: Positive for chest pain. Negative for palpitations and leg swelling. Gastrointestinal: Negative for abdominal distention, abdominal pain, constipation, diarrhea, nausea and vomiting. Genitourinary: Negative for dysuria, flank pain, frequency and urgency. Musculoskeletal: Negative for arthralgias and myalgias. Skin: Negative. Neurological: Negative for syncope, facial asymmetry, weakness, light-headedness, numbness and headaches. Hematological: Bruises/bleeds easily.           PAST MEDICAL AND SURGICAL HISTORY     Past Medical History:   Diagnosis Date    Arthritis     Atrial fibrillation ([facility]     Cancer ([facility]     skin    Carotid arterial disease ([facility]  CHF (congestive heart failure) (HCC)     Depression     Fibromyalgia     Hypertension     Hypothyroidism     Obesity     S/P bariatric surgery 2018    Sleep apnea     CPAP    Thyroid disease     TIA (transient ischemic attack) 12/2015     Past Surgical History:   Procedure Laterality Date    APPENDECTOMY  over 10 years ago    [address] Left 10/01/2019    CAROTID ENDARTERECTOMY  09/2017    CHOLECYSTECTOMY  over 10 years ago    [address] [facility] COLONOSCOPY N/A 2/7/2018    COLONOSCOPY WITH BIOPSY performed by Catrachita Cleveland MD at [address] [facility]  10 plus years ago    Dr. Jaquelin Tejada Right 2009    OTHER SURGICAL HISTORY Left 2012    Rotator cuff sx    SLEEVE GASTRECTOMY  09/12/2016    Robotic, Extensive Lysis of Adhesions, Removal of [place] Prosthesis - Dr. Joe Joaquin Left 04/2021    UPPER GASTROINTESTINAL ENDOSCOPY  07/06/2016    Dr. Casey Savage     Current Facility-Administered Medications:     nitroGLYCERIN (NITROSTAT) SL tablet 0.4 mg, 0.4 mg, SubLINGual, Q5 Min PRN, Jayne Green MD    enoxaparin (LOVENOX) injection 100 mg, 1 mg/kg, SubCUTAneous, BID, Jayne Green MD, 100 mg at 12/30/21 2039    pravastatin (PRAVACHOL) tablet 40 mg, 40 mg, Oral, Nightly, Jayne Green MD, 40 mg at 12/30/21 2038    Current Outpatient Medications:     clopidogrel (PLAVIX) 75 MG tablet, TAKE 1 TABLET DAILY, Disp: 90 tablet, Rfl: 3    pantoprazole (PROTONIX) 40 MG tablet, TAKE 1 TABLET DAILY, Disp: 90 tablet, Rfl: 3    modafinil (PROVIGIL) 200 MG tablet, Take 2 tablets by mouth daily for 180 days. , Disp: 180 tablet, Rfl: 3    pravastatin (PRAVACHOL) 40 MG tablet, TAKE 1 TABLET DAILY, Disp: 90 tablet, Rfl: 1    mirabegron (MYRBETRIQ) 50 MG TB24, Take 50 mg by mouth daily, Disp: 90 tablet, Rfl: 2    losartan (COZAAR) 25 MG tablet, TAKE 1 TABLET DAILY, Disp: 90 tablet, Rfl: 3    metoprolol (LOPRESSOR) 100 MG tablet, TAKE ONE AND ONE-HALF TABLETS TWICE A DAY, Disp: 270 tablet, Rfl: 3    digoxin (LANOXIN) 125 MCG tablet, TAKE 1 TABLET DAILY, Disp: 90 tablet, Rfl: 1    verapamil (VERELAN) 240 MG extended release capsule, TAKE 1 CAPSULE TWICE A DAY, Disp: 180 capsule, Rfl: 1    chlorthalidone (HYGROTON) 25 MG tablet, TAKE 1 TABLET DAILY, Disp: 90 tablet, Rfl: 3    modafinil (PROVIGIL) 200 MG tablet, Take 1 tablet by mouth daily for 180 days. , Disp: 90 tablet, Rfl: 1    fluticasone-salmeterol (ADVAIR) 250-50 MCG/DOSE AEPB, Inhale 1 puff into the lungs 2 times daily, Disp: 3 Inhaler, Rfl: 3    albuterol sulfate HFA (VENTOLIN HFA) 108 (90 Base) MCG/ACT inhaler, Inhale 2 puffs into the lungs every 6 hours as needed for Wheezing, Disp: 1 Inhaler, Rfl: 11    apixaban (ELIQUIS) 5 MG TABS tablet, Take 1 tablet by mouth 2 times daily, Disp: 180 tablet, Rfl: 3    nitroGLYCERIN (NITROSTAT) 0.4 MG SL tablet, up to max of 3 total doses. If no relief after 1 dose, call 911., Disp: 25 tablet, Rfl: 1    DULoxetine (CYMBALTA) 20 MG extended release capsule, Take 20 mg by mouth daily, Disp: , Rfl:     sulfaSALAzine (AZULFIDINE) 500 MG tablet, Take 500 mg by mouth 2 times daily, Disp: , Rfl:     hydroxychloroquine (PLAQUENIL) 200 MG tablet, Take 200 mg by mouth 2 times daily. , Disp: , Rfl:       SOCIAL HISTORY     Social History     Social History Narrative    Not on file     Social History     Tobacco Use    Smoking status: Former Smoker     Packs/day: 0.25     Years: 30.00     Pack years: 7.50     Types: Cigarettes     Start date: 3/24/1982     Quit date: 2015     Years since quittin.8    Smokeless tobacco: Never Used   Vaping Use    Vaping Use: Former   Substance Use Topics    Alcohol use: No    Drug use: No         ALLERGIES     Allergies   Allergen Reactions    Demerol Hcl [Meperidine] Nausea And Vomiting FAMILY HISTORY     Family History   Problem Relation Age of Onset    Stroke Mother     High Blood Pressure Mother     Atrial Fibrillation Mother     Heart Disease Mother     Diabetes Father     Early Death Father     Cancer Other         Daughter    Heart Disease Sister     Heart Attack Sister     Deep Vein Thrombosis Brother          PREVIOUS RECORDS   Previous records reviewed: Medical, past surgical, medications, allergies        PHYSICAL EXAM     ED Triage Vitals [12/30/21 1727]   BP Temp Temp Source Pulse Resp SpO2 Height Weight   (!) 158/84 98.1 °F (36.7 °C) Oral 118 20 95 % 5' 8\" (1.727 m) 220 lb (99.8 kg)     Initial vital signs and nursing assessment reviewed and Tachycardic, hypertension. Body mass index is 33.45 kg/m². Pulsoximetry is normal per my interpretation. Additional Vital Signs:  Vitals:    12/30/21 2155   BP: (!) 171/69   Pulse: 88   Resp: 17   Temp:    SpO2: 94%       Physical Exam  Constitutional:       Appearance: Normal appearance. HENT:      Head: Normocephalic and atraumatic. Right Ear: External ear normal.      Left Ear: External ear normal.      Nose: Nose normal.      Mouth/Throat:      Mouth: Mucous membranes are moist.      Pharynx: Oropharynx is clear. Eyes:      Extraocular Movements: Extraocular movements intact. Conjunctiva/sclera: Conjunctivae normal.      Pupils: Pupils are equal, round, and reactive to light. Cardiovascular:      Rate and Rhythm: Regular rhythm. Tachycardia present. Pulses: Normal pulses. Heart sounds: Normal heart sounds. Pulmonary:      Effort: Pulmonary effort is normal. No respiratory distress. Breath sounds: Normal breath sounds. No wheezing or rales. Chest:      Chest wall: No tenderness. Abdominal:      General: Abdomen is flat. Bowel sounds are normal. There is no distension. Palpations: Abdomen is soft. Tenderness: There is no abdominal tenderness. There is no guarding or rebound. Musculoskeletal:         General: No swelling or tenderness. Normal range of motion. Cervical back: Normal range of motion and neck supple. Right lower leg: Edema present. Left lower leg: Edema present. Skin:     General: Skin is warm and dry. Capillary Refill: Capillary refill takes less than 2 seconds. Neurological:      General: No focal deficit present. Mental Status: She is alert and oriented to person, place, and time. MEDICAL DECISION MAKING   Initial Assessment and Plan:    This is a 77-year-old female, history of COPD, A. fib, SHANNEN, HFpEF, CAD with stenting, presenting with chest pain at 1700 that started during defecation relieved with nitro. Patient took aspirin prior to arrival. On presentation initial EKG significant for multiple areas of depressions. Repeat EKG still showing some minor depressions. Patient states she had not taken her meds today, so went ahead and gave her digoxin, metoprolol. Has not taken her Eliquis or Plavix since yesterday, therefore went ahead and started her on heparin with concern of unstable angina versus NSTEMI. Labs significant for negative first troponin which was an hour after initial onset of pain, mild hypokalemia, mildly elevated proBNP. Digoxin level low. Would like to admit this patient for unstable angina versus NSTEMI.         ED RESULTS   Laboratory results:  Labs Reviewed   BASIC METABOLIC PANEL - Abnormal; Notable for the following components:       Result Value    Potassium 3.3 (*)     Glucose 136 (*)     All other components within normal limits   CBC WITH AUTO DIFFERENTIAL - Abnormal; Notable for the following components:    MCV 99.1 (*)     RDW-SD 49.4 (*)     MPV 9.1 (*)     All other components within normal limits   BRAIN NATRIURETIC PEPTIDE - Abnormal; Notable for the following components:    Pro-BNP 1307.0 (*)     All other components within normal limits   GLOMERULAR FILTRATION RATE, ESTIMATED - Abnormal; Notable for the following components:    Est, Glom Filt Rate 82 (*)     All other components within normal limits   DIGOXIN LEVEL - Abnormal; Notable for the following components:    Digoxin Lvl 0.3 (*)     All other components within normal limits   TROPONIN - Abnormal; Notable for the following components:    Troponin T 0.010 (*)     All other components within normal limits   HEPATIC FUNCTION PANEL   LIPASE   TROPONIN   ANION GAP   OSMOLALITY   TROPONIN       Radiologic studies results:  XR CHEST 1 VIEW   Final Result   Normal chest. No infiltrates or effusions are seen. Final report electronically signed by Dr. Leo Schwab on 12/30/2021 6:00 PM          ED Medications administered this visit:   Medications   nitroGLYCERIN (NITROSTAT) SL tablet 0.4 mg (has no administration in time range)   enoxaparin (LOVENOX) injection 100 mg (100 mg SubCUTAneous Given 12/30/21 2039)   pravastatin (PRAVACHOL) tablet 40 mg (40 mg Oral Given 12/30/21 2038)   digoxin (LANOXIN) tablet 125 mcg (125 mcg Oral Given 12/30/21 1944)   metoprolol tartrate (LOPRESSOR) tablet 100 mg (100 mg Oral Given 12/30/21 1944)         ED COURSE             MEDICATION CHANGES     New Prescriptions    No medications on file         FINAL DISPOSITION     Final diagnoses:   Unstable angina (HCC)     Condition: condition: stable  Dispo: Admit to telemetry      This transcription was electronically signed. Parts of this transcriptions may have been dictated by use of voice recognition software and electronically transcribed, and parts may have been transcribed with the assistance of an ED scribe. The transcription may contain errors not detected in proofreading. Please refer to my supervising physician's documentation if my documentation differs.     Electronically Signed: Beena Smith MD, 12/30/21, 10:24 Arlene Og MD  Resident  12/31/21 8516

## 2021-12-31 NOTE — ED NOTES
Upon first contact with patient this RN receives bedside shift report from Morelia Turner [place]. Patient resting in bed with eyes closed. Respirations easy and unlabored.       Sherley Ha RN  12/31/21 8607

## 2021-12-31 NOTE — DISCHARGE SUMMARY
Hospital Medicine Discharge Summary      Patient Identification:   Shantanu Reardon   : 1950  MRN: [id number]   Account: [de-identified]      Patient's PCP: Noris Salas MD    Admit Date: 2021     Discharge Date:   2021      Admitting Physician: Libby Lund MD     Discharge Physician: Shane José MD     Discharge Diagnoses: Active Hospital Problems    Diagnosis Date Noted    Atypical chest pain [R07.89] 2021       The patient was seen and examined on day of discharge and this discharge summary is in conjunction with any daily progress note from day of discharge. Hospital Course:   Shantanu Reardon is a 66 y.o. female admitted to [address] [place] [address] on 2021 for evaluation of CP. Pt remained clinically stable and was discharged in stable conditions. Assessment/Plan:    Atypical chest pain:  CP short-lived while on toilet which resolved since. Denies any associated symptoms. Known history of coronary disease with ischemic cardiomyopathy and status post stenting 2020. Negative cardiac stress test 2021 prior to elective knee surgery. EKG showed non-specific ST changes but no new/dynamic ST segment changes. Serial trop negative and CP resolved since. Given Hx and Heart score, pt may benefit from further risk stratification w/ possibly LHC given possible false negative results w/ stress test; pt understands and appreciates POC; wishes to return home for a [employer] family gathering w/ early Op cardiology F/U (arranged in ED)  Continue optimal guideline directed medical treatment with Plavix, statin, losartan, and metoprolol and NOAC (ASA not on given minimal benefit w/ outweighed risk of harms). D/c'ed MONTES-1 inhibitor  As needed sublingual nitro and IV morphine for chest pain. Alarming signs to return to ED given.     Chronic A. fib with RVR(resolved):  Ventricular rate max 116 on arrival which now resolved w/ resuming home AVB regimen; K/Mg/STH WNLs.  No other identifiable cause of tachycardia. On NOAC. CVR despite Digocin level subtherapeutic at 0.3; given age PCP/cardio to consider d/c'ing          Diastolic CHF/HFrEF:  Last echocardiogram 4/20/2021 reported a normal left ventricular function and size with an estimated ejection fraction of 55 to 60% with no regional wall motion abnormality. Transesophageal echocardiogram 2/24/2020 reported moderate reduction of left ventricular function with an ejection fraction of 40 to 45%. Continue maintenance and guideline directed medical treatment with beta-blocker metoprolol, losartan, and chlorthalidone. Clinically euvolemic; new documented systolic CHF could be another reason to consider LHC. SHANNEN on CPAP:  Order home CPAP.     Polyarthritis (unspecified type): On hydroxychloroquine and sulfasalazine; No active joints/flare-up; routine PCP/Rheum F/O      Hypothyroidism:  Resume home dose levothyroxine. TSH WNLs     Hypertension:  Fairly controlled on current home meds which resumed at discharge. Pt and PCP need to monitor BP with antihypertensive regimen adjustment as needed        Urinary incontinence:  Known hx of      GERD:  Home dose PPI.     Depression/anxiety:  Home dose Duloxxetine. Obesity w/ BMI 33.45      Exam:     Vitals:  Vitals:    12/31/21 1044 12/31/21 1138 12/31/21 1151 12/31/21 1201   BP: (!) 166/90 (!) 144/62 (!) 148/79 (!) 148/78   Pulse: 87 106 116 103   Resp: 20   18   Temp:       TempSrc:       SpO2: 94%   94%   Weight:       Height:         Weight: Weight: 220 lb (99.8 kg)     24 hour intake/output:    Intake/Output Summary (Last 24 hours) at 12/31/2021 1348  Last data filed at 12/31/2021 1142  Gross per 24 hour   Intake 10 ml   Output --   Net 10 ml           General appearance: Obese. A&O x3, Not ill or toxic, in no apparent distress  HEENT:  YVON  EOM intact. Neck: Supple, with full range of motion. No jugular venous distention. Trachea midline.   Respiratory:   NL A/E bilat with no adventitious sounds   Cardiovascular:  normal S1/S2 with no murmurs/gallops  Abdomen: Soft, non-tender, non-distended, no rigidity or peritoneal signs  Musculoskeletal: NL symmetrical A/PROM bilat U/L extremities   Skin: No rashes. No edema  Neurologic:  CN II-XII intact. NL symmetrical reflexes. NL gait and stance. NL Cerebellar exam. Power 5/5 all muscle groups U/L extremities. Toes downgoing  Capillary Refill: Brisk,< 3 seconds   Peripheral Pulses: +2 palpable, equal bilaterally              Labs: For convenience and continuity at follow-up the following most recent labs are provided:      CBC:    Lab Results   Component Value Date    WBC 10.3 12/30/2021    HGB 13.7 12/30/2021    HCT 42.1 12/30/2021     12/30/2021       Renal:    Lab Results   Component Value Date     12/31/2021    K 3.9 12/31/2021    K 4.0 03/04/2020     12/31/2021    CO2 27 12/31/2021    BUN 14 12/31/2021    CREATININE 0.6 12/31/2021    CALCIUM 9.3 12/31/2021    PHOS 2.4 09/17/2016         Significant Diagnostic Studies    Radiology:   XR CHEST 1 VIEW   Final Result   Normal chest. No infiltrates or effusions are seen. Final report electronically signed by Dr. Teresa Lemus on 12/30/2021 6:00 PM             Consults:     None    Disposition:    [x] Home       [] TCU       [] Rehab       [] Psych       [] SNF       [] [place]       [] Other-    Condition at Discharge: Stable    Code Status:  Full Code     Patient Instructions:    Discharge lab work: N/A  Activity: activity as tolerated  Diet: ADULT DIET; Regular      Follow-up visits:   No follow-up provider specified.        Discharge Medications:        Medication List      ASK your doctor about these medications    albuterol sulfate  (90 Base) MCG/ACT inhaler  Commonly known as: Ventolin HFA  Inhale 2 puffs into the lungs every 6 hours as needed for Wheezing     apixaban 5 MG Tabs tablet  Commonly known as: Eliquis  Take 1 tablet by mouth 2 times daily     b complex vitamins capsule     CeleBREX 200 MG capsule  Generic drug: celecoxib     chlorthalidone 25 MG tablet  Commonly known as: HYGROTON  TAKE 1 TABLET DAILY     clopidogrel 75 MG tablet  Commonly known as: PLAVIX  TAKE 1 TABLET DAILY     digoxin 125 MCG tablet  Commonly known as: LANOXIN  TAKE 1 TABLET DAILY     DULoxetine 20 MG extended release capsule  Commonly known as: CYMBALTA     fluticasone-salmeterol 250-50 MCG/DOSE Aepb  Commonly known as: ADVAIR  Inhale 1 puff into the lungs 2 times daily     hydroxychloroquine 200 MG tablet  Commonly known as: PLAQUENIL     loperamide 2 MG capsule  Commonly known as: IMODIUM     losartan 25 MG tablet  Commonly known as: COZAAR  TAKE 1 TABLET DAILY     Melatonin 10 MG Tabs     metoprolol 100 MG tablet  Commonly known as: LOPRESSOR  TAKE ONE AND ONE-HALF TABLETS TWICE A DAY     mirabegron 50 MG Tb24  Commonly known as: Myrbetriq  Take 50 mg by mouth daily     modafinil 200 MG tablet  Commonly known as: PROVIGIL  Take 2 tablets by mouth daily for 180 days. Ask about: Which instructions should I use?     nitroGLYCERIN 0.4 MG SL tablet  Commonly known as: NITROSTAT  up to max of 3 total doses. If no relief after 1 dose, call 911. pantoprazole 40 MG tablet  Commonly known as: PROTONIX  TAKE 1 TABLET DAILY     pravastatin 40 MG tablet  Commonly known as: PRAVACHOL  TAKE 1 TABLET DAILY     sulfaSALAzine 500 MG tablet  Commonly known as: AZULFIDINE     verapamil 240 MG extended release capsule  Commonly known as: VERELAN  TAKE 1 CAPSULE TWICE A DAY            Time Spent on discharge is more than 45 minutes in the examination, evaluation, counseling and review of medications and discharge plan. With RN in room, patient and  were updated about the treatment plan, all the questions and concerns were addressed. Alarming signs and symptoms to return to ED were explained in length. Signed:     Thank you Lisa Doherty MD for the opportunity to be involved in this patient's care.     Electronically signed by Mode Matos MD on 12/31/2021 at 1:48 PM

## 2021-12-31 NOTE — ED NOTES
aPt resting in bed with no distress noted. Call light within reach. Breakfast tray ordered. PAVEL Cutler RN  12/31/21 2174

## 2021-12-31 NOTE — ED NOTES
Patient resting in bed. Respirations easy and unlabored. No distress noted. Call light within reach.        Jerrilyn Jeans, RN  12/31/21 5126

## 2021-12-31 NOTE — ED NOTES
ED nurse-to-nurse bedside report    Chief Complaint   Patient presents with    Chest Pain      LOC: alert and orientated to name, place, date  Vital signs   Vitals:    12/31/21 0349 12/31/21 0419 12/31/21 0449 12/31/21 0553   BP: (!) 152/85 (!) 146/66 (!) 151/101 (!) 160/92   Pulse: 86 91 110 97   Resp:       Temp:       TempSrc:       SpO2: 95% 93% 95% 92%   Weight:       Height:          Pain:    Pain Interventions: none  Pain Goal: 0  Oxygen: No    Current needs required none   Telemetry: Yes  LDAs:   Peripheral IV 12/30/21 Left Antecubital (Active)   Site Assessment Clean;Dry; Intact 12/30/21 1927   Line Status Flushed 12/30/21 1927   Dressing Status Clean;Dry; Intact 12/30/21 1927   Dressing Intervention New 12/30/21 1927     Continuous Infusions:    sodium chloride       Mobility: Requires assistance * 1  Guzman Fall Risk Score: Fall Risk 2/3/2021 10/5/2020   2 or more falls in past year? no yes   Fall with injury in past year? no no       Report given to:  Lluvia Lee, RN  12/31/21 0508

## 2021-12-31 NOTE — ED NOTES
Pt  brought in BODØ for her with provider OK. Tolerating well. Pt continues restful,on cart watching TV.       Pradeep Sexton RN  12/30/21 3198

## 2021-12-31 NOTE — ED NOTES
Patient resting in bed. Respirations easy and unlabored. No distress noted. Call light within reach.        Miroslava Leonard RN  12/31/21 3203

## 2021-12-31 NOTE — ED NOTES
Pt will yell out rather than use call button- reminded to use her button if she needs something. Called to room multiple times for random requests to adjust her cart or grab something for her. Pt ambulatory to BR per self.  Updated on POV/admit status     Buck Mccain RN  12/31/21 6090

## 2021-12-31 NOTE — ED NOTES
Pt ambulatory to BR w/steady gait. Denies needs or concerns at this time.       Pradeep Sexton RN  12/30/21 2044

## 2021-12-31 NOTE — H&P
estimated ejection fraction of 55 to 60% with no regional wall motion abnormality. Transesophageal echocardiogram 2/24/2020 reported moderate reduction of left ventricular function with an ejection fraction of 40 to 45%. Continue maintenance and guideline directed medical treatment with beta-blocker metoprolol, losartan, and chlorthalidone. Record strict ins and outs and daily weights. SHANNEN on CPAP:  Order home CPAP. Polyarthritis (unspecified type): On Plaquenil and sulfasalazine which will be continued for now. She was investigation of medical records/history to be done. Hypothyroidism:  Check TSH in a.m. Resume home dose levothyroxine. Hypertension:  Uncontrolled at this time. Resumed home dose antihypertensives including losartan, metoprolol, verapamil, and chlorthalidone. Consider increasing Cozaar to 50 mg if remains uncontrolled. Urinary incontinence:  Home dose Myrbetriq. GERD:  Home dose PPI. Depression/anxiety:  Home dose Cymbalta. DVT prophylaxis:  Already on Eliquis. Fluids/electrolytes/nutrition:  Saline lock IV fluids per  Replenish hypokalemia check magnesium. Regular diet.    =======================================================================      Chief Complaint: Chest pain. History Of Present Illness:  Jennifer Hanna is a 66 y.o. female with PMHx of chronic A. fib, diastolic CHF/HFpEF, hypertension, hyperlipidemia, hypothyroidism, obstructive sleep apnea on CPAP, TIA, hiatal hernia, obesity, fibromyalgia, and anxiety who presents to the emergency room here at [facility] on 12/30/2021 complaining of an acute sudden onset of substernal pressure-like chest pain radiating to the left arm. Reports the chest pain started about 45 minutes prior to arriving to the emergency room and occurred while she was sitting on the toilet. Pain improved after she took sublingual nitro at home but did not completely resolve and therefore came to the ER.   She thought her chest pain could have been related to her hiatal hernia. She does have a known history of CAD and reports 2 stents placed on previous cardiac cath in February 2020. Chest pain described as tightness or pressure like a rock on her chest.  Accompanied by some mild nausea and radiated to the left arm but no numbness or tingling or pain in the jaw, no diaphoresis, and no shortness of breath. Reports recent cardiac stress test prior to her knee surgery in April 2021 however upon review of records there is an echocardiogram but no stress test.  Currently is chest pain-free. Also reports compliance with medications including aspirin statin and Plavix as well as beta-blocker. She is on Eliquis for her A. fib. Upon presentation to the ER patient blood pressure 158/84 up to 176/78 with a pulse of 1 18-88 and respirate of 20 temperature of 98.1 and she was 95% on room air. Lab work reveals normal troponin less than 0.01x2, normal LFTs, low potassium of 3.3, magnesium pending, CBC within acceptable range. And low digoxin level of 0.3. Twelve-lead EKG x2 reveals A. fib with RVR but no acute ischemic or ST segment elevation or significant ST depression. Machine reads possible lateral ischemia with nonspecific ST and T wave changes. Chest x-ray with no acute cardiopulmonary process. Patient given digoxin 125 mg x 1 p.o. and metoprolol 100 mg p.o. x1. She was then admitted to our hospitalist service for further evaluation and treatment. At the time of evaluation patient was laying in bed comfortably no acute distress. She was chest pain-free. She remained in atrial fibrillation however with controlled rate in the 80s and 70s. She says that she was to go home tomorrow.     Past Medical History:        Diagnosis Date    Arthritis     Atrial fibrillation ([address]     Cancer (HCC)     skin    Carotid arterial disease (HCC)     CHF (congestive heart failure) ([address]     Depression     Fibromyalgia     Hypertension     Hypothyroidism     Obesity     S/P bariatric surgery 2018    Sleep apnea     CPAP    Thyroid disease     TIA (transient ischemic attack) 12/2015       Past Surgical History:        Procedure Laterality Date    APPENDECTOMY  over 10 years ago    [id number] Left 10/01/2019    CAROTID ENDARTERECTOMY  09/2017    CHOLECYSTECTOMY  over 10 years ago    [id number] COLONOSCOPY N/A 2/7/2018    COLONOSCOPY WITH BIOPSY performed by Erika Hall MD at [address] [place]     HYSTERECTOMY  10 plus years ago    Dr. Domenica Lee Right 2009    OTHER SURGICAL HISTORY Left 2012    Rotator cuff sx    SLEEVE GASTRECTOMY  09/12/2016    Robotic, Extensive Lysis of Adhesions, Removal of [place] Prosthesis - Dr. Ezekiel Vaca Left 04/2021    UPPER GASTROINTESTINAL ENDOSCOPY  07/06/2016    Dr. Tejal Finch        Medications Prior to Admission:   Prior to Admission medications    Medication Sig Start Date End Date Taking? Authorizing Provider   clopidogrel (PLAVIX) 75 MG tablet TAKE 1 TABLET DAILY 12/22/21   Dilan Israel MD   pantoprazole (PROTONIX) 40 MG tablet TAKE 1 TABLET DAILY 12/22/21   Dilan Israel MD   modafinil (PROVIGIL) 200 MG tablet Take 2 tablets by mouth daily for 180 days.  10/29/21 4/27/22  AWILDA Clinton - CNP   pravastatin (PRAVACHOL) 40 MG tablet TAKE 1 TABLET DAILY 10/25/21   Steve Valdez PA-C   mirabegron (MYRBETRIQ) 50 MG TB24 Take 50 mg by mouth daily 10/18/21   Shoaib Cuadra MD   losartan (COZAAR) 25 MG tablet TAKE 1 TABLET DAILY 10/12/21   Steve Valdez PA-C   metoprolol (LOPRESSOR) 100 MG tablet TAKE ONE AND ONE-HALF TABLETS TWICE A DAY 10/11/21   Arlyn Glimpse, APRN - CNP   digoxin (LANOXIN) 125 MCG tablet TAKE 1 TABLET DAILY 10/6/21   Arlyn Glimpse, APRN - CNP   verapamil (VERELAN) 240 MG extended release capsule TAKE 1 CAPSULE TWICE A DAY 10/6/21   AWILDA Jane CNP   chlorthalidone (HYGROTON) 25 MG tablet TAKE 1 TABLET DAILY 9/2/21   Jannette Glass MD   modafinil (PROVIGIL) 200 MG tablet Take 1 tablet by mouth daily for 180 days. 9/1/21 2/28/22  Rajwinder Deluna MD   fluticasone-salmeterol (ADVAIR) 250-50 MCG/DOSE AEPB Inhale 1 puff into the lungs 2 times daily 7/21/21   Jenn Jane MD   albuterol sulfate HFA (VENTOLIN HFA) 108 (90 Base) MCG/ACT inhaler Inhale 2 puffs into the lungs every 6 hours as needed for Wheezing 2/3/21   Jenn Jane MD   apixaban (ELIQUIS) 5 MG TABS tablet Take 1 tablet by mouth 2 times daily 11/4/20   Jannette Glass MD   nitroGLYCERIN (NITROSTAT) 0.4 MG SL tablet up to max of 3 total doses. If no relief after 1 dose, call 911. 2/25/20   AWILDA Jane CNP   DULoxetine (CYMBALTA) 20 MG extended release capsule Take 20 mg by mouth daily    Historical Provider, MD   sulfaSALAzine (AZULFIDINE) 500 MG tablet Take 500 mg by mouth 2 times daily    Historical Provider, MD   hydroxychloroquine (PLAQUENIL) 200 MG tablet Take 200 mg by mouth 2 times daily. Historical Provider, MD       Allergies:  Demerol hcl [meperidine]    Social History:    The patient currently lives independently. Tobacco use:   reports that she quit smoking about 6 years ago. Her smoking use included cigarettes. She started smoking about 39 years ago. She has a 7.50 pack-year smoking history. She has never used smokeless tobacco.  Alcohol use:   reports no history of alcohol use. Drug use:  reports no history of drug use.      Family History:   as follows:      Problem Relation Age of Onset    Stroke Mother     High Blood Pressure Mother     Atrial Fibrillation Mother     Heart Disease Mother     Diabetes Father     Early Death Father     Cancer Other         Daughter    Heart Disease Sister     Heart Attack Sister     Deep Vein Thrombosis Brother        Review of Systems:   Pertinent positives and negatives as noted in the HPI. All other systems reviewed and negative. Physical Exam:    BP (!) 143/97   Pulse 68   Temp 98.1 °F (36.7 °C) (Oral)   Resp 18   Ht 5' 8\" (1.727 m)   Wt 220 lb (99.8 kg)   SpO2 95%   BMI 33.45 kg/m²       General appearance: No apparent distress, well developed, appears stated age. Eyes:  Pupils equal, round, and reactive to light. Conjunctivae/corneas clear. HENT: Head normal in appearance. External nares normal.  Oral mucosa moist without lesions. Hearing grossly intact. Neck: Supple, with full range of motion. Trachea midline. No gross JVD appreciated. Respiratory:  Normal respiratory effort. Clear to auscultation, bilaterally without rales or wheezes or rhonchi. Cardiovascular: Regular rhythm with normal S1/S2 without murmurs. No lower extremity edema. Abdomen: Soft, non-tender, non-distended with normal bowel sounds. Musculoskeletal: There is no joint swelling or tenderness. Normal tone. No abnormal movements. Skin: Warm and dry. No rashes or lesions. Neurologic:  No focal sensory/motor deficits in the upper and lower extremities. Cranial nerves:  grossly non-focal 2-12. Psychiatric: Alert and oriented, normal insight and thought content. Capillary Refill: Brisk,< 3 seconds. Peripheral Pulses: +2 palpable, equal bilaterally. Labs:     Recent Labs     12/30/21  1805   WBC 10.3   HGB 13.7   HCT 42.1        Recent Labs     12/30/21  1805      K 3.3*      CO2 29   BUN 18   CREATININE 0.7   CALCIUM 9.5     Recent Labs     12/30/21  1805   AST 15   ALT 11   BILIDIR <0.2   BILITOT 0.3   ALKPHOS 82     No results for input(s): INR in the last 72 hours. No results for input(s): Ghada Ends in the last 72 hours.   Lab Results   Component Value Date    NITRU NEGATIVE 07/20/2021    WBCUA 10-15 07/20/2021    BACTERIA FEW 07/20/2021    RBCUA 0-2 07/20/2021    BLOODU trace-intact 12/03/2021 BLOODU NEGATIVE 07/20/2021    SPECGRAV 1.025 12/03/2021    GLUCOSEU neg 12/03/2021    GLUCOSEU NEGATIVE 07/20/2021         Radiology:     XR CHEST 1 VIEW   Final Result   Normal chest. No infiltrates or effusions are seen. Final report electronically signed by Dr. Rayshawn Contreras on 12/30/2021 6:00 PM             EKG:  I have reviewed the EKG with the following interpretation: A. fib with RVR but no acute ischemic ST elevation. Diet: No diet orders on file  DVT prophylaxis: On Eliquis  Code Status: Prior  Disposition: admit to observation, MedSurg, telemetry    Thank you Elvira Karimi MD for the opportunity to be involved in this patient's care.     Electronically signed by Samantha Pozo MD on 12/31/2021 at 3:22 AM.

## 2022-01-01 LAB
EKG Q-T INTERVAL: 362 MS
EKG QRS DURATION: 86 MS
EKG QTC CALCULATION (BAZETT): 489 MS
EKG R AXIS: -55 DEGREES
EKG T AXIS: 97 DEGREES
EKG VENTRICULAR RATE: 110 BPM

## 2022-01-06 ENCOUNTER — OFFICE VISIT (OUTPATIENT)
Dept: CARDIOLOGY CLINIC | Age: 72
End: 2022-01-06
Payer: MEDICARE

## 2022-01-06 VITALS
BODY MASS INDEX: 34.77 KG/M2 | SYSTOLIC BLOOD PRESSURE: 122 MMHG | WEIGHT: 229.4 LBS | DIASTOLIC BLOOD PRESSURE: 63 MMHG | OXYGEN SATURATION: 95 % | HEART RATE: 57 BPM | HEIGHT: 68 IN

## 2022-01-06 DIAGNOSIS — I25.118 ATHEROSCLEROTIC HEART DISEASE OF NATIVE CORONARY ARTERY WITH OTHER FORMS OF ANGINA PECTORIS (HCC): ICD-10-CM

## 2022-01-06 DIAGNOSIS — I48.21 PERMANENT ATRIAL FIBRILLATION (HCC): ICD-10-CM

## 2022-01-06 DIAGNOSIS — I50.32 CHRONIC DIASTOLIC CONGESTIVE HEART FAILURE (HCC): Primary | ICD-10-CM

## 2022-01-06 DIAGNOSIS — R07.9 CHEST PAIN, UNSPECIFIED TYPE: ICD-10-CM

## 2022-01-06 DIAGNOSIS — I10 ESSENTIAL HYPERTENSION: ICD-10-CM

## 2022-01-06 PROCEDURE — 99213 OFFICE O/P EST LOW 20 MIN: CPT | Performed by: STUDENT IN AN ORGANIZED HEALTH CARE EDUCATION/TRAINING PROGRAM

## 2022-01-06 RX ORDER — FUROSEMIDE 20 MG/1
20 TABLET ORAL DAILY PRN
COMMUNITY

## 2022-01-06 NOTE — PROGRESS NOTES
[facility] PROFESSIONAL SERVICES  [facility]   [address] [place] [address]   [address] [address]   Dept: [phone]   Dept Fax: [phone]: [phone]      Chief Complaint   Patient presents with    Follow-Up from Hospital     chest pain     Cardiologist:  Dr. Tevin Hudson  67 yo female presents for f/u of ER visit for Chest pain, resolved. Hx of chronic afib with rvr, diastolic CHF, SHANNEN on cpap, OA, HTN,     PCI of left main and LAD in 02/2020. Had chest pain on 12/30, felt like a pressure in the middle of the chest, radiated to left arm. Does not remember any associated symptoms including SOB, jaw pain, dizziness, lightheaded.  However, septal infarct was noted on EKG, has been increasingly fatigued over the last month or so, much worse than usual.     General:   No fever, no chills, no weight loss, + fatigue  Pulmonary:    No dyspnea, no wheezing  Cardiac:    + chest pain episode  GI:     No nausea or vomiting, no abdominal pain  Neuro:      No dizziness or light headedness  Musculoskeletal:  No recent active issues  Extremities:   + edema      Past Medical History:   Diagnosis Date    Arthritis     Atrial fibrillation (HCC)     Cancer (HCC)     skin    Carotid arterial disease (HCC)     CHF (congestive heart failure) (HCC)     Depression     Fibromyalgia     Hypertension     Hypothyroidism     Obesity     S/P bariatric surgery 2018    Sleep apnea     CPAP    Thyroid disease     TIA (transient ischemic attack) 12/2015       Allergies   Allergen Reactions    Demerol Hcl [Meperidine] Nausea And Vomiting       Current Outpatient Medications   Medication Sig Dispense Refill    Melatonin 10 MG TABS Take 10 mg by mouth nightly as needed      b complex vitamins capsule Take 1 capsule by mouth daily as needed      loperamide (IMODIUM) 2 MG capsule Take 2 mg by mouth 4 times daily as needed for Diarrhea      clopidogrel (PLAVIX) 75 MG tablet TAKE 1 TABLET DAILY 90 tablet 3  pantoprazole (PROTONIX) 40 MG tablet TAKE 1 TABLET DAILY 90 tablet 3    modafinil (PROVIGIL) 200 MG tablet Take 2 tablets by mouth daily for 180 days. 180 tablet 3    pravastatin (PRAVACHOL) 40 MG tablet TAKE 1 TABLET DAILY 90 tablet 1    mirabegron (MYRBETRIQ) 50 MG TB24 Take 50 mg by mouth daily 90 tablet 2    losartan (COZAAR) 25 MG tablet TAKE 1 TABLET DAILY 90 tablet 3    metoprolol (LOPRESSOR) 100 MG tablet TAKE ONE AND ONE-HALF TABLETS TWICE A  tablet 3    digoxin (LANOXIN) 125 MCG tablet TAKE 1 TABLET DAILY 90 tablet 1    verapamil (VERELAN) 240 MG extended release capsule TAKE 1 CAPSULE TWICE A  capsule 1    chlorthalidone (HYGROTON) 25 MG tablet TAKE 1 TABLET DAILY 90 tablet 3    fluticasone-salmeterol (ADVAIR) 250-50 MCG/DOSE AEPB Inhale 1 puff into the lungs 2 times daily 3 Inhaler 3    albuterol sulfate HFA (VENTOLIN HFA) 108 (90 Base) MCG/ACT inhaler Inhale 2 puffs into the lungs every 6 hours as needed for Wheezing 1 Inhaler 11    apixaban (ELIQUIS) 5 MG TABS tablet Take 1 tablet by mouth 2 times daily 180 tablet 3    nitroGLYCERIN (NITROSTAT) 0.4 MG SL tablet up to max of 3 total doses. If no relief after 1 dose, call 911. 25 tablet 1    DULoxetine (CYMBALTA) 20 MG extended release capsule Take 20 mg by mouth daily      sulfaSALAzine (AZULFIDINE) 500 MG tablet Take 500 mg by mouth 2 times daily      hydroxychloroquine (PLAQUENIL) 200 MG tablet Take 200 mg by mouth 2 times daily. No current facility-administered medications for this visit.        Social History     Socioeconomic History    Marital status:      Spouse name: Not on file    Number of children: Not on file    Years of education: Not on file    Highest education level: Not on file   Occupational History    Not on file   Tobacco Use    Smoking status: Former Smoker     Packs/day: 0.25     Years: 30.00     Pack years: 7.50     Types: Cigarettes     Start date: 3/24/1982     Quit date: 2015     Years since quittin.8    Smokeless tobacco: Never Used   Vaping Use    Vaping Use: Former   Substance and Sexual Activity    Alcohol use: No    Drug use: No    Sexual activity: Not Currently     Partners: Male   Other Topics Concern    Not on file   Social History Narrative    Not on file     Social Determinants of Health     Financial Resource Strain: Low Risk     Difficulty of Paying Living Expenses: Not hard at all   Food Insecurity: No Food Insecurity    Worried About [facility] in the Last Year: Never true    [facility] in the Last Year: Never true   Transportation Needs:     Lack of Transportation (Medical): Not on file    Lack of Transportation (Non-Medical):  Not on file   Physical Activity:     Days of Exercise per Week: Not on file    Minutes of Exercise per Session: Not on file   Stress:     Feeling of Stress : Not on file   Social Connections:     Frequency of Communication with Friends and Family: Not on file    Frequency of Social Gatherings with Friends and Family: Not on file    Attends Sabianism Services: Not on file    Active Member of [address] [place] [address] or Organizations: Not on file    Attends Club or Organization Meetings: Not on file    Marital Status: Not on file   Intimate Partner Violence:     Fear of Current or Ex-Partner: Not on file    Emotionally Abused: Not on file    Physically Abused: Not on file    Sexually Abused: Not on file   Housing Stability:     Unable to Pay for Housing in the Last Year: Not on file    Number of [place] in the Last Year: Not on file    Unstable Housing in the Last Year: Not on file       Family History   Problem Relation Age of Onset    Stroke Mother     High Blood Pressure Mother     Atrial Fibrillation Mother     Heart Disease Mother     Diabetes Father     Early Death Father     Cancer Other         Daughter    Heart Disease Sister     Heart Attack Sister     Deep Vein Thrombosis Brother consider LHC if continued symptoms even if stress test is WNL. Hx of chronic diastolic CHF- slightly swelling, takting lasix 20 mg daily. PAF- does not notice heart rate being fast much. Usually CVR with episodes of RVR, none noted recently above 120. On eliquis, lopressor 100 mg BID. HTN-elevated today. Continue to monitor for adjustment if necessary. Hx of CAD s/p prior PCI 02/2020--    Stress test.     Disposition:   F/u with Dr Jonel Corrigan as scheduled.    Continue Dr Luanne Hodgkin current treatment plan  Follow up with Dr Jonel Corrigan as scheduled or sooner if needed

## 2022-01-10 RX ORDER — APIXABAN 5 MG/1
TABLET, FILM COATED ORAL
Qty: 180 TABLET | Refills: 3 | Status: SHIPPED | OUTPATIENT
Start: 2022-01-10

## 2022-01-20 ENCOUNTER — HOSPITAL ENCOUNTER (OUTPATIENT)
Dept: NON INVASIVE DIAGNOSTICS | Age: 72
Discharge: HOME | End: 2022-01-20

## 2022-01-20 ENCOUNTER — HOSPITAL ENCOUNTER (OUTPATIENT)
Dept: NON INVASIVE DIAGNOSTICS | Age: 72
Discharge: HOME | End: 2022-01-20
Payer: MEDICARE

## 2022-01-20 DIAGNOSIS — R07.9 CHEST PAIN, UNSPECIFIED TYPE: ICD-10-CM

## 2022-01-20 DIAGNOSIS — I25.118 ATHEROSCLEROTIC HEART DISEASE OF NATIVE CORONARY ARTERY WITH OTHER FORMS OF ANGINA PECTORIS (HCC): ICD-10-CM

## 2022-01-20 PROCEDURE — 3430000000 HC RX DIAGNOSTIC RADIOPHARMACEUTICAL: Performed by: INTERNAL MEDICINE

## 2022-01-20 PROCEDURE — 6360000002 HC RX W HCPCS

## 2022-01-20 PROCEDURE — A9500 TC99M SESTAMIBI: HCPCS | Performed by: INTERNAL MEDICINE

## 2022-01-20 PROCEDURE — 78452 HT MUSCLE IMAGE SPECT MULT: CPT

## 2022-01-20 PROCEDURE — 93017 CV STRESS TEST TRACING ONLY: CPT | Performed by: INTERNAL MEDICINE

## 2022-01-20 RX ADMIN — Medication 10 MILLICURIE: at 08:45

## 2022-01-20 RX ADMIN — Medication 33.3 MILLICURIE: at 09:50

## 2022-02-10 ENCOUNTER — OFFICE VISIT (OUTPATIENT)
Dept: FAMILY MEDICINE CLINIC | Age: 72
End: 2022-02-10
Payer: MEDICARE

## 2022-02-10 VITALS
SYSTOLIC BLOOD PRESSURE: 106 MMHG | HEIGHT: 67 IN | WEIGHT: 230 LBS | HEART RATE: 76 BPM | OXYGEN SATURATION: 96 % | TEMPERATURE: 97.4 F | BODY MASS INDEX: 36.1 KG/M2 | DIASTOLIC BLOOD PRESSURE: 58 MMHG | RESPIRATION RATE: 20 BRPM

## 2022-02-10 DIAGNOSIS — J44.9 CHRONIC OBSTRUCTIVE PULMONARY DISEASE, UNSPECIFIED COPD TYPE (HCC): ICD-10-CM

## 2022-02-10 DIAGNOSIS — Z00.00 ROUTINE GENERAL MEDICAL EXAMINATION AT A HEALTH CARE FACILITY: Primary | ICD-10-CM

## 2022-02-10 PROCEDURE — G0439 PPPS, SUBSEQ VISIT: HCPCS | Performed by: FAMILY MEDICINE

## 2022-02-10 ASSESSMENT — PATIENT HEALTH QUESTIONNAIRE - PHQ9
SUM OF ALL RESPONSES TO PHQ QUESTIONS 1-9: 2
1. LITTLE INTEREST OR PLEASURE IN DOING THINGS: 1
SUM OF ALL RESPONSES TO PHQ9 QUESTIONS 1 & 2: 2
SUM OF ALL RESPONSES TO PHQ QUESTIONS 1-9: 2
2. FEELING DOWN, DEPRESSED OR HOPELESS: 1

## 2022-02-10 ASSESSMENT — LIFESTYLE VARIABLES: HOW OFTEN DO YOU HAVE A DRINK CONTAINING ALCOHOL: 0

## 2022-02-10 NOTE — PROGRESS NOTES
Medicare Annual Wellness Visit  Name: Jimmy Borjas Date: 2/10/2022   MRN: [id number] Sex: Female   Age: 74 y.o. Ethnicity: Non- / Non    : 1950 Race: White (non-)      Rita Zimmerman is here for Medicare AWV (Had a dexa scan by Dr. Taylor Rice. )    Screenings for behavioral, psychosocial and functional/safety risks, and cognitive dysfunction are all negative except as indicated below. These results, as well as other patient data from the [address] form, are documented in Flowsheets linked to this Encounter. Allergies   Allergen Reactions    Demerol Hcl [Meperidine] Nausea And Vomiting       Prior to Visit Medications    Medication Sig Taking? Authorizing Provider   ELIQUIS 5 MG TABS tablet TAKE 1 TABLET TWICE A DAY Yes Yee Morrissey MD   Melatonin 10 MG TABS Take 10 mg by mouth nightly as needed Yes Historical Provider, MD   b complex vitamins capsule Take 1 capsule by mouth daily as needed Yes Historical Provider, MD   loperamide (IMODIUM) 2 MG capsule Take 2 mg by mouth 4 times daily as needed for Diarrhea Yes Historical Provider, MD   clopidogrel (PLAVIX) 75 MG tablet TAKE 1 TABLET DAILY Yes Yee Morrissey MD   pantoprazole (PROTONIX) 40 MG tablet TAKE 1 TABLET DAILY Yes Yee Morrissey MD   modafinil (PROVIGIL) 200 MG tablet Take 2 tablets by mouth daily for 180 days.  Yes AWILDA Hdz CNP   pravastatin (PRAVACHOL) 40 MG tablet TAKE 1 TABLET DAILY Yes Christopher Osei PA-C   mirabegron (MYRBETRIQ) 50 MG TB24 Take 50 mg by mouth daily Yes Isreal Primrose, MD   losartan (COZAAR) 25 MG tablet TAKE 1 TABLET DAILY Yes Christopher Osei PA-C   metoprolol (LOPRESSOR) 100 MG tablet TAKE ONE AND ONE-HALF TABLETS TWICE A DAY Yes AWILDA Mathew CNP   digoxin (LANOXIN) 125 MCG tablet TAKE 1 TABLET DAILY Yes AWILDA Mathew CNP   verapamil (VERELAN) 240 MG extended release capsule TAKE 1 CAPSULE TWICE A DAY Yes Michaela Cordero APRN - CNP   chlorthalidone (HYGROTON) 25 MG tablet TAKE 1 TABLET DAILY Yes Ana Hall MD   fluticasone-salmeterol (ADVAIR) 250-50 MCG/DOSE AEPB Inhale 1 puff into the lungs 2 times daily Yes Gilberto Chen MD   albuterol sulfate HFA (VENTOLIN HFA) 108 (90 Base) MCG/ACT inhaler Inhale 2 puffs into the lungs every 6 hours as needed for Wheezing Yes Gilberto Chen MD   nitroGLYCERIN (NITROSTAT) 0.4 MG SL tablet up to max of 3 total doses. If no relief after 1 dose, call 911. Yes AWILDA Burk CNP   DULoxetine (CYMBALTA) 20 MG extended release capsule Take 20 mg by mouth daily Yes Historical Provider, MD   sulfaSALAzine (AZULFIDINE) 500 MG tablet Take 500 mg by mouth 2 times daily Yes Historical Provider, MD   hydroxychloroquine (PLAQUENIL) 200 MG tablet Take 200 mg by mouth 2 times daily. Yes Historical Provider, MD   furosemide (LASIX) 20 MG tablet Take 20 mg by mouth Daily  Patient not taking: Reported on 2/10/2022  Historical Provider, MD       Past Medical History:   Diagnosis Date    Arthritis     Atrial fibrillation ([facility]     Cancer ([facility]     skin    Carotid arterial disease ([facility]     CHF (congestive heart failure) ([facility]     Depression     Fibromyalgia     Hypertension     Hypothyroidism     Obesity     S/P bariatric surgery 2018    Sleep apnea     CPAP    Thyroid disease     TIA (transient ischemic attack) 12/2015       Past Surgical History:   Procedure Laterality Date    APPENDECTOMY  over 10 years ago    [address] 10/01/2019    CAROTID ENDARTERECTOMY  09/2017    CHOLECYSTECTOMY  over 10 years ago    [id number] [facility] COLONOSCOPY N/A 2/7/2018    COLONOSCOPY WITH BIOPSY performed by Austin Buenrostro MD at [address] [facility]     HYSTERECTOMY  10 plus years ago    Dr. Cande Magana Right 2009    OTHER SURGICAL HISTORY Left 2012    Rotator cuff sx    SLEEVE GASTRECTOMY 09/12/2016    Robotic, Extensive Lysis of Adhesions, Removal of [place] Prosthesis - Dr. Cuca Villalpando Left 04/2021    UPPER GASTROINTESTINAL ENDOSCOPY  07/06/2016    Dr. Pollo Webb        Family History   Problem Relation Age of Onset    Stroke Mother     High Blood Pressure Mother     Atrial Fibrillation Mother     Heart Disease Mother     Diabetes Father     Early Death Father     Cancer Other         Daughter    Heart Disease Sister     Heart Attack Sister     Deep Vein Thrombosis Brother        CareTeam (Including outside providers/suppliers regularly involved in providing care):   Patient Care Team:  French Natarajan MD as PCP - General (Family Medicine)  French Natarajan MD as PCP - [facility]  Shemar Kelly MD as Consulting Physician (Pulmonology)  Lanny Hernandez MD as Physician (Cardiology)  Austin Cardona MD as Physician (Rheumatology)  Stone Herndon MD as Surgeon (Cardiothoracic Surgery)    Wt Readings from Last 3 Encounters:   02/10/22 230 lb (104.3 kg)   01/06/22 229 lb 6.4 oz (104.1 kg)   12/30/21 220 lb (99.8 kg)     Vitals:    02/10/22 1131   BP: (!) 106/58   Pulse: 76   Resp: 20   Temp: 97.4 °F (36.3 °C)   TempSrc: Oral   SpO2: 96%   Weight: 230 lb (104.3 kg)   Height: 5' 7.25\" (1.708 m)     Body mass index is 35.76 kg/m². Based upon direct observation of the patient, evaluation of cognition reveals recent and remote memory intact.     General Appearance: alert and oriented to person, place and time, well developed and well- nourished, in no acute distress  Skin: warm and dry, no rash or erythema  Head: normocephalic and atraumatic  Eyes: pupils equal, round, and reactive to light, extraocular eye movements intact, conjunctivae normal  ENT: tympanic membrane, external ear and ear canal normal bilaterally, nose without deformity, nasal mucosa and turbinates normal without polyps  Neck: supple and non-tender without mass, no thyromegaly or thyroid nodules, no cervical lymphadenopathy  Pulmonary/Chest: clear to auscultation bilaterally- no wheezes, rales or rhonchi, normal air movement, no respiratory distress  Cardiovascular: normal rate, regular rhythm, normal S1 and S2, no murmurs, rubs, clicks, or gallops, distal pulses intact, no carotid bruits  Abdomen: soft, non-tender, non-distended, normal bowel sounds, no masses or organomegaly  Extremities: no cyanosis, clubbing or edema  Musculoskeletal: normal range of motion, no joint swelling, deformity or tenderness  Neurologic: reflexes normal and symmetric, no cranial nerve deficit, gait, coordination and speech normal    Patient's complete Health Risk Assessment and screening values have been reviewed and are found in Flowsheets. The following problems were reviewed today and where indicated follow up appointments were made and/or referrals ordered. Positive Risk Factor Screenings with Interventions:            General Health and ACP:  General  In general, how would you say your health is?: Good  In the past 7 days, have you experienced any of the following?  New or Increased Pain, New or Increased Fatigue, Loneliness, Social Isolation, Stress or Anger?: (!) Stress  Do you get the social and emotional support that you need?: Yes  Do you have a Living Will?: (!) No  Advance Directives     Power of [address] [place] [address] Will ACP-Advance Directive ACP-Power of     Not on File Not on File Not on File Not on File      General Health Risk Interventions:  · No Living Will: Patient declines ACP discussion/assistance    Health Habits/Nutrition:  Health Habits/Nutrition  Do you exercise for at least 20 minutes 2-3 times per week?: (!) No  Have you lost any weight without trying in the past 3 months?: No  Do you eat only one meal per day?: No  Have you seen the dentist within the past year?: Yes  Body mass index: (!) 35.75  Health Habits/Nutrition Interventions:  · na    Hearing/Vision:  No exam data present  Hearing/Vision  Do you or your family notice any trouble with your hearing that hasn't been managed with hearing aids?: (!) Yes  Do you have difficulty driving, watching TV, or doing any of your daily activities because of your eyesight?: No  Have you had an eye exam within the past year?: Yes  Hearing/Vision Interventions:  · Hearing concerns:  patient declines any further evaluation/treatment for hearing issues    Safety:  Safety  Do you have working smoke detectors?: Yes  Have all throw rugs been removed or fastened?: Yes  Do you have non-slip mats or surfaces in all bathtubs/showers?: Yes  Do all of your stairways have a railing or banister?: Yes  Are your doorways, halls and stairs free of clutter?: (!) No  Do you always fasten your seatbelt when you are in a car?: Yes  Safety Interventions:  · Home safety tips provided       Personalized Preventive Plan   Current Health Maintenance Status  Immunization History   Administered Date(s) Administered    COVID-19, Olivier Arizmendi, Primary or Immunocompromised, PF, 100mcg/0.5mL 02/08/2021, 03/08/2021, 11/23/2021    Influenza Virus Vaccine 01/17/2015, 11/20/2017, 09/29/2019    Influenza, High-dose, Quadv, 65 yrs +, IM (Fluzone) 10/21/2020    Influenza, Quadv, IM, PF (6 mo and older Fluzone, Flulaval, Fluarix, and 3 yrs and older Afluria) 11/07/2018    Influenza, Quadv, adjuvanted, 65 yrs +, IM, PF (Fluad) 10/29/2021    Pneumococcal Conjugate 13-valent ([id number]) 12/20/2017    Pneumococcal Polysaccharide ([id number]) 01/17/2015    Tdap (Boostrix, Adacel) 12/20/2017    Zoster Live (Zostavax) 04/24/2015    Zoster Recombinant (Shingrix) 10/21/2020, 01/15/2021        Health Maintenance   Topic Date Due    Depression Screen  Never done    DEXA (modify frequency per FRAX score)  Never done    Annual Wellness Visit (AWV)  02/04/2022    Lipid screen  07/19/2022    Breast cancer screen  10/12/2022    Potassium monitoring  12/31/2022    Creatinine monitoring  12/31/2022    Colon cancer screen colonoscopy  02/07/2023    DTaP/Tdap/Td vaccine (2 - Td or Tdap) 12/20/2027    Flu vaccine  Completed    Shingles Vaccine  Completed    Pneumococcal 65+ years Vaccine  Completed    COVID-19 Vaccine  Completed    Hepatitis C screen  Completed    Hepatitis A vaccine  Aged Out    Hepatitis B vaccine  Aged Out    Hib vaccine  Aged Out    Meningococcal (ACWY) vaccine  Aged Out     Recommendations for [facility] Due: see orders and patient instructions/AVS.  . Recommended screening schedule for the next 5-10 years is provided to the patient in written form: see Patient Instructions/AVS.    Saji Harris was seen today for medicare awv. Diagnoses and all orders for this visit:    Routine general medical examination at a health care facility    Chronic obstructive pulmonary disease, unspecified COPD type ([facility]             Seen today for wellness visit. Discussed the importance of a healthy life style. Balanced diet, nutrition, physical activity,and injury prevention. Also discussed the importance of up to date immunizations and annual screenings.

## 2022-02-10 NOTE — PATIENT INSTRUCTIONS
Personalized Preventive Plan for Jarrell Villalta - 2/10/2022  Medicare offers a range of preventive health benefits. Some of the tests and screenings are paid in full while other may be subject to a deductible, co-insurance, and/or copay. Some of these benefits include a comprehensive review of your medical history including lifestyle, illnesses that may run in your family, and various assessments and screenings as appropriate. After reviewing your medical record and screening and assessments performed today your provider may have ordered immunizations, labs, imaging, and/or referrals for you. A list of these orders (if applicable) as well as your Preventive Care list are included within your After Visit Summary for your review. Other Preventive Recommendations:    · A preventive eye exam performed by an eye specialist is recommended every 1-2 years to screen for glaucoma; cataracts, macular degeneration, and other eye disorders. · A preventive dental visit is recommended every 6 months. · Try to get at least 150 minutes of exercise per week or 10,000 steps per day on a pedometer . · Order or download the FREE \"Exercise & Physical Activity: Your Everyday Guide\" from [employer]. Call 9-318.231.1055 or search [employer] online. · You need 8456-1696 mg of calcium and 1764-8156 IU of vitamin D per day. It is possible to meet your calcium requirement with diet alone, but a vitamin D supplement is usually necessary to meet this goal.  · When exposed to the sun, use a sunscreen that protects against both UVA and UVB radiation with an SPF of 30 or greater. Reapply every 2 to 3 hours or after sweating, drying off with a towel, or swimming. · Always wear a seat belt when traveling in a car. Always wear a helmet when riding a bicycle or motorcycle.

## 2022-02-14 ENCOUNTER — OFFICE VISIT (OUTPATIENT)
Dept: CARDIOLOGY CLINIC | Age: 72
End: 2022-02-14
Payer: MEDICARE

## 2022-02-14 VITALS
HEART RATE: 85 BPM | BODY MASS INDEX: 33.25 KG/M2 | HEIGHT: 68 IN | SYSTOLIC BLOOD PRESSURE: 136 MMHG | DIASTOLIC BLOOD PRESSURE: 59 MMHG | WEIGHT: 219.4 LBS

## 2022-02-14 DIAGNOSIS — I50.32 CHRONIC DIASTOLIC CONGESTIVE HEART FAILURE (HCC): ICD-10-CM

## 2022-02-14 DIAGNOSIS — I48.21 PERMANENT ATRIAL FIBRILLATION (HCC): ICD-10-CM

## 2022-02-14 DIAGNOSIS — Z95.820 S/P ANGIOPLASTY WITH STENT: ICD-10-CM

## 2022-02-14 DIAGNOSIS — I10 ESSENTIAL HYPERTENSION: ICD-10-CM

## 2022-02-14 DIAGNOSIS — R60.0 BILATERAL LEG EDEMA: ICD-10-CM

## 2022-02-14 DIAGNOSIS — I25.10 CORONARY ARTERY DISEASE INVOLVING NATIVE CORONARY ARTERY OF NATIVE HEART WITHOUT ANGINA PECTORIS: Primary | ICD-10-CM

## 2022-02-14 DIAGNOSIS — I25.5 ISCHEMIC CARDIOMYOPATHY: ICD-10-CM

## 2022-02-14 PROBLEM — R07.89 ATYPICAL CHEST PAIN: Status: RESOLVED | Noted: 2021-12-31 | Resolved: 2022-02-14

## 2022-02-14 PROCEDURE — 99214 OFFICE O/P EST MOD 30 MIN: CPT | Performed by: INTERNAL MEDICINE

## 2022-02-14 NOTE — PROGRESS NOTES
Chief Complaint   Patient presents with    6 Month Follow-Up     HX OF  fu had TIA here at [facility], transferred from [facility]    Had carotid endarterectomy - 17 -     Was admitted from office for atr fib with  and was admitted TAYLOR-CV . Cath and needed stent and later went back to atr fib with 110 and re-sent to ER and sent home with added cardizem       6 months F/U    Last ek21    Denied cp, palpitations,  dizziness or edema    Palpitation better after increased lopressor and calan    Sob on exertion and fatigue on exertion- chronic- better    No leg edma  No wt gain    Post Cath and PCI and A. fib rate control--feel stronger    Hx of depression and a lots of stress in family  and and daughter sick        Patient Active Problem List   Diagnosis    Obstructive sleep apnea on CPAP    COPD (chronic obstructive pulmonary disease) ([facility])    Fibromyalgia    Right arm weakness    Obesity (BMI 30-39. 9)    Chronic diastolic congestive heart failure (HCC)    Bilateral leg edema  +1    Pulmonary HTN ([facility]) Mean pressure 32 mmhg by RHC    SOB (shortness of breath) on exertion    Polyosteoarthritis    Postsurgical malabsorption    Bilateral carotid artery stenosis    S/P carotid endarterectomy    Positive colorectal cancer screening using DNA-based stool test    Dysphagia    Gastroesophageal reflux disease without esophagitis    Hypersomnia with sleep apnea    Fatigue    Essential hypertension    Sleep apnea    Coronary artery disease involving native heart without angina pectoris    S/P cardiac cath    S/P percutaneous transluminal angioplasty (PTA) with stent placement    Atrial fibrillation status post cardioversion [facility])    Atherosclerotic heart disease of native coronary artery with other forms of angina pectoris (HCC)    Permanent atrial fibrillation (HCC)    Morbid (severe) obesity due to excess calories ([address]    S/P angioplasty with stent of the LM to LAD feb     Ischemic cardiomyopathy 45 %       Past Surgical History:   Procedure Laterality Date    APPENDECTOMY  over 10 years ago    [address] Left 10/01/2019    CAROTID ENDARTERECTOMY  2017    CHOLECYSTECTOMY  over 10 years ago    [id number] [facility] COLONOSCOPY N/A 2018    COLONOSCOPY WITH BIOPSY performed by Huyen Spangler MD at [address] [facility]  10 plus years ago    Dr. Parth Rush Right     OTHER SURGICAL HISTORY Left     Rotator cuff sx    SLEEVE GASTRECTOMY  2016    Robotic, Extensive Lysis of Adhesions, Removal of [place] Prosthesis - Dr. Ron Brooks Left 2021    UPPER GASTROINTESTINAL ENDOSCOPY  2016    Dr. Mago Robles        Allergies   Allergen Reactions    Demerol Hcl [Meperidine] Nausea And Vomiting        Family History   Problem Relation Age of Onset    Stroke Mother     High Blood Pressure Mother     Atrial Fibrillation Mother     Heart Disease Mother     Diabetes Father     Early Death Father     Cancer Other         Daughter    Heart Disease Sister     Heart Attack Sister     Deep Vein Thrombosis Brother         Social History     Socioeconomic History    Marital status:      Spouse name: Not on file    Number of children: Not on file    Years of education: Not on file    Highest education level: Not on file   Occupational History    Not on file   Tobacco Use    Smoking status: Former Smoker     Packs/day: 0.25     Years: 30.00     Pack years: 7.50     Types: Cigarettes     Start date: 3/24/1982     Quit date: 2015     Years since quittin.9    Smokeless tobacco: Never Used   Vaping Use    Vaping Use: Former   Substance and Sexual Activity    Alcohol use: No    Drug use: No    Sexual activity: Not Currently     Partners: Male   Other Topics Concern    Not on file   Social History Narrative    Not on file     Social Determinants of Health     Financial Resource Strain: Low Risk     Difficulty of Paying Living Expenses: Not hard at all   Food Insecurity: No Food Insecurity    Worried About Running Out of Food in the Last Year: Never true    [address] in the Last Year: Never true   Transportation Needs:     Lack of Transportation (Medical): Not on file    Lack of Transportation (Non-Medical):  Not on file   Physical Activity:     Days of Exercise per Week: Not on file    Minutes of Exercise per Session: Not on file   Stress:     Feeling of Stress : Not on file   Social Connections:     Frequency of Communication with Friends and Family: Not on file    Frequency of Social Gatherings with Friends and Family: Not on file    Attends Oriental orthodox Services: Not on file    Active Member of [address] [place] [address] [employer] or Organizations: Not on file    Attends Club or Organization Meetings: Not on file    Marital Status: Not on file   Intimate Partner Violence:     Fear of Current or Ex-Partner: Not on file    Emotionally Abused: Not on file    Physically Abused: Not on file    Sexually Abused: Not on file   Housing Stability:     Unable to Pay for Housing in the Last Year: Not on file    Number of [place] in the Last Year: Not on file    Unstable Housing in the Last Year: Not on file       Current Outpatient Medications   Medication Sig Dispense Refill    ELIQUIS 5 MG TABS tablet TAKE 1 TABLET TWICE A  tablet 3    furosemide (LASIX) 20 MG tablet Take 20 mg by mouth Daily       Melatonin 10 MG TABS Take 10 mg by mouth nightly as needed      b complex vitamins capsule Take 1 capsule by mouth daily as needed      loperamide (IMODIUM) 2 MG capsule Take 2 mg by mouth 4 times daily as needed for Diarrhea      clopidogrel (PLAVIX) 75 MG tablet TAKE 1 TABLET DAILY 90 tablet 3    pantoprazole (PROTONIX) 40 MG tablet TAKE 1 TABLET DAILY 90 tablet 3    modafinil (PROVIGIL) 200 MG tablet Take 2 tablets by mouth daily for 180 days. 180 tablet 3    pravastatin (PRAVACHOL) 40 MG tablet TAKE 1 TABLET DAILY 90 tablet 1    mirabegron (MYRBETRIQ) 50 MG TB24 Take 50 mg by mouth daily 90 tablet 2    losartan (COZAAR) 25 MG tablet TAKE 1 TABLET DAILY 90 tablet 3    metoprolol (LOPRESSOR) 100 MG tablet TAKE ONE AND ONE-HALF TABLETS TWICE A  tablet 3    digoxin (LANOXIN) 125 MCG tablet TAKE 1 TABLET DAILY 90 tablet 1    verapamil (VERELAN) 240 MG extended release capsule TAKE 1 CAPSULE TWICE A  capsule 1    chlorthalidone (HYGROTON) 25 MG tablet TAKE 1 TABLET DAILY 90 tablet 3    fluticasone-salmeterol (ADVAIR) 250-50 MCG/DOSE AEPB Inhale 1 puff into the lungs 2 times daily 3 Inhaler 3    albuterol sulfate HFA (VENTOLIN HFA) 108 (90 Base) MCG/ACT inhaler Inhale 2 puffs into the lungs every 6 hours as needed for Wheezing 1 Inhaler 11    nitroGLYCERIN (NITROSTAT) 0.4 MG SL tablet up to max of 3 total doses. If no relief after 1 dose, call 911. 25 tablet 1    DULoxetine (CYMBALTA) 20 MG extended release capsule Take 20 mg by mouth daily      sulfaSALAzine (AZULFIDINE) 500 MG tablet Take 500 mg by mouth 2 times daily      hydroxychloroquine (PLAQUENIL) 200 MG tablet Take 200 mg by mouth 2 times daily. No current facility-administered medications for this visit. Review of Systems -     General ROS: negative  Psychological ROS: negative  Hematological and Lymphatic ROS: No history of blood clots or bleeding disorder. Respiratory ROS: no cough, shortness of breath, or wheezing  Cardiovascular ROS: no chest pain or dyspnea on exertion  Gastrointestinal ROS: negative  Genito-Urinary ROS: no dysuria, trouble voiding, or hematuria  Musculoskeletal ROS: negative  Neurological ROS: no TIA or stroke symptoms  Dermatological ROS: negative      Blood pressure (!) 136/59, pulse 85, height 5' 8\" (1.727 m), weight 219 lb 6.4 oz (99.5 kg), not currently breastfeeding.         Physical Examination:    General appearance - alert, well appearing, and in no distress  Mental status - alert, oriented to person, place, and time  Neck - supple, no significant adenopathy, no JVD, or carotid bruits  Chest - clear to auscultation, no wheezes, rales or rhonchi, symmetric air entry  Heart - normal rate, regular rhythm, normal S1, S2, no murmurs, rubs, clicks or gallops  Abdomen - soft, nontender, nondistended, no masses or organomegaly  Neurological - alert, oriented, normal speech, no focal findings or movement disorder noted  Musculoskeletal - no joint tenderness, deformity or swelling  Extremities - peripheral pulses normal, no pedal edema, no clubbing or cyanosis  Skin - normal coloration and turgor, no rashes, no suspicious skin lesions noted    Lab  No results for input(s): CKTOTAL, CKMB, CKMBINDEX, TROPONINI in the last 72 hours.   CBC:   Lab Results   Component Value Date    WBC 10.3 12/30/2021    RBC 4.25 12/30/2021    HGB 13.7 12/30/2021    HCT 42.1 12/30/2021    MCV 99.1 12/30/2021    MCH 32.2 12/30/2021    MCHC 32.5 12/30/2021    RDW 13.3 09/22/2021     12/30/2021    MPV 9.1 12/30/2021     BMP:    Lab Results   Component Value Date     12/31/2021    K 3.9 12/31/2021    K 4.0 03/04/2020     12/31/2021    CO2 27 12/31/2021    BUN 14 12/31/2021    LABALBU 4.1 12/30/2021    CREATININE 0.6 12/31/2021    CALCIUM 9.3 12/31/2021    LABGLOM >90 12/31/2021    GLUCOSE 95 12/31/2021    GLUCOSE 91 09/21/2020     Hepatic Function Panel:    Lab Results   Component Value Date    ALKPHOS 82 12/30/2021    ALT 11 12/30/2021    AST 15 12/30/2021    PROT 7.0 12/30/2021    BILITOT 0.3 12/30/2021    BILIDIR <0.2 12/30/2021    LABALBU 4.1 12/30/2021     Magnesium:    Lab Results   Component Value Date    MG 1.9 12/31/2021     Warfarin PT/INR:  No components found for: PTPATWAR, PTINRWAR  HgBA1c:    Lab Results   Component Value Date    LABA1C 5.4 10/01/2018     FLP:    Lab Results   Component Value Date TRIG 91 07/19/2021    HDL 49 07/19/2021    LDLCALC 46 07/19/2021    LDLDIRECT 58 09/21/2020     TSH:    Lab Results   Component Value Date    TSH 2.050 12/31/2021       EKG 1/5/16  Normal sinus rhythm  Rightward axis  T wave abnormality, consider inferior ischemia  Abnormal ECG  When compared with ECG of 01-JUL-2011 09:56,  Questionable change in The axis  T wave inversion now evident in Inferior leads  Confirmed by NATHANIEL PHELAN ([id number]) on 12/8/2015 8:38:06 PM    EKG 8/25/16  Marked sinus  Bradycardia   -Left axis -anterior fascicular block.    -Old anteroseptal infarct. ABNORMAL       EKG 9/22/16-sinus bradycardia rate 46 bopm       CONCLUSION:   1. This is a normal sinus rhythm with average heart rate 55 beats per minute,  ranging from  beats per minute. 2. No significant pause of more than 1.9 seconds noted. 3. Rare ventricular ectopic beats, isolated and couplets. 4. Rare supraventricular ectopic beats, isolated, couplets, and rare  nonsustained supraventricular ectopic run. 5. No significant bradyarrhythmia to be addressed, yet certainly average  heart rate 55, minimum is 40, so probably need further clinical correlation. 6. Otherwise, at this level, this minimal low heart rate if there are no  symptoms will need just observation.  Alex Davidson M.D.     D: 11/29/2016 20:0      EKG 8/12/19  NSR, no acute abn       Cath feb 2020  HEMODYNAMICS:  LVEDP 9 mmHg.     CORONARY ANGIOGRAM:  1. Right coronary artery. The ostial RCA has a 10% lesion, proximal  RCA has a 20% lesion. Mid RCA has 30% lesion. Distal RCA with luminal  irregularities. RCA is a dominant vessel. Bifurcates into RPL and  RPDA. RPL has an ostial 20% lesion. RPDA has a 10% to 20% lesion. 2.  Left main coronary artery. Left main coronary artery has a 50%  lesion in the distal left main coronary artery. Bifurcates into left  circumflex artery and LAD. 3.  Left circumflex artery.   10% to 20% ostial lesion in the left  circumflex artery. There is a high takeoff of a large OM1 branch that  has a 60% lesion in the proximal part of the vessel. Distal left  circumflex artery is patent. OM2 is a large vessel with no significant  stenotic lesions. 4.  Left anterior descending artery. There is a severe stenotic diffuse  lesion involving the ostial and proximal part of LAD ranging in severity  between 70% to 90%. Mid LAD is patent. Distal LAD has mild diffuse  disease.     MEDICATIONS:  See MAR.     COMPLICATIONS:  None.     ESTIMATED BLOOD LOSS:  Less than 30 mL.     ACCESS:  Right radial artery access. Vasc Band was applied. Hemostasis  was achieved.     CONCLUSION:  1. Severe stenotic lesion of the distal left main coronary artery and  ostial/proximal left anterior descending artery. 2.  Status post successful PCI with the stenting of distal left main  coronary artery and left anterior descending artery with details as  listed above.     RECOMMENDATIONS:  1.  Bedrest for the next 4 hours. 2.  Monitor in telemetry. 3.  Aggressive risk factor modification. 4.  Optimize medical therapy for CAD. 5.  Access site care. 6.  Aspirin 81 mg p.o. daily, can be stopped after 1 month. 7.  Plavix 75 mg p.o. daily. 8.  May resume Eliquis in a.m.  9.  High intensity statin therapy. 10.  Outpatient follow up in office in 2 to 4 weeks.     Findings and plan of care discussed with the patient's family.  Helen Boykin MD     D: 02/21/2020 15:     -IVUS LM/LAD  -Severe stenotic lesion of dLM (Bifurcation lesion, De Leon Type 1,1,0)  -Severe 80-90% lesion of ostial/proximal LAD  -S/P Successful PCI with stenting of dLM/pLAD      Conclusions    Summary   Lexiscan EKG stress test is not suggestive for ischemia. The nuclear images is not suggestive for myocardial ischemia. Recommendation   Clinical correlation is recommended due to poor image quality.     Signatures ----------------------------------------------------------------   Electronically signed by Tonda Spurling MD (Interpreting   Cardiologist) on 01/20/2022 at 20:14   ----------------------------------------------------------------    Conclusions   Summary   Normal left ventricle size and systolic function. Ejection fraction was   estimated at 55 to 60 %. There were no regional left ventricular wall   motion abnormalities and wall thickness was within normal limits. The left atrium is Moderately dilated. Mildly enlarged right atrium size. Signature    ----------------------------------------------------------------   Electronically signed by Tonda Spurling MD (Interpreting   physician) on 04/20/2021 at 06:23 PM   ----------------------------------------------------------------      ekg 9/23/2020  Atrial flutter with  bpm    4/7/2021  atr fib with CVA   Septal infarction  No acute abn    Assessment    Home BP good    Leg edema +1 chronic       Diagnosis Orders   1. Coronary artery disease involving native coronary artery of native heart without angina pectoris     2. Chronic diastolic congestive heart failure ([address]     3. Ischemic cardiomyopathy 45 %     4. Permanent atrial fibrillation ([address]     5. Essential hypertension     6. Bilateral leg edema  +1     7.  S/P angioplasty with stent of the LM to LAD feb 2020         Recent feb 2020 admission DX  PAFB RVR  - more persistent now - s\p TAYLOR / CV to SR 2/24/2020              On [address]               On amiodarone      abnormal stress testing   S\p cardiac cath 2/21/2020:  -Severe stenotic lesion of dLM (Bifurcation lesion, De Leon Type 1,1,0)  -Severe 80-90% lesion of ostial/proximal LAD  -S/P Successful PCI with stenting of dLM/pLAD     Hx bradycardia - none this admit     HTN  HLP  DM II  Hx Lt CEA 2007  Hx TIA  Hx SHANNEN      Previous admission DX    Hx of recent TIA with RT side weakness      NSVT 5 beat   S/P gastric sleeve  Hypertensive Urgency  Hx of obstructive Sleep apnea  Pulmonary HTN  New onset post afib with RVR- now rate controlled  Chads of  DM II  HX of TIA         Continue home medication regimen   bP controlled    Doing well        Plan     The current  meds and labs reviewed    Permanent atr fib with CVR  atr flutter /Fib with CVR 84  S/p TAYLOR- CV and weent back to atr fib  Off  amiodarone as failed to be in NSR  atr fib with  bpm at rest   TSH WNL  Cont  metoprolol 150  BID  Cont calan SR  240 po bid for rate control  Cont digoxin 125 mcg po qd  Cont  losartan to 25 po qd  If remain uncontrolled consider cardioversion and or eps       CHDAS of 4( DM, htn, Hx of TIA)  Need longterm OA and on apixaban  D/w the pat the risk and benefit of OA  Pat understand the risk of bleeding including ICH    Coronary artery disease, seems to be stable. Denies angina or change in breathing pattern  S/ p LM-LAD stent feb 2020  Cont apixaban and plavix  Off ASA       Cardiomyopathy: improving, no CHF symptoms, no change in clinical condition. Will need periodic echocardiograms depending on symptoms. Echo EF 60% and mariaelena nuc negative      Patient Seen, Chart, Consults notes, Labs, Radiology studies reviewed. Hx of TIA IN 2016  Was on asa and apixaban    Hypertension, on medical treatment. Seems to be under good control. Patient is compliant with medical treatment. Cont  calan SR     Congestive heart failure: no evidence of fluid overload today, no recent hospitalization for CHF  Leg edema +1 -   Lost 8 lb  Lasix 20 mg po QOD  kdur 10 meq po QOD     Hyperlipidemia: on statins, followed periodically. Patient need periodic lipid and liver profile.   Hx of carotid left carotid endarterectomy in 2017  And started on statin then  Lower leg weakness from lipitor  OFF  lipitor  TOLERATED  lIvalo 1 mg in 3 weeks AND BUT PCP STOPPED IT DUE TO nORMAL LIPID PANEL VALUES  Now taking pravastatin  Cont  pravastatin to 40 mg po qd    Pulm HTN    Hypertension, on medical treatment. Seems to be under good control. Patient is compliant with medical treatment. Hx of Intermittent serena- claudio  WNL and probably neuropathic    Reviewed the  report of R[facility] from [facility]    patient is advised to exercise 30 min s a day three times a week and about weight loss ,balance diet and    More fruits and vegetables . D/w the pat the plan of care    Aletha Kruger is Stable and doing well    Reviewed the record-on recent admission and office visit    Discussed use, benefit, and side effects of prescribed medications. All patient questions answered. Pt voiced understanding. Instructed to continue current medications, diet and exercise. Continue risk factor modification and medical management. Patient agreed with treatment plan. Follow up as directed.       RTC in   5 months    Natalio EricksonFillmore County Hospital

## 2022-03-01 ENCOUNTER — OFFICE VISIT (OUTPATIENT)
Dept: PULMONOLOGY | Age: 72
End: 2022-03-01
Payer: MEDICARE

## 2022-03-01 VITALS
DIASTOLIC BLOOD PRESSURE: 80 MMHG | SYSTOLIC BLOOD PRESSURE: 130 MMHG | HEART RATE: 80 BPM | TEMPERATURE: 97.2 F | HEIGHT: 68 IN | BODY MASS INDEX: 34.83 KG/M2 | OXYGEN SATURATION: 92 % | WEIGHT: 229.8 LBS

## 2022-03-01 DIAGNOSIS — G47.10 HYPERSOMNIA WITH SLEEP APNEA: ICD-10-CM

## 2022-03-01 DIAGNOSIS — G47.30 HYPERSOMNIA WITH SLEEP APNEA: ICD-10-CM

## 2022-03-01 DIAGNOSIS — Z99.89 OBSTRUCTIVE SLEEP APNEA ON CPAP: Primary | ICD-10-CM

## 2022-03-01 DIAGNOSIS — G47.09 OTHER INSOMNIA: ICD-10-CM

## 2022-03-01 DIAGNOSIS — E66.9 OBESITY (BMI 30-39.9): ICD-10-CM

## 2022-03-01 DIAGNOSIS — I27.20 PULMONARY HTN (HCC): ICD-10-CM

## 2022-03-01 DIAGNOSIS — G47.33 OBSTRUCTIVE SLEEP APNEA ON CPAP: Primary | ICD-10-CM

## 2022-03-01 PROCEDURE — 99214 OFFICE O/P EST MOD 30 MIN: CPT | Performed by: PHYSICIAN ASSISTANT

## 2022-03-01 RX ORDER — TRAZODONE HYDROCHLORIDE 50 MG/1
50 TABLET ORAL NIGHTLY PRN
Qty: 30 TABLET | Refills: 2 | Status: SHIPPED | OUTPATIENT
Start: 2022-03-01 | End: 2022-05-03 | Stop reason: SDUPTHER

## 2022-03-01 ASSESSMENT — ENCOUNTER SYMPTOMS
DIARRHEA: 0
COUGH: 0
STRIDOR: 0
NAUSEA: 0
BACK PAIN: 0
EYES NEGATIVE: 1
WHEEZING: 0
SHORTNESS OF BREATH: 0
CHEST TIGHTNESS: 0
ALLERGIC/IMMUNOLOGIC NEGATIVE: 1

## 2022-03-01 NOTE — PROGRESS NOTES
[facility] [place] [facility]         [id number]  3/1/2022   Chief Complaint   Patient presents with    6 Month Follow-Up     SHANNEN with download         Pt of Dr. Rebeca Wells    PAP Download:   Jonathan Macias or initial AHI: 17.2     Date of initial study: 11/15/2017      Compliant  100%     Noncompliant 0 %     PAP Type Airsense 10 autoset for her Level  11   Avg Hrs/Day 8 hr 52 min  AHI: 0.8   Recorded compliance dates , 1/26/2022  to 2/24/2022   Machine/Mfg:   [x] ResMed    [] Respironics/Dreamstation   Interface:   [] Nasal    [] Nasal pillows   [x] FFM      Provider:      [] [facility]     [][employer]     [] [employer]    [] Ethelywilman Carlos    [] [facility]               [] [employer] [facility]      [x] [employer]    [] [address]      [] Other    Neck Size: 15  Mallampati Mallampati 3  ESS:  19  SAQLI: 34  Here is a scan of the most recent download:            Presentation:   Jj Villanueva presents for sleep medicine follow up for obstructive sleep apnea  Since the last visit, Jj Villanueva is doing well with PAP. She is more tired. She is not as active. Provigil was decreased to 1 pill a day and she felt more tired and it was increased. She still has EDS. She complains of fatigue. Vit D was low and she stopped replacement. She states she has trouble falling asleep at night. She will ay there for a few hours. Equipment issues: The pressure is  acceptable, the mask is acceptable     Sleep issues:  Do you feel better? no  More rested?no   Better concentration? yes    Progress History:   Since last visit any new medical issues? No  New ER or hospital visits? No  Any new or changes in medicines? No  Any new sleep medicines? No    Review of Systems -   Review of Systems   Constitutional: Positive for fatigue. Negative for activity change, appetite change, chills and fever. HENT: Negative for congestion and postnasal drip. Eyes: Negative.     Respiratory: Negative for cough, chest tightness, shortness of breath, wheezing and stridor. Cardiovascular: Negative for chest pain and leg swelling. Gastrointestinal: Negative for diarrhea and nausea. Endocrine: Negative. Genitourinary: Negative. Musculoskeletal: Negative. Negative for arthralgias and back pain. Skin: Negative. Allergic/Immunologic: Negative. Neurological: Negative. Negative for dizziness and light-headedness. Psychiatric/Behavioral: Negative. All other systems reviewed and are negative. Physical Exam:    BMI:  Body mass index is 34.94 kg/m². Wt Readings from Last 3 Encounters:   03/01/22 229 lb 12.8 oz (104.2 kg)   02/14/22 219 lb 6.4 oz (99.5 kg)   02/10/22 230 lb (104.3 kg)     Weight stable / unchanged  Vitals: /80 (Site: Left Upper Arm, Position: Sitting, Cuff Size: Large Adult)   Pulse 80   Temp 97.2 °F (36.2 °C) (Temporal)   Ht 5' 8\" (1.727 m)   Wt 229 lb 12.8 oz (104.2 kg)   SpO2 92%   BMI 34.94 kg/m²       Physical Exam  Constitutional:       Appearance: She is well-developed. HENT:      Head: Normocephalic and atraumatic. Right Ear: External ear normal.      Left Ear: External ear normal.   Eyes:      Conjunctiva/sclera: Conjunctivae normal.      Pupils: Pupils are equal, round, and reactive to light. Cardiovascular:      Rate and Rhythm: Normal rate and regular rhythm. Heart sounds: Normal heart sounds. Pulmonary:      Effort: Pulmonary effort is normal.      Breath sounds: Normal breath sounds. Musculoskeletal:         General: Normal range of motion. Cervical back: Normal range of motion and neck supple. Skin:     General: Skin is warm and dry. Neurological:      Mental Status: She is alert and oriented to person, place, and time. Psychiatric:         Behavior: Behavior normal.         Thought Content: Thought content normal.         Judgment: Judgment normal.           ASSESSMENT/DIAGNOSIS     Diagnosis Orders   1. Obstructive sleep apnea on CPAP     2.  Hypersomnia with sleep apnea     3. Obesity (BMI 30-39.9)     4. Pulmonary HTN (HCC) Mean pressure 32 mmhg by RHC     5. Other insomnia              Plan   Do you need any equipment today? Yes update supplies  - discussed insomnia  - Will start on trazodone- has helped in the past  - sleep hygiene  - hypersomnia likely related to insomnia  - Continue Provigil- may consider changing to [place]  - Restart Vit D supplement  - Download reviewed and discussed with patient  - She  was advised to continue current positive airway pressure therapy with above described pressure. - She  advised to keep good compliance with current recommended pressure to get optimal results and clinical improvement  - Recommend 7-9 hours of sleep with PAP  - She was advised to call [employer] regarding supplies if needed.   -She call my office for earlier appointment if needed for worsening of sleep symptoms.   - She was instructed on weight loss  - Hannah Reyessaturnino was educated about my impression and plan. Patient verbalizesunderstanding.   We will see [address] back in: 2 months with download    Information added by my medical assistant/LPN was reviewed today           Ray West PA-C, MPAS  3/1/2022

## 2022-03-27 DIAGNOSIS — N32.81 OAB (OVERACTIVE BLADDER): ICD-10-CM

## 2022-03-28 RX ORDER — MIRABEGRON 50 MG/1
TABLET, FILM COATED, EXTENDED RELEASE ORAL
Qty: 30 TABLET | Refills: 5 | Status: SHIPPED | OUTPATIENT
Start: 2022-03-28 | End: 2022-09-22 | Stop reason: SDUPTHER

## 2022-04-04 RX ORDER — VERAPAMIL HYDROCHLORIDE 240 MG/1
CAPSULE, EXTENDED RELEASE ORAL
Qty: 180 CAPSULE | Refills: 1 | Status: SHIPPED | OUTPATIENT
Start: 2022-04-04 | End: 2022-09-12 | Stop reason: SDUPTHER

## 2022-04-04 RX ORDER — DIGOXIN 125 MCG
TABLET ORAL
Qty: 90 TABLET | Refills: 1 | Status: SHIPPED | OUTPATIENT
Start: 2022-04-04 | End: 2022-09-12 | Stop reason: SDUPTHER

## 2022-04-22 ENCOUNTER — NURSE ONLY (OUTPATIENT)
Dept: LAB | Age: 72
End: 2022-04-22

## 2022-04-22 LAB
ALT SERPL-CCNC: 14 U/L (ref 11–66)
BASOPHILS # BLD: 0.8 %
BASOPHILS ABSOLUTE: 0.1 THOU/MM3 (ref 0–0.1)
CREAT SERPL-MCNC: 0.9 MG/DL (ref 0.4–1.2)
EOSINOPHIL # BLD: 1 %
EOSINOPHILS ABSOLUTE: 0.1 THOU/MM3 (ref 0–0.4)
ERYTHROCYTE [DISTWIDTH] IN BLOOD BY AUTOMATED COUNT: 14.5 % (ref 11.5–14.5)
ERYTHROCYTE [DISTWIDTH] IN BLOOD BY AUTOMATED COUNT: 52.7 FL (ref 35–45)
GFR SERPL CREATININE-BSD FRML MDRD: 61 ML/MIN/1.73M2
HCT VFR BLD CALC: 43 % (ref 37–47)
HEMOGLOBIN: 13.1 GM/DL (ref 12–16)
IMMATURE GRANS (ABS): 0.07 THOU/MM3 (ref 0–0.07)
IMMATURE GRANULOCYTES: 0.5 %
LYMPHOCYTES # BLD: 22.3 %
LYMPHOCYTES ABSOLUTE: 3.3 THOU/MM3 (ref 1–4.8)
MCH RBC QN AUTO: 30.4 PG (ref 26–33)
MCHC RBC AUTO-ENTMCNC: 30.5 GM/DL (ref 32.2–35.5)
MCV RBC AUTO: 99.8 FL (ref 81–99)
MONOCYTES # BLD: 8.8 %
MONOCYTES ABSOLUTE: 1.3 THOU/MM3 (ref 0.4–1.3)
NUCLEATED RED BLOOD CELLS: 0 /100 WBC
PLATELET # BLD: 262 THOU/MM3 (ref 130–400)
PMV BLD AUTO: 9.7 FL (ref 9.4–12.4)
RBC # BLD: 4.31 MILL/MM3 (ref 4.2–5.4)
SEDIMENTATION RATE, ERYTHROCYTE: 23 MM/HR (ref 0–20)
SEG NEUTROPHILS: 66.6 %
SEGMENTED NEUTROPHILS ABSOLUTE COUNT: 9.7 THOU/MM3 (ref 1.8–7.7)
WBC # BLD: 14.6 THOU/MM3 (ref 4.8–10.8)

## 2022-04-25 RX ORDER — PRAVASTATIN SODIUM 40 MG
TABLET ORAL
Qty: 90 TABLET | Refills: 1 | Status: SHIPPED | OUTPATIENT
Start: 2022-04-25 | End: 2022-09-12 | Stop reason: SDUPTHER

## 2022-05-02 NOTE — PROGRESS NOTES
[facility] and [address] [place] [address] Drive         [id number]  5/3/2022   Chief Complaint   Patient presents with    Follow-up     SHANNEN 2 months f/u sleep with dL adaptive         Pt of Dr. Holly WILLIAMSON Download:   Original or initial AHI: 17.2     Date of initial study: 11/15/ 17    Compliant  100%     Noncompliant 0 %     PAP Type Airsense 10 autosetLevel  11 CMH20   Avg Hrs/Day 10 hrs 20 minutes  AHI: 1.3   Recorded compliance dates ,3/30/22/4/28/22  Machine/Mfg:   [x] ResMed    [] Respironics/Dreamstation   Interface:   [] Nasal    [] Nasal pillows   [x] FFM      Provider:      [] [facility]     [][employer]     [] [employer]    [] Evaline Colder    [] [facility]               [] [facility]      [x] Adaptive    [] [address]      [] Other    Neck Size: 15  Mallampati Mallampati 3  ESS:  12  SAQLI: 44    Here is a scan of the most recent download:          Presentation:   Vicki Shi presents for sleep medicine follow up for obstructive sleep apnea, insomnia, hypersomnia  Since the last visit, Vicki Shi is doing well with PAP. She is sleeping better with trazodone. She feels more rested. She is taking Vit D supplement. She is taking Provigil with benefit. Equipment issues: The pressure is  acceptable, the mask is acceptable     Sleep issues:  Do you feel better? Yes  More rested? Yes   Better concentration? yes    Progress History:   Since last visit any new medical issues? No  New ER or hospital visits? No  Any new or changes in medicines? No  Any new sleep medicines? No    Review of Systems -   Review of Systems   Constitutional: Negative for activity change, appetite change, chills and fever. HENT: Negative for congestion and postnasal drip. Eyes: Negative. Respiratory: Negative for cough, chest tightness, shortness of breath, wheezing and stridor. Cardiovascular: Negative for chest pain and leg swelling. Gastrointestinal: Negative for diarrhea and nausea. Endocrine: Negative. Genitourinary: Negative. Musculoskeletal: Negative. Negative for arthralgias and back pain. Skin: Negative. Allergic/Immunologic: Negative. Neurological: Negative for dizziness and light-headedness. Falls    Psychiatric/Behavioral: Positive for sleep disturbance. All other systems reviewed and are negative. Physical Exam:    BMI:  Body mass index is 34.42 kg/m². Wt Readings from Last 3 Encounters:   05/03/22 226 lb 6.4 oz (102.7 kg)   03/01/22 229 lb 12.8 oz (104.2 kg)   02/14/22 219 lb 6.4 oz (99.5 kg)     Weight stable / unchanged  Vitals: /82 (Site: Right Lower Arm, Position: Sitting, Cuff Size: Medium Adult)   Pulse 89   Temp 97.3 °F (36.3 °C) (Tympanic)   Ht 5' 8\" (1.727 m)   Wt 226 lb 6.4 oz (102.7 kg)   SpO2 97% Comment: on ra  BMI 34.42 kg/m²       Physical Exam  Constitutional:       Appearance: Normal appearance. She is normal weight. HENT:      Head: Normocephalic and atraumatic. Right Ear: External ear normal.      Left Ear: External ear normal.      Nose: Nose normal.   Eyes:      Extraocular Movements: Extraocular movements intact. Conjunctiva/sclera: Conjunctivae normal.      Pupils: Pupils are equal, round, and reactive to light. Pulmonary:      Effort: Pulmonary effort is normal.   Musculoskeletal:      Cervical back: Normal range of motion and neck supple. Neurological:      General: No focal deficit present. Mental Status: She is alert and oriented to person, place, and time. Psychiatric:         Attention and Perception: Attention and perception normal.         Mood and Affect: Mood and affect normal.         Speech: Speech normal.         Behavior: Behavior normal. Behavior is cooperative. Thought Content: Thought content normal.         Cognition and Memory: Cognition normal.         Judgment: Judgment normal.           ASSESSMENT/DIAGNOSIS     Diagnosis Orders   1. Obstructive sleep apnea on CPAP     2.  Hypersomnia with sleep apnea     3. Obesity (BMI 30-39.9)     4. Pulmonary HTN (HCC) Mean pressure 32 mmhg by RHC     5. Other insomnia     6. Fibromyalgia     7. Ischemic cardiomyopathy 45 %     8. Vitamin D deficiency              Plan   Do you need any equipment today? No  - continue Vit D  - Continue Provigil as needed for hypersomnia  - Continue trazodone   - Download reviewed and discussed with patient  - She  was advised to continue current positive airway pressure therapy with above described pressure. - She  advised to keep good compliance with current recommended pressure to get optimal results and clinical improvement  - Recommend 7-9 hours of sleep with PAP  - She was advised to call [employer] regarding supplies if needed.   -She call my office for earlier appointment if needed for worsening of sleep symptoms.   - She was instructed on weight loss  - [place] was educated about my impression and plan. Patient verbalizesunderstanding.   We will see [address] back in: 6 months with download    Information added by my medical assistant/LPN was reviewed today         George Dick PA-C, ANAS  5/3/2022

## 2022-05-03 ENCOUNTER — OFFICE VISIT (OUTPATIENT)
Dept: PULMONOLOGY | Age: 72
End: 2022-05-03
Payer: MEDICARE

## 2022-05-03 VITALS
HEART RATE: 89 BPM | DIASTOLIC BLOOD PRESSURE: 82 MMHG | OXYGEN SATURATION: 97 % | TEMPERATURE: 97.3 F | SYSTOLIC BLOOD PRESSURE: 132 MMHG | WEIGHT: 226.4 LBS | HEIGHT: 68 IN | BODY MASS INDEX: 34.31 KG/M2

## 2022-05-03 DIAGNOSIS — I27.20 PULMONARY HTN (HCC): ICD-10-CM

## 2022-05-03 DIAGNOSIS — G47.33 OBSTRUCTIVE SLEEP APNEA ON CPAP: Primary | ICD-10-CM

## 2022-05-03 DIAGNOSIS — Z99.89 OBSTRUCTIVE SLEEP APNEA ON CPAP: Primary | ICD-10-CM

## 2022-05-03 DIAGNOSIS — G47.30 HYPERSOMNIA WITH SLEEP APNEA: ICD-10-CM

## 2022-05-03 DIAGNOSIS — G47.09 OTHER INSOMNIA: ICD-10-CM

## 2022-05-03 DIAGNOSIS — E55.9 VITAMIN D DEFICIENCY: ICD-10-CM

## 2022-05-03 DIAGNOSIS — G47.10 HYPERSOMNIA WITH SLEEP APNEA: ICD-10-CM

## 2022-05-03 DIAGNOSIS — E66.9 OBESITY (BMI 30-39.9): ICD-10-CM

## 2022-05-03 DIAGNOSIS — I25.5 ISCHEMIC CARDIOMYOPATHY: ICD-10-CM

## 2022-05-03 DIAGNOSIS — M79.7 FIBROMYALGIA: ICD-10-CM

## 2022-05-03 PROCEDURE — 99214 OFFICE O/P EST MOD 30 MIN: CPT | Performed by: PHYSICIAN ASSISTANT

## 2022-05-03 RX ORDER — ALBUTEROL SULFATE 90 UG/1
2 AEROSOL, METERED RESPIRATORY (INHALATION) EVERY 6 HOURS PRN
Qty: 3 EACH | Refills: 3 | Status: SHIPPED | OUTPATIENT
Start: 2022-05-03

## 2022-05-03 RX ORDER — MODAFINIL 200 MG/1
200 TABLET ORAL DAILY
Qty: 90 TABLET | Refills: 1 | Status: SHIPPED | OUTPATIENT
Start: 2022-05-03 | End: 2022-10-30

## 2022-05-03 RX ORDER — TRAZODONE HYDROCHLORIDE 50 MG/1
50 TABLET ORAL NIGHTLY PRN
Qty: 90 TABLET | Refills: 1 | Status: SHIPPED | OUTPATIENT
Start: 2022-05-03 | End: 2022-10-10

## 2022-05-03 ASSESSMENT — ENCOUNTER SYMPTOMS
NAUSEA: 0
CHEST TIGHTNESS: 0
WHEEZING: 0
ALLERGIC/IMMUNOLOGIC NEGATIVE: 1
STRIDOR: 0
SHORTNESS OF BREATH: 0
EYES NEGATIVE: 1
DIARRHEA: 0
COUGH: 0
BACK PAIN: 0

## 2022-07-21 ENCOUNTER — NURSE ONLY (OUTPATIENT)
Dept: LAB | Age: 72
End: 2022-07-21

## 2022-07-21 LAB
ALT SERPL-CCNC: 15 U/L (ref 11–66)
BASOPHILS # BLD: 0.8 %
BASOPHILS ABSOLUTE: 0.1 THOU/MM3 (ref 0–0.1)
CREAT SERPL-MCNC: 0.7 MG/DL (ref 0.4–1.2)
EOSINOPHIL # BLD: 1.8 %
EOSINOPHILS ABSOLUTE: 0.2 THOU/MM3 (ref 0–0.4)
ERYTHROCYTE [DISTWIDTH] IN BLOOD BY AUTOMATED COUNT: 13.7 % (ref 11.5–14.5)
ERYTHROCYTE [DISTWIDTH] IN BLOOD BY AUTOMATED COUNT: 48.5 FL (ref 35–45)
GFR SERPL CREATININE-BSD FRML MDRD: 82 ML/MIN/1.73M2
HCT VFR BLD CALC: 41.3 % (ref 37–47)
HEMOGLOBIN: 13.2 GM/DL (ref 12–16)
IMMATURE GRANS (ABS): 0.06 THOU/MM3 (ref 0–0.07)
IMMATURE GRANULOCYTES: 0.5 %
LYMPHOCYTES # BLD: 23.9 %
LYMPHOCYTES ABSOLUTE: 3 THOU/MM3 (ref 1–4.8)
MCH RBC QN AUTO: 30.8 PG (ref 26–33)
MCHC RBC AUTO-ENTMCNC: 32 GM/DL (ref 32.2–35.5)
MCV RBC AUTO: 96.5 FL (ref 81–99)
MONOCYTES # BLD: 10.2 %
MONOCYTES ABSOLUTE: 1.3 THOU/MM3 (ref 0.4–1.3)
NUCLEATED RED BLOOD CELLS: 0 /100 WBC
PLATELET # BLD: 268 THOU/MM3 (ref 130–400)
PMV BLD AUTO: 9.8 FL (ref 9.4–12.4)
RBC # BLD: 4.28 MILL/MM3 (ref 4.2–5.4)
SEDIMENTATION RATE, ERYTHROCYTE: 29 MM/HR (ref 0–20)
SEG NEUTROPHILS: 62.8 %
SEGMENTED NEUTROPHILS ABSOLUTE COUNT: 7.9 THOU/MM3 (ref 1.8–7.7)
WBC # BLD: 12.6 THOU/MM3 (ref 4.8–10.8)

## 2022-07-28 ENCOUNTER — PATIENT MESSAGE (OUTPATIENT)
Dept: PRIMARY CARE CLINIC | Age: 72
End: 2022-07-28

## 2022-08-15 ENCOUNTER — TELEPHONE (OUTPATIENT)
Dept: CARDIOLOGY CLINIC | Age: 72
End: 2022-08-15

## 2022-08-16 ENCOUNTER — TELEPHONE (OUTPATIENT)
Dept: FAMILY MEDICINE CLINIC | Age: 72
End: 2022-08-16

## 2022-08-16 NOTE — TELEPHONE ENCOUNTER
Pt received population health [employer] message stating she was overdue for an A1C, do you want to order?

## 2022-08-16 NOTE — TELEPHONE ENCOUNTER
----- Message from Nisreen Hernandez sent at 8/16/2022  3:02 PM EDT -----  Subject: Referral Request    Reason for referral request? pt needing order for A1C at [facility] lab   with [facility]  Provider patient wants to be referred to(if known):     Provider Phone Number(if known):     Additional Information for Provider?   ---------------------------------------------------------------------------  --------------  [facility]    [phone]; OK to leave message on voicemail  ---------------------------------------------------------------------------  --------------

## 2022-08-17 ENCOUNTER — OFFICE VISIT (OUTPATIENT)
Dept: FAMILY MEDICINE CLINIC | Age: 72
End: 2022-08-17
Payer: MEDICARE

## 2022-08-17 VITALS
SYSTOLIC BLOOD PRESSURE: 132 MMHG | HEIGHT: 68 IN | WEIGHT: 229 LBS | BODY MASS INDEX: 34.71 KG/M2 | DIASTOLIC BLOOD PRESSURE: 82 MMHG | HEART RATE: 75 BPM

## 2022-08-17 DIAGNOSIS — G47.33 OBSTRUCTIVE SLEEP APNEA ON CPAP: ICD-10-CM

## 2022-08-17 DIAGNOSIS — N39.0 URINARY TRACT INFECTION WITH HEMATURIA, SITE UNSPECIFIED: ICD-10-CM

## 2022-08-17 DIAGNOSIS — E66.9 CLASS 1 OBESITY WITH SERIOUS COMORBIDITY AND BODY MASS INDEX (BMI) OF 34.0 TO 34.9 IN ADULT, UNSPECIFIED OBESITY TYPE: ICD-10-CM

## 2022-08-17 DIAGNOSIS — R31.9 URINARY TRACT INFECTION WITH HEMATURIA, SITE UNSPECIFIED: ICD-10-CM

## 2022-08-17 DIAGNOSIS — N39.46 MIXED STRESS AND URGE URINARY INCONTINENCE: ICD-10-CM

## 2022-08-17 DIAGNOSIS — Z99.89 OBSTRUCTIVE SLEEP APNEA ON CPAP: ICD-10-CM

## 2022-08-17 DIAGNOSIS — I10 ESSENTIAL HYPERTENSION: ICD-10-CM

## 2022-08-17 DIAGNOSIS — N32.81 OAB (OVERACTIVE BLADDER): ICD-10-CM

## 2022-08-17 DIAGNOSIS — R35.0 URINARY FREQUENCY: Primary | ICD-10-CM

## 2022-08-17 DIAGNOSIS — N39.0 RECURRENT UTI: ICD-10-CM

## 2022-08-17 DIAGNOSIS — J44.9 CHRONIC OBSTRUCTIVE PULMONARY DISEASE, UNSPECIFIED COPD TYPE (HCC): ICD-10-CM

## 2022-08-17 DIAGNOSIS — E03.9 HYPOTHYROIDISM, UNSPECIFIED TYPE: ICD-10-CM

## 2022-08-17 DIAGNOSIS — M17.12 PRIMARY OSTEOARTHRITIS OF LEFT KNEE: ICD-10-CM

## 2022-08-17 DIAGNOSIS — Z13.220 SCREENING CHOLESTEROL LEVEL: ICD-10-CM

## 2022-08-17 DIAGNOSIS — I25.10 CORONARY ARTERY DISEASE INVOLVING NATIVE CORONARY ARTERY OF NATIVE HEART WITHOUT ANGINA PECTORIS: ICD-10-CM

## 2022-08-17 DIAGNOSIS — R73.01 IMPAIRED FASTING GLUCOSE: ICD-10-CM

## 2022-08-17 LAB
BILIRUBIN, POC: NEGATIVE
BLOOD URINE, POC: NORMAL
CLARITY, POC: CLEAR
COLOR, POC: YELLOW
GLUCOSE URINE, POC: NEGATIVE
KETONES, POC: NEGATIVE
LEUKOCYTE EST, POC: NORMAL
NITRITE, POC: POSITIVE
PH, POC: 5.5
PROTEIN, POC: NEGATIVE
SPECIFIC GRAVITY, POC: 1.02
UROBILINOGEN, POC: 0.2

## 2022-08-17 PROCEDURE — 99214 OFFICE O/P EST MOD 30 MIN: CPT | Performed by: NURSE PRACTITIONER

## 2022-08-17 PROCEDURE — 1124F ACP DISCUSS-NO DSCNMKR DOCD: CPT | Performed by: NURSE PRACTITIONER

## 2022-08-17 PROCEDURE — 81002 URINALYSIS NONAUTO W/O SCOPE: CPT | Performed by: NURSE PRACTITIONER

## 2022-08-17 RX ORDER — CIPROFLOXACIN 500 MG/1
500 TABLET, FILM COATED ORAL 2 TIMES DAILY
Qty: 20 TABLET | Refills: 0 | Status: SHIPPED | OUTPATIENT
Start: 2022-08-17 | End: 2022-08-27

## 2022-08-18 LAB
ORGANISM: ABNORMAL
URINE CULTURE, ROUTINE: ABNORMAL

## 2022-08-18 ASSESSMENT — ENCOUNTER SYMPTOMS
ABDOMINAL PAIN: 0
BACK PAIN: 0
SHORTNESS OF BREATH: 0
COUGH: 0
ABDOMINAL DISTENTION: 0
CHEST TIGHTNESS: 0
WHEEZING: 0

## 2022-08-18 NOTE — PROGRESS NOTES
[facility] [address] [place] [address]  Dept: [phone]  Dept Fax: [phone]  Loc: [phone]  PROGRESS NOTE      VisitDate: 8/17/2022    Jorge Alberto Hernandez is a 74 y.o. female who presents today for:     Chief Complaint   Patient presents with    Urinary Tract Infection     Patient has been experiencing urinary frequency for the past week         Subjective:  Presents with complaint of urinary frequency and dysuria for the past week. Denies fever abdominal pain flank pain hematuria. Patient also complains of worsening urinary incontinence. Reports substantial leakage with position change cough sneeze etc.  History arthritis A. fib skin cancer CHF depression fibromyalgia hypertension hypothyroid obesity bariatric surgery sleep apnea thyroid disease, overactive bladder urinary incontinence. Denies any fever illness headache dizziness chest pain shortness of breath abdominal pain rash or edema. Medications as prescribed. Review of Systems   Constitutional:  Negative for activity change, appetite change, chills, fatigue and fever. Eyes:  Negative for visual disturbance. Respiratory:  Negative for cough, chest tightness, shortness of breath and wheezing. Cardiovascular:  Negative for chest pain, palpitations and leg swelling. Gastrointestinal:  Negative for abdominal distention and abdominal pain. Genitourinary:  Positive for dysuria, frequency and urgency. Negative for flank pain and hematuria. Musculoskeletal:  Positive for arthralgias and myalgias. Negative for back pain and neck pain. Skin: Negative. Negative for rash. Neurological:  Negative for dizziness, light-headedness and headaches. Hematological:  Negative for adenopathy. Psychiatric/Behavioral:  Positive for decreased concentration and dysphoric mood. The patient is nervous/anxious. All other systems reviewed and are negative.   Past Medical History:   Diagnosis Date    Arthritis     Atrial fibrillation ([facility] [address]     Cancer (HCC)     skin    Carotid arterial disease (HCC)     CHF (congestive heart failure) (HCC)     Depression     Fibromyalgia     Hypertension     Hypothyroidism     Obesity     S/P bariatric surgery     Sleep apnea     CPAP    Thyroid disease     TIA (transient ischemic attack) 2015      Past Surgical History:   Procedure Laterality Date    APPENDECTOMY  over 10 years ago    [address] [place] Left 10/01/2019    CAROTID ENDARTERECTOMY  2017    CHOLECYSTECTOMY  over 10 years ago    [facility]      COLONOSCOPY N/A 2018    COLONOSCOPY WITH BIOPSY performed by Armida Gaines MD at [address] [place] [address].  [place]     HYSTERECTOMY (CERVIX STATUS UNKNOWN)  10 plus years ago    Dr. Sarah Oro Right     OTHER SURGICAL HISTORY Left     Rotator cuff sx    SLEEVE GASTRECTOMY  2016    Robotic, Extensive Lysis of Adhesions, Removal of [place] Prosthesis - Dr. Von Palomares Left 2021    UPPER GASTROINTESTINAL ENDOSCOPY  2016    Dr. Carroll De Leon      Family History   Problem Relation Age of Onset    Stroke Mother     High Blood Pressure Mother     Atrial Fibrillation Mother     Heart Disease Mother     Diabetes Father     Early Death Father     Cancer Other         Daughter    Heart Disease Sister     Heart Attack Sister     Deep Vein Thrombosis Brother      Social History     Tobacco Use    Smoking status: Former     Packs/day: 0.25     Years: 30.00     Pack years: 7.50     Types: Cigarettes     Start date: 3/24/1982     Quit date: 2015     Years since quittin.4    Smokeless tobacco: Never   Substance Use Topics    Alcohol use: No      Current Outpatient Medications   Medication Sig Dispense Refill    ciprofloxacin (CIPRO) 500 MG tablet Take 1 tablet by mouth 2 times daily for 10 days 20 tablet 0    modafinil times daily. (Patient not taking: Reported on 8/17/2022)       No current facility-administered medications for this visit.      Allergies   Allergen Reactions    Demerol Hcl [Meperidine] Nausea And Vomiting     Health Maintenance   Topic Date Due    DEXA (modify frequency per FRAX score)  Never done    COVID-19 Vaccine (4 - Booster for Moderna series) 03/23/2022    Lipids  07/19/2022    Flu vaccine (1) 09/01/2022    Breast cancer screen  10/12/2022    Colorectal Cancer Screen  02/07/2023    Depression Screen  02/10/2023    Annual Wellness Visit (AWV)  02/11/2023    DTaP/Tdap/Td vaccine (2 - Td or Tdap) 12/20/2027    Shingles vaccine  Completed    Pneumococcal 65+ years Vaccine  Completed    Hepatitis C screen  Completed    Hepatitis A vaccine  Aged Out    Hepatitis B vaccine  Aged Out    Hib vaccine  Aged Out    Meningococcal (ACWY) vaccine  Aged Out     Component 8/17/22 1136    Color, UA Yellow    Clarity, UA Clear    Glucose, UA POC Negative    Bilirubin, UA Negative    Ketones, UA Negative    Spec Grav, UA 1.025    Blood, UA POC Small    pH, UA 5.5    Protein, UA POC Negative    Urobilinogen, UA 0.2    Leukocytes, UA Small    Nitrite, UA Positive               Specimen Collected: 08/17/22 11:36 EDT Last Resulted: 08/17/22 11:39 EDT        Lab Flowsheet     Order Details     View Encounter     Lab and Collection Details     Routing     Result History     View Encounter Conversation             Result Care Coordination        Patient Communication     Add Comments   Add Notifications  Back to Top                 Result Information    Status: Final result (Collected: 8/17/2022 11:36) Provider Status: Reviewed       All Reviewers List    Thien Mcmahon MD on 8/17/2022 13:35   AWILDA Dutton CNP on 8/17/2022 12:10           POCT Urinalysis no Micro (Manual): Patient Communication     Add Comments   Add Notifications        Routing History    Priority Sent On From To Message Type    8/17/2022 11:39 AM Riccardo Blackmon RCP TRACE DugganYOKASTA - CNP Results    8/17/2022 11:39 AM ELKE Israel MD CC'd Results         Click to Print Result    View SmartLink Info    POCT Urinalysis no Micro (Manual) (Order #[id number]) on 8/17/22       Order Report     Order Details    Objective:     Physical Exam  Vitals and nursing note reviewed. Constitutional:       Appearance: Normal appearance. She is well-developed and normal weight. HENT:      Head: Normocephalic. Right Ear: Tympanic membrane and ear canal normal.      Left Ear: Tympanic membrane and ear canal normal.      Nose: Nose normal.      Mouth/Throat:      Pharynx: Oropharynx is clear. Eyes:      Extraocular Movements: Extraocular movements intact. Conjunctiva/sclera: Conjunctivae normal.   Cardiovascular:      Rate and Rhythm: Normal rate and regular rhythm. Pulses: Normal pulses. Heart sounds: Normal heart sounds. Pulmonary:      Effort: Pulmonary effort is normal.      Breath sounds: Normal breath sounds. Abdominal:      General: Bowel sounds are normal.      Palpations: Abdomen is soft. Musculoskeletal:         General: Normal range of motion. Cervical back: Neck supple. Skin:     General: Skin is warm and dry. Neurological:      General: No focal deficit present. Mental Status: She is alert and oriented to person, place, and time. Psychiatric:         Mood and Affect: Mood normal.         Thought Content: Thought content normal.         Judgment: Judgment normal.     /82 (Site: Right Upper Arm, Position: Sitting, Cuff Size: Large Adult)   Pulse 75   Ht 5' 8\" (1.727 m)   Wt 229 lb (103.9 kg)   BMI 34.82 kg/m²       Impression/Plan:  1. Urinary frequency    2. OAB (overactive bladder)    3. Chronic obstructive pulmonary disease, unspecified COPD type ([address]    4. Recurrent UTI    5. Mixed stress and urge urinary incontinence    6. Essential hypertension    7.  Primary All patient questions answered. Pt voiced understanding. Reviewed health maintenance. Patient agreedwith treatment plan. Follow up as directed. Urine sent for culture. Cipro 500 twice daily. Pelvic floor therapy ordered. CBC CMP FLP A1c TSH free T4 ordered. Follow-up 1 month. Continue current medications. Continue medications as prescribed.  Educational information on above diagnosis  provided per AVS.  30 min       Electronically signed by AWILDA Dumont CNP on 8/18/2022 at 7:53 AM

## 2022-08-19 ENCOUNTER — HOSPITAL ENCOUNTER (OUTPATIENT)
Dept: INTERVENTIONAL RADIOLOGY/VASCULAR | Age: 72
Discharge: HOME | End: 2022-08-19
Payer: MEDICARE

## 2022-08-19 DIAGNOSIS — I65.23 BILATERAL CAROTID ARTERY STENOSIS: ICD-10-CM

## 2022-08-19 PROCEDURE — 93880 EXTRACRANIAL BILAT STUDY: CPT

## 2022-08-29 ENCOUNTER — HOSPITAL ENCOUNTER (OUTPATIENT)
Dept: PHYSICAL THERAPY | Age: 72
Setting detail: THERAPIES SERIES
Discharge: HOME | End: 2022-08-29
Payer: MEDICARE

## 2022-08-29 PROCEDURE — 97530 THERAPEUTIC ACTIVITIES: CPT

## 2022-08-29 PROCEDURE — 97166 OT EVAL MOD COMPLEX 45 MIN: CPT

## 2022-08-29 PROCEDURE — 97162 PT EVAL MOD COMPLEX 30 MIN: CPT

## 2022-08-29 NOTE — PROGRESS NOTES
** PLEASE SIGN, DATE AND TIME CERTIFICATION BELOW AND RETURN TO [facility] OUTPATIENT REHABILITATION (FAX #: [phone]). ATTEST/CO-SIGN IF ACCESSING VIA [employer]. THANK YOU.**    I certify that I have examined the patient below and determined that Physical Medicine and Rehabilitation service is necessary and that I approve the established plan of care for up to 90 days or as specifically noted.   Attestation, signature or co-signature of physician indicates approval of certification requirements.    ________________________ ____________ __________  Physician Signature   Date   Time  Kym Menendez 17 - SPECIALIZED THERAPY SERVICES  [x] PELVIC HEALTH EVALUATION  [] DAILY NOTE  [] PROGRESS NOTE [] DISCHARGE NOTE    Date: 2022  Patient Name:  Sotero Quintero  : 1950  MRN: [id number]  CSN: [id number]    Referring Practitioner AWILDA Montalvo - *   Diagnosis Mixed incontinence [N39.46]    Treatment Diagnosis M62.58 - Muscle Wasting and Atrophy, nec, other site  M62.81 - Muscle Weakness (Generalized)  N39.41 - Urge Incontinence  N81.84 - Pelvic Muscle Wasting (Female), N39.3 - Stress Incontinence female  R39.15 - Urgency of Urination  N39.41 - Urge Incontinence  N39.46 - Mixed Incontinence, and K59.00 - Constipation, Unspecified  R19.7 - Diarrhea, Unspecified   Date of Evaluation 22    Additional Pertinent History Obesity, Skin CA, Depression, TIA, L knee replacement , hysterectomy       Functional Outcome Measure Used IIQ, ELVIS   Functional Outcome Score 11, 9 (22)       Insurance: Primary: Payor: Damion Holden /  /  / ,   Secondary:    Authorization Information: PRE CERTIFICATION REQUIRED: NO   Visit # 1, 1/10 for progress note   Visits Allowed: INSURANCE THERAPY BENEFIT:  UNLIMITED VISITS BASED ON MEDICAL NECESSITY    Recertification Date:    Physician Follow-Up: None scheduled at this time    Physician Orders: Eval and treat   History of Present Illness:      SUBJECTIVE: Pt reports to skilled OT services with complaint of incontinence, has had continuous UTI (3 in 6 months). Pt reports wearing 3-4 pads a day, has been wearing pads for the last year. Leaks with standing, urgency, walking to the bathroom. Social/Functional History and Current Status:  Medications and Allergies have been reviewed and are listed on Medical History Questionnaire. [address] [place] [address] lives with spouse and grandson  in a single story home with stairs and a handrail to enter.   Task Previous Current   ADLs  Independent Independent toileting    IADL's Independent Independent    Ambulation Independent Walking to the bathroom    Transfers Independent Standing up    Recreation Independent Always knows where the restroom is, hesitant to go new places    [address] Unable to drive home in time to void    Driving Active  Active    Work Retired 31 years at [employer]  Retired     PAIN: pt reports pain only in L knee (replacement in Spring of 2021)      [address] [] Deferred secondary to:    Number of Pregnancies 4   Number of Vaginal Deliveries 4     History of Abuse:No history of physical, emotional or sexual abuse reported    BLADDER ASSESSMENT [] Deferred secondary to:   Daily Fluid Ingestion: 80 oz, 20 oz coffee occ, occ Pepsi, occ Iced tea    Urination Frequency Times/Day: 6 or more times daily   Times/Night: 0 nightly ( rarely 1 time )    Volume Large and Medium   Urge Sensation Normal  and Severe    SYMPTOM QUESTIONNAIRE   Loses Urine Upon: Hearing Fidelity, Walking to Restroom, Lifting, Standing Up/Changing Positions, Bending, and Standing   Incontinence Volume: Large   Frequency of Leakage: Frequent   Wets the Bed: no   Burning/Pain with Urination: no   Difficulty Starting a Stream of Urine: no   Incomplete Emptying No    Strain to Empty Bladder: no   Falling Out Feeling: no   Urinate more than 7 times/day: no   Use a form of Leakage Protection: yes: 3-4 daily    Restrict Fluid Intake: no   Stream Strength Average       BOWEL ASSESSMENT [] Deferred secondary to:   Frequency: Every 3 days    Most Common Stool Consistency: [place] 1-4    SYMPTOM QUESTIONNAIRE   Strain to have a BM: no   Include fiber in your diet: yes: occ   Take laxatives/enemas regularly: no   Pain with BM: Hemorrhoids    Strong urge to have BM: yes: occasionally    Leak/Stain Feces: no   Diarrhea often: yes: on occasion          GENERAL ASSESSMENT   [] Deferred secondary to:   Palpation Muscle layer 1, layer 2 and layer 3    Observation Pubic Symphysis, mons pubis, labia majora and minora, vaginal introitus   Posture WNL   Range of Motion Hip: WFL   Knee: WFL    Strength Right Lower Extremity:  WFL  Left Lower Extremity:   WFL   Gait Impaired - uses cane    Sensation WFL   Edema Not Tested   Balance/Fall History Utilizes cane for balance    Special Tests   SLR:positive BLE    S2-4: negative          The pt did verbalize consent for internal vaginal examination following OT education of pt on the purpose of this activity and on the procedure using the model. Pt was educated in the intent of the OT to proceed slowly into the vaginal canal with permission requested of pt at every step of assessment prior to commencement by OT. Pt was also educated in her right to stop assessment, refuse any portion of this assessment, or to refuse assessment at any time without need for reason. The pt was educated that refusal would not impact her ability to seek care from this therapist in any way or result in therapist prejudice regarding her motivation to get better. Pt verbalized understanding and agreed again to internal examination by OT this date. OBSERVATION  [] Deferred secondary to:   Patient Safety Patient offered a chaperone and declined.    Skin Condition intact   Urogenital Triangle No tenderness or tightness reported       PELVIC FLOOR INTERNAL EXAM [] Deferred secondary to:   Exam Vaginal   Sensation Intact   Muscle Localization Fair - verbal cues required    Palpation/Tone Normal   Pelvic Floor Strength PERF:Power: 2,Endurance: 3,Reps: 4,Flicks: 6   Relax after Contraction Slight Difficult   Prolapse None         PELVIC HEALTH INCONTINENCE TREATMENT   Precautions:    Pain:     X in shaded column indicates activity completed today   Exercise/Intervention Reps/Sec  Notes   PF A and P and viscera       Normal bladder 10 min   X    Diet impact on bladder       Urge control/Urge drills       Precise PFM localization and control 10 min   X           Kegel quick 6  X    Kegel hold 4 3 X      Specific Interventions Next Treatment: PFM strength, endurance, localization and coordination, urge control strategies, progressing kegel hold, pelvic brace, tummy tuck    Activity/Treatment Tolerance:  [x]  Patient tolerated treatment well  []  Patient limited by fatigue  []  Patient limited by pain   []  Patient limited by medical complications  []  Other:     Patient Education:   [x]  HEP/Education Completed: OT plan of care, Pelvic Floor Musculature anatomy with instruction on precise localization. Patient to start doing 10 reps of kegels every 2 hours in sitting or lying alternating quicks with holds. Handout provided. Todaytickets Access Code:  []  No new Education completed  []  Reviewed Prior HEP      []  Patient verbalized and/or demonstrated understanding of education provided. []  Patient unable to verbalize and/or demonstrate understanding of education provided. Will continue education. []  Barriers to learning: none noted at this time     Assessment:Pt tolerated session well this date. With completion of the internal exam to assess the pelvic floor through the vagina. Pt requires skilled OT services at this time to increase PFM strength, coordination, and endurance and to decrease pelvic floor dysfunction.  Without skilled OT services the pt is at risk for worsening PFM which could lead to decreased engagement in meaningful daily occupations and quality of life. Body Structures/Functions/Activity Limitations: Abdominal and core weakness, Impaired muscle tone, and Impaired strength   Prognosis: Good    GOALS:  Patient Goal: to decrease incontinence     Short Term Goals:  Short Term Goals: 4 visits  Pt will be able to identify normal bladder function/voiding patterns, diet impact on the bladder, and urge control strategies to enhance pt insight into potential causative factors and promote increased bladder control. The pt will demonstrate well localized PFM contraction in order to increase the efficacy of strengthening program.   Pt will demonstrate the ability to delay urgency for 5' with good control to enable time to get to the bathroom. Pt will demonstrate independence with basic HEP designed to increase PFM and core strength and to enhance hip girdle flexibility for increased ease of strengthening. 5.  This pt will demo proper use of pelvic brace lifting, push, and pull with pelvic brace in order to promote continence during functional tasks. Long Term Goals: 8 visits  This pt will report a 75% decrease in incontinence frequency and a 75% decrease in amount to increase comfort in public and reduce risk of integumentary compromise. This pt will report normal urinary frequency to enabling ease of work/home task completion without interruption. This pt will reduce pad usage to 1 pad per day to reduce financial strain and promote comfort in public situations. This pt will decrease IIQ and IUD scores to 6 respectively to indicate improved continence and efficacy of treatment  This pt will demo independence with advanced HEP in order to allow gains in therapy to be maintained long term and reduce the risk of further medical need/assessment.     This pt will demo PFM PERF 3/10/10/10 in order to increase continence in functional positions during home tasks. PLAN:  Treatment Recommendations: Strengthening, Endurance Training, Manual Therapy - Soft Tissue Mobilization, Safety Education and Training, Self-Care Education and Training, and Modalities    [x]  Plan of care initiated. Plan to see patient 1 time bi weekly for 12 weeks to address the treatment planned outlined above. [x]  Continue with current plan of care  []  Modify plan of care as follows:    []  Hold pending physician visit  []  Discharge    Time In 1100   Time Out 1155   Timed Code Minutes: 25 min   Total Treatment Time: 55 min     Electronically Signed by:  GRAY Cueva/SHAHRZAD   License: [id number]  Occupational Therapist  [address] [place] [address]

## 2022-08-31 RX ORDER — CHLORTHALIDONE 25 MG/1
TABLET ORAL
Qty: 90 TABLET | Refills: 3 | OUTPATIENT
Start: 2022-08-31

## 2022-09-09 ENCOUNTER — NURSE ONLY (OUTPATIENT)
Dept: LAB | Age: 72
End: 2022-09-09

## 2022-09-09 DIAGNOSIS — R73.01 IMPAIRED FASTING GLUCOSE: ICD-10-CM

## 2022-09-09 DIAGNOSIS — E66.9 CLASS 1 OBESITY WITH SERIOUS COMORBIDITY AND BODY MASS INDEX (BMI) OF 34.0 TO 34.9 IN ADULT, UNSPECIFIED OBESITY TYPE: ICD-10-CM

## 2022-09-09 DIAGNOSIS — I10 ESSENTIAL HYPERTENSION: ICD-10-CM

## 2022-09-09 DIAGNOSIS — N39.0 RECURRENT UTI: ICD-10-CM

## 2022-09-09 DIAGNOSIS — E03.9 HYPOTHYROIDISM, UNSPECIFIED TYPE: ICD-10-CM

## 2022-09-09 DIAGNOSIS — Z13.220 SCREENING CHOLESTEROL LEVEL: ICD-10-CM

## 2022-09-09 LAB
ALBUMIN SERPL-MCNC: 4 G/DL (ref 3.5–5.1)
ALP BLD-CCNC: 73 U/L (ref 38–126)
ALT SERPL-CCNC: 8 U/L (ref 11–66)
ANION GAP SERPL CALCULATED.3IONS-SCNC: 13 MEQ/L (ref 8–16)
AST SERPL-CCNC: 14 U/L (ref 5–40)
AVERAGE GLUCOSE: 120 MG/DL (ref 70–126)
BASOPHILS # BLD: 0.6 %
BASOPHILS ABSOLUTE: 0.1 THOU/MM3 (ref 0–0.1)
BILIRUB SERPL-MCNC: 0.3 MG/DL (ref 0.3–1.2)
BUN BLDV-MCNC: 20 MG/DL (ref 7–22)
CALCIUM SERPL-MCNC: 9.1 MG/DL (ref 8.5–10.5)
CHLORIDE BLD-SCNC: 100 MEQ/L (ref 98–111)
CHOLESTEROL, TOTAL: 124 MG/DL (ref 100–199)
CO2: 29 MEQ/L (ref 23–33)
CREAT SERPL-MCNC: 0.8 MG/DL (ref 0.4–1.2)
EOSINOPHIL # BLD: 1.5 %
EOSINOPHILS ABSOLUTE: 0.2 THOU/MM3 (ref 0–0.4)
ERYTHROCYTE [DISTWIDTH] IN BLOOD BY AUTOMATED COUNT: 13.9 % (ref 11.5–14.5)
ERYTHROCYTE [DISTWIDTH] IN BLOOD BY AUTOMATED COUNT: 49.2 FL (ref 35–45)
GFR SERPL CREATININE-BSD FRML MDRD: 70 ML/MIN/1.73M2
GLUCOSE BLD-MCNC: 98 MG/DL (ref 70–108)
HBA1C MFR BLD: 6 % (ref 4.4–6.4)
HCT VFR BLD CALC: 40 % (ref 37–47)
HDLC SERPL-MCNC: 50 MG/DL
HEMOGLOBIN: 12.7 GM/DL (ref 12–16)
IMMATURE GRANS (ABS): 0.06 THOU/MM3 (ref 0–0.07)
IMMATURE GRANULOCYTES: 0.5 %
LDL CHOLESTEROL CALCULATED: 54 MG/DL
LYMPHOCYTES # BLD: 23 %
LYMPHOCYTES ABSOLUTE: 3 THOU/MM3 (ref 1–4.8)
MCH RBC QN AUTO: 30.8 PG (ref 26–33)
MCHC RBC AUTO-ENTMCNC: 31.8 GM/DL (ref 32.2–35.5)
MCV RBC AUTO: 97.1 FL (ref 81–99)
MONOCYTES # BLD: 10 %
MONOCYTES ABSOLUTE: 1.3 THOU/MM3 (ref 0.4–1.3)
NUCLEATED RED BLOOD CELLS: 0 /100 WBC
PLATELET # BLD: 272 THOU/MM3 (ref 130–400)
PMV BLD AUTO: 9.6 FL (ref 9.4–12.4)
POTASSIUM SERPL-SCNC: 3.1 MEQ/L (ref 3.5–5.2)
RBC # BLD: 4.12 MILL/MM3 (ref 4.2–5.4)
SEG NEUTROPHILS: 64.4 %
SEGMENTED NEUTROPHILS ABSOLUTE COUNT: 8.4 THOU/MM3 (ref 1.8–7.7)
SODIUM BLD-SCNC: 142 MEQ/L (ref 135–145)
T4 FREE: 1.09 NG/DL (ref 0.93–1.76)
TOTAL PROTEIN: 6.9 G/DL (ref 6.1–8)
TRIGL SERPL-MCNC: 98 MG/DL (ref 0–199)
TSH SERPL DL<=0.05 MIU/L-ACNC: 2.85 UIU/ML (ref 0.4–4.2)
WBC # BLD: 13.1 THOU/MM3 (ref 4.8–10.8)

## 2022-09-11 LAB
ORGANISM: ABNORMAL
URINE CULTURE, ROUTINE: ABNORMAL

## 2022-09-12 ENCOUNTER — OFFICE VISIT (OUTPATIENT)
Dept: CARDIOLOGY CLINIC | Age: 72
End: 2022-09-12
Payer: MEDICARE

## 2022-09-12 ENCOUNTER — TELEPHONE (OUTPATIENT)
Dept: FAMILY MEDICINE CLINIC | Age: 72
End: 2022-09-12

## 2022-09-12 ENCOUNTER — NURSE ONLY (OUTPATIENT)
Dept: LAB | Age: 72
End: 2022-09-12

## 2022-09-12 VITALS
HEIGHT: 68 IN | BODY MASS INDEX: 35.89 KG/M2 | DIASTOLIC BLOOD PRESSURE: 72 MMHG | HEART RATE: 78 BPM | SYSTOLIC BLOOD PRESSURE: 128 MMHG | WEIGHT: 236.8 LBS

## 2022-09-12 DIAGNOSIS — Z95.820 S/P ANGIOPLASTY WITH STENT: ICD-10-CM

## 2022-09-12 DIAGNOSIS — Z95.820 S/P PERCUTANEOUS TRANSLUMINAL ANGIOPLASTY (PTA) WITH STENT PLACEMENT: ICD-10-CM

## 2022-09-12 DIAGNOSIS — R60.0 BILATERAL LEG EDEMA: ICD-10-CM

## 2022-09-12 DIAGNOSIS — I20.9 ANGINA PECTORIS, UNSPECIFIED (HCC): ICD-10-CM

## 2022-09-12 DIAGNOSIS — E66.01 MORBID (SEVERE) OBESITY DUE TO EXCESS CALORIES (HCC): ICD-10-CM

## 2022-09-12 DIAGNOSIS — I48.21 PERMANENT ATRIAL FIBRILLATION (HCC): ICD-10-CM

## 2022-09-12 DIAGNOSIS — I25.5 ISCHEMIC CARDIOMYOPATHY: ICD-10-CM

## 2022-09-12 DIAGNOSIS — I50.32 CHRONIC DIASTOLIC CONGESTIVE HEART FAILURE (HCC): Primary | ICD-10-CM

## 2022-09-12 DIAGNOSIS — I10 ESSENTIAL HYPERTENSION: ICD-10-CM

## 2022-09-12 DIAGNOSIS — I25.10 CORONARY ARTERY DISEASE INVOLVING NATIVE CORONARY ARTERY OF NATIVE HEART WITHOUT ANGINA PECTORIS: ICD-10-CM

## 2022-09-12 PROBLEM — I25.119 ATHEROSCLEROTIC HEART DISEASE OF NATIVE CORONARY ARTERY WITH UNSPECIFIED ANGINA PECTORIS (HCC): Status: ACTIVE | Noted: 2022-09-12

## 2022-09-12 PROBLEM — I25.118 ATHEROSCLEROTIC HEART DISEASE OF NATIVE CORONARY ARTERY WITH OTHER FORMS OF ANGINA PECTORIS (HCC): Status: RESOLVED | Noted: 2020-03-17 | Resolved: 2022-09-12

## 2022-09-12 LAB
ALT SERPL-CCNC: 7 U/L (ref 11–66)
BASOPHILS # BLD: 0.7 %
BASOPHILS ABSOLUTE: 0.1 THOU/MM3 (ref 0–0.1)
CREAT SERPL-MCNC: 0.6 MG/DL (ref 0.4–1.2)
EOSINOPHIL # BLD: 2.3 %
EOSINOPHILS ABSOLUTE: 0.3 THOU/MM3 (ref 0–0.4)
ERYTHROCYTE [DISTWIDTH] IN BLOOD BY AUTOMATED COUNT: 13.8 % (ref 11.5–14.5)
ERYTHROCYTE [DISTWIDTH] IN BLOOD BY AUTOMATED COUNT: 49.1 FL (ref 35–45)
GFR SERPL CREATININE-BSD FRML MDRD: > 90 ML/MIN/1.73M2
HBV SURFACE AB TITR SER: NEGATIVE {TITER}
HCT VFR BLD CALC: 40.2 % (ref 37–47)
HEMOGLOBIN: 12.9 GM/DL (ref 12–16)
HEPATITIS B CORE TOTAL ANTIBODY: NONREACTIVE
HEPATITIS B SURFACE ANTIGEN: NEGATIVE
HEPATITIS C ANTIBODY: NEGATIVE
IMMATURE GRANS (ABS): 0.07 THOU/MM3 (ref 0–0.07)
IMMATURE GRANULOCYTES: 0.6 %
LYMPHOCYTES # BLD: 21.9 %
LYMPHOCYTES ABSOLUTE: 2.4 THOU/MM3 (ref 1–4.8)
MCH RBC QN AUTO: 30.7 PG (ref 26–33)
MCHC RBC AUTO-ENTMCNC: 32.1 GM/DL (ref 32.2–35.5)
MCV RBC AUTO: 95.7 FL (ref 81–99)
MONOCYTES # BLD: 8.6 %
MONOCYTES ABSOLUTE: 0.9 THOU/MM3 (ref 0.4–1.3)
NUCLEATED RED BLOOD CELLS: 0 /100 WBC
PLATELET # BLD: 271 THOU/MM3 (ref 130–400)
PMV BLD AUTO: 9.5 FL (ref 9.4–12.4)
RBC # BLD: 4.2 MILL/MM3 (ref 4.2–5.4)
SEDIMENTATION RATE, ERYTHROCYTE: 50 MM/HR (ref 0–20)
SEG NEUTROPHILS: 65.9 %
SEGMENTED NEUTROPHILS ABSOLUTE COUNT: 7.2 THOU/MM3 (ref 1.8–7.7)
WBC # BLD: 11 THOU/MM3 (ref 4.8–10.8)

## 2022-09-12 PROCEDURE — 1124F ACP DISCUSS-NO DSCNMKR DOCD: CPT | Performed by: INTERNAL MEDICINE

## 2022-09-12 PROCEDURE — 99214 OFFICE O/P EST MOD 30 MIN: CPT | Performed by: INTERNAL MEDICINE

## 2022-09-12 RX ORDER — VERAPAMIL HYDROCHLORIDE 240 MG/1
CAPSULE, EXTENDED RELEASE ORAL
Qty: 180 CAPSULE | Refills: 1 | Status: SHIPPED | OUTPATIENT
Start: 2022-09-12

## 2022-09-12 RX ORDER — METOPROLOL TARTRATE 100 MG/1
TABLET ORAL
Qty: 270 TABLET | Refills: 1 | Status: SHIPPED | OUTPATIENT
Start: 2022-09-12

## 2022-09-12 RX ORDER — POTASSIUM CHLORIDE 750 MG/1
10 TABLET, EXTENDED RELEASE ORAL DAILY
Qty: 90 TABLET | Refills: 1 | Status: SHIPPED | OUTPATIENT
Start: 2022-09-12

## 2022-09-12 RX ORDER — DIGOXIN 125 MCG
TABLET ORAL
Qty: 90 TABLET | Refills: 1 | Status: SHIPPED | OUTPATIENT
Start: 2022-09-12

## 2022-09-12 RX ORDER — PRAVASTATIN SODIUM 40 MG
TABLET ORAL
Qty: 90 TABLET | Refills: 1 | Status: SHIPPED | OUTPATIENT
Start: 2022-09-12

## 2022-09-12 RX ORDER — NITROGLYCERIN 0.4 MG/1
TABLET SUBLINGUAL
Qty: 25 TABLET | Refills: 1 | Status: SHIPPED | OUTPATIENT
Start: 2022-09-12

## 2022-09-12 RX ORDER — CHLORTHALIDONE 25 MG/1
25 TABLET ORAL DAILY
Qty: 90 TABLET | Refills: 1 | Status: SHIPPED | OUTPATIENT
Start: 2022-09-12

## 2022-09-12 RX ORDER — LOSARTAN POTASSIUM 25 MG/1
TABLET ORAL
Qty: 90 TABLET | Refills: 1 | Status: SHIPPED | OUTPATIENT
Start: 2022-09-12

## 2022-09-12 NOTE — PROGRESS NOTES
angina pectoris       Past Surgical History:   Procedure Laterality Date    APPENDECTOMY  over 10 years ago    [address] Left 10/01/2019    CAROTID ENDARTERECTOMY  2017    CHOLECYSTECTOMY  over 10 years ago    [address]      COLONOSCOPY N/A 2018    COLONOSCOPY WITH BIOPSY performed by Svetlana Lee MD at [address] [place] [address].  [place]     HYSTERECTOMY (CERVIX STATUS UNKNOWN)  10 plus years ago    Dr. Oc Coreas Right     OTHER SURGICAL HISTORY Left     Rotator cuff sx    SLEEVE GASTRECTOMY  2016    Robotic, Extensive Lysis of Adhesions, Removal of [place] Prosthesis - Dr. Wing Posey Left 2021    UPPER GASTROINTESTINAL ENDOSCOPY  2016    Dr. Brian aWlter        Allergies   Allergen Reactions    Demerol Hcl [Meperidine] Nausea And Vomiting        Family History   Problem Relation Age of Onset    Stroke Mother     High Blood Pressure Mother     Atrial Fibrillation Mother     Heart Disease Mother     Diabetes Father     Early Death Father     Cancer Other         Daughter    Heart Disease Sister     Heart Attack Sister     Deep Vein Thrombosis Brother         Social History     Socioeconomic History    Marital status:      Spouse name: Not on file    Number of children: Not on file    Years of education: Not on file    Highest education level: Not on file   Occupational History    Not on file   Tobacco Use    Smoking status: Former     Packs/day: 0.25     Years: 30.00     Pack years: 7.50     Types: Cigarettes     Start date: 3/24/1982     Quit date: 2015     Years since quittin.5    Smokeless tobacco: Never   Vaping Use    Vaping Use: Former   Substance and Sexual Activity    Alcohol use: No    Drug use: No    Sexual activity: Not Currently     Partners: Male   Other Topics Concern    Not on file   Social History Narrative    Not on file     Social Determinants of Health     Financial Resource Strain: Low Risk     Difficulty of Paying Living Expenses: Not hard at all   Food Insecurity: No Food Insecurity    Worried About Running Out of Food in the Last Year: Never true    Ran Out of Food in the Last Year: Never true   Transportation Needs: Not on file   Physical Activity: Not on file   Stress: Not on file   Social Connections: Not on file   Intimate Partner Violence: Not on file   Housing Stability: Not on file       Current Outpatient Medications   Medication Sig Dispense Refill    chlorthalidone (HYGROTON) 25 MG tablet Take 1 tablet by mouth daily 90 tablet 1    digoxin (LANOXIN) 125 MCG tablet TAKE 1 TABLET DAILY 90 tablet 1    losartan (COZAAR) 25 MG tablet TAKE 1 TABLET DAILY 90 tablet 1    metoprolol (LOPRESSOR) 100 MG tablet TAKE ONE AND ONE-HALF TABLETS TWICE A  tablet 1    nitroGLYCERIN (NITROSTAT) 0.4 MG SL tablet up to max of 3 total doses. If no relief after 1 dose, call 911. 25 tablet 1    pravastatin (PRAVACHOL) 40 MG tablet TAKE 1 TABLET DAILY 90 tablet 1    verapamil (VERELAN) 240 MG extended release capsule TAKE 1 CAPSULE TWICE A  capsule 1    potassium chloride (KLOR-CON M) 10 MEQ extended release tablet Take 1 tablet by mouth daily 90 tablet 1    modafinil (PROVIGIL) 200 MG tablet Take 1 tablet by mouth daily for 180 days.  90 tablet 1    traZODone (DESYREL) 50 MG tablet Take 1 tablet by mouth nightly as needed for Sleep 90 tablet 1    albuterol sulfate HFA (VENTOLIN HFA) 108 (90 Base) MCG/ACT inhaler Inhale 2 puffs into the lungs every 6 hours as needed for Wheezing 3 each 3    MYRBETRIQ 50 MG TB24 take 1 tablet by mouth once daily 30 tablet 5    Cholecalciferol 50 MCG (2000 UT) CAPS Take 50 mcg by mouth daily 30 capsule 3    ELIQUIS 5 MG TABS tablet TAKE 1 TABLET TWICE A  tablet 3    furosemide (LASIX) 20 MG tablet Take 20 mg by mouth Daily       Melatonin 10 MG TABS Take 10 mg by mouth nightly as needed      b complex vitamins capsule Take 1 capsule by mouth daily as needed      loperamide (IMODIUM) 2 MG capsule Take 2 mg by mouth 4 times daily as needed for Diarrhea      clopidogrel (PLAVIX) 75 MG tablet TAKE 1 TABLET DAILY 90 tablet 3    pantoprazole (PROTONIX) 40 MG tablet TAKE 1 TABLET DAILY 90 tablet 3    fluticasone-salmeterol (ADVAIR) 250-50 MCG/DOSE AEPB Inhale 1 puff into the lungs 2 times daily 3 Inhaler 3    DULoxetine (CYMBALTA) 20 MG extended release capsule Take 60 mg by mouth daily       sulfaSALAzine (AZULFIDINE) 500 MG tablet Take 500 mg by mouth 2 times daily 1 in morning 2 at night       No current facility-administered medications for this visit. Review of Systems -     General ROS: negative  Psychological ROS: negative  Hematological and Lymphatic ROS: No history of blood clots or bleeding disorder. Respiratory ROS: no cough, shortness of breath, or wheezing  Cardiovascular ROS: no chest pain or dyspnea on exertion  Gastrointestinal ROS: negative  Genito-Urinary ROS: no dysuria, trouble voiding, or hematuria  Musculoskeletal ROS: negative  Neurological ROS: no TIA or stroke symptoms  Dermatological ROS: negative      Blood pressure 128/72, pulse 78, height 5' 8\" (1.727 m), weight 236 lb 12.8 oz (107.4 kg), not currently breastfeeding.         Physical Examination:    General appearance - alert, well appearing, and in no distress  Mental status - alert, oriented to person, place, and time  Neck - supple, no significant adenopathy, no JVD, or carotid bruits  Chest - clear to auscultation, no wheezes, rales or rhonchi, symmetric air entry  Heart - normal rate, regular rhythm, normal S1, S2, no murmurs, rubs, clicks or gallops  Abdomen - soft, nontender, nondistended, no masses or organomegaly  Neurological - alert, oriented, normal speech, no focal findings or movement disorder noted  Musculoskeletal - no joint tenderness, deformity or swelling  Extremities - peripheral pulses normal, no pedal edema, no clubbing or cyanosis  Skin - normal coloration and turgor, no rashes, no suspicious skin lesions noted    Lab  No results for input(s): CKTOTAL, CKMB, CKMBINDEX, TROPONINI in the last 72 hours.   CBC:   Lab Results   Component Value Date/Time    WBC 11.0 09/12/2022 11:51 AM    RBC 4.20 09/12/2022 11:51 AM    HGB 12.9 09/12/2022 11:51 AM    HCT 40.2 09/12/2022 11:51 AM    MCV 95.7 09/12/2022 11:51 AM    MCH 30.7 09/12/2022 11:51 AM    MCHC 32.1 09/12/2022 11:51 AM    RDW 13.3 09/22/2021 03:08 PM     09/12/2022 11:51 AM    MPV 9.5 09/12/2022 11:51 AM     BMP:    Lab Results   Component Value Date/Time     09/09/2022 04:04 PM    K 3.1 09/09/2022 04:04 PM    K 4.0 03/04/2020 11:55 AM     09/09/2022 04:04 PM    CO2 29 09/09/2022 04:04 PM    BUN 20 09/09/2022 04:04 PM    LABALBU 4.0 09/09/2022 04:04 PM    CREATININE 0.6 09/12/2022 11:51 AM    CALCIUM 9.1 09/09/2022 04:04 PM    LABGLOM >90 09/12/2022 11:51 AM    GLUCOSE 98 09/09/2022 04:04 PM    GLUCOSE 91 09/21/2020 11:44 AM     Hepatic Function Panel:    Lab Results   Component Value Date/Time    ALKPHOS 73 09/09/2022 04:04 PM    ALT 7 09/12/2022 11:51 AM    AST 14 09/09/2022 04:04 PM    PROT 6.9 09/09/2022 04:04 PM    BILITOT 0.3 09/09/2022 04:04 PM    BILIDIR <0.2 12/30/2021 06:05 PM    LABALBU 4.0 09/09/2022 04:04 PM     Magnesium:    Lab Results   Component Value Date/Time    MG 1.9 12/31/2021 07:48 AM     Warfarin PT/INR:  No components found for: PTPATWAR, PTINRWAR  HgBA1c:    Lab Results   Component Value Date/Time    LABA1C 6.0 09/09/2022 04:04 PM     FLP:    Lab Results   Component Value Date/Time    TRIG 98 09/09/2022 04:04 PM    HDL 50 09/09/2022 04:04 PM    LDLCALC 54 09/09/2022 04:04 PM    LDLDIRECT 58 09/21/2020 11:44 AM     TSH:    Lab Results   Component Value Date/Time    TSH 2.850 09/09/2022 04:04 PM       EKG 1/5/16  Normal sinus rhythm  Rightward axis  T wave abnormality, consider inferior ischemia  Abnormal ECG  When compared with ECG of 01-JUL-2011 09:56,  Questionable change in The axis  T wave inversion now evident in Inferior leads  Confirmed by Nehemiah Arias ([id number]) on 12/8/2015 8:38:06 PM    EKG 8/25/16  Marked sinus  Bradycardia   -Left axis -anterior fascicular block.    -Old anteroseptal infarct. ABNORMAL       EKG 9/22/16-sinus bradycardia rate 46 bopm       CONCLUSION:   1. This is a normal sinus rhythm with average heart rate 55 beats per minute,  ranging from  beats per minute. 2. No significant pause of more than 1.9 seconds noted. 3. Rare ventricular ectopic beats, isolated and couplets. 4. Rare supraventricular ectopic beats, isolated, couplets, and rare  nonsustained supraventricular ectopic run. 5. No significant bradyarrhythmia to be addressed, yet certainly average  heart rate 55, minimum is 40, so probably need further clinical correlation. 6. Otherwise, at this level, this minimal low heart rate if there are no  symptoms will need just observation. Robert Robles M.D.     D: 11/29/2016 20:0      EKG 8/12/19  NSR, no acute abn       Cath feb 2020  HEMODYNAMICS:  LVEDP 9 mmHg. CORONARY ANGIOGRAM:  1. Right coronary artery. The ostial RCA has a 10% lesion, proximal  RCA has a 20% lesion. Mid RCA has 30% lesion. Distal RCA with luminal  irregularities. RCA is a dominant vessel. Bifurcates into RPL and  RPDA. RPL has an ostial 20% lesion. RPDA has a 10% to 20% lesion. 2.  Left main coronary artery. Left main coronary artery has a 50%  lesion in the distal left main coronary artery. Bifurcates into left  circumflex artery and LAD. 3.  Left circumflex artery. 10% to 20% ostial lesion in the left  circumflex artery. There is a high takeoff of a large OM1 branch that  has a 60% lesion in the proximal part of the vessel. Distal left  circumflex artery is patent. OM2 is a large vessel with no significant  stenotic lesions. 4.  Left anterior descending artery.   There is a severe stenotic diffuse  lesion involving the ostial and proximal part of LAD ranging in severity  between 70% to 90%. Mid LAD is patent. Distal LAD has mild diffuse  disease. MEDICATIONS:  See MAR. COMPLICATIONS:  None. ESTIMATED BLOOD LOSS:  Less than 30 mL. ACCESS:  Right radial artery access. Vasc Band was applied. Hemostasis  was achieved. CONCLUSION:  1. Severe stenotic lesion of the distal left main coronary artery and  ostial/proximal left anterior descending artery. 2.  Status post successful PCI with the stenting of distal left main  coronary artery and left anterior descending artery with details as  listed above. RECOMMENDATIONS:  1.  Bedrest for the next 4 hours. 2.  Monitor in telemetry. 3.  Aggressive risk factor modification. 4.  Optimize medical therapy for CAD. 5.  Access site care. 6.  Aspirin 81 mg p.o. daily, can be stopped after 1 month. 7.  Plavix 75 mg p.o. daily. 8.  May resume Eliquis in a.m.  9.  High intensity statin therapy. 10.  Outpatient follow up in office in 2 to 4 weeks. Findings and plan of care discussed with the patient's family. Ariana Gilmore MD     D: 02/21/2020 15:     -IVUS LM/LAD  -Severe stenotic lesion of dLM (Bifurcation lesion, De Leon Type 1,1,0)  -Severe 80-90% lesion of ostial/proximal LAD  -S/P Successful PCI with stenting of dLM/pLAD      Conclusions    Summary   Lexiscan EKG stress test is not suggestive for ischemia. The nuclear images is not suggestive for myocardial ischemia. Recommendation   Clinical correlation is recommended due to poor image quality. Signatures    ----------------------------------------------------------------   Electronically signed by Erika West MD (Interpreting   Cardiologist) on 01/20/2022 at 20:14   ----------------------------------------------------------------    Conclusions   Summary   Normal left ventricle size and systolic function. Ejection fraction was   estimated at 55 to 60 %. There were no regional left ventricular wall   motion abnormalities and wall thickness was within normal limits. The left atrium is Moderately dilated. Mildly enlarged right atrium size. Signature    ----------------------------------------------------------------   Electronically signed by Mart Rand MD (Interpreting   physician) on 04/20/2021 at 06:23 PM   ----------------------------------------------------------------      ekg 9/23/2020  Atrial flutter with  bpm    4/7/2021  atr fib with CVA   Septal infarction  No acute abn    Assessment    Home BP good    Leg edema +1 chronic       Diagnosis Orders   1. Chronic diastolic congestive heart failure ([address]        2. Coronary artery disease involving native coronary artery of native heart without angina pectoris        3. Essential hypertension        4. Ischemic cardiomyopathy 45 %        5. Permanent atrial fibrillation ([address]        6. S/P angioplasty with stent of the LM to LAD feb 2020        7. S/P percutaneous transluminal angioplasty (PTA) with stent placement        8. Morbid (severe) obesity due to excess calories ([address]        9. Bilateral leg edema  +1- now trace        10.  Angina pectoris, unspecified              Recent feb 2020 admission DX  PAFB RVR  - more persistent now - s\p TAYLOR / CV to SR 2/24/2020              On [address] [place] [address] Road               On amiodarone      abnormal stress testing   S\p cardiac cath 2/21/2020:  -Severe stenotic lesion of dLM (Bifurcation lesion, De Leon Type 1,1,0)  -Severe 80-90% lesion of ostial/proximal LAD  -S/P Successful PCI with stenting of dLM/pLAD     Hx bradycardia - none this admit     HTN  HLP  DM II  Hx Lt CEA 2007  Hx TIA  Hx SHANNEN      Previous admission DX    Hx of recent TIA with RT side weakness      NSVT 5 beat   S/P gastric sleeve  Hypertensive Urgency  Hx of obstructive Sleep apnea  Pulmonary HTN  New onset post afib with RVR- now rate controlled  Chads of  DM II  HX of TIA         Continue home medication regimen   bP controlled    Doing well        Plan     The most current  meds and labs reviewed    Permanent atr fib with CVR  atr flutter /Fib with CVR 84  S/p TAYLOR- CV and weent back to atr fib  Off  amiodarone as failed to be in NSR  atr fib with  bpm at rest   TSH WNL  Cont  metoprolol 150  BID  Cont calan SR  240 po bid for rate control  Cont digoxin 125 mcg po qd  Cont  losartan to 25 po qd  If remain uncontrolled consider cardioversion and or eps       CHDAS of 4( DM, htn, Hx of TIA)  Need longterm OA and on apixaban  D/w the pat the risk and benefit of OA  Pat understand the risk of bleeding including ICH    Coronary artery disease, seems to be stable. Denies angina or change in breathing pattern  S/ p LM-LAD stent feb 2020  Cont apixaban and plavix  Off ASA       Cardiomyopathy: improving, no CHF symptoms, no change in clinical condition. Will need periodic echocardiograms depending on symptoms. Echo EF 60% and mariaelena nuc negative      Patient Seen, Chart, Consults notes, Labs, Radiology studies reviewed. Hx of TIA IN 2016  Was on asa and apixaban    Hypertension, on medical treatment. Seems to be under good control. Patient is compliant with medical treatment. Cont  calan SR     Congestive heart failure: no evidence of fluid overload today, no recent hospitalization for CHF  Leg edema trace-   Low K 3.1  Cont chlorothalidone 25  Lasix 20 mg po QOD  Resume taking 10 meq po qd ( take 20 po qd for  6 days)     Hyperlipidemia: on statins, followed periodically. Patient need periodic lipid and liver profile. Hx of carotid left carotid endarterectomy in 2017  And started on statin then  Lower leg weakness from lipitor  OFF  lipitor  TOLERATED  lIvalo 1 mg in 3 weeks AND BUT PCP STOPPED IT DUE TO nORMAL LIPID PANEL VALUES  Now taking pravastatin  Cont  pravastatin to 40 mg po qd    Pulm HTN    Hypertension, on medical treatment. Seems to be under good control.  Patient is compliant with medical treatment. Hx of Intermittent serena- claudio  WNL and probably neuropathic    Reviewed the  report of [facility] from [facility]    patient is advised to exercise 30 min s a day three times a week and about weight loss ,balance diet and    More fruits and vegetables . D/w the pat the plan of care    Overall Pat is Stable and doing well    Reviewed the record-on recent admission and office visit    Discussed use, benefit, and side effects of prescribed medications. All patient questions answered. Pt voiced understanding. Instructed to continue current medications, diet and exercise. Continue risk factor modification and medical management. Patient agreed with treatment plan. Follow up as directed.       RTC in   6 months    Jessie Hahn [facility]

## 2022-09-12 NOTE — TELEPHONE ENCOUNTER
----- Message from AWILDA Easton CNP sent at 9/12/2022  9:22 AM EDT -----  Urine culture indicates growth of contaminants. A1c 6.0 good control. CBC CMP FLP thyroid within normal limits. Slightly low potassium level. Potassium rich diet recommended. Mild elevation of WBC count indicating infection continue current meds.

## 2022-09-13 ENCOUNTER — HOSPITAL ENCOUNTER (OUTPATIENT)
Dept: PHYSICAL THERAPY | Age: 72
Setting detail: THERAPIES SERIES
Discharge: HOME | End: 2022-09-13
Payer: MEDICARE

## 2022-09-13 PROCEDURE — 97530 THERAPEUTIC ACTIVITIES: CPT

## 2022-09-13 NOTE — PROGRESS NOTES
[id number] [facility]  OCCUPATIONAL THERAPY  OUTPATIENT REHABILITATION - SPECIALIZED THERAPY SERVICES  [] PELVIC HEALTH EVALUATION  [x] DAILY NOTE  [] PROGRESS NOTE [] DISCHARGE NOTE    Date: 2022  Patient Name:  Ruth Diaz  : 1950  MRN: [id number]  CSN: [id number]    Referring Practitioner Livia LamAWILDA williamson - *   Diagnosis Mixed incontinence [N39.46]    Treatment Diagnosis M62.58 - Muscle Wasting and Atrophy, nec, other site  M62.81 - Muscle Weakness (Generalized)  N39.41 - Urge Incontinence  N81.84 - Pelvic Muscle Wasting (Female), N39.3 - Stress Incontinence female  R39.15 - Urgency of Urination  N39.41 - Urge Incontinence  N39.46 - Mixed Incontinence, and K59.00 - Constipation, Unspecified  R19.7 - Diarrhea, Unspecified   Date of Evaluation 22    Additional Pertinent History Obesity, Skin CA, Depression, TIA, L knee replacement , hysterectomy       Functional Outcome Measure Used IIQ, ELVIS   Functional Outcome Score 11, 9 (22)       Insurance: Primary: Payor: Jasmeet Zhang /  /  / ,   Secondary:    Authorization Information: PRE CERTIFICATION REQUIRED: NO   Visit # 2, 2/10 for progress note   Visits Allowed: INSURANCE THERAPY BENEFIT:  UNLIMITED VISITS BASED ON MEDICAL NECESSITY    Recertification Date:    Physician Follow-Up: None scheduled at this time    Physician Orders: Eval and treat   History of Present Illness:      SUBJECTIVE: I have noticed a little bit of differences, doing kegels 10-12 times a day. Had leakage about 2 or 3 days ago was able to make into bathroom before leaking. Not leaking at night. Able to walk to restroom in the morning without an accident. Standing up from chairs without any leakage. Continues to wear pads during the day with only a few days of the pads being soaked through, on other days the pads are a little damp. Pt reports continuing to wear 3-4 pads in a day which is better from 6-8 per day.  Pt reports no UT at this time. Pt reports having only 2 major accidents in last 2 weeks. Social/Functional History and Current Status:  Medications and Allergies have been reviewed and are listed on Medical History Questionnaire. [facility] with spouse and grandson  in a single story home with stairs and a handrail to enter.   Task Previous Current   ADLs  Independent Independent toileting    IADL's Independent Independent    Ambulation Independent Walking to the bathroom    Transfers Independent Standing up    Recreation Independent Always knows where the restroom is, hesitant to go new places    [address] Unable to drive home in time to void    Driving Active  Active    Work Retired 31 years at [employer]  Retired     PAIN: pt reports pain only in L knee (replacement in Spring of 2021)      SEXUAL /MENSTRUAL HISTORY [x] Deferred secondary to:    Number of Pregnancies 4   Number of Vaginal Deliveries 4     History of Abuse:No history of physical, emotional or sexual abuse reported    BLADDER ASSESSMENT [] Deferred secondary to:   Daily Fluid Ingestion: 60 oz water, 20 oz coffee occ, rare Pepsi, occ Iced tea    Urination Frequency Times/Day: 5-6 times daily   Times/Night: 0 nightly, waking up damp    Volume Large and Medium   Urge Sensation Normal  and Severe    SYMPTOM QUESTIONNAIRE   Loses Urine Upon: Hearing [place], Walking to Restroom, Lifting, Standing Up/Changing Positions, Bending, and Standing   Incontinence Volume: Varies    Frequency of Leakage: Frequent   Wets the Bed: no   Burning/Pain with Urination: no   Difficulty Starting a Stream of Urine: no   Incomplete Emptying No    Strain to Empty Bladder: no   Falling Out Feeling: no   Urinate more than 7 times/day: no   Use a form of Leakage Protection: yes: 3-4 daily   1 pad at night that is damp    Restrict Fluid Intake: no   Stream Strength Average       BOWEL ASSESSMENT [] Deferred secondary to:   Frequency: Every other day    Most Common Stool Consistency: [place] 2- 4, this date [facility] 7 pt reports due to cheese    SYMPTOM QUESTIONNAIRE   Strain to have a BM: no   Include fiber in your diet: yes: occ   Take laxatives/enemas regularly: no   Pain with BM: Hemorrhoids    Strong urge to have BM: yes: occasionally    Leak/Stain Feces: no   Diarrhea often: yes: on occasion          GENERAL ASSESSMENT   [x] Deferred secondary to:   Palpation Muscle layer 1, layer 2 and layer 3    Observation Pubic Symphysis, mons pubis, labia majora and minora, vaginal introitus   Posture WNL   Range of Motion Hip: WFL   Knee: WFL    Strength Right Lower Extremity:  WFL  Left Lower Extremity:   WFL   Gait Impaired - uses cane    Sensation WFL   Edema Not Tested   Balance/Fall History Utilizes cane for balance    Special Tests   SLR:positive BLE    S2-4: negative              OBSERVATION  [x] Deferred secondary to:   Patient Safety Patient offered a chaperone and declined.    Skin Condition intact   Urogenital Triangle No tenderness or tightness reported       PELVIC FLOOR INTERNAL EXAM [x] Deferred secondary to:   Exam Vaginal   Sensation Intact   Muscle Localization Fair - verbal cues required    Palpation/Tone Normal   Pelvic Floor Strength PERF:Power: 2,Endurance: 3,Reps: 4,Flicks: 6   Relax after Contraction Slight Difficult   Prolapse None         PELVIC HEALTH INCONTINENCE TREATMENT   Precautions:    Pain:     X in shaded column indicates activity completed today   Exercise/Intervention Reps/Sec  Notes     Fiber education  5 min          Diaphragm breathing           I Love You massage 10 min          Constipation and PF  10 min          Precise PFM localization and control 10 min          Squatty potty education  5 min          Kegel quick 2 1 x     Kegel hold 2 3 x     Tummy tuck quick 3 1 x  verbal and visual cues required    Tummy tuck hold 3 3 x     Pelvic Brace Quick 2 1 x  verbal and visual cues required    Pelvic Brace Hold 2 3 x                     Specific Interventions Next Treatment: PFM strength, endurance, localization and coordination, urge control strategies, progressing kegel hold, functional use pelvic brace, kegel progression, core strengthening    Activity/Treatment Tolerance:  [x]  Patient tolerated treatment well  []  Patient limited by fatigue  []  Patient limited by pain   []  Patient limited by medical complications  []  Other:     Patient Education:   [x]  HEP/Education Completed: OT plan of care, Pelvic Floor Musculature anatomy with instruction on precise localization. Patient to start doing 10 reps of kegels every 2 hours in sitting or lying alternating quicks with holds. Handout provided. Chinese Online Access Code:  []  No new Education completed  []  Reviewed Prior HEP      []  Patient verbalized and/or demonstrated understanding of education provided. []  Patient unable to verbalize and/or demonstrate understanding of education provided. Will continue education. []  Barriers to learning: none noted at this time     Assessment:Pt tolerated session well this date. Pt educated throughout session regarding use of squatty potty/ positioning, fiber intake, water intake, and I love you massage. Pelvic progressions taught this date including tummy tuck and pelvic brace. Pt requires skilled OT services at this time to increase PFM strength, coordination, and endurance and to decrease pelvic floor dysfunction. Without skilled OT services the pt is at risk for worsening PFM which could lead to decreased engagement in meaningful daily occupations and quality of life.     Body Structures/Functions/Activity Limitations: Abdominal and core weakness, Impaired muscle tone, and Impaired strength   Prognosis: Good    GOALS:  Patient Goal: to decrease incontinence     Short Term Goals:  Short Term Goals: 4 visits  Pt will be able to identify normal bladder function/voiding patterns, diet impact on the bladder, and urge control strategies to enhance pt insight into potential causative factors and promote increased bladder control. The pt will demonstrate well localized PFM contraction in order to increase the efficacy of strengthening program.   Pt will demonstrate the ability to delay urgency for 5' with good control to enable time to get to the bathroom. Pt will demonstrate independence with basic HEP designed to increase PFM and core strength and to enhance hip girdle flexibility for increased ease of strengthening. 5.  This pt will demo proper use of pelvic brace lifting, push, and pull with pelvic brace in order to promote continence during functional tasks. Long Term Goals: 8 visits  This pt will report a 75% decrease in incontinence frequency and a 75% decrease in amount to increase comfort in public and reduce risk of integumentary compromise. This pt will report normal urinary frequency to enabling ease of work/home task completion without interruption. This pt will reduce pad usage to 1 pad per day to reduce financial strain and promote comfort in public situations. This pt will decrease IIQ and IUD scores to 6 respectively to indicate improved continence and efficacy of treatment  This pt will demo independence with advanced HEP in order to allow gains in therapy to be maintained long term and reduce the risk of further medical need/assessment. This pt will demo PFM PERF 3/10/10/10 in order to increase continence in functional positions during home tasks. PLAN:  Treatment Recommendations: Strengthening, Endurance Training, Manual Therapy - Soft Tissue Mobilization, Safety Education and Training, Self-Care Education and Training, and Modalities    [x]  Plan of care initiated. Plan to see patient 1 time bi weekly for 12 weeks to address the treatment planned outlined above.   [x]  Continue with current plan of care  []  Modify plan of care as follows:    []  Hold pending physician visit  []

## 2022-09-16 LAB
QUANTI TB GOLD PLUS: NEGATIVE
QUANTI TB1 MINUS NIL: 0 IU/ML (ref 0–0.34)
QUANTI TB2 MINUS NIL: 0 IU/ML (ref 0–0.34)
QUANTIFERON MITOGEN MINUS NIL: 9.69 IU/ML
QUANTIFERON NIL: 0.01 IU/ML

## 2022-09-22 ENCOUNTER — OFFICE VISIT (OUTPATIENT)
Dept: FAMILY MEDICINE CLINIC | Age: 72
End: 2022-09-22
Payer: MEDICARE

## 2022-09-22 ENCOUNTER — TELEPHONE (OUTPATIENT)
Dept: FAMILY MEDICINE CLINIC | Age: 72
End: 2022-09-22

## 2022-09-22 VITALS
DIASTOLIC BLOOD PRESSURE: 72 MMHG | WEIGHT: 233.8 LBS | RESPIRATION RATE: 18 BRPM | HEART RATE: 75 BPM | OXYGEN SATURATION: 96 % | BODY MASS INDEX: 35.55 KG/M2 | TEMPERATURE: 97.4 F | SYSTOLIC BLOOD PRESSURE: 108 MMHG

## 2022-09-22 DIAGNOSIS — M06.9 RHEUMATOID ARTHRITIS, INVOLVING UNSPECIFIED SITE, UNSPECIFIED WHETHER RHEUMATOID FACTOR PRESENT (HCC): ICD-10-CM

## 2022-09-22 DIAGNOSIS — I25.10 CORONARY ARTERY DISEASE INVOLVING NATIVE CORONARY ARTERY OF NATIVE HEART WITHOUT ANGINA PECTORIS: ICD-10-CM

## 2022-09-22 DIAGNOSIS — Z12.31 ENCOUNTER FOR SCREENING MAMMOGRAM FOR MALIGNANT NEOPLASM OF BREAST: ICD-10-CM

## 2022-09-22 DIAGNOSIS — R73.01 IMPAIRED FASTING GLUCOSE: ICD-10-CM

## 2022-09-22 DIAGNOSIS — I10 ESSENTIAL HYPERTENSION: ICD-10-CM

## 2022-09-22 DIAGNOSIS — R70.0 ELEVATED SED RATE: ICD-10-CM

## 2022-09-22 DIAGNOSIS — E66.9 CLASS 1 OBESITY WITH SERIOUS COMORBIDITY AND BODY MASS INDEX (BMI) OF 34.0 TO 34.9 IN ADULT, UNSPECIFIED OBESITY TYPE: ICD-10-CM

## 2022-09-22 DIAGNOSIS — L03.115 CELLULITIS OF RIGHT LOWER LEG: Primary | ICD-10-CM

## 2022-09-22 DIAGNOSIS — N39.46 MIXED STRESS AND URGE URINARY INCONTINENCE: ICD-10-CM

## 2022-09-22 DIAGNOSIS — N32.81 OAB (OVERACTIVE BLADDER): ICD-10-CM

## 2022-09-22 DIAGNOSIS — E03.9 HYPOTHYROIDISM, UNSPECIFIED TYPE: ICD-10-CM

## 2022-09-22 DIAGNOSIS — Z78.0 POSTMENOPAUSAL: ICD-10-CM

## 2022-09-22 DIAGNOSIS — L40.50 PSORIATIC ARTHRITIS (HCC): ICD-10-CM

## 2022-09-22 DIAGNOSIS — K21.9 GASTROESOPHAGEAL REFLUX DISEASE, UNSPECIFIED WHETHER ESOPHAGITIS PRESENT: ICD-10-CM

## 2022-09-22 DIAGNOSIS — J44.9 CHRONIC OBSTRUCTIVE PULMONARY DISEASE, UNSPECIFIED COPD TYPE (HCC): ICD-10-CM

## 2022-09-22 PROCEDURE — 1124F ACP DISCUSS-NO DSCNMKR DOCD: CPT | Performed by: NURSE PRACTITIONER

## 2022-09-22 PROCEDURE — 90694 VACC AIIV4 NO PRSRV 0.5ML IM: CPT | Performed by: NURSE PRACTITIONER

## 2022-09-22 PROCEDURE — G0008 ADMIN INFLUENZA VIRUS VAC: HCPCS | Performed by: NURSE PRACTITIONER

## 2022-09-22 PROCEDURE — 99214 OFFICE O/P EST MOD 30 MIN: CPT | Performed by: NURSE PRACTITIONER

## 2022-09-22 RX ORDER — PANTOPRAZOLE SODIUM 40 MG/1
TABLET, DELAYED RELEASE ORAL
Qty: 90 TABLET | Refills: 3 | Status: SHIPPED | OUTPATIENT
Start: 2022-09-22

## 2022-09-22 RX ORDER — DULOXETIN HYDROCHLORIDE 60 MG/1
CAPSULE, DELAYED RELEASE ORAL
COMMUNITY
Start: 2022-07-08

## 2022-09-22 RX ORDER — CEPHALEXIN 500 MG/1
500 CAPSULE ORAL 3 TIMES DAILY
Qty: 30 CAPSULE | Refills: 0 | Status: SHIPPED | OUTPATIENT
Start: 2022-09-22 | End: 2022-10-02

## 2022-09-22 NOTE — TELEPHONE ENCOUNTER
----- Message from [facility] sent at 9/22/2022 12:55 PM EDT -----  Subject: Message to Provider    QUESTIONS  Information for Provider? patient called in her orders that were put in   for testing were submitted wrong and the hospital will not schedule her,   she stated that the bone density scan and the doppler scan. [facility]   stated that the order was put in incorrectly . please follow up with the   hospital or the patient   ---------------------------------------------------------------------------  --------------  2023 What's Trending  [phone]; OK to leave message on voicemail, OK to respond with   electronic message via [employer] portal (only for patients who have   registered [employer] account)  ---------------------------------------------------------------------------  --------------  SCRIPT ANSWERS  Relationship to Patient?  Self

## 2022-09-26 ENCOUNTER — HOSPITAL ENCOUNTER (OUTPATIENT)
Dept: PHYSICAL THERAPY | Age: 72
Setting detail: THERAPIES SERIES
Discharge: HOME | End: 2022-09-26
Payer: MEDICARE

## 2022-09-27 ENCOUNTER — HOSPITAL ENCOUNTER (OUTPATIENT)
Dept: INTERVENTIONAL RADIOLOGY/VASCULAR | Age: 72
Discharge: HOME | End: 2022-09-27
Payer: MEDICARE

## 2022-09-27 ENCOUNTER — HOSPITAL ENCOUNTER (OUTPATIENT)
Dept: WOMENS IMAGING | Age: 72
Discharge: HOME | End: 2022-09-27
Payer: MEDICARE

## 2022-09-27 ENCOUNTER — TELEPHONE (OUTPATIENT)
Dept: FAMILY MEDICINE CLINIC | Age: 72
End: 2022-09-27

## 2022-09-27 DIAGNOSIS — M06.9 RHEUMATOID ARTHRITIS, INVOLVING UNSPECIFIED SITE, UNSPECIFIED WHETHER RHEUMATOID FACTOR PRESENT (HCC): ICD-10-CM

## 2022-09-27 DIAGNOSIS — L03.115 CELLULITIS OF RIGHT LOWER LEG: ICD-10-CM

## 2022-09-27 DIAGNOSIS — I25.10 CORONARY ARTERY DISEASE INVOLVING NATIVE CORONARY ARTERY OF NATIVE HEART WITHOUT ANGINA PECTORIS: ICD-10-CM

## 2022-09-27 DIAGNOSIS — Z78.0 POSTMENOPAUSAL: ICD-10-CM

## 2022-09-27 DIAGNOSIS — E66.9 CLASS 1 OBESITY WITH SERIOUS COMORBIDITY AND BODY MASS INDEX (BMI) OF 34.0 TO 34.9 IN ADULT, UNSPECIFIED OBESITY TYPE: ICD-10-CM

## 2022-09-27 DIAGNOSIS — L40.50 PSORIATIC ARTHRITIS (HCC): ICD-10-CM

## 2022-09-27 DIAGNOSIS — R70.0 ELEVATED SED RATE: ICD-10-CM

## 2022-09-27 PROCEDURE — 77080 DXA BONE DENSITY AXIAL: CPT

## 2022-09-27 PROCEDURE — 93971 EXTREMITY STUDY: CPT

## 2022-09-27 ASSESSMENT — ENCOUNTER SYMPTOMS
CHEST TIGHTNESS: 0
ABDOMINAL PAIN: 0
ABDOMINAL DISTENTION: 0
COUGH: 0
SHORTNESS OF BREATH: 0
BACK PAIN: 0
WHEEZING: 0

## 2022-09-27 NOTE — TELEPHONE ENCOUNTER
----- Message from AWILDA Olivas CNP sent at 9/27/2022  3:36 PM EDT -----  Doppler lower extremities indicates no DVT

## 2022-09-27 NOTE — PROGRESS NOTES
[address] [place] [facility] [facility] [address]  Dept: [phone]  Dept Fax: [phone]  Loc: [phone]  PROGRESS NOTE      VisitDate: 9/22/2022    Harrell Dakin is a 74 y.o. female who presents today for:     Chief Complaint   Patient presents with    Edema     Right side ankle swelling since Monday, denies falls or injury, painful in the achilles area, swelling does go down in the evening when she gets it elevated, not taking lasix-c/o incontinence    Flu Vaccine         Subjective:  Patient presents complaint of a right ankle swelling since Monday. Patient does report some mild tenderness. Denies any fall or injury. Patient complains of pain and swelling in the Achilles region. Denies any deformity. Currently not taking Lasix routinely due to worsening urinary incontinence. History arthritis A. fib CHF depression fibromyalgia, psoriatic arthritis rheumatoid arthritis overactive bladder urinary incontinence,Hypertension hypothyroid obesity bariatric surgery sleep apnea thyroid disease TIAs. Currently denies any fever, chest pain shortness of breath abdominal pain or rash. Review of Systems   Constitutional:  Positive for fatigue. Negative for activity change, appetite change, chills and fever. Eyes:  Negative for visual disturbance. Respiratory:  Negative for cough, chest tightness, shortness of breath and wheezing. Cardiovascular:  Positive for leg swelling. Negative for chest pain and palpitations. Gastrointestinal:  Negative for abdominal distention and abdominal pain. Genitourinary:  Negative for dysuria. Musculoskeletal:  Positive for arthralgias. Negative for back pain and neck pain. Skin: Negative. Negative for rash. Neurological:  Positive for weakness. Negative for dizziness, light-headedness and headaches. Hematological:  Negative for adenopathy.    Psychiatric/Behavioral:  Positive for decreased Dispense Refill    DULoxetine (CYMBALTA) 60 MG extended release capsule       mirabegron (MYRBETRIQ) 50 MG TB24 take 1 tablet by mouth once daily 90 tablet 1    pantoprazole (PROTONIX) 40 MG tablet TAKE 1 TABLET DAILY 90 tablet 3    cephALEXin (KEFLEX) 500 MG capsule Take 1 capsule by mouth 3 times daily for 10 days 30 capsule 0    chlorthalidone (HYGROTON) 25 MG tablet Take 1 tablet by mouth daily 90 tablet 1    digoxin (LANOXIN) 125 MCG tablet TAKE 1 TABLET DAILY 90 tablet 1    losartan (COZAAR) 25 MG tablet TAKE 1 TABLET DAILY 90 tablet 1    metoprolol (LOPRESSOR) 100 MG tablet TAKE ONE AND ONE-HALF TABLETS TWICE A  tablet 1    nitroGLYCERIN (NITROSTAT) 0.4 MG SL tablet up to max of 3 total doses. If no relief after 1 dose, call 911. 25 tablet 1    pravastatin (PRAVACHOL) 40 MG tablet TAKE 1 TABLET DAILY 90 tablet 1    verapamil (VERELAN) 240 MG extended release capsule TAKE 1 CAPSULE TWICE A  capsule 1    modafinil (PROVIGIL) 200 MG tablet Take 1 tablet by mouth daily for 180 days.  90 tablet 1    traZODone (DESYREL) 50 MG tablet Take 1 tablet by mouth nightly as needed for Sleep 90 tablet 1    albuterol sulfate HFA (VENTOLIN HFA) 108 (90 Base) MCG/ACT inhaler Inhale 2 puffs into the lungs every 6 hours as needed for Wheezing 3 each 3    ELIQUIS 5 MG TABS tablet TAKE 1 TABLET TWICE A  tablet 3    furosemide (LASIX) 20 MG tablet Take 20 mg by mouth Daily       Melatonin 10 MG TABS Take 10 mg by mouth nightly as needed      b complex vitamins capsule Take 1 capsule by mouth daily as needed      loperamide (IMODIUM) 2 MG capsule Take 2 mg by mouth 4 times daily as needed for Diarrhea      clopidogrel (PLAVIX) 75 MG tablet TAKE 1 TABLET DAILY 90 tablet 3    fluticasone-salmeterol (ADVAIR) 250-50 MCG/DOSE AEPB Inhale 1 puff into the lungs 2 times daily 3 Inhaler 3    sulfaSALAzine (AZULFIDINE) 500 MG tablet Take 500 mg by mouth 2 times daily 1 in morning 2 at night      potassium chloride (KLOR-CON M) 10 MEQ extended release tablet Take 1 tablet by mouth daily (Patient not taking: Reported on 9/22/2022) 90 tablet 1    Cholecalciferol 50 MCG (2000 UT) CAPS Take 50 mcg by mouth daily (Patient not taking: Reported on 9/22/2022) 30 capsule 3     No current facility-administered medications for this visit. Allergies   Allergen Reactions    Demerol Hcl [Meperidine] Nausea And Vomiting     Health Maintenance   Topic Date Due    DEXA (modify frequency per FRAX score)  Never done    COVID-19 Vaccine (4 - Booster for Moderna series) 03/23/2022    Breast cancer screen  10/12/2022    Colorectal Cancer Screen  02/07/2023    Depression Screen  02/10/2023    Annual Wellness Visit (AWV)  02/11/2023    A1C test (Diabetic or Prediabetic)  09/09/2023    Lipids  09/09/2023    DTaP/Tdap/Td vaccine (2 - Td or Tdap) 12/20/2027    Flu vaccine  Completed    Shingles vaccine  Completed    Pneumococcal 65+ years Vaccine  Completed    Hepatitis C screen  Completed    Hepatitis A vaccine  Aged Out    Hepatitis B vaccine  Aged Out    Hib vaccine  Aged Out    Meningococcal (ACWY) vaccine  Aged Out         Objective:     Physical Exam  Vitals and nursing note reviewed. Constitutional:       Appearance: Normal appearance. She is well-developed and normal weight. HENT:      Head: Normocephalic. Right Ear: Tympanic membrane and ear canal normal.      Left Ear: Tympanic membrane and ear canal normal.      Nose: Nose normal.      Mouth/Throat:      Pharynx: Oropharynx is clear. Eyes:      Extraocular Movements: Extraocular movements intact. Conjunctiva/sclera: Conjunctivae normal.   Cardiovascular:      Rate and Rhythm: Normal rate and regular rhythm. Pulses: Normal pulses. Heart sounds: Normal heart sounds. Pulmonary:      Effort: Pulmonary effort is normal.      Breath sounds: Normal breath sounds. Abdominal:      General: Bowel sounds are normal.      Palpations: Abdomen is soft.    Musculoskeletal: Cervical back: Neck supple. Right ankle: Swelling and ecchymosis present. No deformity. Tenderness present. No lateral malleolus tenderness. Decreased range of motion. Right Achilles Tendon: Tenderness present. Comments: 2+ pitting edema mild erythema noted to right ankle. Skin:     General: Skin is warm and dry. Neurological:      General: No focal deficit present. Mental Status: She is alert and oriented to person, place, and time. Psychiatric:         Mood and Affect: Mood normal.         Thought Content: Thought content normal.         Judgment: Judgment normal.     /72 (Site: Right Upper Arm)   Pulse 75   Temp 97.4 °F (36.3 °C) (Oral)   Resp 18   Wt 233 lb 12.8 oz (106.1 kg)   SpO2 96%   BMI 35.55 kg/m²       Impression/Plan:  1. Cellulitis of right lower leg    2. OAB (overactive bladder)    3. Psoriatic arthritis ([facility] [address]    4. Rheumatoid arthritis, involving unspecified site, unspecified whether rheumatoid factor present ([facility])    5. Elevated sed rate    6. Mixed stress and urge urinary incontinence    7. Essential hypertension    8. Coronary artery disease involving native coronary artery of native heart without angina pectoris    9. Hypothyroidism, unspecified type    10. Class 1 obesity with serious comorbidity and body mass index (BMI) of 34.0 to 34.9 in adult, unspecified obesity type    11. Impaired fasting glucose    12. Chronic obstructive pulmonary disease, unspecified COPD type ([facility] [address]    13. Postmenopausal    14. Encounter for screening mammogram for malignant neoplasm of breast    15.  Gastroesophageal reflux disease, unspecified whether esophagitis present      Requested Prescriptions     Signed Prescriptions Disp Refills    mirabegron (MYRBETRIQ) 50 MG TB24 90 tablet 1     Sig: take 1 tablet by mouth once daily    pantoprazole (PROTONIX) 40 MG tablet 90 tablet 3     Sig: TAKE 1 TABLET DAILY    cephALEXin (KEFLEX) 500 MG capsule 30 capsule 0     Sig: Take 1 capsule by mouth 3 times daily for 10 days     Orders Placed This Encounter   Procedures    MICHAELLE DIGITAL SCREEN W OR WO CAD BILATERAL     Standing Status:   Future     Standing Expiration Date:   11/22/2023    XR ANKLE RIGHT (MIN 3 VIEWS)     Standing Status:   Future     Standing Expiration Date:   9/22/2023    VL DUP LOWER EXTREMITY VENOUS LEFT     Standing Status:   Future     Number of Occurrences:   1     Standing Expiration Date:   9/22/2023    DEXA BONE DENSITY AXIAL SKELETON     Standing Status:   Future     Number of Occurrences:   1     Standing Expiration Date:   9/22/2023    VL DUP LOWER EXTREMITY VENOUS RIGHT     Standing Status:   Future     Standing Expiration Date:   9/27/2023    Influenza, FLUAD, (age 72 y+), IM, Preservative Free, 0.5 mL       Patient giveneducational materials - see patient instructions. Discussed use, benefit, and side effects of prescribed medications. All patient questions answered. Pt voiced understanding. Reviewed health maintenance. Patient agreedwith treatment plan. Follow up as directed. Mammogram bone density Doppler lower extremities influenza updated today in office Keflex 500 3 times daily for 10 days. Myrbetriq's 50 mg p.o. daily. Protonix 40 mg p.o. daily. X-ray right ankle. Follow-up 2 to 3 weeks. Continue medications as prescribed.  Educational information on above diagnosis  provided per AVS.  30 min        Electronically signed by AWILDA Riley CNP on 9/27/2022 at 11:31 AM

## 2022-10-04 RX ORDER — PANTOPRAZOLE SODIUM 40 MG/1
TABLET, DELAYED RELEASE ORAL
Qty: 90 TABLET | Refills: 3 | OUTPATIENT
Start: 2022-10-04

## 2022-10-04 NOTE — TELEPHONE ENCOUNTER
[address] [place] [address] called requesting a refill on the following medications:  Requested Prescriptions     Pending Prescriptions Disp Refills    pantoprazole (PROTONIX) 40 MG tablet 90 tablet 3     Sig: TAKE 1 TABLET DAILY     Pharmacy verified:[facility]      Date of last visit:   Date of next visit (if applicable): 9/28/6928

## 2022-10-05 ENCOUNTER — HOSPITAL ENCOUNTER (OUTPATIENT)
Dept: CT IMAGING | Age: 72
Discharge: HOME | End: 2022-10-05
Payer: MEDICARE

## 2022-10-05 DIAGNOSIS — L98.9 BENIGN SKIN LESION OF NECK: ICD-10-CM

## 2022-10-05 PROCEDURE — 6360000004 HC RX CONTRAST MEDICATION: Performed by: THORACIC SURGERY (CARDIOTHORACIC VASCULAR SURGERY)

## 2022-10-05 PROCEDURE — 70491 CT SOFT TISSUE NECK W/DYE: CPT

## 2022-10-05 RX ADMIN — IOPAMIDOL 80 ML: 755 INJECTION, SOLUTION INTRAVENOUS at 14:52

## 2022-10-07 NOTE — TELEPHONE ENCOUNTER
Received refill request for Trazodone. Medication was last ordered by zakia. Medication was last ordered on 5/3/22 with 1 refills. Patient was last seen in the office 5/3/22. Patient has a scheduled follow up 11/7/22.    Medication needs to be sent to [address], [address] [facility] [phone]

## 2022-10-10 ENCOUNTER — HOSPITAL ENCOUNTER (OUTPATIENT)
Dept: PHYSICAL THERAPY | Age: 72
Setting detail: THERAPIES SERIES
Discharge: HOME | End: 2022-10-10
Payer: MEDICARE

## 2022-10-10 PROCEDURE — 97530 THERAPEUTIC ACTIVITIES: CPT

## 2022-10-10 RX ORDER — TRAZODONE HYDROCHLORIDE 50 MG/1
TABLET ORAL
Qty: 90 TABLET | Refills: 3 | Status: SHIPPED | OUTPATIENT
Start: 2022-10-10

## 2022-10-10 NOTE — PROGRESS NOTES
[address] [facility]  OCCUPATIONAL THERAPY  OUTPATIENT REHABILITATION - SPECIALIZED THERAPY SERVICES  [] PELVIC HEALTH EVALUATION  [x] DAILY NOTE  [] PROGRESS NOTE [] DISCHARGE NOTE    Date: 10/10/2022  Patient Name:  Andrew Hashimoto  : 1950  MRN: [id number]  CSN: [id number]    Referring Practitioner AWILDA Tyler - *   Diagnosis Mixed incontinence [N39.46]    Treatment Diagnosis M62.58 - Muscle Wasting and Atrophy, nec, other site  M62.81 - Muscle Weakness (Generalized)  N39.41 - Urge Incontinence  N81.84 - Pelvic Muscle Wasting (Female), N39.3 - Stress Incontinence female  R39.15 - Urgency of Urination  N39.41 - Urge Incontinence  N39.46 - Mixed Incontinence, and K59.00 - Constipation, Unspecified  R19.7 - Diarrhea, Unspecified   Date of Evaluation 22    Additional Pertinent History Obesity, Skin CA, Depression, TIA, L knee replacement , hysterectomy       Functional Outcome Measure Used IIQ, ELVIS   Functional Outcome Score 11, 9 (22)       Insurance: Primary: Payor: Silvina May /  /  / ,   Secondary:    Authorization Information: PRE CERTIFICATION REQUIRED: NO   Visit # 3, 3/10 for progress note   Visits Allowed: INSURANCE THERAPY BENEFIT:  UNLIMITED VISITS BASED ON MEDICAL NECESSITY    Recertification Date:    Physician Follow-Up: None scheduled at this time    Physician Orders: Eval and treat   History of Present Illness:      SUBJECTIVE: Pt reports things are going pretty good. Pt reports she does not feel as though her kegel is activating during pelvic brace after about 3 sets. Therapist suggested moving pelvic brace up into kegel routine, to complete in the morning. Pt reports less accidents while standing up to make it to the restroom, reporting she has had 4-5 days without accidents, as compared to prior to starting pelvic floor therapy the patient was having accident daily. Accidents are occurring more often in the evening time.  Pt reports she is getting 4 sets of kegels in daily. Pt reports she is continuing to wear 3-4 pads, pt reports the pad is not always saturated when changing she just prefers to have a clean pad on. Pt reports she is including more fresh fruit in diet and continuing to have bowel movement every other day that are now formed. Pt reports she is using a stool for her feet while having a bowel movement and feels as though this is assisting with better BMs. Social/Functional History and Current Status:  Medications and Allergies have been reviewed and are listed on Medical History Questionnaire. [address] [place] [address] lives with spouse and grandson  in a single story home with stairs and a handrail to enter. Task Previous Current   ADLs  Independent Independent toileting    IADL's Independent Independent    Ambulation Independent Walking to the bathroom    Transfers Independent Standing up    Recreation Independent Always knows where the restroom is, hesitant to go new places    [address] Unable to drive home in time to void    Driving Active  Active    Work Retired 31 years at [employer]  Retired     PAIN: pt reports pain only in L knee (replacement in Spring of 2021)      [address] [x] Deferred secondary to: Information below from evaluation, not tested today. For reference only on this daily note. Number of Pregnancies 4   Number of Vaginal Deliveries 4     History of Abuse:No history of physical, emotional or sexual abuse reported    BLADDER ASSESSMENT [x] Deferred secondary to: Information below from evaluation, not tested today. For reference only on this daily note.      Daily Fluid Ingestion: 60 oz water, 20 oz coffee occ, rare Pepsi, occ Iced tea    Urination Frequency Times/Day: 5-6 times daily   Times/Night: 0 nightly, waking up damp    Volume Large and Medium   Urge Sensation Normal  and Severe    SYMPTOM QUESTIONNAIRE   Loses Urine Upon: Hearing [employer], Walking to Restroom, Lifting, Standing Up/Changing Positions, Bending, and Standing   Incontinence Volume: Varies    Frequency of Leakage: Frequent   Wets the Bed: no   Burning/Pain with Urination: no   Difficulty Starting a Stream of Urine: no   Incomplete Emptying No    Strain to Empty Bladder: no   Falling Out Feeling: no   Urinate more than 7 times/day: no   Use a form of Leakage Protection: yes: 3-4 daily   1 pad at night that is damp    Restrict Fluid Intake: no   Stream Strength Average       BOWEL ASSESSMENT [x] Deferred secondary to: Information below from evaluation, not tested today. For reference only on this daily note. Frequency: Every other day    Most Common Stool Consistency: [place] 2- 4, this date [facility] 7 pt reports due to cheese    SYMPTOM QUESTIONNAIRE   Strain to have a BM: no   Include fiber in your diet: yes: occ   Take laxatives/enemas regularly: no   Pain with BM: Hemorrhoids    Strong urge to have BM: yes: occasionally    Leak/Stain Feces: no   Diarrhea often: yes: on occasion          GENERAL ASSESSMENT   [x] Deferred secondary to: Information below from evaluation, not tested today. For reference only on this daily note. Palpation Muscle layer 1, layer 2 and layer 3    Observation Pubic Symphysis, mons pubis, labia majora and minora, vaginal introitus   Posture WNL   Range of Motion Hip: WFL   Knee: WFL    Strength Right Lower Extremity:  WFL  Left Lower Extremity:   WFL   Gait Impaired - uses cane    Sensation WFL   Edema Not Tested   Balance/Fall History Utilizes cane for balance    Special Tests   SLR:positive BLE    S2-4: negative              OBSERVATION  [x] Deferred secondary to: Information below from evaluation, not tested today. For reference only on this daily note. Patient Safety Patient offered a chaperone and declined.    Skin Condition intact   Urogenital Triangle No tenderness or tightness reported       PELVIC FLOOR INTERNAL EXAM [x] Deferred secondary to: Information below from evaluation, not tested today. For reference only on this daily note. Exam Vaginal   Sensation Intact   Muscle Localization Fair - verbal cues required    Palpation/Tone Normal   Pelvic Floor Strength PERF:Power: 2,Endurance: 3,Reps: 4,Flicks: 6   Relax after Contraction Slight Difficult   Prolapse None         PELVIC HEALTH INCONTINENCE TREATMENT   Precautions:     Pain: 4-5 low back pain due to increased time at work this weekend       X in shaded column indicates activity completed today   Exercise/Intervention Reps/Sec   Notes   Diaphragm Breathing  8 min     x  Supine on mat table, hand out provided    Urge control/Urge drills  5 min     x  Hand out provided, pt verbalized understanding. Abdominal pressure management   8 min     x  With use of PF model for demonstration lifting, sitting and coughing    Functional pelvic brace  5 min     x  Discussed with use of sit to stand, hand out provided   Kegel quick 1 min    x Quick Review    Kegel hold 1 min    x Quick Review Progress to 5 sec   Able to progress by 1 sec every 1-2 weeks    Tummy tuck quick 1 min    x Quick Review    Tummy tuck hold 1 min    x Quick Review, progressed from 3 sec to 4 sec    Pelvic Brace Quick 1 min    x     Pelvic Brace Hold 1 min    x Quick Review, progressed   Functional pelvic brace 5 min    x Reviewed as pt reports she has not been completing as she did not recall   Kegel Ball 4 (5 min)   x Demonstrated, Patient demonstrates fair understanding at this time. Provided hand out   Kegel Band 4 (5 min)   x  Demonstrated, Patient demonstrates fair understanding at this time. Provided hand out   Pelvic tilt  10 (5 min)   x  Demonstrated, Patient demonstrates fair understanding at this time. Provided hand out     Specific Interventions Next Treatment: PFM strength, endurance, localization and coordination, review urge control strategies, progressing kegel hold, belly routine.      Activity/Treatment Tolerance:  [x]  Patient tolerated treatment well  []  Patient limited by fatigue  []  Patient limited by pain   []  Patient limited by medical complications  []  Other:     Patient Education:   [x]  HEP/Education Completed: OT plan of care, Pelvic Floor Musculature anatomy with instruction on precise localization. Patient to start doing 10 reps of kegels every 2 hours in sitting or lying alternating quicks with holds for kegels, tummy tuck, pelvic brace also adding in ball and band kegels. Pelvic tilt and diaphragm breathing 2 x daily. Handout provided. [employer] Access Code:  []  No new Education completed  []  Reviewed Prior HEP      []  Patient verbalized and/or demonstrated understanding of education provided. []  Patient unable to verbalize and/or demonstrate understanding of education provided. Will continue education. []  Barriers to learning: none noted at this time     Assessment: Pt tolerated session well this date. Body Structures/Functions/Activity Limitations: Abdominal and core weakness, Impaired muscle tone, and Impaired strength   Prognosis: Good    GOALS:  Patient Goal: to decrease incontinence     Short Term Goals:  Short Term Goals: 4 visits  Pt will be able to identify normal bladder function/voiding patterns, diet impact on the bladder, and urge control strategies to enhance pt insight into potential causative factors and promote increased bladder control. The pt will demonstrate well localized PFM contraction in order to increase the efficacy of strengthening program.   Pt will demonstrate the ability to delay urgency for 5' with good control to enable time to get to the bathroom. Pt will demonstrate independence with basic HEP designed to increase PFM and core strength and to enhance hip girdle flexibility for increased ease of strengthening.    5.  This pt will demo proper use of pelvic brace lifting, push, and pull with pelvic brace in order to promote continence during functional tasks. Long Term Goals: 8 visits  This pt will report a 75% decrease in incontinence frequency and a 75% decrease in amount to increase comfort in public and reduce risk of integumentary compromise. This pt will report normal urinary frequency to enabling ease of work/home task completion without interruption. This pt will reduce pad usage to 1 pad per day to reduce financial strain and promote comfort in public situations. This pt will decrease IIQ and IUD scores to 6 respectively to indicate improved continence and efficacy of treatment  This pt will demo independence with advanced HEP in order to allow gains in therapy to be maintained long term and reduce the risk of further medical need/assessment. This pt will demo PFM PERF 3/10/10/10 in order to increase continence in functional positions during home tasks. PLAN:  Treatment Recommendations: Strengthening, Endurance Training, Manual Therapy - Soft Tissue Mobilization, Safety Education and Training, Self-Care Education and Training, and Modalities    [x]  Plan of care initiated. Plan to see patient 1 time bi weekly for 12 weeks to address the treatment planned outlined above. [x]  Continue with current plan of care  []  Modify plan of care as follows:    []  Hold pending physician visit  []  Discharge    Time In 958   Time Out 1051   Timed Code Minutes: 53 min   Total Treatment Time: 53 min     Electronically Signed by:  BECKIE Ellis   License: [id number]  Occupational Therapist  Demetrius Ruffin

## 2022-10-14 ENCOUNTER — HOSPITAL ENCOUNTER (OUTPATIENT)
Dept: MAMMOGRAPHY | Age: 72
Discharge: HOME | End: 2022-10-14
Payer: MEDICARE

## 2022-10-14 DIAGNOSIS — Z12.31 VISIT FOR SCREENING MAMMOGRAM: ICD-10-CM

## 2022-10-14 DIAGNOSIS — Z12.31 ENCOUNTER FOR SCREENING MAMMOGRAM FOR MALIGNANT NEOPLASM OF BREAST: ICD-10-CM

## 2022-10-14 PROCEDURE — 77063 BREAST TOMOSYNTHESIS BI: CPT

## 2022-10-19 ENCOUNTER — HOSPITAL ENCOUNTER (OUTPATIENT)
Dept: WOMENS IMAGING | Age: 72
Discharge: HOME | End: 2022-10-19
Payer: MEDICARE

## 2022-10-19 DIAGNOSIS — R92.2 BREAST DENSITY: ICD-10-CM

## 2022-10-19 PROCEDURE — 76642 ULTRASOUND BREAST LIMITED: CPT

## 2022-10-19 PROCEDURE — G0279 TOMOSYNTHESIS, MAMMO: HCPCS

## 2022-10-21 ENCOUNTER — HOSPITAL ENCOUNTER (OUTPATIENT)
Dept: ULTRASOUND IMAGING | Age: 72
Discharge: HOME | End: 2022-10-21
Payer: MEDICARE

## 2022-10-21 DIAGNOSIS — R93.89 ABNORMAL CT SCAN: ICD-10-CM

## 2022-10-21 DIAGNOSIS — E03.9 HYPOTHYROIDISM, ADULT: ICD-10-CM

## 2022-10-21 DIAGNOSIS — E04.1 THYROID NODULE: ICD-10-CM

## 2022-10-21 PROCEDURE — 76536 US EXAM OF HEAD AND NECK: CPT

## 2022-10-24 ENCOUNTER — TELEPHONE (OUTPATIENT)
Dept: FAMILY MEDICINE CLINIC | Age: 72
End: 2022-10-24

## 2022-10-24 NOTE — TELEPHONE ENCOUNTER
----- Message from Remigio Alpers sent at 10/24/2022  2:55 PM EDT -----  Subject: Message to Provider    QUESTIONS  Information for Provider? The pt called requesting a mediation for a upper   respiratory infection. she would prefer this be called into [facility]   [address]. She tested Neg for Covid please contact the pt about this   request, if questions  ---------------------------------------------------------------------------  --------------  Caryl Rocha INFO  [phone]; OK to leave message on voicemail  ---------------------------------------------------------------------------  --------------  SCRIPT ANSWERS  Relationship to Patient?  Self OFFICE VISIT      Patient: Jan Lester Date of Service: 3/21/2022   : 1960 MRN: [id number]     SUBJECTIVE:     Chief Complaint   Patient presents with   • Physical       Patient Care Team:  Prasanna Vogt MD as PCP - General (Family Practice)    HISTORY OF PRESENT ILLNESS:  Jan Lester is a 62 year old male who presents today for complete physical examination.    Patient has given consent to record this visit for documentation in their clinical record.  Historian: Self.    Need for vaccination  Due for Influenza vaccine.    Well adult exam  Blood pressure is stable.  He quit smoking three months ago. Is down with Chantix.  His lungs feel better since he quit smoking.  He quit smoking for the fifth time now.  Was taking a pack and 2/3 a day before.  Is up-to-date with all three COVID-19 vaccines. Inquires about the fourth dose.  Blood work done recently shows kidney function, blood sugar level, electrolytes, potassium were all within normal range.  Last colonoscopy was done in .  Denies much swelling in his ankles.  He walks his dog every day for 20 minutes.  He gets eight hours of sleep. Takes nap during the day.    Elevated PSA  Has intermittent issues with his urine flow.  He sees the urologist.  Underwent a couple of MRIs and did one biopsy. Annual testing is recommended.  Last MRI was done in 2021 which did not show any concerning lesions.  He did a blood test six months later which was below the threshold at 3 ng/mL.  Last PSA was checked several months ago.  Denies nocturia.  He had seen another PCP with whom he was not satisfied, the doctor checked his prostate physically and said it was fine and the labs showed elevated PSA levels of 5.3 ng/mL.    Obstructive sleep apnea- on CPAP  Is on CPAP. His day time sleepiness and fatigue resolved with CPAP use.  He takes a nap every day (as a part of his routine).    Obesity (BMI 30-39.9)  He drinks lots of energy drinks.      Screening for  diabetes mellitus ; Elevated glucose  Due.    Hyperlipidemia, unspecified hyperlipidemia type  Blood work done in June 2021 showed cholesterol level was at 126 mg/dL and LDL was at 104 mg/dL.  He was put on Lipitor in June 2021 as he has borderline high cholesterol levels without any side effects.  He is not eating very well after he quit smoking. He has an insatiable appetite.    Additional comments:  His left toe was bothersome. After quitting smoking it is much better now.  His back issues are doing good.  His knees bother him; however, with exercise he feels better.    No chest pains.    He has some issues with his erections. Takes generic Viagra with benefits, does not have a partner currently. He has it handy, and does not need refills.    He smoked a pack a day for 30 years. Did not smoke much last year.    Recent PHQ 2/9 Score    PHQ 2:  Date Adult PHQ 2 Score Adult PHQ 2 Interpretation   3/21/2022 1 No further screening needed       PHQ 9:         PAST MEDICAL HISTORY:  Patient Active Problem List   Diagnosis   • Dependence on nicotine from cigarettes   • Diverticulosis of colon   • Obstructive sleep apnea- on CPAP   • Elevated PSA   • Severe obesity (BMI 35.0-35.9 with comorbidity) (CMS/[facility])   • Erectile dysfunction     Immunization History   Administered Date(s) Administered   • COVID-19 12Y+ Pfizer-BioNtech - Requires Dilution 02/04/2021, 02/25/2021, 10/01/2021   • Influenza, Unspecified Formulation 11/04/2016   • Influenza, injectable, quadrivalent 11/04/2016   • Influenza, injectable, quadrivalent, preservative-free 10/29/2015, 12/28/2018, 10/31/2019, 10/14/2020, 03/21/2022   • Influenza, seasonal, injectable, preservative free 11/13/2017   • Pneumococcal polysaccharide, adult, 23 valent 10/29/2015, 05/13/2019   • Shingles Zoster (Shingrix) 05/21/2020, 06/11/2020, 09/25/2020   • Tdap 09/29/2016       MEDICATIONS:  Patient's Medications   New Prescriptions    ATORVASTATIN (LIPITOR) 20 MG TABLET    Take  1 tablet by mouth daily.   Previous Medications    GLUCOS-MSM-C-OT-VQSICT-PCVPDB (GLUCOSAMINE MSM COMPLEX PO)    Take 1,500 mg by mouth daily.    SILDENAFIL (VIAGRA) 100 MG TABLET    Take 1 tablet by mouth once daily as needed   Modified Medications    No medications on file   Discontinued Medications    ASPIRIN 325 MG TABLET    Take 325 mg by mouth daily.    VARENICLINE (CHANTIX KENNETH) 0.5 MG X 11 & 1 MG X 42 TABLET    Days 1-3: Take 0.5mg by mouth tablet daily.   Days 4-7: Take 0.5mg tablet twice daily.  Then take 1 mg tablet twice daily thereafter.       ALLERGIES:  ALLERGIES:  No Known Allergies    PAST SURGICAL HISTORY:  Past Surgical History:   Procedure Laterality Date   • Biopsy of prostate,needle/punch  2020    no cancer   • Colonoscopy  2017   • Colonoscopy w/ polypectomy  2012    due    • Colonoscopy w/ polypectomy  2017    due in    • Colonoscopy w/ polypectomy  10/19/2020   • Umbilical hernia repair         FAMILY HISTORY:  Family History   Problem Relation Age of Onset   • Heart disease Mother    • Cancer, Colon Father         ?? vs bladder cancer   • Other Father         Gout   • Cancer, Lung Sister    • Other Sister         Gout   • Hypertension Brother    • Other Brother         Gout   • Cancer, Colon Brother         ?? vs bladder cancer   • Cancer Brother         ?? thyroid cancer   • Cancer, Breast Maternal Grandmother        SOCIAL HISTORY:  Social History     Tobacco Use   • Smoking status: Former Smoker     Packs/day: 1.00     Years: 42.00     Pack years: 42.00     Types: Cigarettes     Start date: 1973     Quit date: 2021     Years since quittin.2   • Smokeless tobacco: Never Used   Substance Use Topics   • Alcohol use: Yes     Alcohol/week: 0.0 standard drinks     Comment: 2 times weekly   • Drug use: Yes     Types: Marijuana     Employer: [employer]    Review of Systems    Constitutional: as per HPI.  Respiratory: as per HPI.    Cardiovascular: as per HPI.   Endocrine: as per HPI.   Genitourinary: as per HPI.   Musculoskeletal: as per HPI.       All other systems were reviewed and negative.      OBJECTIVE:     Visit Vitals  /76 (BP Location: LUE - Left upper extremity, Patient Position: Sitting, Cuff Size: Large Adult)   Pulse 76   Temp 97.5 °F (36.4 °C)   Ht 5' 9\" (1.753 m)   Wt 118.4 kg (261 lb)   BMI 38.54 kg/m²           Physical Exam      Constitutional: Is obese. alert, in no acute distress. Is able to ambulate on his own without any assistive devices.    Eyes: no discharge, normal conjunctiva, no eyelid swelling, no ptosis and the sclerae were normal. Pupils equal, round and reactive to light and accommodation, conjugate gaze and extraocular movements were intact.  ENT: normal appearing outer ear, normal appearing nose. examination of the tympanic membrane showed normal landmarks, normal appearing external canal. nasal mucosa moist and pink, No nasal discharge. Normal lips. Oral mucosa pink and moist, No oral lesions.  Neck: normal appearing, supple neck and no mass was seen. Thyroid not enlarged and no thyroid nodules. No lymphadenopathy.  Pulmonary: no respiratory distress, normal respiratory rate and effort and no accessory muscle use. Breath sounds clear to auscultation bilaterally.  Cardiovascular: normal rate, no murmurs,, regular rhythm, normal S1 and normal S2. Edema was not present in the lower extremities.  Abdomen: soft, nontender, nondistended, normal bowel sounds and no abdominal mass. No  hepatomegaly and no splenomegaly. Does have a small umbilical hernia present. It has been repaired in the past, but it still has little residual umbilical hernia present which is non-tender.  Musculoskeletal: normal gait. No musculoskeletal erythema was seen, no joint swelling seen and no joint  tenderness was elicited. No scoliosis. Normal range of motion. There was no joint instability noted. Muscle  strength and tone were  normal.  Neurological: cranial nerves II-XII are grossly intact. Reflexes are normal. No sensory changes. No tremors.  Psychiatric: oriented to person, oriented to place and oriented to time. Interactive and mood/affect were  appropriate. judgement not impaired. Normal attention span. Short term memory intact. Mood appears good.  Skin, Hair, Nails: normal skin color and pigmentation and no rash. no foot ulcers and no skin ulcer was  seen. normal skin turgor. no clubbing or cyanosis of the fingernails.  Has a number of seborrheic keratosis on his back, no other suspicious looking skin lesions are seen.  Extremities: he has palpable peripheral pulses.  :  Genitalia appear normal.  No testicular masses are felt.  Penis appears normal.  No inguinal hernias or adenopathy.          ASSESSMENT AND PLAN:   This is a 62 year old year-old male who presents with     1. Need for vaccination  Ordered and administered the Influenza vaccine in the office today.  - INFLUENZA QUADRIVALENT SPLIT PRES FREE 0.5 ML VACC, IM (FLULAVAL,FLUARIX,FLUZONE)    2. Well adult exam  Reviewed and discussed previous reports.  Reviewed and discussed family, personal, social and past surgical history.  Stay away from smoking cigarettes.  COVID-19 fourth dose is not recommended for now. Will be notified for it on news updates.  - CBC WITH DIFFERENTIAL; Future    3. Elevated PSA  Reviewed and discussed previous reports.  Follow up with a urologist.  Encouraged to keep following up with PSA.  Will do a PSA check today, refer to orders.  - PROSTATE SPECIFIC ANTIGEN, TOTAL WITH REFLEX TO FREE PSA; Future    4. Obstructive sleep apnea- on CPAP  Stable on CPAP machine.  Continue CPAP.    5. Obesity (BMI 30-39.9)  Obesity with comorbidity of elevated cholesterol and erectile dysfunction.  Encouraged weight loss.  Keep working on a low-carb diet and exercise.  Cut back on energy drinks etc.    6. Screening for diabetes mellitus  Ordered labs, refer to  orders.  - COMPREHENSIVE METABOLIC PANEL; Future    7. Hyperlipidemia, unspecified hyperlipidemia type  Under good control.  Reviewed and discussed previous reports.  Continue with Atorvastatin. Denies any side effects from Atorvastatin.  Will check labs today.  - LIPID PANEL WITH REFLEX; Future  - atorvastatin (LIPITOR) 20 MG tablet; Take 1 tablet by mouth daily.  Dispense: 30 tablet; Refill: 11    8. Elevated glucose  Ordered labs, refer to orders.  - GLYCOHEMOGLOBIN; Future    Additional plan:    Continue knee exercise.    Erectile dysfunction  Stable on Viagra that he rarely uses.    History of nicotine dependence  He has quit smoking now with Chantix.  Congratulated him on quitting smoking.  Recommended undergoing CT scan of chest as a recommendation for people who smoke a pack a day for 30 years.  Patient declines for now, will do it later.          Health Maintenance   Topic Date Due   • Lung Cancer Screening  Never done   • Depression Screening  03/21/2023   • Colonoscopy Risk  10/19/2023   • DTaP/Tdap/Td Vaccine (2 - Td or Tdap) 09/29/2026   • Influenza Vaccine  Completed   • Hepatitis C Screening  Completed   • Shingles Vaccine  Completed   • COVID-19 Vaccine  Completed   • Hepatitis B Vaccine  Aged Out   • Meningococcal Vaccine  Aged Out   • HPV Vaccine  Aged Out   • Pneumococcal Vaccine 0-64  Aged Out           Patient Instructions   Dietary Recommendations    Foods you should try to liberally incorporate into your diet:  -Fresh fruits and vegetables- green leafy vegetables, tomatoes, sweet potatoes, apples, melons, berries, etc.  -Olive, canola, and flaxseed oils  -Nuts and beans- almonds, walnuts, pecans, Brazil nuts, peas, kidney beans, lentils, soybeans, etc.  -Barley, wild brown rice  -Fish- especially salmon, trout, & tuna (avoid deep-fat-fried fish)  -Skinless chicken and turkey  -Skim milk and low-fat dairy products- such as Greek yogurt, but avoid those with extra sugar  -Eggs    Foods you  should eat in moderation:  -Lean cuts of red meat  -Stone-ground whole grain bread and sourdough bread, pasta, rolled/steel cut oatmeal    Foods you should AVOID:  -High fructose corn syrup  -Foods high in sugar- pastries, candy, pies, cake, jelly, sugary cereals, syrup, etc.  -Soft drinks, sugary sports drinks, diet soft drinks, and fruit juices  -Starchy/processed foods- white bread, plain bagels, pancakes, chips, corn flakes, rice cakes, pretzels, white rice, potatoes, instant oatmeal, popcorn, crackers, etc.  -Trans-fatty acids/partially hydrogenated oils, corn oil, palm oil (look at food labels)  -Saturated fat- fatty cuts of red meat, sausage, chaparro, shortening, etc.  -Processed meats- ham, sausage, deli meats, cured meats, pre-packaged meats)  -Fast food, deep-fat-fried food (French fries, onion rings, etc.)  -Limit salt / sodium intake to less than 2300 mg/day- (check labels for sodium content)  -Don't add salty condiments, such as ketchup, mustard, pickles or sauces, to your food.    Other tips:  -Eat slowly- it takes time for your stomach to sense when it is full.  -Try time restricted eating/intermittent fasting.  Limit the hours of the day when you eat to 8 hours, ie: only eat between 7 am to 3 pm, or 10 am to 6 pm.  -Eat smaller portions- Restaurant portions are often enough for two meals.  -Avoid desserts and appetizers when dining out.  -Foods labeled as \"low-fat\" are sometimes worse for you due to high sugar content.  -Don’t eat while watching TV.  -Drink more water- 4-5 glasses per day.  Coffee and tea are fine in moderation.  -Take 9451-3059 units of Vitamin D3 daily if you get limited sunlight exposure.   -Don’t smoke and avoid second-hand smoke.  -Try to exercise at least 30 minutes 3x a week. Even a brisk walk 3-4x a week for 30 minutes can help a lot.  Both strength training and aerobic type exercises are important.  -In general, I recommend following the \"Mediterranean Diet\" or the \"DASH  diet\".           Instructions provided as documented in the AVS.  Medication use,effects and side effects discussed in detail with patient.  The patient indicated understanding of the diagnosis and agreed with the plan of care.  Medical compliance with plan discussed and risks of non-compliance reviewed.    Patient education completed on disease process, etiology & prognosis.    Patient expresses understanding of the plan.    Proper usage and side effects of medications reviewed & discussed.    Refer to orders.    Return to clinic as clinically indicated as discussed with patient who verbalized understanding of & agreement with the plan.    Return in about 1 year (around 3/21/2023) for Physical, with labs 2-3 days before visit.    I,  Dr. Taniya Ortiz, have created a visit summary document based on the audio recording between Dr. Prasanna Vogt MD and this patient for the physician to review, edit as needed, and authenticate.  Creation Date: 3/22/2022 Time: 7:58 AM      I have reviewed and edited the visit summary above and attest that it is accurate.   -Antwan Vogt MD

## 2022-10-25 ENCOUNTER — TELEPHONE (OUTPATIENT)
Dept: FAMILY MEDICINE CLINIC | Age: 72
End: 2022-10-25

## 2022-10-25 ENCOUNTER — OFFICE VISIT (OUTPATIENT)
Dept: FAMILY MEDICINE CLINIC | Age: 72
End: 2022-10-25
Payer: MEDICARE

## 2022-10-25 VITALS
TEMPERATURE: 97.8 F | OXYGEN SATURATION: 97 % | RESPIRATION RATE: 20 BRPM | SYSTOLIC BLOOD PRESSURE: 124 MMHG | HEART RATE: 76 BPM | HEIGHT: 67 IN | DIASTOLIC BLOOD PRESSURE: 72 MMHG | WEIGHT: 226 LBS | BODY MASS INDEX: 35.47 KG/M2

## 2022-10-25 DIAGNOSIS — J44.1 COPD WITH ACUTE EXACERBATION (HCC): Primary | ICD-10-CM

## 2022-10-25 PROCEDURE — 1124F ACP DISCUSS-NO DSCNMKR DOCD: CPT | Performed by: NURSE PRACTITIONER

## 2022-10-25 PROCEDURE — 99213 OFFICE O/P EST LOW 20 MIN: CPT | Performed by: NURSE PRACTITIONER

## 2022-10-25 RX ORDER — CEFUROXIME AXETIL 250 MG/1
250 TABLET ORAL 2 TIMES DAILY
Qty: 20 TABLET | Refills: 0 | Status: SHIPPED | OUTPATIENT
Start: 2022-10-25 | End: 2022-11-04

## 2022-10-25 RX ORDER — PREDNISONE 20 MG/1
20 TABLET ORAL 2 TIMES DAILY
Qty: 10 TABLET | Refills: 0 | Status: SHIPPED | OUTPATIENT
Start: 2022-10-25 | End: 2022-10-30

## 2022-10-25 ASSESSMENT — ENCOUNTER SYMPTOMS
RHINORRHEA: 1
SHORTNESS OF BREATH: 1
SORE THROAT: 0
EYE REDNESS: 0
WHEEZING: 1
COUGH: 1
CONSTIPATION: 0
EYE PAIN: 0
NAUSEA: 0
BACK PAIN: 0
EYE DISCHARGE: 0
VOMITING: 0
ALLERGIC/IMMUNOLOGIC NEGATIVE: 1
DIARRHEA: 0
ABDOMINAL PAIN: 0
TROUBLE SWALLOWING: 0

## 2022-10-25 NOTE — PROGRESS NOTES
[facility]  [address] [place] [place] [address]  Dept: [phone]  Dept Fax: [phone]  Loc: [phone]     Visit Date:  10/25/2022      Patient:  Melonie Mccormick  YOB: 1950    HPI:     Chief Complaint   Patient presents with    Cough     Cough x one week, it comes and goes. It is productive. Some days it is much worse. She had done 2 home tests that were negative, last one was 3-4 days ago. She does not feel ill, otherwise. Cough  This is a new problem. The current episode started in the past 7 days. The problem has been gradually worsening. The problem occurs every few minutes. The cough is Productive of bloody sputum. Associated symptoms include rhinorrhea, shortness of breath and wheezing. Pertinent negatives include no chills, ear pain, eye redness, fever, headaches, myalgias, rash or sore throat.      Medications    Current Outpatient Medications:     cefUROXime (CEFTIN) 250 MG tablet, Take 1 tablet by mouth 2 times daily for 10 days, Disp: 20 tablet, Rfl: 0    predniSONE (DELTASONE) 20 MG tablet, Take 1 tablet by mouth 2 times daily for 5 days, Disp: 10 tablet, Rfl: 0    traZODone (DESYREL) 50 MG tablet, TAKE 1 TABLET NIGHTLY AS NEEDED FOR SLEEP, Disp: 90 tablet, Rfl: 3    DULoxetine (CYMBALTA) 60 MG extended release capsule, , Disp: , Rfl:     mirabegron (MYRBETRIQ) 50 MG TB24, take 1 tablet by mouth once daily, Disp: 90 tablet, Rfl: 1    pantoprazole (PROTONIX) 40 MG tablet, TAKE 1 TABLET DAILY, Disp: 90 tablet, Rfl: 3    chlorthalidone (HYGROTON) 25 MG tablet, Take 1 tablet by mouth daily, Disp: 90 tablet, Rfl: 1    digoxin (LANOXIN) 125 MCG tablet, TAKE 1 TABLET DAILY, Disp: 90 tablet, Rfl: 1    losartan (COZAAR) 25 MG tablet, TAKE 1 TABLET DAILY, Disp: 90 tablet, Rfl: 1    metoprolol (LOPRESSOR) 100 MG tablet, TAKE ONE AND ONE-HALF TABLETS TWICE A DAY, Disp: 270 tablet, Rfl: 1    nitroGLYCERIN (NITROSTAT) 0.4 MG SL tablet, up to max of 3 total doses. If no relief after 1 dose, call 911., Disp: 25 tablet, Rfl: 1    pravastatin (PRAVACHOL) 40 MG tablet, TAKE 1 TABLET DAILY, Disp: 90 tablet, Rfl: 1    verapamil (VERELAN) 240 MG extended release capsule, TAKE 1 CAPSULE TWICE A DAY, Disp: 180 capsule, Rfl: 1    potassium chloride (KLOR-CON M) 10 MEQ extended release tablet, Take 1 tablet by mouth daily (Patient taking differently: Take 10 mEq by mouth daily as needed), Disp: 90 tablet, Rfl: 1    modafinil (PROVIGIL) 200 MG tablet, Take 1 tablet by mouth daily for 180 days. , Disp: 90 tablet, Rfl: 1    albuterol sulfate HFA (VENTOLIN HFA) 108 (90 Base) MCG/ACT inhaler, Inhale 2 puffs into the lungs every 6 hours as needed for Wheezing, Disp: 3 each, Rfl: 3    Cholecalciferol 50 MCG (2000 UT) CAPS, Take 50 mcg by mouth daily, Disp: 30 capsule, Rfl: 3    ELIQUIS 5 MG TABS tablet, TAKE 1 TABLET TWICE A DAY, Disp: 180 tablet, Rfl: 3    furosemide (LASIX) 20 MG tablet, Take 20 mg by mouth daily as needed, Disp: , Rfl:     Melatonin 10 MG TABS, Take 10 mg by mouth nightly as needed, Disp: , Rfl:     b complex vitamins capsule, Take 1 capsule by mouth daily as needed, Disp: , Rfl:     loperamide (IMODIUM) 2 MG capsule, Take 2 mg by mouth 4 times daily as needed for Diarrhea, Disp: , Rfl:     clopidogrel (PLAVIX) 75 MG tablet, TAKE 1 TABLET DAILY, Disp: 90 tablet, Rfl: 3    fluticasone-salmeterol (ADVAIR) 250-50 MCG/DOSE AEPB, Inhale 1 puff into the lungs 2 times daily, Disp: 3 Inhaler, Rfl: 3    sulfaSALAzine (AZULFIDINE) 500 MG tablet, Take 500 mg by mouth 2 times daily 1 in morning 2 at night, Disp: , Rfl:     The patient is allergic to demerol hcl [meperidine].     Past Medical History  Sharon Thornton  has a past medical history of Arthritis, Atrial fibrillation ([facility] [address] Cancer ([facility] [address] Carotid arterial disease ([facility] [address] CHF (congestive heart failure) ([facility] [address] Depression, Fibromyalgia, Hypertension, Hypothyroidism, Obesity, S/P bariatric surgery, Sleep apnea, Thyroid disease, and TIA (transient ischemic attack). Subjective:      Review of Systems   Constitutional:  Negative for activity change, chills, fatigue and fever. HENT:  Positive for rhinorrhea. Negative for congestion, ear pain, sore throat and trouble swallowing. Eyes:  Negative for pain, discharge and redness. Respiratory:  Positive for cough, shortness of breath and wheezing. Cardiovascular: Negative. Gastrointestinal:  Negative for abdominal pain, constipation, diarrhea, nausea and vomiting. Endocrine: Negative. Genitourinary:  Negative for dysuria, frequency and urgency. Musculoskeletal:  Negative for arthralgias, back pain and myalgias. Skin:  Negative for rash. Allergic/Immunologic: Negative. Neurological:  Negative for dizziness, tremors, weakness and headaches. Hematological: Negative. Psychiatric/Behavioral:  Negative for dysphoric mood and sleep disturbance. The patient is not nervous/anxious. Objective:     /72   Pulse 76   Temp 97.8 °F (36.6 °C) (Oral)   Resp 20   Ht 5' 7\" (1.702 m)   Wt 226 lb (102.5 kg)   SpO2 97%   BMI 35.40 kg/m²     Physical Exam  Vitals reviewed. Constitutional:       General: She is not in acute distress. Appearance: Normal appearance. She is well-developed. She is not toxic-appearing or diaphoretic. HENT:      Head: Normocephalic. No right periorbital erythema or left periorbital erythema. Jaw: No trismus. Right Ear: Hearing, tympanic membrane, ear canal and external ear normal.      Left Ear: Hearing, tympanic membrane, ear canal and external ear normal.      Nose: Rhinorrhea present. No mucosal edema. Mouth/Throat:      Mouth: Mucous membranes are moist.      Dentition: Normal dentition. Pharynx: Uvula midline. No posterior oropharyngeal erythema. Tonsils: No tonsillar exudate. 0 on the right. 0 on the left. Eyes:      General: Lids are normal.         Right eye: No discharge. Left eye: No discharge. Conjunctiva/sclera: Conjunctivae normal.      Right eye: Right conjunctiva is not injected. No chemosis. Left eye: No chemosis. Pupils: Pupils are equal, round, and reactive to light. Neck:      Vascular: No JVD. Trachea: Trachea normal.   Cardiovascular:      Rate and Rhythm: Normal rate and regular rhythm. Heart sounds: Normal heart sounds. No murmur heard. Pulmonary:      Effort: Pulmonary effort is normal. No respiratory distress. Breath sounds: No stridor. Wheezing present. Musculoskeletal:         General: No tenderness, deformity or signs of injury. Normal range of motion. Cervical back: Full passive range of motion without pain and normal range of motion. Lymphadenopathy:      Cervical: No cervical adenopathy. Skin:     General: Skin is warm and dry. Capillary Refill: Capillary refill takes less than 2 seconds. Findings: No rash. Neurological:      Mental Status: She is alert and oriented to person, place, and time. GCS: GCS eye subscore is 4. GCS verbal subscore is 5. GCS motor subscore is 6. Cranial Nerves: No cranial nerve deficit. Coordination: Coordination normal.      Gait: Gait normal.   Psychiatric:         Mood and Affect: Mood is not anxious or depressed. Speech: Speech normal.         Behavior: Behavior normal. Behavior is not withdrawn or hyperactive. Behavior is cooperative. Thought Content: Thought content normal.         Judgment: Judgment normal.       Assessment/Plan:      Aden Hamilton was seen today for cough. Diagnoses and all orders for this visit:    COPD with acute exacerbation ([facility] [address]  -     cefUROXime (CEFTIN) 250 MG tablet; Take 1 tablet by mouth 2 times daily for 10 days  -     predniSONE (DELTASONE) 20 MG tablet; Take 1 tablet by mouth 2 times daily for 5 days      Return if symptoms worsen or fail to improve. Patient instructions given nick.         Electronically signed by AWILDA Bonilla - CNP on 10/25/2022 at 10:14 AM

## 2022-10-25 NOTE — TELEPHONE ENCOUNTER
Pt informed of Thyroid US results. She was placed on recall calendar to repeat in 1 yr per 's order.

## 2022-10-25 NOTE — TELEPHONE ENCOUNTER
Reyes Vazquez NP called stating she received the patient Thyroid Ultrasound that Dr. Diego Benavides ordered and she realized she no longer see's the patient. She is asking if we can follow up with this result and contact the patient.      Please review Thyroid US done 10/21/2022

## 2022-11-01 ENCOUNTER — HOSPITAL ENCOUNTER (OUTPATIENT)
Dept: PHYSICAL THERAPY | Age: 72
Setting detail: THERAPIES SERIES
Discharge: HOME | End: 2022-11-01
Payer: MEDICARE

## 2022-11-01 PROCEDURE — 97530 THERAPEUTIC ACTIVITIES: CPT

## 2022-11-01 NOTE — PROGRESS NOTES
[address] [facility] of [facility]  OCCUPATIONAL THERAPY  OUTPATIENT REHABILITATION - SPECIALIZED THERAPY SERVICES  [] PELVIC HEALTH EVALUATION  [x] DAILY NOTE  [] PROGRESS NOTE [] DISCHARGE NOTE    Date: 2022  Patient Name:  Delio Nowak  : 1950  MRN: [id number]  CSN: [id number]    Referring Practitioner AWILDA Gross - *   Diagnosis Mixed incontinence [N39.46]    Treatment Diagnosis M62.58 - Muscle Wasting and Atrophy, nec, other site  M62.81 - Muscle Weakness (Generalized)  N39.41 - Urge Incontinence  N81.84 - Pelvic Muscle Wasting (Female), N39.3 - Stress Incontinence female  R39.15 - Urgency of Urination  N39.41 - Urge Incontinence  N39.46 - Mixed Incontinence, and K59.00 - Constipation, Unspecified  R19.7 - Diarrhea, Unspecified   Date of Evaluation 22    Additional Pertinent History Obesity, Skin CA, Depression, TIA, L knee replacement , hysterectomy       Functional Outcome Measure Used IIQ, ELVIS   Functional Outcome Score 11, 9 (22)       Insurance: Primary: Payor: Lawanda Lawson /  /  / ,   Secondary:    Authorization Information: PRE CERTIFICATION REQUIRED: NO   Visit # 4, 4/10 for progress note   Visits Allowed: INSURANCE THERAPY BENEFIT:  UNLIMITED VISITS BASED ON MEDICAL NECESSITY    Recertification Date:    Physician Follow-Up: None scheduled at this time    Physician Orders: Eval and treat   History of Present Illness:      SUBJECTIVE: Pt appears short of breath this date, utilizing cane for ambulation. Pt reports she had a bad cold, needing antibiotics and steroids. Pt reports she had leakage with coughing while having a cold. Pt reports she can feel her kegel getting stronger. Pt reports she has noticed differences, she reports she is able to get up in the middle of the night and make it to the bathroom without leaking.  Pt reports she now has an  at home and is getting extra liquid, and reports her bladder is holding more and has longer voiding like her bladder is a lot skinner. Pt reports she is getting in 6 sets of 10 kegels in daily, she reports the pelvic brace is still challenging to complete. Pt reports she is holding kegels for 5-6 seconds. Pt reports she is only having accidents about  2-3 times a day ~ a shot glass full of liquid each time, however, she is now able to stop the leakage, make her way to the restroom and then void in the toilet. Pt reports she is wearing 4-5 pads daily, she reports though as soon as she leaks she is changing the pad and has reduced the pad size from a size 6 protection to size 5. Pt reports she is having a bowel movement every couple of days, she reports the BM is not as hard as they were before. She reports she is eating more fresh fruit which is helping with BMs. Social/Functional History and Current Status:  Medications and Allergies have been reviewed and are listed on Medical History Questionnaire. [address] [place] [address] lives with spouse and grandson  in a single story home with stairs and a handrail to enter. Task Previous Current   ADLs  Independent Independent toileting    IADL's Independent Independent    Ambulation Independent Walking to the bathroom    Transfers Independent Standing up    Recreation Independent Always knows where the restroom is, hesitant to go new places    [address] Unable to drive home in time to void    Driving Active  Active    Work Retired 31 years at [employer]  Retired     PAIN: pt reports pain only in L knee (replacement in Spring of 2021)      [address] [x] Deferred secondary to: Information below from evaluation, not tested today. For reference only on this daily note. Number of Pregnancies 4   Number of Vaginal Deliveries 4     History of Abuse:No history of physical, emotional or sexual abuse reported    BLADDER ASSESSMENT [x] Deferred secondary to: Information below from evaluation, not tested today.   For reference only on this daily note. Daily Fluid Ingestion: 60 oz water, 20 oz coffee occ, rare Pepsi, occ Iced tea   Pt reports she has backed off on coffee and pepsi 11/01/22   Urination Frequency Times/Day: 5-6 times daily   Times/Night: 0 nightly, waking up damp    Volume Large and Medium   Urge Sensation Normal  and Severe    SYMPTOM QUESTIONNAIRE   Loses Urine Upon: Hearing [place], Walking to Restroom, Lifting, Standing Up/Changing Positions, Bending, and Standing   Incontinence Volume: Varies    Frequency of Leakage: Frequent   Wets the Bed: no   Burning/Pain with Urination: no   Difficulty Starting a Stream of Urine: no   Incomplete Emptying No    Strain to Empty Bladder: no   Falling Out Feeling: no   Urinate more than 7 times/day: no   Use a form of Leakage Protection: yes: 3-4 daily   1 pad at night that is damp    Restrict Fluid Intake: no   Stream Strength Average       BOWEL ASSESSMENT [x] Deferred secondary to: Information below from evaluation, not tested today. For reference only on this daily note. Frequency: Every other day    Most Common Stool Consistency: Hondo 2- 4, this date [facility] 7 pt reports due to cheese    SYMPTOM QUESTIONNAIRE   Strain to have a BM: no   Include fiber in your diet: yes: occ   Take laxatives/enemas regularly: no   Pain with BM: Hemorrhoids    Strong urge to have BM: yes: occasionally    Leak/Stain Feces: no   Diarrhea often: yes: on occasion          GENERAL ASSESSMENT   [x] Deferred secondary to: Information below from evaluation, not tested today. For reference only on this daily note.      Palpation Muscle layer 1, layer 2 and layer 3    Observation Pubic Symphysis, mons pubis, labia majora and minora, vaginal introitus   Posture WNL   Range of Motion Hip: WFL   [facility]    Strength Right Lower Extremity:  WFL  Left Lower Extremity:   WFL   Gait Impaired - uses cane    Sensation WFL   Edema Not Tested   Balance/Fall History Utilizes cane for balance Special Tests   SLR:positive BLE    S2-4: negative              OBSERVATION  [x] Deferred secondary to: Information below from evaluation, not tested today. For reference only on this daily note. Patient Safety Patient offered a chaperone and declined. Skin Condition intact   Urogenital Triangle No tenderness or tightness reported       PELVIC FLOOR INTERNAL EXAM [x] Deferred secondary to: Information below from evaluation, not tested today. For reference only on this daily note. Exam Vaginal   Sensation Intact   Muscle Localization Fair - verbal cues required    Palpation/Tone Normal   Pelvic Floor Strength PERF:Power: 2,Endurance: 3,Reps: 4,Flicks: 6   Relax after Contraction Slight Difficult   Prolapse None         PELVIC HEALTH INCONTINENCE TREATMENT   Precautions:     Pain: 4-5 low back pain due to increased time at work this weekend       X in shaded column indicates activity completed today   Exercise/Intervention Reps/Sec   Notes   Diaphragm Breathing  2 min     x  Quick Review    Urge control/Urge drills  5 min     x Pt reports this is getting better and is using it often throughout her day however continues to have some leakage however is able to stop leakage to walk to bathroom to void    HEP education  10 min     x Pt appears to demonstrate decreased recall of kegel with ball/ band and pelvic tilt. Pt educated to begin completing these exercises x 1 daily in order to increase results. Additional treatment session added for late December following next appointment in 2 weeks as patient requiring visits for further education and comprehension of HEP.     Kegel quick 1 min    x Quick Review    Kegel hold 1 min   6 sec  x Quick Review Progress to 5 sec   Able to progress by 1 sec every 1-2 weeks    Tummy tuck quick 1 min    x Quick Review    Tummy tuck hold 1 min   6 sec  x Quick Review, progressed from 3 sec to 4 sec    Pelvic Brace Quick 1 min    x     Pelvic Brace Hold 5 min    x Quick Review, progressed  Pt reports difficulty with this   Educated pt to perform seated and place gentle hand on belly to assist in cue to maintain hold   Functional pelvic brace 5 min    x Reviewed as pt reports she has not been completing as she did not recall  Pt continues to report having difficulty with this   Kegel Ball 10 (8 min)   x Reviewed as pt reports \"we virgilio went over that a little bit\"  Demonstrated, Patient demonstrates fair understanding at this time. Provided hand out   Kegel Band 10 (8 min)   x  Reviewed as pt reports \"we virgilio went over that a little bit\" Demonstrated, Patient demonstrates fair understanding at this time. Provided hand out   Pelvic tilt  10 (5 min)   x  Reviewed as pt reports \"we virgilio went over that a little bit\" Demonstrated, Patient demonstrates fair understanding at this time. Provided hand out   Pt appears to demonstrate decreased recall of kegel with ball/ band and pelvic tilt. Pt educated to begin completing these exercises x 1 daily in order to increase results. Additional treatment session added for late December following next appointment in 2 weeks as patient requiring visits for further education and comprehension of HEP. Specific Interventions Next Treatment: PFM strength, endurance, localization and coordination, review urge control strategies, progressing kegel hold, belly routine. Activity/Treatment Tolerance:  [x]  Patient tolerated treatment well  []  Patient limited by fatigue  []  Patient limited by pain   []  Patient limited by medical complications  []  Other:     Patient Education:   [x]  HEP/Education Completed: OT plan of care, Pelvic Floor Musculature anatomy with instruction on precise localization. Patient to start doing 10 reps of kegels every 2 hours in sitting or lying alternating quicks with holds for kegels, tummy tuck, pelvic brace also adding in ball and band kegels. Pelvic tilt x 1 set of 10 daily and diaphragm breathing 2 x daily. Handouts provided. [employer] Access Code:  []  No new Education completed  []  Reviewed Prior HEP      []  Patient verbalized and/or demonstrated understanding of education provided. []  Patient unable to verbalize and/or demonstrate understanding of education provided. Will continue education. []  Barriers to learning: none noted at this time     Assessment: Pt tolerated session well this date. Body Structures/Functions/Activity Limitations: Abdominal and core weakness, Impaired muscle tone, and Impaired strength   Prognosis: Good    GOALS:  Patient Goal: to decrease incontinence     Short Term Goals:  Short Term Goals: 4 visits  Pt will be able to identify normal bladder function/voiding patterns, diet impact on the bladder, and urge control strategies to enhance pt insight into potential causative factors and promote increased bladder control. The pt will demonstrate well localized PFM contraction in order to increase the efficacy of strengthening program.   Pt will demonstrate the ability to delay urgency for 5' with good control to enable time to get to the bathroom. Pt will demonstrate independence with basic HEP designed to increase PFM and core strength and to enhance hip girdle flexibility for increased ease of strengthening. 5.  This pt will demo proper use of pelvic brace lifting, push, and pull with pelvic brace in order to promote continence during functional tasks. Long Term Goals: 8 visits  This pt will report a 75% decrease in incontinence frequency and a 75% decrease in amount to increase comfort in public and reduce risk of integumentary compromise. This pt will report normal urinary frequency to enabling ease of work/home task completion without interruption. This pt will reduce pad usage to 1 pad per day to reduce financial strain and promote comfort in public situations.   This pt will decrease IIQ and IUD scores to 6 respectively to indicate improved continence and efficacy of treatment  This pt will demo independence with advanced HEP in order to allow gains in therapy to be maintained long term and reduce the risk of further medical need/assessment. This pt will demo PFM PERF 3/10/10/10 in order to increase continence in functional positions during home tasks. PLAN:  Treatment Recommendations: Strengthening, Endurance Training, Manual Therapy - Soft Tissue Mobilization, Safety Education and Training, Self-Care Education and Training, and Modalities    [x]  Plan of care initiated. Plan to see patient 1 time bi weekly for 12 weeks to address the treatment planned outlined above. [x]  Continue with current plan of care  []  Modify plan of care as follows:    []  Hold pending physician visit  []  Discharge    Time In 1201   Time Out 1255   Timed Code Minutes: 54 min   Total Treatment Time: 54 min     Electronically Signed by:  BECKIE Eden   License: [id number]  Occupational Therapist  [address] [place] [address] [employer]

## 2022-11-07 ENCOUNTER — OFFICE VISIT (OUTPATIENT)
Dept: PULMONOLOGY | Age: 72
End: 2022-11-07
Payer: MEDICARE

## 2022-11-07 VITALS
SYSTOLIC BLOOD PRESSURE: 132 MMHG | OXYGEN SATURATION: 96 % | HEART RATE: 73 BPM | HEIGHT: 68 IN | WEIGHT: 231.8 LBS | BODY MASS INDEX: 35.13 KG/M2 | TEMPERATURE: 98.3 F | DIASTOLIC BLOOD PRESSURE: 68 MMHG

## 2022-11-07 DIAGNOSIS — G47.09 OTHER INSOMNIA: ICD-10-CM

## 2022-11-07 DIAGNOSIS — E66.9 OBESITY (BMI 30-39.9): ICD-10-CM

## 2022-11-07 DIAGNOSIS — Z99.89 OBSTRUCTIVE SLEEP APNEA ON CPAP: Primary | ICD-10-CM

## 2022-11-07 DIAGNOSIS — G47.10 HYPERSOMNIA WITH SLEEP APNEA: ICD-10-CM

## 2022-11-07 DIAGNOSIS — I25.5 ISCHEMIC CARDIOMYOPATHY: ICD-10-CM

## 2022-11-07 DIAGNOSIS — G47.30 HYPERSOMNIA WITH SLEEP APNEA: ICD-10-CM

## 2022-11-07 DIAGNOSIS — G47.33 OBSTRUCTIVE SLEEP APNEA ON CPAP: Primary | ICD-10-CM

## 2022-11-07 DIAGNOSIS — M79.7 FIBROMYALGIA: ICD-10-CM

## 2022-11-07 PROCEDURE — 1124F ACP DISCUSS-NO DSCNMKR DOCD: CPT | Performed by: PHYSICIAN ASSISTANT

## 2022-11-07 PROCEDURE — 3074F SYST BP LT 130 MM HG: CPT | Performed by: PHYSICIAN ASSISTANT

## 2022-11-07 PROCEDURE — 3078F DIAST BP <80 MM HG: CPT | Performed by: PHYSICIAN ASSISTANT

## 2022-11-07 PROCEDURE — 99213 OFFICE O/P EST LOW 20 MIN: CPT | Performed by: PHYSICIAN ASSISTANT

## 2022-11-07 ASSESSMENT — ENCOUNTER SYMPTOMS
EYES NEGATIVE: 1
STRIDOR: 0
ALLERGIC/IMMUNOLOGIC NEGATIVE: 1
COUGH: 0
DIARRHEA: 0
NAUSEA: 0
CHEST TIGHTNESS: 0
WHEEZING: 0
BACK PAIN: 0
SHORTNESS OF BREATH: 0

## 2022-11-07 NOTE — PROGRESS NOTES
[place] [facility] and [facility] [address]         [id number]  11/7/2022   Chief Complaint   Patient presents with    Follow-up     6 month f/u SHANNEN. Notes fatigue still. States Dr. Aurelia Cotton started her on a med for her fatigue 1 month ago. Pt of Dr. Marta Vazquez     PAP Download:   Original or initial AHI: 17.2     Date of initial study: 11/15/ 17     Compliant  97%     Noncompliant 3 %     PAP Type CPAP Level  11   Avg Hrs/Day 10.5  AHI: 2.0   Recorded compliance dates: 10/5/22-11/3/22  Machine/Mfg:   [x] ResMed    [] Respironics/Dreamstation   Interface:   [] Nasal    [] Nasal pillows   [x] FFM      Provider:      [] -HME     []Anthony     [] Mattyco    [] Timbo Oneida    [] [place]               [] [facility]      [x] [facility]    [] [address]      [] Other    Neck Size: 16.5  Mallampati 3  ESS:  11  SAQLI: 46    Here is a scan of the most recent download:            Presentation:   Elva Paz presents for sleep medicine follow up for obstructive sleep apnea, insomnia  Since the last visit, Elva Paz is doing well with PAP  She is sleeping better with trazodone. She is taking Provigil as needed- about once a week when driving. Equipment issues: The pressure is  acceptable, the mask is acceptable     Sleep issues:  Do you feel better? Yes  More rested? No   Better concentration? yes    Progress History:   Since last visit any new medical issues? Psoriatic arthritis   New ER or hospital visits? No  Any new or changes in medicines? No  Any new sleep medicines? No    Review of Systems -   Review of Systems   Constitutional:  Positive for fatigue. Negative for activity change, appetite change, chills and fever. HENT:  Negative for congestion and postnasal drip. Eyes: Negative. Respiratory:  Negative for cough, chest tightness, shortness of breath, wheezing and stridor. Cardiovascular:  Negative for chest pain and leg swelling.    Gastrointestinal:  Negative for diarrhea and nausea. Endocrine: Negative. Genitourinary: Negative. Musculoskeletal:  Positive for arthralgias. Negative for back pain. Skin: Negative. Allergic/Immunologic: Negative. Neurological: Negative. Negative for dizziness and light-headedness. Psychiatric/Behavioral: Negative. All other systems reviewed and are negative. Physical Exam:    BMI:  Body mass index is 35.25 kg/m². Wt Readings from Last 3 Encounters:   11/07/22 231 lb 12.8 oz (105.1 kg)   10/25/22 226 lb (102.5 kg)   09/22/22 233 lb 12.8 oz (106.1 kg)     Weight stable / unchanged  Vitals: /68 (Site: Left Upper Arm, Position: Sitting, Cuff Size: Large Adult)   Pulse 73   Temp 98.3 °F (36.8 °C) (Oral)   Ht 5' 8\" (1.727 m)   Wt 231 lb 12.8 oz (105.1 kg)   SpO2 96% Comment: r/a  BMI 35.25 kg/m²       Physical Exam  Constitutional:       Appearance: Normal appearance. She is normal weight. HENT:      Head: Normocephalic and atraumatic. Right Ear: External ear normal.      Left Ear: External ear normal.      Nose: Nose normal.   Eyes:      Extraocular Movements: Extraocular movements intact. Conjunctiva/sclera: Conjunctivae normal.      Pupils: Pupils are equal, round, and reactive to light. Pulmonary:      Effort: Pulmonary effort is normal.   Musculoskeletal:      Cervical back: Normal range of motion and neck supple. Neurological:      General: No focal deficit present. Mental Status: She is alert and oriented to person, place, and time. Psychiatric:         Attention and Perception: Attention and perception normal.         Mood and Affect: Mood and affect normal.         Speech: Speech normal.         Behavior: Behavior normal. Behavior is cooperative. Thought Content: Thought content normal.         Cognition and Memory: Cognition normal.         Judgment: Judgment normal.         ASSESSMENT/DIAGNOSIS     Diagnosis Orders   1. Obstructive sleep apnea on CPAP        2.  Hypersomnia with sleep apnea        3. Obesity (BMI 30-39.9)        4. Fibromyalgia        5. Other insomnia        6. Ischemic cardiomyopathy 45 %               Plan   Do you need any equipment today? Yes update supplies  - continue trazodone for insomnia  - Continue using Provigil as needed   - Download reviewed and discussed with patient  - She  was advised to continue current positive airway pressure therapy with above described pressure. - She  advised to keep good compliance with current recommended pressure to get optimal results and clinical improvement  - Recommend 7-9 hours of sleep with PAP  - She was advised to call [employer] regarding supplies if needed.   -She call my office for earlier appointment if needed for worsening of sleep symptoms.   - She was instructed on weight loss  - Anthony Yanez was educated about my impression and plan. Patient verbalizesunderstanding.   We will see [address] [address] back in: 1 year with download    Information added by my medical assistant/LPN was reviewed today       Jamir Talavera, [address] for pulmonary and Sleep Medicine  11/7/2022

## 2022-11-15 ENCOUNTER — HOSPITAL ENCOUNTER (OUTPATIENT)
Dept: PHYSICAL THERAPY | Age: 72
Setting detail: THERAPIES SERIES
End: 2022-11-15
Payer: MEDICARE

## 2022-11-30 RX ORDER — CLOPIDOGREL BISULFATE 75 MG/1
TABLET ORAL
Qty: 90 TABLET | Refills: 1 | Status: SHIPPED | OUTPATIENT
Start: 2022-11-30

## 2023-02-02 ENCOUNTER — NURSE ONLY (OUTPATIENT)
Dept: LAB | Age: 73
End: 2023-02-02

## 2023-02-02 DIAGNOSIS — I48.21 PERMANENT ATRIAL FIBRILLATION (HCC): ICD-10-CM

## 2023-02-02 DIAGNOSIS — I10 ESSENTIAL HYPERTENSION: ICD-10-CM

## 2023-02-02 DIAGNOSIS — I50.32 CHRONIC DIASTOLIC CONGESTIVE HEART FAILURE (HCC): ICD-10-CM

## 2023-02-02 DIAGNOSIS — I25.5 ISCHEMIC CARDIOMYOPATHY: ICD-10-CM

## 2023-02-02 DIAGNOSIS — I25.10 CORONARY ARTERY DISEASE INVOLVING NATIVE CORONARY ARTERY OF NATIVE HEART WITHOUT ANGINA PECTORIS: ICD-10-CM

## 2023-02-02 DIAGNOSIS — Z95.820 S/P ANGIOPLASTY WITH STENT: ICD-10-CM

## 2023-02-02 DIAGNOSIS — R60.0 BILATERAL LEG EDEMA: ICD-10-CM

## 2023-02-02 LAB
ALBUMIN SERPL BCG-MCNC: 4.1 G/DL (ref 3.5–5.1)
ALP SERPL-CCNC: 89 U/L (ref 38–126)
ALT SERPL W/O P-5'-P-CCNC: 8 U/L (ref 11–66)
ANION GAP SERPL CALC-SCNC: 14 MEQ/L (ref 8–16)
AST SERPL-CCNC: 12 U/L (ref 5–40)
BASOPHILS ABSOLUTE: 0.1 THOU/MM3 (ref 0–0.1)
BASOPHILS NFR BLD AUTO: 0.7 %
BILIRUB CONJ SERPL-MCNC: < 0.2 MG/DL (ref 0–0.3)
BILIRUB SERPL-MCNC: 0.5 MG/DL (ref 0.3–1.2)
BUN SERPL-MCNC: 20 MG/DL (ref 7–22)
CALCIUM SERPL-MCNC: 9.5 MG/DL (ref 8.5–10.5)
CHLORIDE SERPL-SCNC: 96 MEQ/L (ref 98–111)
CHOLEST SERPL-MCNC: 126 MG/DL (ref 100–199)
CO2 SERPL-SCNC: 29 MEQ/L (ref 23–33)
CREAT SERPL-MCNC: 0.6 MG/DL (ref 0.4–1.2)
DEPRECATED RDW RBC AUTO: 47.5 FL (ref 35–45)
EOSINOPHIL NFR BLD AUTO: 1.2 %
EOSINOPHILS ABSOLUTE: 0.2 THOU/MM3 (ref 0–0.4)
ERYTHROCYTE [DISTWIDTH] IN BLOOD BY AUTOMATED COUNT: 14.2 % (ref 11.5–14.5)
ERYTHROCYTE [SEDIMENTATION RATE] IN BLOOD BY WESTERGREN METHOD: 45 MM/HR (ref 0–20)
GFR SERPL CREATININE-BSD FRML MDRD: > 60 ML/MIN/1.73M2
GLUCOSE SERPL-MCNC: 127 MG/DL (ref 70–108)
HCT VFR BLD AUTO: 39.8 % (ref 37–47)
HDLC SERPL-MCNC: 43 MG/DL
HGB BLD-MCNC: 12.4 GM/DL (ref 12–16)
IMM GRANULOCYTES # BLD AUTO: 0.07 THOU/MM3 (ref 0–0.07)
IMM GRANULOCYTES NFR BLD AUTO: 0.5 %
LDLC SERPL CALC-MCNC: 58 MG/DL
LYMPHOCYTES ABSOLUTE: 3.1 THOU/MM3 (ref 1–4.8)
LYMPHOCYTES NFR BLD AUTO: 24 %
MAGNESIUM SERPL-MCNC: 1.8 MG/DL (ref 1.6–2.4)
MCH RBC QN AUTO: 28.4 PG (ref 26–33)
MCHC RBC AUTO-ENTMCNC: 31.2 GM/DL (ref 32.2–35.5)
MCV RBC AUTO: 91.3 FL (ref 81–99)
MONOCYTES ABSOLUTE: 1.2 THOU/MM3 (ref 0.4–1.3)
MONOCYTES NFR BLD AUTO: 9.5 %
NEUTROPHILS NFR BLD AUTO: 64.1 %
NRBC BLD AUTO-RTO: 0 /100 WBC
PLATELET # BLD AUTO: 314 THOU/MM3 (ref 130–400)
PMV BLD AUTO: 9.4 FL (ref 9.4–12.4)
POTASSIUM SERPL-SCNC: 3.8 MEQ/L (ref 3.5–5.2)
PROT SERPL-MCNC: 7.2 G/DL (ref 6.1–8)
RBC # BLD AUTO: 4.36 MILL/MM3 (ref 4.2–5.4)
SEGMENTED NEUTROPHILS ABSOLUTE COUNT: 8.3 THOU/MM3 (ref 1.8–7.7)
SODIUM SERPL-SCNC: 139 MEQ/L (ref 135–145)
TRIGL SERPL-MCNC: 124 MG/DL (ref 0–199)
WBC # BLD AUTO: 12.9 THOU/MM3 (ref 4.8–10.8)

## 2023-02-22 ENCOUNTER — OFFICE VISIT (OUTPATIENT)
Dept: FAMILY MEDICINE CLINIC | Age: 73
End: 2023-02-22

## 2023-02-22 VITALS
SYSTOLIC BLOOD PRESSURE: 120 MMHG | HEART RATE: 85 BPM | RESPIRATION RATE: 20 BRPM | DIASTOLIC BLOOD PRESSURE: 82 MMHG | HEIGHT: 68 IN | WEIGHT: 234 LBS | TEMPERATURE: 98.2 F | OXYGEN SATURATION: 97 % | BODY MASS INDEX: 35.46 KG/M2

## 2023-02-22 DIAGNOSIS — Z00.00 MEDICARE ANNUAL WELLNESS VISIT, SUBSEQUENT: ICD-10-CM

## 2023-02-22 DIAGNOSIS — L40.50 PSORIATIC ARTHRITIS (HCC): ICD-10-CM

## 2023-02-22 DIAGNOSIS — I50.32 CHRONIC DIASTOLIC CONGESTIVE HEART FAILURE (HCC): ICD-10-CM

## 2023-02-22 DIAGNOSIS — I48.21 PERMANENT ATRIAL FIBRILLATION (HCC): ICD-10-CM

## 2023-02-22 DIAGNOSIS — E66.01 SEVERE OBESITY (BMI 35.0-39.9) WITH COMORBIDITY (HCC): ICD-10-CM

## 2023-02-22 DIAGNOSIS — Z00.00 ROUTINE GENERAL MEDICAL EXAMINATION AT A HEALTH CARE FACILITY: Primary | ICD-10-CM

## 2023-02-22 DIAGNOSIS — F32.9 REACTIVE DEPRESSION: ICD-10-CM

## 2023-02-22 RX ORDER — ESCITALOPRAM OXALATE 5 MG/1
5 TABLET ORAL DAILY
Qty: 90 TABLET | Refills: 3 | Status: SHIPPED | OUTPATIENT
Start: 2023-02-22

## 2023-02-22 RX ORDER — SECUKINUMAB 150 MG/ML
300 INJECTION SUBCUTANEOUS
COMMUNITY
Start: 2023-02-06

## 2023-02-22 SDOH — ECONOMIC STABILITY: HOUSING INSECURITY
IN THE LAST 12 MONTHS, WAS THERE A TIME WHEN YOU DID NOT HAVE A STEADY PLACE TO SLEEP OR SLEPT IN A SHELTER (INCLUDING NOW)?: NO

## 2023-02-22 SDOH — ECONOMIC STABILITY: FOOD INSECURITY: WITHIN THE PAST 12 MONTHS, YOU WORRIED THAT YOUR FOOD WOULD RUN OUT BEFORE YOU GOT MONEY TO BUY MORE.: NEVER TRUE

## 2023-02-22 SDOH — ECONOMIC STABILITY: FOOD INSECURITY: WITHIN THE PAST 12 MONTHS, THE FOOD YOU BOUGHT JUST DIDN'T LAST AND YOU DIDN'T HAVE MONEY TO GET MORE.: NEVER TRUE

## 2023-02-22 SDOH — ECONOMIC STABILITY: INCOME INSECURITY: HOW HARD IS IT FOR YOU TO PAY FOR THE VERY BASICS LIKE FOOD, HOUSING, MEDICAL CARE, AND HEATING?: NOT HARD AT ALL

## 2023-02-22 ASSESSMENT — PATIENT HEALTH QUESTIONNAIRE - PHQ9
SUM OF ALL RESPONSES TO PHQ QUESTIONS 1-9: 2
1. LITTLE INTEREST OR PLEASURE IN DOING THINGS: 1
2. FEELING DOWN, DEPRESSED OR HOPELESS: 1
SUM OF ALL RESPONSES TO PHQ9 QUESTIONS 1 & 2: 2

## 2023-02-22 ASSESSMENT — LIFESTYLE VARIABLES: HOW OFTEN DO YOU HAVE A DRINK CONTAINING ALCOHOL: NEVER

## 2023-02-22 NOTE — PATIENT INSTRUCTIONS
Preventing Falls: Care Instructions  Overview     Getting around your home safely can be a challenge if you have injuries or health problems that make it easy for you to fall. Loose rugs and furniture in walkways are among the dangers for many older people who have problems walking or who have poor eyesight. People who have conditions such as arthritis, osteoporosis, or dementia also have to be careful not to fall. You can make your home safer with a few simple measures. Follow-up care is a key part of your treatment and safety. Be sure to make and go to all appointments, and call your doctor if you are having problems. It's also a good idea to know your test results and keep a list of the medicines you take. How can you care for yourself at home? Taking care of yourself  Exercise regularly to improve your strength, muscle tone, and balance. Walk if you can. Swimming may be a good choice if you cannot walk easily. Have your vision and hearing checked each year or any time you notice a change. If you have trouble seeing and hearing, you might not be able to avoid objects and could lose your balance. Know the side effects of the medicines you take. Ask your doctor or pharmacist whether the medicines you take can affect your balance. Sleeping pills or sedatives can affect your balance. Limit the amount of alcohol you drink. Alcohol can impair your balance and other senses. Ask your doctor whether calluses or corns on your feet need to be removed. If you wear loose-fitting shoes because of calluses or corns, you can lose your balance and fall. Talk to your doctor if you have numbness in your feet. You may get dizzy if you do not drink enough water. To prevent dehydration, drink plenty of fluids. Choose water and other clear liquids. If you have kidney, heart, or liver disease and have to limit fluids, talk with your doctor before you increase the amount of fluids you drink.   Preventing falls at home  Remove raised doorway thresholds, throw rugs, and clutter. Repair loose carpet or raised areas in the floor. Move furniture and electrical cords to keep them out of walking paths. Use nonskid floor wax, and wipe up spills right away, especially on ceramic tile floors. If you use a walker or cane, put rubber tips on it. If you use crutches, clean the bottoms of them regularly with an abrasive pad, such as steel wool. Keep your house well lit, especially stairways, porches, and outside walkways. Use night-lights in areas such as hallways and bathrooms. Add extra light switches or use remote switches (such as switches that go on or off when you clap your hands) to make it easier to turn lights on if you have to get up during the night. Install sturdy handrails on stairways. Move items in your cabinets so that the things you use a lot are on the lower shelves (about waist level). Keep a cordless phone and a flashlight with new batteries by your bed. If possible, put a phone in each of the main rooms of your house, or carry a cell phone in case you fall and cannot reach a phone. Or, you can wear a device around your neck or wrist. You push a button that sends a signal for help. Wear low-heeled shoes that fit well and give your feet good support. Use footwear with nonskid soles. Check the heels and soles of your shoes for wear. Repair or replace worn heels or soles. Do not wear socks without shoes on smooth floors, such as wood. Walk on the grass when the sidewalks are slippery. If you live in an area that gets snow and ice in the winter, sprinkle salt on slippery steps and sidewalks. Or ask a family member or friend to do this for you. Preventing falls in the bath  Install grab bars and nonskid mats inside and outside your shower or tub and near the toilet and sinks. Use shower chairs and bath benches. Use a hand-held shower head that will allow you to sit while showering.   Get into a tub or shower by putting the weaker leg in first. Get out of a tub or shower with your strong side first.  Repair loose toilet seats and consider installing a raised toilet seat to make getting on and off the toilet easier. Keep your bathroom door unlocked while you are in the shower. Where can you learn more? Go to http://www.barrera.com/ and enter G117 to learn more about \"Preventing Falls: Care Instructions. \"  Current as of: May 4, 2022               Content Version: 13.5  © 6700-4242 Healthwise, Incorporated. Care instructions adapted under license by [facility] ([facility]). If you have questions about a medical condition or this instruction, always ask your healthcare professional. [address] any warranty or liability for your use of this information. Learning About Emotional Support  When do you need emotional support? You might find getting support from others helpful when you have a long-term health problem. Often people feel alone, confused, or scared when coping with an illness. But you aren't alone. Other people are going through the same thing you are and know how you feel. Talking with others about your feelings can help you feel better. Your family and friends can give you support. So can your doctor, a support group, or a [employer]. If you have a support network, you will not feel as alone. You will learn new ways to deal with your situation, and you may try harder to overcome it. Where you can get support  Family and friends: They can help you cope by giving you comfort and encouragement. Counseling: Professional counseling can help you cope with situations that interfere with your life and cause stress. Counseling can help you understand and deal with your illness. Your doctor: Find a doctor you trust and feel comfortable with. Be open and honest about your fears and concerns. Your doctor can help you get the right medical treatments, including counseling.   Spiritual or Latter-day groups: They can provide comfort and may be able to help you find counseling or other social support services. Social groups: They can help you meet new people and get involved in activities you enjoy. Community support groups: In a support group, you can talk to others who have dealt with the same problems or illness as you. You can encourage one another and learn ways to cope with tough emotions. How to find a support group  Ask your doctor, counselor, or other health professional for suggestions. Contact your local Adventism, Episcopal, Restoration, or other Latter-day group. Ask your family and friends. Ask people who have the same health concerns. Go online. Forums and blogs let you read messages from others and leave your own messages. You can exchange stories, vent your frustrations, and ask and answer questions. Contact a city, state, or national group that provides support for your health concerns. Your library or community center may have a list of these groups. Or you can look for information online. Look for a support group that works for you. Ask yourself if you prefer structure and would like a , or if you would like a less formal group. Do you prefer face-to-face meetings? Or do you feel more secure in online chat rooms or forums? Supportive relationships  A supportive relationship includes emotional support such as love, trust, and understanding, as well as advice and concrete help, such as help managing your time. Reach out to others  Family and friends can help you. Ask them to:  Listen to you and give you encouragement. This can keep you from feeling hopeless or alone. Help with small daily tasks or with bigger problems. A helping hand can keep you from feeling overwhelmed. Help you manage a health problem. For example, ask them to go to doctor visits with you. Your loved ones can offer support by being involved in your medical care.   Respect your relationships  A good relationship is also a two-way street. You count on help from others, but they also count on you. Know your friends' limits. You don't have to see or call your friends every day. If you are going through a rough patch, ask friends if you can contact them outside of the usual boundaries. Don't always complain or talk about yourself. Know when it's time to stop talking and listen or just enjoy your friend's company. Know that good friends can be a bad influence. For example, if a friend encourages you to drink when you know it will harm you, you may want to end the friendship. Where can you learn more? Go to http://www.woods.com/ and enter G092 to learn more about \"Learning About Emotional Support. \"  Current as of: February 9, 2022               Content Version: 13.5  © 2988-1005 Healthwise, [employer]. Care instructions adapted under license by [facility] ([facility]). If you have questions about a medical condition or this instruction, always ask your healthcare professional. [address] any warranty or liability for your use of this information. For more information on your local Area Agency on Aging or [place] on Aging please visit the appropriate web site below:    [place]: MobileCycles.pl    [facility]: https://aging. [place].gov/    [facility]: https://aging.[place].gov/    [place]: InsuranceSquad.es           Learning About Stress  What is stress? Stress is what you feel when you have to handle more than you are used to. Stress is a fact of life for most people, and it affects everyone differently. What causes stress for you may not be stressful for someone else. A lot of things can cause stress. You may feel stress when you go on a job interview, take a test, or run a race. This kind of short-term stress is normal and even useful. It can help you if you need to work hard or react quickly.  For example, stress can help you finish an important job on time. Stress also can last a long time. Long-term stress is caused by stressful situations or events. Examples of long-term stress include long-term health problems, ongoing problems at work, or conflicts in your family. Long-term stress can harm your health. How does stress affect your health? When you are stressed, your body responds as though you are in danger. It makes hormones that speed up your heart, make you breathe faster, and give you a burst of energy. This is called the fight-or-flight stress response. If the stress is over quickly, your body goes back to normal and no harm is done. But if stress happens too often or lasts too long, it can have bad effects. Long-term stress can make you more likely to get sick, and it can make symptoms of some diseases worse. If you tense up when you are stressed, you may develop neck, shoulder, or low back pain. Stress is linked to high blood pressure and heart disease. Stress also harms your emotional health. It can make you downing, tense, or depressed. Your relationships may suffer, and you may not do well at work or school. What can you do to manage stress? How to relax your mind   Write. It may help to write about things that are bothering you. This helps you find out how much stress you feel and what is causing it. When you know this, you can find better ways to cope. Let your feelings out. Talk, laugh, cry, and express anger when you need to. Talking with friends, family, a counselor, or a member of the clergy about your feelings is a healthy way to relieve stress. Do something you enjoy. For example, listen to music or go to a movie. Practice your hobby or do volunteer work. Meditate. This can help you relax, because you are not worrying about what happened before or what may happen in the future. Do guided imagery. Imagine yourself in any setting that helps you feel calm. You can use audiotapes, books, or a teacher to guide you.   How to relax your body   Do something active. Exercise or activity can help reduce stress. Walking is a great way to get started. Even everyday activities such as housecleaning or yard work can help. Do breathing exercises. For example:  From a standing position, bend forward from the waist with your knees slightly bent. Let your arms dangle close to the floor. Breathe in slowly and deeply as you return to a standing position. Roll up slowly and lift your head last.  Hold your breath for just a few seconds in the standing position. Breathe out slowly and bend forward from the waist.  Try yoga or bessie chi. These techniques combine exercise and meditation. You may need some training at first to learn them. What can you do to prevent stress? Manage your time. This helps you find time to do the things you want and need to do. Get enough sleep. Your body recovers from the stresses of the day while you are sleeping. Get support. Your family, friends, and community can make a difference in how you experience stress. Where can you learn more? Go to http://www.barrera.com/ and enter N032 to learn more about \"Learning About Stress. \"  Current as of: October 6, 2021               Content Version: 13.5  © 3569-3097 [employer]. Care instructions adapted under license by [facility] ([facility]). If you have questions about a medical condition or this instruction, always ask your healthcare professional. [address] any warranty or liability for your use of this information. Learning About Being Active as an Older Adult  Why is being active important as you get older? Being active is one of the best things you can do for your health. And it's never too late to start. Being active--or getting active, if you aren't already--has definite benefits. It can:  Give you more energy,  Keep your mind sharp. Improve balance to reduce your risk of falls.   Help you manage chronic illness with fewer medicines.  No matter how old you are, how fit you are, or what health problems you have, there is a form of activity that will work for you. And the more physical activity you can do, the better your overall health will be.  What kinds of activity can help you stay healthy?  Being more active will make your daily activities easier. Physical activity includes planned exercise and things you do in daily life. There are four types of activity:  Aerobic.  Doing aerobic activity makes your heart and lungs strong.  Includes walking, dancing, and gardening.  Aim for at least 2½ hours spread throughout the week.  It improves your energy and can help you sleep better.  Muscle-strengthening.  This type of activity can help maintain muscle and strengthen bones.  Includes climbing stairs, using resistance bands, and lifting or carrying heavy loads.  Aim for at least twice a week.  It can help protect the knees and other joints.  Stretching.  Stretching gives you better range of motion in joints and muscles.  Includes upper arm stretches, calf stretches, and gentle yoga.  Aim for at least twice a week, preferably after your muscles are warmed up from other activities.  It can help you function better in daily life.  Balancing.  This helps you stay coordinated and have good posture.  Includes heel-to-toe walking, bessie chi, and certain types of yoga.  Aim for at least 3 days a week.  It can reduce your risk of falling.  Even if you have a hard time meeting the recommendations, it's better to be more active than less active. All activity done in each category counts toward your weekly total. You'd be surprised how daily things like carrying groceries, keeping up with grandchildren, and taking the stairs can add up.  What keeps you from being active?  If you've had a hard time being more active, you're not alone. Maybe you remember being able to do more. Or maybe you've never thought of yourself as being active.  It's frustrating when you can't do the things you want. Being more active can help. What's holding you back? Getting started. Have a goal, but break it into easy tasks. Small steps build into big accomplishments. Staying motivated. If you feel like skipping your activity, remember your goal. Maybe you want to move better and stay independent. Every activity gets you one step closer. Not feeling your best.  Start with 5 minutes of an activity you enjoy. Prove to yourself you can do it. As you get comfortable, increase your time. You may not be where you want to be. But you're in the process of getting there. Everyone starts somewhere. How can you find safe ways to stay active? Talk with your doctor about any physical challenges you're facing. Make a plan with your doctor if you have a health problem or aren't sure how to get started with activity. If you're already active, ask your doctor if there is anything you should change to stay safe as your body and health change. If you tend to feel dizzy after you take medicine, avoid activity at that time. Try being active before you take your medicine. This will reduce your risk of falls. If you plan to be active at home, make sure to clear your space before you get started. Remove things like TV cords, coffee tables, and throw rugs. It's safest to have plenty of space to move freely. The key to getting more active is to take it slow and steady. Try to improve only a little bit at a time. Pick just one area to improve on at first. And if an activity hurts, stop and talk to your doctor. Where can you learn more? Go to http://www.barrera.com/ and enter P600 to learn more about \"Learning About Being Active as an Older Adult. \"  Current as of: October 10, 2022               Content Version: 13.5  © 3722-5441 Healthwise, Incorporated. Care instructions adapted under license by [facility] ([facility]).  If you have questions about a medical condition or this instruction, always ask your healthcare professional. [address] any warranty or liability for your use of this information. Hearing Loss: Care Instructions  Overview     Hearing loss is a sudden or slow decrease in how well you hear. It can range from mild to severe. Permanent hearing loss can occur with aging. It also can happen when you are exposed long-term to loud noise. Examples include listening to loud music, riding motorcycles, or being around other loud machines. Hearing loss can affect your work and home life. It can make you feel lonely or depressed. You may feel that you have lost your independence. But hearing aids and other devices can help you hear better and feel connected to others. Follow-up care is a key part of your treatment and safety. Be sure to make and go to all appointments, and call your doctor if you are having problems. It's also a good idea to know your test results and keep a list of the medicines you take. How can you care for yourself at home? Avoid loud noises whenever possible. This helps keep your hearing from getting worse. Always wear hearing protection around loud noises. Wear a hearing aid as directed. See a professional who can help you pick a hearing aid that fits you. Have hearing tests as your doctor suggests. They can show whether your hearing has changed. Your hearing aid may need to be adjusted. Use other devices as needed. These may include:  Telephone amplifiers and hearing aids that can connect to a television, stereo, radio, or microphone. Devices that use lights or vibrations. These alert you to the doorbell, a ringing telephone, or a baby monitor. Television closed-captioning. This shows the words at the bottom of the screen. Most new TVs can do this. TTY (text telephone). This lets you type messages back and forth on the telephone instead of talking or listening. These devices are also called TDD.  When messages are typed on the keyboard, they are sent over the phone line to a receiving TTY. The message is shown on a monitor. Use text messaging, social media, and email if it is hard for you to communicate by telephone. Try to learn a listening technique called speechreading. It is not lipreading. You pay attention to people's gestures, expressions, posture, and tone of voice. These clues can help you understand what a person is saying. Face the person you are talking to, and have them face you. Make sure the lighting is good. You need to see the other person's face clearly. Think about counseling if you need help to adjust to your hearing loss. When should you call for help? Watch closely for changes in your health, and be sure to contact your doctor if:    You think your hearing is getting worse.     You have new symptoms, such as dizziness or nausea. Where can you learn more? Go to http://www.barrera.com/ and enter R798 to learn more about \"Hearing Loss: Care Instructions. \"  Current as of: May 4, 2022               Content Version: 13.5  © 2793-9630 Healthwise, Incorporated. Care instructions adapted under license by [facility] ([facility]). If you have questions about a medical condition or this instruction, always ask your healthcare professional. [address] any warranty or liability for your use of this information. Advance Directives: Care Instructions  Overview  An advance directive is a legal way to state your wishes at the end of your life. It tells your family and your doctor what to do if you can't say what you want. There are two main types of advance directives. You can change them any time your wishes change. Living will. This form tells your family and your doctor your wishes about life support and other treatment. The form is also called a declaration. Medical power of .   This form lets you name a person to make treatment decisions for you when you can't speak for yourself. This person is called a health care agent (health care proxy, health care surrogate). The form is also called a durable power of  for health care. If you do not have an advance directive, decisions about your medical care may be made by a family member, or by a doctor or a  who doesn't know you. It may help to think of an advance directive as a gift to the people who care for you. If you have one, they won't have to make tough decisions by themselves. For more information, including forms for your state, see the [address] website (www.caringinfo.org/planning/advance-directives/). Follow-up care is a key part of your treatment and safety. Be sure to make and go to all appointments, and call your doctor if you are having problems. It's also a good idea to know your test results and keep a list of the medicines you take. What should you include in an advance directive? Many states have a unique advance directive form. (It may ask you to address specific issues.) Or you might use a universal form that's approved by many states. If your form doesn't tell you what to address, it may be hard to know what to include in your advance directive. Use the questions below to help you get started. Who do you want to make decisions about your medical care if you are not able to? What life-support measures do you want if you have a serious illness that gets worse over time or can't be cured? What are you most afraid of that might happen? (Maybe you're afraid of having pain, losing your independence, or being kept alive by machines.)  Where would you prefer to die? (Your home? A hospital? A nursing home?)  Do you want to donate your organs when you die? Do you want certain Christian practices performed before you die? When should you call for help? Be sure to contact your doctor if you have any questions. Where can you learn more?   Go to http://www.barrera.com/ and enter R264 to learn more about \"Advance Directives: Care Instructions. \"  Current as of: June 16, 2022               Content Version: 13.5  © 2006-2022 [employer]. Care instructions adapted under license by [address] If you have questions about a medical condition or this instruction, always ask your healthcare professional. [address] any warranty or liability for your use of this information. Starting a Weight Loss Plan: Care Instructions  Overview     If you're thinking about losing weight, it can be hard to know where to start. Your doctor can help you set up a weight loss plan that best meets your needs. You may want to take a class on nutrition or exercise, or you could join a weight loss support group. If you have questions about how to make changes to your eating or exercise habits, ask your doctor about seeing a registered dietitian or an exercise specialist.  It can be a big challenge to lose weight. But you don't have to make huge changes at once. Make small changes, and stick with them. When those changes become habit, add a few more changes. If you don't think you're ready to make changes right now, try to pick a date in the future. Make an appointment to see your doctor to discuss whether the time is right for you to start a plan. Follow-up care is a key part of your treatment and safety. Be sure to make and go to all appointments, and call your doctor if you are having problems. It's also a good idea to know your test results and keep a list of the medicines you take. How can you care for yourself at home? Set realistic goals. Many people expect to lose much more weight than is likely. A weight loss of 5% to 10% of your body weight may be enough to improve your health. Get family and friends involved to provide support. Talk to them about why you are trying to lose weight, and ask them to help.  They can help by participating in exercise and having meals with you, even if they may be eating something different. Find what works best for you. If you do not have time or do not like to cook, a program that offers meal replacement bars or shakes may be better for you. Or if you like to prepare meals, finding a plan that includes daily menus and recipes may be best.  Ask your doctor about other health professionals who can help you achieve your weight loss goals. A dietitian can help you make healthy changes in your diet. An exercise specialist or  can help you develop a safe and effective exercise program.  A counselor or psychiatrist can help you cope with issues such as depression, anxiety, or family problems that can make it hard to focus on weight loss. Consider joining a support group for people who are trying to lose weight. Your doctor can suggest groups in your area. Where can you learn more? Go to http://www.woods.com/ and enter U357 to learn more about \"Starting a Weight Loss Plan: Care Instructions. \"  Current as of: August 25, 2022               Content Version: 13.5  © 2006-2022 [employer]. Care instructions adapted under license by [facility] ([facility]). If you have questions about a medical condition or this instruction, always ask your healthcare professional. [place] [address] any warranty or liability for your use of this information. A Healthy Heart: Care Instructions  Your Care Instructions     Coronary artery disease, also called heart disease, occurs when a substance called plaque builds up in the vessels that supply oxygen-rich blood to your heart muscle. This can narrow the blood vessels and reduce blood flow. A heart attack happens when blood flow is completely blocked. A high-fat diet, smoking, and other factors increase the risk of heart disease. Your doctor has found that you have a chance of having heart disease. You can do lots of things to keep your heart healthy. It may not be easy, but you can change your diet, exercise more, and quit smoking. These steps really work to lower your chance of heart disease. Follow-up care is a key part of your treatment and safety. Be sure to make and go to all appointments, and call your doctor if you are having problems. It's also a good idea to know your test results and keep a list of the medicines you take. How can you care for yourself at home? Diet    Use less salt when you cook and eat. This helps lower your blood pressure. Taste food before salting. Add only a little salt when you think you need it. With time, your taste buds will adjust to less salt.     Eat fewer snack items, fast foods, canned soups, and other high-salt, high-fat, processed foods.     Read food labels and try to avoid saturated and trans fats. They increase your risk of heart disease by raising cholesterol levels.     Limit the amount of solid fat-butter, margarine, and shortening-you eat. Use olive, peanut, or canola oil when you cook. Bake, broil, and steam foods instead of frying them.     Eat a variety of fruit and vegetables every day. Dark green, deep orange, red, or yellow fruits and vegetables are especially good for you. Examples include spinach, carrots, peaches, and berries.     Foods high in fiber can reduce your cholesterol and provide important vitamins and minerals. High-fiber foods include whole-grain cereals and breads, oatmeal, beans, brown rice, citrus fruits, and apples.     Eat lean proteins. Heart-healthy proteins include seafood, lean meats and poultry, eggs, beans, peas, nuts, seeds, and soy products.     Limit drinks and foods with added sugar. These include candy, desserts, and soda pop. Lifestyle changes    If your doctor recommends it, get more exercise. Walking is a good choice. Bit by bit, increase the amount you walk every day. Try for at least 30 minutes on most days of the week.  You also may want to swim, bike, or do other activities.     Do not smoke. If you need help quitting, talk to your doctor about stop-smoking programs and medicines. These can increase your chances of quitting for good. Quitting smoking may be the most important step you can take to protect your heart. It is never too late to quit.     Limit alcohol to 2 drinks a day for men and 1 drink a day for women. Too much alcohol can cause health problems.     Manage other health problems such as diabetes, high blood pressure, and high cholesterol. If you think you may have a problem with alcohol or drug use, talk to your doctor. Medicines    Take your medicines exactly as prescribed. Call your doctor if you think you are having a problem with your medicine.     If your doctor recommends aspirin, take the amount directed each day. Make sure you take aspirin and not another kind of pain reliever, such as acetaminophen (Tylenol). When should you call for help? Call 911 if you have symptoms of a heart attack. These may include:    Chest pain or pressure, or a strange feeling in the chest.     Sweating.     Shortness of breath.     Pain, pressure, or a strange feeling in the back, neck, jaw, or upper belly or in one or both shoulders or arms.     Lightheadedness or sudden weakness.     A fast or irregular heartbeat. After you call 911, the  may tell you to chew 1 adult-strength or 2 to 4 low-dose aspirin. Wait for an ambulance. Do not try to drive yourself. Watch closely for changes in your health, and be sure to contact your doctor if you have any problems. Where can you learn more? Go to http://www.barrera.com/ and enter F075 to learn more about \"A Healthy Heart: Care Instructions. \"  Current as of: September 7, 2022               Content Version: 13.5  © 5730-6650 [employer]. Care instructions adapted under license by [facility] ([facility]).  If you have questions about a medical condition or this instruction, always ask your healthcare fei [facility] any warranty or liability for your use of this information. Personalized Preventive Plan for Janice Hansen - 2/22/2023  Medicare offers a range of preventive health benefits. Some of the tests and screenings are paid in full while other may be subject to a deductible, co-insurance, and/or copay. Some of these benefits include a comprehensive review of your medical history including lifestyle, illnesses that may run in your family, and various assessments and screenings as appropriate. After reviewing your medical record and screening and assessments performed today your provider may have ordered immunizations, labs, imaging, and/or referrals for you. A list of these orders (if applicable) as well as your Preventive Care list are included within your After Visit Summary for your review. Other Preventive Recommendations:    A preventive eye exam performed by an eye specialist is recommended every 1-2 years to screen for glaucoma; cataracts, macular degeneration, and other eye disorders. A preventive dental visit is recommended every 6 months. Try to get at least 150 minutes of exercise per week or 10,000 steps per day on a pedometer . Order or download the FREE \"Exercise & Physical Activity: Your Everyday Guide\" from [employer]. Call 0-111.107.8692 or search [employer] online. You need 7594-2971 mg of calcium and 1932-2836 IU of vitamin D per day. It is possible to meet your calcium requirement with diet alone, but a vitamin D supplement is usually necessary to meet this goal.  When exposed to the sun, use a sunscreen that protects against both UVA and UVB radiation with an SPF of 30 or greater. Reapply every 2 to 3 hours or after sweating, drying off with a towel, or swimming. Always wear a seat belt when traveling in a car. Always wear a helmet when riding a bicycle or motorcycle.

## 2023-02-22 NOTE — PROGRESS NOTES
Medicare Annual Wellness Visit    Mau Goode is here for Medicare AWV and Depression (Says all of her friends have .)    Assessment & Plan   Routine general medical examination at a health care facility  Psoriatic arthritis ([facility]  Severe obesity (BMI 35.0-39. 9) with comorbidity (HCC)  Chronic diastolic congestive heart failure (HCC)  Permanent atrial fibrillation (HCC)  Reactive depression  -     escitalopram (LEXAPRO) 5 MG tablet; Take 1 tablet by mouth daily, Disp-90 tablet, R-3Normal    Recommendations for Preventive Services Due: see orders and patient instructions/AVS.  Recommended screening schedule for the next 5-10 years is provided to the patient in written form: see Patient Instructions/AVS.     No follow-ups on file. Subjective   The following acute and/or chronic problems were also addressed today:   Diagnosis Orders   1. Routine general medical examination at a health care facility        2. Psoriatic arthritis ([facility]        3. Severe obesity (BMI 35.0-39. 9) with comorbidity ([facility]        4. Chronic diastolic congestive heart failure ([facility]        5. Permanent atrial fibrillation (HCC)        6. Reactive depression  escitalopram (LEXAPRO) 5 MG tablet            Patient's complete Health Risk Assessment and screening values have been reviewed and are found in Flowsheets. The following problems were reviewed today and where indicated follow up appointments were made and/or referrals ordered.     Positive Risk Factor Screenings with Interventions:    Fall Risk:  Do you feel unsteady or are you worried about falling? : (!) yes  2 or more falls in past year?: (!) yes  Fall with injury in past year?: no     Interventions:    See A/P for plan and any pertinent orders            General HRA Questions:  Select all that apply: (!) Stress, Loneliness    Loneliness Interventions:  See A/P for plan and any pertinent orders    Stress Interventions:  See A/P for plan and any pertinent orders      Social and Emotional Support:  Do you get the social and emotional support that you need?: (!) No  Interventions:  See A/P for plan and any pertinent orders    Weight and Activity:  Physical Activity: Inactive    Days of Exercise per Week: 0 days    Minutes of Exercise per Session: 0 min     On average, how many days per week do you engage in moderate to strenuous exercise (like a brisk walk)?: 0 days  Have you lost any weight without trying in the past 3 months?: No  Body mass index: (!) 35.58      Inactivity Interventions:  See A/P for plan and any pertinent orders  Obesity Interventions:  See A/P for plan and any pertinent orders             Safety:  Do you have any tripping hazards - clutter in doorways, halls, or stairs?: (!) Yes    Interventions:  See A/P for plan and any pertinent orders     Advanced Directives:  Do you have a Living Will?: (!) No    Intervention:                         Objective   Vitals:    02/22/23 1042   BP: 120/82   Pulse: 85   Resp: 20   Temp: 98.2 °F (36.8 °C)   TempSrc: Oral   SpO2: 97%   Weight: 234 lb (106.1 kg)   Height: 5' 8\" (1.727 m)      Body mass index is 35.58 kg/m².       General Appearance: alert and oriented to person, place and time, well developed and well- nourished, in no acute distress  Skin: warm and dry, no rash or erythema  Head: normocephalic and atraumatic  Eyes: pupils equal, round, and reactive to light, extraocular eye movements intact, conjunctivae normal  ENT: tympanic membrane, external ear and ear canal normal bilaterally, nose without deformity, nasal mucosa and turbinates normal without polyps  Neck: supple and non-tender without mass, no thyromegaly or thyroid nodules, no cervical lymphadenopathy  Pulmonary/Chest: clear to auscultation bilaterally- no wheezes, rales or rhonchi, normal air movement, no respiratory distress  Cardiovascular: normal rate, regular rhythm, normal S1 and S2, no murmurs, rubs, clicks, or gallops, distal pulses intact, no carotid bruits  Abdomen: soft, non-tender, non-distended, normal bowel sounds, no masses or organomegaly  Extremities: no cyanosis, clubbing or edema  Musculoskeletal: normal range of motion, no joint swelling, deformity or tenderness  Neurologic: reflexes normal and symmetric, no cranial nerve deficit, gait, coordination and speech normal       Allergies   Allergen Reactions    Demerol Hcl [Meperidine] Nausea And Vomiting     Prior to Visit Medications    Medication Sig Taking? Authorizing Provider   escitalopram (LEXAPRO) 5 MG tablet Take 1 tablet by mouth daily Yes Kamala Garcia MD   clopidogrel (PLAVIX) 75 MG tablet TAKE 1 TABLET DAILY Yes AWILDA Lo - CNP   traZODone (DESYREL) 50 MG tablet TAKE 1 TABLET NIGHTLY AS NEEDED FOR SLEEP Yes Deepali Palma PA-C   DULoxetine (CYMBALTA) 60 MG extended release capsule Take 60 mg by mouth daily Yes Historical Provider, MD   mirabegron (MYRBETRIQ) 50 MG TB24 take 1 tablet by mouth once daily Yes AWILDA Dominique CNP   pantoprazole (PROTONIX) 40 MG tablet TAKE 1 TABLET DAILY Yes AWILDA Syed - CNP   chlorthalidone (HYGROTON) 25 MG tablet Take 1 tablet by mouth daily Yes Sabine Schwartz MD   digoxin (LANOXIN) 125 MCG tablet TAKE 1 TABLET DAILY Yes Sabine Schwartz MD   losartan (COZAAR) 25 MG tablet TAKE 1 TABLET DAILY Yes Sabine Schwartz MD   metoprolol (LOPRESSOR) 100 MG tablet TAKE ONE AND ONE-HALF TABLETS TWICE A DAY Yes Sabine Schwartz MD   nitroGLYCERIN (NITROSTAT) 0.4 MG SL tablet up to max of 3 total doses. If no relief after 1 dose, call 911.  Yes Sabine Schwartz MD   pravastatin (PRAVACHOL) 40 MG tablet TAKE 1 TABLET DAILY Yes Sabine Schwartz MD   verapamil (VERELAN) 240 MG extended release capsule TAKE 1 CAPSULE TWICE A DAY Yes Sabine Schwartz MD   albuterol sulfate HFA (VENTOLIN HFA) 108 (90 Base) MCG/ACT inhaler Inhale 2 puffs into the lungs every 6 hours as needed for Wheezing Yes Gume Chavez PA-C   Cholecalciferol 50 MCG (2000 UT) CAPS Take 50 mcg by mouth daily Yes Deepali Palma PA-C   ELIQUIS 5 MG TABS tablet TAKE 1 TABLET TWICE A DAY Yes Naveen Rojas MD   furosemide (LASIX) 20 MG tablet Take 20 mg by mouth daily as needed Yes Historical Provider, MD   b complex vitamins capsule Take 1 capsule by mouth daily as needed Yes Historical Provider, MD   loperamide (IMODIUM) 2 MG capsule Take 2 mg by mouth 4 times daily as needed for Diarrhea Yes Historical Provider, MD   fluticasone-salmeterol (ADVAIR) 250-50 MCG/DOSE AEPB Inhale 1 puff into the lungs 2 times daily Yes Faby Lemos MD   sulfaSALAzine (AZULFIDINE) 500 MG tablet Take 500 mg by mouth 2 times daily 1 in morning 2 at night Yes Historical Provider, MD   COSENTYX SENSOREADY, 300 MG, 150 MG/ML SOAJ 300 mg every 30 days  Historical Provider, MD [place] (Including outside providers/suppliers regularly involved in providing care):   Patient Care Team:  Faby Lemos MD as PCP - General (Family Medicine)  Faby Lemos MD as PCP - Empaneled Provider  Alexander Orellana MD as Consulting Physician (Pulmonology)  Silvestre Lira MD as Physician (Rheumatology)  Nnamdi Gerard MD as Surgeon (Cardiothoracic Surgery)  Naveen Rojas MD as Cardiologist (Cardiology)     Reviewed and updated this visit:  Tobacco  Allergies  Meds  Problems  Med Hx  Surg Hx  Soc Hx  Fam Hx        Seen today for wellness visit. Discussed the importance of a healthy life style. Balanced diet, nutrition, physical activity,and injury prevention. Also discussed the importance of up to date immunizations and annual screenings.          Faby Lemos MD

## 2023-02-27 DIAGNOSIS — N32.81 OAB (OVERACTIVE BLADDER): ICD-10-CM

## 2023-03-06 RX ORDER — POTASSIUM CHLORIDE 750 MG/1
TABLET, EXTENDED RELEASE ORAL
Qty: 90 TABLET | Refills: 3 | OUTPATIENT
Start: 2023-03-06

## 2023-03-24 ENCOUNTER — OFFICE VISIT (OUTPATIENT)
Dept: CARDIOLOGY CLINIC | Age: 73
End: 2023-03-24
Payer: MEDICARE

## 2023-03-24 VITALS
BODY MASS INDEX: 36.04 KG/M2 | HEART RATE: 69 BPM | WEIGHT: 237.8 LBS | SYSTOLIC BLOOD PRESSURE: 128 MMHG | HEIGHT: 68 IN | DIASTOLIC BLOOD PRESSURE: 80 MMHG

## 2023-03-24 DIAGNOSIS — Z95.820 S/P PERCUTANEOUS TRANSLUMINAL ANGIOPLASTY (PTA) WITH STENT PLACEMENT: ICD-10-CM

## 2023-03-24 DIAGNOSIS — I27.20 PULMONARY HTN (HCC): ICD-10-CM

## 2023-03-24 DIAGNOSIS — I48.21 PERMANENT ATRIAL FIBRILLATION (HCC): ICD-10-CM

## 2023-03-24 DIAGNOSIS — I10 ESSENTIAL HYPERTENSION: ICD-10-CM

## 2023-03-24 DIAGNOSIS — Z95.820 S/P ANGIOPLASTY WITH STENT: ICD-10-CM

## 2023-03-24 DIAGNOSIS — I50.32 CHRONIC DIASTOLIC CONGESTIVE HEART FAILURE (HCC): ICD-10-CM

## 2023-03-24 DIAGNOSIS — I25.10 CORONARY ARTERY DISEASE INVOLVING NATIVE CORONARY ARTERY OF NATIVE HEART WITHOUT ANGINA PECTORIS: Primary | ICD-10-CM

## 2023-03-24 DIAGNOSIS — E66.01 MORBID (SEVERE) OBESITY DUE TO EXCESS CALORIES (HCC): ICD-10-CM

## 2023-03-24 DIAGNOSIS — I25.5 ISCHEMIC CARDIOMYOPATHY: ICD-10-CM

## 2023-03-24 DIAGNOSIS — E78.5 DYSLIPIDEMIA: ICD-10-CM

## 2023-03-24 PROBLEM — I20.9 ANGINA PECTORIS, UNSPECIFIED (HCC): Status: RESOLVED | Noted: 2022-09-12 | Resolved: 2023-03-24

## 2023-03-24 PROCEDURE — 3017F COLORECTAL CA SCREEN DOC REV: CPT | Performed by: INTERNAL MEDICINE

## 2023-03-24 PROCEDURE — 1036F TOBACCO NON-USER: CPT | Performed by: INTERNAL MEDICINE

## 2023-03-24 PROCEDURE — G8417 CALC BMI ABV UP PARAM F/U: HCPCS | Performed by: INTERNAL MEDICINE

## 2023-03-24 PROCEDURE — 1124F ACP DISCUSS-NO DSCNMKR DOCD: CPT | Performed by: INTERNAL MEDICINE

## 2023-03-24 PROCEDURE — 93000 ELECTROCARDIOGRAM COMPLETE: CPT | Performed by: INTERNAL MEDICINE

## 2023-03-24 PROCEDURE — 1090F PRES/ABSN URINE INCON ASSESS: CPT | Performed by: INTERNAL MEDICINE

## 2023-03-24 PROCEDURE — G8484 FLU IMMUNIZE NO ADMIN: HCPCS | Performed by: INTERNAL MEDICINE

## 2023-03-24 PROCEDURE — 3074F SYST BP LT 130 MM HG: CPT | Performed by: INTERNAL MEDICINE

## 2023-03-24 PROCEDURE — G8427 DOCREV CUR MEDS BY ELIG CLIN: HCPCS | Performed by: INTERNAL MEDICINE

## 2023-03-24 PROCEDURE — 3079F DIAST BP 80-89 MM HG: CPT | Performed by: INTERNAL MEDICINE

## 2023-03-24 PROCEDURE — G8399 PT W/DXA RESULTS DOCUMENT: HCPCS | Performed by: INTERNAL MEDICINE

## 2023-03-24 PROCEDURE — 99214 OFFICE O/P EST MOD 30 MIN: CPT | Performed by: INTERNAL MEDICINE

## 2023-03-24 RX ORDER — VERAPAMIL HYDROCHLORIDE 240 MG/1
CAPSULE, EXTENDED RELEASE ORAL
Qty: 180 CAPSULE | Refills: 3 | Status: SHIPPED | OUTPATIENT
Start: 2023-03-24

## 2023-03-24 RX ORDER — CLOPIDOGREL BISULFATE 75 MG/1
75 TABLET ORAL DAILY
Qty: 90 TABLET | Refills: 3 | Status: SHIPPED | OUTPATIENT
Start: 2023-03-24

## 2023-03-24 RX ORDER — METOPROLOL TARTRATE 100 MG/1
TABLET ORAL
Qty: 270 TABLET | Refills: 3 | Status: SHIPPED | OUTPATIENT
Start: 2023-03-24

## 2023-03-24 RX ORDER — PRAVASTATIN SODIUM 40 MG
TABLET ORAL
Qty: 90 TABLET | Refills: 3 | Status: SHIPPED | OUTPATIENT
Start: 2023-03-24

## 2023-03-24 RX ORDER — DIGOXIN 125 MCG
TABLET ORAL
Qty: 90 TABLET | Refills: 3 | Status: SHIPPED | OUTPATIENT
Start: 2023-03-24

## 2023-03-24 RX ORDER — LOSARTAN POTASSIUM 25 MG/1
TABLET ORAL
Qty: 90 TABLET | Refills: 3 | Status: SHIPPED | OUTPATIENT
Start: 2023-03-24

## 2023-03-24 NOTE — PROGRESS NOTES
Bifurcates into left  circumflex artery and LAD. 3.  Left circumflex artery. 10% to 20% ostial lesion in the left  circumflex artery. There is a high takeoff of a large OM1 branch that  has a 60% lesion in the proximal part of the vessel. Distal left  circumflex artery is patent. OM2 is a large vessel with no significant  stenotic lesions. 4.  Left anterior descending artery. There is a severe stenotic diffuse  lesion involving the ostial and proximal part of LAD ranging in severity  between 70% to 90%. Mid LAD is patent. Distal LAD has mild diffuse  disease. MEDICATIONS:  See MAR. COMPLICATIONS:  None. ESTIMATED BLOOD LOSS:  Less than 30 mL. ACCESS:  Right radial artery access. Vasc Band was applied. Hemostasis  was achieved. CONCLUSION:  1. Severe stenotic lesion of the distal left main coronary artery and  ostial/proximal left anterior descending artery. 2.  Status post successful PCI with the stenting of distal left main  coronary artery and left anterior descending artery with details as  listed above. RECOMMENDATIONS:  1.  Bedrest for the next 4 hours. 2.  Monitor in telemetry. 3.  Aggressive risk factor modification. 4.  Optimize medical therapy for CAD. 5.  Access site care. 6.  Aspirin 81 mg p.o. daily, can be stopped after 1 month. 7.  Plavix 75 mg p.o. daily. 8.  May resume Eliquis in a.m.  9.  High intensity statin therapy. 10.  Outpatient follow up in office in 2 to 4 weeks. Findings and plan of care discussed with the patient's family. Christianne Zelyaa MD     D: 02/21/2020 15:     -IVUS LM/LAD  -Severe stenotic lesion of dLM (Bifurcation lesion, [place] Type 1,1,0)  -Severe 80-90% lesion of ostial/proximal LAD  -S/P Successful PCI with stenting of dLM/pLAD      Conclusions    Summary   Lexiscan EKG stress test is not suggestive for ischemia. The nuclear images is not suggestive for myocardial ischemia.       Recommendation   Clinical correlation

## 2023-04-06 ENCOUNTER — HOSPITAL ENCOUNTER (OUTPATIENT)
Dept: WOMENS IMAGING | Age: 73
Discharge: HOME | End: 2023-04-06
Payer: MEDICARE

## 2023-04-06 DIAGNOSIS — Z09 FOLLOW-UP EXAM: ICD-10-CM

## 2023-04-06 DIAGNOSIS — N60.01 BREAST CYST, RIGHT: ICD-10-CM

## 2023-04-06 DIAGNOSIS — R92.2 BREAST DENSITY: ICD-10-CM

## 2023-04-06 PROCEDURE — G0279 TOMOSYNTHESIS, MAMMO: HCPCS

## 2023-05-01 ENCOUNTER — NURSE ONLY (OUTPATIENT)
Dept: LAB | Age: 73
End: 2023-05-01

## 2023-05-01 DIAGNOSIS — I25.10 CORONARY ARTERY DISEASE INVOLVING NATIVE CORONARY ARTERY OF NATIVE HEART WITHOUT ANGINA PECTORIS: ICD-10-CM

## 2023-05-01 DIAGNOSIS — I25.5 ISCHEMIC CARDIOMYOPATHY: ICD-10-CM

## 2023-05-01 DIAGNOSIS — I48.21 PERMANENT ATRIAL FIBRILLATION (HCC): ICD-10-CM

## 2023-05-01 DIAGNOSIS — Z95.820 S/P ANGIOPLASTY WITH STENT: ICD-10-CM

## 2023-05-01 DIAGNOSIS — I27.20 PULMONARY HTN (HCC): ICD-10-CM

## 2023-05-01 DIAGNOSIS — I10 ESSENTIAL HYPERTENSION: ICD-10-CM

## 2023-05-01 DIAGNOSIS — E78.5 DYSLIPIDEMIA: ICD-10-CM

## 2023-05-01 DIAGNOSIS — Z95.820 S/P PERCUTANEOUS TRANSLUMINAL ANGIOPLASTY (PTA) WITH STENT PLACEMENT: ICD-10-CM

## 2023-05-01 DIAGNOSIS — I50.32 CHRONIC DIASTOLIC CONGESTIVE HEART FAILURE (HCC): ICD-10-CM

## 2023-05-01 DIAGNOSIS — E66.01 MORBID (SEVERE) OBESITY DUE TO EXCESS CALORIES (HCC): ICD-10-CM

## 2023-05-01 LAB
ALBUMIN SERPL BCG-MCNC: 4.1 G/DL (ref 3.5–5.1)
ALP SERPL-CCNC: 87 U/L (ref 38–126)
ALT SERPL W/O P-5'-P-CCNC: 13 U/L (ref 11–66)
ANION GAP SERPL CALC-SCNC: 14 MEQ/L (ref 8–16)
AST SERPL-CCNC: 16 U/L (ref 5–40)
BASOPHILS ABSOLUTE: 0.1 THOU/MM3 (ref 0–0.1)
BASOPHILS NFR BLD AUTO: 0.7 %
BILIRUB CONJ SERPL-MCNC: < 0.2 MG/DL (ref 0–0.3)
BILIRUB SERPL-MCNC: 0.3 MG/DL (ref 0.3–1.2)
BUN SERPL-MCNC: 18 MG/DL (ref 7–22)
CALCIUM SERPL-MCNC: 9.2 MG/DL (ref 8.5–10.5)
CHLORIDE SERPL-SCNC: 101 MEQ/L (ref 98–111)
CHOLEST SERPL-MCNC: 140 MG/DL (ref 100–199)
CO2 SERPL-SCNC: 28 MEQ/L (ref 23–33)
CREAT SERPL-MCNC: 0.7 MG/DL (ref 0.4–1.2)
DEPRECATED RDW RBC AUTO: 53.8 FL (ref 35–45)
EOSINOPHIL NFR BLD AUTO: 1.6 %
EOSINOPHILS ABSOLUTE: 0.2 THOU/MM3 (ref 0–0.4)
ERYTHROCYTE [DISTWIDTH] IN BLOOD BY AUTOMATED COUNT: 15.9 % (ref 11.5–14.5)
ERYTHROCYTE [SEDIMENTATION RATE] IN BLOOD BY WESTERGREN METHOD: 24 MM/HR (ref 0–20)
GFR SERPL CREATININE-BSD FRML MDRD: > 60 ML/MIN/1.73M2
GLUCOSE SERPL-MCNC: 192 MG/DL (ref 70–108)
HCT VFR BLD AUTO: 42.3 % (ref 37–47)
HDLC SERPL-MCNC: 39 MG/DL
HGB BLD-MCNC: 12.8 GM/DL (ref 12–16)
IMM GRANULOCYTES # BLD AUTO: 0.06 THOU/MM3 (ref 0–0.07)
IMM GRANULOCYTES NFR BLD AUTO: 0.5 %
LDLC SERPL CALC-MCNC: 71 MG/DL
LYMPHOCYTES ABSOLUTE: 2.5 THOU/MM3 (ref 1–4.8)
LYMPHOCYTES NFR BLD AUTO: 21.7 %
MAGNESIUM SERPL-MCNC: 1.8 MG/DL (ref 1.6–2.4)
MCH RBC QN AUTO: 27.6 PG (ref 26–33)
MCHC RBC AUTO-ENTMCNC: 30.3 GM/DL (ref 32.2–35.5)
MCV RBC AUTO: 91.4 FL (ref 81–99)
MONOCYTES ABSOLUTE: 0.6 THOU/MM3 (ref 0.4–1.3)
MONOCYTES NFR BLD AUTO: 5.5 %
NEUTROPHILS NFR BLD AUTO: 70 %
NRBC BLD AUTO-RTO: 0 /100 WBC
PLATELET # BLD AUTO: 259 THOU/MM3 (ref 130–400)
PMV BLD AUTO: 9.6 FL (ref 9.4–12.4)
POTASSIUM SERPL-SCNC: 3.4 MEQ/L (ref 3.5–5.2)
PROT SERPL-MCNC: 6.9 G/DL (ref 6.1–8)
RBC # BLD AUTO: 4.63 MILL/MM3 (ref 4.2–5.4)
SEGMENTED NEUTROPHILS ABSOLUTE COUNT: 8 THOU/MM3 (ref 1.8–7.7)
SODIUM SERPL-SCNC: 143 MEQ/L (ref 135–145)
TRIGL SERPL-MCNC: 148 MG/DL (ref 0–199)
WBC # BLD AUTO: 11.4 THOU/MM3 (ref 4.8–10.8)

## 2023-05-24 NOTE — TELEPHONE ENCOUNTER
Received refill request for Albuterol (Ventolin) inhaler. Medication was last ordered by Irineo Cordova. Medication was last ordered on 5/3/22 with 3 refills. Patient was last seen in the office 11/7/22. Does patient have a scheduled follow up?: yes - 11/7/23. Medication needs to be sent to [address] [place] [address] ([facility] ) [facility] [address] [place] [address],[address] [phone]. Thank you, please advise! Patient's Allergies:   Allergies   Allergen Reactions    Demerol Hcl [Meperidine] Nausea And Vomiting

## 2023-05-25 RX ORDER — ALBUTEROL SULFATE 90 UG/1
AEROSOL, METERED RESPIRATORY (INHALATION)
Qty: 72 G | Refills: 3 | Status: SHIPPED | OUTPATIENT
Start: 2023-05-25

## 2023-08-14 ENCOUNTER — NURSE ONLY (OUTPATIENT)
Dept: LAB | Age: 73
End: 2023-08-14

## 2023-08-14 LAB
ALT SERPL W/O P-5'-P-CCNC: 17 U/L (ref 11–66)
BASOPHILS ABSOLUTE: 0.1 THOU/MM3 (ref 0–0.1)
BASOPHILS NFR BLD AUTO: 0.7 %
CREAT SERPL-MCNC: 0.8 MG/DL (ref 0.4–1.2)
DEPRECATED RDW RBC AUTO: 55.8 FL (ref 35–45)
EOSINOPHIL NFR BLD AUTO: 1.4 %
EOSINOPHILS ABSOLUTE: 0.2 THOU/MM3 (ref 0–0.4)
ERYTHROCYTE [DISTWIDTH] IN BLOOD BY AUTOMATED COUNT: 16.8 % (ref 11.5–14.5)
ERYTHROCYTE [SEDIMENTATION RATE] IN BLOOD BY WESTERGREN METHOD: 32 MM/HR (ref 0–20)
GFR SERPL CREATININE-BSD FRML MDRD: > 60 ML/MIN/1.73M2
HCT VFR BLD AUTO: 39.7 % (ref 37–47)
HGB BLD-MCNC: 11.8 GM/DL (ref 12–16)
IMM GRANULOCYTES # BLD AUTO: 0.13 THOU/MM3 (ref 0–0.07)
IMM GRANULOCYTES NFR BLD AUTO: 0.9 %
LYMPHOCYTES ABSOLUTE: 3 THOU/MM3 (ref 1–4.8)
LYMPHOCYTES NFR BLD AUTO: 21.4 %
MCH RBC QN AUTO: 27.3 PG (ref 26–33)
MCHC RBC AUTO-ENTMCNC: 29.7 GM/DL (ref 32.2–35.5)
MCV RBC AUTO: 91.7 FL (ref 81–99)
MONOCYTES ABSOLUTE: 1.1 THOU/MM3 (ref 0.4–1.3)
MONOCYTES NFR BLD AUTO: 8.3 %
NEUTROPHILS NFR BLD AUTO: 67.3 %
NRBC BLD AUTO-RTO: 0 /100 WBC
PLATELET # BLD AUTO: 310 THOU/MM3 (ref 130–400)
PMV BLD AUTO: 9.9 FL (ref 9.4–12.4)
RBC # BLD AUTO: 4.33 MILL/MM3 (ref 4.2–5.4)
SEGMENTED NEUTROPHILS ABSOLUTE COUNT: 9.3 THOU/MM3 (ref 1.8–7.7)
WBC # BLD AUTO: 13.8 THOU/MM3 (ref 4.8–10.8)

## 2023-08-17 RX ORDER — PANTOPRAZOLE SODIUM 40 MG/1
TABLET, DELAYED RELEASE ORAL
Qty: 90 TABLET | Refills: 3 | Status: SHIPPED | OUTPATIENT
Start: 2023-08-17

## 2023-08-18 RX ORDER — TRAZODONE HYDROCHLORIDE 50 MG/1
TABLET ORAL
Qty: 90 TABLET | Refills: 3 | Status: SHIPPED | OUTPATIENT
Start: 2023-08-18

## 2023-08-27 NOTE — PROCEDURES
[address] [id number]                            CARDIAC CATHETERIZATION    PATIENT NAME: Sonal Field                       :        1950  MED REC NO:   [id number]                           ROOM:       0006  ACCOUNT NO:   [de-identified]                           ADMIT DATE: 2020  PROVIDER:     Brinda Sanchez MD    DATE OF PROCEDURE:  2020    INDICATIONS FOR PROCEDURE:  1. Abnormal stress test with evidence for ischemia. 2.  Dyspnea on exertion, shortness of breath, fatigue. 3.  Angina and angina equivalent symptoms. 4.  Hypertension. 5.  Dyslipidemia. 6.  Diabetes mellitus. 7.  History of bradycardia. PROCEDURES PERFORMED:  1. Left cardiac catheterization with selective coronary angiogram.  2.  IVUS of left main. 3.  IVUS of proximal LAD. 4.  Left ventriculography. 5.  Successful PCI with stenting of distal left main and proximal left  anterior descending artery. DESCRIPTION OF PROCEDURE:  After informed consent, the patient was  brought to the cardiac catheterization room. She was prepped and draped  in a sterile fashion. 2% lidocaine was injected in the skin and  subcutaneous tissue overlying the right radial artery. Under ultrasound  guidance and using modified Seldinger technique, I was able to obtain  access in the right radial artery. 5/6 Slender sheath was inserted in  the right radial artery. Standard antithrombotic/antispasmodic  medications were given through the right radial sheath. I used a  5-Paraguayan JR-4 and 5-Paraguayan JL-3.5 diagnostic catheters to complete the  diagnostic procedure. The patient was found to have evidence of distal  left main disease as well as severe ostial LAD and proximal LAD stenotic  lesion. Heparin was given. ACT was confirmed to be above 250. I used  a 6-[place]aguayan VL-3 guiding catheter to engage and cannulate the left main  coronary artery.   After the ACT was Kavitha Kolb is a 71 year old female PMH of persistent atrial fibrillation s/p cardioversion x2, DVT/PE on eliquis, HTN, HLD, T2DB, IDC R breast s/p lumpectomy & radiation who presented with worsening SOB of 3 days, myalgias and bilateral LL edema. On arrival patient found to be in acute hypoxic respiratory failure; also COVID-positive however the sars rdrp ct 41 may simply be the result of her COVID in april. CXR with bilateral infiltrates, left pleural effusion, cardiomegaly. BNP to 2000s. Working differential: new CHF vs. A fib vs. COVID19 vs. DEIDRE      confirmed to be above 250, I was  able to pass a Runthrough wire into the left anterior descending artery  across the lesion. Over the wire, I passed the iCross IVUS catheter  which was put in the level of the proximal LAD just distal to the target  lesion area. The IVUS revealed the distal reference vessel maximum  diameter around 4.2. The ostial and proximal LAD had severe stenotic  lesion with at least 70% plaque burden with the reference vessel  diameter of 4.0 mm. There is significant disease of the ostial LAD. IVUS of the left main coronary artery also revealed evidence of 60%  lesion. There is a lesion involving the distal left main vessel. Based  on those findings and to assure good coverage of the lesion area  including the ostial LAD, I decided to stent from left main into left  anterior descending artery. .  Another Runthrough wire was passed and  used to wire the left circumflex artery for protection. The ostial left  circumflex artery only had 10% to 20% lesion. The provisional stenting  approach was decided. I used a TREK 3.0 mm x 4 mm PTCA balloon for  predilation. After successful predilation angioplasty, I proceeded with  the stenting. Two stents were used first 3.5 mm x 28 mm Xience  drug-eluting stent was deployed in the left main into the proximal left  anterior descending artery. Second 2.75 mm x 15 mm Xience drug-eluting  stent was deployed in the proximal LAD in an overlapping fashion with  the previously placed stent. Post dilation using 4.0 mm x 8 mm NC  balloon was performed at low pressure distally including the overlap  area of both the stents and then at high pressure in the proximal LAD,  ostial LAD and left main coronary artery. I then followed that with  post dilation using 5.0 mm x 8 mm Euphora NC balloon which was inflated  in the ostium of LAD and then at high pressure in the left main coronary  artery.   Repeat angiogram revealed well expanded stents without evidence  for complication. I did rewire the left circumflex artery through the  stent strut before the post dilation using the 5.0 mm balloon. Eventually wires were removed. IC nitroglycerin was given. Final  angiogram revealed well expanded stents with no evidence for  complication including no dissection, distal embolization or  perforation. FINDINGS:  LEFT VENTRICULOGRAPHY:  There is evidence for mild global hypokinesis,  mild to moderate hypokinesis of the anterior wall. Estimated ejection  fraction 45%. HEMODYNAMICS:  LVEDP 9 mmHg. CORONARY ANGIOGRAM:  1. Right coronary artery. The ostial RCA has a 10% lesion, proximal  RCA has a 20% lesion. Mid RCA has 30% lesion. Distal RCA with luminal  irregularities. RCA is a dominant vessel. Bifurcates into RPL and  RPDA. RPL has an ostial 20% lesion. RPDA has a 10% to 20% lesion. 2.  Left main coronary artery. Left main coronary artery has a 50%  lesion in the distal left main coronary artery. Bifurcates into left  circumflex artery and LAD. 3.  Left circumflex artery. 10% to 20% ostial lesion in the left  circumflex artery. There is a high takeoff of a large OM1 branch that  has a 60% lesion in the proximal part of the vessel. Distal left  circumflex artery is patent. OM2 is a large vessel with no significant  stenotic lesions. 4.  Left anterior descending artery. There is a severe stenotic diffuse  lesion involving the ostial and proximal part of LAD ranging in severity  between 70% to 90%. Mid LAD is patent. Distal LAD has mild diffuse  disease. MEDICATIONS:  See MAR. COMPLICATIONS:  None. ESTIMATED BLOOD LOSS:  Less than 30 mL. ACCESS:  Right radial artery access. Vasc Band was applied. Hemostasis  was achieved. CONCLUSION:  1. Severe stenotic lesion of the distal left main coronary artery and  ostial/proximal left anterior descending artery.   2.  Status post successful PCI with the stenting of distal left main  coronary artery and left anterior descending artery with details as  listed above. RECOMMENDATIONS:  1.  Bedrest for the next 4 hours. 2.  Monitor in telemetry. 3.  Aggressive risk factor modification. 4.  Optimize medical therapy for CAD. 5.  Access site care. 6.  Aspirin 81 mg p.o. daily, can be stopped after 1 month. 7.  Plavix 75 mg p.o. daily. 8.  May resume Eliquis in a.m.  9.  High intensity statin therapy. 10.  Outpatient follow up in office in 2 to 4 weeks. Findings and plan of care discussed with the patient's family.         Guilherme Rodríguez MD    D: 02/21/2020 15:57:01       T: 02/21/2020 16:07:16     [id number]  Job#: [id number]     Doc#: [id number]    CC:

## 2023-09-29 ENCOUNTER — OFFICE VISIT (OUTPATIENT)
Dept: CARDIOLOGY CLINIC | Age: 73
End: 2023-09-29
Payer: MEDICARE

## 2023-09-29 VITALS
DIASTOLIC BLOOD PRESSURE: 56 MMHG | HEIGHT: 68 IN | HEART RATE: 68 BPM | SYSTOLIC BLOOD PRESSURE: 135 MMHG | WEIGHT: 235.2 LBS | BODY MASS INDEX: 35.64 KG/M2

## 2023-09-29 DIAGNOSIS — I48.21 PERMANENT ATRIAL FIBRILLATION (HCC): ICD-10-CM

## 2023-09-29 DIAGNOSIS — E78.5 DYSLIPIDEMIA: ICD-10-CM

## 2023-09-29 DIAGNOSIS — Z98.890 S/P CAROTID ENDARTERECTOMY: ICD-10-CM

## 2023-09-29 DIAGNOSIS — I10 ESSENTIAL HYPERTENSION: ICD-10-CM

## 2023-09-29 DIAGNOSIS — Z95.820 S/P ANGIOPLASTY WITH STENT: ICD-10-CM

## 2023-09-29 DIAGNOSIS — I50.32 CHRONIC DIASTOLIC CONGESTIVE HEART FAILURE (HCC): ICD-10-CM

## 2023-09-29 DIAGNOSIS — I27.20 PULMONARY HTN (HCC): ICD-10-CM

## 2023-09-29 DIAGNOSIS — I25.5 ISCHEMIC CARDIOMYOPATHY: ICD-10-CM

## 2023-09-29 DIAGNOSIS — I25.10 CORONARY ARTERY DISEASE INVOLVING NATIVE CORONARY ARTERY OF NATIVE HEART WITHOUT ANGINA PECTORIS: Primary | ICD-10-CM

## 2023-09-29 PROCEDURE — 1124F ACP DISCUSS-NO DSCNMKR DOCD: CPT | Performed by: INTERNAL MEDICINE

## 2023-09-29 PROCEDURE — 3078F DIAST BP <80 MM HG: CPT | Performed by: INTERNAL MEDICINE

## 2023-09-29 PROCEDURE — 3017F COLORECTAL CA SCREEN DOC REV: CPT | Performed by: INTERNAL MEDICINE

## 2023-09-29 PROCEDURE — 1090F PRES/ABSN URINE INCON ASSESS: CPT | Performed by: INTERNAL MEDICINE

## 2023-09-29 PROCEDURE — G8399 PT W/DXA RESULTS DOCUMENT: HCPCS | Performed by: INTERNAL MEDICINE

## 2023-09-29 PROCEDURE — G8427 DOCREV CUR MEDS BY ELIG CLIN: HCPCS | Performed by: INTERNAL MEDICINE

## 2023-09-29 PROCEDURE — G8417 CALC BMI ABV UP PARAM F/U: HCPCS | Performed by: INTERNAL MEDICINE

## 2023-09-29 PROCEDURE — 3075F SYST BP GE 130 - 139MM HG: CPT | Performed by: INTERNAL MEDICINE

## 2023-09-29 PROCEDURE — 99214 OFFICE O/P EST MOD 30 MIN: CPT | Performed by: INTERNAL MEDICINE

## 2023-09-29 PROCEDURE — 1036F TOBACCO NON-USER: CPT | Performed by: INTERNAL MEDICINE

## 2023-09-29 RX ORDER — POTASSIUM CHLORIDE 750 MG/1
10 TABLET, EXTENDED RELEASE ORAL EVERY OTHER DAY
COMMUNITY

## 2023-09-29 NOTE — PATIENT INSTRUCTIONS
Take potassium 10 mg every other day. You may receive a survey regarding the care you received during your visit. Your input is valuable to us. We encourage you to complete and return your survey. We hope you will choose us in the future for your healthcare needs.

## 2023-10-17 ENCOUNTER — OFFICE VISIT (OUTPATIENT)
Dept: FAMILY MEDICINE CLINIC | Age: 73
End: 2023-10-17
Payer: MEDICARE

## 2023-10-17 VITALS
HEART RATE: 51 BPM | OXYGEN SATURATION: 97 % | WEIGHT: 232 LBS | BODY MASS INDEX: 35.28 KG/M2 | DIASTOLIC BLOOD PRESSURE: 72 MMHG | TEMPERATURE: 98.1 F | RESPIRATION RATE: 16 BRPM | SYSTOLIC BLOOD PRESSURE: 130 MMHG

## 2023-10-17 DIAGNOSIS — J20.9 ACUTE BRONCHITIS, UNSPECIFIED ORGANISM: Primary | ICD-10-CM

## 2023-10-17 DIAGNOSIS — R50.9 FEVER, UNSPECIFIED FEVER CAUSE: ICD-10-CM

## 2023-10-17 DIAGNOSIS — J02.9 SORE THROAT: ICD-10-CM

## 2023-10-17 DIAGNOSIS — G47.33 OBSTRUCTIVE SLEEP APNEA ON CPAP: ICD-10-CM

## 2023-10-17 DIAGNOSIS — F17.200 SMOKER: ICD-10-CM

## 2023-10-17 LAB
Lab: NORMAL
QC PASS/FAIL: NORMAL
SARS-COV-2 RDRP RESP QL NAA+PROBE: NEGATIVE
STREPTOCOCCUS A RNA: NEGATIVE

## 2023-10-17 PROCEDURE — G8399 PT W/DXA RESULTS DOCUMENT: HCPCS | Performed by: NURSE PRACTITIONER

## 2023-10-17 PROCEDURE — 87635 SARS-COV-2 COVID-19 AMP PRB: CPT | Performed by: NURSE PRACTITIONER

## 2023-10-17 PROCEDURE — 99214 OFFICE O/P EST MOD 30 MIN: CPT | Performed by: NURSE PRACTITIONER

## 2023-10-17 PROCEDURE — G8417 CALC BMI ABV UP PARAM F/U: HCPCS | Performed by: NURSE PRACTITIONER

## 2023-10-17 PROCEDURE — 3017F COLORECTAL CA SCREEN DOC REV: CPT | Performed by: NURSE PRACTITIONER

## 2023-10-17 PROCEDURE — 87651 STREP A DNA AMP PROBE: CPT | Performed by: NURSE PRACTITIONER

## 2023-10-17 PROCEDURE — 3078F DIAST BP <80 MM HG: CPT | Performed by: NURSE PRACTITIONER

## 2023-10-17 PROCEDURE — G8427 DOCREV CUR MEDS BY ELIG CLIN: HCPCS | Performed by: NURSE PRACTITIONER

## 2023-10-17 PROCEDURE — 1124F ACP DISCUSS-NO DSCNMKR DOCD: CPT | Performed by: NURSE PRACTITIONER

## 2023-10-17 PROCEDURE — 1036F TOBACCO NON-USER: CPT | Performed by: NURSE PRACTITIONER

## 2023-10-17 PROCEDURE — 3075F SYST BP GE 130 - 139MM HG: CPT | Performed by: NURSE PRACTITIONER

## 2023-10-17 PROCEDURE — G8484 FLU IMMUNIZE NO ADMIN: HCPCS | Performed by: NURSE PRACTITIONER

## 2023-10-17 PROCEDURE — 1090F PRES/ABSN URINE INCON ASSESS: CPT | Performed by: NURSE PRACTITIONER

## 2023-10-17 RX ORDER — PREDNISONE 20 MG/1
20 TABLET ORAL 2 TIMES DAILY
Qty: 10 TABLET | Refills: 0 | Status: SHIPPED | OUTPATIENT
Start: 2023-10-17 | End: 2023-10-22

## 2023-10-17 RX ORDER — CEFDINIR 300 MG/1
300 CAPSULE ORAL 2 TIMES DAILY
Qty: 20 CAPSULE | Refills: 0 | Status: SHIPPED | OUTPATIENT
Start: 2023-10-17 | End: 2023-10-27

## 2023-10-17 RX ORDER — CLONIDINE HYDROCHLORIDE 0.1 MG/1
TABLET ORAL
COMMUNITY
Start: 2017-09-26

## 2023-10-17 RX ORDER — CELECOXIB 200 MG/1
CAPSULE ORAL
COMMUNITY
Start: 2017-09-11

## 2023-10-17 RX ORDER — BENZONATATE 200 MG/1
200 CAPSULE ORAL 3 TIMES DAILY PRN
Qty: 21 CAPSULE | Refills: 0 | Status: SHIPPED | OUTPATIENT
Start: 2023-10-17 | End: 2023-10-24

## 2023-10-17 ASSESSMENT — ENCOUNTER SYMPTOMS
DIARRHEA: 0
COUGH: 1
VOMITING: 0
CONSTIPATION: 0
BACK PAIN: 0
EYE DISCHARGE: 0
EYE PAIN: 0
WHEEZING: 1
TROUBLE SWALLOWING: 0
SHORTNESS OF BREATH: 0
ABDOMINAL PAIN: 0
SINUS PRESSURE: 0
ALLERGIC/IMMUNOLOGIC NEGATIVE: 1
NAUSEA: 0
RHINORRHEA: 1
SORE THROAT: 1
EYE REDNESS: 0

## 2023-10-17 NOTE — PROGRESS NOTES
Assessment/Plan:      Jenna Martinez was seen today for sore throat. Diagnoses and all orders for this visit:    Acute bronchitis, unspecified organism  -     predniSONE (DELTASONE) 20 MG tablet; Take 1 tablet by mouth 2 times daily for 5 days  -     cefdinir (OMNICEF) 300 MG capsule; Take 1 capsule by mouth 2 times daily for 10 days  -     benzonatate (TESSALON) 200 MG capsule; Take 1 capsule by mouth 3 times daily as needed for Cough    Sore throat  -     POCT Rapid Strep A DNA (Alere i)    Fever, unspecified fever cause  -     POCT COVID-19 Rapid, NAAT    Smoker    Obstructive sleep apnea on CPAP    Rapid COVID and strep are negative. Return if symptoms worsen or fail to improve. Patient instructions given nick.         Electronically signed by AWILDA Elkins CNP on 10/17/2023 at 12:52 PM

## 2023-10-20 ENCOUNTER — HOSPITAL ENCOUNTER (OUTPATIENT)
Dept: INTERVENTIONAL RADIOLOGY/VASCULAR | Age: 73
Discharge: HOME | End: 2023-10-20
Payer: MEDICARE

## 2023-10-20 DIAGNOSIS — I65.23 BILATERAL CAROTID ARTERY STENOSIS: ICD-10-CM

## 2023-10-20 PROCEDURE — 93880 EXTRACRANIAL BILAT STUDY: CPT

## 2023-10-27 ENCOUNTER — TELEPHONE (OUTPATIENT)
Dept: FAMILY MEDICINE CLINIC | Age: 73
End: 2023-10-27

## 2023-10-27 NOTE — TELEPHONE ENCOUNTER
Pt is on recall calendar for Thyroid US 1 yr follow up scan. Called pt-states Dr. Quinn Banerjee office has it scheduled at [facility] on 11/6/23.        10/25/22 encounter states Dr Bill Chirinos is no longer following pt and request Dr Shorty Rojo to follow. Dr Bill Chirinos ordered a carotid US 10/20/23 so he is following again.

## 2023-11-06 NOTE — PROGRESS NOTES
[facility] [address]         [id number]  11/7/2023   Chief Complaint   Patient presents with    Follow-up     1 year makayla        Pt of Dr. Miranda Easley     PAP Download:   Sharron July or initial AHI: 17.2     Date of initial study: 11/15/ 17     Compliant  87%     Noncompliant 10 %     PAP Type cpap Level  11   Avg Hrs/Day 10.5  AHI: 1.6   Recorded compliance dates 10/4/23-11/2/23  Machine/Mfg:   [x] ResMed    [] Respironics/Dreamstation   Interface:   [] Nasal    [] Nasal pillows   [x] FFM      Provider:      [] [facility]     [][facility]     [] [employer]    [] Kabanchik Ghislaine [place]    [] [facility]               [] [facility]      [x] [facility]    [] [facility] Dr:      [] Other    Neck Size: 17.5  Mallampati 4  ESS:  13  SAQLI: 43    Here is a scan of the most recent download:            Presentation:   Jessica presents for sleep medicine follow up for obstructive sleep apnea, insomnia, hypersomnia  Since the last visit, Jessica is doing well with PAP. She is waking every 1.5-2 hours to urinate. She falls back asleep easily. She is tired during the day. Trazodone helpful for insomnia, uses it about once a week. She takes Provigil as needed, usually about once every few weeks. Equipment issues: The pressure is  acceptable, the mask is acceptable     Sleep issues:  Do you feel better? Yes  More rested? Yes   Better concentration? yes    Progress History:   Since last visit any new medical issues? No  New ER or hospital visits? No  Any new or changes in medicines? No  Any new sleep medicines? No    Review of Systems -   Review of Systems   Constitutional:  Negative for activity change, appetite change, chills and fever. HENT:  Negative for congestion and postnasal drip. Eyes: Negative. Respiratory:  Negative for cough, chest tightness, shortness of breath, wheezing and stridor. Cardiovascular:  Negative for chest pain and leg swelling.    Gastrointestinal:  Negative for diarrhea and

## 2023-11-07 ENCOUNTER — OFFICE VISIT (OUTPATIENT)
Dept: PULMONOLOGY | Age: 73
End: 2023-11-07
Payer: MEDICARE

## 2023-11-07 VITALS
HEIGHT: 68 IN | BODY MASS INDEX: 35.1 KG/M2 | WEIGHT: 231.6 LBS | SYSTOLIC BLOOD PRESSURE: 124 MMHG | TEMPERATURE: 98.7 F | HEART RATE: 79 BPM | OXYGEN SATURATION: 98 % | DIASTOLIC BLOOD PRESSURE: 84 MMHG

## 2023-11-07 DIAGNOSIS — G47.33 OBSTRUCTIVE SLEEP APNEA ON CPAP: Primary | ICD-10-CM

## 2023-11-07 DIAGNOSIS — G47.09 OTHER INSOMNIA: ICD-10-CM

## 2023-11-07 DIAGNOSIS — I25.5 ISCHEMIC CARDIOMYOPATHY: ICD-10-CM

## 2023-11-07 DIAGNOSIS — G47.10 HYPERSOMNIA WITH SLEEP APNEA: ICD-10-CM

## 2023-11-07 DIAGNOSIS — M79.7 FIBROMYALGIA: ICD-10-CM

## 2023-11-07 DIAGNOSIS — I27.20 PULMONARY HTN (HCC): ICD-10-CM

## 2023-11-07 DIAGNOSIS — G47.30 HYPERSOMNIA WITH SLEEP APNEA: ICD-10-CM

## 2023-11-07 DIAGNOSIS — E66.9 OBESITY (BMI 30-39.9): ICD-10-CM

## 2023-11-07 DIAGNOSIS — E55.9 VITAMIN D DEFICIENCY: ICD-10-CM

## 2023-11-07 PROCEDURE — G8427 DOCREV CUR MEDS BY ELIG CLIN: HCPCS | Performed by: PHYSICIAN ASSISTANT

## 2023-11-07 PROCEDURE — 99213 OFFICE O/P EST LOW 20 MIN: CPT | Performed by: PHYSICIAN ASSISTANT

## 2023-11-07 PROCEDURE — 3079F DIAST BP 80-89 MM HG: CPT | Performed by: PHYSICIAN ASSISTANT

## 2023-11-07 PROCEDURE — 1036F TOBACCO NON-USER: CPT | Performed by: PHYSICIAN ASSISTANT

## 2023-11-07 PROCEDURE — G8417 CALC BMI ABV UP PARAM F/U: HCPCS | Performed by: PHYSICIAN ASSISTANT

## 2023-11-07 PROCEDURE — G8399 PT W/DXA RESULTS DOCUMENT: HCPCS | Performed by: PHYSICIAN ASSISTANT

## 2023-11-07 PROCEDURE — 3074F SYST BP LT 130 MM HG: CPT | Performed by: PHYSICIAN ASSISTANT

## 2023-11-07 PROCEDURE — G8484 FLU IMMUNIZE NO ADMIN: HCPCS | Performed by: PHYSICIAN ASSISTANT

## 2023-11-07 PROCEDURE — 1124F ACP DISCUSS-NO DSCNMKR DOCD: CPT | Performed by: PHYSICIAN ASSISTANT

## 2023-11-07 PROCEDURE — 1090F PRES/ABSN URINE INCON ASSESS: CPT | Performed by: PHYSICIAN ASSISTANT

## 2023-11-07 PROCEDURE — 3017F COLORECTAL CA SCREEN DOC REV: CPT | Performed by: PHYSICIAN ASSISTANT

## 2023-11-07 RX ORDER — MODAFINIL 200 MG/1
200 TABLET ORAL DAILY
Qty: 90 TABLET | Refills: 1 | Status: SHIPPED | OUTPATIENT
Start: 2023-11-07 | End: 2024-05-05

## 2023-11-07 ASSESSMENT — ENCOUNTER SYMPTOMS
WHEEZING: 0
COUGH: 0
BACK PAIN: 0
DIARRHEA: 0
EYES NEGATIVE: 1
STRIDOR: 0
CHEST TIGHTNESS: 0
NAUSEA: 0
ALLERGIC/IMMUNOLOGIC NEGATIVE: 1
SHORTNESS OF BREATH: 0

## 2023-11-13 ENCOUNTER — NURSE ONLY (OUTPATIENT)
Dept: LAB | Age: 73
End: 2023-11-13

## 2023-11-13 LAB
ALT SERPL W/O P-5'-P-CCNC: 12 U/L (ref 11–66)
BASOPHILS ABSOLUTE: 0.1 THOU/MM3 (ref 0–0.1)
BASOPHILS NFR BLD AUTO: 0.6 %
CREAT SERPL-MCNC: 0.6 MG/DL (ref 0.4–1.2)
DEPRECATED RDW RBC AUTO: 54.9 FL (ref 35–45)
EOSINOPHIL NFR BLD AUTO: 1.8 %
EOSINOPHILS ABSOLUTE: 0.2 THOU/MM3 (ref 0–0.4)
ERYTHROCYTE [DISTWIDTH] IN BLOOD BY AUTOMATED COUNT: 16.6 % (ref 11.5–14.5)
ERYTHROCYTE [SEDIMENTATION RATE] IN BLOOD BY WESTERGREN METHOD: 43 MM/HR (ref 0–20)
GFR SERPL CREATININE-BSD FRML MDRD: > 60 ML/MIN/1.73M2
HCT VFR BLD AUTO: 38.3 % (ref 37–47)
HGB BLD-MCNC: 11.6 GM/DL (ref 12–16)
IMM GRANULOCYTES # BLD AUTO: 0.07 THOU/MM3 (ref 0–0.07)
IMM GRANULOCYTES NFR BLD AUTO: 0.6 %
LYMPHOCYTES ABSOLUTE: 2.3 THOU/MM3 (ref 1–4.8)
LYMPHOCYTES NFR BLD AUTO: 21 %
MCH RBC QN AUTO: 27.6 PG (ref 26–33)
MCHC RBC AUTO-ENTMCNC: 30.3 GM/DL (ref 32.2–35.5)
MCV RBC AUTO: 91.2 FL (ref 81–99)
MONOCYTES ABSOLUTE: 0.7 THOU/MM3 (ref 0.4–1.3)
MONOCYTES NFR BLD AUTO: 6.7 %
NEUTROPHILS NFR BLD AUTO: 69.3 %
NRBC BLD AUTO-RTO: 0 /100 WBC
PLATELET # BLD AUTO: 289 THOU/MM3 (ref 130–400)
PMV BLD AUTO: 9.3 FL (ref 9.4–12.4)
RBC # BLD AUTO: 4.2 MILL/MM3 (ref 4.2–5.4)
SEGMENTED NEUTROPHILS ABSOLUTE COUNT: 7.7 THOU/MM3 (ref 1.8–7.7)
WBC # BLD AUTO: 11.1 THOU/MM3 (ref 4.8–10.8)

## 2024-01-10 RX ORDER — DIGOXIN 125 MCG
TABLET ORAL
Qty: 90 TABLET | Refills: 0 | Status: SHIPPED | OUTPATIENT
Start: 2024-01-10

## 2024-01-10 RX ORDER — APIXABAN 5 MG/1
5 TABLET, FILM COATED ORAL 2 TIMES DAILY
Qty: 180 TABLET | Refills: 0 | Status: SHIPPED | OUTPATIENT
Start: 2024-01-10

## 2024-01-10 RX ORDER — PRAVASTATIN SODIUM 40 MG
TABLET ORAL
Qty: 90 TABLET | Refills: 0 | Status: SHIPPED | OUTPATIENT
Start: 2024-01-10

## 2024-01-10 RX ORDER — LOSARTAN POTASSIUM 25 MG/1
TABLET ORAL
Qty: 90 TABLET | Refills: 0 | Status: SHIPPED | OUTPATIENT
Start: 2024-01-10

## 2024-01-10 RX ORDER — METOPROLOL TARTRATE 100 MG/1
TABLET ORAL
Qty: 270 TABLET | Refills: 0 | Status: SHIPPED | OUTPATIENT
Start: 2024-01-10

## 2024-01-10 RX ORDER — VERAPAMIL HYDROCHLORIDE 240 MG/1
CAPSULE, EXTENDED RELEASE ORAL
Qty: 180 CAPSULE | Refills: 0 | Status: SHIPPED | OUTPATIENT
Start: 2024-01-10

## 2024-01-30 ENCOUNTER — NURSE ONLY (OUTPATIENT)
Dept: LAB | Age: 74
End: 2024-01-30

## 2024-01-30 DIAGNOSIS — I10 ESSENTIAL HYPERTENSION: ICD-10-CM

## 2024-01-30 DIAGNOSIS — E78.5 DYSLIPIDEMIA: ICD-10-CM

## 2024-01-30 DIAGNOSIS — Z95.820 S/P ANGIOPLASTY WITH STENT: ICD-10-CM

## 2024-01-30 DIAGNOSIS — I25.5 ISCHEMIC CARDIOMYOPATHY: ICD-10-CM

## 2024-01-30 DIAGNOSIS — I50.32 CHRONIC DIASTOLIC CONGESTIVE HEART FAILURE (HCC): ICD-10-CM

## 2024-01-30 DIAGNOSIS — Z98.890 S/P CAROTID ENDARTERECTOMY: ICD-10-CM

## 2024-01-30 DIAGNOSIS — I48.21 PERMANENT ATRIAL FIBRILLATION (HCC): ICD-10-CM

## 2024-01-30 DIAGNOSIS — I27.20 PULMONARY HTN (HCC): ICD-10-CM

## 2024-01-30 DIAGNOSIS — I25.10 CORONARY ARTERY DISEASE INVOLVING NATIVE CORONARY ARTERY OF NATIVE HEART WITHOUT ANGINA PECTORIS: ICD-10-CM

## 2024-01-30 LAB
ALBUMIN SERPL BCG-MCNC: 3.8 G/DL (ref 3.5–5.1)
ALP SERPL-CCNC: 113 U/L (ref 38–126)
ALT SERPL W/O P-5'-P-CCNC: 22 U/L (ref 11–66)
ANION GAP SERPL CALC-SCNC: 10 MEQ/L (ref 8–16)
AST SERPL-CCNC: 27 U/L (ref 5–40)
BASOPHILS ABSOLUTE: 0.1 THOU/MM3 (ref 0–0.1)
BASOPHILS NFR BLD AUTO: 0.8 %
BILIRUB CONJ SERPL-MCNC: < 0.2 MG/DL (ref 0–0.3)
BILIRUB SERPL-MCNC: 0.3 MG/DL (ref 0.3–1.2)
BUN SERPL-MCNC: 16 MG/DL (ref 7–22)
CALCIUM SERPL-MCNC: 9.6 MG/DL (ref 8.5–10.5)
CHLORIDE SERPL-SCNC: 102 MEQ/L (ref 98–111)
CHOLEST SERPL-MCNC: 114 MG/DL (ref 100–199)
CO2 SERPL-SCNC: 29 MEQ/L (ref 23–33)
CREAT SERPL-MCNC: 0.7 MG/DL (ref 0.4–1.2)
DEPRECATED RDW RBC AUTO: 54.1 FL (ref 35–45)
EOSINOPHIL NFR BLD AUTO: 1.9 %
EOSINOPHILS ABSOLUTE: 0.2 THOU/MM3 (ref 0–0.4)
ERYTHROCYTE [DISTWIDTH] IN BLOOD BY AUTOMATED COUNT: 16.9 % (ref 11.5–14.5)
ERYTHROCYTE [SEDIMENTATION RATE] IN BLOOD BY WESTERGREN METHOD: 34 MM/HR (ref 0–20)
GFR SERPL CREATININE-BSD FRML MDRD: > 60 ML/MIN/1.73M2
GLUCOSE SERPL-MCNC: 128 MG/DL (ref 70–108)
HCT VFR BLD AUTO: 39.1 % (ref 37–47)
HDLC SERPL-MCNC: 38 MG/DL
HGB BLD-MCNC: 11.8 GM/DL (ref 12–16)
IMM GRANULOCYTES # BLD AUTO: 0.03 THOU/MM3 (ref 0–0.07)
IMM GRANULOCYTES NFR BLD AUTO: 0.3 %
LDLC SERPL CALC-MCNC: 52 MG/DL
LYMPHOCYTES ABSOLUTE: 2.8 THOU/MM3 (ref 1–4.8)
LYMPHOCYTES NFR BLD AUTO: 29.8 %
MAGNESIUM SERPL-MCNC: 1.9 MG/DL (ref 1.6–2.4)
MCH RBC QN AUTO: 26.6 PG (ref 26–33)
MCHC RBC AUTO-ENTMCNC: 30.2 GM/DL (ref 32.2–35.5)
MCV RBC AUTO: 88.3 FL (ref 81–99)
MONOCYTES ABSOLUTE: 1.1 THOU/MM3 (ref 0.4–1.3)
MONOCYTES NFR BLD AUTO: 11.7 %
NEUTROPHILS NFR BLD AUTO: 55.5 %
NRBC BLD AUTO-RTO: 0 /100 WBC
PLATELET # BLD AUTO: 285 THOU/MM3 (ref 130–400)
PMV BLD AUTO: 9.5 FL (ref 9.4–12.4)
POTASSIUM SERPL-SCNC: 3.2 MEQ/L (ref 3.5–5.2)
PROT SERPL-MCNC: 7.1 G/DL (ref 6.1–8)
RBC # BLD AUTO: 4.43 MILL/MM3 (ref 4.2–5.4)
SEGMENTED NEUTROPHILS ABSOLUTE COUNT: 5.2 THOU/MM3 (ref 1.8–7.7)
SODIUM SERPL-SCNC: 141 MEQ/L (ref 135–145)
TRIGL SERPL-MCNC: 121 MG/DL (ref 0–199)
WBC # BLD AUTO: 9.3 THOU/MM3 (ref 4.8–10.8)

## 2024-02-02 DIAGNOSIS — N32.81 OAB (OVERACTIVE BLADDER): ICD-10-CM

## 2024-02-19 ENCOUNTER — APPOINTMENT (OUTPATIENT)
Dept: GENERAL RADIOLOGY | Age: 74
DRG: 309 | End: 2024-02-19
Payer: MEDICARE

## 2024-02-19 ENCOUNTER — HOSPITAL ENCOUNTER (INPATIENT)
Age: 74
LOS: 7 days | Discharge: HOME | DRG: 309 | End: 2024-02-26
Attending: EMERGENCY MEDICINE | Admitting: HOSPITALIST
Payer: MEDICARE

## 2024-02-19 DIAGNOSIS — M79.605 BILATERAL LEG PAIN: ICD-10-CM

## 2024-02-19 DIAGNOSIS — R79.89 ELEVATED TROPONIN: ICD-10-CM

## 2024-02-19 DIAGNOSIS — I25.119 ATHEROSCLEROSIS OF NATIVE CORONARY ARTERY WITH ANGINA PECTORIS, UNSPECIFIED WHETHER NATIVE OR TRANSPLANTED HEART (HCC): ICD-10-CM

## 2024-02-19 DIAGNOSIS — I50.32 CHRONIC DIASTOLIC CONGESTIVE HEART FAILURE (HCC): ICD-10-CM

## 2024-02-19 DIAGNOSIS — I10 ESSENTIAL HYPERTENSION: ICD-10-CM

## 2024-02-19 DIAGNOSIS — N39.0 URINARY TRACT INFECTION WITHOUT HEMATURIA, SITE UNSPECIFIED: ICD-10-CM

## 2024-02-19 DIAGNOSIS — M79.604 BILATERAL LEG PAIN: ICD-10-CM

## 2024-02-19 DIAGNOSIS — K91.2 POSTSURGICAL MALABSORPTION: ICD-10-CM

## 2024-02-19 DIAGNOSIS — I48.91 ATRIAL FIBRILLATION WITH RAPID VENTRICULAR RESPONSE (HCC): Primary | ICD-10-CM

## 2024-02-19 DIAGNOSIS — R00.1 BRADYCARDIA: ICD-10-CM

## 2024-02-19 DIAGNOSIS — I48.91 ATRIAL FIBRILLATION WITH SLOW VENTRICULAR RESPONSE (HCC): ICD-10-CM

## 2024-02-19 LAB
ALBUMIN SERPL BCG-MCNC: 3.9 G/DL (ref 3.5–5.1)
ALP SERPL-CCNC: 114 U/L (ref 38–126)
ALT SERPL W/O P-5'-P-CCNC: 20 U/L (ref 11–66)
ANION GAP SERPL CALC-SCNC: 16 MEQ/L (ref 8–16)
AST SERPL-CCNC: 24 U/L (ref 5–40)
BACTERIA URNS QL MICRO: ABNORMAL /HPF
BASOPHILS ABSOLUTE: 0.1 THOU/MM3 (ref 0–0.1)
BASOPHILS NFR BLD AUTO: 0.7 %
BILIRUB SERPL-MCNC: 0.3 MG/DL (ref 0.3–1.2)
BILIRUB UR QL STRIP.AUTO: NEGATIVE
BUN SERPL-MCNC: 20 MG/DL (ref 7–22)
CALCIUM SERPL-MCNC: 9.6 MG/DL (ref 8.5–10.5)
CASTS #/AREA URNS LPF: ABNORMAL /LPF
CASTS 2: ABNORMAL /LPF
CHARACTER UR: ABNORMAL
CHLORIDE SERPL-SCNC: 98 MEQ/L (ref 98–111)
CO2 SERPL-SCNC: 26 MEQ/L (ref 23–33)
COLOR: YELLOW
CREAT SERPL-MCNC: 0.8 MG/DL (ref 0.4–1.2)
CRYSTALS URNS MICRO: ABNORMAL
DEPRECATED RDW RBC AUTO: 52.2 FL (ref 35–45)
EOSINOPHIL NFR BLD AUTO: 1.1 %
EOSINOPHILS ABSOLUTE: 0.1 THOU/MM3 (ref 0–0.4)
EPITHELIAL CELLS, UA: ABNORMAL /HPF
ERYTHROCYTE [DISTWIDTH] IN BLOOD BY AUTOMATED COUNT: 16.9 % (ref 11.5–14.5)
GFR SERPL CREATININE-BSD FRML MDRD: > 60 ML/MIN/1.73M2
GLUCOSE SERPL-MCNC: 111 MG/DL (ref 70–108)
GLUCOSE UR QL STRIP.AUTO: NEGATIVE MG/DL
HCT VFR BLD AUTO: 40 % (ref 37–47)
HGB BLD-MCNC: 12.3 GM/DL (ref 12–16)
HGB UR QL STRIP.AUTO: ABNORMAL
IMM GRANULOCYTES # BLD AUTO: 0.07 THOU/MM3 (ref 0–0.07)
IMM GRANULOCYTES NFR BLD AUTO: 0.5 %
KETONES UR QL STRIP.AUTO: NEGATIVE
LYMPHOCYTES ABSOLUTE: 2.4 THOU/MM3 (ref 1–4.8)
LYMPHOCYTES NFR BLD AUTO: 18.4 %
MCH RBC QN AUTO: 26.2 PG (ref 26–33)
MCHC RBC AUTO-ENTMCNC: 30.8 GM/DL (ref 32.2–35.5)
MCV RBC AUTO: 85.3 FL (ref 81–99)
MISCELLANEOUS 2: ABNORMAL
MONOCYTES ABSOLUTE: 1.6 THOU/MM3 (ref 0.4–1.3)
MONOCYTES NFR BLD AUTO: 12.1 %
NEUTROPHILS NFR BLD AUTO: 67.2 %
NITRITE UR QL STRIP: NEGATIVE
NRBC BLD AUTO-RTO: 0 /100 WBC
NT-PROBNP SERPL IA-MCNC: 1555 PG/ML (ref 0–449)
OSMOLALITY SERPL CALC.SUM OF ELEC: 282.7 MOSMOL/KG (ref 275–300)
PH UR STRIP.AUTO: 6 [PH] (ref 5–9)
PLATELET # BLD AUTO: 322 THOU/MM3 (ref 130–400)
PMV BLD AUTO: 9.5 FL (ref 9.4–12.4)
POTASSIUM SERPL-SCNC: 3.1 MEQ/L (ref 3.5–5.2)
PROT SERPL-MCNC: 7.6 G/DL (ref 6.1–8)
PROT UR STRIP.AUTO-MCNC: 30 MG/DL
RBC # BLD AUTO: 4.69 MILL/MM3 (ref 4.2–5.4)
RBC URINE: ABNORMAL /HPF
RENAL EPI CELLS #/AREA URNS HPF: ABNORMAL /[HPF]
SEGMENTED NEUTROPHILS ABSOLUTE COUNT: 8.7 THOU/MM3 (ref 1.8–7.7)
SODIUM SERPL-SCNC: 140 MEQ/L (ref 135–145)
SP GR UR REFRACT.AUTO: 1.01 (ref 1–1.03)
T4 FREE SERPL-MCNC: 1.1 NG/DL (ref 0.93–1.68)
TROPONIN, HIGH SENSITIVITY: 19 NG/L (ref 0–12)
TROPONIN, HIGH SENSITIVITY: 20 NG/L (ref 0–12)
TSH SERPL DL<=0.005 MIU/L-ACNC: 1.61 UIU/ML (ref 0.4–4.2)
UROBILINOGEN, URINE: 0.2 EU/DL (ref 0–1)
WBC # BLD AUTO: 12.9 THOU/MM3 (ref 4.8–10.8)
WBC #/AREA URNS HPF: > 100 /HPF
WBC #/AREA URNS HPF: ABNORMAL /[HPF]
YEAST LIKE FUNGI URNS QL MICRO: ABNORMAL

## 2024-02-19 PROCEDURE — 36415 COLL VENOUS BLD VENIPUNCTURE: CPT

## 2024-02-19 PROCEDURE — 2500000003 HC RX 250 WO HCPCS: Performed by: STUDENT IN AN ORGANIZED HEALTH CARE EDUCATION/TRAINING PROGRAM

## 2024-02-19 PROCEDURE — 96375 TX/PRO/DX INJ NEW DRUG ADDON: CPT

## 2024-02-19 PROCEDURE — 84439 ASSAY OF FREE THYROXINE: CPT

## 2024-02-19 PROCEDURE — 2580000003 HC RX 258: Performed by: STUDENT IN AN ORGANIZED HEALTH CARE EDUCATION/TRAINING PROGRAM

## 2024-02-19 PROCEDURE — 6360000002 HC RX W HCPCS: Performed by: PHYSICIAN ASSISTANT

## 2024-02-19 PROCEDURE — 99285 EMERGENCY DEPT VISIT HI MDM: CPT

## 2024-02-19 PROCEDURE — 93005 ELECTROCARDIOGRAM TRACING: CPT | Performed by: STUDENT IN AN ORGANIZED HEALTH CARE EDUCATION/TRAINING PROGRAM

## 2024-02-19 PROCEDURE — 87186 SC STD MICRODIL/AGAR DIL: CPT

## 2024-02-19 PROCEDURE — 99223 1ST HOSP IP/OBS HIGH 75: CPT | Performed by: PHYSICIAN ASSISTANT

## 2024-02-19 PROCEDURE — 85025 COMPLETE CBC W/AUTO DIFF WBC: CPT

## 2024-02-19 PROCEDURE — 81001 URINALYSIS AUTO W/SCOPE: CPT

## 2024-02-19 PROCEDURE — 87086 URINE CULTURE/COLONY COUNT: CPT

## 2024-02-19 PROCEDURE — 84443 ASSAY THYROID STIM HORMONE: CPT

## 2024-02-19 PROCEDURE — 84484 ASSAY OF TROPONIN QUANT: CPT

## 2024-02-19 PROCEDURE — 93010 ELECTROCARDIOGRAM REPORT: CPT | Performed by: INTERNAL MEDICINE

## 2024-02-19 PROCEDURE — 96376 TX/PRO/DX INJ SAME DRUG ADON: CPT

## 2024-02-19 PROCEDURE — 2580000003 HC RX 258: Performed by: PHYSICIAN ASSISTANT

## 2024-02-19 PROCEDURE — 87077 CULTURE AEROBIC IDENTIFY: CPT

## 2024-02-19 PROCEDURE — 80053 COMPREHEN METABOLIC PANEL: CPT

## 2024-02-19 PROCEDURE — 96374 THER/PROPH/DIAG INJ IV PUSH: CPT

## 2024-02-19 PROCEDURE — 2140000000 HC CCU INTERMEDIATE R&B

## 2024-02-19 PROCEDURE — 71045 X-RAY EXAM CHEST 1 VIEW: CPT

## 2024-02-19 PROCEDURE — 83880 ASSAY OF NATRIURETIC PEPTIDE: CPT

## 2024-02-19 RX ORDER — CHLORTHALIDONE 25 MG/1
25 TABLET ORAL DAILY
Status: DISCONTINUED | OUTPATIENT
Start: 2024-02-20 | End: 2024-02-20

## 2024-02-19 RX ORDER — SODIUM CHLORIDE 9 MG/ML
INJECTION, SOLUTION INTRAVENOUS PRN
Status: DISCONTINUED | OUTPATIENT
Start: 2024-02-19 | End: 2024-02-26 | Stop reason: HOSPADM

## 2024-02-19 RX ORDER — VERAPAMIL HYDROCHLORIDE 240 MG/1
240 TABLET, FILM COATED, EXTENDED RELEASE ORAL 2 TIMES DAILY
Status: DISCONTINUED | OUTPATIENT
Start: 2024-02-19 | End: 2024-02-26 | Stop reason: HOSPADM

## 2024-02-19 RX ORDER — METOPROLOL TARTRATE 1 MG/ML
5 INJECTION, SOLUTION INTRAVENOUS ONCE
Status: COMPLETED | OUTPATIENT
Start: 2024-02-19 | End: 2024-02-19

## 2024-02-19 RX ORDER — TRAZODONE HYDROCHLORIDE 50 MG/1
50 TABLET ORAL NIGHTLY
Status: DISCONTINUED | OUTPATIENT
Start: 2024-02-19 | End: 2024-02-26 | Stop reason: HOSPADM

## 2024-02-19 RX ORDER — POTASSIUM CHLORIDE 7.45 MG/ML
10 INJECTION INTRAVENOUS PRN
Status: DISCONTINUED | OUTPATIENT
Start: 2024-02-19 | End: 2024-02-26 | Stop reason: HOSPADM

## 2024-02-19 RX ORDER — DIGOXIN 125 MCG
125 TABLET ORAL DAILY
Status: DISCONTINUED | OUTPATIENT
Start: 2024-02-20 | End: 2024-02-26 | Stop reason: HOSPADM

## 2024-02-19 RX ORDER — ACETAMINOPHEN 325 MG/1
650 TABLET ORAL EVERY 6 HOURS PRN
Status: DISCONTINUED | OUTPATIENT
Start: 2024-02-19 | End: 2024-02-26 | Stop reason: HOSPADM

## 2024-02-19 RX ORDER — SODIUM CHLORIDE 0.9 % (FLUSH) 0.9 %
5-40 SYRINGE (ML) INJECTION EVERY 12 HOURS SCHEDULED
Status: DISCONTINUED | OUTPATIENT
Start: 2024-02-19 | End: 2024-02-26 | Stop reason: HOSPADM

## 2024-02-19 RX ORDER — CLONIDINE HYDROCHLORIDE 0.1 MG/1
0.1 TABLET ORAL 3 TIMES DAILY
Status: DISCONTINUED | OUTPATIENT
Start: 2024-02-19 | End: 2024-02-23

## 2024-02-19 RX ORDER — ONDANSETRON 2 MG/ML
4 INJECTION INTRAMUSCULAR; INTRAVENOUS EVERY 6 HOURS PRN
Status: DISCONTINUED | OUTPATIENT
Start: 2024-02-19 | End: 2024-02-26 | Stop reason: HOSPADM

## 2024-02-19 RX ORDER — MAGNESIUM SULFATE IN WATER 40 MG/ML
2000 INJECTION, SOLUTION INTRAVENOUS PRN
Status: DISCONTINUED | OUTPATIENT
Start: 2024-02-19 | End: 2024-02-26 | Stop reason: HOSPADM

## 2024-02-19 RX ORDER — ESCITALOPRAM OXALATE 10 MG/1
5 TABLET ORAL DAILY
Status: DISCONTINUED | OUTPATIENT
Start: 2024-02-20 | End: 2024-02-26 | Stop reason: HOSPADM

## 2024-02-19 RX ORDER — POLYETHYLENE GLYCOL 3350 17 G/17G
17 POWDER, FOR SOLUTION ORAL DAILY PRN
Status: DISCONTINUED | OUTPATIENT
Start: 2024-02-19 | End: 2024-02-25

## 2024-02-19 RX ORDER — PRAVASTATIN SODIUM 40 MG
40 TABLET ORAL NIGHTLY
Status: DISCONTINUED | OUTPATIENT
Start: 2024-02-19 | End: 2024-02-26 | Stop reason: HOSPADM

## 2024-02-19 RX ORDER — PANTOPRAZOLE SODIUM 40 MG/1
40 TABLET, DELAYED RELEASE ORAL DAILY
Status: DISCONTINUED | OUTPATIENT
Start: 2024-02-20 | End: 2024-02-26 | Stop reason: HOSPADM

## 2024-02-19 RX ORDER — METOPROLOL TARTRATE 100 MG/1
100 TABLET ORAL 2 TIMES DAILY
Status: DISCONTINUED | OUTPATIENT
Start: 2024-02-19 | End: 2024-02-23

## 2024-02-19 RX ORDER — DULOXETIN HYDROCHLORIDE 60 MG/1
60 CAPSULE, DELAYED RELEASE ORAL DAILY
Status: DISCONTINUED | OUTPATIENT
Start: 2024-02-20 | End: 2024-02-26 | Stop reason: HOSPADM

## 2024-02-19 RX ORDER — TROSPIUM CHLORIDE 20 MG/1
20 TABLET, FILM COATED ORAL
Status: DISCONTINUED | OUTPATIENT
Start: 2024-02-20 | End: 2024-02-23

## 2024-02-19 RX ORDER — POTASSIUM CHLORIDE 20 MEQ/1
40 TABLET, EXTENDED RELEASE ORAL PRN
Status: DISCONTINUED | OUTPATIENT
Start: 2024-02-19 | End: 2024-02-26 | Stop reason: HOSPADM

## 2024-02-19 RX ORDER — ONDANSETRON 4 MG/1
4 TABLET, ORALLY DISINTEGRATING ORAL EVERY 8 HOURS PRN
Status: DISCONTINUED | OUTPATIENT
Start: 2024-02-19 | End: 2024-02-26 | Stop reason: HOSPADM

## 2024-02-19 RX ORDER — FUROSEMIDE 20 MG/1
20 TABLET ORAL DAILY
Status: DISCONTINUED | OUTPATIENT
Start: 2024-02-20 | End: 2024-02-19

## 2024-02-19 RX ORDER — SULFASALAZINE 500 MG/1
500 TABLET ORAL EVERY MORNING
Status: DISCONTINUED | OUTPATIENT
Start: 2024-02-20 | End: 2024-02-26 | Stop reason: HOSPADM

## 2024-02-19 RX ORDER — ACETAMINOPHEN 650 MG/1
650 SUPPOSITORY RECTAL EVERY 6 HOURS PRN
Status: DISCONTINUED | OUTPATIENT
Start: 2024-02-19 | End: 2024-02-26 | Stop reason: HOSPADM

## 2024-02-19 RX ORDER — SULFASALAZINE 500 MG/1
1000 TABLET ORAL NIGHTLY
Status: DISCONTINUED | OUTPATIENT
Start: 2024-02-19 | End: 2024-02-26 | Stop reason: HOSPADM

## 2024-02-19 RX ORDER — SODIUM CHLORIDE 0.9 % (FLUSH) 0.9 %
5-40 SYRINGE (ML) INJECTION PRN
Status: DISCONTINUED | OUTPATIENT
Start: 2024-02-19 | End: 2024-02-26 | Stop reason: HOSPADM

## 2024-02-19 RX ORDER — CLOPIDOGREL BISULFATE 75 MG/1
75 TABLET ORAL DAILY
Status: DISCONTINUED | OUTPATIENT
Start: 2024-02-20 | End: 2024-02-22

## 2024-02-19 RX ORDER — LOSARTAN POTASSIUM 25 MG/1
25 TABLET ORAL DAILY
Status: DISCONTINUED | OUTPATIENT
Start: 2024-02-20 | End: 2024-02-26 | Stop reason: HOSPADM

## 2024-02-19 RX ADMIN — CEFTRIAXONE SODIUM 1000 MG: 1 INJECTION, POWDER, FOR SOLUTION INTRAMUSCULAR; INTRAVENOUS at 22:18

## 2024-02-19 RX ADMIN — DILTIAZEM HYDROCHLORIDE 5 MG/HR: 5 INJECTION, SOLUTION INTRAVENOUS at 20:04

## 2024-02-19 RX ADMIN — METOPROLOL TARTRATE 5 MG: 5 INJECTION INTRAVENOUS at 18:01

## 2024-02-19 RX ADMIN — METOPROLOL TARTRATE 5 MG: 5 INJECTION INTRAVENOUS at 18:32

## 2024-02-19 RX ADMIN — METOPROLOL TARTRATE 5 MG: 5 INJECTION INTRAVENOUS at 18:59

## 2024-02-19 ASSESSMENT — PAIN - FUNCTIONAL ASSESSMENT
PAIN_FUNCTIONAL_ASSESSMENT: NONE - DENIES PAIN

## 2024-02-19 NOTE — ED NOTES
Pt presents to ED via triage from the rheumatologists office for c/o hypertension and \"my a-fib is all out of whack.\" Pt reports \"my blood pressure, at one point, was 220/180 and my pulse was in the 200s.\" Pt sat on side of bed, pulse ox with pulse obtained and BP. Pt  needing to use restroom. Pt ambulated from ED room 11 to restroom across from room 19 with cane, otherwise steady gait. Will finish triage when pt returns to room.

## 2024-02-19 NOTE — ED NOTES
Pt to ED from rheumatologist office with high heart rate in the 200s. Pt states she feels a little dizzy. Pt denies pain. A+Ox4, resps easy and unlabored.

## 2024-02-20 LAB
ANION GAP SERPL CALC-SCNC: 12 MEQ/L (ref 8–16)
BACTERIA UR CULT: ABNORMAL
BASOPHILS ABSOLUTE: 0.1 THOU/MM3 (ref 0–0.1)
BASOPHILS NFR BLD AUTO: 1 %
BUN SERPL-MCNC: 19 MG/DL (ref 7–22)
CALCIUM SERPL-MCNC: 9.2 MG/DL (ref 8.5–10.5)
CHLORIDE SERPL-SCNC: 101 MEQ/L (ref 98–111)
CO2 SERPL-SCNC: 28 MEQ/L (ref 23–33)
CREAT SERPL-MCNC: 0.8 MG/DL (ref 0.4–1.2)
DEPRECATED RDW RBC AUTO: 53.5 FL (ref 35–45)
DIGOXIN SERPL-MCNC: 0.5 NG/ML (ref 0.5–2)
EOSINOPHIL NFR BLD AUTO: 1.5 %
EOSINOPHILS ABSOLUTE: 0.2 THOU/MM3 (ref 0–0.4)
ERYTHROCYTE [DISTWIDTH] IN BLOOD BY AUTOMATED COUNT: 17.1 % (ref 11.5–14.5)
GFR SERPL CREATININE-BSD FRML MDRD: > 60 ML/MIN/1.73M2
GLUCOSE SERPL-MCNC: 133 MG/DL (ref 70–108)
HCT VFR BLD AUTO: 39.8 % (ref 37–47)
HGB BLD-MCNC: 12.2 GM/DL (ref 12–16)
IMM GRANULOCYTES # BLD AUTO: 0.06 THOU/MM3 (ref 0–0.07)
IMM GRANULOCYTES NFR BLD AUTO: 0.5 %
LYMPHOCYTES ABSOLUTE: 3.2 THOU/MM3 (ref 1–4.8)
LYMPHOCYTES NFR BLD AUTO: 28.1 %
MAGNESIUM SERPL-MCNC: 2 MG/DL (ref 1.6–2.4)
MCH RBC QN AUTO: 26.6 PG (ref 26–33)
MCHC RBC AUTO-ENTMCNC: 30.7 GM/DL (ref 32.2–35.5)
MCV RBC AUTO: 86.9 FL (ref 81–99)
MONOCYTES ABSOLUTE: 1.4 THOU/MM3 (ref 0.4–1.3)
MONOCYTES NFR BLD AUTO: 12.2 %
NEUTROPHILS NFR BLD AUTO: 56.7 %
NRBC BLD AUTO-RTO: 0 /100 WBC
ORGANISM: ABNORMAL
PLATELET # BLD AUTO: 324 THOU/MM3 (ref 130–400)
PMV BLD AUTO: 9.5 FL (ref 9.4–12.4)
POTASSIUM SERPL-SCNC: 3.5 MEQ/L (ref 3.5–5.2)
RBC # BLD AUTO: 4.58 MILL/MM3 (ref 4.2–5.4)
SEGMENTED NEUTROPHILS ABSOLUTE COUNT: 6.4 THOU/MM3 (ref 1.8–7.7)
SODIUM SERPL-SCNC: 141 MEQ/L (ref 135–145)
WBC # BLD AUTO: 11.3 THOU/MM3 (ref 4.8–10.8)

## 2024-02-20 PROCEDURE — 6360000002 HC RX W HCPCS: Performed by: PHYSICIAN ASSISTANT

## 2024-02-20 PROCEDURE — 99232 SBSQ HOSP IP/OBS MODERATE 35: CPT | Performed by: INTERNAL MEDICINE

## 2024-02-20 PROCEDURE — 80048 BASIC METABOLIC PNL TOTAL CA: CPT

## 2024-02-20 PROCEDURE — 6370000000 HC RX 637 (ALT 250 FOR IP): Performed by: STUDENT IN AN ORGANIZED HEALTH CARE EDUCATION/TRAINING PROGRAM

## 2024-02-20 PROCEDURE — 6370000000 HC RX 637 (ALT 250 FOR IP): Performed by: PHYSICIAN ASSISTANT

## 2024-02-20 PROCEDURE — 80162 ASSAY OF DIGOXIN TOTAL: CPT

## 2024-02-20 PROCEDURE — 36415 COLL VENOUS BLD VENIPUNCTURE: CPT

## 2024-02-20 PROCEDURE — 94760 N-INVAS EAR/PLS OXIMETRY 1: CPT

## 2024-02-20 PROCEDURE — 6370000000 HC RX 637 (ALT 250 FOR IP)

## 2024-02-20 PROCEDURE — 2140000000 HC CCU INTERMEDIATE R&B

## 2024-02-20 PROCEDURE — 83735 ASSAY OF MAGNESIUM: CPT

## 2024-02-20 PROCEDURE — 94640 AIRWAY INHALATION TREATMENT: CPT

## 2024-02-20 PROCEDURE — 2580000003 HC RX 258: Performed by: PHYSICIAN ASSISTANT

## 2024-02-20 PROCEDURE — 85025 COMPLETE CBC W/AUTO DIFF WBC: CPT

## 2024-02-20 RX ORDER — POTASSIUM CHLORIDE 20 MEQ/1
40 TABLET, EXTENDED RELEASE ORAL ONCE
Status: COMPLETED | OUTPATIENT
Start: 2024-02-20 | End: 2024-02-20

## 2024-02-20 RX ORDER — DIGOXIN 250 MCG
500 TABLET ORAL ONCE
Status: DISCONTINUED | OUTPATIENT
Start: 2024-02-20 | End: 2024-02-20

## 2024-02-20 RX ORDER — CALCIUM CARBONATE 500 MG/1
500 TABLET, CHEWABLE ORAL 3 TIMES DAILY PRN
Status: DISCONTINUED | OUTPATIENT
Start: 2024-02-20 | End: 2024-02-26 | Stop reason: HOSPADM

## 2024-02-20 RX ORDER — CHLORTHALIDONE 25 MG/1
25 TABLET ORAL DAILY
Status: DISCONTINUED | OUTPATIENT
Start: 2024-02-21 | End: 2024-02-26 | Stop reason: HOSPADM

## 2024-02-20 RX ADMIN — CEFTRIAXONE SODIUM 1000 MG: 1 INJECTION, POWDER, FOR SOLUTION INTRAMUSCULAR; INTRAVENOUS at 21:28

## 2024-02-20 RX ADMIN — MOMETASONE FUROATE AND FORMOTEROL FUMARATE DIHYDRATE 2 PUFF: 200; 5 AEROSOL RESPIRATORY (INHALATION) at 17:14

## 2024-02-20 RX ADMIN — PRAVASTATIN SODIUM 40 MG: 40 TABLET ORAL at 20:58

## 2024-02-20 RX ADMIN — SODIUM CHLORIDE, PRESERVATIVE FREE 10 ML: 5 INJECTION INTRAVENOUS at 21:00

## 2024-02-20 RX ADMIN — SODIUM CHLORIDE, PRESERVATIVE FREE 10 ML: 5 INJECTION INTRAVENOUS at 11:09

## 2024-02-20 RX ADMIN — VERAPAMIL HYDROCHLORIDE 240 MG: 240 TABLET, FILM COATED, EXTENDED RELEASE ORAL at 10:27

## 2024-02-20 RX ADMIN — TROSPIUM CHLORIDE 20 MG: 20 TABLET, FILM COATED ORAL at 17:04

## 2024-02-20 RX ADMIN — ANTACID TABLETS 500 MG: 500 TABLET, CHEWABLE ORAL at 20:59

## 2024-02-20 RX ADMIN — DIGOXIN 125 MCG: 0.12 TABLET ORAL at 10:27

## 2024-02-20 RX ADMIN — APIXABAN 5 MG: 5 TABLET, FILM COATED ORAL at 20:58

## 2024-02-20 RX ADMIN — APIXABAN 5 MG: 5 TABLET, FILM COATED ORAL at 10:58

## 2024-02-20 RX ADMIN — CLONIDINE HYDROCHLORIDE 0.1 MG: 0.1 TABLET ORAL at 20:58

## 2024-02-20 RX ADMIN — CLONIDINE HYDROCHLORIDE 0.1 MG: 0.1 TABLET ORAL at 10:27

## 2024-02-20 RX ADMIN — SULFASALAZINE 1000 MG: 500 TABLET ORAL at 20:59

## 2024-02-20 RX ADMIN — LOSARTAN POTASSIUM 25 MG: 25 TABLET, FILM COATED ORAL at 10:58

## 2024-02-20 RX ADMIN — METOPROLOL TARTRATE 100 MG: 100 TABLET, FILM COATED ORAL at 10:27

## 2024-02-20 RX ADMIN — DULOXETINE HYDROCHLORIDE 60 MG: 60 CAPSULE, DELAYED RELEASE ORAL at 10:28

## 2024-02-20 RX ADMIN — CLONIDINE HYDROCHLORIDE 0.1 MG: 0.1 TABLET ORAL at 15:04

## 2024-02-20 RX ADMIN — ESCITALOPRAM OXALATE 5 MG: 10 TABLET ORAL at 10:26

## 2024-02-20 RX ADMIN — PANTOPRAZOLE SODIUM 40 MG: 40 TABLET, DELAYED RELEASE ORAL at 09:45

## 2024-02-20 RX ADMIN — CLOPIDOGREL BISULFATE 75 MG: 75 TABLET ORAL at 09:45

## 2024-02-20 RX ADMIN — TROSPIUM CHLORIDE 20 MG: 20 TABLET, FILM COATED ORAL at 10:28

## 2024-02-20 RX ADMIN — MOMETASONE FUROATE AND FORMOTEROL FUMARATE DIHYDRATE 2 PUFF: 200; 5 AEROSOL RESPIRATORY (INHALATION) at 05:08

## 2024-02-20 RX ADMIN — SODIUM CHLORIDE, PRESERVATIVE FREE 10 ML: 5 INJECTION INTRAVENOUS at 11:19

## 2024-02-20 RX ADMIN — POTASSIUM CHLORIDE 40 MEQ: 1500 TABLET, EXTENDED RELEASE ORAL at 15:05

## 2024-02-20 RX ADMIN — TRAZODONE HYDROCHLORIDE 50 MG: 50 TABLET ORAL at 21:27

## 2024-02-20 RX ADMIN — SULFASALAZINE 500 MG: 500 TABLET ORAL at 10:28

## 2024-02-20 ASSESSMENT — PAIN SCALES - GENERAL
PAINLEVEL_OUTOF10: 0

## 2024-02-20 NOTE — ED PROVIDER NOTES
[facility] EMERGENCY DEPT    Pt Name: Arlene Lira  MRN: [id number]  Birthdate 1950  Date of evaluation: 2/19/2024  Resident Physician: Kiana Sr MD  Supervising Physician: Dr. Iban Walker MD    CHIEF COMPLAINT       Chief Complaint   Patient presents with    Hypertension    Atrial Fibrillation     HISTORY OF PRESENT ILLNESS   Arlene Lira is a 74 y.o. female with PMHx of CHF, CAD, COPD, permanent atrial fibrillation  who presents to the emergency department for evaluation of hypertension, fibrillation.    Patient coming to ED from rheumatology office for chief complaint of hypertension and tachycardia.  Reportedly blood pressure at the office was 220/180.  Heart rate in the 200s.  Patient stating that she was slightly dizzy during that time but denies any chest pain or shortness of breath related with it.  Patient does have a history of A-fib.  Currently on digoxin, metoprolol, on Eliquis and Plavix as well.    Patient does state that she has not been compliant with her antihypertensive medications for the past 3 to 4 days.  States that she has been compliant with her blood thinners.    The patient has no other acute complaints at this time.    Review of Systems    Negative Except as Documented Above.    PASTMEDICAL HISTORY     Past Medical History:   Diagnosis Date    Allergic rhinitis     Arthritis     Atrial fibrillation (HCC)     CAD (coronary artery disease)     Cancer (HCC)     skin    Carotid arterial disease (HCC)     Cerebrovascular disease     CHF (congestive heart failure) (HCC)     Congenital heart disease     COPD (chronic obstructive pulmonary disease) (HCC)     Depression     Fibromyalgia     Hearing loss     Hypertension     Hypothyroidism     Obesity     Osteoarthritis     S/P bariatric surgery 2018    Sleep apnea     CPAP    Thyroid disease     TIA (transient ischemic attack) 12/2015    Urinary incontinence        Patient Active Problem List   Diagnosis Code    Obstructive sleep apnea on 
  This document has been electronically signed by: Benny Crawford MD on    02/19/2024 06:39 PM        Medications   dilTIAZem 125 mg in sodium chloride 0.9 % 125 mL infusion (7.5 mg/hr IntraVENous Rate/Dose Change 2/19/24 2111)   chlorthalidone (HYGROTON) tablet 25 mg (has no administration in time range)   cloNIDine (CATAPRES) tablet 0.1 mg (0.1 mg Oral Not Given 2/19/24 2342)   clopidogrel (PLAVIX) tablet 75 mg (has no administration in time range)   digoxin (LANOXIN) tablet 125 mcg (has no administration in time range)   DULoxetine (CYMBALTA) extended release capsule 60 mg (has no administration in time range)   apixaban (ELIQUIS) tablet 5 mg (5 mg Oral Not Given 2/19/24 2344)   escitalopram (LEXAPRO) tablet 5 mg (has no administration in time range)   mometasone-formoterol (DULERA) 200-5 MCG/ACT inhaler 2 puff ( Inhalation Canceled Entry 2/20/24 0046)   losartan (COZAAR) tablet 25 mg (has no administration in time range)   metoprolol (LOPRESSOR) tablet 100 mg (100 mg Oral Not Given 2/19/24 2342)   trospium (SANCTURA) tablet 20 mg (has no administration in time range)   pantoprazole (PROTONIX) tablet 40 mg (has no administration in time range)   pravastatin (PRAVACHOL) tablet 40 mg (40 mg Oral Not Given 2/19/24 2343)   sulfaSALAzine (AZULFIDINE) tablet 1,000 mg (1,000 mg Oral Not Given 2/19/24 2343)   sulfaSALAzine (AZULFIDINE) tablet 500 mg (has no administration in time range)   traZODone (DESYREL) tablet 50 mg (50 mg Oral Not Given 2/19/24 2343)   verapamil (CALAN SR) extended release tablet 240 mg (240 mg Oral Not Given 2/19/24 2344)   sodium chloride flush 0.9 % injection 5-40 mL ( IntraVENous Not Given 2/19/24 2343)   sodium chloride flush 0.9 % injection 5-40 mL (has no administration in time range)   0.9 % sodium chloride infusion (has no administration in time range)   potassium chloride (KLOR-CON M) extended release tablet 40 mEq (has no administration in time range)     Or   potassium bicarb-citric

## 2024-02-20 NOTE — ED NOTES
.ED to inpatient nurses report      Chief Complaint:  Chief Complaint   Patient presents with    Hypertension    Atrial Fibrillation     Present to ED from: Home    MOA:     LOC: alert and orientated to name, place, date  Mobility: Requires assistance * 1  Oxygen Baseline: Room Air    Current needs required: Room Air     Code Status:   Prior    What abnormal results were found and what did you give/do to treat them?   Any procedures or intervention occur?     Mental Status:  Level of Consciousness: Alert (0)    Psych Assessment:        Vitals:  Patient Vitals for the past 24 hrs:   BP Temp Temp src Pulse Resp SpO2 Height Weight   02/19/24 2043 (!) 164/99 -- -- (!) 111 21 96 % -- --   02/19/24 2003 (!) 159/87 -- -- (!) 110 -- -- -- --   02/19/24 1958 (!) 159/87 -- -- (!) 120 17 92 % -- --   02/19/24 1916 -- -- -- (!) 103 -- -- -- --   02/19/24 1913 (!) 152/90 -- -- (!) 118 24 94 % -- --   02/19/24 1843 (!) 153/85 -- -- (!) 113 23 94 % -- --   02/19/24 1832 (!) 212/130 -- -- (!) 122 -- -- -- --   02/19/24 1811 (!) 234/190 -- -- (!) 123 -- -- -- --   02/19/24 1728 (!) 191/87 -- -- (!) 147 24 90 % -- --   02/19/24 1632 (!) 130/94 97.5 °F (36.4 °C) Oral (!) 136 20 96 % 1.727 m (5' 8\") 98.9 kg (218 lb)   02/19/24 1627 -- -- -- 74 18 98 % -- --   02/19/24 1623 -- -- Oral 74 -- -- -- --        LDAs:   Peripheral IV 02/19/24 Posterior;Right Wrist (Active)   Site Assessment Clean, dry & intact 02/19/24 1800       Ambulatory Status:  No data recorded    Diagnosis:  DISPOSITION Admitted 02/19/2024 08:19:31 PM   Final diagnoses:   Essential hypertension   Atrial fibrillation with rapid ventricular response (HCC)   Elevated troponin        Consults:  None     Pain Score:  Pain Assessment  Pain Assessment: None - Denies Pain    C-SSRS:   Risk of Suicide: No Risk    Sepsis Screening:  Sepsis Risk Score: 6.23    [place] Fall Risk:       Swallow Screening        Preferred Language:   English      ALLERGIES     Demerol hcl

## 2024-02-20 NOTE — H&P
PEARLA, mucous membranes moist  Neck: supple.  Trachea midline. No JVD. Full ROM, no meningismus.  Lungs: clear to auscultation.  No retractions, no accessory muscle use.  Cardiac: irregularly irregular, no murmur, 2+ pulses  Abdomen: soft.  Nontender. Bowel sounds active  Extremities:  No clubbing, cyanosis x 4, no edema    Vasculature: capillary refill < 3 seconds.  Skin:  warm and dry. no visible rashes  Psych:  Alert and oriented x3.  Affect appropriate  Lymph:  No supraclavicular adenopathy.  Neurologic:  CN II-XII grossly intact. No focal deficit.    Data: (All radiographs, tracings, PFTs, and imaging are personally viewed and interpreted unless otherwise noted).   EKG: rhythm: atrial fibrillation, ctzt=934 bpm, pr=. ms, qrs=88 ms, is=272 ms, axis=-40 degrees        Electronically signed by  Alexia Austin PA-C

## 2024-02-20 NOTE — CARE COORDINATION
Case Management Assessment  Initial Evaluation    Date/Time of Evaluation: 2/20/2024 2:58 PM  Assessment Completed by: Gypsy Duarte RN    If patient is discharged prior to next notation, then this note serves as note for discharge by case management.    Patient Name: Arlene Lira                   YOB: 1950  Diagnosis: Essential hypertension [I10]  Elevated troponin [R79.89]  Atrial fibrillation with rapid ventricular response (HCC) [I48.91]  Atrial fibrillation with RVR (HCC) [I48.91]                   Date / Time: 2/19/2024  4:18 PM  Location: [address] [place] [address]     Patient Admission Status: Inpatient   Readmission Risk Low 0-14, Mod 15-19), High > 20: Readmission Risk Score: 9.4    Current PCP: Eugene Kaur MD  PCP verified by CM? Yes    Chart Reviewed: Yes      History Provided by: Patient  Patient Orientation: Alert and Oriented    Patient Cognition: Alert    Hospitalization in the last 30 days (Readmission):  No    If yes, Readmission Assessment in CM Navigator will be completed.    Advance Directives:      Code Status: Full Code   Patient's Primary Decision Maker is: Legal Next of Kin (spiritual care consulted)    Primary Decision Maker: SorayaDaniel J - Spouse - [phone]    Discharge Planning:    Patient lives with: Spouse/Significant Other, Other (Comment) (21 yr old grandson) Type of Home: House  Primary Care Giver: Self  Patient Support Systems include: Spouse/Significant Other, Children, Family Members   Current Financial resources: Medicare  Current community resources: None  Current services prior to admission: Durable Medical Equipment, C-pap            Current DME: Cane, Walker, Cpap (2 WW and Rollator; CPAP from [facility])            Type of Home Care services:  None    ADLS  Prior functional level: Independent in ADLs/IADLs  Current functional level: Independent in ADLs/IADLs    Family can provide assistance at DC: Yes  Would you like Case Management to discuss the discharge

## 2024-02-20 NOTE — ED NOTES
In for hourly rounding. Pt resting on cot in position of comfort. Pt remains A&Ox4, resps easy and unlabored. IV flushed and infusing, shows no s/s of infection or infiltration. Pt pain remains unchanged at this time. Urine specimen collected and sent to lab. Monitor remains in place. Updated pt on POC. Will monitor.

## 2024-02-21 LAB
ANION GAP SERPL CALC-SCNC: 13 MEQ/L (ref 8–16)
BASOPHILS ABSOLUTE: 0.1 THOU/MM3 (ref 0–0.1)
BASOPHILS NFR BLD AUTO: 0.9 %
BUN SERPL-MCNC: 26 MG/DL (ref 7–22)
CALCIUM SERPL-MCNC: 9.1 MG/DL (ref 8.5–10.5)
CHLORIDE SERPL-SCNC: 103 MEQ/L (ref 98–111)
CO2 SERPL-SCNC: 25 MEQ/L (ref 23–33)
CREAT SERPL-MCNC: 1.1 MG/DL (ref 0.4–1.2)
DEPRECATED RDW RBC AUTO: 54.1 FL (ref 35–45)
DIGOXIN SERPL-MCNC: 0.7 NG/ML (ref 0.5–2)
EOSINOPHIL NFR BLD AUTO: 2.3 %
EOSINOPHILS ABSOLUTE: 0.2 THOU/MM3 (ref 0–0.4)
ERYTHROCYTE [DISTWIDTH] IN BLOOD BY AUTOMATED COUNT: 16.9 % (ref 11.5–14.5)
FERRITIN SERPL IA-MCNC: 21 NG/ML (ref 10–291)
FOLATE SERPL-MCNC: 5.8 NG/ML (ref 4.8–24.2)
GFR SERPL CREATININE-BSD FRML MDRD: 52 ML/MIN/1.73M2
GLUCOSE SERPL-MCNC: 127 MG/DL (ref 70–108)
HCT VFR BLD AUTO: 34.2 % (ref 37–47)
HCT VFR BLD AUTO: 36.5 % (ref 37–47)
HGB BLD-MCNC: 10.4 GM/DL (ref 12–16)
HGB BLD-MCNC: 11.1 GM/DL (ref 12–16)
HGB RETIC QN AUTO: 29.3 PG (ref 28.2–35.7)
IMM GRANULOCYTES # BLD AUTO: 0.04 THOU/MM3 (ref 0–0.07)
IMM GRANULOCYTES NFR BLD AUTO: 0.4 %
IMM RETICS NFR: 30.2 % (ref 3–15.9)
IRON SATN MFR SERPL: 10 % (ref 20–50)
IRON SERPL-MCNC: 32 UG/DL (ref 50–170)
LYMPHOCYTES ABSOLUTE: 3.2 THOU/MM3 (ref 1–4.8)
LYMPHOCYTES NFR BLD AUTO: 33.1 %
MAGNESIUM SERPL-MCNC: 1.9 MG/DL (ref 1.6–2.4)
MCH RBC QN AUTO: 26.8 PG (ref 26–33)
MCHC RBC AUTO-ENTMCNC: 30.4 GM/DL (ref 32.2–35.5)
MCV RBC AUTO: 88.1 FL (ref 81–99)
MONOCYTES ABSOLUTE: 1.1 THOU/MM3 (ref 0.4–1.3)
MONOCYTES NFR BLD AUTO: 11.5 %
NEUTROPHILS NFR BLD AUTO: 51.8 %
NRBC BLD AUTO-RTO: 0 /100 WBC
PLATELET # BLD AUTO: 262 THOU/MM3 (ref 130–400)
PMV BLD AUTO: 9.6 FL (ref 9.4–12.4)
POTASSIUM SERPL-SCNC: 3.9 MEQ/L (ref 3.5–5.2)
RBC # BLD AUTO: 3.88 MILL/MM3 (ref 4.2–5.4)
RETICS # AUTO: 91 THOU/MM3 (ref 20–115)
RETICS/RBC NFR AUTO: 2.2 % (ref 0.5–2)
SEGMENTED NEUTROPHILS ABSOLUTE COUNT: 5 THOU/MM3 (ref 1.8–7.7)
SODIUM SERPL-SCNC: 141 MEQ/L (ref 135–145)
TIBC SERPL-MCNC: 332 UG/DL (ref 171–450)
VIT B12 SERPL-MCNC: 338 PG/ML (ref 211–911)
WBC # BLD AUTO: 9.7 THOU/MM3 (ref 4.8–10.8)

## 2024-02-21 PROCEDURE — 2580000003 HC RX 258: Performed by: PHYSICIAN ASSISTANT

## 2024-02-21 PROCEDURE — 87086 URINE CULTURE/COLONY COUNT: CPT

## 2024-02-21 PROCEDURE — 51798 US URINE CAPACITY MEASURE: CPT

## 2024-02-21 PROCEDURE — 80162 ASSAY OF DIGOXIN TOTAL: CPT

## 2024-02-21 PROCEDURE — 5A09357 ASSISTANCE WITH RESPIRATORY VENTILATION, LESS THAN 24 CONSECUTIVE HOURS, CONTINUOUS POSITIVE AIRWAY PRESSURE: ICD-10-PCS | Performed by: STUDENT IN AN ORGANIZED HEALTH CARE EDUCATION/TRAINING PROGRAM

## 2024-02-21 PROCEDURE — 1200000003 HC TELEMETRY R&B

## 2024-02-21 PROCEDURE — 85018 HEMOGLOBIN: CPT

## 2024-02-21 PROCEDURE — 85025 COMPLETE CBC W/AUTO DIFF WBC: CPT

## 2024-02-21 PROCEDURE — 6370000000 HC RX 637 (ALT 250 FOR IP)

## 2024-02-21 PROCEDURE — 94640 AIRWAY INHALATION TREATMENT: CPT

## 2024-02-21 PROCEDURE — 80048 BASIC METABOLIC PNL TOTAL CA: CPT

## 2024-02-21 PROCEDURE — 83550 IRON BINDING TEST: CPT

## 2024-02-21 PROCEDURE — 6360000002 HC RX W HCPCS: Performed by: PHYSICIAN ASSISTANT

## 2024-02-21 PROCEDURE — 6370000000 HC RX 637 (ALT 250 FOR IP): Performed by: STUDENT IN AN ORGANIZED HEALTH CARE EDUCATION/TRAINING PROGRAM

## 2024-02-21 PROCEDURE — 82728 ASSAY OF FERRITIN: CPT

## 2024-02-21 PROCEDURE — 99232 SBSQ HOSP IP/OBS MODERATE 35: CPT | Performed by: HOSPITALIST

## 2024-02-21 PROCEDURE — 6370000000 HC RX 637 (ALT 250 FOR IP): Performed by: PHYSICIAN ASSISTANT

## 2024-02-21 PROCEDURE — 97166 OT EVAL MOD COMPLEX 45 MIN: CPT

## 2024-02-21 PROCEDURE — 82607 VITAMIN B-12: CPT

## 2024-02-21 PROCEDURE — 83735 ASSAY OF MAGNESIUM: CPT

## 2024-02-21 PROCEDURE — 83540 ASSAY OF IRON: CPT

## 2024-02-21 PROCEDURE — 93005 ELECTROCARDIOGRAM TRACING: CPT | Performed by: HOSPITALIST

## 2024-02-21 PROCEDURE — 85014 HEMATOCRIT: CPT

## 2024-02-21 PROCEDURE — 36415 COLL VENOUS BLD VENIPUNCTURE: CPT

## 2024-02-21 PROCEDURE — 85046 RETICYTE/HGB CONCENTRATE: CPT

## 2024-02-21 PROCEDURE — 93010 ELECTROCARDIOGRAM REPORT: CPT | Performed by: INTERNAL MEDICINE

## 2024-02-21 PROCEDURE — 82746 ASSAY OF FOLIC ACID SERUM: CPT

## 2024-02-21 PROCEDURE — 97530 THERAPEUTIC ACTIVITIES: CPT

## 2024-02-21 RX ORDER — PHENAZOPYRIDINE HYDROCHLORIDE 200 MG/1
200 TABLET, FILM COATED ORAL
Status: DISCONTINUED | OUTPATIENT
Start: 2024-02-21 | End: 2024-02-21

## 2024-02-21 RX ADMIN — ANTACID TABLETS 500 MG: 500 TABLET, CHEWABLE ORAL at 21:20

## 2024-02-21 RX ADMIN — PANTOPRAZOLE SODIUM 40 MG: 40 TABLET, DELAYED RELEASE ORAL at 08:40

## 2024-02-21 RX ADMIN — MOMETASONE FUROATE AND FORMOTEROL FUMARATE DIHYDRATE 2 PUFF: 200; 5 AEROSOL RESPIRATORY (INHALATION) at 18:01

## 2024-02-21 RX ADMIN — CLONIDINE HYDROCHLORIDE 0.1 MG: 0.1 TABLET ORAL at 15:43

## 2024-02-21 RX ADMIN — SULFASALAZINE 1000 MG: 500 TABLET ORAL at 20:41

## 2024-02-21 RX ADMIN — TROSPIUM CHLORIDE 20 MG: 20 TABLET, FILM COATED ORAL at 15:43

## 2024-02-21 RX ADMIN — CLONIDINE HYDROCHLORIDE 0.1 MG: 0.1 TABLET ORAL at 08:35

## 2024-02-21 RX ADMIN — DULOXETINE HYDROCHLORIDE 60 MG: 60 CAPSULE, DELAYED RELEASE ORAL at 08:41

## 2024-02-21 RX ADMIN — SODIUM CHLORIDE, PRESERVATIVE FREE 10 ML: 5 INJECTION INTRAVENOUS at 08:47

## 2024-02-21 RX ADMIN — METOPROLOL TARTRATE 100 MG: 100 TABLET, FILM COATED ORAL at 20:41

## 2024-02-21 RX ADMIN — MOMETASONE FUROATE AND FORMOTEROL FUMARATE DIHYDRATE 2 PUFF: 200; 5 AEROSOL RESPIRATORY (INHALATION) at 08:23

## 2024-02-21 RX ADMIN — PRAVASTATIN SODIUM 40 MG: 40 TABLET ORAL at 20:41

## 2024-02-21 RX ADMIN — SULFASALAZINE 500 MG: 500 TABLET ORAL at 08:42

## 2024-02-21 RX ADMIN — VERAPAMIL HYDROCHLORIDE 240 MG: 240 TABLET, FILM COATED, EXTENDED RELEASE ORAL at 20:41

## 2024-02-21 RX ADMIN — PHENAZOPYRIDINE 200 MG: 200 TABLET ORAL at 11:40

## 2024-02-21 RX ADMIN — CEFTRIAXONE SODIUM 1000 MG: 1 INJECTION, POWDER, FOR SOLUTION INTRAMUSCULAR; INTRAVENOUS at 22:34

## 2024-02-21 RX ADMIN — LOSARTAN POTASSIUM 25 MG: 25 TABLET, FILM COATED ORAL at 08:45

## 2024-02-21 RX ADMIN — CLOPIDOGREL BISULFATE 75 MG: 75 TABLET ORAL at 08:39

## 2024-02-21 RX ADMIN — DIGOXIN 125 MCG: 0.12 TABLET ORAL at 08:31

## 2024-02-21 RX ADMIN — APIXABAN 5 MG: 5 TABLET, FILM COATED ORAL at 08:36

## 2024-02-21 RX ADMIN — METOPROLOL TARTRATE 100 MG: 100 TABLET, FILM COATED ORAL at 08:43

## 2024-02-21 RX ADMIN — CLONIDINE HYDROCHLORIDE 0.1 MG: 0.1 TABLET ORAL at 20:41

## 2024-02-21 RX ADMIN — CHLORTHALIDONE 25 MG: 25 TABLET ORAL at 08:43

## 2024-02-21 RX ADMIN — ESCITALOPRAM OXALATE 5 MG: 10 TABLET ORAL at 08:37

## 2024-02-21 RX ADMIN — SODIUM CHLORIDE, PRESERVATIVE FREE 10 ML: 5 INJECTION INTRAVENOUS at 20:42

## 2024-02-21 RX ADMIN — TROSPIUM CHLORIDE 20 MG: 20 TABLET, FILM COATED ORAL at 06:39

## 2024-02-21 RX ADMIN — VERAPAMIL HYDROCHLORIDE 240 MG: 240 TABLET, FILM COATED, EXTENDED RELEASE ORAL at 08:29

## 2024-02-21 ASSESSMENT — PAIN SCALES - GENERAL
PAINLEVEL_OUTOF10: 10
PAINLEVEL_OUTOF10: 10
PAINLEVEL_OUTOF10: 1
PAINLEVEL_OUTOF10: 10
PAINLEVEL_OUTOF10: 0

## 2024-02-21 ASSESSMENT — PAIN DESCRIPTION - LOCATION
LOCATION: OTHER (COMMENT)
LOCATION: OTHER (COMMENT)

## 2024-02-21 ASSESSMENT — PAIN DESCRIPTION - ONSET
ONSET: SUDDEN
ONSET: SUDDEN

## 2024-02-21 ASSESSMENT — PAIN DESCRIPTION - DESCRIPTORS
DESCRIPTORS: BURNING;SHARP;SPASM
DESCRIPTORS: SHARP;SPASM
DESCRIPTORS: SHARP
DESCRIPTORS: DULL

## 2024-02-21 ASSESSMENT — PAIN DESCRIPTION - PAIN TYPE
TYPE: ACUTE PAIN
TYPE: ACUTE PAIN

## 2024-02-21 NOTE — FLOWSHEET NOTE
02/21/24 1233   Treatment Team Notification   Reason for Communication Evaluate  (unable to urinate. Feels like she has a blockage. Bladdre pain)   Name of Team Member Notified Dr Kang   Treatment Team Role Attending Provider   Method of Communication Secure Message   Response Waiting for response   Notification Time [id number]

## 2024-02-22 LAB
ANION GAP SERPL CALC-SCNC: 11 MEQ/L (ref 8–16)
BASOPHILS ABSOLUTE: 0.1 THOU/MM3 (ref 0–0.1)
BASOPHILS NFR BLD AUTO: 0.7 %
BUN SERPL-MCNC: 22 MG/DL (ref 7–22)
CALCIUM SERPL-MCNC: 9.4 MG/DL (ref 8.5–10.5)
CHLORIDE SERPL-SCNC: 103 MEQ/L (ref 98–111)
CO2 SERPL-SCNC: 24 MEQ/L (ref 23–33)
CREAT SERPL-MCNC: 0.7 MG/DL (ref 0.4–1.2)
DEPRECATED RDW RBC AUTO: 54 FL (ref 35–45)
DIGOXIN SERPL-MCNC: 1.1 NG/ML (ref 0.5–2)
EKG Q-T INTERVAL: 324 MS
EKG Q-T INTERVAL: 418 MS
EKG Q-T INTERVAL: 476 MS
EKG QRS DURATION: 86 MS
EKG QRS DURATION: 86 MS
EKG QRS DURATION: 88 MS
EKG QTC CALCULATION (BAZETT): 416 MS
EKG QTC CALCULATION (BAZETT): 454 MS
EKG QTC CALCULATION (BAZETT): 507 MS
EKG R AXIS: -38 DEGREES
EKG R AXIS: -39 DEGREES
EKG R AXIS: -40 DEGREES
EKG T AXIS: 129 DEGREES
EKG T AXIS: 35 DEGREES
EKG T AXIS: 80 DEGREES
EKG VENTRICULAR RATE: 147 BPM
EKG VENTRICULAR RATE: 46 BPM
EKG VENTRICULAR RATE: 71 BPM
EOSINOPHIL NFR BLD AUTO: 2 %
EOSINOPHILS ABSOLUTE: 0.2 THOU/MM3 (ref 0–0.4)
ERYTHROCYTE [DISTWIDTH] IN BLOOD BY AUTOMATED COUNT: 17 % (ref 11.5–14.5)
GFR SERPL CREATININE-BSD FRML MDRD: > 60 ML/MIN/1.73M2
GLUCOSE SERPL-MCNC: 115 MG/DL (ref 70–108)
HCT VFR BLD AUTO: 33.7 % (ref 37–47)
HGB BLD-MCNC: 10.3 GM/DL (ref 12–16)
IMM GRANULOCYTES # BLD AUTO: 0.07 THOU/MM3 (ref 0–0.07)
IMM GRANULOCYTES NFR BLD AUTO: 0.6 %
LYMPHOCYTES ABSOLUTE: 3 THOU/MM3 (ref 1–4.8)
LYMPHOCYTES NFR BLD AUTO: 27.1 %
MAGNESIUM SERPL-MCNC: 2.1 MG/DL (ref 1.6–2.4)
MCH RBC QN AUTO: 26.6 PG (ref 26–33)
MCHC RBC AUTO-ENTMCNC: 30.6 GM/DL (ref 32.2–35.5)
MCV RBC AUTO: 87.1 FL (ref 81–99)
MONOCYTES ABSOLUTE: 1.2 THOU/MM3 (ref 0.4–1.3)
MONOCYTES NFR BLD AUTO: 10.5 %
NEUTROPHILS NFR BLD AUTO: 59.1 %
NRBC BLD AUTO-RTO: 0 /100 WBC
PLATELET # BLD AUTO: 253 THOU/MM3 (ref 130–400)
PMV BLD AUTO: 9.7 FL (ref 9.4–12.4)
POTASSIUM SERPL-SCNC: 4.2 MEQ/L (ref 3.5–5.2)
RBC # BLD AUTO: 3.87 MILL/MM3 (ref 4.2–5.4)
SEGMENTED NEUTROPHILS ABSOLUTE COUNT: 6.6 THOU/MM3 (ref 1.8–7.7)
SODIUM SERPL-SCNC: 138 MEQ/L (ref 135–145)
WBC # BLD AUTO: 11.1 THOU/MM3 (ref 4.8–10.8)

## 2024-02-22 PROCEDURE — 6370000000 HC RX 637 (ALT 250 FOR IP): Performed by: STUDENT IN AN ORGANIZED HEALTH CARE EDUCATION/TRAINING PROGRAM

## 2024-02-22 PROCEDURE — 97116 GAIT TRAINING THERAPY: CPT

## 2024-02-22 PROCEDURE — 99232 SBSQ HOSP IP/OBS MODERATE 35: CPT | Performed by: HOSPITALIST

## 2024-02-22 PROCEDURE — 93005 ELECTROCARDIOGRAM TRACING: CPT | Performed by: HOSPITALIST

## 2024-02-22 PROCEDURE — 85025 COMPLETE CBC W/AUTO DIFF WBC: CPT

## 2024-02-22 PROCEDURE — 80048 BASIC METABOLIC PNL TOTAL CA: CPT

## 2024-02-22 PROCEDURE — 83735 ASSAY OF MAGNESIUM: CPT

## 2024-02-22 PROCEDURE — 51702 INSERT TEMP BLADDER CATH: CPT

## 2024-02-22 PROCEDURE — 94640 AIRWAY INHALATION TREATMENT: CPT

## 2024-02-22 PROCEDURE — 36415 COLL VENOUS BLD VENIPUNCTURE: CPT

## 2024-02-22 PROCEDURE — 2580000003 HC RX 258: Performed by: PHYSICIAN ASSISTANT

## 2024-02-22 PROCEDURE — 6360000002 HC RX W HCPCS: Performed by: PHYSICIAN ASSISTANT

## 2024-02-22 PROCEDURE — 6370000000 HC RX 637 (ALT 250 FOR IP): Performed by: PHYSICIAN ASSISTANT

## 2024-02-22 PROCEDURE — 93010 ELECTROCARDIOGRAM REPORT: CPT | Performed by: INTERNAL MEDICINE

## 2024-02-22 PROCEDURE — 97161 PT EVAL LOW COMPLEX 20 MIN: CPT

## 2024-02-22 PROCEDURE — 1200000003 HC TELEMETRY R&B

## 2024-02-22 PROCEDURE — 80162 ASSAY OF DIGOXIN TOTAL: CPT

## 2024-02-22 RX ORDER — CEFDINIR 300 MG/1
300 CAPSULE ORAL 2 TIMES DAILY
Qty: 10 CAPSULE | Refills: 0 | Status: SHIPPED | OUTPATIENT
Start: 2024-02-22 | End: 2024-02-26 | Stop reason: HOSPADM

## 2024-02-22 RX ORDER — ATROPINE SULFATE 0.1 MG/ML
1 INJECTION INTRAVENOUS PRN
Status: DISCONTINUED | OUTPATIENT
Start: 2024-02-22 | End: 2024-02-26 | Stop reason: HOSPADM

## 2024-02-22 RX ORDER — METOPROLOL TARTRATE 100 MG/1
100 TABLET ORAL 2 TIMES DAILY
Qty: 60 TABLET | Refills: 3
Start: 2024-02-22 | End: 2024-02-26

## 2024-02-22 RX ORDER — CLOPIDOGREL BISULFATE 75 MG/1
75 TABLET ORAL DAILY
Status: DISCONTINUED | OUTPATIENT
Start: 2024-02-22 | End: 2024-02-26 | Stop reason: HOSPADM

## 2024-02-22 RX ORDER — HYDRALAZINE HYDROCHLORIDE 20 MG/ML
10 INJECTION INTRAMUSCULAR; INTRAVENOUS EVERY 6 HOURS PRN
Status: DISCONTINUED | OUTPATIENT
Start: 2024-02-22 | End: 2024-02-26 | Stop reason: HOSPADM

## 2024-02-22 RX ADMIN — MOMETASONE FUROATE AND FORMOTEROL FUMARATE DIHYDRATE 2 PUFF: 200; 5 AEROSOL RESPIRATORY (INHALATION) at 08:25

## 2024-02-22 RX ADMIN — LOSARTAN POTASSIUM 25 MG: 25 TABLET, FILM COATED ORAL at 08:57

## 2024-02-22 RX ADMIN — METOPROLOL TARTRATE 100 MG: 100 TABLET, FILM COATED ORAL at 08:58

## 2024-02-22 RX ADMIN — VERAPAMIL HYDROCHLORIDE 240 MG: 240 TABLET, FILM COATED, EXTENDED RELEASE ORAL at 08:59

## 2024-02-22 RX ADMIN — SULFASALAZINE 500 MG: 500 TABLET ORAL at 08:58

## 2024-02-22 RX ADMIN — PANTOPRAZOLE SODIUM 40 MG: 40 TABLET, DELAYED RELEASE ORAL at 08:58

## 2024-02-22 RX ADMIN — APIXABAN 5 MG: 5 TABLET, FILM COATED ORAL at 12:22

## 2024-02-22 RX ADMIN — DULOXETINE HYDROCHLORIDE 60 MG: 60 CAPSULE, DELAYED RELEASE ORAL at 08:53

## 2024-02-22 RX ADMIN — SODIUM CHLORIDE, PRESERVATIVE FREE 10 ML: 5 INJECTION INTRAVENOUS at 09:05

## 2024-02-22 RX ADMIN — DIGOXIN 125 MCG: 0.12 TABLET ORAL at 08:53

## 2024-02-22 RX ADMIN — SODIUM CHLORIDE, PRESERVATIVE FREE 10 ML: 5 INJECTION INTRAVENOUS at 20:30

## 2024-02-22 RX ADMIN — TROSPIUM CHLORIDE 20 MG: 20 TABLET, FILM COATED ORAL at 15:49

## 2024-02-22 RX ADMIN — CEFTRIAXONE SODIUM 1000 MG: 1 INJECTION, POWDER, FOR SOLUTION INTRAMUSCULAR; INTRAVENOUS at 21:56

## 2024-02-22 RX ADMIN — TROSPIUM CHLORIDE 20 MG: 20 TABLET, FILM COATED ORAL at 06:14

## 2024-02-22 RX ADMIN — TRAZODONE HYDROCHLORIDE 50 MG: 50 TABLET ORAL at 20:31

## 2024-02-22 RX ADMIN — CLONIDINE HYDROCHLORIDE 0.1 MG: 0.1 TABLET ORAL at 08:52

## 2024-02-22 RX ADMIN — CLOPIDOGREL BISULFATE 75 MG: 75 TABLET ORAL at 12:22

## 2024-02-22 RX ADMIN — ESCITALOPRAM OXALATE 5 MG: 10 TABLET ORAL at 08:54

## 2024-02-22 RX ADMIN — CLONIDINE HYDROCHLORIDE 0.1 MG: 0.1 TABLET ORAL at 20:32

## 2024-02-22 RX ADMIN — APIXABAN 5 MG: 5 TABLET, FILM COATED ORAL at 20:31

## 2024-02-22 RX ADMIN — SULFASALAZINE 1000 MG: 500 TABLET ORAL at 20:31

## 2024-02-22 RX ADMIN — CHLORTHALIDONE 25 MG: 25 TABLET ORAL at 08:51

## 2024-02-22 RX ADMIN — PRAVASTATIN SODIUM 40 MG: 40 TABLET ORAL at 20:31

## 2024-02-22 RX ADMIN — MOMETASONE FUROATE AND FORMOTEROL FUMARATE DIHYDRATE 2 PUFF: 200; 5 AEROSOL RESPIRATORY (INHALATION) at 18:36

## 2024-02-22 ASSESSMENT — PAIN SCALES - GENERAL
PAINLEVEL_OUTOF10: 0

## 2024-02-22 NOTE — PROCEDURES
Bladder scan completed at 1330 and results told to Jaqueline BELL. Scan showed 15 mL in the patients bladder. Last void was unknown. Tamanna BERNAL

## 2024-02-22 NOTE — CARE COORDINATION
2/22/24, 2:35 PM EST    DISCHARGE ON GOING EVALUATION    Arlene MARKHAM [facility] day: 3  Location: [facility]24/024-A Reason for admit: Essential hypertension [I10]  Elevated troponin [R79.89]  Atrial fibrillation with rapid ventricular response (HCC) [I48.91]  Atrial fibrillation with RVR (HCC) [I48.91]   Procedure: none    Barriers to Discharge: Hospitalist following. Monroy placed for retention. Plan was for discharge, but pt began to have bradycardia. Discharge placed on hold.     PCP: Eugene Kaur MD  Readmission Risk Score: 10%  Patient Goals/Plan/Treatment Preferences: Plans home with family. Has DME, including CPAP from [facility].     OP PT ordered. Referral called to Jennifer hester [facility]'s OP therapy. Updated Jaqueline BELL.

## 2024-02-23 ENCOUNTER — APPOINTMENT (OUTPATIENT)
Dept: INTERVENTIONAL RADIOLOGY/VASCULAR | Age: 74
DRG: 309 | End: 2024-02-23
Payer: MEDICARE

## 2024-02-23 ENCOUNTER — APPOINTMENT (OUTPATIENT)
Age: 74
DRG: 309 | End: 2024-02-23
Payer: MEDICARE

## 2024-02-23 ENCOUNTER — TELEPHONE (OUTPATIENT)
Dept: UROLOGY | Age: 74
End: 2024-02-23

## 2024-02-23 LAB
ANION GAP SERPL CALC-SCNC: 13 MEQ/L (ref 8–16)
BACTERIA UR CULT: NORMAL
BASOPHILS ABSOLUTE: 0.1 THOU/MM3 (ref 0–0.1)
BASOPHILS NFR BLD AUTO: 0.7 %
BUN SERPL-MCNC: 24 MG/DL (ref 7–22)
CALCIUM SERPL-MCNC: 9.3 MG/DL (ref 8.5–10.5)
CHLORIDE SERPL-SCNC: 104 MEQ/L (ref 98–111)
CO2 SERPL-SCNC: 23 MEQ/L (ref 23–33)
CREAT SERPL-MCNC: 0.8 MG/DL (ref 0.4–1.2)
DEPRECATED RDW RBC AUTO: 53.7 FL (ref 35–45)
ECHO BSA: 2.15 M2
EKG Q-T INTERVAL: 446 MS
EKG QRS DURATION: 92 MS
EKG QTC CALCULATION (BAZETT): 418 MS
EKG R AXIS: -45 DEGREES
EKG T AXIS: 74 DEGREES
EKG VENTRICULAR RATE: 53 BPM
EOSINOPHIL NFR BLD AUTO: 1.5 %
EOSINOPHILS ABSOLUTE: 0.2 THOU/MM3 (ref 0–0.4)
ERYTHROCYTE [DISTWIDTH] IN BLOOD BY AUTOMATED COUNT: 17.2 % (ref 11.5–14.5)
GFR SERPL CREATININE-BSD FRML MDRD: > 60 ML/MIN/1.73M2
GLUCOSE SERPL-MCNC: 116 MG/DL (ref 70–108)
HCT VFR BLD AUTO: 34.8 % (ref 37–47)
HGB BLD-MCNC: 10.7 GM/DL (ref 12–16)
IMM GRANULOCYTES # BLD AUTO: 0.04 THOU/MM3 (ref 0–0.07)
IMM GRANULOCYTES NFR BLD AUTO: 0.4 %
LYMPHOCYTES ABSOLUTE: 2.8 THOU/MM3 (ref 1–4.8)
LYMPHOCYTES NFR BLD AUTO: 25.8 %
MAGNESIUM SERPL-MCNC: 2 MG/DL (ref 1.6–2.4)
MCH RBC QN AUTO: 26.8 PG (ref 26–33)
MCHC RBC AUTO-ENTMCNC: 30.7 GM/DL (ref 32.2–35.5)
MCV RBC AUTO: 87 FL (ref 81–99)
MONOCYTES ABSOLUTE: 1 THOU/MM3 (ref 0.4–1.3)
MONOCYTES NFR BLD AUTO: 9.4 %
NEUTROPHILS NFR BLD AUTO: 62.2 %
NRBC BLD AUTO-RTO: 0 /100 WBC
PLATELET # BLD AUTO: 247 THOU/MM3 (ref 130–400)
PMV BLD AUTO: 9.5 FL (ref 9.4–12.4)
POTASSIUM SERPL-SCNC: 4.4 MEQ/L (ref 3.5–5.2)
RBC # BLD AUTO: 4 MILL/MM3 (ref 4.2–5.4)
SEGMENTED NEUTROPHILS ABSOLUTE COUNT: 6.7 THOU/MM3 (ref 1.8–7.7)
SODIUM SERPL-SCNC: 140 MEQ/L (ref 135–145)
WBC # BLD AUTO: 10.8 THOU/MM3 (ref 4.8–10.8)

## 2024-02-23 PROCEDURE — 85025 COMPLETE CBC W/AUTO DIFF WBC: CPT

## 2024-02-23 PROCEDURE — 83735 ASSAY OF MAGNESIUM: CPT

## 2024-02-23 PROCEDURE — 51702 INSERT TEMP BLADDER CATH: CPT

## 2024-02-23 PROCEDURE — 51798 US URINE CAPACITY MEASURE: CPT

## 2024-02-23 PROCEDURE — 2580000003 HC RX 258: Performed by: PHYSICIAN ASSISTANT

## 2024-02-23 PROCEDURE — 94640 AIRWAY INHALATION TREATMENT: CPT

## 2024-02-23 PROCEDURE — 6360000002 HC RX W HCPCS: Performed by: PHYSICIAN ASSISTANT

## 2024-02-23 PROCEDURE — 6370000000 HC RX 637 (ALT 250 FOR IP): Performed by: PHYSICIAN ASSISTANT

## 2024-02-23 PROCEDURE — 36415 COLL VENOUS BLD VENIPUNCTURE: CPT

## 2024-02-23 PROCEDURE — 93270 REMOTE 30 DAY ECG REV/REPORT: CPT

## 2024-02-23 PROCEDURE — 94761 N-INVAS EAR/PLS OXIMETRY MLT: CPT

## 2024-02-23 PROCEDURE — 1200000003 HC TELEMETRY R&B

## 2024-02-23 PROCEDURE — 6370000000 HC RX 637 (ALT 250 FOR IP): Performed by: STUDENT IN AN ORGANIZED HEALTH CARE EDUCATION/TRAINING PROGRAM

## 2024-02-23 PROCEDURE — 80048 BASIC METABOLIC PNL TOTAL CA: CPT

## 2024-02-23 PROCEDURE — 93970 EXTREMITY STUDY: CPT

## 2024-02-23 PROCEDURE — 93005 ELECTROCARDIOGRAM TRACING: CPT | Performed by: STUDENT IN AN ORGANIZED HEALTH CARE EDUCATION/TRAINING PROGRAM

## 2024-02-23 PROCEDURE — 93010 ELECTROCARDIOGRAM REPORT: CPT | Performed by: INTERNAL MEDICINE

## 2024-02-23 RX ADMIN — METOPROLOL TARTRATE 75 MG: 100 TABLET, FILM COATED ORAL at 07:51

## 2024-02-23 RX ADMIN — APIXABAN 5 MG: 5 TABLET, FILM COATED ORAL at 20:34

## 2024-02-23 RX ADMIN — TRAZODONE HYDROCHLORIDE 50 MG: 50 TABLET ORAL at 20:34

## 2024-02-23 RX ADMIN — CLOPIDOGREL BISULFATE 75 MG: 75 TABLET ORAL at 07:46

## 2024-02-23 RX ADMIN — DULOXETINE HYDROCHLORIDE 60 MG: 60 CAPSULE, DELAYED RELEASE ORAL at 07:46

## 2024-02-23 RX ADMIN — SULFASALAZINE 500 MG: 500 TABLET ORAL at 07:47

## 2024-02-23 RX ADMIN — TROSPIUM CHLORIDE 20 MG: 20 TABLET, FILM COATED ORAL at 07:47

## 2024-02-23 RX ADMIN — LOSARTAN POTASSIUM 25 MG: 25 TABLET, FILM COATED ORAL at 07:47

## 2024-02-23 RX ADMIN — MOMETASONE FUROATE AND FORMOTEROL FUMARATE DIHYDRATE 2 PUFF: 200; 5 AEROSOL RESPIRATORY (INHALATION) at 08:23

## 2024-02-23 RX ADMIN — SODIUM CHLORIDE, PRESERVATIVE FREE 10 ML: 5 INJECTION INTRAVENOUS at 07:47

## 2024-02-23 RX ADMIN — APIXABAN 5 MG: 5 TABLET, FILM COATED ORAL at 07:52

## 2024-02-23 RX ADMIN — SODIUM CHLORIDE: 9 INJECTION, SOLUTION INTRAVENOUS at 21:43

## 2024-02-23 RX ADMIN — SODIUM CHLORIDE, PRESERVATIVE FREE 10 ML: 5 INJECTION INTRAVENOUS at 20:06

## 2024-02-23 RX ADMIN — CEFTRIAXONE SODIUM 1000 MG: 1 INJECTION, POWDER, FOR SOLUTION INTRAMUSCULAR; INTRAVENOUS at 21:44

## 2024-02-23 RX ADMIN — CLONIDINE HYDROCHLORIDE 0.1 MG: 0.1 TABLET ORAL at 15:51

## 2024-02-23 RX ADMIN — ESCITALOPRAM OXALATE 5 MG: 10 TABLET ORAL at 07:46

## 2024-02-23 RX ADMIN — VERAPAMIL HYDROCHLORIDE 240 MG: 240 TABLET, FILM COATED, EXTENDED RELEASE ORAL at 09:20

## 2024-02-23 RX ADMIN — SULFASALAZINE 1000 MG: 500 TABLET ORAL at 20:06

## 2024-02-23 RX ADMIN — CHLORTHALIDONE 25 MG: 25 TABLET ORAL at 07:46

## 2024-02-23 RX ADMIN — CLONIDINE HYDROCHLORIDE 0.1 MG: 0.1 TABLET ORAL at 07:46

## 2024-02-23 RX ADMIN — PRAVASTATIN SODIUM 40 MG: 40 TABLET ORAL at 20:06

## 2024-02-23 RX ADMIN — PANTOPRAZOLE SODIUM 40 MG: 40 TABLET, DELAYED RELEASE ORAL at 07:47

## 2024-02-23 ASSESSMENT — PAIN SCALES - GENERAL: PAINLEVEL_OUTOF10: 0

## 2024-02-23 NOTE — CARE COORDINATION
2/23/24, 1:30 PM EST    DISCHARGE ON GOING EVALUATION    Arlene MARKHAM [facility]: 4  Location: [facility]24/024-A Reason for admit: Essential hypertension [I10]  Elevated troponin [R79.89]  Atrial fibrillation with rapid ventricular response (HCC) [I48.91]  Atrial fibrillation with RVR (HCC) [I48.91]   Procedure:   2/23 Venous doppler BLE: ordered.     Barriers to Discharge: Hospitalist following. HR today 58-84, remains in atrial fib. Monroy removed and pt able to urinate. Pt having lower extremity swelling, BLE doppler to be done today, if ok will plan discharge later today.     PCP: Eugene Kaur MD  Readmission Risk Score: 11%  Patient Goals/Plan/Treatment Preferences: Plans home with family. Has DME, including CPAP from [facility].  OP PT referral done yesterday to [facility] OP therapy.     Anticipate discharge today or tomorrow.     2/23/24, 1:39 PM EST    Patient goals/plan/ treatment preferences discussed by  and .  Patient goals/plan/ treatment preferences reviewed with patient/ family.  Patient/ family verbalize understanding of discharge plan and are in agreement with goal/plan/treatment preferences.  Understanding was demonstrated using the teach back method.  AVS provided by RN at time of discharge, which includes all necessary medical information pertaining to the patients current course of illness, treatment, post-discharge goals of care, and treatment preferences.     Services At/After Discharge: Outpatient and PT       IMM Letter  IMM Letter given to Patient/Family/Significant other/Guardian/POA/by:: Gypsy BELL CM  IMM Letter date given:: 02/23/24  IMM Letter time given:: 1002

## 2024-02-23 NOTE — PROCEDURES
Bladder scan completed at 1155 and results told to Jaqueline BELL. Scan showed 461 mL in the patients bladder. Last void was unknown. Tamanna BERNAL

## 2024-02-23 NOTE — TELEPHONE ENCOUNTER
Consult for urinary retention  Has gamble in place  Was d/c before seen  Needs OV next wk with GLENN for VT and to establish care

## 2024-02-24 ENCOUNTER — APPOINTMENT (OUTPATIENT)
Age: 74
DRG: 309 | End: 2024-02-24
Attending: INTERNAL MEDICINE
Payer: MEDICARE

## 2024-02-24 LAB
ANION GAP SERPL CALC-SCNC: 11 MEQ/L (ref 8–16)
BASOPHILS ABSOLUTE: 0.1 THOU/MM3 (ref 0–0.1)
BASOPHILS NFR BLD AUTO: 0.7 %
BUN SERPL-MCNC: 21 MG/DL (ref 7–22)
CALCIUM SERPL-MCNC: 9.2 MG/DL (ref 8.5–10.5)
CHLORIDE SERPL-SCNC: 104 MEQ/L (ref 98–111)
CO2 SERPL-SCNC: 25 MEQ/L (ref 23–33)
CREAT SERPL-MCNC: 0.7 MG/DL (ref 0.4–1.2)
DEPRECATED RDW RBC AUTO: 53.7 FL (ref 35–45)
DIGOXIN SERPL-MCNC: 0.6 NG/ML (ref 0.5–2)
ECHO AO ASC DIAM: 3.1 CM
ECHO AO ASCENDING AORTA INDEX: 1.47 CM/M2
ECHO AV CUSP MM: 1.6 CM
ECHO AV PEAK GRADIENT: 12 MMHG
ECHO AV PEAK VELOCITY: 1.8 M/S
ECHO AV VELOCITY RATIO: 0.56
ECHO BSA: 2.15 M2
ECHO EST RA PRESSURE: 10 MMHG
ECHO IVC PROX: 1.9 CM
ECHO LA AREA 2C: 26.2 CM2
ECHO LA AREA 4C: 29.9 CM2
ECHO LA DIAMETER INDEX: 2.04 CM/M2
ECHO LA DIAMETER: 4.3 CM
ECHO LA MAJOR AXIS: 7 CM
ECHO LA MINOR AXIS: 6.5 CM
ECHO LA VOL BP: 98 ML (ref 22–52)
ECHO LA VOL MOD A2C: 88 ML (ref 22–52)
ECHO LA VOL MOD A4C: 101 ML (ref 22–52)
ECHO LA VOL/BSA BIPLANE: 46 ML/M2 (ref 16–34)
ECHO LA VOLUME INDEX MOD A2C: 42 ML/M2 (ref 16–34)
ECHO LA VOLUME INDEX MOD A4C: 48 ML/M2 (ref 16–34)
ECHO LV FRACTIONAL SHORTENING: 34 % (ref 28–44)
ECHO LV INTERNAL DIMENSION DIASTOLE INDEX: 1.94 CM/M2
ECHO LV INTERNAL DIMENSION DIASTOLIC: 4.1 CM (ref 3.9–5.3)
ECHO LV INTERNAL DIMENSION SYSTOLIC INDEX: 1.28 CM/M2
ECHO LV INTERNAL DIMENSION SYSTOLIC: 2.7 CM
ECHO LV ISOVOLUMETRIC RELAXATION TIME (IVRT): 56 MS
ECHO LV IVSD: 1.2 CM (ref 0.6–0.9)
ECHO LV MASS 2D: 171.7 G (ref 67–162)
ECHO LV MASS INDEX 2D: 81.4 G/M2 (ref 43–95)
ECHO LV POSTERIOR WALL DIASTOLIC: 1.2 CM (ref 0.6–0.9)
ECHO LV RELATIVE WALL THICKNESS RATIO: 0.59
ECHO LVOT PEAK GRADIENT: 4 MMHG
ECHO LVOT PEAK VELOCITY: 1 M/S
ECHO MV E DECELERATION TIME (DT): 236 MS
ECHO MV E VELOCITY: 1.14 M/S
ECHO MV REGURGITANT PEAK GRADIENT: 92 MMHG
ECHO MV REGURGITANT PEAK VELOCITY: 4.8 M/S
ECHO PV MAX VELOCITY: 0.8 M/S
ECHO PV PEAK GRADIENT: 3 MMHG
ECHO RIGHT VENTRICULAR SYSTOLIC PRESSURE (RVSP): 48 MMHG
ECHO RV INTERNAL DIMENSION: 2.6 CM
ECHO RV TAPSE: 1.9 CM (ref 1.7–?)
ECHO TV E WAVE: 1 M/S
ECHO TV REGURGITANT MAX VELOCITY: 3.07 M/S
ECHO TV REGURGITANT PEAK GRADIENT: 38 MMHG
EOSINOPHIL NFR BLD AUTO: 1.3 %
EOSINOPHILS ABSOLUTE: 0.1 THOU/MM3 (ref 0–0.4)
ERYTHROCYTE [DISTWIDTH] IN BLOOD BY AUTOMATED COUNT: 17.1 % (ref 11.5–14.5)
GFR SERPL CREATININE-BSD FRML MDRD: > 60 ML/MIN/1.73M2
GLUCOSE SERPL-MCNC: 114 MG/DL (ref 70–108)
HCT VFR BLD AUTO: 34.2 % (ref 37–47)
HGB BLD-MCNC: 10.3 GM/DL (ref 12–16)
IMM GRANULOCYTES # BLD AUTO: 0.05 THOU/MM3 (ref 0–0.07)
IMM GRANULOCYTES NFR BLD AUTO: 0.5 %
LYMPHOCYTES ABSOLUTE: 2.8 THOU/MM3 (ref 1–4.8)
LYMPHOCYTES NFR BLD AUTO: 25.9 %
MAGNESIUM SERPL-MCNC: 2 MG/DL (ref 1.6–2.4)
MCH RBC QN AUTO: 26.3 PG (ref 26–33)
MCHC RBC AUTO-ENTMCNC: 30.1 GM/DL (ref 32.2–35.5)
MCV RBC AUTO: 87.5 FL (ref 81–99)
MONOCYTES ABSOLUTE: 1.1 THOU/MM3 (ref 0.4–1.3)
MONOCYTES NFR BLD AUTO: 10.5 %
NEUTROPHILS NFR BLD AUTO: 61.1 %
NRBC BLD AUTO-RTO: 0 /100 WBC
PHOSPHATE SERPL-MCNC: 3.5 MG/DL (ref 2.4–4.7)
PLATELET # BLD AUTO: 224 THOU/MM3 (ref 130–400)
PMV BLD AUTO: 9.5 FL (ref 9.4–12.4)
POTASSIUM SERPL-SCNC: 4 MEQ/L (ref 3.5–5.2)
RBC # BLD AUTO: 3.91 MILL/MM3 (ref 4.2–5.4)
SEGMENTED NEUTROPHILS ABSOLUTE COUNT: 6.6 THOU/MM3 (ref 1.8–7.7)
SODIUM SERPL-SCNC: 140 MEQ/L (ref 135–145)
WBC # BLD AUTO: 10.8 THOU/MM3 (ref 4.8–10.8)

## 2024-02-24 PROCEDURE — 6360000002 HC RX W HCPCS: Performed by: PHYSICIAN ASSISTANT

## 2024-02-24 PROCEDURE — 6370000000 HC RX 637 (ALT 250 FOR IP): Performed by: INTERNAL MEDICINE

## 2024-02-24 PROCEDURE — 93306 TTE W/DOPPLER COMPLETE: CPT

## 2024-02-24 PROCEDURE — 1200000003 HC TELEMETRY R&B

## 2024-02-24 PROCEDURE — 51702 INSERT TEMP BLADDER CATH: CPT

## 2024-02-24 PROCEDURE — 83735 ASSAY OF MAGNESIUM: CPT

## 2024-02-24 PROCEDURE — 94761 N-INVAS EAR/PLS OXIMETRY MLT: CPT

## 2024-02-24 PROCEDURE — 99223 1ST HOSP IP/OBS HIGH 75: CPT | Performed by: INTERNAL MEDICINE

## 2024-02-24 PROCEDURE — 80048 BASIC METABOLIC PNL TOTAL CA: CPT

## 2024-02-24 PROCEDURE — 99232 SBSQ HOSP IP/OBS MODERATE 35: CPT | Performed by: HOSPITALIST

## 2024-02-24 PROCEDURE — 84100 ASSAY OF PHOSPHORUS: CPT

## 2024-02-24 PROCEDURE — 6370000000 HC RX 637 (ALT 250 FOR IP): Performed by: UROLOGY

## 2024-02-24 PROCEDURE — 2580000003 HC RX 258: Performed by: PHYSICIAN ASSISTANT

## 2024-02-24 PROCEDURE — 6370000000 HC RX 637 (ALT 250 FOR IP): Performed by: PHYSICIAN ASSISTANT

## 2024-02-24 PROCEDURE — 94640 AIRWAY INHALATION TREATMENT: CPT

## 2024-02-24 PROCEDURE — 6370000000 HC RX 637 (ALT 250 FOR IP): Performed by: STUDENT IN AN ORGANIZED HEALTH CARE EDUCATION/TRAINING PROGRAM

## 2024-02-24 PROCEDURE — 80162 ASSAY OF DIGOXIN TOTAL: CPT

## 2024-02-24 PROCEDURE — 36415 COLL VENOUS BLD VENIPUNCTURE: CPT

## 2024-02-24 PROCEDURE — 93306 TTE W/DOPPLER COMPLETE: CPT | Performed by: INTERNAL MEDICINE

## 2024-02-24 PROCEDURE — 85025 COMPLETE CBC W/AUTO DIFF WBC: CPT

## 2024-02-24 RX ORDER — DOCUSATE SODIUM 100 MG/1
100 CAPSULE, LIQUID FILLED ORAL DAILY
Status: DISCONTINUED | OUTPATIENT
Start: 2024-02-24 | End: 2024-02-26 | Stop reason: HOSPADM

## 2024-02-24 RX ADMIN — METOPROLOL TARTRATE 25 MG: 25 TABLET, FILM COATED ORAL at 12:23

## 2024-02-24 RX ADMIN — CLOPIDOGREL BISULFATE 75 MG: 75 TABLET ORAL at 09:11

## 2024-02-24 RX ADMIN — PANTOPRAZOLE SODIUM 40 MG: 40 TABLET, DELAYED RELEASE ORAL at 09:11

## 2024-02-24 RX ADMIN — PRAVASTATIN SODIUM 40 MG: 40 TABLET ORAL at 21:43

## 2024-02-24 RX ADMIN — SULFASALAZINE 500 MG: 500 TABLET ORAL at 09:11

## 2024-02-24 RX ADMIN — METOPROLOL TARTRATE 25 MG: 25 TABLET, FILM COATED ORAL at 21:43

## 2024-02-24 RX ADMIN — SODIUM CHLORIDE, PRESERVATIVE FREE 10 ML: 5 INJECTION INTRAVENOUS at 09:11

## 2024-02-24 RX ADMIN — POLYETHYLENE GLYCOL 3350 17 G: 17 POWDER, FOR SOLUTION ORAL at 10:40

## 2024-02-24 RX ADMIN — DOCUSATE SODIUM 100 MG: 100 CAPSULE, LIQUID FILLED ORAL at 10:42

## 2024-02-24 RX ADMIN — APIXABAN 5 MG: 5 TABLET, FILM COATED ORAL at 09:11

## 2024-02-24 RX ADMIN — APIXABAN 5 MG: 5 TABLET, FILM COATED ORAL at 21:43

## 2024-02-24 RX ADMIN — MOMETASONE FUROATE AND FORMOTEROL FUMARATE DIHYDRATE 2 PUFF: 200; 5 AEROSOL RESPIRATORY (INHALATION) at 17:52

## 2024-02-24 RX ADMIN — LOSARTAN POTASSIUM 25 MG: 25 TABLET, FILM COATED ORAL at 09:12

## 2024-02-24 RX ADMIN — ESCITALOPRAM OXALATE 5 MG: 10 TABLET ORAL at 09:11

## 2024-02-24 RX ADMIN — DULOXETINE HYDROCHLORIDE 60 MG: 60 CAPSULE, DELAYED RELEASE ORAL at 09:11

## 2024-02-24 RX ADMIN — SULFASALAZINE 1000 MG: 500 TABLET ORAL at 21:43

## 2024-02-24 RX ADMIN — TRAZODONE HYDROCHLORIDE 50 MG: 50 TABLET ORAL at 21:43

## 2024-02-24 RX ADMIN — CHLORTHALIDONE 25 MG: 25 TABLET ORAL at 09:11

## 2024-02-24 RX ADMIN — CEFTRIAXONE SODIUM 1000 MG: 1 INJECTION, POWDER, FOR SOLUTION INTRAMUSCULAR; INTRAVENOUS at 22:10

## 2024-02-24 RX ADMIN — SODIUM CHLORIDE, PRESERVATIVE FREE 10 ML: 5 INJECTION INTRAVENOUS at 21:44

## 2024-02-24 RX ADMIN — MOMETASONE FUROATE AND FORMOTEROL FUMARATE DIHYDRATE 2 PUFF: 200; 5 AEROSOL RESPIRATORY (INHALATION) at 07:48

## 2024-02-24 ASSESSMENT — PAIN SCALES - GENERAL
PAINLEVEL_OUTOF10: 0
PAINLEVEL_OUTOF10: 0

## 2024-02-24 NOTE — CONSULTS
[facility]  Cardiology Consult      Patient:  Arlene Lira  YOB: 1950    MRN: [id number]   Acct: [id number]     Primary Care Physician: Eugene Kaur MD    REASON FOR CONSULT:    asx bradycardia, permanent afib pt chronically on lopressor 150 bid and varapamil 240 BID, and dig 125        CHIEF COMPLAINT:    Uncontrolled hypertension     HISTORY OF PRESENT ILLNESS:    Arlene Lira is a pleasant 74 year old female patient with past medical history that includes:   Past Medical History:   Diagnosis Date    Allergic rhinitis     Arthritis     Atrial fibrillation (HCC)     CAD (coronary artery disease)     Cancer (HCC)     skin    Carotid arterial disease (HCC)     Cerebrovascular disease     CHF (congestive heart failure) (HCC)     Congenital heart disease     COPD (chronic obstructive pulmonary disease) (HCC)     Depression     Fibromyalgia     Hearing loss     Hypertension     Hypothyroidism     Obesity     Osteoarthritis     S/P bariatric surgery 2018    Sleep apnea     CPAP    Thyroid disease     TIA (transient ischemic attack) 12/2015    Urinary incontinence    Has h/o AF, CAD. In 2020, she underwent PCI/KIMBERLY of LM and LAD. Echocardiogram in 2021 revealed an EF of 55-60%. She is followed by Dr Woody. Her atrial fibrillation seems to be permanent and she reports prior failed attempts for cardioversion. She reports that she is usually compliant with her medications, but has missed her medications on past Sunday. She was at her Rheumatologist office when she was noted to have elevated BP and tachycardia. States that her SBP was 220 at that time. She reports occasional mild dyspnea on exertion, stable without recent worsening. Otherwise, the patient denies chest pain,orthopnea, paroxysmal nocturnal dyspnea, palpitations, dizziness, syncope, recent weight gain or leg swelling. She has required cardizem gtt initially due to AF with RVR. EKG revealed AF with slow ventricular 
02/24/2024 04:52 AM    CREATININE 0.7 02/24/2024 04:52 AM     Magnesium:    Lab Results   Component Value Date/Time    MG 2.0 02/24/2024 04:52 AM     Phosphorus:    Lab Results   Component Value Date/Time    PHOS 3.5 02/24/2024 04:52 AM     PT/INR:    Lab Results   Component Value Date/Time    PROTIME 1.46 04/07/2021 12:00 AM    INR 1.40 02/21/2020 05:36 AM     U/A:    Lab Results   Component Value Date/Time    NITRITE Positive 08/17/2022 11:36 AM    COLORU YELLOW 02/19/2024 08:00 PM    PHUR 6.0 02/19/2024 08:00 PM    WBCUA > 100 02/19/2024 08:00 PM    RBCUA 0-2 02/19/2024 08:00 PM    YEAST NONE SEEN 02/19/2024 08:00 PM    BACTERIA FEW 02/19/2024 08:00 PM    CLARITYU Clear 08/17/2022 11:36 AM    CLARITYU Cloudy 04/07/2021 12:00 AM    SPECGRAV 1.025 08/17/2022 11:36 AM    LEUKOCYTESUR MODERATE 02/19/2024 08:00 PM    UROBILINOGEN 0.2 02/19/2024 08:00 PM    BILIRUBINUR NEGATIVE 02/19/2024 08:00 PM    BILIRUBINUR Negative 08/17/2022 11:36 AM    BILIRUBINUR Negative 04/07/2021 12:00 AM    BLOODU SMALL 02/19/2024 08:00 PM    GLUCOSEU NEGATIVE 02/19/2024 08:00 PM         IMPRESSION/Plan:    Needs aggressive bowel regime, no BM since Tues per patient    Urinary retention - discharge with gamble, OV for VT next wk  UTI - abx per primary  Constipation - likely contributing to retention    URO to follow peripherally, call with questions        Thank you for including us in the care of AWILDA Patel - CNP, APRN  02/24/24 6:31 AM  Urology

## 2024-02-25 LAB
ANION GAP SERPL CALC-SCNC: 10 MEQ/L (ref 8–16)
BASOPHILS ABSOLUTE: 0.1 THOU/MM3 (ref 0–0.1)
BASOPHILS NFR BLD AUTO: 0.6 %
BUN SERPL-MCNC: 18 MG/DL (ref 7–22)
CALCIUM SERPL-MCNC: 9.4 MG/DL (ref 8.5–10.5)
CHLORIDE SERPL-SCNC: 104 MEQ/L (ref 98–111)
CO2 SERPL-SCNC: 28 MEQ/L (ref 23–33)
CREAT SERPL-MCNC: 0.8 MG/DL (ref 0.4–1.2)
DEPRECATED RDW RBC AUTO: 54.8 FL (ref 35–45)
EOSINOPHIL NFR BLD AUTO: 1.3 %
EOSINOPHILS ABSOLUTE: 0.1 THOU/MM3 (ref 0–0.4)
ERYTHROCYTE [DISTWIDTH] IN BLOOD BY AUTOMATED COUNT: 17.1 % (ref 11.5–14.5)
GFR SERPL CREATININE-BSD FRML MDRD: > 60 ML/MIN/1.73M2
GLUCOSE SERPL-MCNC: 94 MG/DL (ref 70–108)
HCT VFR BLD AUTO: 35.4 % (ref 37–47)
HGB BLD-MCNC: 10.6 GM/DL (ref 12–16)
IMM GRANULOCYTES # BLD AUTO: 0.05 THOU/MM3 (ref 0–0.07)
IMM GRANULOCYTES NFR BLD AUTO: 0.5 %
LYMPHOCYTES ABSOLUTE: 2.8 THOU/MM3 (ref 1–4.8)
LYMPHOCYTES NFR BLD AUTO: 25.8 %
MAGNESIUM SERPL-MCNC: 2.1 MG/DL (ref 1.6–2.4)
MCH RBC QN AUTO: 26.6 PG (ref 26–33)
MCHC RBC AUTO-ENTMCNC: 29.9 GM/DL (ref 32.2–35.5)
MCV RBC AUTO: 88.7 FL (ref 81–99)
MONOCYTES ABSOLUTE: 1.1 THOU/MM3 (ref 0.4–1.3)
MONOCYTES NFR BLD AUTO: 10.4 %
NEUTROPHILS NFR BLD AUTO: 61.4 %
NRBC BLD AUTO-RTO: 0 /100 WBC
PLATELET # BLD AUTO: 256 THOU/MM3 (ref 130–400)
PMV BLD AUTO: 10 FL (ref 9.4–12.4)
POTASSIUM SERPL-SCNC: 4.7 MEQ/L (ref 3.5–5.2)
RBC # BLD AUTO: 3.99 MILL/MM3 (ref 4.2–5.4)
SEGMENTED NEUTROPHILS ABSOLUTE COUNT: 6.6 THOU/MM3 (ref 1.8–7.7)
SODIUM SERPL-SCNC: 142 MEQ/L (ref 135–145)
WBC # BLD AUTO: 10.8 THOU/MM3 (ref 4.8–10.8)

## 2024-02-25 PROCEDURE — 36415 COLL VENOUS BLD VENIPUNCTURE: CPT

## 2024-02-25 PROCEDURE — 6360000002 HC RX W HCPCS: Performed by: PHYSICIAN ASSISTANT

## 2024-02-25 PROCEDURE — 80048 BASIC METABOLIC PNL TOTAL CA: CPT

## 2024-02-25 PROCEDURE — 2580000003 HC RX 258: Performed by: PHYSICIAN ASSISTANT

## 2024-02-25 PROCEDURE — 6370000000 HC RX 637 (ALT 250 FOR IP): Performed by: UROLOGY

## 2024-02-25 PROCEDURE — 6370000000 HC RX 637 (ALT 250 FOR IP): Performed by: STUDENT IN AN ORGANIZED HEALTH CARE EDUCATION/TRAINING PROGRAM

## 2024-02-25 PROCEDURE — 6370000000 HC RX 637 (ALT 250 FOR IP): Performed by: INTERNAL MEDICINE

## 2024-02-25 PROCEDURE — 94761 N-INVAS EAR/PLS OXIMETRY MLT: CPT

## 2024-02-25 PROCEDURE — 99232 SBSQ HOSP IP/OBS MODERATE 35: CPT | Performed by: NURSE PRACTITIONER

## 2024-02-25 PROCEDURE — 85025 COMPLETE CBC W/AUTO DIFF WBC: CPT

## 2024-02-25 PROCEDURE — 94640 AIRWAY INHALATION TREATMENT: CPT

## 2024-02-25 PROCEDURE — 1200000003 HC TELEMETRY R&B

## 2024-02-25 PROCEDURE — 6370000000 HC RX 637 (ALT 250 FOR IP): Performed by: PHYSICIAN ASSISTANT

## 2024-02-25 PROCEDURE — 51702 INSERT TEMP BLADDER CATH: CPT

## 2024-02-25 PROCEDURE — 83735 ASSAY OF MAGNESIUM: CPT

## 2024-02-25 RX ORDER — METOPROLOL TARTRATE 50 MG/1
50 TABLET, FILM COATED ORAL 2 TIMES DAILY
Status: DISCONTINUED | OUTPATIENT
Start: 2024-02-25 | End: 2024-02-26 | Stop reason: HOSPADM

## 2024-02-25 RX ORDER — POLYETHYLENE GLYCOL 3350 17 G/17G
17 POWDER, FOR SOLUTION ORAL DAILY
Status: DISCONTINUED | OUTPATIENT
Start: 2024-02-25 | End: 2024-02-26 | Stop reason: HOSPADM

## 2024-02-25 RX ORDER — SENNOSIDES A AND B 8.6 MG/1
1 TABLET, FILM COATED ORAL NIGHTLY
Status: DISCONTINUED | OUTPATIENT
Start: 2024-02-25 | End: 2024-02-26 | Stop reason: HOSPADM

## 2024-02-25 RX ADMIN — SULFASALAZINE 1000 MG: 500 TABLET ORAL at 20:56

## 2024-02-25 RX ADMIN — SULFASALAZINE 500 MG: 500 TABLET ORAL at 09:52

## 2024-02-25 RX ADMIN — DULOXETINE HYDROCHLORIDE 60 MG: 60 CAPSULE, DELAYED RELEASE ORAL at 09:52

## 2024-02-25 RX ADMIN — SODIUM CHLORIDE, PRESERVATIVE FREE 10 ML: 5 INJECTION INTRAVENOUS at 09:58

## 2024-02-25 RX ADMIN — APIXABAN 5 MG: 5 TABLET, FILM COATED ORAL at 20:57

## 2024-02-25 RX ADMIN — DOCUSATE SODIUM 100 MG: 100 CAPSULE, LIQUID FILLED ORAL at 09:56

## 2024-02-25 RX ADMIN — ESCITALOPRAM OXALATE 5 MG: 10 TABLET ORAL at 09:52

## 2024-02-25 RX ADMIN — SENNOSIDES 8.6 MG: 8.6 TABLET, FILM COATED ORAL at 11:45

## 2024-02-25 RX ADMIN — SODIUM CHLORIDE, PRESERVATIVE FREE 10 ML: 5 INJECTION INTRAVENOUS at 20:56

## 2024-02-25 RX ADMIN — CLOPIDOGREL BISULFATE 75 MG: 75 TABLET ORAL at 09:56

## 2024-02-25 RX ADMIN — POLYETHYLENE GLYCOL 3350 17 G: 17 POWDER, FOR SOLUTION ORAL at 11:45

## 2024-02-25 RX ADMIN — PANTOPRAZOLE SODIUM 40 MG: 40 TABLET, DELAYED RELEASE ORAL at 09:56

## 2024-02-25 RX ADMIN — APIXABAN 5 MG: 5 TABLET, FILM COATED ORAL at 09:52

## 2024-02-25 RX ADMIN — METOPROLOL TARTRATE 25 MG: 25 TABLET, FILM COATED ORAL at 11:45

## 2024-02-25 RX ADMIN — METOPROLOL TARTRATE 50 MG: 50 TABLET, FILM COATED ORAL at 20:57

## 2024-02-25 RX ADMIN — METOPROLOL TARTRATE 25 MG: 25 TABLET, FILM COATED ORAL at 09:52

## 2024-02-25 RX ADMIN — TRAZODONE HYDROCHLORIDE 50 MG: 50 TABLET ORAL at 20:56

## 2024-02-25 RX ADMIN — MOMETASONE FUROATE AND FORMOTEROL FUMARATE DIHYDRATE 2 PUFF: 200; 5 AEROSOL RESPIRATORY (INHALATION) at 17:54

## 2024-02-25 RX ADMIN — LOSARTAN POTASSIUM 25 MG: 25 TABLET, FILM COATED ORAL at 09:52

## 2024-02-25 RX ADMIN — CEFTRIAXONE SODIUM 1000 MG: 1 INJECTION, POWDER, FOR SOLUTION INTRAMUSCULAR; INTRAVENOUS at 21:38

## 2024-02-25 RX ADMIN — CHLORTHALIDONE 25 MG: 25 TABLET ORAL at 09:52

## 2024-02-25 RX ADMIN — MOMETASONE FUROATE AND FORMOTEROL FUMARATE DIHYDRATE 2 PUFF: 200; 5 AEROSOL RESPIRATORY (INHALATION) at 08:00

## 2024-02-25 RX ADMIN — PRAVASTATIN SODIUM 40 MG: 40 TABLET ORAL at 20:57

## 2024-02-26 VITALS
DIASTOLIC BLOOD PRESSURE: 72 MMHG | RESPIRATION RATE: 18 BRPM | TEMPERATURE: 97.9 F | BODY MASS INDEX: 32.83 KG/M2 | HEIGHT: 68 IN | WEIGHT: 216.6 LBS | SYSTOLIC BLOOD PRESSURE: 154 MMHG | OXYGEN SATURATION: 98 % | HEART RATE: 99 BPM

## 2024-02-26 LAB
ANION GAP SERPL CALC-SCNC: 10 MEQ/L (ref 8–16)
BASOPHILS ABSOLUTE: 0.1 THOU/MM3 (ref 0–0.1)
BASOPHILS NFR BLD AUTO: 0.6 %
BUN SERPL-MCNC: 18 MG/DL (ref 7–22)
CALCIUM SERPL-MCNC: 9.2 MG/DL (ref 8.5–10.5)
CHLORIDE SERPL-SCNC: 104 MEQ/L (ref 98–111)
CO2 SERPL-SCNC: 28 MEQ/L (ref 23–33)
CREAT SERPL-MCNC: 0.7 MG/DL (ref 0.4–1.2)
DEPRECATED RDW RBC AUTO: 54.4 FL (ref 35–45)
EOSINOPHIL NFR BLD AUTO: 1.3 %
EOSINOPHILS ABSOLUTE: 0.1 THOU/MM3 (ref 0–0.4)
ERYTHROCYTE [DISTWIDTH] IN BLOOD BY AUTOMATED COUNT: 16.9 % (ref 11.5–14.5)
GFR SERPL CREATININE-BSD FRML MDRD: > 60 ML/MIN/1.73M2
GLUCOSE SERPL-MCNC: 106 MG/DL (ref 70–108)
HCT VFR BLD AUTO: 37.7 % (ref 37–47)
HGB BLD-MCNC: 11.2 GM/DL (ref 12–16)
IMM GRANULOCYTES # BLD AUTO: 0.09 THOU/MM3 (ref 0–0.07)
IMM GRANULOCYTES NFR BLD AUTO: 0.8 %
LYMPHOCYTES ABSOLUTE: 2.6 THOU/MM3 (ref 1–4.8)
LYMPHOCYTES NFR BLD AUTO: 24 %
MAGNESIUM SERPL-MCNC: 2 MG/DL (ref 1.6–2.4)
MCH RBC QN AUTO: 26.4 PG (ref 26–33)
MCHC RBC AUTO-ENTMCNC: 29.7 GM/DL (ref 32.2–35.5)
MCV RBC AUTO: 88.9 FL (ref 81–99)
MONOCYTES ABSOLUTE: 1.2 THOU/MM3 (ref 0.4–1.3)
MONOCYTES NFR BLD AUTO: 10.7 %
NEUTROPHILS NFR BLD AUTO: 62.6 %
NRBC BLD AUTO-RTO: 0 /100 WBC
PLATELET # BLD AUTO: 257 THOU/MM3 (ref 130–400)
PMV BLD AUTO: 9.7 FL (ref 9.4–12.4)
POTASSIUM SERPL-SCNC: 4.4 MEQ/L (ref 3.5–5.2)
RBC # BLD AUTO: 4.24 MILL/MM3 (ref 4.2–5.4)
SEGMENTED NEUTROPHILS ABSOLUTE COUNT: 6.8 THOU/MM3 (ref 1.8–7.7)
SODIUM SERPL-SCNC: 142 MEQ/L (ref 135–145)
WBC # BLD AUTO: 10.8 THOU/MM3 (ref 4.8–10.8)

## 2024-02-26 PROCEDURE — 2580000003 HC RX 258: Performed by: PHYSICIAN ASSISTANT

## 2024-02-26 PROCEDURE — 99232 SBSQ HOSP IP/OBS MODERATE 35: CPT | Performed by: PHYSICIAN ASSISTANT

## 2024-02-26 PROCEDURE — 6370000000 HC RX 637 (ALT 250 FOR IP): Performed by: UROLOGY

## 2024-02-26 PROCEDURE — 97116 GAIT TRAINING THERAPY: CPT

## 2024-02-26 PROCEDURE — 36415 COLL VENOUS BLD VENIPUNCTURE: CPT

## 2024-02-26 PROCEDURE — 6370000000 HC RX 637 (ALT 250 FOR IP): Performed by: PHYSICIAN ASSISTANT

## 2024-02-26 PROCEDURE — 6370000000 HC RX 637 (ALT 250 FOR IP): Performed by: STUDENT IN AN ORGANIZED HEALTH CARE EDUCATION/TRAINING PROGRAM

## 2024-02-26 PROCEDURE — 85025 COMPLETE CBC W/AUTO DIFF WBC: CPT

## 2024-02-26 PROCEDURE — 80048 BASIC METABOLIC PNL TOTAL CA: CPT

## 2024-02-26 PROCEDURE — 94640 AIRWAY INHALATION TREATMENT: CPT

## 2024-02-26 PROCEDURE — 97110 THERAPEUTIC EXERCISES: CPT

## 2024-02-26 PROCEDURE — 83735 ASSAY OF MAGNESIUM: CPT

## 2024-02-26 PROCEDURE — 94761 N-INVAS EAR/PLS OXIMETRY MLT: CPT

## 2024-02-26 RX ORDER — METOPROLOL TARTRATE 75 MG/1
75 TABLET, FILM COATED ORAL 2 TIMES DAILY
Qty: 60 TABLET | Refills: 1 | Status: SHIPPED | OUTPATIENT
Start: 2024-02-26 | End: 2024-02-29

## 2024-02-26 RX ADMIN — SODIUM CHLORIDE, PRESERVATIVE FREE 10 ML: 5 INJECTION INTRAVENOUS at 08:55

## 2024-02-26 RX ADMIN — PANTOPRAZOLE SODIUM 40 MG: 40 TABLET, DELAYED RELEASE ORAL at 08:53

## 2024-02-26 RX ADMIN — CHLORTHALIDONE 25 MG: 25 TABLET ORAL at 08:54

## 2024-02-26 RX ADMIN — DULOXETINE HYDROCHLORIDE 60 MG: 60 CAPSULE, DELAYED RELEASE ORAL at 08:54

## 2024-02-26 RX ADMIN — DOCUSATE SODIUM 100 MG: 100 CAPSULE, LIQUID FILLED ORAL at 08:53

## 2024-02-26 RX ADMIN — SULFASALAZINE 500 MG: 500 TABLET ORAL at 08:54

## 2024-02-26 RX ADMIN — CLOPIDOGREL BISULFATE 75 MG: 75 TABLET ORAL at 08:53

## 2024-02-26 RX ADMIN — MOMETASONE FUROATE AND FORMOTEROL FUMARATE DIHYDRATE 2 PUFF: 200; 5 AEROSOL RESPIRATORY (INHALATION) at 07:30

## 2024-02-26 RX ADMIN — LOSARTAN POTASSIUM 25 MG: 25 TABLET, FILM COATED ORAL at 08:54

## 2024-02-26 RX ADMIN — ESCITALOPRAM OXALATE 5 MG: 10 TABLET ORAL at 08:54

## 2024-02-26 RX ADMIN — APIXABAN 5 MG: 5 TABLET, FILM COATED ORAL at 08:54

## 2024-02-26 RX ADMIN — METOPROLOL TARTRATE 50 MG: 50 TABLET, FILM COATED ORAL at 08:54

## 2024-02-26 NOTE — PROGRESS NOTES
Demographic information:  Name: Arlene Lira  : 1950  Unit/Bed: 3B-24/024-A  MRN: [id number]  Code Status: Full Code  Date of admission: 2024  Date of service: Pt seen/examined on 24    Assessment/Plan:  Permanent atrial fibrillation with RVR: RVR improved overnight on Cardizem drip.  Drip was stopped this morning and her home medications were administered.  Patient remained rate controlled all day.  Likely went into RVR because she missed her morning cardiac medications and has UTI.  Continue Eliquis.  Monitor on telemetry.  Continue Lopressor 100 mg twice daily, digoxin 125 mcg, verapamil 250 mg twice daily.  Digoxin level 0.5 today.  Will check another digoxin level in the morning.  UTI: Continue Rocephin and transition to Augmentin at discharge.  HFpEF: EF 55-60% in 2021.    Continue GDMT as tolerated -on chlorthalidone, Cozaar, Lopressor.  Monitor daily weights and strict I's/O.  Primary HTN: Continue clonidine, chlorthalidone, Cozaar, Lopressor, verapamil.  CAD: Continue Plavix and pravastatin.  COPD: Not in acute exacerbation.  Continue home inhalers.  Depression: Continue Lexapro, Cymbalta, trazodone.  GERD: Continue Protonix.  Psoriatic arthritis: Follows with Dr. Roberts. Continue sulfasalazine.  SHANNEN: CPAP.    Subjective: Patient assessed sitting upright in her chair.  Heart rate has improved since midnight.  Reports she did not take her home blood pressure and cardiac medications in the morning because she was going to her doctor's appointment.  Reports blood pressure was in the 200s at her rheumatologist office.  Denies feeling palpitations, headache, dizziness, chest pain, shortness of breath, abdominal pain.  Does report dysuria and some flank pain.  Denies urinary frequency.  Does have urinary incontinence which has been ongoing for years.    Initial HPI: \"Patient transferred to the ED from an outpatient office with hypertension and tachycardia.  Patient has had refractory 
    Demographic information:  Name: Arlene Lira  : 1950  Unit/Bed: 3B-24/024-A  MRN: [id number]  Code Status: Full Code  Date of admission: 2024  Date of service: Pt seen/examined on 24    Assessment/Plan:  Permanent atrial fibrillation with slow ventricular response: RVR initially improved on Cardizem drip and after reinitiating her home medications. Patient has remained rate controlled but is now dropping into the 30s-40s.  Remains asymptomatic.  Likely went into RVR because she missed her morning cardiac medications and has UTI.  Continue Eliquis.  Monitor on telemetry.  Hold Lopressor 100 mg twice daily, digoxin 125 mcg, verapamil 250 mg twice daily -check digoxin level and monitor overnight off of all these medications.  Will monitor heart rate overnight on telemetry and consider cardiology consultation if needed  UTI with hematuria & retention: Ucx on  grew E. Coli.   Continue Rocephin.   Remove Monroy catheter and do postvoid trial today.    Hematuria resolved.  Avoid Pyridium.   HFpEF: EF 55-60% in 2021.    Continue GDMT as tolerated -on chlorthalidone, Cozaar, Lopressor.  Monitor daily weights and strict I's/O.  Primary HTN: Continue clonidine, chlorthalidone, Cozaar, Lopressor, verapamil. -Hold medications for bradycardia and reinitiate when indicated.  CAD: Continue Plavix and pravastatin.  COPD: Not in acute exacerbation.  Continue home inhalers.  Depression: Continue Lexapro, Cymbalta, trazodone.  GERD: Continue Protonix.  Psoriatic arthritis: Follows with Dr. Roberts. Continue sulfasalazine.  SHANNEN: CPAP.    Subjective: Patient assessed sitting upright at the side of her bed.  Reports relief of abdominal pain after inserting Monroy catheter.  Hematuria has resolved and urine is still mildly orange from Pyridium likely.   Denies CP/SOB/N/V/D/C, dizziness, palpitations, fevers, chills or back pain.    Initial HPI: \"Patient transferred to the ED from an outpatient office with 
    Demographic information:  Name: Arlene Lira  : 1950  Unit/Bed: 3B-24/024-A  MRN: [id number]  Code Status: Full Code  Date of admission: 2024  Date of service: Pt seen/examined on 24    Assessment/Plan:    Permanent atrial fibrillation with slow ventricular response: RVR initially improved on Cardizem drip and after reinitiating her home medications. Patient has remained rate controlled but is now dropping into the 30s-40s.  Remains asymptomatic.  Likely went into RVR because she missed her morning cardiac medications and has UTI.  Continue Eliquis.  Monitor on telemetry.  Cardiology consulted appreciate input.  Hold Verapamil and digoxin.  Metoprolol reduced to 25mg BID.  Event monitor on dc.  Echo.  Monitor on telemetry.   UTI with hematuria & retention: Ucx on  grew E. Coli.   Continue Rocephin.   Bladder scan showed retention of over 400 cc again today.  Monroy catheter reinserted and urology consulted.  She may need to be discharged with a Monroy catheter.  Hematuria resolved.  Avoid Pyridium.   HFpEF: EF 55-60% in 2021.    Continue GDMT as tolerated -on chlorthalidone, Cozaar, Lopressor.  Monitor daily weights and strict I's/O.  Primary HTN: Continue  chlorthalidone, Cozaar, Lopressor, verapamil. -Hold medications for bradycardia and reinitiate when indicated.  Discontinue clonidine due to multiple drug interactions.   Add as needed for blood pressure if needed.  CAD: Continue Plavix and pravastatin.  COPD: Not in acute exacerbation.  Continue home inhalers.  Depression: Continue Lexapro, Cymbalta, trazodone.  GERD: Continue Protonix.  Psoriatic arthritis: Follows with Dr. Roberts. Continue sulfasalazine.  SHANNEN: CPAP.    Subjective:   Patient seen and examined at bedside.  States that she is doing well.  Patient denies any chest pain, shortness of breath, nausea or vomiting.  Monroy catheter in place and draining well.  Overnight patient did have another bradycardic episode with heart 
   02/26/24 1022   Encounter Summary   Encounter Overview/Reason  Initial Encounter   Service Provided For: Patient   Referral/Consult From: [facility]   Support System Family members   Last Encounter  02/26/24   Complexity of Encounter Low   Begin Time 0935   End Time  0945   Total Time Calculated 10 min   Assessment/Intervention/Outcome   Assessment Calm   Intervention Active listening   Outcome Encouraged   Plan and Referrals   Plan/Referrals Continue to visit, (comment)     ASSESSMENT  The patient is a 74-yr-old who has Afib. She is coping with the condition while waiting for further procedure. She was sitting up and was ready for conversation and prayer. She has her family and the Muslim in the support system.     INTERVENTION    She responded to me very well when I mentioned to her about what the chaplains do at [facility].   Arlene wanted prayer for her condition. I prayed with her for her healing and health.     OUTCOME  She was told to contact the Spiritual Care office, should there be a spiritual need for her. She said she would..     CARE PLAN  Arlene will welcome further ministry from chaplains.                                                                                                                                             
  Physician Progress Note      PATIENT:               JILLIAN GRIFFITH  CSN #:                  [id number]  :                       1950  ADMIT DATE:       2024 4:18 PM  DISCH DATE:  RESPONDING  PROVIDER #:        Cullen Soto MD          QUERY TEXT:    Patient admitted with Afib with RVR, noted to have atrial fibrillation and is   maintained on Eliquis. If possible, please document in progress notes and   discharge summary if you are evaluating and/or treating any of the following:    The medical record reflects the following:  Risk Factors: Elderly, afib, CAD, HTN ,CHF  Clinical Indicators: Afib on Eliquis  Treatment: lab monitoring, medication management    Thank You!  Lydia Colroado RN, CRCR  RN Clinical Documentation Integrity  Options provided:  -- Secondary hypercoagulable state in a patient with atrial fibrillation  -- Other - I will add my own diagnosis  -- Disagree - Not applicable / Not valid  -- Disagree - Clinically unable to determine / Unknown  -- Refer to Clinical Documentation Reviewer    PROVIDER RESPONSE TEXT:    This patient has secondary hypercoagulable state in a patient with atrial   fibrillation.    Query created by: Lydia Colorado on 2024 1:46 PM      Electronically signed by:  Cullen Soto MD 2024 2:13 PM          
 [facility]  INPATIENT PHYSICAL THERAPY  DAILY NOTE  STRZ MED SURG 8AB - 8A-04/004-A    Time In: 1304  Time Out: 1327  Timed Code Treatment Minutes: 23 Minutes  Minutes: 23          Date: 2024  Patient Name: Arlene Lira,  Gender:  female        MRN: [id number]  : 1950  (74 y.o.)     Referring Practitioner: Dr. KAR Kang  Diagnosis: essential HTN  Additional Pertinent Hx: admit with above diagnosis, permanent a fib, UTI, HF, HTN, CAD, COPD, depression, GERD, psoriatic arthritis, SHANNEN     Prior Level of Function:  Lives With: Spouse (and grandson)  Type of Home: House  Home Layout: One level  Home Access: Stairs to enter with rails  Entrance Stairs - Number of Steps: 1 step down into laundry room; 3 steps into home through back  Home Equipment: Cane, Walker, rolling, Rollator, Cane, quad   Bathroom Shower/Tub: Tub/Shower unit  Bathroom Toilet: Handicap height  Bathroom Equipment: Grab bars in shower    ADL Assistance: Independent  Homemaking Assistance: Independent (shares IADL tasks with ; he primarily does grocery shopping and loading washer/dryer)  Ambulation Assistance: Independent  Transfer Assistance: Independent  Active : Yes  Additional Comments: Pt uses cane in home; uses RW/rollator when camping at [place]    Restrictions/Precautions:  Restrictions/Precautions: General Precautions, Fall Risk     SUBJECTIVE: RN approved session. Pt pleasant and agreeable to therapy. Pt reports possibly going home today.     PAIN: 0/10: denies pain     Vitals: Vitals not assessed per clinical judgement, see nursing flowsheet  Heart Rate:  throughout session       OBJECTIVE:  Bed Mobility:  Not Tested  Pt up in chair upon arrival and requesting to return to chair at end of session     Transfers:  Sit to Stand: Stand By Assistance, X 1, to/from chair with arms  Stand to Sit:Stand By Assistance, X 1, to/from chair with arms, uncontrolled descent     Ambulation:  Stand By 
Advanced Directives Consult: Pt wanted it done later.  She was sitting on the chair beside her bed as her sister was with her.  She was dealing with A-Fib issues.  She was encouraged and blessed. It was appreciated.    02/20/24 1245   Encounter Summary   Encounter Overview/Reason  Advance Care Planning   Service Provided For: Patient and family together   Referral/Consult From: Nurse   Support System Family members   Last Encounter  02/20/24   Complexity of Encounter Low   Begin Time 0825   End Time  0831   Total Time Calculated 6 min   Spiritual/Emotional needs   Type Spiritual Support   Advance Care Planning   Type ACP conversation   Assessment/Intervention/Outcome   Assessment Hopeful   Intervention Empowerment       
[facility]  INPATIENT OCCUPATIONAL THERAPY  STRZ CCU-STEPDOWN 3B  EVALUATION    Time:   Time In: 912  Time Out: 940  Timed Code Treatment Minutes: 16 Minutes  Minutes: 28          Date: 2024  Patient Name: Arlene Lira,   Gender: female      MRN: [id number]  : 1950  (74 y.o.)  Referring Practitioner: Darren Kang DO  Diagnosis: essential hypertension  Additional Pertinent Hx: Per H&P: Patient transferred to the ED from an outpatient office with hypertension and tachycardia.  Patient has had refractory blood pressures in the past and reports recently she has not been overly compliant with her blood pressure medications.  She has been taking her Eliquis.  The patient's heart rates were initially in the 200s but were brought down with metoprolol IVP x 3.  The patient's blood pressures were never unstable so there was no indication for DCCV. The patient was started on diltiazem drip with good results.    Restrictions/Precautions:  Restrictions/Precautions: General Precautions, Fall Risk    Subjective  Chart Reviewed: Yes, Orders, Progress Notes, History and Physical  Patient assessed for rehabilitation services?: Yes  Family / Caregiver Present: No    Subjective: Pt seated in bedside chair upon arrival just finishing breakfast. Pt agreeable to OT session, hopeful to go home later today.    Pain: 0/10:     Vitals: Heart Rate: 88 bpm, remained in 80s-low 100s throughout    Social/Functional History:  Lives With: Spouse (and grandson)  Type of Home: House  Home Layout: One level  Home Access: Stairs to enter with rails  Entrance Stairs - Number of Steps: 1 step down into laundry room; 3 steps into home through back  Home Equipment: Cane, Walker, rolling, Rollator, Cane, quad   Bathroom Shower/Tub: Tub/Shower unit  Bathroom Toilet: Handicap height  Bathroom Equipment: Grab bars in shower       ADL Assistance: Independent  Homemaking Assistance: Independent (shares IADL tasks with ; he 
CLINICAL PHARMACY: DISCHARGE MED RECONCILIATION/REVIEW    [facility] Patient?: No  Total # of Interventions Recommended: 5 -   - Discontinued Medication #: 1  - Updated Order #: 4     Total # Interventions Accepted: 5  Intervention Severity:   - Level 1 Intervention Present?: Yes - 1 -    - Level 2 #: 0   - Level 3 #: 4   Time Spent (min): 60    Irina Navarro PharmD 2/23/2024 4:43 PM  
Cardiology Progress Note      Patient:  Arlene Lira  YOB: 1950  MRN: [id number]   Acct: [id number]  Admit Date:  2/19/2024  Primary Cardiologist: Allison Woody MD  Seen by Dr. Sy     Per prior cardiology consult note-  REASON FOR CONSULT:    asx bradycardia, permanent afib pt chronically on lopressor 150 bid and varapamil 240 BID, and dig 125          CHIEF COMPLAINT:    Uncontrolled hypertension      HISTORY OF PRESENT ILLNESS:    Arlene Lira is a pleasant 74 year old female patient with past medical history that includes:   Past Medical History        Past Medical History:   Diagnosis Date    Allergic rhinitis      Arthritis      Atrial fibrillation (HCC)      CAD (coronary artery disease)      Cancer (HCC)       skin    Carotid arterial disease (HCC)      Cerebrovascular disease      CHF (congestive heart failure) (HCC)      Congenital heart disease      COPD (chronic obstructive pulmonary disease) (HCC)      Depression      Fibromyalgia      Hearing loss      Hypertension      Hypothyroidism      Obesity      Osteoarthritis      S/P bariatric surgery 2018    Sleep apnea       CPAP    Thyroid disease      TIA (transient ischemic attack) 12/2015    Urinary incontinence        Has h/o AF, CAD. In 2020, she underwent PCI/KIMBERLY of LM and LAD. Echocardiogram in 2021 revealed an EF of 55-60%. She is followed by Dr Woody. Her atrial fibrillation seems to be permanent and she reports prior failed attempts for cardioversion. She reports that she is usually compliant with her medications, but has missed her medications on past Sunday. She was at her Rheumatologist office when she was noted to have elevated BP and tachycardia. States that her SBP was 220 at that time. She reports occasional mild dyspnea on exertion, stable without recent worsening. Otherwise, the patient denies chest pain,orthopnea, paroxysmal nocturnal dyspnea, palpitations, dizziness, syncope, recent weight gain or leg swelling. She 
Cardiology Progress Note      Patient:  Arlene Lira  YOB: 1950  MRN: [id number]   Acct: [id number]  Admit Date:  2/19/2024  Primary Cardiologist: Allison Woody MD    Note per dr schaffer \"REASON FOR CONSULT:    asx bradycardia, permanent afib pt chronically on lopressor 150 bid and varapamil 240 BID, and dig 125          CHIEF COMPLAINT:    Uncontrolled hypertension      HISTORY OF PRESENT ILLNESS:    Arlene Lira is a pleasant 74 year old female patient with past medical history that includes:   Past Medical History        Past Medical History:   Diagnosis Date    Allergic rhinitis      Arthritis      Atrial fibrillation (HCC)      CAD (coronary artery disease)      Cancer (HCC)       skin    Carotid arterial disease (HCC)      Cerebrovascular disease      CHF (congestive heart failure) (HCC)      Congenital heart disease      COPD (chronic obstructive pulmonary disease) (HCC)      Depression      Fibromyalgia      Hearing loss      Hypertension      Hypothyroidism      Obesity      Osteoarthritis      S/P bariatric surgery 2018    Sleep apnea       CPAP    Thyroid disease      TIA (transient ischemic attack) 12/2015    Urinary incontinence        Has h/o AF, CAD. In 2020, she underwent PCI/KIMBERLY of LM and LAD. Echocardiogram in 2021 revealed an EF of 55-60%. She is followed by Dr Woody. Her atrial fibrillation seems to be permanent and she reports prior failed attempts for cardioversion. She reports that she is usually compliant with her medications, but has missed her medications on past Sunday. She was at her Rheumatologist office when she was noted to have elevated BP and tachycardia. States that her SBP was 220 at that time. She reports occasional mild dyspnea on exertion, stable without recent worsening. Otherwise, the patient denies chest pain,orthopnea, paroxysmal nocturnal dyspnea, palpitations, dizziness, syncope, recent weight gain or leg swelling. She has required cardizem gtt initially due 
[facility]  PHYSICAL THERAPY MISSED TREATMENT NOTE  STRZ CCU-STEPDOWN 3B    Date: 2024  Patient Name: Arlene Lira        MRN: [id number]   : 1950  (74 y.o.)  Gender: female                REASON FOR MISSED TREATMENT:  Hold treatment per nursing request.  Per nursing, pt having increased difficulty urinating, planning straight cath and bladder scan shortly. Pt also having increased pain, recommend to hold. Will check back as able.       
[facility]  INPATIENT PHYSICAL THERAPY  EV[facility]    Time In: 0711  Time Out: 0734  Timed Code Treatment Minutes: 12 Minutes  Minutes: 23          Date: 2024  Patient Name: Arlene Lira,  Gender:  female        MRN: [id number]  : 1950  (74 y.o.)      Referring Practitioner: Dr. KAR Kang  Diagnosis: essential HTN  Additional Pertinent Hx: admit with above diagnosis, permanent a fib, UTI, HF, HTN, CAD, COPD, depression, GERD, psoriatic arthritis, SHANNEN     Restrictions/Precautions:  Restrictions/Precautions: General Precautions, Fall Risk    Subjective:  Chart Reviewed: Yes  Patient assessed for rehabilitation services?: Yes  Subjective: pleasant and cooperative, pt stated feels close to baseline but has dec balance at baseline. discussed outpt PT at discharge to improve balance and pt agreeable.    General:        Hearing: Within functional limits       Pain: no pain per pt    Vitals: Vitals not assessed per clinical judgement, see nursing flowsheet    Social/Functional History:    Lives With: Spouse (and grandson)  Type of Home: House  Home Layout: One level  Home Access: Stairs to enter with rails  Entrance Stairs - Number of Steps: 1 step down into laundry room; 3 steps into home through back  Home Equipment: Cane, Walker, rolling, Rollator, Cane, quad     Bathroom Shower/Tub: Tub/Shower unit  Bathroom Toilet: Handicap height  Bathroom Equipment: Grab bars in shower       ADL Assistance: Independent  Homemaking Assistance: Independent (shares IADL tasks with ; he primarily does grocery shopping and loading washer/dryer)  Ambulation Assistance: Independent  Transfer Assistance: Independent    Active : Yes  Leisure & Hobbies: 4 dogs  Additional Comments: Pt uses cane in home; uses RW/rollator when camping at [place]    OBJECTIVE:  Range of Motion:  Bilateral Lower Extremity: WFL    Strength:  Bilateral Lower Extremity: WFL, min 
Hand off report given to primary nurse, Jaqueline. Pt. Sitting in chair eating lunch. Expressed no concerns at this time.    Osmar Trammell, RSC, SN  
Heart rate dropped to 40s/min while sleeping. Back to 60s/min when awake. Dr Kang updated  
No changes from prior assessment. No concerns voiced at this time.   
Patient educated on how to use incentive spirometer. Patient verbalized understanding and demonstrated proper use. Emphasized importance and usage of device, with coughing and deep breathing every 4 hours    
Patient educated on how to use incentive spirometer. Patient verbalized understanding and demonstrated proper use. Emphasized importance and usage of device, with coughing and deep breathing every 4 hours while awake.        
Patient in recliner visiting with guest. Call light within reach. Chair alarm on.   
Patient is in bed eating lunch. Respirations are regular and unlabored. No concerns voiced at this time. Call light within reach.  NIKOLE JJ  
Patient is in chair asleep. Respirations are regular and unlabored. No concerns voiced at this time. Call light with in rosario.   NIKOLE JJ  
Patient lying in bed resting. Call light within reach. Bed alarm on. Report given to primary RN.   
Patient sitting in recliner reading. Diltiazem IV stopped. No redness or edema at IV site. No other concerns voiced. Call light within reach. Chair alarm on.   
Patient up in chair visiting with sibling. Patient A&O x3. Speech is appropriate and clear. Oral cavity pink and moist. Pupils 3mm to 2mm; roud, equal and reactive to light. Patient capillary refill and skin turgor <3 sec. Arm drift negative. Hand  strong and equal bilaterally. No edema present on any extremities. IV infusing dilTIAZem 2.5 mg/hr at right wrist. No redness or edema present at IV site. Irregular heart rhythm. Lung sounds clear throughout. Abdomen round and soft; no tenderness noted. Bowel sounds active. No skin issues noted. Pedal push and pull strong and equal bilaterally. Patient denied any pain at this time. Call light within reach. Chair alarm is on.   
Patient up to chair. Tolerated well. Voices no concerns at this time. Call light within reach.  Charo JJ  
Pt admitted to  3B24 in a wheelchair.   Complaints: hypertension and Afib  IV Cardizem infusing into the wrist right, condition patent and no redness at a rate of 7.5 mls/ hour with about 100 mls in the bag still. IV site free of s/s of infection or infiltration. Vital signs obtained. Assessment and data collection initiated. Two nurse skin assessment performed by Edith BELL and Sindhu BELL. Oriented to room. Policies and procedures for 3B explained.   Bed alarm on. Call light in reach. All questions answered with no further questions at this time.       
Report given to me with primary nurse, Jaqueline. Pt. Is awake talking to therapy.     Osmar Trammell, RSC, SN  
    Recent Labs     02/19/24  1715   AST 24   ALT 20   BILITOT 0.3   ALKPHOS 114     No results for input(s): \"INR\" in the last 72 hours.  No results for input(s): \"TROPONINT\" in the last 72 hours.  No results for input(s): \"PROCAL\" in the last 72 hours.   Lab Results   Component Value Date/Time    NITRU NEGATIVE 02/19/2024 08:00 PM    WBCUA > 100 02/19/2024 08:00 PM    BACTERIA FEW 02/19/2024 08:00 PM    RBCUA 0-2 02/19/2024 08:00 PM    BLOODU SMALL 02/19/2024 08:00 PM    SPECGRAV 1.025 08/17/2022 11:36 AM    GLUCOSEU NEGATIVE 02/19/2024 08:00 PM       Fluids: None  Diet: ADULT DIET; Regular; 4 carb choices (60 gm/meal); Low Fat/Low Chol/High Fiber/EVELYN  PT/OT: Ordered  Expected discharge date: 2/23/2024  Disposition:   [x] Home  [] Inpatient Rehab  [] Psychiatric Unit  [] SNF  [] Long Term Care Facility  [] Other-  DVT prophylaxis:        [] Lovenox  [] SCDs  [] SQ Heparin  [] Encourage ambulation               [x] Already on Anticoagulation -Eliquis    Electronically signed by Darren Kang DO on 2/21/2024 at 3:59 PM.  Case and plan developed in coordination with Dalia Lopez MD.  
  Capillary Refill: Brisk,< 3 seconds.  Peripheral Pulses: +2 palpable, equal bilaterally.     Labs:   Recent Labs     02/21/24  0504 02/21/24  1543 02/22/24  0509 02/23/24  0415   WBC 9.7  --  11.1* 10.8   HGB 10.4* 11.1* 10.3* 10.7*   HCT 34.2* 36.5* 33.7* 34.8*     --  253 247     Recent Labs     02/21/24  0504 02/22/24  0509 02/23/24  0415    138 140   K 3.9 4.2 4.4    103 104   CO2 25 24 23   BUN 26* 22 24*   CREATININE 1.1 0.7 0.8   CALCIUM 9.1 9.4 9.3     No results for input(s): \"AST\", \"ALT\", \"BILIDIR\", \"BILITOT\", \"ALKPHOS\" in the last 72 hours.    No results for input(s): \"INR\" in the last 72 hours.  No results for input(s): \"TROPONINT\" in the last 72 hours.  No results for input(s): \"PROCAL\" in the last 72 hours.   Lab Results   Component Value Date/Time    NITRU NEGATIVE 02/19/2024 08:00 PM    WBCUA > 100 02/19/2024 08:00 PM    BACTERIA FEW 02/19/2024 08:00 PM    RBCUA 0-2 02/19/2024 08:00 PM    BLOODU SMALL 02/19/2024 08:00 PM    SPECGRAV 1.025 08/17/2022 11:36 AM    GLUCOSEU NEGATIVE 02/19/2024 08:00 PM       Fluids: None  Diet: ADULT DIET; Regular; 4 carb choices (60 gm/meal); Low Fat/Low Chol/High Fiber/EVELYN  PT/OT: Ordered  Expected discharge date: 2/25/2024  Disposition:   [x] Home  [] Inpatient Rehab  [] Psychiatric Unit  [] SNF  [] Long Term Care Facility  [] Other-  DVT prophylaxis:        [] Lovenox  [] SCDs  [] SQ Heparin  [] Encourage ambulation               [x] Already on Anticoagulation -Eliquis    Electronically signed by Darren Kang DO on 2/23/2024 at 5:55 PM.  Case and plan developed in coordination with Dalia Lopez MD.  
focal sensory/motor deficits in the upper and lower extremities.   Psychiatric: Alert and oriented, normal insight and thought content.   Capillary Refill: Brisk,< 3 seconds.  Peripheral Pulses: +2 palpable, equal bilaterally.     Labs:   Recent Labs     02/23/24 0415 02/24/24 0452 02/25/24  0537   WBC 10.8 10.8 10.8   HGB 10.7* 10.3* 10.6*   HCT 34.8* 34.2* 35.4*    224 256     Recent Labs     02/23/24 0415 02/24/24 0452 02/25/24  0537    140 142   K 4.4 4.0 4.7    104 104   CO2 23 25 28   BUN 24* 21 18   CREATININE 0.8 0.7 0.8   CALCIUM 9.3 9.2 9.4   PHOS  --  3.5  --      No results for input(s): \"AST\", \"ALT\", \"BILIDIR\", \"BILITOT\", \"ALKPHOS\" in the last 72 hours.    No results for input(s): \"INR\" in the last 72 hours.  No results for input(s): \"TROPONINT\" in the last 72 hours.  No results for input(s): \"PROCAL\" in the last 72 hours.   Lab Results   Component Value Date/Time    NITRU NEGATIVE 02/19/2024 08:00 PM    WBCUA > 100 02/19/2024 08:00 PM    BACTERIA FEW 02/19/2024 08:00 PM    RBCUA 0-2 02/19/2024 08:00 PM    BLOODU SMALL 02/19/2024 08:00 PM    SPECGRAV 1.025 08/17/2022 11:36 AM    GLUCOSEU NEGATIVE 02/19/2024 08:00 PM       Fluids: None  Diet: ADULT DIET; Regular; 4 carb choices (60 gm/meal); Low Fat/Low Chol/High Fiber/EVELYN  PT/OT: Ordered  Expected discharge date: 2/25/2024  Disposition:   [x] Home  [] Inpatient Rehab  [] Psychiatric Unit  [] SNF  [] Long Term Care Facility  [] Other-  DVT prophylaxis:        [] Lovenox  [] SCDs  [] SQ Heparin  [] Encourage ambulation               [x] Already on Anticoagulation -Eliquis    Electronically signed by Darren Kang DO on 2/25/2024 at 4:25 PM.  Case and plan developed in coordination with Dalia Lopez MD.

## 2024-02-26 NOTE — PLAN OF CARE
Problem: Discharge Planning  Goal: Discharge to home or other facility with appropriate resources  2/20/2024 1306 by Sharon Alvarez, RN  Outcome: Progressing  Flowsheets (Taken 2/19/2024 2349 by Kiana Schumacher, RN)  Discharge to home or other facility with appropriate resources:   Identify barriers to discharge with patient and caregiver   Identify discharge learning needs (meds, wound care, etc)   Refer to discharge planning if patient needs post-hospital services based on physician order or complex needs related to functional status, cognitive ability or social support system   Arrange for needed discharge resources and transportation as appropriate  Note: She is from home with her  and plans on returning there at discharge.  Denies discharge needs/services.    2/20/2024 0236 by Kiana Schumacher RN  Outcome: Progressing  Flowsheets  Taken 2/19/2024 2349  Discharge to home or other facility with appropriate resources:   Identify barriers to discharge with patient and caregiver   Identify discharge learning needs (meds, wound care, etc)   Refer to discharge planning if patient needs post-hospital services based on physician order or complex needs related to functional status, cognitive ability or social support system   Arrange for needed discharge resources and transportation as appropriate  Taken 2/19/2024 2230  Discharge to home or other facility with appropriate resources:   Identify barriers to discharge with patient and caregiver   Arrange for needed discharge resources and transportation as appropriate   Identify discharge learning needs (meds, wound care, etc)   Refer to discharge planning if patient needs post-hospital services based on physician order or complex needs related to functional status, cognitive ability or social support system     Problem: Safety - Adult  Goal: Free from fall injury  2/20/2024 1306 by Sharon Alvarez, RN  Outcome: Progressing  Flowsheets (Taken 2/20/2024 0236 by 
  Problem: Discharge Planning  Goal: Discharge to home or other facility with appropriate resources  2/21/2024 0137 by Pantera Wilson RN  Outcome: Progressing  Flowsheets (Taken 2/21/2024 0137)  Discharge to home or other facility with appropriate resources:   Identify barriers to discharge with patient and caregiver   Identify discharge learning needs (meds, wound care, etc)     Problem: Safety - Adult  Goal: Free from fall injury  2/21/2024 0137 by Pantera Wilson RN  Outcome: Progressing  Note: Bed locked & in low position, call light in reach, side-rails up x2, bed/chair alarm utilized, non-slip socks on when ambulating, reminded patient to use call light to call for assistance.      Problem: ABCDS Injury Assessment  Goal: Absence of physical injury  2/21/2024 0137 by Pantera Wilson RN  Outcome: Progressing  Flowsheets (Taken 2/21/2024 0137)  Absence of Physical Injury: Implement safety measures based on patient assessment     Problem: Respiratory - Adult  Goal: Achieves optimal ventilation and oxygenation  2/21/2024 0137 by Pantera Wilson RN  Outcome: Progressing  Flowsheets (Taken 2/21/2024 0137)  Achieves optimal ventilation and oxygenation:   Assess for changes in respiratory status   Position to facilitate oxygenation and minimize respiratory effort   Assess for changes in mentation and behavior     Problem: Skin/Tissue Integrity - Adult  Goal: Skin integrity remains intact  2/21/2024 0137 by Pantera Wilson RN  Outcome: Progressing  Flowsheets (Taken 2/21/2024 0137)  Skin Integrity Remains Intact: Monitor for areas of redness and/or skin breakdown     Problem: Metabolic/Fluid and Electrolytes - Adult  Goal: Electrolytes maintained within normal limits  2/21/2024 0137 by Pantera Wilson RN  Outcome: Progressing  Flowsheets (Taken 2/21/2024 0137)  Electrolytes maintained within normal limits: Monitor labs and assess patient for signs and symptoms of electrolyte imbalances   Care plan reviewed with patient.  
  Problem: Discharge Planning  Goal: Discharge to home or other facility with appropriate resources  2/26/2024 1055 by Lissette Curtis RN  Outcome: Progressing  2/26/2024 0220 by Alisson Felming RN  Outcome: Progressing  Note: Patient will discharge home when appropriate.       Problem: Safety - Adult  Goal: Free from fall injury  2/26/2024 1055 by Lissette Curtis RN  Outcome: Progressing  2/26/2024 0220 by Alisson Fleming RN  Outcome: Progressing  Note: Patient remains free of falls. Call light in reach. Bed in low position.       Problem: Respiratory - Adult  Goal: Achieves optimal ventilation and oxygenation  Recent Flowsheet Documentation  Taken 2/26/2024 0730 by Deepali Snow RCP  Achieves optimal ventilation and oxygenation: Assess for changes in respiratory status     Problem: Infection - Adult  Goal: Absence of infection during hospitalization  2/26/2024 1055 by Lissette Curtis RN  Outcome: Progressing  2/26/2024 0220 by Alisson Fleming RN  Outcome: Progressing  Note: Remains free of signs and symptoms of infection      
  Problem: Discharge Planning  Goal: Discharge to home or other facility with appropriate resources  Outcome: Progressing  Flowsheets  Taken 2/19/2024 2349  Discharge to home or other facility with appropriate resources:   Identify barriers to discharge with patient and caregiver   Identify discharge learning needs (meds, wound care, etc)   Refer to discharge planning if patient needs post-hospital services based on physician order or complex needs related to functional status, cognitive ability or social support system   Arrange for needed discharge resources and transportation as appropriate  Taken 2/19/2024 2230  Discharge to home or other facility with appropriate resources:   Identify barriers to discharge with patient and caregiver   Arrange for needed discharge resources and transportation as appropriate   Identify discharge learning needs (meds, wound care, etc)   Refer to discharge planning if patient needs post-hospital services based on physician order or complex needs related to functional status, cognitive ability or social support system     Problem: Safety - Adult  Goal: Free from fall injury  Outcome: Progressing  Flowsheets (Taken 2/20/2024 0236)  Free From Fall Injury:   Instruct family/caregiver on patient safety   Based on caregiver fall risk screen, instruct family/caregiver to ask for assistance with transferring infant if caregiver noted to have fall risk factors     Problem: ABCDS Injury Assessment  Goal: Absence of physical injury  Outcome: Progressing  Flowsheets (Taken 2/19/2024 2346)  Absence of Physical Injury: Implement safety measures based on patient assessment    Care plan reviewed with pt. Pt verbalizes understanding of the care plan and contributed to goal setting.       
  Problem: Discharge Planning  Goal: Discharge to home or other facility with appropriate resources  Outcome: Progressing  Flowsheets (Taken 2/22/2024 0908)  Discharge to home or other facility with appropriate resources:   Identify barriers to discharge with patient and caregiver   Arrange for needed discharge resources and transportation as appropriate   Identify discharge learning needs (meds, wound care, etc)     Problem: Safety - Adult  Goal: Free from fall injury  Outcome: Progressing  Flowsheets (Taken 2/22/2024 0908)  Free From Fall Injury: Instruct family/caregiver on patient safety     Problem: ABCDS Injury Assessment  Goal: Absence of physical injury  Outcome: Progressing  Flowsheets (Taken 2/22/2024 0908)  Absence of Physical Injury: Implement safety measures based on patient assessment     Problem: Respiratory - Adult  Goal: Achieves optimal ventilation and oxygenation  Outcome: Progressing  Flowsheets (Taken 2/22/2024 0908)  Achieves optimal ventilation and oxygenation:   Assess for changes in respiratory status   Assess for changes in mentation and behavior     Problem: Skin/Tissue Integrity - Adult  Goal: Skin integrity remains intact  Outcome: Progressing  Flowsheets (Taken 2/22/2024 0908)  Skin Integrity Remains Intact: Monitor for areas of redness and/or skin breakdown     Problem: Metabolic/Fluid and Electrolytes - Adult  Goal: Electrolytes maintained within normal limits  Outcome: Progressing  Flowsheets (Taken 2/22/2024 0908)  Electrolytes maintained within normal limits:   Monitor labs and assess patient for signs and symptoms of electrolyte imbalances   Administer electrolyte replacement as ordered     Problem: Genitourinary - Adult  Goal: Urinary catheter remains patent  Outcome: Progressing  Flowsheets (Taken 2/22/2024 0908)  Urinary catheter remains patent: Assess patency of urinary catheter     Problem: Pain  Goal: Verbalizes/displays adequate comfort level or baseline comfort 
  Problem: Infection - Adult  Goal: Absence of infection during hospitalization  Outcome: Progressing   Care plan reviewed with patient and verbalize understanding of the plan of care and contribute to goal setting.     
  Problem: Respiratory - Adult  Goal: Clear lung sounds  2/24/2024 1756 by Yusra Loyd, RCP  Outcome: Progressing   Pt continues on MDI for maintenance of COPD and pt does MDI at home. Patient mutually agreed on goals.      
  Problem: Respiratory - Adult  Goal: Clear lung sounds  2/25/2024 1757 by Yusra Loyd, RCP  Outcome: Progressing   Pt continues on MDI for maintenance of COPD and pt does MDI at home. Patient mutually agreed on goals.    
  Problem: Respiratory - Adult  Goal: Clear lung sounds  Outcome: Progressing     
  Problem: Respiratory - Adult  Goal: Clear lung sounds  Outcome: Progressing    Patient lung sounds are considered normal for their current lung condition. No signs of distress noted. Current treatment regimen appropriate      Patient mutually agreed on goals.      
  Problem: Respiratory - Adult  Goal: Clear lung sounds  Outcome: Progressing   Continue inhaler for COPD maintenance as pt takes Advair at home. Patient mutually agreed on goals.    
 Pt. Verbalizes understanding of care plan. Patient contributes to goal settings.    
Patient educated on how to use incentive spirometer. Patient verbalized understanding and demonstrated proper use. Emphasized importance and usage of device, with coughing and deep breathing every 4 hours while awake.        
Patient gamble catheter bag changed to leg bag per patient request. Educated patient on need to change back to gamble bag during sleeping hours. Educated on twice daily gamble care. Voiced understanding.   
Kiana Schumacher, RN)  Free From Fall Injury:   Instruct family/caregiver on patient safety   Based on caregiver fall risk screen, instruct family/caregiver to ask for assistance with transferring infant if caregiver noted to have fall risk factors  Note: Bed locked & in low position, call light in reach, side-rails up x2, bed/chair alarm utilized, non-slip socks on when ambulating, reminded patient to use call light to call for assistance.    2/20/2024 0236 by Kiana Schumacher RN  Outcome: Progressing  Flowsheets (Taken 2/20/2024 0236)  Free From Fall Injury:   Instruct family/caregiver on patient safety   Based on caregiver fall risk screen, instruct family/caregiver to ask for assistance with transferring infant if caregiver noted to have fall risk factors     Problem: ABCDS Injury Assessment  Goal: Absence of physical injury  2/20/2024 1306 by Sharon Alvarez RN  Outcome: Progressing  2/20/2024 0236 by Kiana Schumacher RN  Outcome: Progressing  Flowsheets (Taken 2/19/2024 2346)  Absence of Physical Injury: Implement safety measures based on patient assessment     Problem: Respiratory - Adult  Goal: Clear lung sounds  2/20/2024 0514 by Chaya Farr, NAGA  Outcome: Progressing  Goal: Achieves optimal ventilation and oxygenation  Outcome: Progressing  Flowsheets (Taken 2/20/2024 1309)  Achieves optimal ventilation and oxygenation: Assess for changes in respiratory status  Note: She is on room air and sats fine >90%.     Problem: Skin/Tissue Integrity - Adult  Goal: Skin integrity remains intact  Outcome: Progressing  Flowsheets (Taken 2/20/2024 1309)  Skin Integrity Remains Intact: Monitor for areas of redness and/or skin breakdown  Note: Ongoing assessment & interventions provided throughout shift.  Skin assessments provided.  Encouraging/assisting patient to turn as needed.       Problem: Metabolic/Fluid and Electrolytes - Adult  Goal: Electrolytes maintained within normal limits  Outcome: 
Monitor and assess patient's chronic conditions and comorbid symptoms for stability, deterioration, or improvement   Collaborate with multidisciplinary team to address chronic and comorbid conditions and prevent exacerbation or deterioration     Achieves optimal ventilation and oxygenation:   Assess for changes in respiratory status   Assess for changes in mentation and behavior   Position to facilitate oxygenation and minimize respiratory effort   Encourage broncho-pulmonary hygiene including cough, deep breathe, incentive spirometry   Assess and instruct to report shortness of breath or any respiratory difficulty      Care plan reviewed with patient.  Patient verbalizes understanding of the care plan and contributed to goal setting.    
breathe, incentive spirometry   Assess and instruct to report shortness of breath or any respiratory difficulty  2/22/2024 1416 by Jaqueline Sanchez RN  Outcome: Completed  Flowsheets (Taken 2/22/2024 1001 by Osmar Trammell)  Achieves optimal ventilation and oxygenation: Assess for changes in respiratory status     Problem: Skin/Tissue Integrity - Adult  Goal: Skin integrity remains intact  Recent Flowsheet Documentation  Taken 2/22/2024 2332 by Cyndie Cali RN  Skin Integrity Remains Intact:   Monitor for areas of redness and/or skin breakdown   Assess vascular access sites hourly  Taken 2/22/2024 2020 by Cyndie Cali RN  Skin Integrity Remains Intact:   Monitor for areas of redness and/or skin breakdown   Assess vascular access sites hourly  2/22/2024 1416 by Jaqueline Sanchez RN  Outcome: Completed     Problem: Metabolic/Fluid and Electrolytes - Adult  Goal: Electrolytes maintained within normal limits  Recent Flowsheet Documentation  Taken 2/22/2024 2020 by Cyndie Cali RN  Electrolytes maintained within normal limits: Monitor labs and assess patient for signs and symptoms of electrolyte imbalances  2/22/2024 1416 by Jaqueline Sanchez RN  Outcome: Completed     Problem: Genitourinary - Adult  Goal: Urinary catheter remains patent  2/22/2024 1416 by Jaqueline Sanchez RN  Outcome: Completed     Problem: Pain  Goal: Verbalizes/displays adequate comfort level or baseline comfort level  Recent Flowsheet Documentation  Taken 2/22/2024 2020 by Cyndie Cali RN  Verbalizes/displays adequate comfort level or baseline comfort level:   Encourage patient to monitor pain and request assistance   Assess pain using appropriate pain scale   Administer analgesics based on type and severity of pain and evaluate response   Implement non-pharmacological measures as appropriate and evaluate response   Consider cultural and social influences on pain and pain management   Notify Licensed Independent Practitioner if 
comorbid symptoms for stability, deterioration, or improvement     Problem: Genitourinary - Adult  Goal: Urinary catheter remains patent  Outcome: Progressing  Flowsheets (Taken 2/21/2024 1302)  Urinary catheter remains patent: Assess patency of urinary catheter    Care plan reviewed with patient and family.  Patient and family verbalize understanding of the plan of care and contribute to goal setting.        
hospitalization:   Assess and monitor for signs and symptoms of infection   Monitor all insertion sites i.e., indwelling lines, tubes and drains   Monitor lab/diagnostic results     Problem: Skin/Tissue Integrity - Adult  Goal: Skin integrity remains intact  Recent Flowsheet Documentation  Taken 2/23/2024 2226 by Jos Sheffield RN  Skin Integrity Remains Intact: Monitor for areas of redness and/or skin breakdown    Care plan reviewed with patient.  Patient verbalizes understanding of the care plan and contributed to goal setting.

## 2024-02-26 NOTE — CARE COORDINATION
2/26/24, 11:23 AM EST    Patient goals/plan/ treatment preferences discussed by  and .  Patient goals/plan/ treatment preferences reviewed with patient/ family.  Patient/ family verbalize understanding of discharge plan and are in agreement with goal/plan/treatment preferences.  Understanding was demonstrated using the teach back method.  AVS provided by RN at time of discharge, which includes all necessary medical information pertaining to the patients current course of illness, treatment, post-discharge goals of care, and treatment preferences.     Services At/After Discharge: Outpatient and PT  [facility] OP PT       IMM Letter  IMM Letter given to Patient/Family/Significant other/Guardian/POA/by:: Rima BELL CM  IMM Letter date given:: 02/26/24  IMM Letter time given:: 1024     Pt verbalized understanding and gave permission for possible discharge within 4 hours of receiving IMM.      Unit secretary to follow up with scheduling for OP therapy.

## 2024-02-27 ENCOUNTER — TELEPHONE (OUTPATIENT)
Dept: CARDIOLOGY CLINIC | Age: 74
End: 2024-02-27

## 2024-02-27 ENCOUNTER — TELEPHONE (OUTPATIENT)
Dept: FAMILY MEDICINE CLINIC | Age: 74
End: 2024-02-27

## 2024-02-27 NOTE — TELEPHONE ENCOUNTER
[employer] strips faxed day 4 of 30 , report analysis : Atrial Fibrillation RVR sustained w/[employer]ifi[employer]           Dr. Woody advise?

## 2024-02-27 NOTE — TELEPHONE ENCOUNTER
Care Transitions Initial Follow Up Call    Outreach made within 2 business days of discharge: Yes    Patient: Arlene Lira Patient : [id number]   MRN: [id number]  Reason for Admission: There are no discharge diagnoses documented for the most recent discharge.  Discharge Date: 24       Spoke with: Patient     Discharge department/facility: [facility]    TCM Interactive Patient Contact:  Was patient able to fill all prescriptions: Yes  Was patient instructed to bring all medications to the follow-up visit: Yes  Is patient taking all medications as directed in the discharge summary? Yes  Does patient understand their discharge instructions: Yes  Does patient have questions or concerns that need addressed prior to 7-14 day follow up office visit: no    Scheduled appointment with PCP within 7-14 days    Follow Up  Future Appointments   Date Time Provider Department Center   2024  1:45 PM Eugene Kaur MD [facility] [place]   3/4/2024  3:15 PM Roya Clark, PT [facility] [place] [facility]   3/5/2024 10:15 AM Carlos Resendiz PA-C [facility] [place] [facility] [place]   3/22/2024 10:45 AM Allison Woody MD N [facility] - [place]   2024 10:30 AM Aldo Huang MD ENDO P - [place]   2024 11:30 AM Deepali Palma PA-C N [facility] - [place]       Ghada Mcpherson, [facility]

## 2024-02-27 NOTE — DISCHARGE SUMMARY
deficits in the upper and lower extremities.   Psychiatric: Alert and oriented, normal insight and thought content.   Capillary Refill: Brisk,< 3 seconds.  Peripheral Pulses: +2 palpable, equal bilaterally.          Significant Diagnostic Studies    Labs: For convenience and continuity at follow-up the following most recent labs are provided:  CBC:    Lab Results   Component Value Date/Time    WBC 10.8 02/26/2024 05:27 AM    HGB 11.2 02/26/2024 05:27 AM    HCT 37.7 02/26/2024 05:27 AM     02/26/2024 05:27 AM     Renal:    Lab Results   Component Value Date/Time     02/26/2024 05:27 AM    K 4.4 02/26/2024 05:27 AM    K 3.5 02/20/2024 04:30 AM     02/26/2024 05:27 AM    CO2 28 02/26/2024 05:27 AM    BUN 18 02/26/2024 05:27 AM    CREATININE 0.7 02/26/2024 05:27 AM    CALCIUM 9.2 02/26/2024 05:27 AM    PHOS 3.5 02/24/2024 04:52 AM       Radiology:   Vascular duplex lower extremity venous bilateral   Final Result   Normal venous ultrasound. No evidence for deep venous thrombosis.            **This report has been created using voice recognition software.  It may contain minor errors which are inherent in voice recognition technology.**      Final report electronically signed by Dr. Alvin Walters on 2/23/2024 3:22 PM      XR CHEST PORTABLE   Final Result   Impression:      The lungs are clear.      This document has been electronically signed by: Benny Crawford MD on    02/19/2024 06:39 PM             Consults:   IP CONSULT TO SPIRITUAL SERVICES  IP CONSULT TO UROLOGY  IP CONSULT TO CARDIOLOGY      Disposition: Home  Condition at Discharge: Stable    Code Status:  Prior     Patient Instructions:    Discharge lab work: per pcp   Activity: activity as tolerated  Diet: No diet orders on file      Follow-up visits:   Eugene Kaur MD  [id number] [facility] [id number] [phone]    Follow up on 2/29/2024  Follow up appointment February 29, 2024 at 1:45pm.         Discharge Medications:

## 2024-02-29 ENCOUNTER — OFFICE VISIT (OUTPATIENT)
Dept: FAMILY MEDICINE CLINIC | Age: 74
End: 2024-02-29

## 2024-02-29 VITALS
WEIGHT: 219.9 LBS | HEART RATE: 90 BPM | TEMPERATURE: 97.9 F | SYSTOLIC BLOOD PRESSURE: 128 MMHG | DIASTOLIC BLOOD PRESSURE: 74 MMHG | BODY MASS INDEX: 33.44 KG/M2 | OXYGEN SATURATION: 97 %

## 2024-02-29 DIAGNOSIS — I48.21 PERMANENT ATRIAL FIBRILLATION (HCC): Primary | ICD-10-CM

## 2024-02-29 DIAGNOSIS — J44.9 CHRONIC OBSTRUCTIVE PULMONARY DISEASE, UNSPECIFIED COPD TYPE (HCC): ICD-10-CM

## 2024-02-29 DIAGNOSIS — G47.33 OBSTRUCTIVE SLEEP APNEA ON CPAP: ICD-10-CM

## 2024-02-29 DIAGNOSIS — Z09 HOSPITAL DISCHARGE FOLLOW-UP: ICD-10-CM

## 2024-02-29 DIAGNOSIS — I10 ESSENTIAL HYPERTENSION: ICD-10-CM

## 2024-02-29 DIAGNOSIS — L40.50 PSORIATIC ARTHRITIS (HCC): ICD-10-CM

## 2024-02-29 RX ORDER — FLUTICASONE PROPIONATE AND SALMETEROL 250; 50 UG/1; UG/1
1 POWDER RESPIRATORY (INHALATION) EVERY 12 HOURS
Qty: 3 EACH | Refills: 3 | Status: SHIPPED | OUTPATIENT
Start: 2024-02-29

## 2024-02-29 RX ORDER — ALBUTEROL SULFATE 90 UG/1
AEROSOL, METERED RESPIRATORY (INHALATION)
Qty: 72 G | Refills: 3 | Status: SHIPPED | OUTPATIENT
Start: 2024-02-29

## 2024-02-29 RX ORDER — METOPROLOL TARTRATE 75 MG/1
TABLET, FILM COATED ORAL
Qty: 180 TABLET | Refills: 3 | Status: SHIPPED | OUTPATIENT
Start: 2024-02-29 | End: 2024-03-01 | Stop reason: DRUGHIGH

## 2024-02-29 SDOH — ECONOMIC STABILITY: FOOD INSECURITY: WITHIN THE PAST 12 MONTHS, THE FOOD YOU BOUGHT JUST DIDN'T LAST AND YOU DIDN'T HAVE MONEY TO GET MORE.: NEVER TRUE

## 2024-02-29 SDOH — ECONOMIC STABILITY: FOOD INSECURITY: WITHIN THE PAST 12 MONTHS, YOU WORRIED THAT YOUR FOOD WOULD RUN OUT BEFORE YOU GOT MONEY TO BUY MORE.: NEVER TRUE

## 2024-02-29 SDOH — ECONOMIC STABILITY: INCOME INSECURITY: HOW HARD IS IT FOR YOU TO PAY FOR THE VERY BASICS LIKE FOOD, HOUSING, MEDICAL CARE, AND HEATING?: NOT VERY HARD

## 2024-02-29 ASSESSMENT — PATIENT HEALTH QUESTIONNAIRE - PHQ9: DEPRESSION UNABLE TO ASSESS: URGENT/EMERGENT SITUATION

## 2024-02-29 NOTE — PROGRESS NOTES
exertion    Polyosteoarthritis    Postsurgical malabsorption    Bilateral carotid artery stenosis    S/P carotid endarterectomy    Positive colorectal cancer screening using DNA-based stool test    Dysphagia    Gastroesophageal reflux disease without esophagitis    Hypersomnia with sleep apnea    Fatigue    Essential hypertension    Sleep apnea    Coronary artery disease involving native heart without angina pectoris    S/P cardiac cath    S/P percutaneous transluminal angioplasty (PTA) with stent placement    Permanent atrial fibrillation (HCC)    Morbid (severe) obesity due to excess calories ([facility])    S/P angioplasty with stent of the LM to LAD feb 2020    Ischemic cardiomyopathy 45 %    Atherosclerotic heart disease of native coronary artery with unspecified angina pectoris    Psoriatic arthritis (HCC)    Dyslipidemia    Atrial fibrillation with rapid ventricular response ([facility])       Medications listed as ordered at the time of discharge from hospital     Medication List            Accurate as of February 29, 2024  2:25 PM. If you have any questions, ask your nurse or doctor.                START taking these medications      fluticasone-salmeterol 250-50 MCG/ACT Aepb diskus inhaler  Commonly known as: Advair Diskus  Inhale 1 puff into the lungs in the morning and 1 puff in the evening.  Started by: Eugene Kaur MD     Metoprolol Tartrate 75 MG Tabs  TAKE ONE  TABLET TWICE A DAY  Started by: Eugene Kaur MD            CHANGE how you take these medications      modafinil 200 MG tablet  Commonly known as: Provigil  Take 1 tablet by mouth daily for 180 days.  What changed:   when to take this  reasons to take this            CONTINUE taking these medications      albuterol sulfate  (90 Base) MCG/ACT inhaler  Commonly known as: Ventolin HFA  USE 2 INHALATIONS BY MOUTH EVERY 6 HOURS AS NEEDED FOR WHEEZING     chlorthalidone 25 MG tablet  Commonly known as: HYGROTON  Take 1 tablet by mouth daily

## 2024-03-01 RX ORDER — METOPROLOL TARTRATE 100 MG/1
100 TABLET ORAL 2 TIMES DAILY
COMMUNITY

## 2024-03-01 NOTE — TELEPHONE ENCOUNTER
Record reviewed  Recent admission to hospital for atr fib RVR and on cardizem drip. Then vic and meds adjusted  Sent home decreasing lopressor to 75 bid from 150 bid  Off calan sr 240  Seem meds wear off and HR going up    Recommend  Increase metoprolol to 100 bid from 75 bid now  F./U with me in office next week

## 2024-03-04 ENCOUNTER — HOSPITAL ENCOUNTER (OUTPATIENT)
Dept: PHYSICAL THERAPY | Age: 74
Setting detail: THERAPIES SERIES
Discharge: HOME | End: 2024-03-04
Payer: MEDICARE

## 2024-03-04 PROCEDURE — 97162 PT EVAL MOD COMPLEX 30 MIN: CPT

## 2024-03-04 RX ORDER — METOPROLOL TARTRATE 100 MG/1
100 TABLET ORAL 2 TIMES DAILY
Qty: 60 TABLET | Refills: 3 | Status: SHIPPED | OUTPATIENT
Start: 2024-03-04

## 2024-03-04 NOTE — PROGRESS NOTES
gait/balance    Short Term Goals:  Time Frame: 4 weeks   Arlene will demonstrate an ankle strategy to maintain standing balance with perturbations to her trunk and when transitioning from non-compliant to compliant surfaces.   Arlene's 5x sit to stand will improve to <12 without UE support indicating minimal fall risk.  Arlene's lateral hip stabilization strength will improve to 4/5 allowing her to transition to independent ambulation without \"falling\" off her weaker left LE.    Long Term Goals:  Time Frame: 8 weeks   Arlene will be discharged from PT with independent HEP to maintain all gains achieved in clinic.  Arlene's 5x sit to stand will improve to <10 indicating no fall risk.  Arlene will confidently walk on ramps, grass and other uneven surfaces without fear of falling as her balance and endurance improves.  Arlene will maintain standing balance with eyes closed for 30 sec with minimal sway indicating no fall risk.      Patient Education:   [x]  HEP/Education Completed: Plan of Care, Goals,   Medbridge Access Code:  []  No new Education completed  []  Reviewed Prior HEP      [x]  Patient verbalized and/or demonstrated understanding of education provided.  []  Patient unable to verbalize and/or demonstrate understanding of education provided.  Will continue education.  [x]  Barriers to learning: wearing a heart monitor     PLAN:  Treatment Recommendations: Strengthening, Range of Motion, Balance Training, Transfer Training, Gait Training, Stair Training, Neuromuscular Re-education, Manual Therapy - Soft Tissue Mobilization, Manual Therapy - Joint Manipulation, Pain Management, Home Exercise Program, Patient Education, Aquatics, and Modalities    [x]  Plan of care initiated.  Plan to see patient 2 times per week for 8 weeks to address the treatment planned outlined above.  []  Continue with current plan of care  []  Modify plan of care as follows:    []  Hold pending physician visit  []  Discharge    Time In 1535

## 2024-03-05 ENCOUNTER — OFFICE VISIT (OUTPATIENT)
Dept: UROLOGY | Age: 74
End: 2024-03-05
Payer: MEDICARE

## 2024-03-05 VITALS — HEIGHT: 68 IN | RESPIRATION RATE: 16 BRPM | WEIGHT: 219 LBS | BODY MASS INDEX: 33.19 KG/M2

## 2024-03-05 DIAGNOSIS — N39.3 SUI (STRESS URINARY INCONTINENCE, FEMALE): ICD-10-CM

## 2024-03-05 DIAGNOSIS — R33.8 ACUTE URINARY RETENTION: Primary | ICD-10-CM

## 2024-03-05 DIAGNOSIS — N32.81 OAB (OVERACTIVE BLADDER): ICD-10-CM

## 2024-03-05 DIAGNOSIS — R33.9 INCOMPLETE BLADDER EMPTYING: ICD-10-CM

## 2024-03-05 LAB
BILIRUBIN URINE: ABNORMAL
BLOOD URINE, POC: NEGATIVE
CHARACTER, URINE: ABNORMAL
COLOR, URINE: YELLOW
GLUCOSE URINE: NEGATIVE MG/DL
KETONES, URINE: NEGATIVE
LEUKOCYTE CLUMPS, URINE: ABNORMAL
NITRITE, URINE: NEGATIVE
PH, URINE: 6 (ref 5–9)
POST VOID RESIDUAL (PVR): 92 ML
PROTEIN, URINE: NEGATIVE MG/DL
SPECIFIC GRAVITY, URINE: 1.02 (ref 1–1.03)
UROBILINOGEN, URINE: 0.2 EU/DL (ref 0–1)

## 2024-03-05 PROCEDURE — 1090F PRES/ABSN URINE INCON ASSESS: CPT

## 2024-03-05 PROCEDURE — G8484 FLU IMMUNIZE NO ADMIN: HCPCS

## 2024-03-05 PROCEDURE — 51798 US URINE CAPACITY MEASURE: CPT

## 2024-03-05 PROCEDURE — G8399 PT W/DXA RESULTS DOCUMENT: HCPCS

## 2024-03-05 PROCEDURE — 0509F URINE INCON PLAN DOCD: CPT

## 2024-03-05 PROCEDURE — 1123F ACP DISCUSS/DSCN MKR DOCD: CPT

## 2024-03-05 PROCEDURE — G8427 DOCREV CUR MEDS BY ELIG CLIN: HCPCS

## 2024-03-05 PROCEDURE — 1111F DSCHRG MED/CURRENT MED MERGE: CPT

## 2024-03-05 PROCEDURE — G8417 CALC BMI ABV UP PARAM F/U: HCPCS

## 2024-03-05 PROCEDURE — 1036F TOBACCO NON-USER: CPT

## 2024-03-05 PROCEDURE — 99204 OFFICE O/P NEW MOD 45 MIN: CPT

## 2024-03-05 PROCEDURE — 81003 URINALYSIS AUTO W/O SCOPE: CPT

## 2024-03-05 NOTE — PROGRESS NOTES
[facility] [place] [facility]  [facility] UROLOGY  [address]  [address]  [facility] [address]  Dept: [phone]  Loc: [phone]  Visit Date: 3/5/2024    Patient:  Arlene Lira  YOB: 1950  Date: 3/5/2024    HPI  The patient is a 74 y.o. female who presents today as a new patient for hospital urinary retention follow-up.    Episode of acute urinary retention for which gamble catheter was placed in the ED. IP visit for cardiac workup. Found to have a UTI and treated with Rocephin. Also constipated. Likely combination of events that lead to retention. No issue with this in the past.    Urology consulted for management of the above issues, however, patient left before being seen by our service. Gamble catheter removed by the patient on 2/29/24- she states that she did deflate the balloon by cutting the line prior to removal. Voiding without difficulty. UTI resolved. PVR of 92 mL in the office today.    Has not been established with a Urologist in the past. No prior URO history to date.    PVR: 92 mL  UA: Trace Leukocytes. Negative for Nitrites. Negative for blood.  Lab Results   Component Value Date/Time    COLORU Yellow 03/05/2024 10:35 AM    COLORU YELLOW 02/19/2024 08:00 PM    LABSPEC 1.025 03/05/2024 10:35 AM    LABPH 6.00 03/05/2024 10:35 AM    NITRU Negative 03/05/2024 10:35 AM    GLUCOSEU Negative 03/05/2024 10:35 AM    KETUA Negative 03/05/2024 10:35 AM    UROBILINOGEN 0.20 03/05/2024 10:35 AM    BILIRUBINUR Small 03/05/2024 10:35 AM    BILIRUBINUR Negative 08/17/2022 11:36 AM    BILIRUBINUR Negative 04/07/2021 12:00 AM          Last BUN and creatinine:  Lab Results   Component Value Date    BUN 18 02/26/2024     Lab Results   Component Value Date    CREATININE 0.7 02/26/2024       Imaging Reviewed during this Office Visit:   (results were independently reviewed by physician and radiology report verified)  I independently reviewed and verified the images and reports

## 2024-03-06 ENCOUNTER — HOSPITAL ENCOUNTER (OUTPATIENT)
Dept: PHYSICAL THERAPY | Age: 74
Setting detail: THERAPIES SERIES
Discharge: HOME | End: 2024-03-06
Payer: MEDICARE

## 2024-03-06 PROCEDURE — 97110 THERAPEUTIC EXERCISES: CPT

## 2024-03-06 NOTE — PROGRESS NOTES
[facility]  PHYSICAL THERAPY  [] EVALUATION  [] DAILY NOTE (LAND) [] DAILY NOTE (AQUATIC ) [] PROGRESS NOTE [] DISCHARGE NOTE    [] OUTPATIENT REHABILITATION CENTER [facility]   [] [facility]    [] [facility]   [x] [facility]    Date: 3/6/2024  Patient Name:  Arlene Lira  : 1950  MRN: [id number]  CSN: [id number]    Referring Practitioner Dalia Lopez MD [facility]   Diagnosis Unspecified atrial fibrillation  Chronic diastolic (congestive) heart failure    Treatment Diagnosis R26.81  Unsteadiness on feet  M62.81 Generalized Weakness   Date of Evaluation 3/4/24    Additional Pertinent History HTN; incontinence; COPD; UTI; obesity; OA; fibromyalgia; TIA; skin CA      Functional Outcome Measure Used 5x sit to stand   Functional Outcome Score 14 sec with use of hands (3/4/24)       Insurance: Primary: Payor: [facility] MEDICARE /  /  / , modalities and aquatic therapy covered  Secondary:    Authorization Information: Pre-certification is not needed    Approved Procedure Codes: Authorization of Specific CPT Codes Not Required  (Codes requested indicated by red font, codes approved indicated by black font)   Visit # 2 /10 for progress note (Reporting Period: 3/4/24 to  24)   Visits Allowed: Based on medical necessity   Recertification Date:    Physician Follow-Up:    Physician Orders:    History of Present Illness: Arlene was recently hospitalized for a-fib and while there she found out she had a UTI. She was hospitalized for a week and received IV antibiotics. While hospitalized she states that therapists walked her up and down the hallways. She was discharged home with outpatient referral and no home health services.    Over the last 6 months Arlene admits to having 3 significant falls, fortunately no injury. She started to use a SPC 3-4 months ago, however she admits to not using it at home. She denies vertigo or neuropathy.     She has had both

## 2024-03-11 ENCOUNTER — APPOINTMENT (OUTPATIENT)
Dept: PHYSICAL THERAPY | Age: 74
End: 2024-03-11
Payer: MEDICARE

## 2024-03-13 ENCOUNTER — HOSPITAL ENCOUNTER (OUTPATIENT)
Dept: PHYSICAL THERAPY | Age: 74
Setting detail: THERAPIES SERIES
Discharge: HOME | End: 2024-03-13
Payer: MEDICARE

## 2024-03-13 PROCEDURE — 97110 THERAPEUTIC EXERCISES: CPT

## 2024-03-13 RX ORDER — CLOPIDOGREL BISULFATE 75 MG/1
75 TABLET ORAL DAILY
Qty: 90 TABLET | Refills: 3 | OUTPATIENT
Start: 2024-03-13

## 2024-03-13 RX ORDER — LOSARTAN POTASSIUM 25 MG/1
TABLET ORAL
Qty: 90 TABLET | Refills: 3 | OUTPATIENT
Start: 2024-03-13

## 2024-03-15 RX ORDER — PRAVASTATIN SODIUM 40 MG
TABLET ORAL
Qty: 90 TABLET | Refills: 0 | Status: SHIPPED | OUTPATIENT
Start: 2024-03-15

## 2024-03-15 RX ORDER — CLOPIDOGREL BISULFATE 75 MG/1
75 TABLET ORAL DAILY
Qty: 90 TABLET | Refills: 0 | Status: SHIPPED | OUTPATIENT
Start: 2024-03-15

## 2024-03-15 RX ORDER — LOSARTAN POTASSIUM 25 MG/1
TABLET ORAL
Qty: 90 TABLET | Refills: 0 | Status: SHIPPED | OUTPATIENT
Start: 2024-03-15

## 2024-03-15 RX ORDER — APIXABAN 5 MG/1
5 TABLET, FILM COATED ORAL 2 TIMES DAILY
Qty: 180 TABLET | Refills: 0 | Status: SHIPPED | OUTPATIENT
Start: 2024-03-15

## 2024-03-18 ENCOUNTER — OFFICE VISIT (OUTPATIENT)
Dept: CARDIOLOGY CLINIC | Age: 74
End: 2024-03-18
Payer: MEDICARE

## 2024-03-18 ENCOUNTER — APPOINTMENT (OUTPATIENT)
Dept: PHYSICAL THERAPY | Age: 74
End: 2024-03-18
Payer: MEDICARE

## 2024-03-18 VITALS
SYSTOLIC BLOOD PRESSURE: 137 MMHG | DIASTOLIC BLOOD PRESSURE: 84 MMHG | HEART RATE: 112 BPM | WEIGHT: 227.2 LBS | HEIGHT: 68 IN | BODY MASS INDEX: 34.43 KG/M2

## 2024-03-18 DIAGNOSIS — G47.33 OBSTRUCTIVE SLEEP APNEA ON CPAP: ICD-10-CM

## 2024-03-18 DIAGNOSIS — Z95.820 S/P ANGIOPLASTY WITH STENT: ICD-10-CM

## 2024-03-18 DIAGNOSIS — I10 ESSENTIAL HYPERTENSION: ICD-10-CM

## 2024-03-18 DIAGNOSIS — I48.91 ATRIAL FIBRILLATION WITH RAPID VENTRICULAR RESPONSE (HCC): Primary | ICD-10-CM

## 2024-03-18 DIAGNOSIS — I25.5 ISCHEMIC CARDIOMYOPATHY: ICD-10-CM

## 2024-03-18 DIAGNOSIS — Z95.820 S/P PERCUTANEOUS TRANSLUMINAL ANGIOPLASTY (PTA) WITH STENT PLACEMENT: ICD-10-CM

## 2024-03-18 DIAGNOSIS — E78.5 DYSLIPIDEMIA: ICD-10-CM

## 2024-03-18 DIAGNOSIS — I48.21 PERMANENT ATRIAL FIBRILLATION (HCC): ICD-10-CM

## 2024-03-18 DIAGNOSIS — I25.10 CORONARY ARTERY DISEASE INVOLVING NATIVE CORONARY ARTERY OF NATIVE HEART WITHOUT ANGINA PECTORIS: ICD-10-CM

## 2024-03-18 DIAGNOSIS — I50.32 CHRONIC DIASTOLIC CONGESTIVE HEART FAILURE (HCC): ICD-10-CM

## 2024-03-18 DIAGNOSIS — R60.0 BILATERAL LEG EDEMA: ICD-10-CM

## 2024-03-18 DIAGNOSIS — E66.01 MORBID (SEVERE) OBESITY DUE TO EXCESS CALORIES (HCC): ICD-10-CM

## 2024-03-18 PROCEDURE — G8484 FLU IMMUNIZE NO ADMIN: HCPCS | Performed by: INTERNAL MEDICINE

## 2024-03-18 PROCEDURE — 99215 OFFICE O/P EST HI 40 MIN: CPT | Performed by: INTERNAL MEDICINE

## 2024-03-18 PROCEDURE — G8399 PT W/DXA RESULTS DOCUMENT: HCPCS | Performed by: INTERNAL MEDICINE

## 2024-03-18 PROCEDURE — G8417 CALC BMI ABV UP PARAM F/U: HCPCS | Performed by: INTERNAL MEDICINE

## 2024-03-18 PROCEDURE — G8427 DOCREV CUR MEDS BY ELIG CLIN: HCPCS | Performed by: INTERNAL MEDICINE

## 2024-03-18 PROCEDURE — 1111F DSCHRG MED/CURRENT MED MERGE: CPT | Performed by: INTERNAL MEDICINE

## 2024-03-18 PROCEDURE — 3075F SYST BP GE 130 - 139MM HG: CPT | Performed by: INTERNAL MEDICINE

## 2024-03-18 PROCEDURE — 3079F DIAST BP 80-89 MM HG: CPT | Performed by: INTERNAL MEDICINE

## 2024-03-18 PROCEDURE — 1123F ACP DISCUSS/DSCN MKR DOCD: CPT | Performed by: INTERNAL MEDICINE

## 2024-03-18 PROCEDURE — 93000 ELECTROCARDIOGRAM COMPLETE: CPT | Performed by: INTERNAL MEDICINE

## 2024-03-18 PROCEDURE — 1036F TOBACCO NON-USER: CPT | Performed by: INTERNAL MEDICINE

## 2024-03-18 PROCEDURE — 1090F PRES/ABSN URINE INCON ASSESS: CPT | Performed by: INTERNAL MEDICINE

## 2024-03-18 RX ORDER — CHLORTHALIDONE 25 MG/1
25 TABLET ORAL DAILY
Qty: 90 TABLET | Refills: 3 | Status: SHIPPED | OUTPATIENT
Start: 2024-03-18

## 2024-03-20 ENCOUNTER — HOSPITAL ENCOUNTER (OUTPATIENT)
Dept: PHYSICAL THERAPY | Age: 74
Setting detail: THERAPIES SERIES
Discharge: HOME | End: 2024-03-20
Payer: MEDICARE

## 2024-03-20 PROCEDURE — 97110 THERAPEUTIC EXERCISES: CPT

## 2024-03-20 PROCEDURE — 97112 NEUROMUSCULAR REEDUCATION: CPT

## 2024-03-20 NOTE — PROGRESS NOTES
both knees replaced and has never felt that the left knee has healed well.     SUBJECTIVE: Pt running late to session. Pt stated she wasn't feeling good at the beginning of the week. Pt stated she was in Afib. Pt has been performing HEP.      TREATMENT   Precautions: on a heart monitor    Pain: 2/10 L knee pain    \"X” in shaded column indicates activity completed today    “*\" next to exercise/intervention indicates progression   Modalities Parameters/  Location  Notes                     Manual Therapy Time/Technique  Notes                     Exercise/  Intervention   Notes   NuStep legs 8. Arms 9   Level 5 5 min  x           // bars: forward march;  side step; tandem walk; retro 2 laps  x Cues to  feet when turning around.           3 way hip  10x  x    Airex: anterior/posterior wt shifts; marches, HS curls  15   x            Sitting knee flexion Orange band 15 x    Knee ext  2# 10  x    T band hip abd Orange 15   x    Ball add  5 sec 15 reps  x    Sitting marching 15x 2# x           Sit to stand  knees to sore,               Standing ft together EO/EC 20 sec  x Close SBA with EC   Standing ft together head turns 20 sec.  x Horizontal/vertical            Specific Interventions Next Treatment: Initiate next session on NuStep    Activity/Treatment Tolerance:  [x]  Patient tolerated treatment well  [x]  Patient limited by fatigue  []  Patient limited by pain   []  Patient limited by medical complications  []  Other:     Assessment: Pt continues to work on endurance, LE strengthening and balance. Pt had increased difficulty with maintaining balance with head turns with moderate ankle strategies. Pt required 2 sitting RB's during 30 min session due to fatigue.    Body Structures/Functions/Activity Limitations: impaired activity tolerance, impaired balance, impaired endurance, impaired strength, and abnormal gait  Prognosis: good    GOALS:  Patient Goal: Minimize fall risk; improve gait/balance    Short Term

## 2024-03-22 LAB — ECHO BSA: 2.15 M2

## 2024-03-25 ENCOUNTER — HOSPITAL ENCOUNTER (OUTPATIENT)
Dept: PHYSICAL THERAPY | Age: 74
Setting detail: THERAPIES SERIES
Discharge: HOME | End: 2024-03-25
Payer: MEDICARE

## 2024-03-25 PROCEDURE — 97530 THERAPEUTIC ACTIVITIES: CPT

## 2024-03-25 PROCEDURE — 97110 THERAPEUTIC EXERCISES: CPT

## 2024-03-27 ENCOUNTER — HOSPITAL ENCOUNTER (OUTPATIENT)
Dept: PHYSICAL THERAPY | Age: 74
Setting detail: THERAPIES SERIES
Discharge: HOME | End: 2024-03-27
Payer: MEDICARE

## 2024-03-27 PROCEDURE — 97110 THERAPEUTIC EXERCISES: CPT

## 2024-04-01 ENCOUNTER — HOSPITAL ENCOUNTER (OUTPATIENT)
Dept: PHYSICAL THERAPY | Age: 74
Setting detail: THERAPIES SERIES
Discharge: HOME | End: 2024-04-01
Payer: MEDICARE

## 2024-04-01 PROCEDURE — 97530 THERAPEUTIC ACTIVITIES: CPT

## 2024-04-01 PROCEDURE — 97110 THERAPEUTIC EXERCISES: CPT

## 2024-04-03 ENCOUNTER — APPOINTMENT (OUTPATIENT)
Dept: PHYSICAL THERAPY | Age: 74
End: 2024-04-03
Payer: MEDICARE

## 2024-04-10 ENCOUNTER — HOSPITAL ENCOUNTER (OUTPATIENT)
Dept: PHYSICAL THERAPY | Age: 74
Setting detail: THERAPIES SERIES
Discharge: HOME | End: 2024-04-10
Payer: MEDICARE

## 2024-04-10 PROCEDURE — 97110 THERAPEUTIC EXERCISES: CPT

## 2024-04-10 NOTE — PROGRESS NOTES
vertigo or neuropathy.     She has had both knees replaced and has never felt that the left knee has healed well.     SUBJECTIVE: Pt stated she has been really busy. L knee still bothersome but she has noticed she is able to do more around the home. Pt stated she continues to get SOB easily.     TREATMENT   Precautions: on a heart monitor    Pain: 5-6/10 L knee pain    \"X” in shaded column indicates activity completed today    “*\" next to exercise/intervention indicates progression   Modalities Parameters/  Location  Notes                     Manual Therapy Time/Technique  Notes                     Exercise/  Intervention   Notes   NuStep legs 8. Arms 9   Level 5 5 min  x    Review of goals; 5x sit to stand       // bars: forward march;  side step; tandem walk; retro 2 laps  x Cues to  feet when turning around.           3 way hip  10x      Airex: anterior/posterior wt shifts; marches, HS curls  15   x            Sitting knee flexion green 15 x    Knee ext  2# 10  x    T band hip abd 15x  green x    Ball add  5 sec 15 reps  x    Sitting marching 15x 2# x           Sit to stand  knees to sore,  5x             Standing ft together EO/EC 20 sec   Close SBA with EC   Standing ft together head turns 20 sec.   Horizontal/vertical   Standing ft together UE swings 10x        Specific Interventions Next Treatment: Initiate next session on NuStep    Activity/Treatment Tolerance:  [x]  Patient tolerated treatment well  [x]  Patient limited by fatigue  []  Patient limited by pain   []  Patient limited by medical complications  []  Other:     Assessment: Pt running behind to therapy session so only perform 30 min session. Pt continues to work on strengthening of LE's and walking patterns form improved safety with gait. Pt able to perform about 10 min of ex before needing sitting RB    Body Structures/Functions/Activity Limitations: impaired activity tolerance, impaired balance, impaired endurance, impaired strength, and

## 2024-04-15 ENCOUNTER — TELEPHONE (OUTPATIENT)
Dept: UROLOGY | Age: 74
End: 2024-04-15

## 2024-04-15 NOTE — TELEPHONE ENCOUNTER
Patient was last seen 3/5/2024 and her next appt if 9/4/2024.  She is calling in asking for sooner appt or advice.  She said at that appt they discuss her getting the bladder sling, and she would like to proceed.  She said she can not hold or control her bladder any longer, she has difficulty leaving her house.  Please call her to advise if needs another office visit to discuss or how to proceed?

## 2024-04-17 ENCOUNTER — APPOINTMENT (OUTPATIENT)
Dept: PHYSICAL THERAPY | Age: 74
End: 2024-04-17
Payer: MEDICARE

## 2024-04-22 ENCOUNTER — NURSE ONLY (OUTPATIENT)
Dept: LAB | Age: 74
End: 2024-04-22

## 2024-04-22 ENCOUNTER — HOSPITAL ENCOUNTER (OUTPATIENT)
Dept: PHYSICAL THERAPY | Age: 74
Setting detail: THERAPIES SERIES
Discharge: HOME | End: 2024-04-22
Payer: MEDICARE

## 2024-04-22 LAB
BASOPHILS ABSOLUTE: 0.1 THOU/MM3 (ref 0–0.1)
BASOPHILS NFR BLD AUTO: 0.6 %
DEPRECATED RDW RBC AUTO: 50.2 FL (ref 35–45)
EOSINOPHIL NFR BLD AUTO: 1 %
EOSINOPHILS ABSOLUTE: 0.1 THOU/MM3 (ref 0–0.4)
ERYTHROCYTE [DISTWIDTH] IN BLOOD BY AUTOMATED COUNT: 16.7 % (ref 11.5–14.5)
HCT VFR BLD AUTO: 36 % (ref 37–47)
HGB BLD-MCNC: 10.9 GM/DL (ref 12–16)
IMM GRANULOCYTES # BLD AUTO: 0.05 THOU/MM3 (ref 0–0.07)
IMM GRANULOCYTES NFR BLD AUTO: 0.4 %
LYMPHOCYTES ABSOLUTE: 2.5 THOU/MM3 (ref 1–4.8)
LYMPHOCYTES NFR BLD AUTO: 21.2 %
MCH RBC QN AUTO: 25.1 PG (ref 26–33)
MCHC RBC AUTO-ENTMCNC: 30.3 GM/DL (ref 32.2–35.5)
MCV RBC AUTO: 82.8 FL (ref 81–99)
MONOCYTES ABSOLUTE: 1.2 THOU/MM3 (ref 0.4–1.3)
MONOCYTES NFR BLD AUTO: 10.1 %
NEUTROPHILS NFR BLD AUTO: 66.7 %
NRBC BLD AUTO-RTO: 0 /100 WBC
PLATELET # BLD AUTO: 346 THOU/MM3 (ref 130–400)
PMV BLD AUTO: 10.2 FL (ref 9.4–12.4)
RBC # BLD AUTO: 4.35 MILL/MM3 (ref 4.2–5.4)
SEGMENTED NEUTROPHILS ABSOLUTE COUNT: 7.8 THOU/MM3 (ref 1.8–7.7)
WBC # BLD AUTO: 11.7 THOU/MM3 (ref 4.8–10.8)

## 2024-04-22 PROCEDURE — 97112 NEUROMUSCULAR REEDUCATION: CPT

## 2024-04-22 PROCEDURE — 97110 THERAPEUTIC EXERCISES: CPT

## 2024-04-22 NOTE — PROGRESS NOTES
[facility]  PHYSICAL THERAPY  [] EVALUATION  [x] DAILY NOTE (LAND) [] DAILY NOTE (AQUATIC ) [] PROGRESS NOTE [] DISCHARGE NOTE    [] OUTPATIENT REHABILITATION CENTER [facility]   [] [place] [facility]    [] [facility]   [x] [facility]    Date: 2024  Patient Name:  Arlene Lira  : 1950  MRN: [id number]  CSN: [id number]    Referring Practitioner Dalia Lopez MD [facility]   Diagnosis Unspecified atrial fibrillation  Chronic diastolic (congestive) heart failure    Treatment Diagnosis R26.81  Unsteadiness on feet  M62.81 Generalized Weakness   Date of Evaluation 3/4/24    Additional Pertinent History HTN; incontinence; COPD; UTI; obesity; OA; fibromyalgia; TIA; skin CA      Functional Outcome Measure Used 5x sit to stand   Functional Outcome Score 14 sec with use of hands (3/4/24)   21 sec with use of hands (24)      Insurance: Primary: Payor: [facility] MEDICARE /  /  / , modalities and aquatic therapy covered  Secondary:    Authorization Information: Pre-certification is not needed    Approved Procedure Codes: Authorization of Specific CPT Codes Not Required  (Codes requested indicated by red font, codes approved indicated by black font)   Visit # 9,  2/10 for progress note (Reporting Period: 3/4/24 to  24)   Visits Allowed: Based on medical necessity   Recertification Date:    Physician Follow-Up:    Physician Orders:    History of Present Illness: Arlene was recently hospitalized for a-fib and while there she found out she had a UTI. She was hospitalized for a week and received IV antibiotics. While hospitalized she states that therapists walked her up and down the hallways. She was discharged home with outpatient referral and no home health services.    Over the last 6 months Arlene admits to having 3 significant falls, fortunately no injury. She started to use a SPC 3-4 months ago, however she admits to not using it at home. She denies

## 2024-04-23 LAB
ALT SERPL W/O P-5'-P-CCNC: 11 U/L (ref 11–66)
CREAT SERPL-MCNC: 0.7 MG/DL (ref 0.4–1.2)
CRP SERPL-MCNC: 0.41 MG/DL (ref 0–1)
FERRITIN SERPL IA-MCNC: 19 NG/ML (ref 10–291)
FOLATE SERPL-MCNC: 3.3 NG/ML (ref 4.8–24.2)
GFR SERPL CREATININE-BSD FRML MDRD: 89 ML/MIN/1.73M2
IRON SATN MFR SERPL: 6 % (ref 20–50)
IRON SERPL-MCNC: 23 UG/DL (ref 50–170)
TIBC SERPL-MCNC: 401 UG/DL (ref 171–450)
VIT B12 SERPL-MCNC: 322 PG/ML (ref 211–911)

## 2024-04-23 RX ORDER — ALBUTEROL SULFATE 90 UG/1
AEROSOL, METERED RESPIRATORY (INHALATION)
Qty: 72 G | Refills: 3 | OUTPATIENT
Start: 2024-04-23

## 2024-04-24 ENCOUNTER — HOSPITAL ENCOUNTER (OUTPATIENT)
Dept: PHYSICAL THERAPY | Age: 74
Setting detail: THERAPIES SERIES
Discharge: HOME | End: 2024-04-24
Payer: MEDICARE

## 2024-04-26 ENCOUNTER — OFFICE VISIT (OUTPATIENT)
Age: 74
End: 2024-04-26
Payer: MEDICARE

## 2024-04-26 ENCOUNTER — OFFICE VISIT (OUTPATIENT)
Dept: CARDIOLOGY CLINIC | Age: 74
End: 2024-04-26
Payer: MEDICARE

## 2024-04-26 VITALS
SYSTOLIC BLOOD PRESSURE: 132 MMHG | HEART RATE: 88 BPM | WEIGHT: 223 LBS | DIASTOLIC BLOOD PRESSURE: 68 MMHG | BODY MASS INDEX: 33.8 KG/M2 | HEIGHT: 68 IN

## 2024-04-26 VITALS
DIASTOLIC BLOOD PRESSURE: 67 MMHG | HEART RATE: 74 BPM | SYSTOLIC BLOOD PRESSURE: 146 MMHG | BODY MASS INDEX: 33.62 KG/M2 | WEIGHT: 221.8 LBS | HEIGHT: 68 IN

## 2024-04-26 DIAGNOSIS — Z95.820 S/P ANGIOPLASTY WITH STENT: ICD-10-CM

## 2024-04-26 DIAGNOSIS — E78.5 DYSLIPIDEMIA: ICD-10-CM

## 2024-04-26 DIAGNOSIS — I50.32 CHRONIC DIASTOLIC CONGESTIVE HEART FAILURE (HCC): ICD-10-CM

## 2024-04-26 DIAGNOSIS — I25.10 CORONARY ARTERY DISEASE INVOLVING NATIVE CORONARY ARTERY OF NATIVE HEART WITHOUT ANGINA PECTORIS: Primary | ICD-10-CM

## 2024-04-26 DIAGNOSIS — R60.0 BILATERAL LEG EDEMA: ICD-10-CM

## 2024-04-26 DIAGNOSIS — I10 ESSENTIAL HYPERTENSION: ICD-10-CM

## 2024-04-26 DIAGNOSIS — E04.2 MULTINODULAR GOITER: Primary | ICD-10-CM

## 2024-04-26 DIAGNOSIS — I48.21 PERMANENT ATRIAL FIBRILLATION (HCC): ICD-10-CM

## 2024-04-26 DIAGNOSIS — I25.5 ISCHEMIC CARDIOMYOPATHY: ICD-10-CM

## 2024-04-26 PROCEDURE — 99204 OFFICE O/P NEW MOD 45 MIN: CPT | Performed by: INTERNAL MEDICINE

## 2024-04-26 PROCEDURE — G8427 DOCREV CUR MEDS BY ELIG CLIN: HCPCS | Performed by: INTERNAL MEDICINE

## 2024-04-26 PROCEDURE — 1036F TOBACCO NON-USER: CPT | Performed by: INTERNAL MEDICINE

## 2024-04-26 PROCEDURE — G8417 CALC BMI ABV UP PARAM F/U: HCPCS | Performed by: INTERNAL MEDICINE

## 2024-04-26 PROCEDURE — 3075F SYST BP GE 130 - 139MM HG: CPT | Performed by: INTERNAL MEDICINE

## 2024-04-26 PROCEDURE — 3078F DIAST BP <80 MM HG: CPT | Performed by: INTERNAL MEDICINE

## 2024-04-26 PROCEDURE — 1090F PRES/ABSN URINE INCON ASSESS: CPT | Performed by: INTERNAL MEDICINE

## 2024-04-26 PROCEDURE — 3077F SYST BP >= 140 MM HG: CPT | Performed by: INTERNAL MEDICINE

## 2024-04-26 PROCEDURE — 1123F ACP DISCUSS/DSCN MKR DOCD: CPT | Performed by: INTERNAL MEDICINE

## 2024-04-26 PROCEDURE — G8399 PT W/DXA RESULTS DOCUMENT: HCPCS | Performed by: INTERNAL MEDICINE

## 2024-04-26 PROCEDURE — 99214 OFFICE O/P EST MOD 30 MIN: CPT | Performed by: INTERNAL MEDICINE

## 2024-04-26 RX ORDER — FOLIC ACID 1 MG/1
1 TABLET ORAL DAILY
COMMUNITY

## 2024-04-26 RX ORDER — FERROUS SULFATE 325(65) MG
325 TABLET ORAL
COMMUNITY

## 2024-04-26 NOTE — PROGRESS NOTES
Pt with SARAH  [facility] [place] [facility]  [facility] ENDOCRINOLOGY  [address]  [facility] [address]  Dept: [phone]  Loc: [phone]     Visit Date: 4/26/2024    Arlene Lira is a 74 y.o. female who presents today for:  Chief Complaint   Patient presents with    Thyroid Problem            Subjective:      HPI     Arlene Lira is a 74 y.o. , female who comes for Initial visit for multinodular goiter  Arlene Lira was recently diagnosed with a multinodular goiter during workup for carotid enterectomy.  However looking back, the patient had an ultrasound in 2022 which showed a multinodular goiter but I do not see that any follow-up studies have been done.  She reports a foreign body sensation inside her throat which she has had for about a year.  In addition, following the carotid endarterectomy, she had some difficulty swallowing which would often culminated in coughing spells but that appears to have improved.  Her voice has really not changed.  She does not have any symptoms or signs of thyrotoxicosis but she tells me that there is a strong history of thyroid disease in the family.  However there is no history of thyroid cancer that she is aware of.  Past Medical History:   Diagnosis Date    Allergic rhinitis     Arthritis     Atrial fibrillation (HCC)     CAD (coronary artery disease)     Cancer (HCC)     skin    Carotid arterial disease (HCC)     Cerebrovascular disease     CHF (congestive heart failure) (HCC)     Congenital heart disease     COPD (chronic obstructive pulmonary disease) (HCC)     Depression     Fibromyalgia     Hearing loss     Hypertension     Hypothyroidism     Obesity     Osteoarthritis     S/P bariatric surgery 2018    Sleep apnea     CPAP    Thyroid disease     TIA (transient ischemic attack) 12/2015    Urinary incontinence       Past Surgical History:   Procedure Laterality Date    APPENDECTOMY  over 10 years ago    [place]     ARM SURGERY Left 10/01/2019

## 2024-04-26 NOTE — PROGRESS NOTES
Chief Complaint   Patient presents with    Follow-up    Coronary Artery Disease    Atrial Fibrillation    Congestive Heart Failure     HX OF  fu had TIA here at [facility], transferred from     Had carotid endarterectomy - 9-11-17 -     Was admitted from office for atr fib with  and was admitted [facility] . Cath and needed stent and later went back to atr fib with 110 and re-sent to ER and sent home with added cardizem         Hx  fo CAD< CMP< CHF  And post hospital f/u  On D/c lopressor decreased to 75 bid from 150 bid for bradycardia  Off lasix 20, dig 125, clonidine 0.1, calan sr 240  Admitted  atr fib rvr and UTI  In hospital had atr fib rvr and later episodes of atr fib with SVR- bradycardia and hence med adjusted as above  Was in hospital for 7 days             1 month follow up. For atr fib rate  eval    Rate controlled  after calan  mg po qd    EKG done 3-.    Occasional palpitation better    Denied cp,   dizziness   Sob on exertion and fatigue on exertion- chronic- better    Some  leg edema +1  Lost 6 lb  Had been off lasix 20 and kcl 10  DID NOT TAKE HER CHLORTHALIDONE FOR 2 WEEKS RUN OUT      Hx of depression and a lots of stress in family  and and daughter sick        Patient Active Problem List   Diagnosis    Obstructive sleep apnea on CPAP    COPD (chronic obstructive pulmonary disease) (HCC)    Fibromyalgia    Right arm weakness    Urinary tract infection without hematuria    Obesity (BMI 30-39.9)    Chronic diastolic congestive heart failure (HCC)    Bilateral leg edema  +1- TO +2 now trace    Pulmonary HTN (HCC) Mean pressure 32 mmhg by [facility]    SOB (shortness of breath) on exertion    Polyosteoarthritis    Postsurgical malabsorption    Bilateral carotid artery stenosis    S/P carotid endarterectomy    Positive colorectal cancer screening using DNA-based stool test    Dysphagia    Gastroesophageal reflux disease without esophagitis    Hypersomnia with sleep apnea

## 2024-04-29 ENCOUNTER — APPOINTMENT (OUTPATIENT)
Dept: PHYSICAL THERAPY | Age: 74
End: 2024-04-29
Payer: MEDICARE

## 2024-05-02 ENCOUNTER — OFFICE VISIT (OUTPATIENT)
Dept: UROLOGY | Age: 74
End: 2024-05-02
Payer: MEDICARE

## 2024-05-02 VITALS — HEIGHT: 68 IN | BODY MASS INDEX: 33.49 KG/M2 | WEIGHT: 221 LBS | RESPIRATION RATE: 24 BRPM

## 2024-05-02 DIAGNOSIS — N39.3 SUI (STRESS URINARY INCONTINENCE, FEMALE): Primary | ICD-10-CM

## 2024-05-02 PROCEDURE — G8399 PT W/DXA RESULTS DOCUMENT: HCPCS | Performed by: UROLOGY

## 2024-05-02 PROCEDURE — G8417 CALC BMI ABV UP PARAM F/U: HCPCS | Performed by: UROLOGY

## 2024-05-02 PROCEDURE — 1090F PRES/ABSN URINE INCON ASSESS: CPT | Performed by: UROLOGY

## 2024-05-02 PROCEDURE — 1123F ACP DISCUSS/DSCN MKR DOCD: CPT | Performed by: UROLOGY

## 2024-05-02 PROCEDURE — 1036F TOBACCO NON-USER: CPT | Performed by: UROLOGY

## 2024-05-02 PROCEDURE — G8427 DOCREV CUR MEDS BY ELIG CLIN: HCPCS | Performed by: UROLOGY

## 2024-05-02 PROCEDURE — 0509F URINE INCON PLAN DOCD: CPT | Performed by: UROLOGY

## 2024-05-02 PROCEDURE — 99214 OFFICE O/P EST MOD 30 MIN: CPT | Performed by: UROLOGY

## 2024-05-02 NOTE — PROGRESS NOTES
[facility] [place] [facility]  [facility] UROLOGY  [address]  [address]  [facility] [address]  Dept: [phone]  Loc: [phone]  Visit Date: 5/2/2024    Patient:  Arlene Lira  YOB: 1950  Date: 5/2/2024    HPI  The patient is a 74 y.o. female who presents today as a new patient for hospital urinary retention follow-up.    ROSI bothersome    Episode of acute urinary retention for which gamble catheter was placed in the ED. IP visit for cardiac workup. Found to have a UTI and treated with Rocephin. Also constipated. Likely combination of events that lead to retention. No issue with this in the past.    Urology consulted for management of the above issues, however, patient left before being seen by our service. Gamble catheter removed by the patient on 2/29/24- she states that she did deflate the balloon by cutting the line prior to removal. Voiding without difficulty. UTI resolved. PVR of 92 mL in the office today.    Has not been established with a Urologist in the past. No prior URO history to date.    PVR: 92 mL  UA: Trace Leukocytes. Negative for Nitrites. Negative for blood.  Lab Results   Component Value Date/Time    COLORU Yellow 03/05/2024 10:35 AM    COLORU YELLOW 02/19/2024 08:00 PM    NITRU Negative 03/05/2024 10:35 AM    GLUCOSEU Negative 03/05/2024 10:35 AM    KETUA Negative 03/05/2024 10:35 AM    UROBILINOGEN 0.20 03/05/2024 10:35 AM    BILIRUBINUR Small 03/05/2024 10:35 AM    BILIRUBINUR Negative 08/17/2022 11:36 AM    BILIRUBINUR Negative 04/07/2021 12:00 AM          Last BUN and creatinine:  Lab Results   Component Value Date    BUN 18 02/26/2024     Lab Results   Component Value Date    CREATININE 0.7 04/22/2024       Imaging Reviewed during this Office Visit:   (results were independently reviewed by physician and radiology report verified)  I independently reviewed and verified the images and reports from:    No results found.      PAST MEDICAL, FAMILY

## 2024-05-03 ENCOUNTER — TELEPHONE (OUTPATIENT)
Dept: UROLOGY | Age: 74
End: 2024-05-03

## 2024-05-03 NOTE — TELEPHONE ENCOUNTER
Pre op Risk Assessment    Procedure Cystoscopy with Pelvic Exam and Mid Urethral Sling Placement   Physician Vahe  Date of surgery/procedure 5/20/24    Last OV 4/26/24  Last Stress 1/20/22  Last Echo 2/24/24  Last Cath 2/21/20  Last Stent 2/21/20  Is patient on blood thinners Plavix and Eliquis  Hold Meds/how many days 5 and 3

## 2024-05-03 NOTE — TELEPHONE ENCOUNTER
SURGERY SCHEDULING FORM   [address] [address]      Phone *[phone] *[phone]   Surgical Scheduling Direct Line Phone *[phone] Fax *[phone]      Arlene MARKHAM Soraya 1950 female    [address] [place] [address]   Marital Status:          Home Phone: [phone]      Cell Phone:    Telephone Information:   Mobile [phone]          Surgeon: Dr. Chapman       Surgery Date: 5/20/24       Time: 10:00 am    Procedure: Cystoscopy with Pelvic Exam, Mid Urethral Sling Placement    Diagnosis: Stress Urinary Incontinence    Important Medical History:  In [facility]    Special Inst/Equip: [employer]  with [employer] Notified     CPT Codes:    35270,38894,41677  Latex Allergy: No     Cardiac Device:  No    Anesthesia:  General          Admission Type:  Same Day                        Admit Prior to Day of Surgery: No    Case Location:  Main OR            Preadmission Testing:  Phone Call          PAT Date and Time:______________________________________________________    PAT Confirmation #: ______________________________________________________    Post Op Visit: ___________________________________________________________    Need Preop Cardiac Clearance: Yes    Does Patient have Cardiologist/physician?     Dr Woody    Surgery Confirmation #: __________________________________________________    : ________________________   Date: __________________________     Insurance Company Name: [facility] [employer]

## 2024-05-03 NOTE — TELEPHONE ENCOUNTER
Patient scheduled for a Cystoscopy with Pelvic Exam and Mid Urethral Sling Placement with Dr Chapman on 5/20/24. We are asking for clearance

## 2024-05-06 ENCOUNTER — TELEPHONE (OUTPATIENT)
Dept: UROLOGY | Age: 74
End: 2024-05-06

## 2024-05-06 DIAGNOSIS — N39.3 SUI (STRESS URINARY INCONTINENCE, FEMALE): Primary | ICD-10-CM

## 2024-05-06 DIAGNOSIS — Z01.818 PRE-OP TESTING: ICD-10-CM

## 2024-05-06 NOTE — TELEPHONE ENCOUNTER
Patient's upcoming surgery on 5/20/24 has been cancelled. Dr Chapman is doing a cysto with a pelvic exam in the office first

## 2024-05-06 NOTE — TELEPHONE ENCOUNTER
May proceed low to  mod risk  Hold apixaban for 3 days  Hold plavix for 5 days  Bridge plavix with asa 81 mg po qd while off plavix

## 2024-05-09 ENCOUNTER — TELEPHONE (OUTPATIENT)
Dept: UROLOGY | Age: 74
End: 2024-05-09

## 2024-05-09 DIAGNOSIS — R30.0 DYSURIA: Primary | ICD-10-CM

## 2024-05-09 NOTE — TELEPHONE ENCOUNTER
Patient thinks she has infection. C/o burning, itching, frequency, and urgency. She denies fever or chills.    She is scheduled for cystoscopy and pelvic exam on 05/13/2024.    Orders placed for urine.

## 2024-05-10 ENCOUNTER — NURSE ONLY (OUTPATIENT)
Dept: LAB | Age: 74
End: 2024-05-10

## 2024-05-10 DIAGNOSIS — E04.2 MULTINODULAR GOITER: ICD-10-CM

## 2024-05-10 DIAGNOSIS — R30.0 DYSURIA: ICD-10-CM

## 2024-05-10 LAB
ALT SERPL W/O P-5'-P-CCNC: 12 U/L (ref 11–66)
BACTERIA: ABNORMAL
BASOPHILS ABSOLUTE: 0.1 THOU/MM3 (ref 0–0.1)
BASOPHILS NFR BLD AUTO: 0.9 %
BILIRUB UR QL STRIP: NEGATIVE
CASTS #/AREA URNS LPF: ABNORMAL /LPF
CASTS #/AREA URNS LPF: ABNORMAL /LPF
CHARACTER UR: ABNORMAL
CHARCOAL URNS QL MICRO: ABNORMAL
COLOR UR: YELLOW
CREAT SERPL-MCNC: 0.7 MG/DL (ref 0.4–1.2)
CRYSTALS URNS QL MICRO: ABNORMAL
DEPRECATED RDW RBC AUTO: 59.7 FL (ref 35–45)
EOSINOPHIL NFR BLD AUTO: 1.5 %
EOSINOPHILS ABSOLUTE: 0.1 THOU/MM3 (ref 0–0.4)
EPITHELIAL CELLS, UA: ABNORMAL /HPF
ERYTHROCYTE [DISTWIDTH] IN BLOOD BY AUTOMATED COUNT: 20.5 % (ref 11.5–14.5)
ERYTHROCYTE [SEDIMENTATION RATE] IN BLOOD BY WESTERGREN METHOD: 21 MM/HR (ref 0–20)
FOLATE SERPL-MCNC: 6.8 NG/ML (ref 4.8–24.2)
GFR SERPL CREATININE-BSD FRML MDRD: 89 ML/MIN/1.73M2
GLUCOSE UR QL STRIP.AUTO: NEGATIVE MG/DL
HCT VFR BLD AUTO: 42 % (ref 37–47)
HGB BLD-MCNC: 12.9 GM/DL (ref 12–16)
HGB UR QL STRIP.AUTO: ABNORMAL
IMM GRANULOCYTES # BLD AUTO: 0.03 THOU/MM3 (ref 0–0.07)
IMM GRANULOCYTES NFR BLD AUTO: 0.3 %
IRON SATN MFR SERPL: 15 % (ref 20–50)
IRON SERPL-MCNC: 52 UG/DL (ref 50–170)
KETONES UR QL STRIP.AUTO: ABNORMAL
LEUKOCYTE ESTERASE UR QL STRIP.AUTO: ABNORMAL
LYMPHOCYTES ABSOLUTE: 2.6 THOU/MM3 (ref 1–4.8)
LYMPHOCYTES NFR BLD AUTO: 28.7 %
MCH RBC QN AUTO: 25.7 PG (ref 26–33)
MCHC RBC AUTO-ENTMCNC: 30.7 GM/DL (ref 32.2–35.5)
MCV RBC AUTO: 83.8 FL (ref 81–99)
MONOCYTES ABSOLUTE: 1.1 THOU/MM3 (ref 0.4–1.3)
MONOCYTES NFR BLD AUTO: 11.6 %
NEUTROPHILS ABSOLUTE: 5.2 THOU/MM3 (ref 1.8–7.7)
NEUTROPHILS NFR BLD AUTO: 57 %
NITRITE UR QL STRIP.AUTO: NEGATIVE
NRBC BLD AUTO-RTO: 0 /100 WBC
PH UR STRIP.AUTO: 6 [PH] (ref 5–9)
PLATELET # BLD AUTO: 287 THOU/MM3 (ref 130–400)
PMV BLD AUTO: 9.5 FL (ref 9.4–12.4)
PROT UR STRIP.AUTO-MCNC: 300 MG/DL
RBC # BLD AUTO: 5.01 MILL/MM3 (ref 4.2–5.4)
RBC #/AREA URNS HPF: ABNORMAL /HPF
RENAL EPI CELLS #/AREA URNS HPF: ABNORMAL /[HPF]
SPECIFIC GRAVITY UA: 1.02 (ref 1–1.03)
T4 FREE SERPL-MCNC: 1.19 NG/DL (ref 0.93–1.68)
TIBC SERPL-MCNC: 339 UG/DL (ref 171–450)
TSH SERPL DL<=0.005 MIU/L-ACNC: 2.49 UIU/ML (ref 0.4–4.2)
UROBILINOGEN, URINE: 1 EU/DL (ref 0–1)
WBC # BLD AUTO: 9.1 THOU/MM3 (ref 4.8–10.8)
WBC #/AREA URNS HPF: ABNORMAL /HPF
YEAST LIKE FUNGI URNS QL MICRO: ABNORMAL

## 2024-05-12 LAB
BACTERIA UR CULT: ABNORMAL
BACTERIA UR CULT: ABNORMAL
ORGANISM: ABNORMAL
ORGANISM: ABNORMAL

## 2024-05-13 ENCOUNTER — TELEPHONE (OUTPATIENT)
Dept: UROLOGY | Age: 74
End: 2024-05-13

## 2024-05-13 ENCOUNTER — PROCEDURE VISIT (OUTPATIENT)
Dept: UROLOGY | Age: 74
End: 2024-05-13
Payer: MEDICARE

## 2024-05-13 VITALS — RESPIRATION RATE: 16 BRPM | WEIGHT: 221 LBS | BODY MASS INDEX: 33.49 KG/M2 | HEIGHT: 68 IN

## 2024-05-13 DIAGNOSIS — R30.0 DYSURIA: Primary | ICD-10-CM

## 2024-05-13 LAB
BILIRUBIN, URINE: ABNORMAL
BLOOD URINE, POC: ABNORMAL
CHARACTER, URINE: CLEAR
COLOR: YELLOW
GLUCOSE URINE: NEGATIVE MG/DL
KETONES, URINE: NEGATIVE
LEUKOCYTE CLUMPS, URINE: ABNORMAL
NITRITE, URINE: NEGATIVE
PH, URINE: 5.5 (ref 5–9)
PROTEIN, URINE: 100 MG/DL
SPECIFIC GRAVITY UA: 1.02 (ref 1–1.03)
UROBILINOGEN, URINE: 0.2 EU/DL (ref 0–1)

## 2024-05-13 PROCEDURE — G8417 CALC BMI ABV UP PARAM F/U: HCPCS | Performed by: UROLOGY

## 2024-05-13 PROCEDURE — 1036F TOBACCO NON-USER: CPT | Performed by: UROLOGY

## 2024-05-13 PROCEDURE — 1090F PRES/ABSN URINE INCON ASSESS: CPT | Performed by: UROLOGY

## 2024-05-13 PROCEDURE — G8427 DOCREV CUR MEDS BY ELIG CLIN: HCPCS | Performed by: UROLOGY

## 2024-05-13 PROCEDURE — 81003 URINALYSIS AUTO W/O SCOPE: CPT | Performed by: UROLOGY

## 2024-05-13 PROCEDURE — 52000 CYSTOURETHROSCOPY: CPT | Performed by: UROLOGY

## 2024-05-13 PROCEDURE — G8399 PT W/DXA RESULTS DOCUMENT: HCPCS | Performed by: UROLOGY

## 2024-05-13 PROCEDURE — 1123F ACP DISCUSS/DSCN MKR DOCD: CPT | Performed by: UROLOGY

## 2024-05-13 PROCEDURE — 99214 OFFICE O/P EST MOD 30 MIN: CPT | Performed by: UROLOGY

## 2024-05-13 RX ORDER — DOXYCYCLINE HYCLATE 100 MG
100 TABLET ORAL 2 TIMES DAILY
Qty: 20 TABLET | Refills: 0 | Status: SHIPPED | OUTPATIENT
Start: 2024-05-13 | End: 2024-05-23

## 2024-05-13 RX ORDER — FLUCONAZOLE 100 MG/1
100 TABLET ORAL DAILY
Qty: 8 TABLET | Refills: 0 | Status: SHIPPED | OUTPATIENT
Start: 2024-05-13 | End: 2024-05-21

## 2024-05-13 NOTE — PROGRESS NOTES
[facility] [place] [facility]  [facility] UROLOGY  [address]  [address]  [facility] [address]  Dept: [phone]  Loc: [phone]  Visit Date: 5/13/2024    Patient:  Arlene Lira  YOB: 1950  Date: 5/13/2024    HPI  The patient is a 74 y.o. female who presents today as a new patient for hospital urinary retention follow-up.    ROSI bothersome    Cystoscopy Operative Note  Surgeon: Ari  Anesthesia: Urethral 2%  Indications: ROSI  Position: supine  EBL: 1cc  Specimen: none  Findings:   The patient was prepped and draped in the usual sterile fashion.  The flexible cystoscope was advanced through the urethra and into the bladder.  The bladder was thoroughly inspected and the following was noted:    Vagina: normal appearing vagina with normal color and discharge, no lesions  Residual Urine: none  Urethra: normal appearing urethra with no masses, tenderness or lesions  Bladder: No tumors or CIS noted.  No bladder diverticulum.  none trabeculation noted.  Ureters: Clear efflux from both ureters.  Orifices with normal configuration and location.    The cystoscope was removed.  The patient tolerated the procedure well.  Good candidate for MUS          Episode of acute urinary retention for which gamble catheter was placed in the ED. IP visit for cardiac workup. Found to have a UTI and treated with Rocephin. Also constipated. Likely combination of events that lead to retention. No issue with this in the past.    Urology consulted for management of the above issues, however, patient left before being seen by our service. Gamble catheter removed by the patient on 2/29/24- she states that she did deflate the balloon by cutting the line prior to removal. Voiding without difficulty. UTI resolved. PVR of 92 mL in the office today.    Has not been established with a Urologist in the past. No prior URO history to date.    PVR: 92 mL  UA: Trace Leukocytes. Negative for Nitrites. Negative for

## 2024-05-13 NOTE — TELEPHONE ENCOUNTER
SURGERY SCHEDULING FORM              [address] [address]                            Phone *[phone]              Surgical Scheduling Direct Line Phone *[phone] Fax *[phone]        Arlene MARKHAM Soraya 1950        female     [address] [place] [address]               Marital Status:                                                      Home Phone: [phone]      Cell Phone:        Telephone Information:   Mobile [phone]                                            Surgeon: Dr. Chapman                  Surgery Date:  6/3/24             Time: 9:00 am     Procedure: Mid Urethral Sling Placement     Diagnosis: Stress Urinary Incontinence     Important Medical History:  In [facility]     Special Inst/Equip: SERVANDO Fields  with [email or web address] Notified      CPT Codes:    48751  Latex Allergy: No     Cardiac Device:  No     Anesthesia:    General                            Admission Type:  Same Day                        Admit Prior to Day of Surgery: No     Case Location:  Main OR            Preadmission Testing:  Phone Call                     PAT Date and Time:______________________________________________________     PAT Confirmation #: ______________________________________________________     Post Op Visit: ___________________________________________________________     Need Preop Cardiac Clearance: Yes     Does Patient have Cardiologist/physician?     Dr Woody     Surgery Confirmation #: __________________________________________________     : ________________________   Date: __________________________      Insurance Company Name: [employer]

## 2024-05-13 NOTE — TELEPHONE ENCOUNTER
+UTI  Take diflucan 200mg today then 100mg daily for 7 days.  Take doxycycline twice a day 10 days

## 2024-05-13 NOTE — TELEPHONE ENCOUNTER
Patient is scheduled for surgery with  on 6/3/24. Surgery consent to be done on arrival. Dr. Woody to clear. Patient does not need any pre op testing done. Surgery instructions gone over with patient verbally in the office or mailed to the patient.      Patient informed an adult over the age of 18 must be with them at the time of surgery and upon discharge

## 2024-05-13 NOTE — TELEPHONE ENCOUNTER
Patient scheduled for a Mid Urethral Sling Placement on 6/3/24 with Dr Chapman. We are asking for clearance

## 2024-05-13 NOTE — TELEPHONE ENCOUNTER
DO NOT TAKE  FISH OIL, MOBIC, IBUPROFEN, MOTRIN-LIKE DRUGS AND ANY MULTIVITAMINS OR OVER THE COUNTER SUPPLEMENTS 14 DAYS PRIOR TO SURGERY.     HOLD ASPIRIN 5 DAYS PRIOR TO SURGERY     HOLD THE ELIQUIS 3 DAYS PRIOR     HOLD THE PLAVIX 5 DAYS PRIOR     IF YOU TAKE GLUCOPHAGE, METFORMIN OR JANUMET, HOLD 2 DAYS PRIOR TO SURGERY     MUST HAVE AN ADULT OVER THE AGE OF 18 WITH YOU AT THE TIME OF THE DISCHARGE                     Arlene Lira 1950             Surgical Physician: Dr. Chapman                             You have been scheduled for the procedure marked below:                  Surgery: Placement of Mid Urethral Sling                                          Date: 6/3/24     Anesthesia:     General                Place of Service: [facility] --Second Floor Same Day Surgery                                                                                     Arrive to same day surgery at:  7:00 am  (Surgery time is subject to change)                             INSTRUCTIONS AS MARKED BELOW:     1.  DO NOT eat or drink anything after midnight before surgery.  2.  We prefer you shower or bathe with an antibacterial soap (Dial) the morning of surgery.  3  Please bring a current medication list, photo ID and insurance card(s) with you  4. Okay to take Tylenol  5. Take blood pressure or heart medication as directed, if taken in the morning take with a small sip of water  6.The office will call you in 1-2 days after your procedure to schedule a follow up.

## 2024-05-14 LAB
BACTERIA UR CULT: ABNORMAL
ORGANISM: ABNORMAL

## 2024-05-14 NOTE — TELEPHONE ENCOUNTER
Proceed mod risk  Hold apixaban for 3 days h  Hold plavix for 5 days and bridge with asa  81 mg po qd while off plavix

## 2024-05-15 ENCOUNTER — TELEPHONE (OUTPATIENT)
Dept: UROLOGY | Age: 74
End: 2024-05-15

## 2024-05-15 RX ORDER — PRAVASTATIN SODIUM 40 MG
TABLET ORAL
Qty: 90 TABLET | Refills: 1 | Status: SHIPPED | OUTPATIENT
Start: 2024-05-15

## 2024-05-15 RX ORDER — APIXABAN 5 MG/1
5 TABLET, FILM COATED ORAL 2 TIMES DAILY
Qty: 180 TABLET | Refills: 1 | Status: SHIPPED | OUTPATIENT
Start: 2024-05-15

## 2024-05-28 ENCOUNTER — PREP FOR PROCEDURE (OUTPATIENT)
Dept: UROLOGY | Age: 74
End: 2024-05-28

## 2024-05-28 RX ORDER — METOPROLOL TARTRATE 100 MG/1
100 TABLET ORAL 2 TIMES DAILY
Qty: 180 TABLET | Refills: 2 | Status: SHIPPED | OUTPATIENT
Start: 2024-05-28

## 2024-05-28 NOTE — PROGRESS NOTES
Follow all instructions given by your physician  Do not eat or drink anything after midnight prior to surgery(includes water, chewing gum, mints and ice chips)  Sips of water am of surgery with allowed medications  May brush teeth do not swallow water  Do not smoke or chew tobacco, drink alcoholic beverages or use any illicit drugs for 24 hours prior to surgery  Bring insurance info and photo ID  Bring pertinent paperwork with you from Doctor or surgeons's office  Wear clean comfortable, loose-fitting clothing  No make-up, nail polish, jewelry, piercings, or contact lenses to be worn day of surgery  No glue on dentures morning of surgery; you will be asked to remove them for surgery. Case for glasses.  Shower the night before and the morning of surgery with cleansing soap provided or a liquid antibacterial soap, dry with new fresh clean towel after each shower, no lotions, creams or powder.  Clean sheets and pillowcase on bed night before surgery  Bring medications in original bottles, Bring rescue inhalers with you  Bring CPAP/BIPAP machine if you have one ( you may be charged if one is needed in recovery room )    Do you have a DNR? No   Please Bring Healthcare Directive or Healthcare Power of  in so we can scan it into your chart.    Our pharmacy has a Meds to Beds program where they will deliver any new prescriptions you may have to your room before you leave. Our Pharmacy will clear it through your insurance; for example (same co pay). This enables you to take your new RX as soon as you need when you get home and avoids stop/wait delays on the way home.  Please have a form of payment with you and have someone designated as your Pharmacy contact with their phone # as you may not feel well or still be under the influence of anesthesia.    Please refer to the SSI-Surgical Site Infection Flyer you hopefully received in the mail-together we can prevent infections; signs and symptoms reviewed.  When

## 2024-05-28 NOTE — PROGRESS NOTES
PAT Call Date: 5/28   Surgery Date: 6/3    Surgeon: Ari   Surgery: Cysto, sling    Any Isolation Precautions? No      Type of Isolation Precaution: Not Applicable   Is patient from a nursing home? No  Name of Nursing Home:   Any equipment assist needed for moving patient? No   Type of Equipment: Not Applicable  Patient last weight: 221 lb  CANCELED FROM 5/20   Hard Copy on Chart  In EPIC Pending/Notes   Consent -   Within 30 days; signed, dated & timed by patient and physician     [x] On Arrival     [] Blood      [] DNR   H&P -   Within 30 days  5/13  [] Physician To Do     Clearance -        5/14  []Medical     [x]Cardiac Annalise-mod plavix 5d bridge w/asa 81mg,Eliquis 3d     [] Pulmonary    Orders -   Signed and Dated      [] Physician To Do   BMP   Labs -   Within 3 months   5/10    5/10      5/13  [x] CBC -ok   [] BMP   [x] GFR-ok   [] INR    [] PTT    [x] Urine on antibiotic   [] Liver Enzymes    [] MRSA Nasal      Others:     Radiology Studies -   Within 1 year  2/19  [x] Portable Chest X-Ray-clear   [] MRI    [] CT    [] Vascular   [] US     Pulmonary -     [] SHANNEN   [] CPAP     Cardiac Workup -   Stress Test, Echo, Cath within 18 months  3/18      2/24    2/23  [x] EKG -cleared     [] Cath                 [] Stress Test                      [x] Echo/TAYLOR 60-65%   [] CABG   [x] Holter Monitor      [] Pacemaker/ICD        Brand:        Where does patient have checked:         Last check:         Rep Notified:

## 2024-05-30 ENCOUNTER — PREP FOR PROCEDURE (OUTPATIENT)
Dept: UROLOGY | Age: 74
End: 2024-05-30

## 2024-05-30 ENCOUNTER — TELEPHONE (OUTPATIENT)
Dept: UROLOGY | Age: 74
End: 2024-05-30

## 2024-05-30 RX ORDER — SODIUM CHLORIDE 0.9 % (FLUSH) 0.9 %
5-40 SYRINGE (ML) INJECTION PRN
Status: CANCELLED | OUTPATIENT
Start: 2024-05-30

## 2024-05-30 RX ORDER — SODIUM CHLORIDE 0.9 % (FLUSH) 0.9 %
5-40 SYRINGE (ML) INJECTION EVERY 12 HOURS SCHEDULED
Status: CANCELLED | OUTPATIENT
Start: 2024-05-30

## 2024-05-30 RX ORDER — IPRATROPIUM BROMIDE AND ALBUTEROL SULFATE 2.5; .5 MG/3ML; MG/3ML
1 SOLUTION RESPIRATORY (INHALATION) ONCE
Status: CANCELLED | OUTPATIENT
Start: 2024-06-03

## 2024-05-30 RX ORDER — SODIUM CHLORIDE 9 MG/ML
INJECTION, SOLUTION INTRAVENOUS PRN
Status: CANCELLED | OUTPATIENT
Start: 2024-05-30

## 2024-05-30 NOTE — TELEPHONE ENCOUNTER
Patient notified of new surgery arrival time.  Patient is to be at [facility] Same day surgery by  6 AM   on  6/3/24    Patient reminded to have nothing to eat or drink after midnight.  Patient voiced understanding.

## 2024-06-03 ENCOUNTER — ANESTHESIA EVENT (OUTPATIENT)
Dept: OPERATING ROOM | Age: 74
End: 2024-06-03
Payer: MEDICARE

## 2024-06-03 ENCOUNTER — ANESTHESIA (OUTPATIENT)
Dept: OPERATING ROOM | Age: 74
End: 2024-06-03
Payer: MEDICARE

## 2024-06-03 ENCOUNTER — HOSPITAL ENCOUNTER (OUTPATIENT)
Age: 74
Setting detail: OUTPATIENT SURGERY
Discharge: HOME | End: 2024-06-03
Attending: UROLOGY | Admitting: UROLOGY
Payer: MEDICARE

## 2024-06-03 VITALS
DIASTOLIC BLOOD PRESSURE: 74 MMHG | SYSTOLIC BLOOD PRESSURE: 132 MMHG | RESPIRATION RATE: 16 BRPM | HEIGHT: 67 IN | WEIGHT: 217.2 LBS | TEMPERATURE: 97.2 F | HEART RATE: 94 BPM | OXYGEN SATURATION: 96 % | BODY MASS INDEX: 34.09 KG/M2

## 2024-06-03 DIAGNOSIS — G89.18 POST-OP PAIN: Primary | ICD-10-CM

## 2024-06-03 LAB
ANION GAP SERPL CALC-SCNC: 13 MEQ/L (ref 8–16)
BUN SERPL-MCNC: 26 MG/DL (ref 7–22)
CALCIUM SERPL-MCNC: 9.6 MG/DL (ref 8.5–10.5)
CHLORIDE SERPL-SCNC: 104 MEQ/L (ref 98–111)
CO2 SERPL-SCNC: 25 MEQ/L (ref 23–33)
CREAT SERPL-MCNC: 0.8 MG/DL (ref 0.4–1.2)
GFR SERPL CREATININE-BSD FRML MDRD: 76 ML/MIN/1.73M2
GLUCOSE SERPL-MCNC: 117 MG/DL (ref 70–108)
POTASSIUM SERPL-SCNC: 4.1 MEQ/L (ref 3.5–5.2)
SODIUM SERPL-SCNC: 142 MEQ/L (ref 135–145)

## 2024-06-03 PROCEDURE — 36415 COLL VENOUS BLD VENIPUNCTURE: CPT

## 2024-06-03 PROCEDURE — 3600000003 HC SURGERY LEVEL 3 BASE: Performed by: UROLOGY

## 2024-06-03 PROCEDURE — 3700000001 HC ADD 15 MINUTES (ANESTHESIA): Performed by: UROLOGY

## 2024-06-03 PROCEDURE — 2580000003 HC RX 258: Performed by: UROLOGY

## 2024-06-03 PROCEDURE — 7100000001 HC PACU RECOVERY - ADDTL 15 MIN: Performed by: UROLOGY

## 2024-06-03 PROCEDURE — 2709999900 HC NON-CHARGEABLE SUPPLY: Performed by: UROLOGY

## 2024-06-03 PROCEDURE — 3600000013 HC SURGERY LEVEL 3 ADDTL 15MIN: Performed by: UROLOGY

## 2024-06-03 PROCEDURE — 7100000000 HC PACU RECOVERY - FIRST 15 MIN: Performed by: UROLOGY

## 2024-06-03 PROCEDURE — 7100000010 HC PHASE II RECOVERY - FIRST 15 MIN: Performed by: UROLOGY

## 2024-06-03 PROCEDURE — 80048 BASIC METABOLIC PNL TOTAL CA: CPT

## 2024-06-03 PROCEDURE — 6360000002 HC RX W HCPCS: Performed by: NURSE ANESTHETIST, CERTIFIED REGISTERED

## 2024-06-03 PROCEDURE — 3700000000 HC ANESTHESIA ATTENDED CARE: Performed by: UROLOGY

## 2024-06-03 PROCEDURE — C1771 REP DEV, URINARY, W/SLING: HCPCS | Performed by: UROLOGY

## 2024-06-03 PROCEDURE — 6370000000 HC RX 637 (ALT 250 FOR IP): Performed by: UROLOGY

## 2024-06-03 PROCEDURE — 2500000003 HC RX 250 WO HCPCS: Performed by: NURSE ANESTHETIST, CERTIFIED REGISTERED

## 2024-06-03 PROCEDURE — 2500000003 HC RX 250 WO HCPCS: Performed by: UROLOGY

## 2024-06-03 PROCEDURE — 7100000011 HC PHASE II RECOVERY - ADDTL 15 MIN: Performed by: UROLOGY

## 2024-06-03 DEVICE — TRANSOBTURATOR SLING SYSTEM WITH PRECISIONBLUE™ DESIGN
Type: IMPLANTABLE DEVICE | Site: VAGINA | Status: FUNCTIONAL
Brand: OBTRYX™ II SYSTEM - HALO

## 2024-06-03 RX ORDER — SODIUM CHLORIDE 9 MG/ML
INJECTION, SOLUTION INTRAVENOUS CONTINUOUS
Status: DISCONTINUED | OUTPATIENT
Start: 2024-06-03 | End: 2024-06-03 | Stop reason: HOSPADM

## 2024-06-03 RX ORDER — LIDOCAINE HYDROCHLORIDE AND EPINEPHRINE 10; 10 MG/ML; UG/ML
INJECTION, SOLUTION INFILTRATION; PERINEURAL PRN
Status: DISCONTINUED | OUTPATIENT
Start: 2024-06-03 | End: 2024-06-03 | Stop reason: ALTCHOICE

## 2024-06-03 RX ORDER — HYDROCODONE BITARTRATE AND ACETAMINOPHEN 5; 325 MG/1; MG/1
1 TABLET ORAL EVERY 4 HOURS PRN
Qty: 3 TABLET | Refills: 0 | Status: SHIPPED | OUTPATIENT
Start: 2024-06-03 | End: 2024-06-06

## 2024-06-03 RX ORDER — FENTANYL CITRATE 50 UG/ML
INJECTION, SOLUTION INTRAMUSCULAR; INTRAVENOUS PRN
Status: DISCONTINUED | OUTPATIENT
Start: 2024-06-03 | End: 2024-06-03 | Stop reason: SDUPTHER

## 2024-06-03 RX ORDER — GINSENG 100 MG
CAPSULE ORAL PRN
Status: DISCONTINUED | OUTPATIENT
Start: 2024-06-03 | End: 2024-06-03 | Stop reason: ALTCHOICE

## 2024-06-03 RX ORDER — SULFAMETHOXAZOLE AND TRIMETHOPRIM 800; 160 MG/1; MG/1
1 TABLET ORAL 2 TIMES DAILY
Qty: 20 TABLET | Refills: 0 | Status: SHIPPED | OUTPATIENT
Start: 2024-06-03 | End: 2024-06-13

## 2024-06-03 RX ORDER — SODIUM CHLORIDE 0.9 % (FLUSH) 0.9 %
5-40 SYRINGE (ML) INJECTION EVERY 12 HOURS SCHEDULED
Status: CANCELLED | OUTPATIENT
Start: 2024-06-03

## 2024-06-03 RX ORDER — ROCURONIUM BROMIDE 10 MG/ML
INJECTION, SOLUTION INTRAVENOUS PRN
Status: DISCONTINUED | OUTPATIENT
Start: 2024-06-03 | End: 2024-06-03 | Stop reason: SDUPTHER

## 2024-06-03 RX ORDER — FENTANYL CITRATE 50 UG/ML
50 INJECTION, SOLUTION INTRAMUSCULAR; INTRAVENOUS EVERY 5 MIN PRN
Status: CANCELLED | OUTPATIENT
Start: 2024-06-03

## 2024-06-03 RX ORDER — DIPHENHYDRAMINE HYDROCHLORIDE 50 MG/ML
12.5 INJECTION INTRAMUSCULAR; INTRAVENOUS
Status: CANCELLED | OUTPATIENT
Start: 2024-06-03 | End: 2024-06-04

## 2024-06-03 RX ORDER — PROPOFOL 10 MG/ML
INJECTION, EMULSION INTRAVENOUS PRN
Status: DISCONTINUED | OUTPATIENT
Start: 2024-06-03 | End: 2024-06-03 | Stop reason: SDUPTHER

## 2024-06-03 RX ORDER — CEFAZOLIN SODIUM 1 G/3ML
INJECTION, POWDER, FOR SOLUTION INTRAMUSCULAR; INTRAVENOUS PRN
Status: DISCONTINUED | OUTPATIENT
Start: 2024-06-03 | End: 2024-06-03 | Stop reason: SDUPTHER

## 2024-06-03 RX ORDER — SODIUM CHLORIDE 0.9 % (FLUSH) 0.9 %
5-40 SYRINGE (ML) INJECTION PRN
Status: CANCELLED | OUTPATIENT
Start: 2024-06-03

## 2024-06-03 RX ORDER — LIDOCAINE HYDROCHLORIDE 20 MG/ML
INJECTION, SOLUTION INTRAVENOUS PRN
Status: DISCONTINUED | OUTPATIENT
Start: 2024-06-03 | End: 2024-06-03 | Stop reason: SDUPTHER

## 2024-06-03 RX ORDER — ONDANSETRON 2 MG/ML
4 INJECTION INTRAMUSCULAR; INTRAVENOUS
Status: CANCELLED | OUTPATIENT
Start: 2024-06-03 | End: 2024-06-04

## 2024-06-03 RX ORDER — DEXAMETHASONE SODIUM PHOSPHATE 10 MG/ML
INJECTION, EMULSION INTRAMUSCULAR; INTRAVENOUS PRN
Status: DISCONTINUED | OUTPATIENT
Start: 2024-06-03 | End: 2024-06-03 | Stop reason: SDUPTHER

## 2024-06-03 RX ORDER — ONDANSETRON 2 MG/ML
INJECTION INTRAMUSCULAR; INTRAVENOUS PRN
Status: DISCONTINUED | OUTPATIENT
Start: 2024-06-03 | End: 2024-06-03 | Stop reason: SDUPTHER

## 2024-06-03 RX ORDER — NALOXONE HYDROCHLORIDE 0.4 MG/ML
INJECTION, SOLUTION INTRAMUSCULAR; INTRAVENOUS; SUBCUTANEOUS PRN
Status: CANCELLED | OUTPATIENT
Start: 2024-06-03

## 2024-06-03 RX ORDER — SODIUM CHLORIDE 9 MG/ML
INJECTION, SOLUTION INTRAVENOUS PRN
Status: CANCELLED | OUTPATIENT
Start: 2024-06-03

## 2024-06-03 RX ADMIN — CEFAZOLIN 2 G: 1 INJECTION, POWDER, FOR SOLUTION INTRAMUSCULAR; INTRAVENOUS at 09:15

## 2024-06-03 RX ADMIN — ROCURONIUM BROMIDE 50 MG: 10 INJECTION INTRAVENOUS at 08:55

## 2024-06-03 RX ADMIN — LIDOCAINE HYDROCHLORIDE 50 MG: 20 INJECTION, SOLUTION INTRAVENOUS at 08:55

## 2024-06-03 RX ADMIN — FENTANYL CITRATE 50 MCG: 50 INJECTION, SOLUTION INTRAMUSCULAR; INTRAVENOUS at 08:55

## 2024-06-03 RX ADMIN — SUGAMMADEX 200 MG: 100 INJECTION, SOLUTION INTRAVENOUS at 09:48

## 2024-06-03 RX ADMIN — FENTANYL CITRATE 100 MCG: 50 INJECTION, SOLUTION INTRAMUSCULAR; INTRAVENOUS at 09:26

## 2024-06-03 RX ADMIN — SODIUM CHLORIDE: 9 INJECTION, SOLUTION INTRAVENOUS at 08:11

## 2024-06-03 RX ADMIN — PROPOFOL 150 MG: 10 INJECTION, EMULSION INTRAVENOUS at 08:55

## 2024-06-03 RX ADMIN — DEXAMETHASONE SODIUM PHOSPHATE 8 MG: 10 INJECTION, EMULSION INTRAMUSCULAR; INTRAVENOUS at 08:59

## 2024-06-03 RX ADMIN — FENTANYL CITRATE 50 MCG: 50 INJECTION, SOLUTION INTRAMUSCULAR; INTRAVENOUS at 09:17

## 2024-06-03 RX ADMIN — ONDANSETRON 4 MG: 2 INJECTION INTRAMUSCULAR; INTRAVENOUS at 09:46

## 2024-06-03 ASSESSMENT — PAIN - FUNCTIONAL ASSESSMENT
PAIN_FUNCTIONAL_ASSESSMENT: NONE - DENIES PAIN
PAIN_FUNCTIONAL_ASSESSMENT: 0-10

## 2024-06-03 ASSESSMENT — LIFESTYLE VARIABLES: SMOKING_STATUS: 0

## 2024-06-03 NOTE — H&P
without hematuria    Obesity (BMI 30-39.9)    Chronic diastolic congestive heart failure (HCC)    Bilateral leg edema  +1- TO +2 now trace    Pulmonary HTN (HCC) Mean pressure 32 mmhg by [facility]    SOB (shortness of breath) on exertion    Polyosteoarthritis    Postsurgical malabsorption    Bilateral carotid artery stenosis    S/P carotid endarterectomy    Positive colorectal cancer screening using DNA-based stool test    Dysphagia    Gastroesophageal reflux disease without esophagitis    Hypersomnia with sleep apnea    Fatigue    Essential hypertension    Sleep apnea    Coronary artery disease involving native heart without angina pectoris    S/P cardiac cath    S/P percutaneous transluminal angioplasty (PTA) with stent placement    Permanent atrial fibrillation (HCC)    Morbid (severe) obesity due to excess calories ([facility])    S/P angioplasty with stent of the LM to LAD feb 2020    Ischemic cardiomyopathy 45 %    Atherosclerotic heart disease of native coronary artery with unspecified angina pectoris    Psoriatic arthritis (HCC)    Dyslipidemia    Atrial fibrillation with rapid ventricular response ([facility])       Plan:   Mid urethral sling    Risks benefits and alternative procedures are explained, informed consent is obtained, and the patient elects to proceed.

## 2024-06-03 NOTE — PROGRESS NOTES
Patient oriented to Same Day department and admitted to Same Day Surgery room 12.   Patient verbalized approval for first name, last initial with physician name on unit whiteboard.     Plan of care reviewed with patient.   Patient room whiteboard filled out and discussed with patient and responsible adult.   Patient and responsible adult offered Same Day Welcome Packet to review.    Call light in reach.   Bed in lowest position, locked, with one bed rail up.   SCDs and warming blanket in place.  Appropriate arm bands on patient.   Bathroom offered.   All questions and concerns of patient addressed.        Meds to Beds:   Patient informed of [facility] Meds to Beds program during admission. Patient has declined use of program.   Contact information for the pharmacy and the Meds to Beds program:   Name:    Relationship to patient:   Phone number:

## 2024-06-03 NOTE — PROGRESS NOTES
0956- pt to pacu, resp easy and unlabored, VSS, pt appears in no acute distress  1005- pt resting in bed with eyes closed, resp easy and unlabored, VSS, pt denies pain and nausea  1010- pt with oxygen drop to 89%, pt placed on 2L NC, pt stable  1020- pt resting in bed with eyes closed, resp easy and unlabored, VSS, pt appears in no acute distress, pt denies pain  1026- pt meets criteria for discharge from pacu, pt transported to [facility] in stable condition

## 2024-06-03 NOTE — OP NOTE
FACILITY:  [place], [place]   Arlene Lira  1950  [id number]    DATE: 06/03/24  SURGEON:  Dr. Sd Chapman Jr, MD , MD    ASSISTANT: Dr. Sd Chapman Jr, MD MD  PREOPERATIVE DIAGNOSIS: Stress urinary incontinence.   POSTOPERATIVE DIAGNOSIS:  Same  PROCEDURES PERFORMED:  Cystoscopy with placement of mid urethral sling.  ANESTHESIA:  Gen ET  COMPLICATIONS:  None.   DRAINS:  None.  SPECIMEN:  none .  ESTIMATED BLOOD LOSS:  Less than 5 mL.      INDICATIONS FOR THE PROCEDURE:  Arlene Lira is a 74 y.o. female with a history of severe stress urinary  incontinence. After treatment options were discussed with the patient, she  elected to proceed with the mid urethral sling today.     NARRATIVE OF THE PROCEDURE:  After informed consent was reviewed in the preoperative area, the patient was taken back to the operating room and transferred to the operating table in the supine position. EPC cuffs were placed and the machine was turned on. Anesthesia was induced and antibiotics were given. She was placed in a modified dorsal lithotomy position, sterilely prepped and draped in a standard fashion. We placed a Monroy catheter in her bladder and her bladder was drained. Then we used Lidocaine and injected just lateral to her urethral meatus in order to hydrodissect the tissues between her urethra and her anterior vaginal wall. We then used a 15 blade scalpel to make a small midline incision about 1 cm proximal to the urethral meatus. At this time, we used Metzenbaum scissors to create a plane with sufficient vaginal thickness into the lateral foci bilaterally. We then placed our finger into the incision and palpated the ischial pubic ramus. We then made 2 stab incisions at the level of the clitoris in the groove near the abductor longus. We then placed our trocars through our stab incisions and our vaginal incision. The vaginal fornices were inspected and there was no evidence of any buttonholing.     At this

## 2024-06-03 NOTE — PROGRESS NOTES

## 2024-06-03 NOTE — ANESTHESIA PRE PROCEDURE
1 TABLET DAILY 8/17/23   Eugene Kaur MD   COSENTYX SENSOREADY, 300 MG, 150 MG/ML SOAJ 2 mLs every 30 days 2/6/23   Sandra Cazares MD   DULoxetine (CYMBALTA) 60 MG extended release capsule Take 1 capsule by mouth daily 7/8/22   Sandra Cazares MD   nitroGLYCERIN (NITROSTAT) 0.4 MG SL tablet up to max of 3 total doses. If no relief after 1 dose, call 911. 9/12/22   Allison Woody MD   Cholecalciferol 50 MCG (2000 UT) CAPS Take 50 mcg by mouth daily 3/1/22   Deepali Palma PA-C   loperamide (IMODIUM) 2 MG capsule Take 1 capsule by mouth 4 times daily as needed for Diarrhea    Sandra Cazares MD   sulfaSALAzine (AZULFIDINE) 500 MG tablet Take 2 tablets by mouth 2 times daily    ProviderSandra MD       Current medications:    Current Facility-Administered Medications   Medication Dose Route Frequency Provider Last Rate Last Admin   • 0.9 % sodium chloride infusion   IntraVENous Continuous Sd Chapman Jr., MD           Allergies:    Allergies   Allergen Reactions   • Demerol Hcl [Meperidine] Nausea And Vomiting   • Pineapple Dermatitis       Problem List:    Patient Active Problem List   Diagnosis Code   • Obstructive sleep apnea on CPAP G47.33   • COPD (chronic obstructive pulmonary disease) ([facility]) J44.9   • Fibromyalgia M79.7   • Right arm weakness R29.898   • Urinary tract infection without hematuria N39.0   • Obesity (BMI 30-39.9) E66.9   • Chronic diastolic congestive heart failure ([facility]) I50.32   • Bilateral leg edema  +1- TO +2 now trace R60.0   • Pulmonary HTN ([facility]) Mean pressure 32 mmhg by [facility] I27.20   • SOB (shortness of breath) on exertion R06.02   • Polyosteoarthritis M15.9   • Postsurgical malabsorption K91.2   • Bilateral carotid artery stenosis I65.23   • S/P carotid endarterectomy Z98.890   • Positive colorectal cancer screening using DNA-based stool test R19.5   • Dysphagia R13.10   • Gastroesophageal reflux disease without esophagitis K21.9   • Hypersomnia with

## 2024-06-03 NOTE — ANESTHESIA POSTPROCEDURE EVALUATION
Department of Anesthesiology  Postprocedure Note    Patient: Arlene Lira  MRN: [id number]  YOB: 1950  Date of evaluation: 6/3/2024    Procedure Summary       Date: 06/03/24 Room / Location: [facility] OR 03 / [facility] OR    Anesthesia Start: 0852 Anesthesia Stop: 0959    Procedure: Mid Urethal Sling Placement (Vagina ) Diagnosis:       Urinary, incontinence, stress female      (Urinary, incontinence, stress female [N39.3])    Surgeons: Sd Chapman Jr., MD Responsible Provider: Tom Márquez DO    Anesthesia Type: general ASA Status: 3            Anesthesia Type: No value filed.    Cayla Phase I: Cayla Score: 9    Cayla Phase II: Cayla Score: 10    Anesthesia Post Evaluation    Patient location during evaluation: PACU  Patient participation: complete - patient participated  Level of consciousness: awake and alert  Airway patency: patent  Nausea & Vomiting: no vomiting and no nausea  Cardiovascular status: hemodynamically stable  Respiratory status: acceptable and room air  Hydration status: stable  Pain management: adequate    No notable events documented.

## 2024-06-03 NOTE — DISCHARGE INSTRUCTIONS
Mid uretheral sling:  If present, Please remove vaginal packing in PACU, 45 minutes after procedure.  Then remove the gamble catheter, and the patient must void before discharge.  Please call if unable to void.  If you have difficulty emptying your bladder after your procedure please call the Urology office.    Pelvic rest   No strenuous activity or heavy lifting (>10 lbs) for 6 weeks, and until cleared by Dr. Chapman     Tylenol for pain control after pain pills are finished.  Pt ok to discharge home in good condition  Pelvic rest till cleared.  No tampons or sexual intercourse, or anything per vagina for 6 weeks, till cleared by Dr. Chapman   No heavy lifting, >10 lbs till cleared.  Pt should avoid strenuous activity till cleared.  Pt should walk moderately at home  Pt ok to shower.  Avoid soaking in water for 2 weeks.   Pt may resume diet as tolerated  Pt should take Rx as directed  No driving while on narcotics  Please call attending physician or hospital  with questions  Call or Present to ED if fever (> 101F), intractable nausea vomiting or pain.    If taking, Please hold blood thinning medications for 3 days till hematuria improves.     Pt should follow up with Dr. Chapman in 4-6 weeks.  Call to confirm appointment

## 2024-06-06 RX ORDER — LOSARTAN POTASSIUM 25 MG/1
TABLET ORAL
Qty: 90 TABLET | Refills: 1 | Status: SHIPPED | OUTPATIENT
Start: 2024-06-06

## 2024-06-19 RX ORDER — CLOPIDOGREL BISULFATE 75 MG/1
75 TABLET ORAL DAILY
Qty: 90 TABLET | Refills: 1 | Status: SHIPPED | OUTPATIENT
Start: 2024-06-19

## 2024-07-02 ENCOUNTER — OFFICE VISIT (OUTPATIENT)
Dept: UROLOGY | Age: 74
End: 2024-07-02
Payer: MEDICARE

## 2024-07-02 VITALS — BODY MASS INDEX: 34.06 KG/M2 | WEIGHT: 217 LBS | RESPIRATION RATE: 16 BRPM | HEIGHT: 67 IN

## 2024-07-02 DIAGNOSIS — N39.3 SUI (STRESS URINARY INCONTINENCE, FEMALE): Primary | ICD-10-CM

## 2024-07-02 LAB
BACTERIA: ABNORMAL
BILIRUB UR QL STRIP: ABNORMAL
BILIRUBIN, URINE: ABNORMAL
BLOOD URINE, POC: ABNORMAL
CASTS #/AREA URNS LPF: ABNORMAL /LPF
CASTS #/AREA URNS LPF: ABNORMAL /LPF
CHARACTER UR: ABNORMAL
CHARACTER, URINE: CLEAR
CHARCOAL URNS QL MICRO: ABNORMAL
COLOR UR: ABNORMAL
COLOR, UA: YELLOW
CRYSTALS URNS QL MICRO: ABNORMAL
EPITHELIAL CELLS, UA: ABNORMAL /HPF
GLUCOSE UR QL STRIP.AUTO: NEGATIVE MG/DL
GLUCOSE URINE: NEGATIVE MG/DL
HGB UR QL STRIP.AUTO: NEGATIVE
KETONES UR QL STRIP.AUTO: ABNORMAL
KETONES, URINE: NEGATIVE
LEUKOCYTE CLUMPS, URINE: NEGATIVE
LEUKOCYTE ESTERASE UR QL STRIP.AUTO: NEGATIVE
NITRITE UR QL STRIP.AUTO: NEGATIVE
NITRITE, URINE: NEGATIVE
PH UR STRIP.AUTO: 5.5 [PH] (ref 5–9)
PH, URINE: 5.5 (ref 5–9)
POST VOID RESIDUAL (PVR): 301 ML
PROT UR STRIP.AUTO-MCNC: ABNORMAL MG/DL
PROTEIN, URINE: ABNORMAL MG/DL
RBC #/AREA URNS HPF: ABNORMAL /HPF
RENAL EPI CELLS #/AREA URNS HPF: ABNORMAL /[HPF]
SPECIFIC GRAVITY UA: 1.02 (ref 1–1.03)
SPECIFIC GRAVITY UA: 1.02 (ref 1–1.03)
UROBILINOGEN, URINE: 0.2 EU/DL (ref 0–1)
UROBILINOGEN, URINE: 0.2 EU/DL (ref 0–1)
WBC #/AREA URNS HPF: ABNORMAL /HPF
YEAST LIKE FUNGI URNS QL MICRO: ABNORMAL

## 2024-07-02 PROCEDURE — 51798 US URINE CAPACITY MEASURE: CPT

## 2024-07-02 PROCEDURE — 99214 OFFICE O/P EST MOD 30 MIN: CPT

## 2024-07-02 PROCEDURE — 1123F ACP DISCUSS/DSCN MKR DOCD: CPT

## 2024-07-02 PROCEDURE — G8427 DOCREV CUR MEDS BY ELIG CLIN: HCPCS

## 2024-07-02 PROCEDURE — 1090F PRES/ABSN URINE INCON ASSESS: CPT

## 2024-07-02 PROCEDURE — 0509F URINE INCON PLAN DOCD: CPT

## 2024-07-02 PROCEDURE — G8399 PT W/DXA RESULTS DOCUMENT: HCPCS

## 2024-07-02 PROCEDURE — 81003 URINALYSIS AUTO W/O SCOPE: CPT

## 2024-07-02 PROCEDURE — G8417 CALC BMI ABV UP PARAM F/U: HCPCS

## 2024-07-02 PROCEDURE — 1036F TOBACCO NON-USER: CPT

## 2024-07-02 ASSESSMENT — ENCOUNTER SYMPTOMS
ABDOMINAL PAIN: 0
CONSTIPATION: 1

## 2024-07-02 NOTE — PATIENT INSTRUCTIONS
Continue Myrbetriq  Avoid constipation  Monitor for incomplete emptying of the bladder.   Follow-up in 8-12 weeks   Call with questions, comments, or concerns. I recommend going to the ED for further evaluation if you develop fever, chills, nausea, vomiting, chest pain, SOB, or calf pain.

## 2024-07-02 NOTE — PROGRESS NOTES
[facility] [place] [facility]  [facility] UROLOGY  [address]  [address]  [facility] [address]  Dept: [phone]  Loc: [phone]    Visit Date: 7/2/2024        HPI:     HPI  Ms. Lira is a 74-year-old female that presents in follow-up.     Patient was admitted to [facility] in 2/2024 for cardiac workup. While admitted, urology was consulted for acute urinary retention. She did have UTI at this time and was treated with Rocephin. She also endorsed constipation. Urinary retention was likely multifactorial and occurred 2/2 a combination of constipation and UTI. Ms. Lira admits to self-removal of the gamble catheter prior to discharge.     She does take Myrbetriq for management of OAB symptomatology.     Urologic hx of stress urinary incontinence. She underwent a cystoscopy with Dr. Sd Chapman in 5/2024 and was determined to be a good candidate for MUS. Ms. Lira therefore underwent placement of a mid urethral sling with Dr. Sd Chapman on 6/3/2024. She reports little improvement in symptomatology post-operatively.       Current Outpatient Medications   Medication Sig Dispense Refill    clopidogrel (PLAVIX) 75 MG tablet TAKE 1 TABLET BY MOUTH DAILY 90 tablet 1    losartan (COZAAR) 25 MG tablet TAKE 1 TABLET BY MOUTH DAILY 90 tablet 1    metoprolol (LOPRESSOR) 100 MG tablet TAKE 1 TABLET BY MOUTH TWICE  DAILY 180 tablet 2    pravastatin (PRAVACHOL) 40 MG tablet TAKE 1 TABLET BY MOUTH DAILY 90 tablet 1    ELIQUIS 5 MG TABS tablet TAKE 1 TABLET BY MOUTH TWICE  DAILY 180 tablet 1    ferrous sulfate (IRON 325) 325 (65 Fe) MG tablet Take 1 tablet by mouth daily (with breakfast)      folic acid (FOLVITE) 1 MG tablet Take 1 tablet by mouth daily      verapamil (CALAN SR) 120 MG extended release tablet Take 1 tablet by mouth daily 90 tablet 3    chlorthalidone (HYGROTON) 25 MG tablet Take 1 tablet by mouth daily 90 tablet 3    albuterol sulfate HFA (VENTOLIN HFA) 108 (90 Base) MCG/ACT inhaler USE 2

## 2024-07-03 ENCOUNTER — TELEPHONE (OUTPATIENT)
Dept: UROLOGY | Age: 74
End: 2024-07-03

## 2024-07-03 DIAGNOSIS — R31.29 MICROHEMATURIA: Primary | ICD-10-CM

## 2024-07-03 LAB
BACTERIA UR CULT: ABNORMAL
ORGANISM: ABNORMAL

## 2024-07-03 RX ORDER — CEPHALEXIN 500 MG/1
500 CAPSULE ORAL 3 TIMES DAILY
Qty: 30 CAPSULE | Refills: 0 | Status: SHIPPED | OUTPATIENT
Start: 2024-07-03 | End: 2024-07-13

## 2024-07-03 NOTE — TELEPHONE ENCOUNTER
Patient advised of the urine results and keflex was sent to the lab. She voiced understanding to repeat the urine sample after completing the keflex and follow up appointment confirmed.

## 2024-07-03 NOTE — TELEPHONE ENCOUNTER
Patient with microhematuria. Culture with growth of contaminants.     Take Keflex. Repeat urine micro at completion    Monitor for incomplete emptying    The patient should go to the ED should they develop fever, chills, nausea, vomiting, chest pain, SOB, calf pain, feelings of incomplete emptying, or should they otherwise feel they need evaluated    F/u as scheduled

## 2024-07-17 RX ORDER — TRAZODONE HYDROCHLORIDE 50 MG/1
50 TABLET ORAL NIGHTLY PRN
Qty: 90 TABLET | Refills: 3 | Status: SHIPPED | OUTPATIENT
Start: 2024-07-17

## 2024-07-17 RX ORDER — PANTOPRAZOLE SODIUM 40 MG/1
TABLET, DELAYED RELEASE ORAL
Qty: 90 TABLET | Refills: 3 | Status: SHIPPED | OUTPATIENT
Start: 2024-07-17

## 2024-07-17 NOTE — TELEPHONE ENCOUNTER
Received refill request for Trazodone. Medication was last ordered by Deepali Palma. Medication was last ordered on 8/18/23 with 3 refills.   Patient was last seen in the office 11/7/23. Patient has a scheduled follow up 11/4/24.   Medication needs to be sent to [facility].

## 2024-07-29 ENCOUNTER — LAB (OUTPATIENT)
Dept: LAB | Age: 74
End: 2024-07-29

## 2024-07-29 DIAGNOSIS — I48.21 PERMANENT ATRIAL FIBRILLATION (HCC): ICD-10-CM

## 2024-07-29 DIAGNOSIS — I10 ESSENTIAL HYPERTENSION: ICD-10-CM

## 2024-07-29 DIAGNOSIS — E78.5 DYSLIPIDEMIA: ICD-10-CM

## 2024-07-29 DIAGNOSIS — I25.10 CORONARY ARTERY DISEASE INVOLVING NATIVE CORONARY ARTERY OF NATIVE HEART WITHOUT ANGINA PECTORIS: ICD-10-CM

## 2024-07-29 DIAGNOSIS — I25.5 ISCHEMIC CARDIOMYOPATHY: ICD-10-CM

## 2024-07-29 DIAGNOSIS — R60.0 BILATERAL LEG EDEMA: ICD-10-CM

## 2024-07-29 DIAGNOSIS — I50.32 CHRONIC DIASTOLIC CONGESTIVE HEART FAILURE (HCC): ICD-10-CM

## 2024-07-29 DIAGNOSIS — Z95.820 S/P ANGIOPLASTY WITH STENT: ICD-10-CM

## 2024-07-29 LAB
ALBUMIN SERPL BCG-MCNC: 4.2 G/DL (ref 3.5–5.1)
ALP SERPL-CCNC: 87 U/L (ref 38–126)
ALT SERPL W/O P-5'-P-CCNC: 10 U/L (ref 11–66)
ANION GAP SERPL CALC-SCNC: 11 MEQ/L (ref 8–16)
AST SERPL-CCNC: 18 U/L (ref 5–40)
BASOPHILS ABSOLUTE: 0.1 THOU/MM3 (ref 0–0.1)
BASOPHILS NFR BLD AUTO: 0.9 %
BILIRUB CONJ SERPL-MCNC: < 0.1 MG/DL (ref 0.1–13.8)
BILIRUB SERPL-MCNC: 0.3 MG/DL (ref 0.3–1.2)
BUN SERPL-MCNC: 19 MG/DL (ref 7–22)
CALCIUM SERPL-MCNC: 8.9 MG/DL (ref 8.5–10.5)
CHLORIDE SERPL-SCNC: 101 MEQ/L (ref 98–111)
CHOLEST SERPL-MCNC: 130 MG/DL (ref 100–199)
CO2 SERPL-SCNC: 30 MEQ/L (ref 23–33)
CREAT SERPL-MCNC: 0.7 MG/DL (ref 0.4–1.2)
DEPRECATED RDW RBC AUTO: 58.4 FL (ref 35–45)
EOSINOPHIL NFR BLD AUTO: 1.1 %
EOSINOPHILS ABSOLUTE: 0.1 THOU/MM3 (ref 0–0.4)
ERYTHROCYTE [DISTWIDTH] IN BLOOD BY AUTOMATED COUNT: 18.1 % (ref 11.5–14.5)
ERYTHROCYTE [SEDIMENTATION RATE] IN BLOOD BY WESTERGREN METHOD: 19 MM/HR (ref 0–20)
FOLATE SERPL-MCNC: 4.2 NG/ML (ref 4.8–24.2)
GFR SERPL CREATININE-BSD FRML MDRD: 89 ML/MIN/1.73M2
GLUCOSE SERPL-MCNC: 115 MG/DL (ref 70–108)
HCT VFR BLD AUTO: 40.6 % (ref 37–47)
HDLC SERPL-MCNC: 50 MG/DL
HGB BLD-MCNC: 12.6 GM/DL (ref 12–16)
IMM GRANULOCYTES # BLD AUTO: 0.03 THOU/MM3 (ref 0–0.07)
IMM GRANULOCYTES NFR BLD AUTO: 0.3 %
IRON SATN MFR SERPL: 12 % (ref 20–50)
IRON SERPL-MCNC: 46 UG/DL (ref 50–170)
LDLC SERPL CALC-MCNC: 56 MG/DL
LYMPHOCYTES ABSOLUTE: 2.8 THOU/MM3 (ref 1–4.8)
LYMPHOCYTES NFR BLD AUTO: 28.5 %
MAGNESIUM SERPL-MCNC: 1.8 MG/DL (ref 1.6–2.4)
MCH RBC QN AUTO: 27 PG (ref 26–33)
MCHC RBC AUTO-ENTMCNC: 31 GM/DL (ref 32.2–35.5)
MCV RBC AUTO: 87.1 FL (ref 81–99)
MONOCYTES ABSOLUTE: 1.3 THOU/MM3 (ref 0.4–1.3)
MONOCYTES NFR BLD AUTO: 13 %
NEUTROPHILS ABSOLUTE: 5.6 THOU/MM3 (ref 1.8–7.7)
NEUTROPHILS NFR BLD AUTO: 56.2 %
NRBC BLD AUTO-RTO: 0 /100 WBC
PLATELET # BLD AUTO: 264 THOU/MM3 (ref 130–400)
PMV BLD AUTO: 9.3 FL (ref 9.4–12.4)
POTASSIUM SERPL-SCNC: 3.2 MEQ/L (ref 3.5–5.2)
PROT SERPL-MCNC: 6.9 G/DL (ref 6.1–8)
RBC # BLD AUTO: 4.66 MILL/MM3 (ref 4.2–5.4)
SODIUM SERPL-SCNC: 142 MEQ/L (ref 135–145)
TIBC SERPL-MCNC: 369 UG/DL (ref 171–450)
TRIGL SERPL-MCNC: 118 MG/DL (ref 0–199)
WBC # BLD AUTO: 9.9 THOU/MM3 (ref 4.8–10.8)

## 2024-08-01 NOTE — TELEPHONE ENCOUNTER
Arlene Lira called requesting a refill on the following medications:  Requested Prescriptions     Pending Prescriptions Disp Refills    verapamil (CALAN SR) 120 MG extended release tablet 90 tablet 3     Sig: Take 1 tablet by mouth daily     Pharmacy verified:    [facility] - [place], [place] - [address] [phone] - F [phone]     Date of last visit: 04/26/2024  Date of next visit (if applicable): 9/13/2024

## 2024-08-08 ENCOUNTER — HOSPITAL ENCOUNTER (OUTPATIENT)
Dept: ULTRASOUND IMAGING | Age: 74
Discharge: HOME | End: 2024-08-08
Attending: INTERNAL MEDICINE
Payer: MEDICARE

## 2024-08-08 DIAGNOSIS — E04.2 MULTINODULAR GOITER: ICD-10-CM

## 2024-08-08 PROCEDURE — 76536 US EXAM OF HEAD AND NECK: CPT

## 2024-08-15 ENCOUNTER — OFFICE VISIT (OUTPATIENT)
Dept: FAMILY MEDICINE CLINIC | Age: 74
End: 2024-08-15

## 2024-08-15 ENCOUNTER — HOSPITAL ENCOUNTER (OUTPATIENT)
Dept: MAMMOGRAPHY | Age: 74
Discharge: HOME | End: 2024-08-15
Payer: MEDICARE

## 2024-08-15 VITALS
TEMPERATURE: 98.1 F | HEART RATE: 52 BPM | OXYGEN SATURATION: 98 % | SYSTOLIC BLOOD PRESSURE: 128 MMHG | BODY MASS INDEX: 34.4 KG/M2 | DIASTOLIC BLOOD PRESSURE: 74 MMHG | WEIGHT: 219.7 LBS

## 2024-08-15 DIAGNOSIS — Z12.31 VISIT FOR SCREENING MAMMOGRAM: ICD-10-CM

## 2024-08-15 DIAGNOSIS — N30.01 ACUTE CYSTITIS WITH HEMATURIA: Primary | ICD-10-CM

## 2024-08-15 DIAGNOSIS — R35.0 URINARY FREQUENCY: ICD-10-CM

## 2024-08-15 LAB
BILIRUBIN, POC: ABNORMAL
BLOOD URINE, POC: ABNORMAL
CLARITY, POC: ABNORMAL
COLOR, POC: ABNORMAL
GLUCOSE URINE, POC: ABNORMAL
KETONES, POC: ABNORMAL
LEUKOCYTE EST, POC: ABNORMAL
NITRITE, POC: ABNORMAL
PH, POC: 6
PROTEIN, POC: ABNORMAL
SPECIFIC GRAVITY, POC: >=1.03
UROBILINOGEN, POC: 0.2

## 2024-08-15 PROCEDURE — 77063 BREAST TOMOSYNTHESIS BI: CPT

## 2024-08-15 RX ORDER — CEPHALEXIN 500 MG/1
500 CAPSULE ORAL 3 TIMES DAILY
Qty: 30 CAPSULE | Refills: 0 | Status: SHIPPED | OUTPATIENT
Start: 2024-08-15 | End: 2024-08-25

## 2024-08-15 ASSESSMENT — PATIENT HEALTH QUESTIONNAIRE - PHQ9
SUM OF ALL RESPONSES TO PHQ QUESTIONS 1-9: 2
3. TROUBLE FALLING OR STAYING ASLEEP: NOT AT ALL
10. IF YOU CHECKED OFF ANY PROBLEMS, HOW DIFFICULT HAVE THESE PROBLEMS MADE IT FOR YOU TO DO YOUR WORK, TAKE CARE OF THINGS AT HOME, OR GET ALONG WITH OTHER PEOPLE: NOT DIFFICULT AT ALL
5. POOR APPETITE OR OVEREATING: SEVERAL DAYS
2. FEELING DOWN, DEPRESSED OR HOPELESS: SEVERAL DAYS
SUM OF ALL RESPONSES TO PHQ9 QUESTIONS 1 & 2: 1
8. MOVING OR SPEAKING SO SLOWLY THAT OTHER PEOPLE COULD HAVE NOTICED. OR THE OPPOSITE, BEING SO FIGETY OR RESTLESS THAT YOU HAVE BEEN MOVING AROUND A LOT MORE THAN USUAL: NOT AT ALL
SUM OF ALL RESPONSES TO PHQ QUESTIONS 1-9: 2
6. FEELING BAD ABOUT YOURSELF - OR THAT YOU ARE A FAILURE OR HAVE LET YOURSELF OR YOUR FAMILY DOWN: NOT AT ALL
SUM OF ALL RESPONSES TO PHQ QUESTIONS 1-9: 2
SUM OF ALL RESPONSES TO PHQ QUESTIONS 1-9: 2
7. TROUBLE CONCENTRATING ON THINGS, SUCH AS READING THE NEWSPAPER OR WATCHING TELEVISION: NOT AT ALL
1. LITTLE INTEREST OR PLEASURE IN DOING THINGS: NOT AT ALL
9. THOUGHTS THAT YOU WOULD BE BETTER OFF DEAD, OR OF HURTING YOURSELF: NOT AT ALL

## 2024-08-15 ASSESSMENT — ENCOUNTER SYMPTOMS
COUGH: 0
NAUSEA: 0
RHINORRHEA: 0
ABDOMINAL PAIN: 0
BACK PAIN: 0
EYES NEGATIVE: 1
SORE THROAT: 0
VOMITING: 0
CHEST TIGHTNESS: 0
SHORTNESS OF BREATH: 0
DIARRHEA: 0

## 2024-08-15 NOTE — PROGRESS NOTES
[facility]  [address] [address]  Dept: [phone]  Dept Fax: [phone]  Loc: [phone]  PROGRESS NOTE      VisitDate: 8/15/2024    Arlene Lira is a 74 y.o. female who presents today for:  Chief Complaint   Patient presents with    Urinary Frequency    Urinary Pain     Symptoms began day before yesterday slight pain when urinating, states she is having a UTI about once a month; denies any flank pain     Discuss Labs    Results     Soft tissue scan, concerned with nodules        Impression/Plan:  1. Acute cystitis with hematuria    2. Urinary frequency      Requested Prescriptions     Signed Prescriptions Disp Refills    cephALEXin (KEFLEX) 500 MG capsule 30 capsule 0     Sig: Take 1 capsule by mouth 3 times daily for 10 days     Orders Placed This Encounter   Procedures    POCT Urinalysis No Micro (Auto)         Subjective:  HPI  Patient presents with complaints of dysuria and frequency.  She has not noted any gross hematuria.  States she has had multiple urinary tract infections.  Chart reviewed last 2 cultures were negative.  She did have 1 positive culture back in May.  Denies fever chills nausea vomit diarrhea no dizziness or lightheadedness.  She had questions about her thyroid ultrasound and blood work these were reviewed with her.  She plans to follow-up with the ordering physicians as well.    Review of Systems   Constitutional:  Negative for activity change, appetite change, fatigue and fever.   HENT:  Negative for congestion, rhinorrhea and sore throat.    Eyes: Negative.    Respiratory:  Negative for cough, chest tightness and shortness of breath.    Cardiovascular:  Negative for chest pain and palpitations.   Gastrointestinal:  Negative for abdominal pain, diarrhea, nausea and vomiting.   Genitourinary:  Positive for dysuria and frequency. Negative for urgency.   Musculoskeletal:  Negative for arthralgias and back

## 2024-08-17 LAB
BACTERIA UR CULT: ABNORMAL
ORGANISM: ABNORMAL

## 2024-08-21 ENCOUNTER — HOSPITAL ENCOUNTER (OUTPATIENT)
Dept: WOMENS IMAGING | Age: 74
Discharge: HOME | End: 2024-08-21
Attending: RADIOLOGY
Payer: MEDICARE

## 2024-08-21 DIAGNOSIS — R92.8 ABNORMAL MAMMOGRAM: ICD-10-CM

## 2024-08-21 PROCEDURE — 76642 ULTRASOUND BREAST LIMITED: CPT

## 2024-08-26 DIAGNOSIS — Z09 FOLLOW-UP EXAM: Primary | ICD-10-CM

## 2024-08-26 DIAGNOSIS — T14.8XXA HEMATOMA: ICD-10-CM

## 2024-09-12 ENCOUNTER — OFFICE VISIT (OUTPATIENT)
Age: 74
End: 2024-09-12
Payer: MEDICARE

## 2024-09-12 VITALS
WEIGHT: 218 LBS | SYSTOLIC BLOOD PRESSURE: 132 MMHG | RESPIRATION RATE: 18 BRPM | DIASTOLIC BLOOD PRESSURE: 88 MMHG | HEART RATE: 89 BPM | HEIGHT: 68 IN | BODY MASS INDEX: 33.04 KG/M2

## 2024-09-12 DIAGNOSIS — E04.2 MULTINODULAR GOITER: Primary | ICD-10-CM

## 2024-09-12 PROCEDURE — 1123F ACP DISCUSS/DSCN MKR DOCD: CPT | Performed by: INTERNAL MEDICINE

## 2024-09-12 PROCEDURE — G8427 DOCREV CUR MEDS BY ELIG CLIN: HCPCS | Performed by: INTERNAL MEDICINE

## 2024-09-12 PROCEDURE — 1090F PRES/ABSN URINE INCON ASSESS: CPT | Performed by: INTERNAL MEDICINE

## 2024-09-12 PROCEDURE — 99213 OFFICE O/P EST LOW 20 MIN: CPT | Performed by: INTERNAL MEDICINE

## 2024-09-12 PROCEDURE — 3079F DIAST BP 80-89 MM HG: CPT | Performed by: INTERNAL MEDICINE

## 2024-09-12 PROCEDURE — G8399 PT W/DXA RESULTS DOCUMENT: HCPCS | Performed by: INTERNAL MEDICINE

## 2024-09-12 PROCEDURE — 1036F TOBACCO NON-USER: CPT | Performed by: INTERNAL MEDICINE

## 2024-09-12 PROCEDURE — G8417 CALC BMI ABV UP PARAM F/U: HCPCS | Performed by: INTERNAL MEDICINE

## 2024-09-12 PROCEDURE — 3075F SYST BP GE 130 - 139MM HG: CPT | Performed by: INTERNAL MEDICINE

## 2024-09-24 DIAGNOSIS — N32.81 OAB (OVERACTIVE BLADDER): ICD-10-CM

## 2024-09-25 ENCOUNTER — OFFICE VISIT (OUTPATIENT)
Dept: UROLOGY | Age: 74
End: 2024-09-25
Payer: MEDICARE

## 2024-09-25 VITALS
HEIGHT: 68 IN | DIASTOLIC BLOOD PRESSURE: 86 MMHG | WEIGHT: 228 LBS | SYSTOLIC BLOOD PRESSURE: 144 MMHG | RESPIRATION RATE: 18 BRPM | BODY MASS INDEX: 34.56 KG/M2

## 2024-09-25 DIAGNOSIS — R31.29 MICROHEMATURIA: Primary | ICD-10-CM

## 2024-09-25 DIAGNOSIS — N39.3 SUI (STRESS URINARY INCONTINENCE, FEMALE): ICD-10-CM

## 2024-09-25 DIAGNOSIS — R30.0 DYSURIA: ICD-10-CM

## 2024-09-25 LAB
BILIRUBIN, URINE: ABNORMAL
BLOOD URINE, POC: NEGATIVE
CHARACTER, URINE: CLEAR
COLOR, UA: YELLOW
GLUCOSE URINE: NEGATIVE MG/DL
KETONES, URINE: NEGATIVE
LEUKOCYTE CLUMPS, URINE: NEGATIVE
NITRITE, URINE: NEGATIVE
PH, URINE: 5.5 (ref 5–9)
POST VOID RESIDUAL (PVR): 63 ML
PROTEIN, URINE: NEGATIVE MG/DL
SPECIFIC GRAVITY UA: 1.02 (ref 1–1.03)
UROBILINOGEN, URINE: 0.2 EU/DL (ref 0–1)

## 2024-09-25 PROCEDURE — 3077F SYST BP >= 140 MM HG: CPT

## 2024-09-25 PROCEDURE — G8427 DOCREV CUR MEDS BY ELIG CLIN: HCPCS

## 2024-09-25 PROCEDURE — 0509F URINE INCON PLAN DOCD: CPT

## 2024-09-25 PROCEDURE — 1036F TOBACCO NON-USER: CPT

## 2024-09-25 PROCEDURE — 3079F DIAST BP 80-89 MM HG: CPT

## 2024-09-25 PROCEDURE — 81003 URINALYSIS AUTO W/O SCOPE: CPT

## 2024-09-25 PROCEDURE — 1123F ACP DISCUSS/DSCN MKR DOCD: CPT

## 2024-09-25 PROCEDURE — G8399 PT W/DXA RESULTS DOCUMENT: HCPCS

## 2024-09-25 PROCEDURE — 1090F PRES/ABSN URINE INCON ASSESS: CPT

## 2024-09-25 PROCEDURE — 51798 US URINE CAPACITY MEASURE: CPT

## 2024-09-25 PROCEDURE — 99214 OFFICE O/P EST MOD 30 MIN: CPT

## 2024-09-25 PROCEDURE — G8417 CALC BMI ABV UP PARAM F/U: HCPCS

## 2024-09-25 RX ORDER — MIRABEGRON 50 MG/1
TABLET, EXTENDED RELEASE ORAL
Qty: 90 TABLET | Refills: 3 | Status: SHIPPED | OUTPATIENT
Start: 2024-09-25

## 2024-09-25 RX ORDER — VIBEGRON 75 MG/1
75 TABLET, FILM COATED ORAL DAILY
Qty: 90 EACH | Refills: 0 | Status: SHIPPED | OUTPATIENT
Start: 2024-09-25 | End: 2024-12-24

## 2024-09-29 RX ORDER — VERAPAMIL HYDROCHLORIDE 120 MG/1
120 TABLET, FILM COATED, EXTENDED RELEASE ORAL DAILY
Qty: 90 TABLET | Refills: 0 | Status: SHIPPED | OUTPATIENT
Start: 2024-09-29

## 2024-10-03 RX ORDER — CHLORTHALIDONE 25 MG/1
25 TABLET ORAL DAILY
Qty: 90 TABLET | Refills: 0 | Status: SHIPPED | OUTPATIENT
Start: 2024-10-03

## 2024-10-11 RX ORDER — PRAVASTATIN SODIUM 40 MG
TABLET ORAL
Qty: 90 TABLET | Refills: 0 | Status: SHIPPED | OUTPATIENT
Start: 2024-10-11

## 2024-10-11 RX ORDER — APIXABAN 5 MG/1
5 TABLET, FILM COATED ORAL 2 TIMES DAILY
Qty: 180 TABLET | Refills: 0 | Status: SHIPPED | OUTPATIENT
Start: 2024-10-11

## 2024-10-22 ENCOUNTER — HOSPITAL ENCOUNTER (OUTPATIENT)
Dept: INTERVENTIONAL RADIOLOGY/VASCULAR | Age: 74
Discharge: HOME | End: 2024-10-24
Payer: MEDICARE

## 2024-10-22 DIAGNOSIS — I65.23 BILATERAL CAROTID ARTERY STENOSIS: ICD-10-CM

## 2024-10-22 PROCEDURE — 93880 EXTRACRANIAL BILAT STUDY: CPT

## 2024-10-30 RX ORDER — LOSARTAN POTASSIUM 25 MG/1
TABLET ORAL
Qty: 90 TABLET | Refills: 0 | Status: SHIPPED | OUTPATIENT
Start: 2024-10-30 | End: 2024-10-31 | Stop reason: SDUPTHER

## 2024-10-31 ENCOUNTER — OFFICE VISIT (OUTPATIENT)
Dept: CARDIOLOGY CLINIC | Age: 74
End: 2024-10-31

## 2024-10-31 VITALS
BODY MASS INDEX: 33.49 KG/M2 | HEIGHT: 68 IN | SYSTOLIC BLOOD PRESSURE: 176 MMHG | WEIGHT: 221 LBS | DIASTOLIC BLOOD PRESSURE: 111 MMHG | HEART RATE: 81 BPM

## 2024-10-31 DIAGNOSIS — Z95.820 S/P PERCUTANEOUS TRANSLUMINAL ANGIOPLASTY (PTA) WITH STENT PLACEMENT: ICD-10-CM

## 2024-10-31 DIAGNOSIS — I25.5 ISCHEMIC CARDIOMYOPATHY: ICD-10-CM

## 2024-10-31 DIAGNOSIS — I27.20 PULMONARY HTN (HCC): ICD-10-CM

## 2024-10-31 DIAGNOSIS — Z95.820 S/P ANGIOPLASTY WITH STENT: ICD-10-CM

## 2024-10-31 DIAGNOSIS — E78.5 DYSLIPIDEMIA: ICD-10-CM

## 2024-10-31 DIAGNOSIS — I10 ESSENTIAL HYPERTENSION: ICD-10-CM

## 2024-10-31 DIAGNOSIS — I50.32 CHRONIC DIASTOLIC CONGESTIVE HEART FAILURE (HCC): ICD-10-CM

## 2024-10-31 DIAGNOSIS — I25.10 CORONARY ARTERY DISEASE INVOLVING NATIVE CORONARY ARTERY OF NATIVE HEART WITHOUT ANGINA PECTORIS: Primary | ICD-10-CM

## 2024-10-31 DIAGNOSIS — R60.0 BILATERAL LEG EDEMA: ICD-10-CM

## 2024-10-31 DIAGNOSIS — I48.21 PERMANENT ATRIAL FIBRILLATION (HCC): ICD-10-CM

## 2024-10-31 PROBLEM — R13.10 DYSPHAGIA: Status: RESOLVED | Noted: 2018-03-08 | Resolved: 2024-10-31

## 2024-10-31 RX ORDER — CHLORTHALIDONE 25 MG/1
25 TABLET ORAL DAILY
Qty: 90 TABLET | Refills: 3 | Status: CANCELLED | OUTPATIENT
Start: 2024-10-31

## 2024-10-31 RX ORDER — METOPROLOL TARTRATE 100 MG/1
100 TABLET ORAL 2 TIMES DAILY
Qty: 180 TABLET | Refills: 3 | Status: SHIPPED | OUTPATIENT
Start: 2024-10-31

## 2024-10-31 RX ORDER — LOSARTAN POTASSIUM 50 MG/1
TABLET ORAL
Qty: 90 TABLET | Refills: 3 | Status: SHIPPED | OUTPATIENT
Start: 2024-10-31

## 2024-10-31 RX ORDER — TRIAMTERENE AND HYDROCHLOROTHIAZIDE 37.5; 25 MG/1; MG/1
0.5 TABLET ORAL DAILY
Qty: 45 TABLET | Refills: 3 | Status: SHIPPED | OUTPATIENT
Start: 2024-10-31

## 2024-10-31 NOTE — PROGRESS NOTES
Chief Complaint   Patient presents with    Follow-up    Coronary Artery Disease    Congestive Heart Failure     HX OF  fu had TIA here at [facility], transferred from     Had carotid endarterectomy - 9-11-17 -     Was admitted from office for atr fib with  and was admitted TAYLOR-CV . Cath and needed stent and later went back to atr fib with 110 and re-sent to ER and sent home with added cardizem         Hx  fo CAD< CMP< CHF  And post hospital f/u  On D/c lopressor decreased to 75 bid from 150 bid for bradycardia  Off lasix 20, dig 125, clonidine 0.1, calan sr 240  Admitted  atr fib rvr and UTI  In hospital had atr fib rvr and later episodes of atr fib with SVR- bradycardia and hence med adjusted as above  Was in hospital for 7 days         5 month follow up.    EKG done 3-.    Denies CP, palpitations, dizziness, and YVETTE.    Sob on exertion and fatigue on exertion- chronic- better    Some  leg edema +1  Had been off lasix 20 and kcl 10  DID NOT TAKE HER CHLORTHALIDONE FOR 2 WEEKS RUN OUT      Hx of depression and a lots of stress in family  and and daughter sick        Patient Active Problem List   Diagnosis    Obstructive sleep apnea on CPAP    COPD (chronic obstructive pulmonary disease) (HCC)    Fibromyalgia    Right arm weakness    Urinary tract infection without hematuria    Obesity (BMI 30-39.9)    Chronic diastolic congestive heart failure (HCC)    Bilateral leg edema  +1- TO +2 now trace    Pulmonary HTN (HCC) Mean pressure 32 mmhg by RHC    SOB (shortness of breath) on exertion    Polyosteoarthritis    Postsurgical malabsorption    Bilateral carotid artery stenosis    S/P carotid endarterectomy    Positive colorectal cancer screening using DNA-based stool test    Gastroesophageal reflux disease without esophagitis    Hypersomnia with sleep apnea    Fatigue    Essential hypertension    Sleep apnea    Coronary artery disease involving native heart without angina pectoris    S/P

## 2024-11-01 ENCOUNTER — LAB (OUTPATIENT)
Dept: LAB | Age: 74
End: 2024-11-01

## 2024-11-01 DIAGNOSIS — Z95.820 S/P ANGIOPLASTY WITH STENT: ICD-10-CM

## 2024-11-01 DIAGNOSIS — I25.10 CORONARY ARTERY DISEASE INVOLVING NATIVE CORONARY ARTERY OF NATIVE HEART WITHOUT ANGINA PECTORIS: ICD-10-CM

## 2024-11-01 DIAGNOSIS — I48.21 PERMANENT ATRIAL FIBRILLATION (HCC): ICD-10-CM

## 2024-11-01 DIAGNOSIS — E78.5 DYSLIPIDEMIA: ICD-10-CM

## 2024-11-01 DIAGNOSIS — I10 ESSENTIAL HYPERTENSION: ICD-10-CM

## 2024-11-01 DIAGNOSIS — I50.32 CHRONIC DIASTOLIC CONGESTIVE HEART FAILURE (HCC): ICD-10-CM

## 2024-11-01 DIAGNOSIS — I25.5 ISCHEMIC CARDIOMYOPATHY: ICD-10-CM

## 2024-11-01 DIAGNOSIS — Z95.820 S/P PERCUTANEOUS TRANSLUMINAL ANGIOPLASTY (PTA) WITH STENT PLACEMENT: ICD-10-CM

## 2024-11-01 DIAGNOSIS — R60.0 BILATERAL LEG EDEMA: ICD-10-CM

## 2024-11-01 DIAGNOSIS — I27.20 PULMONARY HTN (HCC): ICD-10-CM

## 2024-11-01 LAB
ALT SERPL W/O P-5'-P-CCNC: 12 U/L (ref 11–66)
ANION GAP SERPL CALC-SCNC: 13 MEQ/L (ref 8–16)
BASOPHILS ABSOLUTE: 0.1 THOU/MM3 (ref 0–0.1)
BASOPHILS NFR BLD AUTO: 0.7 %
BUN SERPL-MCNC: 26 MG/DL (ref 7–22)
CALCIUM SERPL-MCNC: 9.3 MG/DL (ref 8.5–10.5)
CHLORIDE SERPL-SCNC: 107 MEQ/L (ref 98–111)
CO2 SERPL-SCNC: 24 MEQ/L (ref 23–33)
CREAT SERPL-MCNC: 0.8 MG/DL (ref 0.4–1.2)
DEPRECATED RDW RBC AUTO: 58.7 FL (ref 35–45)
EOSINOPHIL NFR BLD AUTO: 2.3 %
EOSINOPHILS ABSOLUTE: 0.3 THOU/MM3 (ref 0–0.4)
ERYTHROCYTE [DISTWIDTH] IN BLOOD BY AUTOMATED COUNT: 16.6 % (ref 11.5–14.5)
GFR SERPL CREATININE-BSD FRML MDRD: 76 ML/MIN/1.73M2
GLUCOSE SERPL-MCNC: 117 MG/DL (ref 70–108)
HCT VFR BLD AUTO: 44.8 % (ref 37–47)
HGB BLD-MCNC: 13.7 GM/DL (ref 12–16)
IMM GRANULOCYTES # BLD AUTO: 0.04 THOU/MM3 (ref 0–0.07)
IMM GRANULOCYTES NFR BLD AUTO: 0.4 %
IRON SATN MFR SERPL: 15 % (ref 20–50)
IRON SERPL-MCNC: 43 UG/DL (ref 50–170)
LYMPHOCYTES ABSOLUTE: 2.2 THOU/MM3 (ref 1–4.8)
LYMPHOCYTES NFR BLD AUTO: 20.1 %
MAGNESIUM SERPL-MCNC: 2.1 MG/DL (ref 1.6–2.4)
MCH RBC QN AUTO: 29.1 PG (ref 26–33)
MCHC RBC AUTO-ENTMCNC: 30.6 GM/DL (ref 32.2–35.5)
MCV RBC AUTO: 95.1 FL (ref 81–99)
MONOCYTES ABSOLUTE: 1.1 THOU/MM3 (ref 0.4–1.3)
MONOCYTES NFR BLD AUTO: 10.1 %
NEUTROPHILS ABSOLUTE: 7.2 THOU/MM3 (ref 1.8–7.7)
NEUTROPHILS NFR BLD AUTO: 66.4 %
NRBC BLD AUTO-RTO: 0 /100 WBC
PLATELET # BLD AUTO: 259 THOU/MM3 (ref 130–400)
PMV BLD AUTO: 10 FL (ref 9.4–12.4)
POTASSIUM SERPL-SCNC: 3.7 MEQ/L (ref 3.5–5.2)
RBC # BLD AUTO: 4.71 MILL/MM3 (ref 4.2–5.4)
SODIUM SERPL-SCNC: 144 MEQ/L (ref 135–145)
TIBC SERPL-MCNC: 281 UG/DL (ref 171–450)
WBC # BLD AUTO: 10.9 THOU/MM3 (ref 4.8–10.8)

## 2024-11-02 LAB — FOLATE SERPL-MCNC: 4.1 NG/ML (ref 4.8–24.2)

## 2024-11-08 RX ORDER — CLOPIDOGREL BISULFATE 75 MG/1
75 TABLET ORAL DAILY
Qty: 90 TABLET | Refills: 3 | Status: SHIPPED | OUTPATIENT
Start: 2024-11-08

## 2024-11-11 ENCOUNTER — TELEPHONE (OUTPATIENT)
Dept: CARDIOLOGY CLINIC | Age: 74
End: 2024-11-11

## 2024-11-11 RX ORDER — VIBEGRON 75 MG/1
75 TABLET, FILM COATED ORAL DAILY
Qty: 90 TABLET | Refills: 3 | OUTPATIENT
Start: 2024-11-11 | End: 2025-02-09

## 2024-11-21 NOTE — PROGRESS NOTES
[facility]      Arlene Lira         [id number]  11/22/2024   Chief Complaint   Patient presents with    Follow-up     SHANNEN  1 year follow up Adaptive         Patient of Dr. Shaw     Assessment/Plan   1. Obstructive sleep apnea on CPAP    2. Obesity (BMI 30-39.9)    3. Hypersomnia with sleep apnea    4. Other insomnia     -Continue trazodone and provigil PRN as ordered previously.   -Refills sent today  - DME Order for CPAP as OP  -Yearly supply order placed? Yes new machine and supplies  -Download was personally reviewed and discussed with patient at length.  -The symptoms of SHANNEN have improved. The patient is benefiting from therapy and should continue use of PAP device.  -Patient's symptoms and AHI are controlled with current settings of 11 cmH2O. Change to 12 for patient comfort.  -Advised to continue current positive airway pressure therapy with above described pressure.   -Advised to keep good compliance with current recommended pressure to get optimal results and clinical improvement  -Recommend 7-9 hours of sleep with PAP  -Instructed to call DME company regarding supplies if needed.   -Patient to call my office for earlier appointment if needed for worsening of sleep symptoms.   -Patient is not to drive if feeling sleepy  -Counseled patient on weight loss    Educated about my impression and plan. Patient verbalizes understanding.      Return in about 3 months (around 2/22/2025) for SHANNEN after new machine. with download.      Subjective   Presentation:   Arlene presents for sleep medicine follow up for obstructive sleep apnea, insomnia, hypersomnia.  Since the last visit, Arlene has been doing very well on PAP therapy and reports good benefit from compliant use of PAP machine. No complaints of problems with mask pressures at this time. Machine is showing motor life exceeded warning.    Prescribed trazodone for insomnia.  Prescribed Provigil for daytime

## 2024-11-22 ENCOUNTER — OFFICE VISIT (OUTPATIENT)
Dept: PULMONOLOGY | Age: 74
End: 2024-11-22
Payer: MEDICARE

## 2024-11-22 VITALS
SYSTOLIC BLOOD PRESSURE: 138 MMHG | BODY MASS INDEX: 33.13 KG/M2 | DIASTOLIC BLOOD PRESSURE: 82 MMHG | TEMPERATURE: 98.4 F | OXYGEN SATURATION: 96 % | HEART RATE: 81 BPM | WEIGHT: 218.6 LBS | HEIGHT: 68 IN

## 2024-11-22 DIAGNOSIS — E66.9 OBESITY (BMI 30-39.9): ICD-10-CM

## 2024-11-22 DIAGNOSIS — G47.33 OBSTRUCTIVE SLEEP APNEA ON CPAP: Primary | ICD-10-CM

## 2024-11-22 DIAGNOSIS — G47.09 OTHER INSOMNIA: ICD-10-CM

## 2024-11-22 DIAGNOSIS — G47.10 HYPERSOMNIA WITH SLEEP APNEA: ICD-10-CM

## 2024-11-22 DIAGNOSIS — G47.30 HYPERSOMNIA WITH SLEEP APNEA: ICD-10-CM

## 2024-11-22 PROCEDURE — 3075F SYST BP GE 130 - 139MM HG: CPT

## 2024-11-22 PROCEDURE — G8427 DOCREV CUR MEDS BY ELIG CLIN: HCPCS

## 2024-11-22 PROCEDURE — 1159F MED LIST DOCD IN RCRD: CPT

## 2024-11-22 PROCEDURE — G8399 PT W/DXA RESULTS DOCUMENT: HCPCS

## 2024-11-22 PROCEDURE — 3079F DIAST BP 80-89 MM HG: CPT

## 2024-11-22 PROCEDURE — 99214 OFFICE O/P EST MOD 30 MIN: CPT

## 2024-11-22 PROCEDURE — 1036F TOBACCO NON-USER: CPT

## 2024-11-22 PROCEDURE — 1090F PRES/ABSN URINE INCON ASSESS: CPT

## 2024-11-22 PROCEDURE — 1123F ACP DISCUSS/DSCN MKR DOCD: CPT

## 2024-11-22 PROCEDURE — G8417 CALC BMI ABV UP PARAM F/U: HCPCS

## 2024-11-22 PROCEDURE — G8484 FLU IMMUNIZE NO ADMIN: HCPCS

## 2024-11-22 RX ORDER — MODAFINIL 200 MG/1
200 TABLET ORAL DAILY
Qty: 30 TABLET | Refills: 3 | Status: SHIPPED | OUTPATIENT
Start: 2024-11-22 | End: 2025-03-22

## 2024-11-22 RX ORDER — TRAZODONE HYDROCHLORIDE 50 MG/1
50 TABLET, FILM COATED ORAL NIGHTLY PRN
Qty: 90 TABLET | Refills: 3 | Status: SHIPPED | OUTPATIENT
Start: 2024-11-22

## 2024-11-22 ASSESSMENT — ENCOUNTER SYMPTOMS
SHORTNESS OF BREATH: 0
RHINORRHEA: 0
SORE THROAT: 0
SINUS PRESSURE: 0
WHEEZING: 0
SINUS PAIN: 0
COUGH: 0

## 2024-12-02 RX ORDER — VERAPAMIL HYDROCHLORIDE 120 MG/1
120 TABLET, FILM COATED, EXTENDED RELEASE ORAL DAILY
Qty: 90 TABLET | Refills: 2 | Status: SHIPPED | OUTPATIENT
Start: 2024-12-02

## 2024-12-10 NOTE — PROGRESS NOTES
[facility]  PHYSICAL THERAPY  [] EVALUATION  [x] DAILY NOTE (LAND) [] DAILY NOTE (AQUATIC ) [] PROGRESS NOTE [] DISCHARGE NOTE    [] OUTPATIENT REHABILITATION CENTER [facility]   [] [facility]    [] [facility]   [x] [facility]    Date: 2024  Patient Name:  Arlene Lira  : 1950  MRN: [id number]  CSN: [id number]    Referring Practitioner Dalia Lopez MD [facility]   Diagnosis Unspecified atrial fibrillation  Chronic diastolic (congestive) heart failure    Treatment Diagnosis R26.81  Unsteadiness on feet  M62.81 Generalized Weakness   Date of Evaluation 3/4/24    Additional Pertinent History HTN; incontinence; COPD; UTI; obesity; OA; fibromyalgia; TIA; skin CA      Functional Outcome Measure Used 5x sit to stand   Functional Outcome Score 14 sec with use of hands (3/4/24)   21 sec with use of hands (24)      Insurance: Primary: Payor: [facility] MEDICARE /  /  / , modalities and aquatic therapy covered  Secondary:    Authorization Information: Pre-certification is not needed    Approved Procedure Codes: Authorization of Specific CPT Codes Not Required  (Codes requested indicated by red font, codes approved indicated by black font)   Visit # 7,  0/10 for progress note (Reporting Period: 3/4/24 to  24)   Visits Allowed: Based on medical necessity   Recertification Date:    Physician Follow-Up:    Physician Orders:    History of Present Illness: Arlene was recently hospitalized for a-fib and while there she found out she had a UTI. She was hospitalized for a week and received IV antibiotics. While hospitalized she states that therapists walked her up and down the hallways. She was discharged home with outpatient referral and no home health services.    Over the last 6 months Arlene admits to having 3 significant falls, fortunately no injury. She started to use a SPC 3-4 months ago, however she admits to not using it at home. She denies 
carotid stenosis

## 2024-12-12 ENCOUNTER — TELEPHONE (OUTPATIENT)
Dept: FAMILY MEDICINE CLINIC | Age: 74
End: 2024-12-12

## 2024-12-12 RX ORDER — PRAVASTATIN SODIUM 40 MG
TABLET ORAL
Qty: 90 TABLET | Refills: 1 | Status: SHIPPED | OUTPATIENT
Start: 2024-12-12

## 2024-12-12 NOTE — TELEPHONE ENCOUNTER
Patient asking for handicap placard letter.     She said to call her back when ready and her  will pick it up.

## 2024-12-17 ENCOUNTER — LAB (OUTPATIENT)
Dept: LAB | Age: 74
End: 2024-12-17

## 2024-12-17 LAB
ALT SERPL W/O P-5'-P-CCNC: 12 U/L (ref 11–66)
BASOPHILS ABSOLUTE: 0.1 THOU/MM3 (ref 0–0.1)
BASOPHILS NFR BLD AUTO: 1.1 %
CREAT SERPL-MCNC: 0.7 MG/DL (ref 0.4–1.2)
DEPRECATED RDW RBC AUTO: 55.7 FL (ref 35–45)
EOSINOPHIL NFR BLD AUTO: 2.7 %
EOSINOPHILS ABSOLUTE: 0.2 THOU/MM3 (ref 0–0.4)
ERYTHROCYTE [DISTWIDTH] IN BLOOD BY AUTOMATED COUNT: 15.9 % (ref 11.5–14.5)
ERYTHROCYTE [SEDIMENTATION RATE] IN BLOOD BY WESTERGREN METHOD: 10 MM/HR (ref 0–20)
GFR SERPL CREATININE-BSD FRML MDRD: 89 ML/MIN/1.73M2
HCT VFR BLD AUTO: 44.9 % (ref 37–47)
HGB BLD-MCNC: 13.9 GM/DL (ref 12–16)
IMM GRANULOCYTES # BLD AUTO: 0.03 THOU/MM3 (ref 0–0.07)
IMM GRANULOCYTES NFR BLD AUTO: 0.3 %
IRON SATN MFR SERPL: 32 % (ref 20–50)
IRON SERPL-MCNC: 89 UG/DL (ref 50–170)
LYMPHOCYTES ABSOLUTE: 2.1 THOU/MM3 (ref 1–4.8)
LYMPHOCYTES NFR BLD AUTO: 23.6 %
MCH RBC QN AUTO: 29.6 PG (ref 26–33)
MCHC RBC AUTO-ENTMCNC: 31 GM/DL (ref 32.2–35.5)
MCV RBC AUTO: 95.5 FL (ref 81–99)
MONOCYTES ABSOLUTE: 1.1 THOU/MM3 (ref 0.4–1.3)
MONOCYTES NFR BLD AUTO: 11.8 %
NEUTROPHILS ABSOLUTE: 5.5 THOU/MM3 (ref 1.8–7.7)
NEUTROPHILS NFR BLD AUTO: 60.5 %
NRBC BLD AUTO-RTO: 0 /100 WBC
PLATELET # BLD AUTO: 217 THOU/MM3 (ref 130–400)
PMV BLD AUTO: 10 FL (ref 9.4–12.4)
RBC # BLD AUTO: 4.7 MILL/MM3 (ref 4.2–5.4)
TIBC SERPL-MCNC: 280 UG/DL (ref 171–450)
WBC # BLD AUTO: 9.1 THOU/MM3 (ref 4.8–10.8)

## 2024-12-18 LAB — FOLATE SERPL-MCNC: 2.5 NG/ML (ref 4.8–24.2)

## 2024-12-19 LAB
GAMMA INTERFERON BACKGROUND BLD IA-ACNC: 0.01 IU/ML
M TB IFN-G BLD-IMP: NEGATIVE
M TB IFN-G CD4+ BCKGRND COR BLD-ACNC: 0.01 IU/ML
M TB IFN-G CD4+CD8+ BCKGRND COR BLD-ACNC: 0 IU/ML
MITOGEN IGNF BCKGRD COR BLD-ACNC: 2.59 IU/ML

## 2025-01-07 ENCOUNTER — OFFICE VISIT (OUTPATIENT)
Dept: FAMILY MEDICINE CLINIC | Age: 75
End: 2025-01-07
Payer: MEDICARE

## 2025-01-07 VITALS
OXYGEN SATURATION: 98 % | SYSTOLIC BLOOD PRESSURE: 130 MMHG | HEIGHT: 68 IN | DIASTOLIC BLOOD PRESSURE: 74 MMHG | BODY MASS INDEX: 33.1 KG/M2 | RESPIRATION RATE: 16 BRPM | HEART RATE: 72 BPM | WEIGHT: 218.4 LBS

## 2025-01-07 DIAGNOSIS — N39.0 ACUTE UTI (URINARY TRACT INFECTION): Primary | ICD-10-CM

## 2025-01-07 DIAGNOSIS — R30.0 DYSURIA: ICD-10-CM

## 2025-01-07 LAB
BILIRUBIN, POC: ABNORMAL
BLOOD URINE, POC: ABNORMAL
CLARITY, POC: CLEAR
COLOR, POC: YELLOW
GLUCOSE URINE, POC: NEGATIVE MG/DL
KETONES, POC: NEGATIVE MG/DL
LEUKOCYTE EST, POC: NEGATIVE
NITRITE, POC: NEGATIVE
PH, POC: 6
PROTEIN, POC: >=300 MG/DL
SPECIFIC GRAVITY, POC: >=1.03
UROBILINOGEN, POC: 1 MG/DL

## 2025-01-07 PROCEDURE — 3075F SYST BP GE 130 - 139MM HG: CPT | Performed by: NURSE PRACTITIONER

## 2025-01-07 PROCEDURE — 3078F DIAST BP <80 MM HG: CPT | Performed by: NURSE PRACTITIONER

## 2025-01-07 PROCEDURE — G8399 PT W/DXA RESULTS DOCUMENT: HCPCS | Performed by: NURSE PRACTITIONER

## 2025-01-07 PROCEDURE — 1123F ACP DISCUSS/DSCN MKR DOCD: CPT | Performed by: NURSE PRACTITIONER

## 2025-01-07 PROCEDURE — 1036F TOBACCO NON-USER: CPT | Performed by: NURSE PRACTITIONER

## 2025-01-07 PROCEDURE — 1160F RVW MEDS BY RX/DR IN RCRD: CPT | Performed by: NURSE PRACTITIONER

## 2025-01-07 PROCEDURE — 99213 OFFICE O/P EST LOW 20 MIN: CPT | Performed by: NURSE PRACTITIONER

## 2025-01-07 PROCEDURE — G8427 DOCREV CUR MEDS BY ELIG CLIN: HCPCS | Performed by: NURSE PRACTITIONER

## 2025-01-07 PROCEDURE — 1159F MED LIST DOCD IN RCRD: CPT | Performed by: NURSE PRACTITIONER

## 2025-01-07 PROCEDURE — G8417 CALC BMI ABV UP PARAM F/U: HCPCS | Performed by: NURSE PRACTITIONER

## 2025-01-07 PROCEDURE — M1308 PR FLU IMMUNIZE NO ADMIN: HCPCS | Performed by: NURSE PRACTITIONER

## 2025-01-07 PROCEDURE — 81003 URINALYSIS AUTO W/O SCOPE: CPT | Performed by: NURSE PRACTITIONER

## 2025-01-07 PROCEDURE — 1090F PRES/ABSN URINE INCON ASSESS: CPT | Performed by: NURSE PRACTITIONER

## 2025-01-07 RX ORDER — CEPHALEXIN 500 MG/1
500 CAPSULE ORAL 2 TIMES DAILY
Qty: 14 CAPSULE | Refills: 0 | Status: SHIPPED | OUTPATIENT
Start: 2025-01-07 | End: 2025-01-14

## 2025-01-07 RX ORDER — CEPHALEXIN 500 MG/1
500 CAPSULE ORAL 2 TIMES DAILY
Qty: 14 CAPSULE | Refills: 0 | Status: SHIPPED | OUTPATIENT
Start: 2025-01-07 | End: 2025-01-07

## 2025-01-07 ASSESSMENT — PATIENT HEALTH QUESTIONNAIRE - PHQ9
3. TROUBLE FALLING OR STAYING ASLEEP: NOT AT ALL
SUM OF ALL RESPONSES TO PHQ QUESTIONS 1-9: 4
2. FEELING DOWN, DEPRESSED OR HOPELESS: SEVERAL DAYS
SUM OF ALL RESPONSES TO PHQ9 QUESTIONS 1 & 2: 1
SUM OF ALL RESPONSES TO PHQ QUESTIONS 1-9: 4
6. FEELING BAD ABOUT YOURSELF - OR THAT YOU ARE A FAILURE OR HAVE LET YOURSELF OR YOUR FAMILY DOWN: NOT AT ALL
4. FEELING TIRED OR HAVING LITTLE ENERGY: NEARLY EVERY DAY
SUM OF ALL RESPONSES TO PHQ QUESTIONS 1-9: 4
9. THOUGHTS THAT YOU WOULD BE BETTER OFF DEAD, OR OF HURTING YOURSELF: NOT AT ALL
5. POOR APPETITE OR OVEREATING: NOT AT ALL
SUM OF ALL RESPONSES TO PHQ QUESTIONS 1-9: 4
7. TROUBLE CONCENTRATING ON THINGS, SUCH AS READING THE NEWSPAPER OR WATCHING TELEVISION: NOT AT ALL
10. IF YOU CHECKED OFF ANY PROBLEMS, HOW DIFFICULT HAVE THESE PROBLEMS MADE IT FOR YOU TO DO YOUR WORK, TAKE CARE OF THINGS AT HOME, OR GET ALONG WITH OTHER PEOPLE: SOMEWHAT DIFFICULT
8. MOVING OR SPEAKING SO SLOWLY THAT OTHER PEOPLE COULD HAVE NOTICED. OR THE OPPOSITE, BEING SO FIGETY OR RESTLESS THAT YOU HAVE BEEN MOVING AROUND A LOT MORE THAN USUAL: NOT AT ALL
1. LITTLE INTEREST OR PLEASURE IN DOING THINGS: NOT AT ALL

## 2025-01-07 ASSESSMENT — ENCOUNTER SYMPTOMS
TROUBLE SWALLOWING: 0
ABDOMINAL PAIN: 0
CONSTIPATION: 0
BACK PAIN: 0
COUGH: 0
SHORTNESS OF BREATH: 0
DIARRHEA: 0
SORE THROAT: 0
EYE PAIN: 0
RHINORRHEA: 0
EYE DISCHARGE: 0
EYE REDNESS: 0
NAUSEA: 0
ALLERGIC/IMMUNOLOGIC NEGATIVE: 1
VOMITING: 0
WHEEZING: 0

## 2025-01-07 NOTE — PROGRESS NOTES
[facility] PROFESSIONAL SERVS  [facility]  [address] [address]  Dept: [phone]  Dept Fax: [phone]  Loc: [phone]     Visit Date:  1/7/2025      Patient:  Arlene Lira  YOB: 1950    HPI:     Chief Complaint   Patient presents with    Dysuria     Dysuria began on Friday. Urinary frequency.     Other     Constant rhinitis, was exposed to Covid a week ago.    Flu Vaccine     Declines flu vaccine        HPI    Medications    Current Outpatient Medications:     cephALEXin (KEFLEX) 500 MG capsule, Take 1 capsule by mouth 2 times daily for 7 days, Disp: 14 capsule, Rfl: 0    pravastatin (PRAVACHOL) 40 MG tablet, TAKE 1 TABLET BY MOUTH DAILY, Disp: 90 tablet, Rfl: 1    verapamil (CALAN SR) 120 MG extended release tablet, TAKE 1 TABLET BY MOUTH DAILY, Disp: 90 tablet, Rfl: 2    traZODone (DESYREL) 50 MG tablet, Take 1 tablet by mouth nightly as needed for Sleep, Disp: 90 tablet, Rfl: 3    modafinil (PROVIGIL) 200 MG tablet, Take 1 tablet by mouth daily for 120 days. Max Daily Amount: 200 mg, Disp: 30 tablet, Rfl: 3    clopidogrel (PLAVIX) 75 MG tablet, TAKE 1 TABLET BY MOUTH DAILY, Disp: 90 tablet, Rfl: 3    metoprolol (LOPRESSOR) 100 MG tablet, Take 1 tablet by mouth 2 times daily, Disp: 180 tablet, Rfl: 3    losartan (COZAAR) 50 MG tablet, TAKE 1 TABLET BY MOUTH DAILY, Disp: 90 tablet, Rfl: 3    apixaban (ELIQUIS) 5 MG TABS tablet, Take 1 tablet by mouth 2 times daily, Disp: 180 tablet, Rfl: 3    triamterene-hydroCHLOROthiazide (MAXZIDE-25) 37.5-25 MG per tablet, Take 0.5 tablets by mouth daily, Disp: 45 tablet, Rfl: 3    pantoprazole (PROTONIX) 40 MG tablet, TAKE 1 TABLET BY MOUTH DAILY, Disp: 90 tablet, Rfl: 3    ferrous sulfate (IRON 325) 325 (65 Fe) MG tablet, Take 1 tablet by mouth daily (with breakfast), Disp: , Rfl:     folic acid (FOLVITE) 1 MG tablet, Take 1 tablet by mouth daily, Disp: , Rfl:     albuterol sulfate HFA (VENTOLIN HFA)

## 2025-01-30 ENCOUNTER — OFFICE VISIT (OUTPATIENT)
Dept: FAMILY MEDICINE CLINIC | Age: 75
End: 2025-01-30
Payer: MEDICARE

## 2025-01-30 VITALS
SYSTOLIC BLOOD PRESSURE: 134 MMHG | OXYGEN SATURATION: 97 % | BODY MASS INDEX: 33.88 KG/M2 | TEMPERATURE: 97.4 F | DIASTOLIC BLOOD PRESSURE: 84 MMHG | HEART RATE: 78 BPM | WEIGHT: 222.8 LBS

## 2025-01-30 DIAGNOSIS — M25.50 MULTIPLE JOINT PAIN: ICD-10-CM

## 2025-01-30 DIAGNOSIS — R35.0 URINARY FREQUENCY: ICD-10-CM

## 2025-01-30 DIAGNOSIS — N39.0 ACUTE UTI (URINARY TRACT INFECTION): Primary | ICD-10-CM

## 2025-01-30 LAB
BILIRUBIN, POC: NORMAL
BLOOD URINE, POC: NORMAL
CLARITY, POC: NORMAL
COLOR, POC: NORMAL
GLUCOSE URINE, POC: NORMAL MG/DL
KETONES, POC: NORMAL MG/DL
LEUKOCYTE EST, POC: NORMAL
NITRITE, POC: POSITIVE
PH, POC: 6
PROTEIN, POC: >=300 MG/DL
SPECIFIC GRAVITY, POC: >=1.03
UROBILINOGEN, POC: 0.2 MG/DL

## 2025-01-30 PROCEDURE — 1036F TOBACCO NON-USER: CPT | Performed by: NURSE PRACTITIONER

## 2025-01-30 PROCEDURE — 1159F MED LIST DOCD IN RCRD: CPT | Performed by: NURSE PRACTITIONER

## 2025-01-30 PROCEDURE — 3075F SYST BP GE 130 - 139MM HG: CPT | Performed by: NURSE PRACTITIONER

## 2025-01-30 PROCEDURE — 1123F ACP DISCUSS/DSCN MKR DOCD: CPT | Performed by: NURSE PRACTITIONER

## 2025-01-30 PROCEDURE — 99214 OFFICE O/P EST MOD 30 MIN: CPT | Performed by: NURSE PRACTITIONER

## 2025-01-30 PROCEDURE — G8427 DOCREV CUR MEDS BY ELIG CLIN: HCPCS | Performed by: NURSE PRACTITIONER

## 2025-01-30 PROCEDURE — 1090F PRES/ABSN URINE INCON ASSESS: CPT | Performed by: NURSE PRACTITIONER

## 2025-01-30 PROCEDURE — G8399 PT W/DXA RESULTS DOCUMENT: HCPCS | Performed by: NURSE PRACTITIONER

## 2025-01-30 PROCEDURE — G8417 CALC BMI ABV UP PARAM F/U: HCPCS | Performed by: NURSE PRACTITIONER

## 2025-01-30 PROCEDURE — 1160F RVW MEDS BY RX/DR IN RCRD: CPT | Performed by: NURSE PRACTITIONER

## 2025-01-30 PROCEDURE — 3079F DIAST BP 80-89 MM HG: CPT | Performed by: NURSE PRACTITIONER

## 2025-01-30 PROCEDURE — 81003 URINALYSIS AUTO W/O SCOPE: CPT | Performed by: NURSE PRACTITIONER

## 2025-01-30 RX ORDER — PHENAZOPYRIDINE HYDROCHLORIDE 200 MG/1
200 TABLET, FILM COATED ORAL 3 TIMES DAILY PRN
Qty: 9 TABLET | Refills: 0 | Status: SHIPPED | OUTPATIENT
Start: 2025-01-30 | End: 2025-02-02

## 2025-01-30 RX ORDER — CIPROFLOXACIN 500 MG/1
500 TABLET, FILM COATED ORAL 2 TIMES DAILY
Qty: 14 TABLET | Refills: 0 | Status: SHIPPED | OUTPATIENT
Start: 2025-01-30 | End: 2025-02-06

## 2025-01-30 RX ORDER — TRAMADOL HYDROCHLORIDE 50 MG/1
50 TABLET ORAL NIGHTLY PRN
Qty: 7 TABLET | Refills: 0 | Status: SHIPPED | OUTPATIENT
Start: 2025-01-30 | End: 2025-02-06

## 2025-01-30 SDOH — ECONOMIC STABILITY: FOOD INSECURITY: WITHIN THE PAST 12 MONTHS, YOU WORRIED THAT YOUR FOOD WOULD RUN OUT BEFORE YOU GOT MONEY TO BUY MORE.: NEVER TRUE

## 2025-01-30 SDOH — ECONOMIC STABILITY: FOOD INSECURITY: WITHIN THE PAST 12 MONTHS, THE FOOD YOU BOUGHT JUST DIDN'T LAST AND YOU DIDN'T HAVE MONEY TO GET MORE.: NEVER TRUE

## 2025-01-30 ASSESSMENT — ENCOUNTER SYMPTOMS
TROUBLE SWALLOWING: 0
ALLERGIC/IMMUNOLOGIC NEGATIVE: 1
DIARRHEA: 0
ABDOMINAL PAIN: 0
WHEEZING: 0
EYE PAIN: 0
BACK PAIN: 0
EYE REDNESS: 0
EYE DISCHARGE: 0
COUGH: 0
NAUSEA: 0
RHINORRHEA: 0
CONSTIPATION: 0
SHORTNESS OF BREATH: 0
SORE THROAT: 0
VOMITING: 0

## 2025-01-30 NOTE — PROGRESS NOTES
[facility] PROFESSIONAL SERVS  [facility]  [address] [address]  Dept: [phone]  Dept Fax: [phone]  Loc: [phone]     Visit Date:  1/30/2025      Patient:  Arlene Lira  YOB: 1950    HPI:     Chief Complaint   Patient presents with    Urinary Burning     Patient was seen on 1/7, prescribed cephalexin     Urinary Retention       Patient treated on January 7 for acute UTI with Keflex.  Patient states she does not think she got any better since that period of time.  Denies fever, chills, pelvic pressure.  Also requesting some tramadol to help her sleep at night due to her multiple joint pain.  She states she has had this in the past        Medications    Current Outpatient Medications:     ciprofloxacin (CIPRO) 500 MG tablet, Take 1 tablet by mouth 2 times daily for 7 days, Disp: 14 tablet, Rfl: 0    phenazopyridine (PYRIDIUM) 200 MG tablet, Take 1 tablet by mouth 3 times daily as needed for Pain, Disp: 9 tablet, Rfl: 0    traMADol (ULTRAM) 50 MG tablet, Take 1 tablet by mouth nightly as needed for Pain for up to 7 days. Intended supply: 7 days. Take lowest dose possible to manage pain Max Daily Amount: 50 mg, Disp: 7 tablet, Rfl: 0    pravastatin (PRAVACHOL) 40 MG tablet, TAKE 1 TABLET BY MOUTH DAILY, Disp: 90 tablet, Rfl: 1    verapamil (CALAN SR) 120 MG extended release tablet, TAKE 1 TABLET BY MOUTH DAILY, Disp: 90 tablet, Rfl: 2    traZODone (DESYREL) 50 MG tablet, Take 1 tablet by mouth nightly as needed for Sleep, Disp: 90 tablet, Rfl: 3    modafinil (PROVIGIL) 200 MG tablet, Take 1 tablet by mouth daily for 120 days. Max Daily Amount: 200 mg, Disp: 30 tablet, Rfl: 3    clopidogrel (PLAVIX) 75 MG tablet, TAKE 1 TABLET BY MOUTH DAILY, Disp: 90 tablet, Rfl: 3    metoprolol (LOPRESSOR) 100 MG tablet, Take 1 tablet by mouth 2 times daily, Disp: 180 tablet, Rfl: 3    losartan (COZAAR) 50 MG tablet, TAKE 1 TABLET BY MOUTH DAILY, Disp: 90

## 2025-02-01 LAB
BACTERIA UR CULT: ABNORMAL
ORGANISM: ABNORMAL

## 2025-02-03 ENCOUNTER — TELEPHONE (OUTPATIENT)
Dept: FAMILY MEDICINE CLINIC | Age: 75
End: 2025-02-03

## 2025-02-03 NOTE — TELEPHONE ENCOUNTER
----- Message from AWILDA Barragan CNP sent at 2/2/2025 12:57 PM EST -----  Urine culture shows the antibiotic should be effective.

## 2025-02-13 ENCOUNTER — TELEPHONE (OUTPATIENT)
Dept: FAMILY MEDICINE CLINIC | Age: 75
End: 2025-02-13

## 2025-02-13 DIAGNOSIS — R30.0 BURNING WITH URINATION: Primary | ICD-10-CM

## 2025-02-13 NOTE — TELEPHONE ENCOUNTER
Arlene called stating she is still having UTI symptoms from 1/30/25 visit. Urinary burning and retention; did finish cipro & pyridium. Pls advise

## 2025-02-13 NOTE — TELEPHONE ENCOUNTER
Pt notified order has been placed, she will go to the [facility] tomorrow to have it done.

## 2025-02-14 ENCOUNTER — LAB (OUTPATIENT)
Dept: LAB | Age: 75
End: 2025-02-14

## 2025-02-14 DIAGNOSIS — R30.0 BURNING WITH URINATION: ICD-10-CM

## 2025-02-14 LAB
ALT SERPL W/O P-5'-P-CCNC: 14 U/L (ref 11–66)
AMORPH SED URNS QL MICRO: ABNORMAL
BACTERIA URNS QL MICRO: ABNORMAL /HPF
BASOPHILS ABSOLUTE: 0.1 THOU/MM3 (ref 0–0.1)
BASOPHILS NFR BLD AUTO: 1 %
BILIRUB UR QL STRIP.AUTO: ABNORMAL
CASTS #/AREA URNS LPF: ABNORMAL /LPF
CASTS 2: ABNORMAL /LPF
CHARACTER UR: ABNORMAL
COLOR, UA: ABNORMAL
CREAT SERPL-MCNC: 0.8 MG/DL (ref 0.4–1.2)
CRYSTALS URNS MICRO: ABNORMAL
CRYSTALS URNS MICRO: ABNORMAL
DEPRECATED RDW RBC AUTO: 54 FL (ref 35–45)
EOSINOPHIL NFR BLD AUTO: 1.3 %
EOSINOPHILS ABSOLUTE: 0.2 THOU/MM3 (ref 0–0.4)
EPITHELIAL CELLS, UA: ABNORMAL /HPF
ERYTHROCYTE [DISTWIDTH] IN BLOOD BY AUTOMATED COUNT: 15.1 % (ref 11.5–14.5)
ERYTHROCYTE [SEDIMENTATION RATE] IN BLOOD BY WESTERGREN METHOD: 17 MM/HR (ref 0–20)
FOLATE SERPL-MCNC: 7.5 NG/ML (ref 4.8–24.2)
GFR SERPL CREATININE-BSD FRML MDRD: 76 ML/MIN/1.73M2
GLUCOSE UR QL STRIP.AUTO: NEGATIVE MG/DL
HBV CORE IGM SERPL QL IA: NEGATIVE
HBV SURFACE AB SER QL IA: NEGATIVE
HBV SURFACE AG SERPL QL IA: NEGATIVE
HCT VFR BLD AUTO: 46.1 % (ref 37–47)
HCV IGG SERPL QL IA: NEGATIVE
HGB BLD-MCNC: 14.5 GM/DL (ref 12–16)
HGB UR QL STRIP.AUTO: ABNORMAL
IMM GRANULOCYTES # BLD AUTO: 0.07 THOU/MM3 (ref 0–0.07)
IMM GRANULOCYTES NFR BLD AUTO: 0.5 %
IRON SATN MFR SERPL: 24 % (ref 20–50)
IRON SERPL-MCNC: 69 UG/DL (ref 50–170)
KETONES UR QL STRIP.AUTO: ABNORMAL
LYMPHOCYTES ABSOLUTE: 2 THOU/MM3 (ref 1–4.8)
LYMPHOCYTES NFR BLD AUTO: 15.6 %
MCH RBC QN AUTO: 30.6 PG (ref 26–33)
MCHC RBC AUTO-ENTMCNC: 31.5 GM/DL (ref 32.2–35.5)
MCV RBC AUTO: 97.3 FL (ref 81–99)
MISCELLANEOUS 2: ABNORMAL
MISCELLANEOUS LAB TEST RESULT: ABNORMAL
MONOCYTES ABSOLUTE: 1.6 THOU/MM3 (ref 0.4–1.3)
MONOCYTES NFR BLD AUTO: 12.6 %
MUCOUS THREADS URNS QL MICRO: ABNORMAL
NEUTROPHILS ABSOLUTE: 8.9 THOU/MM3 (ref 1.8–7.7)
NEUTROPHILS NFR BLD AUTO: 69 %
NITRITE UR QL STRIP: NEGATIVE
NRBC BLD AUTO-RTO: 0 /100 WBC
PH UR STRIP.AUTO: 6.5 [PH] (ref 5–9)
PLATELET # BLD AUTO: 261 THOU/MM3 (ref 130–400)
PMV BLD AUTO: 9.5 FL (ref 9.4–12.4)
PROT UR STRIP.AUTO-MCNC: 300 MG/DL
RBC # BLD AUTO: 4.74 MILL/MM3 (ref 4.2–5.4)
RBC URINE: ABNORMAL /HPF
RENAL EPI CELLS #/AREA URNS HPF: ABNORMAL /[HPF]
SP GR UR REFRACT.AUTO: 1.02 (ref 1–1.03)
TIBC SERPL-MCNC: 287 UG/DL (ref 171–450)
UROBILINOGEN, URINE: 0.2 EU/DL (ref 0–1)
WBC # BLD AUTO: 12.9 THOU/MM3 (ref 4.8–10.8)
WBC #/AREA URNS HPF: > 100 /HPF
WBC #/AREA URNS HPF: ABNORMAL /[HPF]
YEAST LIKE FUNGI URNS QL MICRO: ABNORMAL

## 2025-02-16 LAB
BACTERIA UR CULT: ABNORMAL
ORGANISM: ABNORMAL

## 2025-02-17 ENCOUNTER — TELEPHONE (OUTPATIENT)
Dept: FAMILY MEDICINE CLINIC | Age: 75
End: 2025-02-17

## 2025-02-17 RX ORDER — CEPHALEXIN 500 MG/1
500 CAPSULE ORAL 3 TIMES DAILY
Qty: 30 CAPSULE | Refills: 0 | Status: SHIPPED | OUTPATIENT
Start: 2025-02-17

## 2025-02-17 NOTE — TELEPHONE ENCOUNTER
Keflex 500mg tid x 10 days #30/0rf was escribed to [facility] per DR. Kaur's order.

## 2025-02-18 LAB
BACTERIA UR CULT: ABNORMAL
ORGANISM: ABNORMAL

## 2025-02-27 ENCOUNTER — OFFICE VISIT (OUTPATIENT)
Dept: PULMONOLOGY | Age: 75
End: 2025-02-27
Payer: MEDICARE

## 2025-02-27 VITALS
TEMPERATURE: 97.9 F | HEIGHT: 68 IN | OXYGEN SATURATION: 98 % | BODY MASS INDEX: 33.68 KG/M2 | SYSTOLIC BLOOD PRESSURE: 126 MMHG | HEART RATE: 153 BPM | DIASTOLIC BLOOD PRESSURE: 80 MMHG | WEIGHT: 222.2 LBS

## 2025-02-27 DIAGNOSIS — E66.9 OBESITY (BMI 30-39.9): ICD-10-CM

## 2025-02-27 DIAGNOSIS — G47.09 OTHER INSOMNIA: ICD-10-CM

## 2025-02-27 DIAGNOSIS — G47.10 HYPERSOMNIA WITH SLEEP APNEA: ICD-10-CM

## 2025-02-27 DIAGNOSIS — G47.33 OBSTRUCTIVE SLEEP APNEA ON CPAP: Primary | ICD-10-CM

## 2025-02-27 DIAGNOSIS — G47.30 HYPERSOMNIA WITH SLEEP APNEA: ICD-10-CM

## 2025-02-27 PROCEDURE — G8427 DOCREV CUR MEDS BY ELIG CLIN: HCPCS

## 2025-02-27 PROCEDURE — 3079F DIAST BP 80-89 MM HG: CPT

## 2025-02-27 PROCEDURE — 1123F ACP DISCUSS/DSCN MKR DOCD: CPT

## 2025-02-27 PROCEDURE — G8399 PT W/DXA RESULTS DOCUMENT: HCPCS

## 2025-02-27 PROCEDURE — 1159F MED LIST DOCD IN RCRD: CPT

## 2025-02-27 PROCEDURE — 1090F PRES/ABSN URINE INCON ASSESS: CPT

## 2025-02-27 PROCEDURE — 1036F TOBACCO NON-USER: CPT

## 2025-02-27 PROCEDURE — 3074F SYST BP LT 130 MM HG: CPT

## 2025-02-27 PROCEDURE — 99214 OFFICE O/P EST MOD 30 MIN: CPT

## 2025-02-27 PROCEDURE — G8417 CALC BMI ABV UP PARAM F/U: HCPCS

## 2025-02-27 ASSESSMENT — ENCOUNTER SYMPTOMS
COUGH: 0
SHORTNESS OF BREATH: 0
SINUS PAIN: 0
SORE THROAT: 0
SINUS PRESSURE: 0
RHINORRHEA: 0
WHEEZING: 0

## 2025-02-27 NOTE — PROGRESS NOTES
[facility]      Arlene Lira         [id number]  2/27/2025   Chief Complaint   Patient presents with    Follow-up     MAKAYLA 3 month follow up replacement machine         Patient of Dr. Shaw     Assessment/Plan   1. Obstructive sleep apnea on CPAP    2. Hypersomnia with sleep apnea    3. Other insomnia    4. Obesity (BMI 30-39.9)       -Increase trazodone to 100 mg (2 tablets) nightly to help with recent onset of insomnia within the past 1 month.  -Patient encouraged to decrease dates that she is taking Provigil or cut dosing in half to make sure that this is not contributing to her insomnia  -Discussed coping skills, dealing with loss as she is currently suffering from multiple personal health problems as well as the loss of a close friend    -Yearly supply order placed? No  -Download was personally reviewed and discussed with patient at length.  -The symptoms of MAAKYLA have improved. The patient is benefiting from therapy and should continue use of PAP device.  -Patient's symptoms and AHI are controlled with current settings of 12 cmH2O. Continue current pressure settings.  -Advised to continue current positive airway pressure therapy with above described pressure.   -Advised to keep good compliance with current recommended pressure to get optimal results and clinical improvement  -Recommend 7-9 hours of sleep with PAP  -Instructed to call [employer] regarding supplies if needed.   -Patient to call my office for earlier appointment if needed for worsening of sleep symptoms.   -Patient is not to drive if feeling sleepy  -Counseled patient on weight loss    Educated about my impression and plan. Patient verbalizes understanding.      Return in about 3 months (around 5/27/2025) for makayla, insomnia. with download.      Subjective   Presentation:   Arlene presents for sleep medicine follow up for obstructive sleep apnea.  Since the last visit, Arlene received a new machine and

## 2025-03-07 ENCOUNTER — OFFICE VISIT (OUTPATIENT)
Dept: FAMILY MEDICINE CLINIC | Age: 75
End: 2025-03-07
Payer: MEDICARE

## 2025-03-07 VITALS
BODY MASS INDEX: 33.3 KG/M2 | HEART RATE: 117 BPM | WEIGHT: 219 LBS | DIASTOLIC BLOOD PRESSURE: 80 MMHG | RESPIRATION RATE: 18 BRPM | TEMPERATURE: 97.7 F | OXYGEN SATURATION: 94 % | SYSTOLIC BLOOD PRESSURE: 138 MMHG

## 2025-03-07 DIAGNOSIS — I27.20 PULMONARY HTN (HCC): ICD-10-CM

## 2025-03-07 DIAGNOSIS — R92.1 BREAST CALCIFICATION, RIGHT: ICD-10-CM

## 2025-03-07 DIAGNOSIS — I48.21 PERMANENT ATRIAL FIBRILLATION (HCC): ICD-10-CM

## 2025-03-07 DIAGNOSIS — N39.0 URINARY TRACT INFECTION WITH HEMATURIA, SITE UNSPECIFIED: Primary | ICD-10-CM

## 2025-03-07 DIAGNOSIS — R31.9 URINARY TRACT INFECTION WITH HEMATURIA, SITE UNSPECIFIED: Primary | ICD-10-CM

## 2025-03-07 DIAGNOSIS — R30.0 DYSURIA: ICD-10-CM

## 2025-03-07 DIAGNOSIS — L40.50 PSORIATIC ARTHRITIS (HCC): ICD-10-CM

## 2025-03-07 DIAGNOSIS — J44.9 CHRONIC OBSTRUCTIVE PULMONARY DISEASE, UNSPECIFIED COPD TYPE (HCC): ICD-10-CM

## 2025-03-07 LAB
BILIRUBIN, POC: ABNORMAL
BLOOD URINE, POC: ABNORMAL
CLARITY, POC: ABNORMAL
COLOR, POC: YELLOW
GLUCOSE URINE, POC: ABNORMAL MG/DL
KETONES, POC: ABNORMAL MG/DL
LEUKOCYTE EST, POC: ABNORMAL
NITRITE, POC: POSITIVE
PH, POC: 6
PROTEIN, POC: ABNORMAL MG/DL
SPECIFIC GRAVITY, POC: >=1.03
UROBILINOGEN, POC: 0.2 MG/DL

## 2025-03-07 PROCEDURE — G8399 PT W/DXA RESULTS DOCUMENT: HCPCS | Performed by: FAMILY MEDICINE

## 2025-03-07 PROCEDURE — G8427 DOCREV CUR MEDS BY ELIG CLIN: HCPCS | Performed by: FAMILY MEDICINE

## 2025-03-07 PROCEDURE — 3079F DIAST BP 80-89 MM HG: CPT | Performed by: FAMILY MEDICINE

## 2025-03-07 PROCEDURE — 1090F PRES/ABSN URINE INCON ASSESS: CPT | Performed by: FAMILY MEDICINE

## 2025-03-07 PROCEDURE — 3075F SYST BP GE 130 - 139MM HG: CPT | Performed by: FAMILY MEDICINE

## 2025-03-07 PROCEDURE — 1160F RVW MEDS BY RX/DR IN RCRD: CPT | Performed by: FAMILY MEDICINE

## 2025-03-07 PROCEDURE — 81003 URINALYSIS AUTO W/O SCOPE: CPT | Performed by: FAMILY MEDICINE

## 2025-03-07 PROCEDURE — 1123F ACP DISCUSS/DSCN MKR DOCD: CPT | Performed by: FAMILY MEDICINE

## 2025-03-07 PROCEDURE — 1159F MED LIST DOCD IN RCRD: CPT | Performed by: FAMILY MEDICINE

## 2025-03-07 PROCEDURE — 1036F TOBACCO NON-USER: CPT | Performed by: FAMILY MEDICINE

## 2025-03-07 PROCEDURE — G8417 CALC BMI ABV UP PARAM F/U: HCPCS | Performed by: FAMILY MEDICINE

## 2025-03-07 PROCEDURE — 99213 OFFICE O/P EST LOW 20 MIN: CPT | Performed by: FAMILY MEDICINE

## 2025-03-07 PROCEDURE — 3023F SPIROM DOC REV: CPT | Performed by: FAMILY MEDICINE

## 2025-03-07 RX ORDER — NITROFURANTOIN 25; 75 MG/1; MG/1
100 CAPSULE ORAL 2 TIMES DAILY
Qty: 20 CAPSULE | Refills: 0 | Status: SHIPPED | OUTPATIENT
Start: 2025-03-07 | End: 2025-03-17

## 2025-03-09 LAB
BACTERIA UR CULT: ABNORMAL
ORGANISM: ABNORMAL

## 2025-03-21 ENCOUNTER — LAB (OUTPATIENT)
Dept: LAB | Age: 75
End: 2025-03-21

## 2025-03-21 DIAGNOSIS — N39.0 URINARY TRACT INFECTION WITH HEMATURIA, SITE UNSPECIFIED: ICD-10-CM

## 2025-03-21 DIAGNOSIS — R31.9 URINARY TRACT INFECTION WITH HEMATURIA, SITE UNSPECIFIED: ICD-10-CM

## 2025-03-21 LAB
BILIRUB UR QL STRIP.AUTO: NEGATIVE
CHARACTER UR: CLEAR
COLOR, UA: YELLOW
GLUCOSE UR QL STRIP.AUTO: NEGATIVE MG/DL
HGB UR QL STRIP.AUTO: NEGATIVE
KETONES UR QL STRIP.AUTO: NEGATIVE
NITRITE UR QL STRIP: NEGATIVE
PH UR STRIP.AUTO: 6.5 [PH] (ref 5–9)
PROT UR STRIP.AUTO-MCNC: NEGATIVE MG/DL
SP GR UR REFRACT.AUTO: 1.01 (ref 1–1.03)
UROBILINOGEN, URINE: 0.2 EU/DL (ref 0–1)
WBC #/AREA URNS HPF: NEGATIVE /[HPF]

## 2025-03-21 NOTE — PROGRESS NOTES
Called pt to schedule appt at [facility], [place]. Left voicemail message with directions to return call [phone] opt 5.     Pt arrived in 4K 6 in a wheelchair. Complaints: None. IV none infusing into the left forearm at a rate of 0 mls/ hour with about 0 mls remaining in the bag. The best day to schedule a follow up Dr appointment is: Wednesday p.m.       The patient is interested in [facility]. [facility] meds to [facility] program?:  No

## 2025-03-24 ENCOUNTER — HOSPITAL ENCOUNTER (EMERGENCY)
Age: 75
Discharge: ANOTHER HOSPITAL | End: 2025-03-25
Attending: EMERGENCY MEDICINE
Payer: MEDICARE

## 2025-03-24 ENCOUNTER — HOSPITAL ENCOUNTER (OUTPATIENT)
Dept: WOMENS IMAGING | Age: 75
Discharge: HOME | End: 2025-03-24
Attending: FAMILY MEDICINE
Payer: MEDICARE

## 2025-03-24 ENCOUNTER — APPOINTMENT (OUTPATIENT)
Dept: CT IMAGING | Age: 75
End: 2025-03-24
Payer: MEDICARE

## 2025-03-24 DIAGNOSIS — N64.89 HEMATOMA OF RIGHT BREAST: ICD-10-CM

## 2025-03-24 DIAGNOSIS — S02.32XA CLOSED BLOW-OUT FRACTURE OF LEFT ORBITAL FLOOR (HCC): ICD-10-CM
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
## 2025-03-24 DIAGNOSIS — R92.8 ABNORMAL MAMMOGRAM: ICD-10-CM

## 2025-03-24 DIAGNOSIS — Z09 FOLLOW-UP EXAM: ICD-10-CM

## 2025-03-24 PROCEDURE — 6370000000 HC RX 637 (ALT 250 FOR IP): Performed by: EMERGENCY MEDICINE

## 2025-03-24 PROCEDURE — 70450 CT HEAD/BRAIN W/O DYE: CPT

## 2025-03-24 PROCEDURE — 76642 ULTRASOUND BREAST LIMITED: CPT

## 2025-03-24 PROCEDURE — 72125 CT NECK SPINE W/O DYE: CPT

## 2025-03-24 PROCEDURE — 70486 CT MAXILLOFACIAL W/O DYE: CPT

## 2025-03-24 PROCEDURE — 93005 ELECTROCARDIOGRAM TRACING: CPT | Performed by: EMERGENCY MEDICINE

## 2025-03-24 PROCEDURE — 99285 EMERGENCY DEPT VISIT HI MDM: CPT

## 2025-03-24 RX ORDER — ACETAMINOPHEN 500 MG
1000 TABLET ORAL
Status: COMPLETED | OUTPATIENT
Start: 2025-03-24 | End: 2025-03-24

## 2025-03-24 RX ADMIN — ACETAMINOPHEN 1000 MG: 500 TABLET ORAL at 23:30

## 2025-03-24 ASSESSMENT — PAIN SCALES - GENERAL: PAINLEVEL_OUTOF10: 7

## 2025-03-24 ASSESSMENT — PAIN DESCRIPTION - LOCATION: LOCATION: FACE

## 2025-03-24 ASSESSMENT — PAIN - FUNCTIONAL ASSESSMENT: PAIN_FUNCTIONAL_ASSESSMENT: 0-10

## 2025-03-25 VITALS
WEIGHT: 217 LBS | TEMPERATURE: 98.1 F | HEIGHT: 68 IN | OXYGEN SATURATION: 96 % | BODY MASS INDEX: 32.89 KG/M2 | SYSTOLIC BLOOD PRESSURE: 146 MMHG | RESPIRATION RATE: 19 BRPM | HEART RATE: 100 BPM | DIASTOLIC BLOOD PRESSURE: 106 MMHG

## 2025-03-25 LAB
EKG Q-T INTERVAL: 334 MS
EKG QRS DURATION: 90 MS
EKG QTC CALCULATION (BAZETT): 474 MS
EKG R AXIS: -44 DEGREES
EKG T AXIS: 121 DEGREES
EKG VENTRICULAR RATE: 121 BPM

## 2025-03-25 PROCEDURE — 6370000000 HC RX 637 (ALT 250 FOR IP)

## 2025-03-25 RX ORDER — HYDROCODONE BITARTRATE AND ACETAMINOPHEN 5; 325 MG/1; MG/1
1 TABLET ORAL ONCE
Status: COMPLETED | OUTPATIENT
Start: 2025-03-25 | End: 2025-03-25

## 2025-03-25 RX ADMIN — HYDROCODONE BITARTRATE AND ACETAMINOPHEN 1 TABLET: 5; 325 TABLET ORAL at 04:17

## 2025-03-25 ASSESSMENT — PAIN - FUNCTIONAL ASSESSMENT
PAIN_FUNCTIONAL_ASSESSMENT: 0-10
PAIN_FUNCTIONAL_ASSESSMENT: 0-10

## 2025-03-25 ASSESSMENT — PAIN DESCRIPTION - LOCATION: LOCATION: FACE

## 2025-03-25 ASSESSMENT — PAIN DESCRIPTION - ORIENTATION: ORIENTATION: LEFT;MID

## 2025-03-25 ASSESSMENT — PAIN SCALES - GENERAL
PAINLEVEL_OUTOF10: 6
PAINLEVEL_OUTOF10: 6

## 2025-03-25 NOTE — ED NOTES
This RN assumed care at this time. Pt updated on estimated transport time. Pt helped to reposition in bed. No other needs expressed at this time. VSS

## 2025-03-25 NOTE — ED NOTES
Pt asks to use restroom at this time, this RN takes pt to bathroom via wheelchair, pt ambulates from bed to wheelchair with stand-by assistance and holds onto this RN and railings in bathroom to ambulate.

## 2025-03-25 NOTE — ED PROVIDER NOTES
[facility] EMERGENCY DEPARTMENT  EMERGENCY DEPARTMENT ENCOUNTER          Pt Name: Arlene Lira  MRN: [id number]  Birthdate 1950  Date of evaluation: 3/24/2025  Physician: Rai Pham MD  Supervising Attending Physician: Tamanna Jhaveri MD       CHIEF COMPLAINT       Chief Complaint   Patient presents with    Fall         HISTORY OF PRESENT ILLNESS    HPI  Arlene Lira is a 75 y.o. female PMH of A-fib on Eliquis, CVA, CHF, COPD, CAD presents to the emergency department for evaluation after mechanical fall.  Patient's was getting out of her car to come visit her  who is in the ED and stated that she was walking too fast causing her to trip and fall forward.  She denies loss of consciousness but does have hematoma over her left eyebrow and abrasion on her nose.  Patient is denying any numbness, weakness, vision changes, nausea, vomiting, lightheadedness, chest pain, shortness of breath.     The patient has no other acute complaints at this time.      PAST MEDICAL AND SURGICAL HISTORY     Past Medical History:   Diagnosis Date    Allergic rhinitis     Arthritis     Atrial fibrillation (HCC)     CAD (coronary artery disease)     Cancer (HCC)     skin    Carotid arterial disease     Cerebral artery occlusion with cerebral infarction (HCC)     Cerebrovascular disease     CHF (congestive heart failure) (HCC)     Congenital heart disease     COPD (chronic obstructive pulmonary disease) (HCC)     Depression     Fibromyalgia     Hearing loss     Hypertension     Hypothyroidism     Obesity     Osteoarthritis     S/P bariatric surgery 2018    Sleep apnea     CPAP    Thyroid disease     TIA (transient ischemic attack) 12/2015    Urinary incontinence      Past Surgical History:   Procedure Laterality Date    APPENDECTOMY  over 10 years ago    [place]     ARM SURGERY Left 10/01/2019    BREAST BIOPSY      BREAST LUMPECTOMY Right 1992    excisional biopsy negative    CAROTID  (TYLENOL) tablet 1,000 mg (1,000 mg Oral Given 3/24/25 4770)   HYDROcodone-acetaminophen (NORCO) 5-325 MG per tablet 1 tablet (1 tablet Oral Given 3/25/25 3937)       ED Course as of 03/25/25 0432   Tue Mar 25, 2025   0052 Contacted [facility] for OMF consult at [facility].  They advised they would call back once OMF was on the line. [AA]   0213 Received contact from [facility] ED provider who recommended OMF consult.  I advised them that that is who I was trying to speak with.  After they contacted OMF they were unable to get him on the line at this time and advised they would call back when they are available. [AA]      ED Course User Index  [AA] Emma Carter MD       PROCEDURES: (None if blank)  Procedures:     CRITICAL CARE:  None    MEDICATION CHANGES     New Prescriptions    No medications on file       FINAL DISPOSITION   Shared Decision-Making was performed, disposition discussed with the patient/family and questions answered.    Outpatient follow up (If applicable):  No follow-up provider specified.  The results of pertinent diagnostic studies and exam findings were discussed with the patient/surrogate. The patient’s provisional diagnosis and plan of care were discussed with the patient and present family who expressed understanding. Medications were reviewed and indications and risks of medications were discussed with the patient/family present. Strict return precautions and follow up instructions were discussed with the patient prior to discharge, with which the patient agrees.          FINAL DIAGNOSES:  Final diagnoses:   Fall, initial encounter   Closed blow-out fracture of left orbital floor (HCC)       Condition: condition: fair  Dispo: Transfer to [facility] to ED  DISPOSITION Decision To Transfer 03/25/2025 02:55:04 AM   DISPOSITION CONDITION Stable           This transcription was electronically signed. It was dictated by use of voice recognition software and electronically

## 2025-03-25 NOTE — ED NOTES
Pt called out to this RN stating her left elbow area is started to feel numb. Neuro check and NIH reassessed. Dr. Carter notified

## 2025-03-25 NOTE — ED NOTES
Post-Fall Assessment  Date of Fall:   03/24/2025  Time of Fall:   2245   Yes No N/A Comment   Was Patient on [place] Program? [] [x] [] Was not a patient   Was the Fall Witnessed? [] [x] []    Were Clothes a Factor? [] [x] []    Was Patient Wearing Corrective Footwear? [] [x] []    Other Environmental Factors Involved? [x] [] [] Dark outside     Description of Fall  Who found the patient: ARABELLA Knox  Where was the patient at the time of the fall:  Emergency Department parking lot.  Brief description of fall:  Pt  in the ER and went home to let the dogs out. Pt got out of the car and tripped over the curb. Pt hit head and noted to have bruising to the face. C-Collar placed.   Patient comments regarding fall:   doesn't think that she hit her head.  Medications potentially contributing to fall risk (such as Sedatives, Hypnotics, Antihypertensives, Narcotics, Psychotropics, Anticonvulsants):   NA    Patient Assessment of Injury    (Please document Vital Signs in Doc Flowsheet)     Yes No   Patient hit his/her head [x] []   Patient is taking an anticoagulant [x] []   CT of Head requested [x] []     Neurological Assessment Protocol:    If the patient has hit his/her head during this fall,   a Neurological Assessment must be completed every 2 hours for 12 hours;   every 3 hours for 24 hours;   then every 4 hours for 24 hours.  Document in Doc Flowsheets.    Neurological Assessment Protocol initiated  yes    If the patient did not hit his/her head during this fall, monitor vital signs every 8 hours.  Notify the physician within 24 hours and document.      Physician Notification  Please document under “Provider Notification” group within the Assessment (Complex Assessment) template of Doc Flowsheets.     Physician notified yes    Pharmacy notified no Time Notified: MARTIN    House Supervisor/Clinical Manager Notified:   Yes  Date:   03/24/2025 Time:   2317  Family Member Notified:   Yes   Date:    03/24/2025 Time:   2300

## 2025-03-25 NOTE — ED NOTES
Pt back in bed at this time, warm blanket provided, pt asks for phone and coffee. Pt is breathing regular and unalbored on room air at this time. Pt has pain in face rated 6/10, no other signs of distress to note at this time. Call light [employer] reach. Pt is alert and oriented x4 at this time.

## 2025-03-25 NOTE — ED NOTES
Transfer center states that since there was a traumatic incident, the patient is able to go via a micu. They will be sending a team to  the patient.

## 2025-03-25 NOTE — ED TRIAGE NOTES
Pt presents to ED after fall in the parking lot when she tripped on the curb. Pt reports her  is in ED room 18 and she was coming back to visit. Pt denies LOC. Pt is on blood thinners. Pt has skin tears on her nose and chin as well as left eyelid hematoma. Pt reports she was on the ground for about 7-8 minutes.

## 2025-03-25 NOTE — ED NOTES
Patient resting in bed. Respirations easy and unlabored. No distress noted. Call light within reach. Medicated per MAR

## 2025-03-25 NOTE — ED NOTES
Spoke with [facility] in regards to a possible earlier transport that could be provided. Said they only have a MICU team available and that the patient does not qualify for that higher level of care transport. Currently seeing if there is a change and there is a lower level of care transport option.

## 2025-03-25 NOTE — ED PROVIDER NOTES
[facility] EMERGENCY DEPARTMENT    EMERGENCY MEDICINE      Pt Name: Arlene Lira  MRN: [id number]  Birthdate 1950  Date of evaluation: 3/24/2025  Treating Physician: Dr. Hartmann  Resident Physician: Emma Carter MD    CHIEF COMPLAINT       Chief Complaint   Patient presents with    Fall     History obtained from chart review and the patient.    HISTORY OF PRESENT ILLNESS    HPI    Arlene Lira is a 75 y.o. female with PMH of A-fib on Eliquis, CVA, CHF, COPD, CAD presents to the emergency department for evaluation after mechanical fall.  Patient's was getting out of her car to come visit her  who is in the ED and stated that she was walking too fast causing her to trip and fall forward.  She denies loss of consciousness but does have hematoma over her left eyebrow and abrasion on her nose.  Patient is denying any numbness, weakness, vision changes, nausea, vomiting, lightheadedness, chest pain, shortness of breath.    The patient has no other acute complaints at this time.    PAST MEDICAL AND SURGICAL HISTORY     Past Medical History:   Diagnosis Date    Allergic rhinitis     Arthritis     Atrial fibrillation (HCC)     CAD (coronary artery disease)     Cancer (HCC)     skin    Carotid arterial disease     Cerebral artery occlusion with cerebral infarction (HCC)     Cerebrovascular disease     CHF (congestive heart failure) (HCC)     Congenital heart disease     COPD (chronic obstructive pulmonary disease) (HCC)     Depression     Fibromyalgia     Hearing loss     Hypertension     Hypothyroidism     Obesity     Osteoarthritis     S/P bariatric surgery 2018    Sleep apnea     CPAP    Thyroid disease     TIA (transient ischemic attack) 12/2015    Urinary incontinence        Past Surgical History:   Procedure Laterality Date    APPENDECTOMY  over 10 years ago    [place]     ARM SURGERY Left 10/01/2019    BREAST BIOPSY      BREAST LUMPECTOMY Right 1992    excisional biopsy negative

## 2025-03-25 NOTE — ED PROVIDER NOTES
Transfer of Care Note:   I have personally performed a face to face diagnostic evaluation on this patient. The patient's initial evaluation and plan have been discussed with the prior provider who initially evaluated the patient. Nursing Notes, Past Medical Hx, Past Surgical Hx, Social Hx, Allergies, and Family Hx were all reviewed.    (Please note that portions of this note were completed with a voice recognition program.  Efforts were made to edit the dictations but occasionally words are mis-transcribed.)    2:29 AM EDT: The patient was evaluated.     Arlene Lira is a 75 y.o. female who presents to the Emergency Department for the evaluation of mechanical fall, left orbital blow out fracture, no entrapment or visual complaints.          RADIOLOGY: non-plain film images(s) such as CT, Ultrasound and MRI are read by the radiologist.  CT HEAD WO CONTRAST   Final Result   No acute intracranial findings.   Chronic changes as described.   Blow out fracture left orbital floor. Refer to facial bone CT.   Prominent left periorbital and facial soft tissue hematomas.      This document has been electronically signed by: Blanquita De Leon MD on    03/25/2025 12:17 AM      All CTs at this facility use dose modulation techniques and iterative    reconstructions, and/or weight-based dosing   when appropriate to reduce radiation to a low as reasonably achievable.      CT FACIAL BONES WO CONTRAST   Final Result   Acute blow out fracture left orbital floor. No extra-ocular entrapment.   Left periorbital and facial soft tissue hematomas.      This document has been electronically signed by: Blanquita De Leon MD on    03/25/2025 12:18 AM      All CTs at this facility use dose modulation techniques and iterative    reconstructions, and/or weight-based dosing   when appropriate to reduce radiation to a low as reasonably achievable.      CT CERVICAL SPINE WO CONTRAST   Final Result   Impression:   No acute fracture or dislocation

## 2025-03-25 NOTE — ED NOTES
Patient resting in bed. Respirations easy and unlabored. No distress noted. Call light within reach. Denies needs. Removed C collar per Dr. Carter

## 2025-04-02 ENCOUNTER — OFFICE VISIT (OUTPATIENT)
Dept: FAMILY MEDICINE CLINIC | Age: 75
End: 2025-04-02
Payer: MEDICARE

## 2025-04-02 VITALS
RESPIRATION RATE: 18 BRPM | SYSTOLIC BLOOD PRESSURE: 140 MMHG | HEART RATE: 65 BPM | BODY MASS INDEX: 33.04 KG/M2 | HEIGHT: 68 IN | WEIGHT: 218 LBS | DIASTOLIC BLOOD PRESSURE: 74 MMHG | OXYGEN SATURATION: 98 %

## 2025-04-02 DIAGNOSIS — R30.0 DYSURIA: ICD-10-CM

## 2025-04-02 DIAGNOSIS — S02.85XD CLOSED FRACTURE OF ORBIT WITH ROUTINE HEALING, SUBSEQUENT ENCOUNTER: ICD-10-CM

## 2025-04-02 DIAGNOSIS — N30.01 ACUTE CYSTITIS WITH HEMATURIA: Primary | ICD-10-CM

## 2025-04-02 LAB
BILIRUBIN, POC: ABNORMAL
BLOOD URINE, POC: ABNORMAL
CLARITY, POC: ABNORMAL
COLOR, POC: YELLOW
GLUCOSE URINE, POC: ABNORMAL MG/DL
KETONES, POC: ABNORMAL MG/DL
LEUKOCYTE EST, POC: ABNORMAL
NITRITE, POC: ABNORMAL
PH, POC: 6
PROTEIN, POC: ABNORMAL MG/DL
SPECIFIC GRAVITY, POC: >=1.03
UROBILINOGEN, POC: 0.2 MG/DL

## 2025-04-02 PROCEDURE — G8399 PT W/DXA RESULTS DOCUMENT: HCPCS | Performed by: FAMILY MEDICINE

## 2025-04-02 PROCEDURE — G8417 CALC BMI ABV UP PARAM F/U: HCPCS | Performed by: FAMILY MEDICINE

## 2025-04-02 PROCEDURE — 1160F RVW MEDS BY RX/DR IN RCRD: CPT | Performed by: FAMILY MEDICINE

## 2025-04-02 PROCEDURE — 99213 OFFICE O/P EST LOW 20 MIN: CPT | Performed by: FAMILY MEDICINE

## 2025-04-02 PROCEDURE — 81003 URINALYSIS AUTO W/O SCOPE: CPT | Performed by: FAMILY MEDICINE

## 2025-04-02 PROCEDURE — 1036F TOBACCO NON-USER: CPT | Performed by: FAMILY MEDICINE

## 2025-04-02 PROCEDURE — 1159F MED LIST DOCD IN RCRD: CPT | Performed by: FAMILY MEDICINE

## 2025-04-02 PROCEDURE — 3077F SYST BP >= 140 MM HG: CPT | Performed by: FAMILY MEDICINE

## 2025-04-02 PROCEDURE — G8427 DOCREV CUR MEDS BY ELIG CLIN: HCPCS | Performed by: FAMILY MEDICINE

## 2025-04-02 PROCEDURE — 1090F PRES/ABSN URINE INCON ASSESS: CPT | Performed by: FAMILY MEDICINE

## 2025-04-02 PROCEDURE — 3078F DIAST BP <80 MM HG: CPT | Performed by: FAMILY MEDICINE

## 2025-04-02 PROCEDURE — 1123F ACP DISCUSS/DSCN MKR DOCD: CPT | Performed by: FAMILY MEDICINE

## 2025-04-02 RX ORDER — NITROFURANTOIN 25; 75 MG/1; MG/1
100 CAPSULE ORAL 2 TIMES DAILY
Qty: 28 CAPSULE | Refills: 0 | Status: SHIPPED | OUTPATIENT
Start: 2025-04-02 | End: 2025-04-16

## 2025-04-04 LAB
BACTERIA UR CULT: ABNORMAL
ORGANISM: ABNORMAL

## 2025-05-06 RX ORDER — PRAVASTATIN SODIUM 40 MG
40 TABLET ORAL DAILY
Qty: 90 TABLET | Refills: 0 | Status: SHIPPED | OUTPATIENT
Start: 2025-05-06

## 2025-05-08 ENCOUNTER — LAB (OUTPATIENT)
Dept: LAB | Age: 75
End: 2025-05-08

## 2025-05-08 ENCOUNTER — OFFICE VISIT (OUTPATIENT)
Dept: CARDIOLOGY CLINIC | Age: 75
End: 2025-05-08
Payer: MEDICARE

## 2025-05-08 VITALS
BODY MASS INDEX: 32.28 KG/M2 | DIASTOLIC BLOOD PRESSURE: 64 MMHG | WEIGHT: 213 LBS | HEART RATE: 84 BPM | SYSTOLIC BLOOD PRESSURE: 128 MMHG | HEIGHT: 68 IN

## 2025-05-08 DIAGNOSIS — E78.5 DYSLIPIDEMIA: ICD-10-CM

## 2025-05-08 DIAGNOSIS — I48.21 PERMANENT ATRIAL FIBRILLATION (HCC): ICD-10-CM

## 2025-05-08 DIAGNOSIS — I10 ESSENTIAL HYPERTENSION: ICD-10-CM

## 2025-05-08 DIAGNOSIS — R60.0 BILATERAL LEG EDEMA: ICD-10-CM

## 2025-05-08 DIAGNOSIS — I25.10 CORONARY ARTERY DISEASE INVOLVING NATIVE CORONARY ARTERY OF NATIVE HEART WITHOUT ANGINA PECTORIS: Primary | ICD-10-CM

## 2025-05-08 DIAGNOSIS — Z95.820 S/P ANGIOPLASTY WITH STENT: ICD-10-CM

## 2025-05-08 DIAGNOSIS — I50.32 CHRONIC DIASTOLIC CONGESTIVE HEART FAILURE (HCC): ICD-10-CM

## 2025-05-08 DIAGNOSIS — I25.5 ISCHEMIC CARDIOMYOPATHY: ICD-10-CM
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
## 2025-05-08 LAB
ALT SERPL W/O P-5'-P-CCNC: 23 U/L (ref 10–35)
AST SERPL-CCNC: 35 U/L (ref 10–35)
BASOPHILS ABSOLUTE: 0.1 THOU/MM3 (ref 0–0.1)
BASOPHILS NFR BLD AUTO: 0.6 %
CREAT SERPL-MCNC: 0.8 MG/DL (ref 0.5–0.9)
DEPRECATED RDW RBC AUTO: 50.2 FL (ref 35–45)
EOSINOPHIL NFR BLD AUTO: 0.7 %
EOSINOPHILS ABSOLUTE: 0.1 THOU/MM3 (ref 0–0.4)
ERYTHROCYTE [DISTWIDTH] IN BLOOD BY AUTOMATED COUNT: 13.8 % (ref 11.5–14.5)
ERYTHROCYTE [SEDIMENTATION RATE] IN BLOOD BY WESTERGREN METHOD: 50 MM/HR (ref 0–20)
GFR SERPL CREATININE-BSD FRML MDRD: 76 ML/MIN/1.73M2
HCT VFR BLD AUTO: 40.7 % (ref 37–47)
HGB BLD-MCNC: 12.6 GM/DL (ref 12–16)
IMM GRANULOCYTES # BLD AUTO: 0.09 THOU/MM3 (ref 0–0.07)
IMM GRANULOCYTES NFR BLD AUTO: 0.7 %
LYMPHOCYTES ABSOLUTE: 2.1 THOU/MM3 (ref 1–4.8)
LYMPHOCYTES NFR BLD AUTO: 17.3 %
MCH RBC QN AUTO: 30.9 PG (ref 26–33)
MCHC RBC AUTO-ENTMCNC: 31 GM/DL (ref 32.2–35.5)
MCV RBC AUTO: 99.8 FL (ref 81–99)
MONOCYTES ABSOLUTE: 1.8 THOU/MM3 (ref 0.4–1.3)
MONOCYTES NFR BLD AUTO: 15.2 %
NEUTROPHILS ABSOLUTE: 7.9 THOU/MM3 (ref 1.8–7.7)
NEUTROPHILS NFR BLD AUTO: 65.5 %
NRBC BLD AUTO-RTO: 0 /100 WBC
PLATELET # BLD AUTO: 279 THOU/MM3 (ref 130–400)
PMV BLD AUTO: 9.5 FL (ref 9.4–12.4)
RBC # BLD AUTO: 4.08 MILL/MM3 (ref 4.2–5.4)
WBC # BLD AUTO: 12 THOU/MM3 (ref 4.8–10.8)

## 2025-05-08 PROCEDURE — 3074F SYST BP LT 130 MM HG: CPT | Performed by: INTERNAL MEDICINE

## 2025-05-08 PROCEDURE — 1159F MED LIST DOCD IN RCRD: CPT | Performed by: INTERNAL MEDICINE

## 2025-05-08 PROCEDURE — G8417 CALC BMI ABV UP PARAM F/U: HCPCS | Performed by: INTERNAL MEDICINE

## 2025-05-08 PROCEDURE — 3078F DIAST BP <80 MM HG: CPT | Performed by: INTERNAL MEDICINE

## 2025-05-08 PROCEDURE — 93000 ELECTROCARDIOGRAM COMPLETE: CPT | Performed by: INTERNAL MEDICINE

## 2025-05-08 PROCEDURE — 1090F PRES/ABSN URINE INCON ASSESS: CPT | Performed by: INTERNAL MEDICINE

## 2025-05-08 PROCEDURE — 99214 OFFICE O/P EST MOD 30 MIN: CPT | Performed by: INTERNAL MEDICINE

## 2025-05-08 PROCEDURE — G8399 PT W/DXA RESULTS DOCUMENT: HCPCS | Performed by: INTERNAL MEDICINE

## 2025-05-08 PROCEDURE — 1123F ACP DISCUSS/DSCN MKR DOCD: CPT | Performed by: INTERNAL MEDICINE

## 2025-05-08 PROCEDURE — 1160F RVW MEDS BY RX/DR IN RCRD: CPT | Performed by: INTERNAL MEDICINE

## 2025-05-08 PROCEDURE — G8427 DOCREV CUR MEDS BY ELIG CLIN: HCPCS | Performed by: INTERNAL MEDICINE

## 2025-05-08 PROCEDURE — 1036F TOBACCO NON-USER: CPT | Performed by: INTERNAL MEDICINE

## 2025-05-08 RX ORDER — METHOTREXATE 2.5 MG/1
TABLET ORAL
COMMUNITY
Start: 2025-05-05

## 2025-05-08 NOTE — PROGRESS NOTES
Chief Complaint   Patient presents with    Follow-up     6 mo fu      HX OF  fu had TIA here at [facility], transferred from     Had carotid endarterectomy - 9-11-17 -     Was admitted from office for atr fib with  and was admitted TAYLOR-CV . Cath and needed stent and later went back to atr fib with 110 and re-sent to ER and sent home with added cardizem         Hx  fo CAD< CMP< CHF  And post hospital f/u  On D/c lopressor decreased to 75 bid from 150 bid for bradycardia  Off lasix 20, dig 125, clonidine 0.1, calan sr 240  Admitted  atr fib rvr and UTI  In hospital had atr fib rvr and later episodes of atr fib with SVR- bradycardia and hence med adjusted as above  Was in hospital for 7 days       Pt here for 6 month Follow up    Last EKG Done 03/24/25.    Pt states she has very little energy throughout the day.  Lost 8 lb    Hx of fall  mach 2025 and broke facial bone  With NO LOC  or dizziness    Denies CP, palpitations, dizziness, and YVETTE.    Sob on exertion and fatigue on exertion- chronic- better    Hx of  leg edema +1- resolved after maxzide and off chlorothalidone  Had been off lasix 20 and kcl 10      Hx of depression and a lots of stress in family  and and daughter sick        Patient Active Problem List   Diagnosis    Obstructive sleep apnea on CPAP    COPD (chronic obstructive pulmonary disease) (HCC)    Fibromyalgia    Right arm weakness    Urinary tract infection without hematuria    Obesity (BMI 30-39.9)    Chronic diastolic congestive heart failure (HCC)    Bilateral leg edema  +1- TO +2 now trace    Pulmonary HTN (HCC) Mean pressure 32 mmhg by R[facility]    SOB (shortness of breath) on exertion    Polyosteoarthritis    Postsurgical malabsorption    Bilateral carotid artery stenosis    S/P carotid endarterectomy    Positive colorectal cancer screening using DNA-based stool test    Gastroesophageal reflux disease without esophagitis    Hypersomnia with sleep apnea    Fatigue    Essential

## 2025-05-29 RX ORDER — PANTOPRAZOLE SODIUM 40 MG/1
40 TABLET, DELAYED RELEASE ORAL DAILY
Qty: 90 TABLET | Refills: 3 | Status: SHIPPED | OUTPATIENT
Start: 2025-05-29

## 2025-06-02 NOTE — PATIENT INSTRUCTIONS
If your physician has ordered procedures/ testing and you have not been contacted with in 2 days after your office visit,  please call our office.     If you have had your testing performed -  please allow 48 hours for our office to notify you of the results.  If you have not heard from us with in the 48 hrs,  please call the office.     Results for the 14 day and 30 day heart monitor - please allow 7-10 business days after the monitor is mailed back.    You may receive a survey regarding the care you received during your visit.  Your input is valuable to us.  We encourage you to complete and return your survey.  We hope you will choose us in the future for your healthcare needs.  Thank you for choosing [facility]!    Your Medical Assistant Today:  Lydia REBOLLEDO   Your RN Today:  Lorna REBOLLEDO   Your Provider for Today: Dr. Quiroz  Ph. [phone] opt 1    Have A Great Day!

## 2025-06-03 NOTE — PROGRESS NOTES
[facility]      Arlene Lira         [id number]  6/4/2025   Chief Complaint   Patient presents with    Follow-up     MAKAYLA 3 MO        Assessment/Plan      Diagnosis Orders   1. Hypersomnia with sleep apnea  modafinil (PROVIGIL) 200 MG tablet      2. MAKAYLA on CPAP  DME Order for CPAP as OP           Continue Provigil 200 mg daily for hypersomnia  Patient not using trazodone at this time, will keep her med list on as needed basis  Supply refill sent today  Continue working on sleep hygiene      -Yearly supply order placed? Yes   -Download was personally reviewed and discussed with patient at length.  -The symptoms of MAKAYLA have improved. The patient is benefiting from therapy and should continue use of PAP device.  -Patient's symptoms and AHI are controlled with current settings of 12 cmH2O. Continue current pressure settings.  -Advised to continue current positive airway pressure therapy with above described pressure.   -Advised to keep good compliance with current recommended pressure to get optimal results and clinical improvement  -Recommend 7-9 hours of sleep with PAP  -Instructed to call DME company regarding supplies if needed.   -Patient to call my office for earlier appointment if needed for worsening of sleep symptoms.   -Patient is not to drive if feeling sleepy  -Counseled patient on weight loss    Educated about my impression and plan. Patient verbalizes understanding.      Return in about 1 year (around 6/4/2026) for makayla, hypersomnia. with download.      Subjective   Presentation:   Arlene presents for sleep medicine follow up for obstructive sleep apnea, insomnia.  Since the last visit, Arlene has been doing very well on PAP therapy and reports good benefit from compliant use of PAP machine. No complaints of problems with machine, mask, or pressures at this time.     She has not been taking trazodone.   She is working on improving her sleep hygiene.  She is still

## 2025-06-04 ENCOUNTER — OFFICE VISIT (OUTPATIENT)
Age: 75
End: 2025-06-04
Payer: MEDICARE

## 2025-06-04 ENCOUNTER — OFFICE VISIT (OUTPATIENT)
Dept: CARDIOLOGY CLINIC | Age: 75
End: 2025-06-04
Payer: MEDICARE

## 2025-06-04 VITALS
BODY MASS INDEX: 33.43 KG/M2 | SYSTOLIC BLOOD PRESSURE: 134 MMHG | WEIGHT: 220.6 LBS | HEART RATE: 86 BPM | DIASTOLIC BLOOD PRESSURE: 86 MMHG | OXYGEN SATURATION: 97 % | HEIGHT: 68 IN

## 2025-06-04 VITALS
HEART RATE: 76 BPM | WEIGHT: 220 LBS | OXYGEN SATURATION: 93 % | DIASTOLIC BLOOD PRESSURE: 78 MMHG | BODY MASS INDEX: 33.34 KG/M2 | HEIGHT: 68 IN | SYSTOLIC BLOOD PRESSURE: 129 MMHG

## 2025-06-04 DIAGNOSIS — I50.32 CHRONIC DIASTOLIC CONGESTIVE HEART FAILURE (HCC): ICD-10-CM

## 2025-06-04 DIAGNOSIS — Z95.820 S/P ANGIOPLASTY WITH STENT: ICD-10-CM

## 2025-06-04 DIAGNOSIS — G47.33 OSA ON CPAP: ICD-10-CM

## 2025-06-04 DIAGNOSIS — G47.10 HYPERSOMNIA WITH SLEEP APNEA: Primary | ICD-10-CM

## 2025-06-04 DIAGNOSIS — G47.30 HYPERSOMNIA WITH SLEEP APNEA: Primary | ICD-10-CM

## 2025-06-04 DIAGNOSIS — I25.10 CORONARY ARTERY DISEASE INVOLVING NATIVE CORONARY ARTERY OF NATIVE HEART WITHOUT ANGINA PECTORIS: ICD-10-CM

## 2025-06-04 DIAGNOSIS — I48.21 PERMANENT ATRIAL FIBRILLATION (HCC): Primary | ICD-10-CM

## 2025-06-04 PROCEDURE — 3075F SYST BP GE 130 - 139MM HG: CPT

## 2025-06-04 PROCEDURE — G8427 DOCREV CUR MEDS BY ELIG CLIN: HCPCS | Performed by: INTERNAL MEDICINE

## 2025-06-04 PROCEDURE — 3077F SYST BP >= 140 MM HG: CPT | Performed by: INTERNAL MEDICINE

## 2025-06-04 PROCEDURE — 1036F TOBACCO NON-USER: CPT | Performed by: INTERNAL MEDICINE

## 2025-06-04 PROCEDURE — 93000 ELECTROCARDIOGRAM COMPLETE: CPT | Performed by: INTERNAL MEDICINE

## 2025-06-04 PROCEDURE — 1123F ACP DISCUSS/DSCN MKR DOCD: CPT

## 2025-06-04 PROCEDURE — 1159F MED LIST DOCD IN RCRD: CPT | Performed by: INTERNAL MEDICINE

## 2025-06-04 PROCEDURE — 99214 OFFICE O/P EST MOD 30 MIN: CPT

## 2025-06-04 PROCEDURE — G8427 DOCREV CUR MEDS BY ELIG CLIN: HCPCS

## 2025-06-04 PROCEDURE — G8399 PT W/DXA RESULTS DOCUMENT: HCPCS

## 2025-06-04 PROCEDURE — G8417 CALC BMI ABV UP PARAM F/U: HCPCS

## 2025-06-04 PROCEDURE — 1090F PRES/ABSN URINE INCON ASSESS: CPT | Performed by: INTERNAL MEDICINE

## 2025-06-04 PROCEDURE — 1123F ACP DISCUSS/DSCN MKR DOCD: CPT | Performed by: INTERNAL MEDICINE

## 2025-06-04 PROCEDURE — G8399 PT W/DXA RESULTS DOCUMENT: HCPCS | Performed by: INTERNAL MEDICINE

## 2025-06-04 PROCEDURE — 1036F TOBACCO NON-USER: CPT

## 2025-06-04 PROCEDURE — 3079F DIAST BP 80-89 MM HG: CPT | Performed by: INTERNAL MEDICINE

## 2025-06-04 PROCEDURE — 3079F DIAST BP 80-89 MM HG: CPT

## 2025-06-04 PROCEDURE — 99215 OFFICE O/P EST HI 40 MIN: CPT | Performed by: INTERNAL MEDICINE

## 2025-06-04 PROCEDURE — 1090F PRES/ABSN URINE INCON ASSESS: CPT

## 2025-06-04 PROCEDURE — G8417 CALC BMI ABV UP PARAM F/U: HCPCS | Performed by: INTERNAL MEDICINE

## 2025-06-04 PROCEDURE — 1159F MED LIST DOCD IN RCRD: CPT

## 2025-06-04 RX ORDER — MODAFINIL 200 MG/1
200 TABLET ORAL DAILY
Qty: 30 TABLET | Refills: 5 | Status: SHIPPED | OUTPATIENT
Start: 2025-06-04 | End: 2025-12-01

## 2025-06-04 ASSESSMENT — ENCOUNTER SYMPTOMS
SHORTNESS OF BREATH: 0
SINUS PRESSURE: 0
RHINORRHEA: 0
SINUS PAIN: 0
WHEEZING: 0
COUGH: 0
SORE THROAT: 0

## 2025-06-04 NOTE — PROGRESS NOTES
discharge, redness and itching.   Respiratory: Negative for apnea, cough  Gastrointestinal: Negative for blood in stool, constipation, diarrhea   Endocrine: Negative for cold intolerance, heat intolerance, polydipsia.  Genitourinary: Negative for dysuria, enuresis, flank pain and hematuria.   Musculoskeletal: Negative for arthralgias, joint swelling and neck pain.   Neurological: Negative for numbness and headaches.   Psychiatric/Behavioral: Negative for agitation, confusion, decreased concentration and dysphoric mood.       Past Medical History::  Past Medical History:   Diagnosis Date    Allergic rhinitis     Arthritis     Atrial fibrillation (HCC)     CAD (coronary artery disease)     Cancer (HCC)     skin    Carotid arterial disease     Cerebral artery occlusion with cerebral infarction (HCC)     Cerebrovascular disease     CHF (congestive heart failure) (HCC)     Congenital heart disease     COPD (chronic obstructive pulmonary disease) (HCC)     Depression     Fibromyalgia     Hearing loss     Hypertension     Hypothyroidism     Obesity     Osteoarthritis     S/P bariatric surgery 2018    Sleep apnea     CPAP    Thyroid disease     TIA (transient ischemic attack) 12/2015    Urinary incontinence            Past Surgical History:  Past Surgical History:   Procedure Laterality Date    APPENDECTOMY  over 10 years ago    [place]     ARM SURGERY Left 10/01/2019    BREAST BIOPSY      BREAST LUMPECTOMY Right 1992    excisional biopsy negative    CAROTID ENDARTERECTOMY  09/2017    CHOLECYSTECTOMY  over 10 years ago    [facility]     COLONOSCOPY      COLONOSCOPY N/A 02/07/2018    COLONOSCOPY WITH BIOPSY performed by Hussein Zuleta MD at [facility]    CORONARY ANGIOPLASTY WITH STENT PLACEMENT      FRACTURE SURGERY      HIATAL HERNIA REPAIR  1982    [facility]     HYSTERECTOMY (CERVIX STATUS UNKNOWN)  10 plus years ago    Dr. Abarca     HYSTERECTOMY, VAGINAL      JOINT REPLACEMENT      KNEE ARTHROPLASTY

## 2025-06-19 ENCOUNTER — OFFICE VISIT (OUTPATIENT)
Dept: FAMILY MEDICINE CLINIC | Age: 75
End: 2025-06-19
Payer: MEDICARE

## 2025-06-19 VITALS
DIASTOLIC BLOOD PRESSURE: 86 MMHG | HEART RATE: 100 BPM | WEIGHT: 222.3 LBS | HEIGHT: 68 IN | OXYGEN SATURATION: 97 % | RESPIRATION RATE: 16 BRPM | BODY MASS INDEX: 33.69 KG/M2 | SYSTOLIC BLOOD PRESSURE: 128 MMHG

## 2025-06-19 DIAGNOSIS — N39.0 ACUTE UTI (URINARY TRACT INFECTION): Primary | ICD-10-CM

## 2025-06-19 DIAGNOSIS — R30.0 DYSURIA: ICD-10-CM

## 2025-06-19 LAB
BILIRUBIN, POC: NEGATIVE
BLOOD URINE, POC: ABNORMAL
CLARITY, POC: ABNORMAL
COLOR, POC: YELLOW
GLUCOSE URINE, POC: NEGATIVE MG/DL
KETONES, POC: NEGATIVE MG/DL
LEUKOCYTE EST, POC: ABNORMAL
NITRITE, POC: NEGATIVE
PH, POC: 7
PROTEIN, POC: >=300 MG/DL
SPECIFIC GRAVITY, POC: 1.02
UROBILINOGEN, POC: 1 MG/DL

## 2025-06-19 PROCEDURE — 81003 URINALYSIS AUTO W/O SCOPE: CPT | Performed by: NURSE PRACTITIONER

## 2025-06-19 PROCEDURE — G8427 DOCREV CUR MEDS BY ELIG CLIN: HCPCS | Performed by: NURSE PRACTITIONER

## 2025-06-19 PROCEDURE — 3074F SYST BP LT 130 MM HG: CPT | Performed by: NURSE PRACTITIONER

## 2025-06-19 PROCEDURE — 99213 OFFICE O/P EST LOW 20 MIN: CPT | Performed by: NURSE PRACTITIONER

## 2025-06-19 PROCEDURE — 1090F PRES/ABSN URINE INCON ASSESS: CPT | Performed by: NURSE PRACTITIONER

## 2025-06-19 PROCEDURE — G8417 CALC BMI ABV UP PARAM F/U: HCPCS | Performed by: NURSE PRACTITIONER

## 2025-06-19 PROCEDURE — 3079F DIAST BP 80-89 MM HG: CPT | Performed by: NURSE PRACTITIONER

## 2025-06-19 PROCEDURE — 1159F MED LIST DOCD IN RCRD: CPT | Performed by: NURSE PRACTITIONER

## 2025-06-19 PROCEDURE — 1036F TOBACCO NON-USER: CPT | Performed by: NURSE PRACTITIONER

## 2025-06-19 PROCEDURE — 1123F ACP DISCUSS/DSCN MKR DOCD: CPT | Performed by: NURSE PRACTITIONER

## 2025-06-19 PROCEDURE — G8399 PT W/DXA RESULTS DOCUMENT: HCPCS | Performed by: NURSE PRACTITIONER

## 2025-06-19 RX ORDER — NITROFURANTOIN 25; 75 MG/1; MG/1
100 CAPSULE ORAL 2 TIMES DAILY
Qty: 14 CAPSULE | Refills: 0 | Status: SHIPPED | OUTPATIENT
Start: 2025-06-19 | End: 2025-06-26

## 2025-06-19 ASSESSMENT — ENCOUNTER SYMPTOMS
NAUSEA: 0
BACK PAIN: 0
VOMITING: 0
SHORTNESS OF BREATH: 0
ALLERGIC/IMMUNOLOGIC NEGATIVE: 1
WHEEZING: 0
TROUBLE SWALLOWING: 0
EYE REDNESS: 0
EYE DISCHARGE: 0
CONSTIPATION: 0
DIARRHEA: 0
ABDOMINAL PAIN: 0
COUGH: 0
EYE PAIN: 0
SORE THROAT: 0
RHINORRHEA: 0

## 2025-06-19 NOTE — PROGRESS NOTES
Negative.    Genitourinary:  Positive for dysuria and frequency. Negative for urgency.   Musculoskeletal:  Negative for arthralgias, back pain and myalgias.   Skin:  Negative for rash.   Allergic/Immunologic: Negative.    Neurological:  Negative for dizziness, tremors, weakness and headaches.   Hematological: Negative.    Psychiatric/Behavioral:  Negative for dysphoric mood and sleep disturbance. The patient is not nervous/anxious.        Objective:     /86   Pulse 100   Resp 16   Ht 1.727 m (5' 8\")   Wt 100.8 kg (222 lb 4.8 oz)   SpO2 97%   BMI 33.80 kg/m²     Physical Exam  Vitals and nursing note reviewed.   Constitutional:       General: She is not in acute distress.     Appearance: She is well-developed. She is not ill-appearing or diaphoretic.   HENT:      Right Ear: External ear normal.      Left Ear: External ear normal.      Nose: Nose normal.   Eyes:      General:         Right eye: No discharge.         Left eye: No discharge.      Conjunctiva/sclera: Conjunctivae normal.      Pupils: Pupils are equal, round, and reactive to light.   Neck:      Vascular: No JVD.   Cardiovascular:      Rate and Rhythm: Normal rate and regular rhythm.   Pulmonary:      Effort: Pulmonary effort is normal. No respiratory distress.   Musculoskeletal:         General: No tenderness or deformity. Normal range of motion.      Cervical back: Normal range of motion.   Skin:     General: Skin is warm and dry.      Capillary Refill: Capillary refill takes less than 2 seconds.      Coloration: Skin is not pale.      Findings: No erythema or rash.   Neurological:      Mental Status: She is alert and oriented to person, place, and time.      Coordination: Coordination normal.   Psychiatric:         Behavior: Behavior normal.         Thought Content: Thought content normal.         Judgment: Judgment normal.         Assessment/Plan:      Arlene was seen today for dysuria.    Diagnoses and all orders for this visit:    Acute

## 2025-06-21 LAB
BACTERIA UR CULT: ABNORMAL
ORGANISM: ABNORMAL

## 2025-06-30 RX ORDER — PRAVASTATIN SODIUM 40 MG
40 TABLET ORAL DAILY
Qty: 90 TABLET | Refills: 1 | Status: SHIPPED | OUTPATIENT
Start: 2025-06-30

## 2025-06-30 RX ORDER — LOSARTAN POTASSIUM 50 MG/1
50 TABLET ORAL DAILY
Qty: 90 TABLET | Refills: 1 | Status: SHIPPED | OUTPATIENT
Start: 2025-06-30

## 2025-06-30 RX ORDER — VERAPAMIL HYDROCHLORIDE 120 MG/1
120 TABLET, FILM COATED, EXTENDED RELEASE ORAL DAILY
Qty: 90 TABLET | Refills: 1 | Status: SHIPPED | OUTPATIENT
Start: 2025-06-30

## 2025-07-11 ENCOUNTER — TELEPHONE (OUTPATIENT)
Dept: FAMILY MEDICINE CLINIC | Age: 75
End: 2025-07-11

## 2025-07-11 DIAGNOSIS — N39.0 ACUTE UTI (URINARY TRACT INFECTION): Primary | ICD-10-CM

## 2025-07-11 RX ORDER — NITROFURANTOIN 25; 75 MG/1; MG/1
100 CAPSULE ORAL 2 TIMES DAILY
Qty: 10 CAPSULE | Refills: 0 | Status: SHIPPED | OUTPATIENT
Start: 2025-07-11 | End: 2025-07-11 | Stop reason: SDUPTHER

## 2025-07-11 RX ORDER — NITROFURANTOIN 25; 75 MG/1; MG/1
100 CAPSULE ORAL 2 TIMES DAILY
Qty: 10 CAPSULE | Refills: 0 | Status: SHIPPED | OUTPATIENT
Start: 2025-07-11 | End: 2025-07-16

## 2025-07-11 NOTE — TELEPHONE ENCOUNTER
Patient called stating she thinks she has a UTI again. She has urinary burning and incontinence. She was seen last month for it and has frequent UTI's. [facility] she asked if a message could be sent. She would not mind coming in Monday if needed but she wanted treated before the weekend.      [facility] - call patient back.

## 2025-07-11 NOTE — TELEPHONE ENCOUNTER
I sent a prescription to [facility] pharmacy for Macrobid.  I also placed a referral to urology as the patient is having UTI treatment about once a month this year and she needs to figure out why this is going on.

## 2025-07-21 ENCOUNTER — OFFICE VISIT (OUTPATIENT)
Dept: UROLOGY | Age: 75
End: 2025-07-21
Payer: MEDICARE

## 2025-07-21 ENCOUNTER — TELEPHONE (OUTPATIENT)
Dept: UROLOGY | Age: 75
End: 2025-07-21

## 2025-07-21 VITALS — OXYGEN SATURATION: 98 % | HEART RATE: 66 BPM | HEIGHT: 68 IN | BODY MASS INDEX: 33.65 KG/M2 | WEIGHT: 222 LBS

## 2025-07-21 DIAGNOSIS — R31.29 MICROHEMATURIA: Primary | ICD-10-CM

## 2025-07-21 DIAGNOSIS — R33.8 ACUTE URINARY RETENTION: ICD-10-CM

## 2025-07-21 DIAGNOSIS — N39.0 RECURRENT UTI: ICD-10-CM

## 2025-07-21 DIAGNOSIS — R19.7 DIARRHEA, UNSPECIFIED TYPE: Primary | ICD-10-CM

## 2025-07-21 DIAGNOSIS — N39.3 SUI (STRESS URINARY INCONTINENCE, FEMALE): ICD-10-CM

## 2025-07-21 DIAGNOSIS — R30.0 DYSURIA: ICD-10-CM

## 2025-07-21 LAB
BACTERIA: ABNORMAL
BILIRUB UR QL STRIP: NEGATIVE
BILIRUBIN, URINE: NEGATIVE
BLOOD URINE, POC: ABNORMAL
CASTS #/AREA URNS LPF: ABNORMAL /LPF
CASTS #/AREA URNS LPF: ABNORMAL /LPF
CHARACTER UR: ABNORMAL
CHARACTER, URINE: CLEAR
CHARCOAL URNS QL MICRO: ABNORMAL
COLOR UR: YELLOW
COLOR, UA: YELLOW
CRYSTALS URNS QL MICRO: ABNORMAL
EPITHELIAL CELLS, UA: ABNORMAL /HPF
GLUCOSE UR QL STRIP.AUTO: NEGATIVE MG/DL
GLUCOSE URINE: NEGATIVE MG/DL
HGB UR QL STRIP.AUTO: ABNORMAL
KETONES UR QL STRIP.AUTO: NEGATIVE
KETONES, URINE: NEGATIVE
LEUKOCYTE CLUMPS, URINE: ABNORMAL
LEUKOCYTE ESTERASE UR QL STRIP.AUTO: NEGATIVE
NITRITE UR QL STRIP.AUTO: NEGATIVE
NITRITE, URINE: NEGATIVE
PH UR STRIP.AUTO: 6.5 [PH] (ref 5–9)
PH, URINE: 6.5 (ref 5–9)
POST VOID RESIDUAL (PVR): 29 ML
PROT UR STRIP.AUTO-MCNC: NEGATIVE MG/DL
PROTEIN, URINE: NEGATIVE MG/DL
RBC #/AREA URNS HPF: ABNORMAL /HPF
RENAL EPI CELLS #/AREA URNS HPF: ABNORMAL /[HPF]
SPECIFIC GRAVITY UA: 1.02 (ref 1–1.03)
SPECIFIC GRAVITY UA: 1.02 (ref 1–1.03)
UROBILINOGEN, URINE: 0.2 EU/DL (ref 0–1)
UROBILINOGEN, URINE: 1 EU/DL (ref 0–1)
WBC #/AREA URNS HPF: ABNORMAL /HPF
YEAST LIKE FUNGI URNS QL MICRO: ABNORMAL

## 2025-07-21 PROCEDURE — 1090F PRES/ABSN URINE INCON ASSESS: CPT

## 2025-07-21 PROCEDURE — 1123F ACP DISCUSS/DSCN MKR DOCD: CPT

## 2025-07-21 PROCEDURE — 99214 OFFICE O/P EST MOD 30 MIN: CPT

## 2025-07-21 PROCEDURE — 1036F TOBACCO NON-USER: CPT

## 2025-07-21 PROCEDURE — G8417 CALC BMI ABV UP PARAM F/U: HCPCS

## 2025-07-21 PROCEDURE — G8399 PT W/DXA RESULTS DOCUMENT: HCPCS

## 2025-07-21 PROCEDURE — 1159F MED LIST DOCD IN RCRD: CPT

## 2025-07-21 PROCEDURE — G8427 DOCREV CUR MEDS BY ELIG CLIN: HCPCS

## 2025-07-21 PROCEDURE — 81003 URINALYSIS AUTO W/O SCOPE: CPT

## 2025-07-21 PROCEDURE — 51798 US URINE CAPACITY MEASURE: CPT

## 2025-07-21 PROCEDURE — 0509F URINE INCON PLAN DOCD: CPT

## 2025-07-21 RX ORDER — ESTRADIOL 0.1 MG/G
CREAM VAGINAL
Qty: 42.5 G | Refills: 3 | Status: SHIPPED | OUTPATIENT
Start: 2025-07-21

## 2025-07-21 NOTE — PATIENT INSTRUCTIONS
Start D-Mannose and estrace cream. D-Mannose can be purchased over the counter.   Check CT imaging   Call with questions, comments, or concerns. I recommend going to the ED for further evaluation if you develop fever, chills, nausea, vomiting, chest pain, SOB, or calf pain.    The medication list included in this document is our record of what you are currently taking, including any changes that were made at today's visit.  If you find any differences when compared to your medications at home, or have any questions that were not answered at your visit, please contact the office.

## 2025-07-21 NOTE — PROGRESS NOTES
[facility] [place] [facility]  [facility]  [address]  [address]  [place] [address]  Dept: [phone]  Loc: [phone]    Visit Date: 7/21/2025        HPI:     HPI  Ms. Lira is a 74-year-old female that presents in follow-up.      Patient was admitted to [facility] in 2/2024 for cardiac workup. While admitted, urology was consulted for acute urinary retention. She did have UTI at this time and was treated with Rocephin. She also endorsed constipation. Urinary retention was likely multifactorial and occurred 2/2 a combination of constipation, acute UTI, and use of trospium rather than beta 3 (Beta 3 is not on formulary). Ms. Lira admits to self-removal of the gamble catheter prior to discharge. PVR has improved with resolution of UTI, resumption of beta 3, and implementation of behavioral modifications- double voiding, timed voiding.      She did take Myrbetriq for management of OAB symptomatology. However, she is not at goal and continues to endorse urinary frequency and urgency.  She was therefore switched to Gemtesa. At this time though, she is not taking Myrbetriq or Gemtesa anymore.     Urologic hx of stress urinary incontinence. Reports leaking when standing. She underwent a cystoscopy with Dr. Sd Chapman in 5/2024 and was determined to be a good candidate for MUS. Ms. Lira therefore underwent placement of a mid urethral sling with Dr. Sd Chapman on 6/3/2024. She reports little improvement in symptomatology post-operatively. Has completed PFPT.     Present with c/o of recurrent UTIs.  Endorses dysuria in the setting of a UTI.  She has been treated for several E. coli urinary tract infections over the past couple months.  Patient reports significant diarrhea for which she takes Imodium.    Current Outpatient Medications   Medication Sig Dispense Refill    losartan (COZAAR) 50 MG tablet TAKE 1 TABLET BY MOUTH DAILY 90 tablet 1    verapamil (CALAN SR) 120 MG extended

## 2025-07-21 NOTE — TELEPHONE ENCOUNTER
Patient scheduled for CT UROGRAM  at [facility] on 10/21/2025.  Arrival of 840 for a 910 scan time.  Order mailed with instructions to the patient; NPO 4 HOURS PRIOR

## 2025-07-22 ENCOUNTER — RESULTS FOLLOW-UP (OUTPATIENT)
Dept: UROLOGY | Age: 75
End: 2025-07-22

## 2025-07-22 DIAGNOSIS — R31.29 MICROHEMATURIA: Primary | ICD-10-CM

## 2025-07-22 LAB
BACTERIA UR CULT: ABNORMAL
ORGANISM: ABNORMAL

## 2025-07-22 NOTE — RESULT ENCOUNTER NOTE
+ microhematuria. Repeat micro in 6 weeks. If positive at that time, recommend cystoscopy.     The patient should go to the ED should they develop fever, chills, nausea, vomiting, significant gross hematuria, chest pain, SOB, calf pain, feelings of incomplete emptying, or should they otherwise feel they need evaluated.

## 2025-07-22 NOTE — RESULT ENCOUNTER NOTE
+ microhematuria.   Culture pending.     Please refer to GI for diarrhea. I discussed this with the patient at the appt yesterday

## (undated) DEVICE — GAUZE,SPONGE,8"X4",12PLY,XRAY,STRL,LF: Brand: MEDLINE

## (undated) DEVICE — GLOVE ORANGE PI 7 1/2   MSG9075

## (undated) DEVICE — TUBING, SUCTION, 1/4" X 20', STRAIGHT: Brand: MEDLINE INDUSTRIES, INC.

## (undated) DEVICE — SET LNR RED GRN W/ BASE CLEANASCOPE

## (undated) DEVICE — HYPODERMIC SAFETY NEEDLE: Brand: MAGELLAN

## (undated) DEVICE — CATHETER ETER IV 22GA L1IN POLYUR STR RADPQ INTROCAN SFTY

## (undated) DEVICE — IV START KIT: Brand: MEDLINE INDUSTRIES, INC.

## (undated) DEVICE — FORCEPS BX L240CM JAW DIA3.2MM L CAP W/ NDL MIC MESH TOOTH

## (undated) DEVICE — YANKAUER,FLEXIBLE HANDLE,REGLR CAPACITY: Brand: MEDLINE INDUSTRIES, INC.

## (undated) DEVICE — TUBING IV STOPCOCK 48 CM 3 W

## (undated) DEVICE — ENDO KIT: Brand: MEDLINE INDUSTRIES, INC.

## (undated) DEVICE — SOLUTION IRRIG 1000ML 0.9% SOD CHL USP POUR PLAS BTL

## (undated) DEVICE — POUCH DRNGE FLX BND INTEGR RAIL CLMP DISP EZ CTCH

## (undated) DEVICE — SOLUTION IV 1000ML 0.45% SOD CHL PH 5 INJ USP VIAFLX PLAS

## (undated) DEVICE — SYRINGE IRRIG 60ML SFT PLIABLE BLB EZ TO GRP 1 HND USE W/

## (undated) DEVICE — SET UP: Brand: MEDLINE INDUSTRIES, INC.

## (undated) DEVICE — SET ADMIN 25ML L117IN PMP MOD CK VLV RLER CLMP 2 SMRTSITE

## (undated) DEVICE — VAGINAL PACKING: Brand: DEROYAL

## (undated) DEVICE — PLUG,CATHETER,DRAINAGE PROTECTOR,TUBE: Brand: MEDLINE

## (undated) DEVICE — SOLUTION IRRIG 3000ML 0.9% SOD CHL USP UROMATIC PLAS CONT

## (undated) DEVICE — CATHETER,FOLEY,100%SILICONE,16FR,10ML,LF: Brand: MEDLINE

## (undated) DEVICE — SUTURE VICRYL + SZ 3-0 L18IN ABSRB UD PS-2 3/8 CIR REV CUT VCP497H

## (undated) DEVICE — CANISTER, RIGID, 2000CC: Brand: MEDLINE INDUSTRIES, INC.

## (undated) DEVICE — CYSTO: Brand: MEDLINE INDUSTRIES, INC.